# Patient Record
Sex: MALE | Race: WHITE | Employment: OTHER | URBAN - METROPOLITAN AREA
[De-identification: names, ages, dates, MRNs, and addresses within clinical notes are randomized per-mention and may not be internally consistent; named-entity substitution may affect disease eponyms.]

---

## 2017-04-17 DIAGNOSIS — I10 ESSENTIAL HYPERTENSION, BENIGN: ICD-10-CM

## 2017-04-17 DIAGNOSIS — J30.9 ALLERGIC RHINITIS: ICD-10-CM

## 2017-04-17 RX ORDER — HYDROCHLOROTHIAZIDE 25 MG/1
TABLET ORAL
Qty: 90 TABLET | Refills: 0 | Status: SHIPPED | OUTPATIENT
Start: 2017-04-17 | End: 2017-07-21 | Stop reason: SDUPTHER

## 2017-04-17 RX ORDER — LORATADINE 10 MG/1
10 TABLET ORAL DAILY
Qty: 90 TABLET | Refills: 0 | Status: SHIPPED | OUTPATIENT
Start: 2017-04-17 | End: 2017-07-21 | Stop reason: SDUPTHER

## 2017-04-17 RX ORDER — FLUTICASONE PROPIONATE 50 MCG
1 SPRAY, SUSPENSION (ML) NASAL DAILY
Qty: 3 BOTTLE | Refills: 0 | Status: SHIPPED | OUTPATIENT
Start: 2017-04-17 | End: 2017-07-21 | Stop reason: SDUPTHER

## 2017-05-01 ENCOUNTER — OFFICE VISIT (OUTPATIENT)
Dept: FAMILY MEDICINE CLINIC | Age: 57
End: 2017-05-01

## 2017-05-01 VITALS
OXYGEN SATURATION: 98 % | TEMPERATURE: 98.4 F | BODY MASS INDEX: 49.44 KG/M2 | HEART RATE: 86 BPM | RESPIRATION RATE: 16 BRPM | HEIGHT: 67 IN | DIASTOLIC BLOOD PRESSURE: 101 MMHG | SYSTOLIC BLOOD PRESSURE: 147 MMHG | WEIGHT: 315 LBS

## 2017-05-01 DIAGNOSIS — I10 ESSENTIAL HYPERTENSION, BENIGN: Primary | ICD-10-CM

## 2017-05-01 DIAGNOSIS — E78.5 HYPERLIPIDEMIA WITH TARGET LDL LESS THAN 100: ICD-10-CM

## 2017-05-01 DIAGNOSIS — K76.0 FATTY LIVER DISEASE, NONALCOHOLIC: ICD-10-CM

## 2017-05-01 DIAGNOSIS — Z12.11 COLON CANCER SCREENING: ICD-10-CM

## 2017-05-01 DIAGNOSIS — R73.01 IMPAIRED FASTING GLUCOSE: ICD-10-CM

## 2017-05-01 DIAGNOSIS — E66.01 MORBID OBESITY WITH BMI OF 50.0-59.9, ADULT (HCC): ICD-10-CM

## 2017-05-01 DIAGNOSIS — Z12.5 PROSTATE CANCER SCREENING: Primary | ICD-10-CM

## 2017-05-01 DIAGNOSIS — J30.1 SEASONAL ALLERGIC RHINITIS DUE TO POLLEN: ICD-10-CM

## 2017-05-01 DIAGNOSIS — I10 ESSENTIAL HYPERTENSION, BENIGN: ICD-10-CM

## 2017-05-01 LAB
A/G RATIO: 1.8 (ref 1.1–2.2)
ALBUMIN SERPL-MCNC: 4.6 G/DL (ref 3.4–5)
ALP BLD-CCNC: 68 U/L (ref 40–129)
ALT SERPL-CCNC: 37 U/L (ref 10–40)
ANION GAP SERPL CALCULATED.3IONS-SCNC: 15 MMOL/L (ref 3–16)
AST SERPL-CCNC: 21 U/L (ref 15–37)
BILIRUB SERPL-MCNC: 0.3 MG/DL (ref 0–1)
BUN BLDV-MCNC: 16 MG/DL (ref 7–20)
CALCIUM SERPL-MCNC: 9.7 MG/DL (ref 8.3–10.6)
CHLORIDE BLD-SCNC: 101 MMOL/L (ref 99–110)
CHOLESTEROL, TOTAL: 221 MG/DL (ref 0–199)
CO2: 27 MMOL/L (ref 21–32)
CREAT SERPL-MCNC: 0.8 MG/DL (ref 0.9–1.3)
GFR AFRICAN AMERICAN: >60
GFR NON-AFRICAN AMERICAN: >60
GLOBULIN: 2.5 G/DL
GLUCOSE BLD-MCNC: 93 MG/DL (ref 70–99)
HDLC SERPL-MCNC: 49 MG/DL (ref 40–60)
LDL CHOLESTEROL CALCULATED: 116 MG/DL
POTASSIUM SERPL-SCNC: 4.8 MMOL/L (ref 3.5–5.1)
PROSTATE SPECIFIC ANTIGEN: 0.78 NG/ML (ref 0–4)
SODIUM BLD-SCNC: 143 MMOL/L (ref 136–145)
TOTAL PROTEIN: 7.1 G/DL (ref 6.4–8.2)
TRIGL SERPL-MCNC: 278 MG/DL (ref 0–150)
VLDLC SERPL CALC-MCNC: 56 MG/DL

## 2017-05-01 PROCEDURE — 99214 OFFICE O/P EST MOD 30 MIN: CPT | Performed by: FAMILY MEDICINE

## 2017-05-01 RX ORDER — LISINOPRIL 5 MG/1
5 TABLET ORAL DAILY
Qty: 90 TABLET | Refills: 0 | Status: SHIPPED | OUTPATIENT
Start: 2017-05-01 | End: 2017-05-30 | Stop reason: SDUPTHER

## 2017-05-01 ASSESSMENT — ENCOUNTER SYMPTOMS
ABDOMINAL DISTENTION: 0
COUGH: 0
STRIDOR: 0
APNEA: 0
WHEEZING: 0
CHOKING: 0
ABDOMINAL PAIN: 0
CONSTIPATION: 0
RECTAL PAIN: 0
NAUSEA: 0
SHORTNESS OF BREATH: 0
CHEST TIGHTNESS: 0
DIARRHEA: 0
BLOOD IN STOOL: 0
VOMITING: 0
ANAL BLEEDING: 0

## 2017-05-02 LAB
ESTIMATED AVERAGE GLUCOSE: 116.9 MG/DL
HBA1C MFR BLD: 5.7 %

## 2017-05-30 ENCOUNTER — OFFICE VISIT (OUTPATIENT)
Dept: FAMILY MEDICINE CLINIC | Age: 57
End: 2017-05-30

## 2017-05-30 VITALS
DIASTOLIC BLOOD PRESSURE: 95 MMHG | WEIGHT: 315 LBS | RESPIRATION RATE: 16 BRPM | HEART RATE: 84 BPM | BODY MASS INDEX: 49.44 KG/M2 | TEMPERATURE: 97.8 F | SYSTOLIC BLOOD PRESSURE: 144 MMHG | OXYGEN SATURATION: 98 % | HEIGHT: 67 IN

## 2017-05-30 DIAGNOSIS — I10 ESSENTIAL HYPERTENSION: Primary | ICD-10-CM

## 2017-05-30 DIAGNOSIS — E66.01 MORBID OBESITY WITH BMI OF 45.0-49.9, ADULT (HCC): ICD-10-CM

## 2017-05-30 DIAGNOSIS — K76.0 FATTY LIVER DISEASE, NONALCOHOLIC: ICD-10-CM

## 2017-05-30 DIAGNOSIS — J30.1 SEASONAL ALLERGIC RHINITIS DUE TO POLLEN: ICD-10-CM

## 2017-05-30 DIAGNOSIS — E78.2 MIXED HYPERLIPIDEMIA: ICD-10-CM

## 2017-05-30 DIAGNOSIS — R73.01 IMPAIRED FASTING GLUCOSE: ICD-10-CM

## 2017-05-30 PROCEDURE — 99214 OFFICE O/P EST MOD 30 MIN: CPT | Performed by: FAMILY MEDICINE

## 2017-05-30 RX ORDER — LISINOPRIL 10 MG/1
10 TABLET ORAL DAILY
Qty: 90 TABLET | Refills: 0 | Status: SHIPPED | OUTPATIENT
Start: 2017-05-30 | End: 2017-09-09 | Stop reason: SDUPTHER

## 2017-05-30 ASSESSMENT — ENCOUNTER SYMPTOMS
ABDOMINAL DISTENTION: 0
BLOOD IN STOOL: 0
SHORTNESS OF BREATH: 0
DIARRHEA: 0
ANAL BLEEDING: 0
COUGH: 0
NAUSEA: 0
CHOKING: 0
STRIDOR: 0
APNEA: 0
WHEEZING: 0
CHEST TIGHTNESS: 0
ABDOMINAL PAIN: 0
RECTAL PAIN: 0
CONSTIPATION: 0
VOMITING: 0

## 2017-06-16 ENCOUNTER — OFFICE VISIT (OUTPATIENT)
Dept: FAMILY MEDICINE CLINIC | Age: 57
End: 2017-06-16

## 2017-06-16 VITALS
SYSTOLIC BLOOD PRESSURE: 138 MMHG | HEART RATE: 88 BPM | DIASTOLIC BLOOD PRESSURE: 82 MMHG | BODY MASS INDEX: 47.74 KG/M2 | RESPIRATION RATE: 16 BRPM | HEIGHT: 68 IN | WEIGHT: 315 LBS

## 2017-06-16 DIAGNOSIS — E78.5 HYPERLIPIDEMIA WITH TARGET LDL LESS THAN 100: ICD-10-CM

## 2017-06-16 DIAGNOSIS — J30.1 SEASONAL ALLERGIC RHINITIS DUE TO POLLEN: ICD-10-CM

## 2017-06-16 DIAGNOSIS — E66.01 MORBID OBESITY WITH BMI OF 45.0-49.9, ADULT (HCC): ICD-10-CM

## 2017-06-16 DIAGNOSIS — I10 ESSENTIAL HYPERTENSION: Primary | ICD-10-CM

## 2017-06-16 PROCEDURE — 99213 OFFICE O/P EST LOW 20 MIN: CPT | Performed by: FAMILY MEDICINE

## 2017-06-16 ASSESSMENT — ENCOUNTER SYMPTOMS
DIARRHEA: 0
NAUSEA: 0
VOMITING: 0
STRIDOR: 0
ABDOMINAL PAIN: 0
RECTAL PAIN: 0
WHEEZING: 0
CONSTIPATION: 0
APNEA: 0
ABDOMINAL DISTENTION: 0
BLOOD IN STOOL: 0
ANAL BLEEDING: 0
SHORTNESS OF BREATH: 0
CHOKING: 0
CHEST TIGHTNESS: 0
COUGH: 0

## 2017-07-13 DIAGNOSIS — E78.2 MIXED HYPERLIPIDEMIA: ICD-10-CM

## 2017-07-13 DIAGNOSIS — R73.01 IMPAIRED FASTING GLUCOSE: ICD-10-CM

## 2017-07-13 DIAGNOSIS — I10 ESSENTIAL HYPERTENSION: ICD-10-CM

## 2017-07-13 DIAGNOSIS — E87.5 HYPERKALEMIA: Primary | ICD-10-CM

## 2017-07-13 LAB
A/G RATIO: 1.6 (ref 1.1–2.2)
ALBUMIN SERPL-MCNC: 4.5 G/DL (ref 3.4–5)
ALP BLD-CCNC: 63 U/L (ref 40–129)
ALT SERPL-CCNC: 38 U/L (ref 10–40)
ANION GAP SERPL CALCULATED.3IONS-SCNC: 14 MMOL/L (ref 3–16)
AST SERPL-CCNC: 20 U/L (ref 15–37)
BILIRUB SERPL-MCNC: <0.2 MG/DL (ref 0–1)
BUN BLDV-MCNC: 25 MG/DL (ref 7–20)
CALCIUM SERPL-MCNC: 9.5 MG/DL (ref 8.3–10.6)
CHLORIDE BLD-SCNC: 108 MMOL/L (ref 99–110)
CHOLESTEROL, TOTAL: 200 MG/DL (ref 0–199)
CO2: 24 MMOL/L (ref 21–32)
CREAT SERPL-MCNC: 0.8 MG/DL (ref 0.9–1.3)
GFR AFRICAN AMERICAN: >60
GFR NON-AFRICAN AMERICAN: >60
GLOBULIN: 2.8 G/DL
GLUCOSE BLD-MCNC: 98 MG/DL (ref 70–99)
HDLC SERPL-MCNC: 45 MG/DL (ref 40–60)
LDL CHOLESTEROL CALCULATED: 127 MG/DL
POTASSIUM SERPL-SCNC: 5.2 MMOL/L (ref 3.5–5.1)
SODIUM BLD-SCNC: 146 MMOL/L (ref 136–145)
TOTAL PROTEIN: 7.3 G/DL (ref 6.4–8.2)
TRIGL SERPL-MCNC: 142 MG/DL (ref 0–150)
VLDLC SERPL CALC-MCNC: 28 MG/DL

## 2017-07-17 DIAGNOSIS — E87.5 HYPERKALEMIA: ICD-10-CM

## 2017-07-17 LAB — POTASSIUM SERPL-SCNC: 4.5 MMOL/L (ref 3.5–5.1)

## 2017-07-21 DIAGNOSIS — I10 ESSENTIAL HYPERTENSION, BENIGN: ICD-10-CM

## 2017-07-21 DIAGNOSIS — J30.9 ALLERGIC RHINITIS: ICD-10-CM

## 2017-07-21 RX ORDER — LORATADINE 10 MG/1
10 TABLET ORAL DAILY
Qty: 90 TABLET | Refills: 0 | Status: SHIPPED | OUTPATIENT
Start: 2017-07-21 | End: 2017-10-25 | Stop reason: SDUPTHER

## 2017-07-21 RX ORDER — FLUTICASONE PROPIONATE 50 MCG
1 SPRAY, SUSPENSION (ML) NASAL DAILY
Qty: 3 BOTTLE | Refills: 0 | Status: SHIPPED | OUTPATIENT
Start: 2017-07-21 | End: 2018-08-29 | Stop reason: SDUPTHER

## 2017-07-21 RX ORDER — HYDROCHLOROTHIAZIDE 25 MG/1
TABLET ORAL
Qty: 90 TABLET | Refills: 0 | Status: SHIPPED | OUTPATIENT
Start: 2017-07-21 | End: 2017-10-25 | Stop reason: SDUPTHER

## 2017-09-09 DIAGNOSIS — I10 ESSENTIAL HYPERTENSION: ICD-10-CM

## 2017-09-09 RX ORDER — LISINOPRIL 10 MG/1
10 TABLET ORAL DAILY
Qty: 30 TABLET | Refills: 0 | Status: SHIPPED | OUTPATIENT
Start: 2017-09-09 | End: 2017-10-25 | Stop reason: SDUPTHER

## 2017-09-13 ENCOUNTER — PAT TELEPHONE (OUTPATIENT)
Dept: PREADMISSION TESTING | Age: 57
End: 2017-09-13

## 2017-09-13 VITALS — BODY MASS INDEX: 47.74 KG/M2 | HEIGHT: 68 IN | WEIGHT: 315 LBS

## 2017-09-14 ENCOUNTER — HOSPITAL ENCOUNTER (OUTPATIENT)
Dept: ENDOSCOPY | Age: 57
Discharge: HOME | End: 2017-09-14
Attending: INTERNAL MEDICINE | Admitting: INTERNAL MEDICINE

## 2017-09-14 VITALS
TEMPERATURE: 97 F | SYSTOLIC BLOOD PRESSURE: 134 MMHG | HEART RATE: 63 BPM | WEIGHT: 315 LBS | BODY MASS INDEX: 47.74 KG/M2 | DIASTOLIC BLOOD PRESSURE: 68 MMHG | HEIGHT: 68 IN | OXYGEN SATURATION: 96 % | RESPIRATION RATE: 18 BRPM

## 2017-09-14 RX ORDER — SODIUM CHLORIDE 9 MG/ML
INJECTION, SOLUTION INTRAVENOUS CONTINUOUS
Status: DISCONTINUED | OUTPATIENT
Start: 2017-09-14 | End: 2017-09-15 | Stop reason: HOSPADM

## 2017-09-14 RX ORDER — SODIUM CHLORIDE 0.9 % (FLUSH) 0.9 %
10 SYRINGE (ML) INJECTION PRN
Status: DISCONTINUED | OUTPATIENT
Start: 2017-09-14 | End: 2017-09-15 | Stop reason: HOSPADM

## 2017-09-14 RX ORDER — LABETALOL HYDROCHLORIDE 5 MG/ML
5 INJECTION, SOLUTION INTRAVENOUS EVERY 10 MIN PRN
Status: DISCONTINUED | OUTPATIENT
Start: 2017-09-14 | End: 2017-09-15 | Stop reason: HOSPADM

## 2017-09-14 RX ORDER — SODIUM CHLORIDE 0.9 % (FLUSH) 0.9 %
10 SYRINGE (ML) INJECTION EVERY 12 HOURS SCHEDULED
Status: DISCONTINUED | OUTPATIENT
Start: 2017-09-14 | End: 2017-09-15 | Stop reason: HOSPADM

## 2017-09-14 RX ORDER — PROMETHAZINE HYDROCHLORIDE 25 MG/ML
6.25 INJECTION, SOLUTION INTRAMUSCULAR; INTRAVENOUS
Status: ACTIVE | OUTPATIENT
Start: 2017-09-14 | End: 2017-09-14

## 2017-09-14 RX ORDER — ONDANSETRON 2 MG/ML
4 INJECTION INTRAMUSCULAR; INTRAVENOUS
Status: ACTIVE | OUTPATIENT
Start: 2017-09-14 | End: 2017-09-14

## 2017-09-14 RX ADMIN — SODIUM CHLORIDE: 9 INJECTION, SOLUTION INTRAVENOUS at 08:23

## 2017-09-14 ASSESSMENT — PAIN DESCRIPTION - DESCRIPTORS: DESCRIPTORS: DISCOMFORT;HEADACHE

## 2017-09-14 ASSESSMENT — PAIN SCALES - GENERAL
PAINLEVEL_OUTOF10: 0

## 2017-09-14 ASSESSMENT — PAIN - FUNCTIONAL ASSESSMENT: PAIN_FUNCTIONAL_ASSESSMENT: 0-10

## 2017-10-25 DIAGNOSIS — I10 ESSENTIAL HYPERTENSION, BENIGN: ICD-10-CM

## 2017-10-25 DIAGNOSIS — I10 ESSENTIAL HYPERTENSION: ICD-10-CM

## 2017-10-25 RX ORDER — LISINOPRIL 10 MG/1
10 TABLET ORAL DAILY
Qty: 30 TABLET | Refills: 0 | Status: SHIPPED | OUTPATIENT
Start: 2017-10-25 | End: 2017-11-21 | Stop reason: SDUPTHER

## 2017-10-25 RX ORDER — HYDROCHLOROTHIAZIDE 25 MG/1
TABLET ORAL
Qty: 90 TABLET | Refills: 0 | Status: SHIPPED | OUTPATIENT
Start: 2017-10-25 | End: 2018-01-23 | Stop reason: SDUPTHER

## 2017-10-25 RX ORDER — LORATADINE 10 MG/1
10 TABLET ORAL DAILY
Qty: 90 TABLET | Refills: 0 | Status: SHIPPED | OUTPATIENT
Start: 2017-10-25 | End: 2018-01-23 | Stop reason: SDUPTHER

## 2017-10-26 DIAGNOSIS — I10 ESSENTIAL HYPERTENSION, BENIGN: ICD-10-CM

## 2017-10-26 DIAGNOSIS — I10 ESSENTIAL HYPERTENSION: ICD-10-CM

## 2017-10-26 RX ORDER — LISINOPRIL 10 MG/1
10 TABLET ORAL DAILY
Qty: 30 TABLET | Refills: 0 | OUTPATIENT
Start: 2017-10-26

## 2017-10-26 RX ORDER — HYDROCHLOROTHIAZIDE 25 MG/1
TABLET ORAL
Qty: 90 TABLET | Refills: 0 | OUTPATIENT
Start: 2017-10-26

## 2017-10-26 RX ORDER — LORATADINE 10 MG/1
10 TABLET ORAL DAILY
Qty: 90 TABLET | Refills: 0 | OUTPATIENT
Start: 2017-10-26

## 2017-10-26 NOTE — TELEPHONE ENCOUNTER
From: Keanu Chavez  Sent: 10/25/2017 5:27 PM EDT  Subject: Medication Renewal Request    Keanu Chavez would like a refill of the following medications:  loratadine (CLARITIN) 10 MG tablet Tracy Perez MD]  hydrochlorothiazide (HYDRODIURIL) 25 MG tablet Tracy Perez MD]  lisinopril (PRINIVIL;ZESTRIL) 10 MG tablet Tracy Perez MD]    Preferred pharmacy: [address] [place] [address], [address] [place] [address] [phone] - F [phone]    Comment:  Need 90 day supply on all 3.  Sent this request in yesterday but was nit sure it went thru

## 2017-11-21 DIAGNOSIS — I10 ESSENTIAL HYPERTENSION: ICD-10-CM

## 2017-11-21 RX ORDER — LISINOPRIL 10 MG/1
10 TABLET ORAL DAILY
Qty: 30 TABLET | Refills: 0 | Status: SHIPPED | OUTPATIENT
Start: 2017-11-21 | End: 2017-11-21 | Stop reason: SDUPTHER

## 2017-11-21 RX ORDER — LISINOPRIL 10 MG/1
10 TABLET ORAL DAILY
Qty: 90 TABLET | Refills: 0 | Status: SHIPPED | OUTPATIENT
Start: 2017-11-21 | End: 2018-02-20 | Stop reason: SDUPTHER

## 2018-01-23 DIAGNOSIS — I10 ESSENTIAL HYPERTENSION, BENIGN: ICD-10-CM

## 2018-01-23 RX ORDER — HYDROCHLOROTHIAZIDE 25 MG/1
TABLET ORAL
Qty: 90 TABLET | Refills: 0 | Status: SHIPPED | OUTPATIENT
Start: 2018-01-23 | End: 2018-01-23 | Stop reason: SDUPTHER

## 2018-01-23 RX ORDER — LORATADINE 10 MG/1
10 TABLET ORAL DAILY
Qty: 90 TABLET | Refills: 0 | Status: SHIPPED | OUTPATIENT
Start: 2018-01-23 | End: 2018-01-23 | Stop reason: SDUPTHER

## 2018-01-23 RX ORDER — HYDROCHLOROTHIAZIDE 25 MG/1
TABLET ORAL
Qty: 30 TABLET | Refills: 0 | Status: SHIPPED | OUTPATIENT
Start: 2018-01-23 | End: 2018-02-20 | Stop reason: SDUPTHER

## 2018-01-23 RX ORDER — LORATADINE 10 MG/1
10 TABLET ORAL DAILY
Qty: 30 TABLET | Refills: 0 | Status: SHIPPED | OUTPATIENT
Start: 2018-01-23 | End: 2018-02-20 | Stop reason: SDUPTHER

## 2018-02-20 DIAGNOSIS — I10 ESSENTIAL HYPERTENSION: ICD-10-CM

## 2018-02-20 DIAGNOSIS — I10 ESSENTIAL HYPERTENSION, BENIGN: ICD-10-CM

## 2018-02-20 RX ORDER — LORATADINE 10 MG/1
10 TABLET ORAL DAILY
Qty: 90 TABLET | Refills: 0 | Status: SHIPPED | OUTPATIENT
Start: 2018-02-20 | End: 2018-05-23 | Stop reason: SDUPTHER

## 2018-02-20 RX ORDER — HYDROCHLOROTHIAZIDE 25 MG/1
TABLET ORAL
Qty: 90 TABLET | Refills: 0 | Status: SHIPPED | OUTPATIENT
Start: 2018-02-20 | End: 2018-05-23 | Stop reason: SDUPTHER

## 2018-02-20 RX ORDER — LISINOPRIL 10 MG/1
10 TABLET ORAL DAILY
Qty: 90 TABLET | Refills: 0 | Status: SHIPPED | OUTPATIENT
Start: 2018-02-20 | End: 2018-05-23 | Stop reason: SDUPTHER

## 2018-02-21 NOTE — TELEPHONE ENCOUNTER
From: Nicky Cobian  Sent: 2/20/2018 8:02 PM EST  Subject: Medication Renewal Request    Nickysukhdeep Cobian would like a refill of the following medications:  lisinopril (PRINIVIL;ZESTRIL) 10 MG tablet Bony Porras MD]  loratadine (CLARITIN) 10 MG tablet Bony Porras MD]  hydrochlorothiazide (HYDRODIURIL) 25 MG tablet Bony Porras MD]    Preferred pharmacy: [address] [place] [address], [address] [place] [address] [phone] - F [phone]    Comment:  Need a 90 day supply on ALL 3 and they must go thru [facility] not [facility]

## 2018-02-22 DIAGNOSIS — I10 ESSENTIAL HYPERTENSION, BENIGN: ICD-10-CM

## 2018-02-22 RX ORDER — HYDROCHLOROTHIAZIDE 25 MG/1
TABLET ORAL
Qty: 30 TABLET | Refills: 0 | Status: SHIPPED | OUTPATIENT
Start: 2018-02-22 | End: 2018-10-01 | Stop reason: CLARIF

## 2018-02-22 RX ORDER — LORATADINE 10 MG/1
10 TABLET ORAL DAILY
Qty: 30 TABLET | Refills: 0 | Status: SHIPPED | OUTPATIENT
Start: 2018-02-22 | End: 2018-10-01 | Stop reason: CLARIF

## 2018-03-19 DIAGNOSIS — I10 ESSENTIAL HYPERTENSION: ICD-10-CM

## 2018-03-19 RX ORDER — LISINOPRIL 10 MG/1
10 TABLET ORAL DAILY
Qty: 30 TABLET | Refills: 0 | Status: SHIPPED | OUTPATIENT
Start: 2018-03-19 | End: 2018-10-01 | Stop reason: CLARIF

## 2018-05-23 DIAGNOSIS — I10 ESSENTIAL HYPERTENSION, BENIGN: ICD-10-CM

## 2018-05-23 DIAGNOSIS — I10 ESSENTIAL HYPERTENSION: ICD-10-CM

## 2018-05-23 RX ORDER — HYDROCHLOROTHIAZIDE 25 MG/1
TABLET ORAL
Qty: 90 TABLET | Refills: 0 | Status: SHIPPED | OUTPATIENT
Start: 2018-05-23 | End: 2018-08-29 | Stop reason: SDUPTHER

## 2018-05-23 RX ORDER — LISINOPRIL 10 MG/1
10 TABLET ORAL DAILY
Qty: 90 TABLET | Refills: 0 | Status: SHIPPED | OUTPATIENT
Start: 2018-05-23 | End: 2018-08-29 | Stop reason: SDUPTHER

## 2018-05-23 RX ORDER — LORATADINE 10 MG/1
10 TABLET ORAL DAILY
Qty: 90 TABLET | Refills: 0 | Status: SHIPPED | OUTPATIENT
Start: 2018-05-23 | End: 2018-08-29 | Stop reason: SDUPTHER

## 2018-08-29 DIAGNOSIS — I10 ESSENTIAL HYPERTENSION, BENIGN: ICD-10-CM

## 2018-08-29 DIAGNOSIS — I10 ESSENTIAL HYPERTENSION: ICD-10-CM

## 2018-08-29 DIAGNOSIS — J30.9 ALLERGIC RHINITIS: ICD-10-CM

## 2018-08-29 RX ORDER — FLUTICASONE PROPIONATE 50 MCG
1 SPRAY, SUSPENSION (ML) NASAL DAILY
Qty: 3 BOTTLE | Refills: 0 | Status: SHIPPED | OUTPATIENT
Start: 2018-08-29 | End: 2018-08-30 | Stop reason: SDUPTHER

## 2018-08-29 RX ORDER — LORATADINE 10 MG/1
10 TABLET ORAL DAILY
Qty: 90 TABLET | Refills: 0 | Status: SHIPPED | OUTPATIENT
Start: 2018-08-29 | End: 2018-08-30 | Stop reason: SDUPTHER

## 2018-08-29 RX ORDER — HYDROCHLOROTHIAZIDE 25 MG/1
TABLET ORAL
Qty: 30 TABLET | Refills: 0 | Status: SHIPPED | OUTPATIENT
Start: 2018-08-29 | End: 2018-08-30 | Stop reason: SDUPTHER

## 2018-08-29 RX ORDER — LISINOPRIL 10 MG/1
10 TABLET ORAL DAILY
Qty: 30 TABLET | Refills: 0 | Status: SHIPPED | OUTPATIENT
Start: 2018-08-29 | End: 2018-08-30 | Stop reason: SDUPTHER

## 2018-08-30 DIAGNOSIS — I10 ESSENTIAL HYPERTENSION: ICD-10-CM

## 2018-08-30 DIAGNOSIS — J30.9 ALLERGIC RHINITIS, UNSPECIFIED SEASONALITY, UNSPECIFIED TRIGGER: ICD-10-CM

## 2018-08-30 DIAGNOSIS — I10 ESSENTIAL HYPERTENSION, BENIGN: ICD-10-CM

## 2018-08-30 RX ORDER — FLUTICASONE PROPIONATE 50 MCG
1 SPRAY, SUSPENSION (ML) NASAL DAILY
Qty: 3 BOTTLE | Refills: 0 | Status: SHIPPED | OUTPATIENT
Start: 2018-08-30 | End: 2018-12-26 | Stop reason: SDUPTHER

## 2018-08-30 RX ORDER — LISINOPRIL 10 MG/1
10 TABLET ORAL DAILY
Qty: 30 TABLET | Refills: 0 | Status: SHIPPED | OUTPATIENT
Start: 2018-08-30 | End: 2018-10-01 | Stop reason: SDUPTHER

## 2018-08-30 RX ORDER — LORATADINE 10 MG/1
10 TABLET ORAL DAILY
Qty: 90 TABLET | Refills: 0 | Status: SHIPPED | OUTPATIENT
Start: 2018-08-30 | End: 2018-10-01 | Stop reason: SDUPTHER

## 2018-08-30 RX ORDER — HYDROCHLOROTHIAZIDE 25 MG/1
TABLET ORAL
Qty: 30 TABLET | Refills: 0 | Status: SHIPPED | OUTPATIENT
Start: 2018-08-30 | End: 2018-10-01 | Stop reason: SDUPTHER

## 2018-08-30 NOTE — TELEPHONE ENCOUNTER
[address]    There is a problem with his script for     hydrochlorothiazide (HYDRODIURIL) 25 MG tablet  TAKE ONE TABLET BY MOUTH ONCE DAILY, Disp-30 tablet, R-0    lisinopril (PRINIVIL;ZESTRIL) 10 MG tablet  TAKE 1 TABLET BY MOUTH DAILY, Disp-30 tablet, R-0    loratadine (CLARITIN) 10 MG tablet  TAKE 1 TABLET BY MOUTH DAILY, Disp-30 tablet, R-0    fluticasone (FLONASE) 50 MCG/ACT nasal spray  1 spray by Nasal route daily, Disp-3 Bottle, R-0    Says [facility] does not accept our insurance. want us to call it in to [facility] instead?     Last seen  6/16/2017

## 2018-08-30 NOTE — TELEPHONE ENCOUNTER
Medications have been refilled for 30days only. Last office appointment was >1year ago(6/2017). Needs to schedule an office appointment within 4weeks for medication-blood pressure check & further refills.

## 2018-10-01 ENCOUNTER — OFFICE VISIT (OUTPATIENT)
Dept: FAMILY MEDICINE CLINIC | Age: 58
End: 2018-10-01
Payer: COMMERCIAL

## 2018-10-01 VITALS
BODY MASS INDEX: 47.74 KG/M2 | TEMPERATURE: 98.6 F | WEIGHT: 315 LBS | OXYGEN SATURATION: 99 % | DIASTOLIC BLOOD PRESSURE: 82 MMHG | HEART RATE: 81 BPM | SYSTOLIC BLOOD PRESSURE: 122 MMHG | HEIGHT: 68 IN | RESPIRATION RATE: 16 BRPM

## 2018-10-01 DIAGNOSIS — J30.1 SEASONAL ALLERGIC RHINITIS DUE TO POLLEN: ICD-10-CM

## 2018-10-01 DIAGNOSIS — R73.03 PREDIABETES: ICD-10-CM

## 2018-10-01 DIAGNOSIS — Z12.5 SCREENING PSA (PROSTATE SPECIFIC ANTIGEN): ICD-10-CM

## 2018-10-01 DIAGNOSIS — I10 ESSENTIAL HYPERTENSION: ICD-10-CM

## 2018-10-01 DIAGNOSIS — I10 ESSENTIAL HYPERTENSION: Primary | ICD-10-CM

## 2018-10-01 DIAGNOSIS — K76.0 FATTY LIVER: ICD-10-CM

## 2018-10-01 DIAGNOSIS — E78.2 MIXED HYPERLIPIDEMIA: ICD-10-CM

## 2018-10-01 DIAGNOSIS — E66.01 OBESITY, MORBID (HCC): ICD-10-CM

## 2018-10-01 LAB
A/G RATIO: 1.5 (ref 1.1–2.2)
ALBUMIN SERPL-MCNC: 4.7 G/DL (ref 3.4–5)
ALP BLD-CCNC: 85 U/L (ref 40–129)
ALT SERPL-CCNC: 44 U/L (ref 10–40)
ANION GAP SERPL CALCULATED.3IONS-SCNC: 17 MMOL/L (ref 3–16)
AST SERPL-CCNC: 26 U/L (ref 15–37)
BILIRUB SERPL-MCNC: 0.3 MG/DL (ref 0–1)
BUN BLDV-MCNC: 26 MG/DL (ref 7–20)
CALCIUM SERPL-MCNC: 9.6 MG/DL (ref 8.3–10.6)
CHLORIDE BLD-SCNC: 102 MMOL/L (ref 99–110)
CHOLESTEROL, TOTAL: 252 MG/DL (ref 0–199)
CO2: 23 MMOL/L (ref 21–32)
CREAT SERPL-MCNC: 0.9 MG/DL (ref 0.9–1.3)
GFR AFRICAN AMERICAN: >60
GFR NON-AFRICAN AMERICAN: >60
GLOBULIN: 3.1 G/DL
GLUCOSE BLD-MCNC: 117 MG/DL (ref 70–99)
HDLC SERPL-MCNC: 49 MG/DL (ref 40–60)
LDL CHOLESTEROL CALCULATED: 166 MG/DL
POTASSIUM SERPL-SCNC: 5 MMOL/L (ref 3.5–5.1)
PROSTATE SPECIFIC ANTIGEN: 0.86 NG/ML (ref 0–4)
SODIUM BLD-SCNC: 142 MMOL/L (ref 136–145)
TOTAL PROTEIN: 7.8 G/DL (ref 6.4–8.2)
TRIGL SERPL-MCNC: 183 MG/DL (ref 0–150)
VLDLC SERPL CALC-MCNC: 37 MG/DL

## 2018-10-01 PROCEDURE — 99214 OFFICE O/P EST MOD 30 MIN: CPT | Performed by: FAMILY MEDICINE

## 2018-10-01 RX ORDER — LORATADINE 10 MG/1
10 TABLET ORAL DAILY
Qty: 90 TABLET | Refills: 0 | Status: SHIPPED | OUTPATIENT
Start: 2018-10-01 | End: 2018-12-26 | Stop reason: SDUPTHER

## 2018-10-01 RX ORDER — HYDROCHLOROTHIAZIDE 25 MG/1
TABLET ORAL
Qty: 90 TABLET | Refills: 0 | Status: SHIPPED | OUTPATIENT
Start: 2018-10-01 | End: 2018-12-26 | Stop reason: SDUPTHER

## 2018-10-01 RX ORDER — LISINOPRIL 10 MG/1
10 TABLET ORAL DAILY
Qty: 90 TABLET | Refills: 0 | Status: SHIPPED | OUTPATIENT
Start: 2018-10-01 | End: 2018-12-26 | Stop reason: SDUPTHER

## 2018-10-01 ASSESSMENT — ENCOUNTER SYMPTOMS
COLOR CHANGE: 0
VOMITING: 0
BLOOD IN STOOL: 0
CHOKING: 0
CONSTIPATION: 0
ABDOMINAL DISTENTION: 0
CHEST TIGHTNESS: 0
STRIDOR: 0
RECTAL PAIN: 0
SHORTNESS OF BREATH: 0
DIARRHEA: 0
ABDOMINAL PAIN: 0
NAUSEA: 0
WHEEZING: 0
ANAL BLEEDING: 0
COUGH: 0
APNEA: 0

## 2018-10-01 ASSESSMENT — PATIENT HEALTH QUESTIONNAIRE - PHQ9
1. LITTLE INTEREST OR PLEASURE IN DOING THINGS: 0
SUM OF ALL RESPONSES TO PHQ9 QUESTIONS 1 & 2: 0
SUM OF ALL RESPONSES TO PHQ QUESTIONS 1-9: 0
2. FEELING DOWN, DEPRESSED OR HOPELESS: 0
SUM OF ALL RESPONSES TO PHQ QUESTIONS 1-9: 0

## 2018-10-01 NOTE — PROGRESS NOTES
Subjective:      Patient ID: Flor Earl is a 66 y.o. male. Hypertension   Pertinent negatives include no chest pain, palpitations or shortness of breath. HTN:reports doing well. Takes lisinopril 10mg & 25mg HCTZ w/o side effects. Needs refill. Adds salt to food at the table:Never does  No other new associated factors. Improving with current medication. Worsened by nothing else. Denies cp/sob/pnd/ankle edema/dizziness. Hyperlipidemia:doing well. Labs are due. No other associated or worsening factors. Denies adbo pain/myalgias. Allergic rhinitis:reports doing well. No other associated concerns. Denies neck pain-stiffness/photophobia/fever/chills/appetite changes/rash/wheezing/cough/sob/sore throat/epistaxis. IFG:good diet regime. A1c due. No other new associated concerns. Denies polyuria/polyphagia/polydipsia/unexpected weight loss or gain. Fatty liver:labs due. No Known Allergies    Current Outpatient Prescriptions on File Prior to Visit   Medication Sig Dispense Refill    fluticasone (FLONASE) 50 MCG/ACT nasal spray 1 spray by Nasal route daily 3 Bottle 0    lisinopril (PRINIVIL;ZESTRIL) 10 MG tablet Take 1 tablet by mouth daily 30 tablet 0    loratadine (CLARITIN) 10 MG tablet Take 1 tablet by mouth daily 90 tablet 0    hydrochlorothiazide (HYDRODIURIL) 25 MG tablet TAKE ONE TABLET BY MOUTH ONCE DAILY 30 tablet 0    lisinopril (PRINIVIL;ZESTRIL) 10 MG tablet TAKE 1 TABLET BY MOUTH DAILY 30 tablet 0    loratadine (CLARITIN) 10 MG tablet TAKE 1 TABLET BY MOUTH DAILY 30 tablet 0    hydrochlorothiazide (HYDRODIURIL) 25 MG tablet TAKE ONE TABLET BY MOUTH ONCE DAILY 30 tablet 0    meclizine (ANTIVERT) 25 MG tablet Take 1-2 tablets every 6-8 hours as needed for dizziness 30 tablet 0     No current facility-administered medications on file prior to visit.         Past Medical History:   Diagnosis Date    Allergic rhinitis     Essential hypertension, benign     Fatty liver     Impaired fasting glucose 5/2013    Mixed hyperlipidemia      Obesity     Prostate cancer screening     Declined 4/3/13    Screening PSA (prostate specific antigen) 12/30/2015;5/1/17    Nml:0.53(12/30/2015);5/1/17=nml. Social History   Substance Use Topics    Smoking status: Former Smoker     Packs/day: 2.00     Years: 25.00     Types: Cigarettes     Quit date: 5/1/2006    Smokeless tobacco: Never Used    Alcohol use Yes      Comment: ocas     History   Drug Use No                 Review of Systems   Constitutional: Negative for activity change, appetite change, chills, diaphoresis, fatigue, fever and unexpected weight change. Eyes: Negative for visual disturbance. Respiratory: Negative for apnea, cough, choking, chest tightness, shortness of breath, wheezing and stridor. Cardiovascular: Negative for chest pain, palpitations and leg swelling. Gastrointestinal: Negative for abdominal distention, abdominal pain, anal bleeding, blood in stool, constipation, diarrhea, nausea, rectal pain and vomiting. Endocrine: Negative for cold intolerance, heat intolerance, polydipsia, polyphagia and polyuria. Skin: Negative for color change, pallor, rash and wound. Hematological: Negative for adenopathy. Psychiatric/Behavioral: Negative for sleep disturbance. Objective:   Physical Exam   Constitutional: He is oriented to person, place, and time. Vital signs are normal. He appears well-developed and well-nourished. He is cooperative. He does not have a sickly appearance. No distress. HENT:   Nose: Nose normal.   Mouth/Throat: Uvula is midline, oropharynx is clear and moist and mucous membranes are normal.   Hearing intact to nml conversation   Eyes: Pupils are equal, round, and reactive to light. Conjunctivae, EOM and lids are normal.   Neck: Trachea normal and normal range of motion. Neck supple. No JVD present. Carotid bruit is not present.    Cardiovascular: Normal rate, regular

## 2018-10-02 LAB
ESTIMATED AVERAGE GLUCOSE: 111.2 MG/DL
HBA1C MFR BLD: 5.5 %

## 2018-12-26 ENCOUNTER — OFFICE VISIT (OUTPATIENT)
Dept: FAMILY MEDICINE CLINIC | Age: 58
End: 2018-12-26
Payer: COMMERCIAL

## 2018-12-26 VITALS
HEIGHT: 68 IN | HEART RATE: 81 BPM | OXYGEN SATURATION: 98 % | DIASTOLIC BLOOD PRESSURE: 82 MMHG | BODY MASS INDEX: 47.74 KG/M2 | RESPIRATION RATE: 16 BRPM | SYSTOLIC BLOOD PRESSURE: 115 MMHG | WEIGHT: 315 LBS | TEMPERATURE: 98.2 F

## 2018-12-26 DIAGNOSIS — J30.1 SEASONAL ALLERGIC RHINITIS DUE TO POLLEN: ICD-10-CM

## 2018-12-26 DIAGNOSIS — E78.5 HYPERLIPIDEMIA WITH TARGET LDL LESS THAN 100: ICD-10-CM

## 2018-12-26 DIAGNOSIS — K76.0 NONALCOHOLIC FATTY LIVER DISEASE: ICD-10-CM

## 2018-12-26 DIAGNOSIS — I10 ESSENTIAL HYPERTENSION: ICD-10-CM

## 2018-12-26 DIAGNOSIS — E66.01 OBESITY, MORBID (HCC): ICD-10-CM

## 2018-12-26 DIAGNOSIS — R79.9 ELEVATED BUN: ICD-10-CM

## 2018-12-26 DIAGNOSIS — Z12.11 COLON CANCER SCREENING: ICD-10-CM

## 2018-12-26 DIAGNOSIS — Z00.00 ROUTINE GENERAL MEDICAL EXAMINATION AT A HEALTH CARE FACILITY: Primary | ICD-10-CM

## 2018-12-26 LAB
HEMOCCULT STL QL: NEGATIVE
HEMOCCULT STL QL: NORMAL
HEMOCCULT STL QL: NORMAL

## 2018-12-26 PROCEDURE — 82270 OCCULT BLOOD FECES: CPT | Performed by: FAMILY MEDICINE

## 2018-12-26 PROCEDURE — 99396 PREV VISIT EST AGE 40-64: CPT | Performed by: FAMILY MEDICINE

## 2018-12-26 RX ORDER — HYDROCHLOROTHIAZIDE 25 MG/1
TABLET ORAL
Qty: 90 TABLET | Refills: 0 | Status: SHIPPED | OUTPATIENT
Start: 2018-12-26 | End: 2019-03-19 | Stop reason: SDUPTHER

## 2018-12-26 RX ORDER — LORATADINE 10 MG/1
10 TABLET ORAL DAILY
Qty: 90 TABLET | Refills: 0 | Status: SHIPPED | OUTPATIENT
Start: 2018-12-26 | End: 2018-12-26 | Stop reason: SDUPTHER

## 2018-12-26 RX ORDER — FLUTICASONE PROPIONATE 50 MCG
1 SPRAY, SUSPENSION (ML) NASAL DAILY
Qty: 3 BOTTLE | Refills: 0 | Status: SHIPPED | OUTPATIENT
Start: 2018-12-26 | End: 2018-12-26 | Stop reason: SDUPTHER

## 2018-12-26 RX ORDER — LISINOPRIL 10 MG/1
10 TABLET ORAL DAILY
Qty: 90 TABLET | Refills: 0 | Status: SHIPPED | OUTPATIENT
Start: 2018-12-26 | End: 2019-03-19 | Stop reason: SDUPTHER

## 2018-12-26 RX ORDER — FLUTICASONE PROPIONATE 50 MCG
1 SPRAY, SUSPENSION (ML) NASAL DAILY
Qty: 3 BOTTLE | Refills: 0 | Status: SHIPPED | OUTPATIENT
Start: 2018-12-26 | End: 2020-09-08 | Stop reason: SDUPTHER

## 2018-12-26 RX ORDER — HYDROCHLOROTHIAZIDE 25 MG/1
TABLET ORAL
Qty: 90 TABLET | Refills: 0 | Status: SHIPPED | OUTPATIENT
Start: 2018-12-26 | End: 2018-12-26 | Stop reason: SDUPTHER

## 2018-12-26 RX ORDER — LORATADINE 10 MG/1
10 TABLET ORAL DAILY
Qty: 90 TABLET | Refills: 0 | Status: SHIPPED | OUTPATIENT
Start: 2018-12-26 | End: 2019-03-19 | Stop reason: SDUPTHER

## 2018-12-26 RX ORDER — LISINOPRIL 10 MG/1
10 TABLET ORAL DAILY
Qty: 90 TABLET | Refills: 0 | Status: SHIPPED | OUTPATIENT
Start: 2018-12-26 | End: 2018-12-26 | Stop reason: SDUPTHER

## 2018-12-26 ASSESSMENT — ENCOUNTER SYMPTOMS
PHOTOPHOBIA: 0
SORE THROAT: 0
VOICE CHANGE: 0
ABDOMINAL PAIN: 0
EYE ITCHING: 0
NAUSEA: 0
CHOKING: 0
RECTAL PAIN: 0
ANAL BLEEDING: 0
COUGH: 0
EYE PAIN: 0
COLOR CHANGE: 0
CHEST TIGHTNESS: 0
RHINORRHEA: 0
FACIAL SWELLING: 0
EYE DISCHARGE: 0
WHEEZING: 0
CONSTIPATION: 0
BACK PAIN: 0
SINUS PRESSURE: 0
ABDOMINAL DISTENTION: 0
TROUBLE SWALLOWING: 0
BLOOD IN STOOL: 0
SHORTNESS OF BREATH: 0
APNEA: 0
DIARRHEA: 0
VOMITING: 0
EYE REDNESS: 0
STRIDOR: 0

## 2018-12-26 ASSESSMENT — PATIENT HEALTH QUESTIONNAIRE - PHQ9
SUM OF ALL RESPONSES TO PHQ QUESTIONS 1-9: 0
SUM OF ALL RESPONSES TO PHQ QUESTIONS 1-9: 0
2. FEELING DOWN, DEPRESSED OR HOPELESS: 0
1. LITTLE INTEREST OR PLEASURE IN DOING THINGS: 0
SUM OF ALL RESPONSES TO PHQ9 QUESTIONS 1 & 2: 0

## 2018-12-26 NOTE — PATIENT INSTRUCTIONS
Patient Education        Learning About High Cholesterol  What is high cholesterol? Cholesterol is a type of fat in your blood. It is needed for many body functions, such as making new cells. Cholesterol is made by your body. It also comes from food you eat. If you have too much cholesterol, it starts to build up in your arteries. This is called hardening of the arteries, or atherosclerosis. High cholesterol raises your risk of a heart attack and stroke. There are different types of cholesterol. LDL is the \"bad\" cholesterol. High LDL can raise your risk for heart disease, heart attack, and stroke. HDL is the \"good\" cholesterol. High HDL is linked with a lower risk for heart disease, heart attack, and stroke. Your cholesterol levels help your doctor find out your risk for having a heart attack or stroke. How can you prevent high cholesterol? A heart-healthy lifestyle can help you prevent high cholesterol. This lifestyle helps lower your risk for a heart attack and stroke. · Eat heart-healthy foods. ? Eat fruits, vegetables, whole grains (like oatmeal), dried beans and peas, nuts and seeds, soy products (like tofu), and fat-free or low-fat dairy products. ? Replace butter, margarine, and hydrogenated or partially hydrogenated oils with olive and canola oils. (Canola oil margarine without trans fat is fine.)  ? Replace red meat with fish, poultry, and soy protein (like tofu). ? Limit processed and packaged foods like chips, crackers, and cookies. · Be active. Exercise can improve your cholesterol level. Get at least 30 minutes of exercise on most days of the week. Walking is a good choice. You also may want to do other activities, such as running, swimming, cycling, or playing tennis or team sports. · Stay at a healthy weight. Lose weight if you need to. · Don't smoke. If you need help quitting, talk to your doctor about stop-smoking programs and medicines.  These can increase your chances of quitting levels help your doctor find out your risk for having a heart attack or stroke. You and your doctor can talk about whether you need to lower your risk and what treatment is best for you. A heart-healthy lifestyle along with medicines can help lower your cholesterol and your risk. The way you choose to lower your risk will depend on how high your risk is for heart attack and stroke. It will also depend on how you feel about taking medicines. Follow-up care is a key part of your treatment and safety. Be sure to make and go to all appointments, and call your doctor if you are having problems. It's also a good idea to know your test results and keep a list of the medicines you take. How can you care for yourself at home? · Eat a variety of foods every day. Good choices include fruits, vegetables, whole grains (like oatmeal), dried beans and peas, nuts and seeds, soy products (like tofu), and fat-free or low-fat dairy products. · Replace butter, margarine, and hydrogenated or partially hydrogenated oils with olive and canola oils. (Canola oil margarine without trans fat is fine.)  · Replace red meat with fish, poultry, and soy protein (like tofu). · Limit processed and packaged foods like chips, crackers, and cookies. · Bake, broil, or steam foods. Don't pena them. · Be physically active. Get at least 30 minutes of exercise on most days of the week. Walking is a good choice. You also may want to do other activities, such as running, swimming, cycling, or playing tennis or team sports. · Stay at a healthy weight or lose weight by making the changes in eating and physical activity listed above. Losing just a small amount of weight, even 5 to 10 pounds, can reduce your risk for having a heart attack or stroke. · Do not smoke. When should you call for help?   Watch closely for changes in your health, and be sure to contact your doctor if:    · You need help making lifestyle changes.     · You have questions about

## 2018-12-26 NOTE — PROGRESS NOTES
Subjective:      Patient ID: Laura Juarez is a 66 y.o. male. HPI  Results for Brianna Rutherford (MRN [id number]) as of 12/26/2018 09:26   Ref. Range 10/1/2018 10:13   Sodium Latest Ref Range: 136 - 145 mmol/L 142   Potassium Latest Ref Range: 3.5 - 5.1 mmol/L 5.0   Chloride Latest Ref Range: 99 - 110 mmol/L 102   CO2 Latest Ref Range: 21 - 32 mmol/L 23   BUN Latest Ref Range: 7 - 25 mg/dL 26 (H)   Creatinine Latest Ref Range: 0.9 - 1.3 mg/dL 0.9   Anion Gap Latest Ref Range: 3 - 16  17 (H)   GFR Non- Latest Ref Range: >60  >60   GFR  Latest Ref Range: >60  >60   Glucose Latest Ref Range: 70 - 99 mg/dL 117 (H)   Calcium Latest Ref Range: 8.3 - 10.6 mg/dL 9.6   Total Protein Latest Ref Range: 6.4 - 8.2 g/dL 7.8   Cholesterol, Total Latest Ref Range: 0 - 199 mg/dL 252 (H)   HDL Cholesterol Latest Ref Range: 40 - 60 mg/dL 49   LDL Calculated Latest Ref Range: <100 mg/dL 166 (H)   Triglycerides Latest Ref Range: 0 - 150 mg/dL 183 (H)   VLDL Cholesterol Calculated Latest Ref Range: Not Established mg/dL 37   Albumin Latest Ref Range: 3.4 - 5.0 g/dL 4.7   Globulin Latest Units: g/dL 3.1   Albumin/Globulin Ratio Latest Ref Range: 1.1 - 2.2  1.5   Alk Phos Latest Ref Range: 40 - 129 U/L 85   ALT Latest Ref Range: 10 - 45 U/L 44    AST Latest Ref Range: 15 - 37 U/L 26   Bilirubin Latest Ref Range: 0.0 - 1.0 mg/dL 0.3   Hemoglobin A1C Latest Ref Range: See comment % 5.5   eAG (mg/dL) Latest Units: mg/dL 111.2   Prostatic Specific Ag Latest Ref Range: 0.00 - 4.00 ng/mL 0.86         58y. o male presenting for:Routine Physical exam  PSA:0.86 on 10/1/18. Performs self-testicular exams:Yes  Eye check:<12mos  Dental check:<12mos. Lipid check:see above:plans to address with diet changes. Colonoscopy:2017. Immunizations:see below. Exercise:form regime:daily walking. Smoker:no. Denies any other concerns.     Immunization History   Administered Date(s) Administered    Influenza Vaccine, unspecified

## 2018-12-31 ENCOUNTER — OFFICE VISIT (OUTPATIENT)
Dept: ORTHOPEDIC SURGERY | Age: 58
End: 2018-12-31
Payer: COMMERCIAL

## 2018-12-31 VITALS
WEIGHT: 315 LBS | HEART RATE: 85 BPM | DIASTOLIC BLOOD PRESSURE: 79 MMHG | BODY MASS INDEX: 47.74 KG/M2 | SYSTOLIC BLOOD PRESSURE: 139 MMHG | HEIGHT: 68 IN

## 2018-12-31 DIAGNOSIS — M47.26 OTHER SPONDYLOSIS WITH RADICULOPATHY, LUMBAR REGION: ICD-10-CM

## 2018-12-31 DIAGNOSIS — M54.41 ACUTE RIGHT-SIDED LOW BACK PAIN WITH RIGHT-SIDED SCIATICA: Primary | ICD-10-CM

## 2018-12-31 PROCEDURE — 99213 OFFICE O/P EST LOW 20 MIN: CPT | Performed by: PHYSICIAN ASSISTANT

## 2018-12-31 RX ORDER — METHYLPREDNISOLONE 4 MG/1
TABLET ORAL
Qty: 1 KIT | Refills: 0 | Status: SHIPPED | OUTPATIENT
Start: 2018-12-31 | End: 2019-01-06

## 2018-12-31 NOTE — PROGRESS NOTES
normal and he walks heel to toe without limp or instability. Back:  He stands with slight lumbar flexion. His lumbar flexion, extension and lateral bending are moderately reduced with pain. He has mild tenderness over his lumbar spine without obvious muscle spasm. The skin over his lumbar spine is normal without a surgical scar. Lower extremities:  He has 5/5 motor strength of bilateral lower extremities. He has a negative straight leg raise, bilaterally. Deep tendon reflexes at knees and achilles are 2+. Sensation is intact to light touch L3 to S1 bilaterally. He has no clonus. Hip range of motion painless. Imaging:  I reviewed AP and lateral xray images of his lumbar spine from today. They note mild thoracolumbar spondylosis without evidence of acute fracture or dislocation. Grade 1 spondylolisthesis noted L4-5    Assessment:  Lumbar spondylosis with radiculopathy    Plan:  We discussed treatment options including observation, oral steroids, physical therapy, additional imaging and epidural injections. I recommend he try an oral steroid taper. He was advised to stop all NSAIDs while on the medrol dose pack. He will stop the medication immediately if he develops any poor side effects. He was also given information for [place] exercises to try at home. He will call to schedule a lumbar MRI if his symptoms fail to improve or worsen.      Mary Gunter PA-C

## 2019-03-18 DIAGNOSIS — E78.5 HYPERLIPIDEMIA WITH TARGET LDL LESS THAN 100: ICD-10-CM

## 2019-03-18 DIAGNOSIS — I10 ESSENTIAL HYPERTENSION: ICD-10-CM

## 2019-03-18 DIAGNOSIS — R79.9 ELEVATED BUN: ICD-10-CM

## 2019-03-18 LAB
A/G RATIO: 1.8 (ref 1.1–2.2)
ALBUMIN SERPL-MCNC: 4.4 G/DL (ref 3.4–5)
ALP BLD-CCNC: 67 U/L (ref 40–129)
ALT SERPL-CCNC: 35 U/L (ref 10–40)
ANION GAP SERPL CALCULATED.3IONS-SCNC: 15 MMOL/L (ref 3–16)
AST SERPL-CCNC: 19 U/L (ref 15–37)
BILIRUB SERPL-MCNC: <0.2 MG/DL (ref 0–1)
BUN BLDV-MCNC: 15 MG/DL (ref 7–20)
CALCIUM SERPL-MCNC: 9.2 MG/DL (ref 8.3–10.6)
CHLORIDE BLD-SCNC: 105 MMOL/L (ref 99–110)
CHOLESTEROL, TOTAL: 212 MG/DL (ref 0–199)
CO2: 24 MMOL/L (ref 21–32)
CREAT SERPL-MCNC: 0.7 MG/DL (ref 0.9–1.3)
GFR AFRICAN AMERICAN: >60
GFR NON-AFRICAN AMERICAN: >60
GLOBULIN: 2.4 G/DL
GLUCOSE BLD-MCNC: 109 MG/DL (ref 70–99)
HDLC SERPL-MCNC: 52 MG/DL (ref 40–60)
LDL CHOLESTEROL CALCULATED: 120 MG/DL
POTASSIUM SERPL-SCNC: 4.8 MMOL/L (ref 3.5–5.1)
SODIUM BLD-SCNC: 144 MMOL/L (ref 136–145)
TOTAL PROTEIN: 6.8 G/DL (ref 6.4–8.2)
TRIGL SERPL-MCNC: 199 MG/DL (ref 0–150)
VLDLC SERPL CALC-MCNC: 40 MG/DL

## 2019-03-19 DIAGNOSIS — I10 ESSENTIAL HYPERTENSION: ICD-10-CM

## 2019-03-19 DIAGNOSIS — J30.1 SEASONAL ALLERGIC RHINITIS DUE TO POLLEN: ICD-10-CM

## 2019-03-19 RX ORDER — LISINOPRIL 10 MG/1
10 TABLET ORAL DAILY
Qty: 90 TABLET | Refills: 0 | Status: SHIPPED | OUTPATIENT
Start: 2019-03-19 | End: 2019-06-17 | Stop reason: SDUPTHER

## 2019-03-19 RX ORDER — HYDROCHLOROTHIAZIDE 25 MG/1
TABLET ORAL
Qty: 90 TABLET | Refills: 0 | Status: SHIPPED | OUTPATIENT
Start: 2019-03-19 | End: 2019-06-17 | Stop reason: SDUPTHER

## 2019-03-19 RX ORDER — LORATADINE 10 MG/1
10 TABLET ORAL DAILY
Qty: 90 TABLET | Refills: 0 | Status: SHIPPED | OUTPATIENT
Start: 2019-03-19 | End: 2019-06-17 | Stop reason: SDUPTHER

## 2019-04-29 ENCOUNTER — OFFICE VISIT (OUTPATIENT)
Dept: FAMILY MEDICINE CLINIC | Age: 59
End: 2019-04-29
Payer: COMMERCIAL

## 2019-04-29 VITALS
TEMPERATURE: 97.9 F | DIASTOLIC BLOOD PRESSURE: 82 MMHG | HEIGHT: 68 IN | OXYGEN SATURATION: 98 % | SYSTOLIC BLOOD PRESSURE: 128 MMHG | RESPIRATION RATE: 16 BRPM | BODY MASS INDEX: 47.74 KG/M2 | WEIGHT: 315 LBS | HEART RATE: 82 BPM

## 2019-04-29 DIAGNOSIS — R73.03 PREDIABETES: ICD-10-CM

## 2019-04-29 DIAGNOSIS — E66.01 OBESITY, MORBID (HCC): ICD-10-CM

## 2019-04-29 DIAGNOSIS — I10 ESSENTIAL HYPERTENSION: Primary | ICD-10-CM

## 2019-04-29 DIAGNOSIS — K76.0 NONALCOHOLIC FATTY LIVER DISEASE: ICD-10-CM

## 2019-04-29 DIAGNOSIS — E78.5 HYPERLIPIDEMIA WITH TARGET LDL LESS THAN 100: ICD-10-CM

## 2019-04-29 PROCEDURE — 99214 OFFICE O/P EST MOD 30 MIN: CPT | Performed by: FAMILY MEDICINE

## 2019-04-29 ASSESSMENT — ENCOUNTER SYMPTOMS
SHORTNESS OF BREATH: 0
ABDOMINAL DISTENTION: 0
STRIDOR: 0
RECTAL PAIN: 0
VOMITING: 0
ANAL BLEEDING: 0
BLOOD IN STOOL: 0
ABDOMINAL PAIN: 0
CHEST TIGHTNESS: 0
NAUSEA: 0
CHOKING: 0
COUGH: 0
CONSTIPATION: 0
WHEEZING: 0
APNEA: 0
DIARRHEA: 0

## 2019-04-29 ASSESSMENT — PATIENT HEALTH QUESTIONNAIRE - PHQ9
SUM OF ALL RESPONSES TO PHQ QUESTIONS 1-9: 0
SUM OF ALL RESPONSES TO PHQ9 QUESTIONS 1 & 2: 0
1. LITTLE INTEREST OR PLEASURE IN DOING THINGS: 0
2. FEELING DOWN, DEPRESSED OR HOPELESS: 0
SUM OF ALL RESPONSES TO PHQ QUESTIONS 1-9: 0

## 2019-04-29 NOTE — PATIENT INSTRUCTIONS
Patient Education        High Cholesterol: Care Instructions  Your Care Instructions    Cholesterol is a type of fat in your blood. It is needed for many body functions, such as making new cells. Cholesterol is made by your body. It also comes from food you eat. High cholesterol means that you have too much of the fat in your blood. This raises your risk of a heart attack and stroke. LDL and HDL are part of your total cholesterol. LDL is the \"bad\" cholesterol. High LDL can raise your risk for heart disease, heart attack, and stroke. HDL is the \"good\" cholesterol. It helps clear bad cholesterol from the body. High HDL is linked with a lower risk of heart disease, heart attack, and stroke. Your cholesterol levels help your doctor find out your risk for having a heart attack or stroke. You and your doctor can talk about whether you need to lower your risk and what treatment is best for you. A heart-healthy lifestyle along with medicines can help lower your cholesterol and your risk. The way you choose to lower your risk will depend on how high your risk is for heart attack and stroke. It will also depend on how you feel about taking medicines. Follow-up care is a key part of your treatment and safety. Be sure to make and go to all appointments, and call your doctor if you are having problems. It's also a good idea to know your test results and keep a list of the medicines you take. How can you care for yourself at home? · Eat a variety of foods every day. Good choices include fruits, vegetables, whole grains (like oatmeal), dried beans and peas, nuts and seeds, soy products (like tofu), and fat-free or low-fat dairy products. · Replace butter, margarine, and hydrogenated or partially hydrogenated oils with olive and canola oils. (Canola oil margarine without trans fat is fine.)  · Replace red meat with fish, poultry, and soy protein (like tofu).   · Limit processed and packaged foods like chips, crackers, and cookies. · Bake, broil, or steam foods. Don't pena them. · Be physically active. Get at least 30 minutes of exercise on most days of the week. Walking is a good choice. You also may want to do other activities, such as running, swimming, cycling, or playing tennis or team sports. · Stay at a healthy weight or lose weight by making the changes in eating and physical activity listed above. Losing just a small amount of weight, even 5 to 10 pounds, can reduce your risk for having a heart attack or stroke. · Do not smoke. When should you call for help? Watch closely for changes in your health, and be sure to contact your doctor if:    · You need help making lifestyle changes.     · You have questions about your medicine. Where can you learn more? Go to https://Lixto Softwareboneb.Hello! Messenger. org and sign in to your [employer] account. Enter G996 in the [employer] box to learn more about \"High Cholesterol: Care Instructions. \"     If you do not have an account, please click on the \"Sign Up Now\" link. Current as of: July 22, 2018  Content Version: 11.9  © 8328-3805 [employer]. Care instructions adapted under license by [facility] ([facility]). If you have questions about a medical condition or this instruction, always ask your healthcare professional. [address] any warranty or liability for your use of this information. Patient Education        Learning About High Cholesterol  What is high cholesterol? Cholesterol is a type of fat in your blood. It is needed for many body functions, such as making new cells. Cholesterol is made by your body. It also comes from food you eat. If you have too much cholesterol, it starts to build up in your arteries. This is called hardening of the arteries, or atherosclerosis. High cholesterol raises your risk of a heart attack and stroke. There are different types of cholesterol. LDL is the \"bad\" cholesterol.  High LDL can raise your risk for heart disease, heart attack, and stroke. HDL is the \"good\" cholesterol. High HDL is linked with a lower risk for heart disease, heart attack, and stroke. Your cholesterol levels help your doctor find out your risk for having a heart attack or stroke. How can you prevent high cholesterol? A heart-healthy lifestyle can help you prevent high cholesterol. This lifestyle helps lower your risk for a heart attack and stroke. · Eat heart-healthy foods. ? Eat fruits, vegetables, whole grains (like oatmeal), dried beans and peas, nuts and seeds, soy products (like tofu), and fat-free or low-fat dairy products. ? Replace butter, margarine, and hydrogenated or partially hydrogenated oils with olive and canola oils. (Canola oil margarine without trans fat is fine.)  ? Replace red meat with fish, poultry, and soy protein (like tofu). ? Limit processed and packaged foods like chips, crackers, and cookies. · Be active. Exercise can improve your cholesterol level. Get at least 30 minutes of exercise on most days of the week. Walking is a good choice. You also may want to do other activities, such as running, swimming, cycling, or playing tennis or team sports. · Stay at a healthy weight. Lose weight if you need to. · Don't smoke. If you need help quitting, talk to your doctor about stop-smoking programs and medicines. These can increase your chances of quitting for good. How is high cholesterol treated? The goal of treatment is to reduce your chances of having a heart attack or stroke. The goal is not to lower your cholesterol numbers only. · You may make lifestyle changes, such as eating healthy foods, not smoking, losing weight, and being more active. · You may have to take medicine. Follow-up care is a key part of your treatment and safety. Be sure to make and go to all appointments, and call your doctor if you are having problems.  It's also a good idea to know your test results and keep a list of the medicines you take. Where can you learn more? Go to https://chpepiceweb.healthStereomood. org and sign in to your [employer] account. Enter E421 in the [facility] box to learn more about \"Learning About High Cholesterol. \"     If you do not have an account, please click on the \"Sign Up Now\" link. Current as of: July 22, 2018  Content Version: 11.9  © 6522-1448 [employer]. Care instructions adapted under license by [facility] ([facility]). If you have questions about a medical condition or this instruction, always ask your healthcare professional. [address] any warranty or liability for your use of this information.

## 2019-04-29 NOTE — PROGRESS NOTES
Subjective:      Patient ID: Johanna Epstein is a 66 y.o. male. Hypertension   Pertinent negatives include no chest pain, palpitations or shortness of breath. Results for Jana Sheldon (MRN [id number]) as of 4/29/2019 10:32   Ref. Range 3/18/2019 09:55   Sodium Latest Ref Range: 136 - 145 mmol/L 144   Potassium Latest Ref Range: 3.5 - 5.1 mmol/L 4.8   Chloride Latest Ref Range: 99 - 110 mmol/L 105   CO2 Latest Ref Range: 21 - 32 mmol/L 24   BUN Latest Ref Range: 7 - 20 mg/dL 15   Creatinine Latest Ref Range: 0.9 - 1.3 mg/dL 0.7 (L)   Anion Gap Latest Ref Range: 3 - 16  15   GFR Non- Latest Ref Range: >60  >60   GFR  Latest Ref Range: >60  >60   Glucose Latest Ref Range: 70 - 99 mg/dL 109 (H)   Calcium Latest Ref Range: 8.3 - 10.6 mg/dL 9.2   Total Protein Latest Ref Range: 6.4 - 8.2 g/dL 6.8   Cholesterol, Total Latest Ref Range: 0 - 199 mg/dL 212 (H)   HDL Cholesterol Latest Ref Range: 40 - 60 mg/dL 52   LDL Calculated Latest Ref Range: <100 mg/dL 120 (H)   Triglycerides Latest Ref Range: 0 - 150 mg/dL 199 (H)   VLDL Cholesterol Calculated Latest Ref Range: Not Established mg/dL 40   Albumin Latest Ref Range: 3.4 - 5.0 g/dL 4.4   Globulin Latest Units: g/dL 2.4   Albumin/Globulin Ratio Latest Ref Range: 1.1 - 2.2  1.8   Alk Phos Latest Ref Range: 40 - 129 U/L 67   ALT Latest Ref Range: 10 - 40 U/L 35   AST Latest Ref Range: 15 - 37 U/L 19   Bilirubin Latest Ref Range: 0.0 - 1.0 mg/dL <0.2     HTN check:doing well. Takes meds: lisinopril 10mg & 25mg HCTZ w/o any side effects. Adds salt to food at the table:Never does  No other associated factors. Improving with current medication. Worsened by nothing else new. Denies cp/sob/pnd/ankle edema/dizziness. Hyperlipidemia:doing well. Labs see above. No other new associated factors. Worsening factors:burgers/fast food. .   Denies adbo pain/myalgias. Allergic rhinitis:doing well. No other new associated concerns.   Denies neck pain-stiffness/photophobia/fever/chills/appetite changes/rash/wheezing/cough/sob/sore throat/epistaxis. IFG:good diet regime. A1c see above. No other associated concerns. Denies polyuria/polyphagia/polydipsia/unexpected weight loss or gain. Fatty liver:doing well. See recent labs above. No Known Allergies    Current Outpatient Medications on File Prior to Visit   Medication Sig Dispense Refill    lisinopril (PRINIVIL;ZESTRIL) 10 MG tablet Take 1 tablet by mouth daily 90 tablet 0    hydrochlorothiazide (HYDRODIURIL) 25 MG tablet TAKE ONE TABLET BY MOUTH ONCE DAILY 90 tablet 0    loratadine (CLARITIN) 10 MG tablet Take 1 tablet by mouth daily 90 tablet 0    fluticasone (FLONASE) 50 MCG/ACT nasal spray 1 spray by Nasal route daily 3 Bottle 0    meclizine (ANTIVERT) 25 MG tablet Take 1-2 tablets every 6-8 hours as needed for dizziness 30 tablet 0     No current facility-administered medications on file prior to visit. Past Medical History:   Diagnosis Date    Allergic rhinitis     Essential hypertension, benign     Fatty liver     Impaired fasting glucose 2013    Mixed hyperlipidemia      Obesity     Prostate cancer screening     Declined 4/3/13    Screening PSA (prostate specific antigen) 2015;17    Nml:0.53(2015);17=nml. Social History     Tobacco Use    Smoking status: Former Smoker     Packs/day: 2.00     Years: 25.00     Pack years: 50.00     Types: Cigarettes     Last attempt to quit: 2006     Years since quittin.0    Smokeless tobacco: Never Used   Substance Use Topics    Alcohol use: Yes     Comment: ocas    Drug use: No     Social History     Substance and Sexual Activity   Drug Use No                 Review of Systems   Constitutional: Negative for activity change, appetite change, chills, diaphoresis, fatigue, fever and unexpected weight change. Eyes: Negative for visual disturbance.    Respiratory: Negative for apnea, cough, choking, chest tightness, shortness of breath, wheezing and stridor. Cardiovascular: Negative for chest pain, palpitations and leg swelling. Gastrointestinal: Negative for abdominal distention, abdominal pain, anal bleeding, blood in stool, constipation, diarrhea, nausea, rectal pain and vomiting. Endocrine: Negative for cold intolerance, heat intolerance, polydipsia, polyphagia and polyuria. Genitourinary: Negative for difficulty urinating. Skin: Negative for pallor and rash. Hematological: Negative for adenopathy. Psychiatric/Behavioral: Negative for sleep disturbance. Objective:   Physical Exam   Constitutional: He is oriented to person, place, and time. Vital signs are normal. He appears well-developed and well-nourished. He is cooperative. He does not have a sickly appearance. No distress. HENT:   Nose: Nose normal.   Mouth/Throat: Uvula is midline, oropharynx is clear and moist and mucous membranes are normal.   Hearing intact to nml conversation   Eyes: Pupils are equal, round, and reactive to light. Conjunctivae, EOM and lids are normal.   Neck: Trachea normal and normal range of motion. Neck supple. No JVD present. Carotid bruit is not present. Cardiovascular: Normal rate, regular rhythm, normal heart sounds, intact distal pulses and normal pulses. No murmur heard. No bilateral leg-ankle edema noted. Pulmonary/Chest: Effort normal and breath sounds normal.   CTAB,good AE bilaterally   Abdominal: Soft. Normal appearance and bowel sounds are normal. He exhibits no distension and no mass. There is no hepatomegaly. There is no tenderness. Lymphadenopathy:     He has no cervical adenopathy. No supraclavicular LAD. Neurological: He is alert and oriented to person, place, and time. Skin: Skin is warm, dry and intact. No rash noted. He is not diaphoretic. No pallor. Good skin turgor. Capillary refill=2-3 secs. Psychiatric: He has a normal mood and affect.    Good eye contact. Polite. Assessment:      Diagnosis Orders   1. Essential hypertension  VSS/well appearing. Stable. Comprehensive Metabolic Panel   2. Prediabetes  Stable. Hemoglobin A1C    Comprehensive Metabolic Panel   3. Hyperlipidemia with target LDL less than 100  Not at goal.  ,The patient is asked to make an attempt to improve diet and exercise patterns to aid in medical management of this problem. Stop fast food/burgers. Labs in 2-3mos. Comprehensive Metabolic Panel    Lipid Panel   4. Nonalcoholic fatty liver disease  Stable. Comprehensive Metabolic Panel   5. Obesity, morbid ([address]  Diet & exercise regime reviewed. \             Plan:         Pt' left office in good condition. Obtain labs/diagnostic tests as discussed today & call back for results within 2-7days. Advised to go to local ER or call 911 for any worrisome signs/sx.

## 2019-06-17 DIAGNOSIS — J30.1 SEASONAL ALLERGIC RHINITIS DUE TO POLLEN: ICD-10-CM

## 2019-06-17 DIAGNOSIS — I10 ESSENTIAL HYPERTENSION: ICD-10-CM

## 2019-06-17 RX ORDER — LISINOPRIL 10 MG/1
10 TABLET ORAL DAILY
Qty: 90 TABLET | Refills: 0 | Status: SHIPPED | OUTPATIENT
Start: 2019-06-17 | End: 2019-09-18 | Stop reason: SDUPTHER

## 2019-06-17 RX ORDER — LORATADINE 10 MG/1
10 TABLET ORAL DAILY
Qty: 90 TABLET | Refills: 0 | Status: SHIPPED | OUTPATIENT
Start: 2019-06-17 | End: 2019-09-18 | Stop reason: SDUPTHER

## 2019-06-17 RX ORDER — HYDROCHLOROTHIAZIDE 25 MG/1
TABLET ORAL
Qty: 90 TABLET | Refills: 0 | Status: SHIPPED | OUTPATIENT
Start: 2019-06-17 | End: 2019-09-18 | Stop reason: SDUPTHER

## 2019-06-18 DIAGNOSIS — K76.0 NONALCOHOLIC FATTY LIVER DISEASE: ICD-10-CM

## 2019-06-18 DIAGNOSIS — E78.5 HYPERLIPIDEMIA WITH TARGET LDL LESS THAN 100: ICD-10-CM

## 2019-06-18 DIAGNOSIS — R73.03 PREDIABETES: ICD-10-CM

## 2019-06-18 DIAGNOSIS — I10 ESSENTIAL HYPERTENSION: ICD-10-CM

## 2019-06-18 LAB
A/G RATIO: 1.6 (ref 1.1–2.2)
ALBUMIN SERPL-MCNC: 4.4 G/DL (ref 3.4–5)
ALP BLD-CCNC: 74 U/L (ref 40–129)
ALT SERPL-CCNC: 41 U/L (ref 10–40)
ANION GAP SERPL CALCULATED.3IONS-SCNC: 14 MMOL/L (ref 3–16)
AST SERPL-CCNC: 28 U/L (ref 15–37)
BILIRUB SERPL-MCNC: 0.3 MG/DL (ref 0–1)
BUN BLDV-MCNC: 18 MG/DL (ref 7–20)
CALCIUM SERPL-MCNC: 9.5 MG/DL (ref 8.3–10.6)
CHLORIDE BLD-SCNC: 103 MMOL/L (ref 99–110)
CHOLESTEROL, TOTAL: 188 MG/DL (ref 0–199)
CO2: 23 MMOL/L (ref 21–32)
CREAT SERPL-MCNC: 0.9 MG/DL (ref 0.9–1.3)
GFR AFRICAN AMERICAN: >60
GFR NON-AFRICAN AMERICAN: >60
GLOBULIN: 2.7 G/DL
GLUCOSE BLD-MCNC: 110 MG/DL (ref 70–99)
HDLC SERPL-MCNC: 54 MG/DL (ref 40–60)
LDL CHOLESTEROL CALCULATED: 104 MG/DL
POTASSIUM SERPL-SCNC: 4.5 MMOL/L (ref 3.5–5.1)
SODIUM BLD-SCNC: 140 MMOL/L (ref 136–145)
TOTAL PROTEIN: 7.1 G/DL (ref 6.4–8.2)
TRIGL SERPL-MCNC: 152 MG/DL (ref 0–150)
VLDLC SERPL CALC-MCNC: 30 MG/DL

## 2019-06-19 LAB
ESTIMATED AVERAGE GLUCOSE: 134.1 MG/DL
HBA1C MFR BLD: 6.3 %

## 2019-07-31 ENCOUNTER — TELEPHONE (OUTPATIENT)
Dept: FAMILY MEDICINE CLINIC | Age: 59
End: 2019-07-31

## 2019-07-31 NOTE — TELEPHONE ENCOUNTER
That is ok. However, eval today is advised since the area may be an abscess/infection & could worsen prior to Friday. Since no appt available with me today then ok go to [id number] [facility]);local urgent care or ER today. F/u w/me after discharge from that facility.

## 2019-08-02 ENCOUNTER — HOSPITAL ENCOUNTER (OUTPATIENT)
Dept: GENERAL RADIOLOGY | Age: 59
Discharge: HOME | End: 2019-08-02
Payer: COMMERCIAL

## 2019-08-02 ENCOUNTER — OFFICE VISIT (OUTPATIENT)
Dept: INTERNAL MEDICINE CLINIC | Age: 59
End: 2019-08-02
Payer: COMMERCIAL

## 2019-08-02 ENCOUNTER — HOSPITAL ENCOUNTER (OUTPATIENT)
Age: 59
Discharge: HOME | End: 2019-08-02
Payer: COMMERCIAL

## 2019-08-02 VITALS
WEIGHT: 315 LBS | SYSTOLIC BLOOD PRESSURE: 138 MMHG | OXYGEN SATURATION: 97 % | HEART RATE: 89 BPM | DIASTOLIC BLOOD PRESSURE: 88 MMHG | BODY MASS INDEX: 56.71 KG/M2

## 2019-08-02 DIAGNOSIS — R60.9 DEPENDENT EDEMA: ICD-10-CM

## 2019-08-02 DIAGNOSIS — R60.9 DEPENDENT EDEMA: Primary | ICD-10-CM

## 2019-08-02 DIAGNOSIS — L03.319 CELLULITIS AND ABSCESS OF TRUNK: ICD-10-CM

## 2019-08-02 DIAGNOSIS — L02.219 CELLULITIS AND ABSCESS OF TRUNK: ICD-10-CM

## 2019-08-02 LAB
A/G RATIO: 1.6 (ref 1.1–2.2)
ALBUMIN SERPL-MCNC: 4.2 G/DL (ref 3.4–5)
ALP BLD-CCNC: 71 U/L (ref 40–129)
ALT SERPL-CCNC: 41 U/L (ref 10–40)
ANION GAP SERPL CALCULATED.3IONS-SCNC: 12 MMOL/L (ref 3–16)
AST SERPL-CCNC: 25 U/L (ref 15–37)
BASOPHILS ABSOLUTE: 0 K/UL (ref 0–0.2)
BASOPHILS RELATIVE PERCENT: 0.6 %
BILIRUB SERPL-MCNC: <0.2 MG/DL (ref 0–1)
BUN BLDV-MCNC: 19 MG/DL (ref 7–20)
CALCIUM SERPL-MCNC: 9.7 MG/DL (ref 8.3–10.6)
CHLORIDE BLD-SCNC: 105 MMOL/L (ref 99–110)
CO2: 26 MMOL/L (ref 21–32)
CREAT SERPL-MCNC: 0.9 MG/DL (ref 0.9–1.3)
EOSINOPHILS ABSOLUTE: 0.1 K/UL (ref 0–0.6)
EOSINOPHILS RELATIVE PERCENT: 2.1 %
GFR AFRICAN AMERICAN: >60
GFR NON-AFRICAN AMERICAN: >60
GLOBULIN: 2.7 G/DL
GLUCOSE BLD-MCNC: 124 MG/DL (ref 70–99)
HCT VFR BLD CALC: 44.4 % (ref 40.5–52.5)
HEMOGLOBIN: 14.7 G/DL (ref 13.5–17.5)
LYMPHOCYTES ABSOLUTE: 1.3 K/UL (ref 1–5.1)
LYMPHOCYTES RELATIVE PERCENT: 21.9 %
MCH RBC QN AUTO: 32.8 PG (ref 26–34)
MCHC RBC AUTO-ENTMCNC: 33.1 G/DL (ref 31–36)
MCV RBC AUTO: 98.9 FL (ref 80–100)
MONOCYTES ABSOLUTE: 0.5 K/UL (ref 0–1.3)
MONOCYTES RELATIVE PERCENT: 9 %
NEUTROPHILS ABSOLUTE: 4 K/UL (ref 1.7–7.7)
NEUTROPHILS RELATIVE PERCENT: 66.4 %
PDW BLD-RTO: 14.7 % (ref 12.4–15.4)
PLATELET # BLD: 191 K/UL (ref 135–450)
PMV BLD AUTO: 8.2 FL (ref 5–10.5)
POTASSIUM SERPL-SCNC: 5.7 MMOL/L (ref 3.5–5.1)
RBC # BLD: 4.49 M/UL (ref 4.2–5.9)
SODIUM BLD-SCNC: 143 MMOL/L (ref 136–145)
TOTAL PROTEIN: 6.9 G/DL (ref 6.4–8.2)
WBC # BLD: 6 K/UL (ref 4–11)

## 2019-08-02 PROCEDURE — 71046 X-RAY EXAM CHEST 2 VIEWS: CPT

## 2019-08-02 PROCEDURE — 74019 RADEX ABDOMEN 2 VIEWS: CPT

## 2019-08-02 PROCEDURE — 99214 OFFICE O/P EST MOD 30 MIN: CPT | Performed by: NURSE PRACTITIONER

## 2019-08-02 RX ORDER — SULFAMETHOXAZOLE AND TRIMETHOPRIM 800; 160 MG/1; MG/1
1 TABLET ORAL 2 TIMES DAILY
Qty: 14 TABLET | Refills: 0 | Status: SHIPPED | OUTPATIENT
Start: 2019-08-02 | End: 2019-08-09

## 2019-08-02 ASSESSMENT — PATIENT HEALTH QUESTIONNAIRE - PHQ9
SUM OF ALL RESPONSES TO PHQ QUESTIONS 1-9: 0
2. FEELING DOWN, DEPRESSED OR HOPELESS: 0
SUM OF ALL RESPONSES TO PHQ9 QUESTIONS 1 & 2: 0
1. LITTLE INTEREST OR PLEASURE IN DOING THINGS: 0
SUM OF ALL RESPONSES TO PHQ QUESTIONS 1-9: 0

## 2019-08-02 ASSESSMENT — ENCOUNTER SYMPTOMS
WHEEZING: 0
RHINORRHEA: 0
SHORTNESS OF BREATH: 0
CHEST TIGHTNESS: 0
CONSTIPATION: 0
COLOR CHANGE: 0
ABDOMINAL PAIN: 0
BACK PAIN: 0
EYE REDNESS: 0
NAUSEA: 0
VOMITING: 0
EYE ITCHING: 0
COUGH: 0
SINUS PRESSURE: 0
DIARRHEA: 0
SORE THROAT: 0
BLOOD IN STOOL: 0

## 2019-08-05 ENCOUNTER — TELEPHONE (OUTPATIENT)
Dept: INTERNAL MEDICINE CLINIC | Age: 59
End: 2019-08-05

## 2019-08-05 DIAGNOSIS — E78.2 MIXED HYPERLIPIDEMIA: ICD-10-CM

## 2019-08-05 DIAGNOSIS — E78.2 MIXED HYPERLIPIDEMIA: Primary | ICD-10-CM

## 2019-08-05 LAB — PRO-BNP: 156 PG/ML (ref 0–124)

## 2019-08-05 NOTE — TELEPHONE ENCOUNTER
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

## 2019-09-18 DIAGNOSIS — I10 ESSENTIAL HYPERTENSION: ICD-10-CM

## 2019-09-18 DIAGNOSIS — J30.1 SEASONAL ALLERGIC RHINITIS DUE TO POLLEN: ICD-10-CM

## 2019-09-18 RX ORDER — LORATADINE 10 MG/1
10 TABLET ORAL DAILY
Qty: 90 TABLET | Refills: 0 | Status: SHIPPED | OUTPATIENT
Start: 2019-09-18 | End: 2019-12-18 | Stop reason: SDUPTHER

## 2019-09-18 RX ORDER — HYDROCHLOROTHIAZIDE 25 MG/1
TABLET ORAL
Qty: 90 TABLET | Refills: 0 | Status: SHIPPED | OUTPATIENT
Start: 2019-09-18 | End: 2019-12-18 | Stop reason: SDUPTHER

## 2019-09-18 RX ORDER — LISINOPRIL 10 MG/1
10 TABLET ORAL DAILY
Qty: 90 TABLET | Refills: 0 | Status: SHIPPED | OUTPATIENT
Start: 2019-09-18 | End: 2019-12-18 | Stop reason: SDUPTHER

## 2019-09-25 ENCOUNTER — OFFICE VISIT (OUTPATIENT)
Dept: INTERNAL MEDICINE CLINIC | Age: 59
End: 2019-09-25
Payer: COMMERCIAL

## 2019-09-25 ENCOUNTER — TELEPHONE (OUTPATIENT)
Dept: INTERNAL MEDICINE CLINIC | Age: 59
End: 2019-09-25

## 2019-09-25 VITALS
HEART RATE: 90 BPM | SYSTOLIC BLOOD PRESSURE: 132 MMHG | WEIGHT: 315 LBS | OXYGEN SATURATION: 96 % | BODY MASS INDEX: 58.39 KG/M2 | DIASTOLIC BLOOD PRESSURE: 88 MMHG

## 2019-09-25 DIAGNOSIS — I10 ESSENTIAL HYPERTENSION, BENIGN: ICD-10-CM

## 2019-09-25 DIAGNOSIS — E78.2 MIXED HYPERTRIGLYCERIDEMIA: ICD-10-CM

## 2019-09-25 DIAGNOSIS — E66.01 OBESITY, MORBID (HCC): ICD-10-CM

## 2019-09-25 DIAGNOSIS — L03.116 CELLULITIS OF LEFT LOWER EXTREMITY: ICD-10-CM

## 2019-09-25 PROBLEM — L02.219 CELLULITIS AND ABSCESS OF TRUNK: Status: RESOLVED | Noted: 2019-08-02 | Resolved: 2019-09-25

## 2019-09-25 PROBLEM — R60.9 DEPENDENT EDEMA: Status: RESOLVED | Noted: 2019-08-02 | Resolved: 2019-09-25

## 2019-09-25 PROBLEM — L03.319 CELLULITIS AND ABSCESS OF TRUNK: Status: RESOLVED | Noted: 2019-08-02 | Resolved: 2019-09-25

## 2019-09-25 PROCEDURE — 99214 OFFICE O/P EST MOD 30 MIN: CPT | Performed by: NURSE PRACTITIONER

## 2019-09-25 RX ORDER — SULFAMETHOXAZOLE AND TRIMETHOPRIM 800; 160 MG/1; MG/1
1 TABLET ORAL 2 TIMES DAILY
Qty: 14 TABLET | Refills: 0 | Status: SHIPPED | OUTPATIENT
Start: 2019-09-25 | End: 2019-10-02

## 2019-09-25 ASSESSMENT — ENCOUNTER SYMPTOMS
CONSTIPATION: 0
ABDOMINAL PAIN: 0
VOMITING: 0
DIARRHEA: 0
RHINORRHEA: 0
BACK PAIN: 0
CHEST TIGHTNESS: 0
WHEEZING: 0
SHORTNESS OF BREATH: 0
COUGH: 0
COLOR CHANGE: 0
EYE REDNESS: 0
SINUS PRESSURE: 0
BLOOD IN STOOL: 0
EYE ITCHING: 0
SORE THROAT: 0
NAUSEA: 0

## 2019-09-25 ASSESSMENT — PATIENT HEALTH QUESTIONNAIRE - PHQ9
SUM OF ALL RESPONSES TO PHQ QUESTIONS 1-9: 0
SUM OF ALL RESPONSES TO PHQ9 QUESTIONS 1 & 2: 0
2. FEELING DOWN, DEPRESSED OR HOPELESS: 0
1. LITTLE INTEREST OR PLEASURE IN DOING THINGS: 0
SUM OF ALL RESPONSES TO PHQ QUESTIONS 1-9: 0

## 2019-09-25 NOTE — ASSESSMENT & PLAN NOTE
Discussed the critical importance of weight loss today  He has gained 50 lbs since April  He is not exercising or dieting  He has considered surgery, but afraid about time off work  He will check with his insurance and work to see if he is allowed to try saxenda for weight loss.    Appointment 30 minutes >50% of time spent discussing weight loss and exercise

## 2019-09-25 NOTE — PROGRESS NOTES
Subjective:      Patient ID: Isaiah Vega is a 64 y.o. y.o. male. Chief Complaint   Patient presents with    Edema     lower extremities. HPI    Patient is here for a recheck  He continues to have cellulitis and swelling to lower extremities. He knows that his weight is the largest issue  He is a  and this is preventing him from a lot of exercise and eating healthy. He is up 50 lbs since April  He recently had blood work done, which was stable. Is has considered surgery, but is worried about time off work. Vitals:    09/25/19 1108   BP: 132/88   Pulse:    SpO2:        Wt Readings from Last 3 Encounters:   09/25/19 (!) 384 lb (174.2 kg)   08/02/19 (!) 373 lb (169.2 kg)   04/29/19 (!) 350 lb 4.8 oz (158.9 kg)       [unfilled]    Review of Systems   Constitutional: Negative for chills, fatigue and fever. HENT: Negative for congestion, ear pain, postnasal drip, rhinorrhea, sinus pressure, sneezing and sore throat. Eyes: Negative for redness and itching. Respiratory: Negative for cough, chest tightness, shortness of breath and wheezing. Cardiovascular: Positive for leg swelling. Negative for chest pain and palpitations. Gastrointestinal: Negative for abdominal pain, blood in stool, constipation, diarrhea, nausea and vomiting. Endocrine: Negative for cold intolerance and heat intolerance. Genitourinary: Negative for difficulty urinating, dysuria, flank pain, frequency, hematuria and urgency. Musculoskeletal: Negative for arthralgias, back pain, joint swelling and myalgias. Skin: Negative for color change, pallor, rash and wound. Allergic/Immunologic: Negative for environmental allergies and food allergies. Neurological: Negative for dizziness, seizures, syncope, weakness, light-headedness, numbness and headaches. Hematological: Negative for adenopathy. Does not bruise/bleed easily.    Psychiatric/Behavioral: Negative for confusion, sleep disturbance and suicidal

## 2019-09-26 ENCOUNTER — TELEPHONE (OUTPATIENT)
Dept: INTERNAL MEDICINE CLINIC | Age: 59
End: 2019-09-26

## 2019-09-26 RX ORDER — FUROSEMIDE 40 MG/1
40 TABLET ORAL DAILY
Qty: 60 TABLET | Refills: 3 | Status: SHIPPED | OUTPATIENT
Start: 2019-09-26 | End: 2019-12-18 | Stop reason: SDUPTHER

## 2019-09-26 RX ORDER — PEN NEEDLE, DIABETIC 29 G X1/2"
NEEDLE, DISPOSABLE MISCELLANEOUS
Qty: 100 EACH | Refills: 0 | Status: SHIPPED | OUTPATIENT
Start: 2019-09-26 | End: 2020-01-24 | Stop reason: CLARIF

## 2019-09-26 NOTE — TELEPHONE ENCOUNTER
Pt's wife calling to ask if you can please phone in another full supply of the anti-biotic that you prescribed for his cellulitis. .. 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 Also, the \"diet drug\" you spoke with him about requires a Pre-D according to [employer]. Please contact wife, Nehemias Brasher, back at her work number [phone].

## 2019-09-27 ENCOUNTER — TELEPHONE (OUTPATIENT)
Dept: INTERNAL MEDICINE CLINIC | Age: 59
End: 2019-09-27

## 2019-10-18 ENCOUNTER — TELEPHONE (OUTPATIENT)
Dept: INTERNAL MEDICINE CLINIC | Age: 59
End: 2019-10-18

## 2019-10-30 ENCOUNTER — TELEPHONE (OUTPATIENT)
Dept: INTERNAL MEDICINE CLINIC | Age: 59
End: 2019-10-30

## 2019-10-30 ENCOUNTER — OFFICE VISIT (OUTPATIENT)
Dept: INTERNAL MEDICINE CLINIC | Age: 59
End: 2019-10-30
Payer: COMMERCIAL

## 2019-10-30 VITALS
DIASTOLIC BLOOD PRESSURE: 80 MMHG | SYSTOLIC BLOOD PRESSURE: 140 MMHG | OXYGEN SATURATION: 98 % | WEIGHT: 315 LBS | BODY MASS INDEX: 55.95 KG/M2 | HEART RATE: 93 BPM

## 2019-10-30 DIAGNOSIS — I10 ESSENTIAL HYPERTENSION, BENIGN: ICD-10-CM

## 2019-10-30 DIAGNOSIS — E66.01 OBESITY, MORBID (HCC): ICD-10-CM

## 2019-10-30 DIAGNOSIS — L03.116 CELLULITIS OF LEFT LOWER EXTREMITY: ICD-10-CM

## 2019-10-30 PROCEDURE — 99214 OFFICE O/P EST MOD 30 MIN: CPT | Performed by: NURSE PRACTITIONER

## 2019-10-30 ASSESSMENT — ENCOUNTER SYMPTOMS
ABDOMINAL PAIN: 0
BLOOD IN STOOL: 0
EYE ITCHING: 0
SORE THROAT: 0
RHINORRHEA: 0
COUGH: 0
EYE REDNESS: 0
DIARRHEA: 0
CONSTIPATION: 0
NAUSEA: 0
CHEST TIGHTNESS: 0
WHEEZING: 0
BACK PAIN: 0
COLOR CHANGE: 0
SHORTNESS OF BREATH: 0
VOMITING: 0
SINUS PRESSURE: 0

## 2019-11-04 ENCOUNTER — TELEPHONE (OUTPATIENT)
Dept: INTERNAL MEDICINE CLINIC | Age: 59
End: 2019-11-04

## 2019-12-18 ENCOUNTER — TELEPHONE (OUTPATIENT)
Dept: INTERNAL MEDICINE CLINIC | Age: 59
End: 2019-12-18

## 2019-12-18 DIAGNOSIS — J30.1 SEASONAL ALLERGIC RHINITIS DUE TO POLLEN: ICD-10-CM

## 2019-12-18 DIAGNOSIS — I10 ESSENTIAL HYPERTENSION: ICD-10-CM

## 2019-12-18 RX ORDER — LORATADINE 10 MG/1
10 TABLET ORAL DAILY
Qty: 90 TABLET | Refills: 0 | Status: SHIPPED | OUTPATIENT
Start: 2019-12-18 | End: 2019-12-18 | Stop reason: SDUPTHER

## 2019-12-18 RX ORDER — LISINOPRIL 10 MG/1
10 TABLET ORAL DAILY
Qty: 90 TABLET | Refills: 0 | Status: SHIPPED | OUTPATIENT
Start: 2019-12-18 | End: 2019-12-18 | Stop reason: SDUPTHER

## 2019-12-18 RX ORDER — HYDROCHLOROTHIAZIDE 25 MG/1
TABLET ORAL
Qty: 90 TABLET | Refills: 0 | Status: SHIPPED | OUTPATIENT
Start: 2019-12-18 | End: 2019-12-18 | Stop reason: SDUPTHER

## 2019-12-18 RX ORDER — LISINOPRIL 10 MG/1
10 TABLET ORAL DAILY
Qty: 90 TABLET | Refills: 0 | Status: SHIPPED | OUTPATIENT
Start: 2019-12-18 | End: 2020-03-16

## 2019-12-18 RX ORDER — FUROSEMIDE 40 MG/1
40 TABLET ORAL DAILY
Qty: 90 TABLET | Refills: 0 | Status: SHIPPED | OUTPATIENT
Start: 2019-12-18 | End: 2019-12-18 | Stop reason: SDUPTHER

## 2019-12-18 RX ORDER — FUROSEMIDE 40 MG/1
40 TABLET ORAL DAILY
Qty: 90 TABLET | Refills: 0 | Status: SHIPPED | OUTPATIENT
Start: 2019-12-18 | End: 2020-03-16

## 2019-12-18 RX ORDER — LORATADINE 10 MG/1
10 TABLET ORAL DAILY
Qty: 90 TABLET | Refills: 0 | Status: SHIPPED | OUTPATIENT
Start: 2019-12-18 | End: 2020-03-16

## 2019-12-18 RX ORDER — HYDROCHLOROTHIAZIDE 25 MG/1
TABLET ORAL
Qty: 90 TABLET | Refills: 0 | Status: SHIPPED | OUTPATIENT
Start: 2019-12-18 | End: 2020-03-16

## 2019-12-23 ENCOUNTER — OFFICE VISIT (OUTPATIENT)
Dept: INTERNAL MEDICINE CLINIC | Age: 59
End: 2019-12-23
Payer: COMMERCIAL

## 2019-12-23 VITALS
BODY MASS INDEX: 56.78 KG/M2 | WEIGHT: 315 LBS | DIASTOLIC BLOOD PRESSURE: 74 MMHG | SYSTOLIC BLOOD PRESSURE: 128 MMHG | TEMPERATURE: 98.2 F

## 2019-12-23 DIAGNOSIS — R73.01 IMPAIRED FASTING GLUCOSE: ICD-10-CM

## 2019-12-23 DIAGNOSIS — E66.01 OBESITY, MORBID (HCC): ICD-10-CM

## 2019-12-23 DIAGNOSIS — I10 ESSENTIAL HYPERTENSION, BENIGN: ICD-10-CM

## 2019-12-23 PROCEDURE — 99214 OFFICE O/P EST MOD 30 MIN: CPT | Performed by: NURSE PRACTITIONER

## 2019-12-23 ASSESSMENT — ENCOUNTER SYMPTOMS
WHEEZING: 0
DIARRHEA: 0
SHORTNESS OF BREATH: 0
EYE ITCHING: 0
BLOOD IN STOOL: 0
COLOR CHANGE: 0
ABDOMINAL PAIN: 0
CHEST TIGHTNESS: 0
EYE REDNESS: 0
SINUS PRESSURE: 0
RHINORRHEA: 0
CONSTIPATION: 0
VOMITING: 0
SORE THROAT: 0
BACK PAIN: 0
NAUSEA: 0
COUGH: 0

## 2020-01-24 ENCOUNTER — OFFICE VISIT (OUTPATIENT)
Dept: INTERNAL MEDICINE CLINIC | Age: 60
End: 2020-01-24
Payer: COMMERCIAL

## 2020-01-24 VITALS
DIASTOLIC BLOOD PRESSURE: 76 MMHG | BODY MASS INDEX: 55.95 KG/M2 | HEART RATE: 100 BPM | SYSTOLIC BLOOD PRESSURE: 120 MMHG | OXYGEN SATURATION: 94 % | WEIGHT: 315 LBS

## 2020-01-24 PROCEDURE — 99214 OFFICE O/P EST MOD 30 MIN: CPT | Performed by: NURSE PRACTITIONER

## 2020-01-24 RX ORDER — ONDANSETRON 4 MG/1
4 TABLET, FILM COATED ORAL DAILY PRN
Qty: 30 TABLET | Refills: 0 | Status: SHIPPED | OUTPATIENT
Start: 2020-01-24 | End: 2020-08-06

## 2020-01-24 ASSESSMENT — ENCOUNTER SYMPTOMS
EYE REDNESS: 0
SORE THROAT: 0
STRIDOR: 0
COUGH: 0
SINUS PAIN: 0
EYE PAIN: 0
PHOTOPHOBIA: 0
WHEEZING: 0
SHORTNESS OF BREATH: 0
ABDOMINAL PAIN: 0
NAUSEA: 0
VOMITING: 0
DIARRHEA: 0
CONSTIPATION: 0
BACK PAIN: 0
EYE DISCHARGE: 0
BLOOD IN STOOL: 0

## 2020-01-24 NOTE — PROGRESS NOTES
Subjective:      Patient ID: Lee Lockett is a 64 y.o. male. Chief Complaint   Patient presents with    Other     medication f/up       HPI  Soni Olivas is in the office today to follow up on contrave  He is down 5 lbs in one month. He states that he has been doing well overall. He does have some nausea from the medication, but is managing. He states that he is trying to watch his diet. He is not exercising. His bp remains stable. He has no concerns with medications     Review of Systems   Constitutional: Negative for chills, diaphoresis and fever. HENT: Negative for congestion, ear discharge, ear pain, hearing loss, nosebleeds, sinus pain, sore throat and tinnitus. Eyes: Negative for photophobia, pain, discharge and redness. Respiratory: Negative for cough, shortness of breath, wheezing and stridor. Cardiovascular: Negative for chest pain, palpitations and leg swelling. Gastrointestinal: Negative for abdominal pain, blood in stool, constipation, diarrhea, nausea and vomiting. Endocrine: Negative for polydipsia. Genitourinary: Negative for dysuria, flank pain, frequency, hematuria and urgency. Musculoskeletal: Negative for back pain, myalgias and neck pain. Skin: Negative for rash. Allergic/Immunologic: Negative for environmental allergies. Neurological: Negative for dizziness, tremors, seizures, weakness and headaches. Hematological: Does not bruise/bleed easily. Psychiatric/Behavioral: Negative for hallucinations and suicidal ideas. The patient is not nervous/anxious. Objective:   Physical Exam  Constitutional:       Appearance: He is well-developed. HENT:      Head: Normocephalic. Right Ear: External ear normal.      Left Ear: External ear normal.   Eyes:      Conjunctiva/sclera: Conjunctivae normal.      Pupils: Pupils are equal, round, and reactive to light. Neck:      Musculoskeletal: Normal range of motion and neck supple. Vascular: No JVD.    Cardiovascular: Rate and Rhythm: Normal rate and regular rhythm. Heart sounds: No murmur. No friction rub. No gallop. Pulmonary:      Effort: Pulmonary effort is normal. No respiratory distress. Breath sounds: Normal breath sounds. No wheezing. Abdominal:      General: Bowel sounds are normal. There is no distension. Palpations: Abdomen is soft. There is no mass. Tenderness: There is no tenderness. There is no guarding or rebound. Musculoskeletal: Normal range of motion. General: No tenderness. Skin:     General: Skin is warm and dry. Neurological:      Mental Status: He is alert and oriented to person, place, and time. Deep Tendon Reflexes: Reflexes are normal and symmetric. Psychiatric:         Behavior: Behavior normal.         Assessment:      See Problem List assessment and plan       Plan:       Essential hypertension, benign  Stable, controlled  No changes  Continue current treatment plan       Obesity, morbid  Weight continues to trend down  He is doing well on the contrave  Follow up in 1 month       Impaired fasting glucose  Continue to monitor blood sugars   Recheck a1c at next appointment                 Patient engaged in shared decision making. Information given to evaluate options of treatment, understand what is needed and discuss importance of following plan.

## 2020-02-24 ENCOUNTER — OFFICE VISIT (OUTPATIENT)
Dept: INTERNAL MEDICINE CLINIC | Age: 60
End: 2020-02-24
Payer: COMMERCIAL

## 2020-02-24 VITALS
BODY MASS INDEX: 55.95 KG/M2 | OXYGEN SATURATION: 96 % | DIASTOLIC BLOOD PRESSURE: 82 MMHG | SYSTOLIC BLOOD PRESSURE: 134 MMHG | WEIGHT: 315 LBS | HEART RATE: 94 BPM

## 2020-02-24 PROCEDURE — 99214 OFFICE O/P EST MOD 30 MIN: CPT | Performed by: NURSE PRACTITIONER

## 2020-02-24 ASSESSMENT — ENCOUNTER SYMPTOMS
RHINORRHEA: 0
NAUSEA: 0
BLOOD IN STOOL: 0
SINUS PRESSURE: 0
ABDOMINAL PAIN: 0
WHEEZING: 0
EYE ITCHING: 0
SORE THROAT: 0
DIARRHEA: 0
CHEST TIGHTNESS: 0
COUGH: 0
BACK PAIN: 0
COLOR CHANGE: 0
VOMITING: 0
CONSTIPATION: 0
EYE REDNESS: 0
SHORTNESS OF BREATH: 0

## 2020-02-24 ASSESSMENT — PATIENT HEALTH QUESTIONNAIRE - PHQ9
2. FEELING DOWN, DEPRESSED OR HOPELESS: 0
SUM OF ALL RESPONSES TO PHQ QUESTIONS 1-9: 0
SUM OF ALL RESPONSES TO PHQ QUESTIONS 1-9: 0
1. LITTLE INTEREST OR PLEASURE IN DOING THINGS: 0
SUM OF ALL RESPONSES TO PHQ9 QUESTIONS 1 & 2: 0

## 2020-02-24 NOTE — PROGRESS NOTES
Subjective:      Patient ID: Jenny Briseno is a 64 y.o. y.o. male. Chief Complaint   Patient presents with    Follow-up       HPI    Patient is here for a recheck  He states that he has been doing okay   His joints are hurting more lately  He uses and arthritis cream that helps  He knows it is because of his weight  He states that he feels the medication is not working anymore   He has not lost anymore weight, but has not gained either  He continues to take all other medications as well. Vitals:    02/24/20 0835   BP: 134/82   Pulse: 94   SpO2: 96%       Wt Readings from Last 3 Encounters:   02/24/20 (!) 368 lb (166.9 kg)   01/24/20 (!) 368 lb (166.9 kg)   12/23/19 (!) 373 lb 6.4 oz (169.4 kg)         Review of Systems   Constitutional: Negative for chills, fatigue and fever. HENT: Negative for congestion, ear pain, postnasal drip, rhinorrhea, sinus pressure, sneezing and sore throat. Eyes: Negative for redness and itching. Respiratory: Negative for cough, chest tightness, shortness of breath and wheezing. Cardiovascular: Negative for chest pain and palpitations. Gastrointestinal: Negative for abdominal pain, blood in stool, constipation, diarrhea, nausea and vomiting. Endocrine: Negative for cold intolerance and heat intolerance. Genitourinary: Negative for difficulty urinating, dysuria, flank pain, frequency, hematuria and urgency. Musculoskeletal: Negative for arthralgias, back pain, joint swelling and myalgias. Skin: Negative for color change, pallor, rash and wound. Allergic/Immunologic: Negative for environmental allergies and food allergies. Neurological: Negative for dizziness, seizures, syncope, weakness, light-headedness, numbness and headaches. Hematological: Negative for adenopathy. Does not bruise/bleed easily. Psychiatric/Behavioral: Negative for confusion, sleep disturbance and suicidal ideas. The patient is not nervous/anxious and is not hyperactive. needed and discuss importance of following plan.

## 2020-03-16 RX ORDER — LISINOPRIL 10 MG/1
TABLET ORAL
Qty: 90 TABLET | Refills: 0 | Status: SHIPPED | OUTPATIENT
Start: 2020-03-16 | End: 2020-06-17 | Stop reason: SDUPTHER

## 2020-03-16 RX ORDER — NALTREXONE HYDROCHLORIDE AND BUPROPION HYDROCHLORIDE 8; 90 MG/1; MG/1
TABLET, EXTENDED RELEASE ORAL
Qty: 180 TABLET | Refills: 0 | Status: SHIPPED | OUTPATIENT
Start: 2020-03-16 | End: 2020-07-20 | Stop reason: ALTCHOICE

## 2020-03-16 RX ORDER — FUROSEMIDE 40 MG/1
TABLET ORAL
Qty: 90 TABLET | Refills: 0 | Status: SHIPPED | OUTPATIENT
Start: 2020-03-16 | End: 2020-06-17 | Stop reason: SDUPTHER

## 2020-03-16 RX ORDER — LORATADINE 10 MG/1
TABLET ORAL
Qty: 90 TABLET | Refills: 0 | Status: SHIPPED | OUTPATIENT
Start: 2020-03-16 | End: 2020-06-17 | Stop reason: SDUPTHER

## 2020-03-16 RX ORDER — HYDROCHLOROTHIAZIDE 25 MG/1
TABLET ORAL
Qty: 90 TABLET | Refills: 0 | Status: SHIPPED | OUTPATIENT
Start: 2020-03-16 | End: 2020-06-17 | Stop reason: SDUPTHER

## 2020-03-23 ENCOUNTER — OFFICE VISIT (OUTPATIENT)
Dept: ORTHOPEDIC SURGERY | Age: 60
End: 2020-03-23
Payer: COMMERCIAL

## 2020-03-23 VITALS — HEIGHT: 68 IN | WEIGHT: 315 LBS | BODY MASS INDEX: 47.74 KG/M2 | RESPIRATION RATE: 18 BRPM

## 2020-03-23 PROBLEM — M17.12 OSTEOARTHRITIS OF LEFT KNEE: Status: ACTIVE | Noted: 2020-03-23

## 2020-03-23 PROBLEM — M25.562 CHRONIC PAIN OF LEFT KNEE: Status: ACTIVE | Noted: 2020-03-23

## 2020-03-23 PROBLEM — M21.40 PES PLANUS: Status: ACTIVE | Noted: 2020-03-23

## 2020-03-23 PROBLEM — M94.262 CHONDROMALACIA OF LEFT KNEE: Status: ACTIVE | Noted: 2020-03-23

## 2020-03-23 PROBLEM — G89.29 CHRONIC PAIN OF LEFT KNEE: Status: ACTIVE | Noted: 2020-03-23

## 2020-03-23 PROCEDURE — L3040 FT ARCH SUPRT PREMOLD LONGIT: HCPCS | Performed by: FAMILY MEDICINE

## 2020-03-23 PROCEDURE — 20610 DRAIN/INJ JOINT/BURSA W/O US: CPT | Performed by: FAMILY MEDICINE

## 2020-03-23 PROCEDURE — 99203 OFFICE O/P NEW LOW 30 MIN: CPT | Performed by: FAMILY MEDICINE

## 2020-03-23 PROCEDURE — L1812 KO ELASTIC W/JOINTS PRE OTS: HCPCS | Performed by: FAMILY MEDICINE

## 2020-03-23 RX ORDER — DICLOFENAC SODIUM 75 MG/1
75 TABLET, DELAYED RELEASE ORAL 2 TIMES DAILY
Qty: 180 TABLET | Refills: 0 | Status: SHIPPED | OUTPATIENT
Start: 2020-03-23 | End: 2020-06-09 | Stop reason: ALTCHOICE

## 2020-03-23 RX ORDER — BUPIVACAINE HYDROCHLORIDE 2.5 MG/ML
2 INJECTION, SOLUTION INFILTRATION; PERINEURAL ONCE
Status: COMPLETED | OUTPATIENT
Start: 2020-03-23 | End: 2020-03-23

## 2020-03-23 RX ORDER — BETAMETHASONE SODIUM PHOSPHATE AND BETAMETHASONE ACETATE 3; 3 MG/ML; MG/ML
12 INJECTION, SUSPENSION INTRA-ARTICULAR; INTRALESIONAL; INTRAMUSCULAR; SOFT TISSUE ONCE
Status: COMPLETED | OUTPATIENT
Start: 2020-03-23 | End: 2020-03-23

## 2020-03-23 RX ORDER — LIDOCAINE HYDROCHLORIDE 10 MG/ML
1 INJECTION, SOLUTION INFILTRATION; PERINEURAL ONCE
Status: COMPLETED | OUTPATIENT
Start: 2020-03-23 | End: 2020-03-23

## 2020-03-23 RX ADMIN — BUPIVACAINE HYDROCHLORIDE 2 MG: 2.5 INJECTION, SOLUTION INFILTRATION; PERINEURAL at 11:28

## 2020-03-23 RX ADMIN — LIDOCAINE HYDROCHLORIDE 1 ML: 10 INJECTION, SOLUTION INFILTRATION; PERINEURAL at 11:29

## 2020-03-23 RX ADMIN — BETAMETHASONE SODIUM PHOSPHATE AND BETAMETHASONE ACETATE 12 MG: 3; 3 INJECTION, SUSPENSION INTRA-ARTICULAR; INTRALESIONAL; INTRAMUSCULAR; SOFT TISSUE at 11:28

## 2020-03-23 NOTE — PROGRESS NOTES
Chief Complaint  Knee Pain (NP LT KNEE)      Sheri Morales is a 64 y.o. 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 History of Present Illness for New Patient:     Patient is being seen today for acute left knee pain. The patient reports over the last couple of weeks since the first week in March 2020 he began noticed the insidious onset of pain to the anterior medial portion of his left knee. Alexi Ye He describes the symptoms as aching, sharp and stabbing. The pain is located medial, lateral and the patient rates their current pain as a 1-7 out of 10 on the pain scale. This problem has been an issue for 2 weeks. 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 Patient has other associated symptoms: Mild swelling instability. Patient has attempted rest, ice, heat, NSAID-6-12 over-the-counter ibuprofen daily to treat this problem and symptoms are are worsening. Patient does not have a previous history previous knee injury. He does recall an episode a couple months ago when he believes he pulled his right hip adductor which may have led to a gait change causing his left knee to bother him. Only mild night discomfort. Patient is being seen today for orthopedic and sports consultation and review of imaging.      Pain Assessment  Location of Pain: Knee  Location Modifiers: Left  Severity of Pain: 5  Quality of Pain: Aching, Dull, Sharp  Duration of Pain: Persistent  Frequency of Pain: Intermittent  Limiting Behavior: Some  Result of Injury: No  Work-Related Injury: No  Are there other pain locations you wish to document?: No     I have reviewed and attest the documentation healing. The patient was educated and fit by a healthcare professional with expert knowledge and specialization in brace application while under the direct supervision of the treating physician. Verbal and written instructions for the use of and application of this item were provided. They were instructed to contact the office immediately should the brace result in increased pain, decreased sensation, increased swelling or worsening of the condition.  [employer] Hinged Knee WrapAround Brace     Patient was prescribed a [employer] Hinged Wrap Around Knee Brace. The left knee will require stabilization / immobilization from this semi-rigid / rigid orthosis to improve their function. The orthosis will assist in protecting the affected area, provide functional support and facilitate healing. The patient was educated and fit by a healthcare professional with expert knowledge and specialization in brace application while under the direct supervision of the treating physician. Verbal and written instructions for the use of and application of this item were provided. They were instructed to contact the office immediately should the brace result in increased pain, decreased sensation, increased swelling or worsening of the condition. Treatment Plan:  Treatment options were discussed with Layla Ch. We did review his plain films and exam findings. He is only been symptomatic for a couple of weeks with pain in his left knee but is actively limping. He does have medial and anterior compartment arthritic change and potential for a degenerative medial meniscus tear was discussed. He has had little in the way of treatment and would like to hold off on MR imaging given his lack of mechanical symptoms. Alternatively we did opt to inject his left knee today using 2 cc of Celestone, 2 cc of Marcaine, 1 cc of Xylocaine.   We did place him in a wraparound knee brace and gave him off-the-shelf inserts

## 2020-04-13 RX ORDER — DICLOFENAC SODIUM 75 MG/1
75 TABLET, DELAYED RELEASE ORAL 2 TIMES DAILY
Qty: 60 TABLET | Refills: 3 | Status: SHIPPED | OUTPATIENT
Start: 2020-04-13 | End: 2020-05-27 | Stop reason: SDUPTHER

## 2020-05-19 ENCOUNTER — TELEPHONE (OUTPATIENT)
Dept: INTERNAL MEDICINE CLINIC | Age: 60
End: 2020-05-19

## 2020-05-19 ENCOUNTER — TELEMEDICINE (OUTPATIENT)
Dept: FAMILY MEDICINE CLINIC | Age: 60
End: 2020-05-19
Payer: COMMERCIAL

## 2020-05-19 PROBLEM — L03.119 CELLULITIS OF LOWER EXTREMITY: Status: ACTIVE | Noted: 2019-09-25

## 2020-05-19 PROCEDURE — 99213 OFFICE O/P EST LOW 20 MIN: CPT | Performed by: NURSE PRACTITIONER

## 2020-05-19 RX ORDER — SULFAMETHOXAZOLE AND TRIMETHOPRIM 800; 160 MG/1; MG/1
1 TABLET ORAL 2 TIMES DAILY
Qty: 14 TABLET | Refills: 0 | Status: SHIPPED | OUTPATIENT
Start: 2020-05-19 | End: 2020-05-26

## 2020-05-19 ASSESSMENT — ENCOUNTER SYMPTOMS
SHORTNESS OF BREATH: 0
ABDOMINAL PAIN: 0
CONSTIPATION: 0
BACK PAIN: 0
DIARRHEA: 0
SINUS PAIN: 0
SINUS PRESSURE: 0
COLOR CHANGE: 0
COUGH: 0
WHEEZING: 0

## 2020-05-19 NOTE — TELEPHONE ENCOUNTER
Spoke to patients wife  Patient's wife will have patient call our office to schedule a Mychart VV. Patient is on road at this moment until Thursday so patient's wife states he will call to schedule when he is able to pull over for a minute.    Await phone call

## 2020-05-19 NOTE — PROGRESS NOTES
MOUTH ONE TIME DAILY  Wen Mauro MD   loratadine (CLARITIN) 10 MG tablet TAKE ONE TABLET BY MOUTH ONE TIME DAILY  Wen Mauro MD   CONTRAVE 8-90 MG per extended release tablet TAKE 1 TABLET BY MOUTH 2 TIMES A DAY  JACKLYN Eli CNP   ondansetron (ZOFRAN) 4 MG tablet Take 1 tablet by mouth daily as needed for Nausea or Vomiting  JACKLYN Yip CNP   fluticasone (FLONASE) 50 MCG/ACT nasal spray 1 spray by Nasal route daily  Leah Strogn MD   meclizine (ANTIVERT) 25 MG tablet Take 1-2 tablets every 6-8 hours as needed for dizziness  Sheila Dumont MD       Social History     Tobacco Use    Smoking status: Former Smoker     Packs/day: 2.00     Years: 25.00     Pack years: 50.00     Types: Cigarettes     Last attempt to quit: 2006     Years since quittin.0    Smokeless tobacco: Never Used   Substance Use Topics    Alcohol use: Yes     Comment: ocas    Drug use: No        Past Medical History:   Diagnosis Date    Allergic rhinitis     Essential hypertension, benign     Fatty liver     Impaired fasting glucose 2013    Mixed hyperlipidemia      Obesity     Prostate cancer screening     Declined 4/3/13    Screening PSA (prostate specific antigen) 2015;17    Nml:0.53(2015);17=nml.   ,   Past Surgical History:   Procedure Laterality Date    CIRCUMCISION      COLONOSCOPY  2017    Colonoscopy with polypectomy (cold biopsy):Dr. Walker:next in 3yrs=2020    ENDOSCOPY, COLON, DIAGNOSTIC  2017    HERNIA REPAIR  7323    umbilical    MOUTH SURGERY      VASECTOMY      WISDOM TOOTH EXTRACTION         PHYSICAL EXAMINATION:  Physical Exam  Constitutional:       Appearance: Normal appearance. HENT:      Head: Normocephalic and atraumatic. Mouth/Throat:      Mouth: Mucous membranes are moist.   Eyes:      Extraocular Movements: Extraocular movements intact.       Conjunctiva/sclera: Conjunctivae normal.   Neck: Musculoskeletal: Normal range of motion. Pulmonary:      Effort: Pulmonary effort is normal.   Skin:     Coloration: Skin is not jaundiced or pale. Findings: Erythema and lesion (open blisters, draining clear fluid) present. Neurological:      General: No focal deficit present. Mental Status: He is alert and oriented to person, place, and time. Psychiatric:         Mood and Affect: Mood normal.         Behavior: Behavior normal.         Thought Content: Thought content normal.     ASSESSMENT/PLAN:  Cellulitis of lower extremity  Start bactrim x 7 days   Elevate legs as much as possible   Follow up in 1 week       Cammie Millan is a 64 y.o. male being evaluated by a Virtual Visit (video visit) encounter to address concerns as mentioned above. A caregiver was present when appropriate. Due to this being a TeleHealth encounter (During TKSEX-35 public health emergency), evaluation of the following organ systems was limited: Vitals/Constitutional/EENT/Resp/CV/GI//MS/Neuro/Skin/Heme-Lymph-Imm. Pursuant to the emergency declaration under the [address] [place] [address] authority and the [employer] Act, this Virtual Visit was conducted with patient's (and/or legal guardian's) consent, to reduce the patient's risk of exposure to COVID-19 and provide necessary medical care. The patient (and/or legal guardian) has also been advised to contact this office for worsening conditions or problems, and seek emergency medical treatment and/or call 911 if deemed necessary. Patient identification was verified at the start of the visit: Yes    Total time spent on this encounter: 15 minutes    Services were provided through a video synchronous discussion virtually to substitute for in-person clinic visit. Patient and provider were located at their individual homes.     --JACKLYN Corona CNP on 5/19/2020 at 3:38 PM    An

## 2020-05-27 ENCOUNTER — OFFICE VISIT (OUTPATIENT)
Dept: ORTHOPEDIC SURGERY | Age: 60
End: 2020-05-27
Payer: COMMERCIAL

## 2020-05-27 ENCOUNTER — TELEPHONE (OUTPATIENT)
Dept: ORTHOPEDIC SURGERY | Age: 60
End: 2020-05-27

## 2020-05-27 VITALS — BODY MASS INDEX: 47.74 KG/M2 | HEIGHT: 68 IN | WEIGHT: 315 LBS

## 2020-05-27 PROCEDURE — 99214 OFFICE O/P EST MOD 30 MIN: CPT | Performed by: FAMILY MEDICINE

## 2020-05-27 RX ORDER — METHYLPREDNISOLONE 4 MG/1
TABLET ORAL
Qty: 21 KIT | Refills: 0 | Status: SHIPPED | OUTPATIENT
Start: 2020-05-27 | End: 2020-07-20

## 2020-05-27 RX ORDER — DICLOFENAC SODIUM 75 MG/1
75 TABLET, DELAYED RELEASE ORAL 2 TIMES DAILY
Qty: 60 TABLET | Refills: 3 | Status: SHIPPED | OUTPATIENT
Start: 2020-05-27 | End: 2020-06-09 | Stop reason: SDUPTHER

## 2020-05-27 NOTE — PROGRESS NOTES
intact  Mental Status: The patient is oriented to time, place and person. The patient's mood and affect are appropriate. Lymphatic: The lymphatic examination bilaterally reveals all areas to be without enlargement or induration. Vascular: Examination reveals no swelling or calf tenderness. Peripheral pulses are palpable and 2+. Neurological: The patient has good coordination. There is no weakness or sensory deficit. Left knee examination     Inspection: There is no high-grade deformity or soft tissue swelling although he may have a trace knee joint effusion. Only mild patellofemoral crepitation.     Palpation: He does have ongoing tenderness over the lateral greater than medial patellofemoral facet as well as over the anterior and posterior third medial joint line.     Rang of Motion: He does definitely stiff in the terminal 10 degrees of extension with flexion to about 120. Hamstrings and IT bands are tight.     Strength: 4 to 4+ out of 5 with flexion and extension.     Special Tests: He does have some pain reproduced patellar grind testing and with palpation of the primarily the anterior third medial joint line. I cannot really elicit any high-grade meniscal click so this causes some moderate discomfort medially more so than laterally. No obvious instability. Screening left hip testing is benign.     Skin: There are no rashes, ulcerations or lesions. Distal motor sensory and vascular exam is intact.     Gait:  Mild altalgia      Reflex symmetrically preserved       Additional Comments:                 Additional Examinations:     Contralateral Exam: Examination of the right knee reveals intact skin. There is no focal tenderness. The patient demonstrates full painless range of motion with regards to flexion and extension. Strength is 5/5 thorough out all planes.   Ligamentous stability is grossly intact.     Right Lower Extremity: Examination of the right lower extremity does not show any tenderness, deformity or injury. Range of motion is unremarkable. There is no gross instability. There are no rashes, ulcerations or lesions. Strength and tone are normal.  Left Lower Extremity: Examination of the left lower extremity does not show any tenderness, deformity or injury. Range of motion is unremarkable. There is no gross instability. There are no rashes, ulcerations or lesions. Strength and tone are normal.          Diagnostic Test Findings: No results found. Left knee AP and PA weightbearing sunrise and lateral films were once again reviewed from 3/23/2020 which does show moderate medial compartment narrowing with patellofemoral tilt and moderate patellofemoral arthropathy.         Assessment & Plan:    Encounter Diagnoses   Name Primary?  Primary osteoarthritis of left knee Yes    Chondromalacia of left knee     Left knee pain, unspecified chronicity        Orders Placed This Encounter   Procedures    MRI Knee Left WO Contrast     Standing Status:   Future     Standing Expiration Date:   6/27/2020     Scheduling Instructions:      [facility] [address]      [phone]            [facility] #:      TIME AND DATE TBD      PLEASE CALL PATIENT ONCE APPROVED TO SCHEDULE       PUSH TO [employer] SYSTEM            Remember that it may take several business days to pre-cert your MRI through your insurance. Our office will contact you as soon as we have the approval. We will not give any test results over the phone. Please call [phone] once you have your test day and time to schedule a follow up with Dr. King Nazario. Order Specific Question:   Reason for exam:     Answer:   EVAL OA/CMP. R/O MEDIAL MENISCUS TEAR         Treatment Plan:  Treatment options were discussed with Christian Patterson. We did once again review his plain films and exam findings.   He has been having ongoing pain symptoms for almost 3 months now and with initial treatment he did have a very temporary improvement about 80% but this lasted only about 4 weeks and he is now once again having anterior medial pain. He is noticed some region catching and fairly substantial pain with repositioning his knee after it is flexed for a period of time. He does note some popping but has not had juventino locking. Given his plain films, would like for him to have an MRI performed to more thoroughly evaluate degree of degenerative changes and to rule out a medial meniscus tear particular with his new onset catching. I would like for him to start a Medrol Dosepak to see if we can get him calm down and he will then resume his diclofenac 75 mg 1 pill twice daily. He was strongly encouraged to continue with his off-the-shelf inserts his wraparound brace and his patellar protection program.  Icing and activity modification as discussed. He does leave to go out of town over the road with his job next Monday and will be gone for 3 weeks and hopefully we can get his MRI performed before this. Potential for Visco supplementation versus arthroscopic intervention was also discussed. He was also encouraged 10 pound weight loss and a positive impact to put him on his knees. We will see him back post imaging but he will contact us in the interim with questions or concerns.          This dictation was performed with a verbal recognition program (DRAGON) and it was checked for errors. It is possible that there are still dictated errors within this office note. If so, please bring any errors to my attention for an addendum. All efforts were made to ensure that this office note is accurate.

## 2020-05-27 NOTE — TELEPHONE ENCOUNTER
Spoke to patient and informed them that their MRI has been authorized and that they can call and schedule scan at their convenience. Also told them that they can call and schedule a f/u with Dr. Katlyn Fountain once they have MRI scheduled, leaving at least 2-3 days for our office to receive their results.

## 2020-06-02 ENCOUNTER — TELEPHONE (OUTPATIENT)
Dept: ORTHOPEDIC SURGERY | Age: 60
End: 2020-06-02

## 2020-06-02 ENCOUNTER — OFFICE VISIT (OUTPATIENT)
Dept: ORTHOPEDIC SURGERY | Age: 60
End: 2020-06-02
Payer: COMMERCIAL

## 2020-06-02 VITALS — BODY MASS INDEX: 47.74 KG/M2 | HEIGHT: 68 IN | WEIGHT: 315 LBS

## 2020-06-02 PROCEDURE — 99214 OFFICE O/P EST MOD 30 MIN: CPT | Performed by: FAMILY MEDICINE

## 2020-06-02 PROCEDURE — 20610 DRAIN/INJ JOINT/BURSA W/O US: CPT | Performed by: FAMILY MEDICINE

## 2020-06-02 PROCEDURE — E0114 CRUTCH UNDERARM PAIR NO WOOD: HCPCS | Performed by: FAMILY MEDICINE

## 2020-06-02 RX ORDER — LIDOCAINE HYDROCHLORIDE 10 MG/ML
2 INJECTION, SOLUTION INFILTRATION; PERINEURAL ONCE
Status: COMPLETED | OUTPATIENT
Start: 2020-06-02 | End: 2020-06-02

## 2020-06-02 RX ORDER — TRAMADOL HYDROCHLORIDE 50 MG/1
50 TABLET ORAL EVERY 6 HOURS PRN
Qty: 28 TABLET | Refills: 0 | Status: SHIPPED | OUTPATIENT
Start: 2020-06-02 | End: 2020-06-09

## 2020-06-02 RX ORDER — BUPIVACAINE HYDROCHLORIDE 2.5 MG/ML
4 INJECTION, SOLUTION INFILTRATION; PERINEURAL ONCE
Status: COMPLETED | OUTPATIENT
Start: 2020-06-02 | End: 2020-06-02

## 2020-06-02 RX ORDER — BETAMETHASONE SODIUM PHOSPHATE AND BETAMETHASONE ACETATE 3; 3 MG/ML; MG/ML
24 INJECTION, SUSPENSION INTRA-ARTICULAR; INTRALESIONAL; INTRAMUSCULAR; SOFT TISSUE ONCE
Status: COMPLETED | OUTPATIENT
Start: 2020-06-02 | End: 2020-06-02

## 2020-06-02 RX ORDER — IBUPROFEN 800 MG/1
800 TABLET ORAL EVERY 8 HOURS PRN
Qty: 90 TABLET | Refills: 3 | Status: SHIPPED | OUTPATIENT
Start: 2020-06-02 | End: 2020-07-28 | Stop reason: ALTCHOICE

## 2020-06-02 RX ADMIN — LIDOCAINE HYDROCHLORIDE 2 ML: 10 INJECTION, SOLUTION INFILTRATION; PERINEURAL at 09:55

## 2020-06-02 RX ADMIN — BUPIVACAINE HYDROCHLORIDE 10 MG: 2.5 INJECTION, SOLUTION INFILTRATION; PERINEURAL at 09:55

## 2020-06-02 RX ADMIN — BETAMETHASONE SODIUM PHOSPHATE AND BETAMETHASONE ACETATE 24 MG: 3; 3 INJECTION, SUSPENSION INTRA-ARTICULAR; INTRALESIONAL; INTRAMUSCULAR; SOFT TISSUE at 09:54

## 2020-06-02 NOTE — PROGRESS NOTES
Chief Complaint  Knee Pain (TR MRI LEFT KNEE)      Candace Nelson is a 64 y.o. 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 Follow-up left knee MRI with initial evaluation worsening right knee pain    History of Present Illness for Follow Up Patient:      Patient is being seen today for acute left knee pain. The patient reports that since the first week in March 2020 he began noticed the insidious onset of pain to the anterior medial portion of his left knee. Fredrick Badillo He describes the symptoms as aching, sharp and stabbing. The pain is located medial, lateral and the patient rates their current pain as a 1-7 out of 10 on the pain scale. This problem has been an issue for 2 weeks. 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 Patient has other associated symptoms: Mild swelling instability. Patient has attempted rest, ice, heat, NSAID-6-12 over-the-counter ibuprofen daily to treat this problem and symptoms are are worsening. Patient does not have a previous history previous knee injury. He does recall an episode a couple months ago when he believes he pulled his right hip adductor which may have led to a gait change causing his left knee to bother him. Only mild night discomfort. Patient is being seen today for orthopedic and sports consultation and review of imaging. 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   Following a steroid injection he did report at least an 80% improvement of his symptoms but this lasted only about 4 weeks and over the last month he has noticed recurrence of his pain to the point where his pain is about a 5 out of 10 currently. He has noticed some catching and has definite difficulty repositioning his knee if it is in a bent position. He has not had juventino locking and admits he has been a little bit lax in performing his home-based exercises but has continued with his diclofenac twice daily. He has not noticed a great deal of swelling and has not had true instability symptoms and has gotten benefit from use of his brace. His pain is primarily anterior but also medial.  He just returned back to town this past weekend after being on the road for 8 straight weeks. Naldo Johnson was last seen in the office on 5/27/2020 due to worsening pain, and sent for an MRI of his left knee. His MRI does show more prominent degenerative changes with high-grade medial and patellofemoral compartment chondromalacia with degenerative tearing and maceration to the posterior horn and body of the medial meniscus. There was a probable area of synovial thickening versus loose body in the suprapatellar recess medially but he is really not complaining of sensations of loose bodies and is not really had true locking or catching. Over this past weekend his pain symptoms have worsened substantially where he is unable to comfortably walk. He is having 10 out of 10 pain difficulty with motion and flexion. Once again he was supposed to leave [facility] to get back on the road as a  but he is unable to walk and unable to climb into his cab. He is having difficulty sleeping at this point. He has been icing and has been taking his steroid taper which did not provide any type of pain relief now his right knee is bothering him anteriorly and medially. Once again no active locking or catching. Does have short-term disability available to him. Attest: I have reviewed and attest the documentation of the HPI documented by my .  I will make any changes if necessary. Enc Date: 2020  Time: 9:33 AM  Provider: Mary Barrera MD        Social History     Tobacco Use    Smoking status: Former Smoker     Packs/day: 2.00     Years: 25.00     Pack years: 50.00     Types: Cigarettes     Last attempt to quit: 2006     Years since quittin.0    Smokeless tobacco: Never Used   Substance Use Topics    Alcohol use: Yes     Comment: ocas    Drug use: No        Review of Systems  Pertinent items are noted in HPI  Review of systems reviewed from Patient History Form dated on 3/23/2020 and available in the patient's chart under the Media tab. Vital Signs     Ht 5' 8\" (1.727 m)   Wt (!) 367 lb (166.5 kg)   BMI 55.80 kg/m²       General Exam:   Constitutional: Patient is adequately groomed with no evidence of malnutrition  DTRs: Deep tendon reflexes are intact  Mental Status: The patient is oriented to time, place and person. The patient's mood and affect are appropriate. Lymphatic: The lymphatic examination bilaterally reveals all areas to be without enlargement or induration. Vascular: Examination reveals no swelling or calf tenderness. Peripheral pulses are palpable and 2+. Neurological: The patient has good coordination. There is no weakness or sensory deficit. Left knee examination     Inspection: There is no high-grade deformity or soft tissue swelling although he may have a trace knee joint effusion. Only mild patellofemoral crepitation.     Palpation: He does have more prominent tenderness over the lateral greater than medial patellofemoral facet as well as over the anterior and posterior third medial joint line.     Rang of Motion: He does definitely stiff in the terminal 10 degrees of extension with flexion to about 120.   Hamstrings and IT bands are tight.     Strength: 4 to 4+ out of 5 with flexion and extension.     Special Tests: He does have substantial pain reproduced patellar grind testing and with palpation of the primarily the in the interim with questions or concerns. This dictation was performed with a verbal recognition program (DRAGON) and it was checked for errors. It is possible that there are still dictated errors within this office note. If so, please bring any errors to my attention for an addendum. All efforts were made to ensure that this office note is accurate.

## 2020-06-02 NOTE — TELEPHONE ENCOUNTER
6/2/2020. EUFLEXXA () series of 3. NO PA REQUIRED. BILATERAL KNEE.  VALID & BILLABLE ON CLAIM YES. BUY AND BILL. Per PHONE  From [facility] REF # [id number] .

## 2020-06-02 NOTE — TELEPHONE ENCOUNTER
LVM for patient that euflexxa injections have been approved for BILATERAL knees. Told patient they could call central scheduling at [phone] at their convenience to schedule those appointments.  Also told them if they had any problems or questions, they could call me directly at [phone]

## 2020-06-03 ENCOUNTER — TELEPHONE (OUTPATIENT)
Dept: ORTHOPEDIC SURGERY | Age: 60
End: 2020-06-03

## 2020-06-08 ENCOUNTER — TELEPHONE (OUTPATIENT)
Dept: ORTHOPEDIC SURGERY | Age: 60
End: 2020-06-08

## 2020-06-09 ENCOUNTER — OFFICE VISIT (OUTPATIENT)
Dept: ORTHOPEDIC SURGERY | Age: 60
End: 2020-06-09
Payer: COMMERCIAL

## 2020-06-09 VITALS — HEIGHT: 68 IN | WEIGHT: 315 LBS | BODY MASS INDEX: 47.74 KG/M2 | TEMPERATURE: 93.3 F

## 2020-06-09 PROCEDURE — 20610 DRAIN/INJ JOINT/BURSA W/O US: CPT | Performed by: FAMILY MEDICINE

## 2020-06-09 PROCEDURE — 99213 OFFICE O/P EST LOW 20 MIN: CPT | Performed by: FAMILY MEDICINE

## 2020-06-09 RX ORDER — HYALURONATE SODIUM 10 MG/ML
40 SYRINGE (ML) INTRAARTICULAR ONCE
Status: COMPLETED | OUTPATIENT
Start: 2020-06-09 | End: 2020-06-09

## 2020-06-09 RX ADMIN — Medication 40 MG: at 08:29

## 2020-06-09 NOTE — PROGRESS NOTES
did report at least an 80% improvement of his symptoms but this lasted only about 4 weeks and over the last month he has noticed recurrence of his pain to the point where his pain is about a 5 out of 10 currently. He has noticed some catching and has definite difficulty repositioning his knee if it is in a bent position. He has not had juventino locking and admits he has been a little bit lax in performing his home-based exercises but has continued with his diclofenac twice daily. He has not noticed a great deal of swelling and has not had true instability symptoms and has gotten benefit from use of his brace. His pain is primarily anterior but also medial.  He just returned back to town this past weekend after being on the road for 8 straight weeks. Jaden Queen was last seen in the office on 5/27/2020 due to worsening pain, and sent for an MRI of his left knee. His MRI does show more prominent degenerative changes with high-grade medial and patellofemoral compartment chondromalacia with degenerative tearing and maceration to the posterior horn and body of the medial meniscus. There was a probable area of synovial thickening versus loose body in the suprapatellar recess medially but he is really not complaining of sensations of loose bodies and is not really had true locking or catching. Over this past weekend his pain symptoms have worsened substantially where he is unable to comfortably walk. He is having 10 out of 10 pain difficulty with motion and flexion. Once again he was supposed to leave [facility] to get back on the road as a  but he is unable to walk and unable to climb into his cab. He is having difficulty sleeping at this point. He has been icing and has been taking his steroid taper which did not provide any type of pain relief now his right knee is bothering him anteriorly and medially. Once again no active locking or catching. Does have short-term disability available to him.     He was induration. Vascular: Examination reveals no swelling or calf tenderness. Peripheral pulses are palpable and 2+. Neurological: The patient has good coordination. There is no weakness or sensory deficit. Left knee examination     Inspection: There is no high-grade deformity or soft tissue swelling although he may have a trace knee joint effusion. Only mild patellofemoral crepitation.     Palpation: He does have more prominent tenderness over the lateral greater than medial patellofemoral facet as well as over the anterior and posterior third medial joint line.     Rang of Motion: He does definitely stiff in the terminal 10 degrees of extension with flexion to about 120. Hamstrings and IT bands are tight.     Strength: 4 to 4+ out of 5 with flexion and extension.     Special Tests: He does have substantial pain reproduced patellar grind testing and with palpation of the primarily the anterior third medial joint line. I cannot really elicit any high-grade meniscal click so this causes some moderate discomfort medially more so than laterally. No obvious instability. Screening left hip testing is benign.     Skin: There are no rashes, ulcerations or lesions. Distal motor sensory and vascular exam is intact.     Gait:  He is still having difficulty with bearing weight. He is currently using crutches. .     Reflex symmetrically preserved       Additional Comments:     Evaluation of his right knee does reveal trace swelling. He does have tenderness over the medial and lateral patellofemoral facet and most of his pain is reproduced with palpation of the anterior third medial joint line with patellar grind testing. He is stiff in the terminal 10 degrees of extension with flexion on the right to about 120-125.  4-5 strength with flexion extension. Pain with medial Monik's but no high-grade click. Negative lateral Monik's. No obvious instability.   Screening right hip testing appears minimally tilted.  High-grade patellar and trochlear groove    chondromalacia.       ACL, PCL, LCL, MCL, flexor mechanism, and extensor mechanism are intact.       Thickened MCL.  Chronic sprain, scarring, and capsulitis is present.       High-grade chondromalacia involves the weightbearing medial femur and tibia.  Eccentric    spurring is present.       Lateral meniscus is intact.       Medial meniscus free edge tearing involves the body.       Nodular region of synovial thickening or body is noted within the suprapatellar recess    medially.  This measures at least 1.75 x 1 x 2.3 cm.       CONCLUSION:   1. High-grade medial and patellofemoral compartment chondromalacia.  Degenerative tearing    involves the free edge of the posterior horn and body.  Subchondral marrow changes, spurring    and remodeling of the medial and less so patellofemoral joint spaces. 2. Loose osteochondral body suprapatellar recess medially measures 1.75 x 1 x 2.3 cm.   3. Anteromedial and anterolateral subcutis swelling.  Contusion and sprain present. 4. Please see above.       Thank you for the opportunity to provide your interpretation.               Jayy Moctezuma MD       A: See 05/29/2020 7:29 PM   T: AVA 05/29/2020 2:35 PM           Assessment & Plan:    Encounter Diagnoses   Name Primary?  Primary osteoarthritis of left knee Yes    Primary osteoarthritis of right knee     Chondromalacia of left knee     Chondromalacia patellae of right knee     Acute pain of right knee     Left knee pain, unspecified chronicity     Pes planus of both feet        Orders Placed This Encounter   Procedures    [place] ARTHROCENTESIS ASPIR&/INJ MAJOR JT/BURSA W/O US         Treatment Plan:  Treatment options were discussed with Cammie Millan. We did once again review his plain films, his recent left knee MRI and exam findings.   He has been having ongoing pain symptoms for 3+ months now and with initial treatment he did have a very temporary improvement about 80% but this lasted only about 4 weeks and he is now once again having anterior medial pain. His MRI was obtained last week and actually shows more prominent arthritic change with high-grade degenerative changes to the medial and anterior compartment. There was a question of synovial thickening versus mild or loose body but he is not really complaining of true foreign body sensations or locking. Clinic at this point he looks much better than last week and would rate his improvement on the left at about 50% with an 80 to 90% improvement on his right knee. After discussion of pros and cons of Visco supplementation, we did opt to inject his knees bilaterally today with his first injection of Euflexxa. This was performed using a standard prefilled 2 cc syringe. He was measured for a medial compartment  and secondary to not hitting his deductible he needs to discuss this with his wife. He was encouraged to keep his therapy appointment on 6/11/2020. He will continue with his ibuprofen 800 mg 3 times daily and does have tramadol to take for breakthrough pain at night. He may utilize his crutches. He is currently off work on short-term disability to allow for efficient rehab and maximal treatment with regard to his knees. He is aware that he will probably need a knee replacement down the road but certainly needs substantial weight loss as his current BMI is 55. 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 Icing and activity modification importance of performing his home-based exercise program and periodic use of crutches was discussed. He will be seen back each to the next 2 weeks to finish up his Euflexxa series and progress through therapy. Icing and activity modifications was discussed. He will contact us in the interim with questions or concerns.          This dictation was performed with a verbal recognition program (DRAGON) and it was checked for errors. It is possible that there are still dictated errors within this office note. If so, please bring any errors to my attention for an addendum. All efforts were made to ensure that this office note is accurate.

## 2020-06-09 NOTE — PROGRESS NOTES
Chief Complaint  Knee Pain (EUFLEXXA #1 BILATERAL KNEES)      Saeed Easton is a 64 y.o. male ***     History of Present Illness for Follow Up Patient:      Naldo Johnson is being seen in follow up today for {OSE ACUTE CHRONIC:} {body parts:302865} pain. Naldo Johnson is {#:40402} {DAYS:} out from initial injury. The patient rates their current pain as a {NUMBER 1-10:3577678228} out of 10 on the pain scale. Activities aggravating current symptoms include ***. Activities relieving current symptoms include ***. Treatment to date includes: {BACK PAIN PREVIOUS TREATMENT:} to treat this problem and symptoms {Improving/worsening/no change:33004}. Saeed Easton reports a *** % improvement in symptoms. Attest: ***    Social History     Tobacco Use    Smoking status: Former Smoker     Packs/day: 2.00     Years: 25.00     Pack years: 50.00     Types: Cigarettes     Last attempt to quit: 2006     Years since quittin.1    Smokeless tobacco: Never Used   Substance Use Topics    Alcohol use: Yes     Comment: ocas    Drug use: No        Review of Systems  Pertinent items are noted in HPI  Review of systems reviewed from Patient History Form dated on *** and available in the patient's chart under the Media tab. Vital Signs     Ht 5' 8\" (1.727 m)   Wt (!) 367 lb (166.5 kg)   BMI 55.80 kg/m²       General Exam:   ***    ***        Additional Examinations:     {Additional Exams:92396}        Diagnostic Test Findings:   ***    Assessment & Plan:    Encounter Diagnoses   Name Primary?     Primary osteoarthritis of left knee Yes    Primary osteoarthritis of right knee     Chondromalacia of left knee     Chondromalacia patellae of right knee     Acute pain of right knee     Left knee pain, unspecified chronicity     Pes planus of both feet        Orders Placed This Encounter   Procedures    [place] ARTHROCENTESIS ASPIR&/INJ MAJOR JT/BURSA W/O US         Treatment Plan:  Treatment options were discussed

## 2020-06-10 ENCOUNTER — TELEMEDICINE (OUTPATIENT)
Dept: INTERNAL MEDICINE CLINIC | Age: 60
End: 2020-06-10
Payer: COMMERCIAL

## 2020-06-10 PROCEDURE — 99213 OFFICE O/P EST LOW 20 MIN: CPT | Performed by: NURSE PRACTITIONER

## 2020-06-10 RX ORDER — ROPINIROLE 0.25 MG/1
TABLET, FILM COATED ORAL
Qty: 30 TABLET | Refills: 2 | Status: SHIPPED | OUTPATIENT
Start: 2020-06-10 | End: 2020-06-22 | Stop reason: SDUPTHER

## 2020-06-10 ASSESSMENT — ENCOUNTER SYMPTOMS
COLOR CHANGE: 0
WHEEZING: 0
DIARRHEA: 0
SINUS PAIN: 0
COUGH: 0
SINUS PRESSURE: 0
SHORTNESS OF BREATH: 0
CONSTIPATION: 0
BACK PAIN: 0
ABDOMINAL PAIN: 0

## 2020-06-10 NOTE — PROGRESS NOTES
6/10/2020    TELEHEALTH EVALUATION -- Audio/Visual (During [facility]-88 public health emergency)    HPI:    Demetrius Monroe (:  1960) has requested an audio/video evaluation for the following concern(s):    Here for concern of RLS. States he has had symptoms for years but they have been manageable until now. He recently had arthrocentesis of his knee and has been on crutches. He states the sensation in his legs are now driving him nuts. He has to get up frequently through the night and cannot keep his legs still. He is interested in starting medication to help with this. Review of Systems   Constitutional: Negative for chills, fatigue and fever. HENT: Negative for congestion, sinus pressure and sinus pain. Respiratory: Negative for cough, shortness of breath and wheezing. Cardiovascular: Negative for chest pain and palpitations. Gastrointestinal: Negative for abdominal pain, constipation and diarrhea. Musculoskeletal: Positive for arthralgias. Negative for back pain and myalgias. Skin: Negative for color change, pallor and rash. Neurological: Negative for dizziness, syncope, weakness, light-headedness and headaches. Psychiatric/Behavioral: Positive for sleep disturbance. Negative for behavioral problems and confusion. The patient is not nervous/anxious. Prior to Visit Medications    Medication Sig Taking? Authorizing Provider   rOPINIRole (REQUIP) 0.25 MG tablet Take 1 tab for 1 week then increase to 2 tabs before bed Yes Edu Key, APRN - CNP   ibuprofen (ADVIL;MOTRIN) 800 MG tablet Take 1 tablet by mouth every 8 hours as needed for Pain  Duran Foreman MD   methylPREDNISolone (MEDROL DOSEPACK) 4 MG tablet Take by mouth as directed.   Duran Foreman MD   lisinopril (PRINIVIL;ZESTRIL) 10 MG tablet TAKE ONE TABLET BY MOUTH DAILY  Daniel Youngblood MD   furosemide (LASIX) 40 MG tablet TAKE ONE TABLET BY MOUTH ONE TIME DAILY  Daniel Youngblood MD   hydroCHLOROthiazide (HYDRODIURIL) 25 MG tablet TAKE ONE TABLET BY MOUTH ONE TIME DAILY  Karin Rivero MD   loratadine (CLARITIN) 10 MG tablet TAKE ONE TABLET BY MOUTH ONE TIME DAILY  Karin Rivero MD   CONTRAVE 8-90 MG per extended release tablet TAKE 1 TABLET BY MOUTH 2 TIMES A DAY  JACKLYN Eli CNP   ondansetron (ZOFRAN) 4 MG tablet Take 1 tablet by mouth daily as needed for Nausea or Vomiting  JACKLYN Cage - CNP   fluticasone (FLONASE) 50 MCG/ACT nasal spray 1 spray by Nasal route daily  Yelena Aguilera MD   meclizine (ANTIVERT) 25 MG tablet Take 1-2 tablets every 6-8 hours as needed for dizziness  Arvin Garcia MD       Social History     Tobacco Use    Smoking status: Former Smoker     Packs/day: 2.00     Years: 25.00     Pack years: 50.00     Types: Cigarettes     Last attempt to quit: 2006     Years since quittin.1    Smokeless tobacco: Never Used   Substance Use Topics    Alcohol use: Yes     Comment: ocas    Drug use: No        Past Medical History:   Diagnosis Date    Allergic rhinitis     Essential hypertension, benign     Fatty liver     Impaired fasting glucose 2013    Mixed hyperlipidemia      Obesity     Prostate cancer screening     Declined 4/3/13    Screening PSA (prostate specific antigen) 2015;17    Nml:0.53(2015);17=nml.   ,   Past Surgical History:   Procedure Laterality Date    CIRCUMCISION      COLONOSCOPY  2017    Colonoscopy with polypectomy (cold biopsy):Dr. Walker:next in 3yrs=2020    ENDOSCOPY, COLON, DIAGNOSTIC  2017    HERNIA REPAIR  9598    umbilical    MOUTH SURGERY      VASECTOMY      WISDOM TOOTH EXTRACTION         PHYSICAL EXAMINATION:  Physical Exam  Constitutional:       Appearance: Normal appearance. HENT:      Head: Normocephalic and atraumatic. Mouth/Throat:      Mouth: Mucous membranes are moist.   Eyes:      Extraocular Movements: Extraocular movements intact. Conjunctiva/sclera: Conjunctivae normal.   Neck:      Musculoskeletal: Normal range of motion. Pulmonary:      Effort: Pulmonary effort is normal.   Skin:     Coloration: Skin is not jaundiced or pale. Neurological:      General: No focal deficit present. Mental Status: He is alert and oriented to person, place, and time. Psychiatric:         Mood and Affect: Mood normal.         Behavior: Behavior normal.         Thought Content: Thought content normal.         ASSESSMENT/PLAN:  RLS (restless legs syndrome)  Symptoms have been managed conservatively until now   Will start requip 0.25 mg, increase to 0.5 mg after one week   Follow up in one month     No follow-ups on file. Demetrius Monroe is a 64 y.o. male being evaluated by a Virtual Visit (video visit) encounter to address concerns as mentioned above. A caregiver was present when appropriate. Due to this being a TeleHealth encounter (During [facility]-30 public health emergency), evaluation of the following organ systems was limited: Vitals/Constitutional/EENT/Resp/CV/GI//MS/Neuro/Skin/Heme-Lymph-Imm. Pursuant to the emergency declaration under the [address] [place] [address], [address] [place] [address] authority and the [employer] Act, this Virtual Visit was conducted with patient's (and/or legal guardian's) consent, to reduce the patient's risk of exposure to COVID-19 and provide necessary medical care. The patient (and/or legal guardian) has also been advised to contact this office for worsening conditions or problems, and seek emergency medical treatment and/or call 911 if deemed necessary. Patient identification was verified at the start of the visit: Yes    Total time spent on this encounter: not billed on time    Services were provided through a video synchronous discussion virtually to substitute for in-person clinic visit.  Patient and provider were located at their individual homes.    --Vikas [place], APRN - CNP on 6/10/2020 at 4:11 PM    An electronic signature was used to authenticate this note.

## 2020-06-11 ENCOUNTER — HOSPITAL ENCOUNTER (OUTPATIENT)
Dept: PHYSICAL THERAPY | Age: 60
Setting detail: THERAPIES SERIES
Discharge: HOME | End: 2020-06-11
Payer: COMMERCIAL

## 2020-06-11 PROCEDURE — 97110 THERAPEUTIC EXERCISES: CPT

## 2020-06-11 PROCEDURE — 97140 MANUAL THERAPY 1/> REGIONS: CPT

## 2020-06-11 PROCEDURE — 97161 PT EVAL LOW COMPLEX 20 MIN: CPT

## 2020-06-11 NOTE — PLAN OF CARE
joint sprain/strain   []Signs/symptoms consistent with patella-femoral syndrome   [x]Signs/symptoms consistent with knee OA/hip OA   [x]Signs/symptoms consistent with internal derangement of knee/Hip   []Signs/symptoms consistent with functional hip weakness/NMR control      []Signs/symptoms consistent with tendinitis/tendinosis    []signs/symptoms consistent with pathology which may benefit from Dry needling      []other:      Prognosis/Rehab Potential:      []Excellent   []Good    [x]Fair   []Poor    Tolerance of evaluation/treatment:    []Excellent   []Good    [x]Fair   []Poor  Physical Therapy Evaluation Complexity Justification  [x] A history of present problem with:  [] no personal factors and/or comorbidities that impact the plan of care;  [x]1-2 personal factors and/or comorbidities that impact the plan of care  []3 personal factors and/or comorbidities that impact the plan of care  [x] An examination of body systems using standardized tests and measures addressing any of the following: body structures and functions (impairments), activity limitations, and/or participation restrictions;:  [] a total of 1-2 or more elements   [] a total of 3 or more elements   [x] a total of 4 or more elements   [x] A clinical presentation with:  [x] stable and/or uncomplicated characteristics   [] evolving clinical presentation with changing characteristics  [] unstable and unpredictable characteristics;   [x] Clinical decision making of [x] low, [] moderate, [] high complexity using standardized patient assessment instrument and/or measurable assessment of functional outcome.     [x] EVAL (LOW) 26408 (typically 20 minutes face-to-face)  [] EVAL (MOD) 36456 (typically 30 minutes face-to-face)  [] EVAL (HIGH) 43841 (typically 45 minutes face-to-face)  [] RE-EVAL       PLAN:   Frequency/Duration:  2 days per week for 6 Weeks:  Interventions:  [x]  Therapeutic exercise including: strength training, ROM, for Lower extremity and signed by:  Joelene Bence, PT, DPT

## 2020-06-11 NOTE — FLOWSHEET NOTE
assist with LE, proximal hip, and core control in self care, mobility, lifting, ambulation and eccentric single leg control. NMR and Therapeutic Activities:    [] (61341 or 85292) Provided verbal/tactile cueing for activities related to improving balance, coordination, kinesthetic sense, posture, motor skill, proprioception and motor activation to allow for proper function of core, proximal hip and LE with self care and ADLs  [x] (31702) Gait Re-education- Provided training and instruction to the patient for proper LE, core and proximal hip recruitment and positioning and eccentric body weight control with ambulation re-education including up and down stairs     Home Exercise Program:    [x] (55566) Reviewed/Progressed HEP activities related to strengthening, flexibility, endurance, ROM of core, proximal hip and LE for functional self-care, mobility, lifting and ambulation/stair navigation   [] (23285)Reviewed/Progressed HEP activities related to improving balance, coordination, kinesthetic sense, posture, motor skill, proprioception of core, proximal hip and LE for self care, mobility, lifting, and ambulation/stair navigation      Manual Treatments:  PROM / STM / Oscillations-Mobs:  G-I, II, III, IV (PA's, Inf., Post.)  [x] (36603) Provided manual therapy to mobilize LE, proximal hip and/or LS spine soft tissue/joints for the purpose of modulating pain, promoting relaxation,  increasing ROM, reducing/eliminating soft tissue swelling/inflammation/restriction, improving soft tissue extensibility and allowing for proper ROM for normal function with self care, mobility, lifting and ambulation. Modalities:     [] GAME READY (VASO)- for significant edema, swelling, pain control.      Charges:  Timed Code Treatment Minutes: 30   Total Treatment Minutes: 62     [facility] time in/time out:   (and requires time in and out for each CPT code)    [x] EVAL (LOW) 30935 (typically 20 minutes face-to-face)  [] EVAL (MOD) 11245

## 2020-06-15 ENCOUNTER — HOSPITAL ENCOUNTER (OUTPATIENT)
Dept: PHYSICAL THERAPY | Age: 60
Setting detail: THERAPIES SERIES
Discharge: HOME | End: 2020-06-15
Payer: COMMERCIAL

## 2020-06-15 PROCEDURE — 97110 THERAPEUTIC EXERCISES: CPT | Performed by: PHYSICAL THERAPIST

## 2020-06-15 PROCEDURE — 97140 MANUAL THERAPY 1/> REGIONS: CPT | Performed by: PHYSICAL THERAPIST

## 2020-06-15 NOTE — FLOWSHEET NOTE
strengthening, flexibility, endurance, ROM for improvements in LE, proximal hip, and core control with self care, mobility, lifting, ambulation.  [] (69357) Provided verbal/tactile cueing for activities related to improving balance, coordination, kinesthetic sense, posture, motor skill, proprioception  to assist with LE, proximal hip, and core control in self care, mobility, lifting, ambulation and eccentric single leg control. NMR and Therapeutic Activities:    [] (82765 or 08195) Provided verbal/tactile cueing for activities related to improving balance, coordination, kinesthetic sense, posture, motor skill, proprioception and motor activation to allow for proper function of core, proximal hip and LE with self care and ADLs  [x] (17131) Gait Re-education- Provided training and instruction to the patient for proper LE, core and proximal hip recruitment and positioning and eccentric body weight control with ambulation re-education including up and down stairs     Home Exercise Program:    [x] (44843) Reviewed/Progressed HEP activities related to strengthening, flexibility, endurance, ROM of core, proximal hip and LE for functional self-care, mobility, lifting and ambulation/stair navigation   [] (51895)Reviewed/Progressed HEP activities related to improving balance, coordination, kinesthetic sense, posture, motor skill, proprioception of core, proximal hip and LE for self care, mobility, lifting, and ambulation/stair navigation      Manual Treatments:  PROM / STM / Oscillations-Mobs:  G-I, II, III, IV (PA's, Inf., Post.)  [x] (08749) Provided manual therapy to mobilize LE, proximal hip and/or LS spine soft tissue/joints for the purpose of modulating pain, promoting relaxation,  increasing ROM, reducing/eliminating soft tissue swelling/inflammation/restriction, improving soft tissue extensibility and allowing for proper ROM for normal function with self care, mobility, lifting and ambulation.      Modalities: Instructed pt to ice (also compress with ace bandage) at home   [] GAME READY (VASO)- for significant edema, swelling, pain control. Charges:  Timed Code Treatment Minutes: 50   Total Treatment Minutes: 60     [facility] time in/time out:   (and requires time in and out for each CPT code)    [] EVAL (LOW) 26533 (typically 20 minutes face-to-face)  [] EVAL (MOD) 90873 (typically 30 minutes face-to-face)  [] EVAL (HIGH) 69215 (typically 45 minutes face-to-face)  [] RE-EVAL     [x] PZ(75379) x 2     [] IONTO  [] NMR (83198) x     [] VASO  [x] Manual (98230) x  1    [] Other:  [] TA x      [] Mech Traction (97211)  [] ES(attended) (79956)      [] ES (un) (98630):       GOALS:   Patient stated goal: Bending knee without pain  []? Progressing: []? Met: []? Not Met: []? Adjusted     Therapist goals for Patient:   Short Term Goals: To be achieved in: 2 weeks  1. Independent in HEP and progression per patient tolerance, in order to prevent re-injury. []? Progressing: []? Met: []? Not Met: []? Adjusted  2. Patient will have a decrease in pain to facilitate improvement in movement, function, and ADLs as indicated by Functional Deficits. []? Progressing: []? Met: []? Not Met: []? Adjusted     Long Term Goals: To be achieved in: 6 weeks  1. Disability index score of 40% or less for the LEFS to assist with reaching prior level of function. []? Progressing: []? Met: []? Not Met: []? Adjusted  2. Patient will demonstrate increased pain-free AROM to 0-105 deg for R,L knees to allow for proper joint functioning for sit to stand transitions, normal gait patterns, and stair ambulation with a reciprocal pattern. []? Progressing: []? Met: []? Not Met: []? Adjusted  3. Patient will demonstrate an increase in R,L knee, hip strength to at least 4+/5 in order to amb s AD, improve ease of sit to stand transfers and climb stairs in his home. []? Progressing: []? Met: []? Not Met: []? Adjusted  4.  Patient will return to [facility] distances s AD and using knee braces. []? Progressing: []? Met: []? Not Met: []? Adjusted  5. Patient will be able to climb stairs reciprocally using on HR.    []? Progressing: []? Met: []? Not Met: []? Adjusted         Progression Towards Functional goals:  [] Patient is progressing as expected towards functional goals listed. [x] Progression is slowed due to complexities listed. [x] Progression has been slowed due to co-morbidities. [] Plan just implemented, too soon to assess goals progression  [] Other:         Overall Progression Towards Functional goals/ Treatment Progress Update:  [] Patient is progressing as expected towards functional goals listed. [x] Progression is slowed due to complexities/Impairments listed. [x] Progression has been slowed due to co-morbidities. [] Plan just implemented, too soon to assess goals progression <30days   [] Goals require adjustment due to lack of progress  [] Patient is not progressing as expected and requires additional follow up with physician  [] Other    Prognosis for POC: [x] Good [] Fair  [] Poor      Patient requires continued skilled intervention: [x] Yes  [] No    Treatment/Activity Tolerance:  [x] Patient able to complete treatment  [] Patient limited by fatigue  [] Patient limited by pain    [] Patient limited by other medical complications  [] Other:     ASSESSMENT: High pain levels limit patient progress. Pt demonstrates difficulty with ROM, quad activation and proper gait mechanics. PLAN:   [x] Continue per plan of care [] Alter current plan (see comments above)  [] Plan of care initiated [] Hold pending MD visit [] Discharge      Electronically signed by:  Irene Prasad, PT, DPT [id number]    Note: If patient does not return for scheduled/ recommended follow up visits, this note will serve as a discharge from care along with most recent update on progress.

## 2020-06-16 ENCOUNTER — OFFICE VISIT (OUTPATIENT)
Dept: ORTHOPEDIC SURGERY | Age: 60
End: 2020-06-16
Payer: COMMERCIAL

## 2020-06-16 ENCOUNTER — TELEPHONE (OUTPATIENT)
Dept: ORTHOPEDIC SURGERY | Age: 60
End: 2020-06-16

## 2020-06-16 VITALS — WEIGHT: 315 LBS | HEIGHT: 68 IN | BODY MASS INDEX: 47.74 KG/M2

## 2020-06-16 PROCEDURE — 20610 DRAIN/INJ JOINT/BURSA W/O US: CPT | Performed by: FAMILY MEDICINE

## 2020-06-16 RX ORDER — HYALURONATE SODIUM 10 MG/ML
40 SYRINGE (ML) INTRAARTICULAR ONCE
Status: COMPLETED | OUTPATIENT
Start: 2020-06-16 | End: 2020-06-16

## 2020-06-16 RX ADMIN — Medication 40 MG: at 08:19

## 2020-06-16 NOTE — PROGRESS NOTES
CC:  FU Knee Osteoarthritis with Viscosupplementation       HPI: Clara Mccray is a 64 y.o. 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 He is here today to continue with his Euflexxa injections. He is continue with physical therapy and still is using his crutches but may have noticed a slight improvement overall in his knee pain symptoms. Is here today for second Euflexxa injection and has continued with his anti-inflammatory medications and is in the process of getting fitted for an  brace. He is continue with his anti-inflammatory medications and is still not complaining of mechanical locking catching or sensations of loose bodies. PE: no substantial change in exam.    Assessment:  Knee Osteoarthritis with viscosupplementation      PROCEDURE NOTE:    PRE-PROCEDURE DIAGNOSIS: DJD knee    POST-PROCEDURE DIAGNOSIS: DJD knee    Injection #2 of Euflexxa bilaterally. PROCEDURE:  With the patient's permission, his bilaterally knee was prepped in standard sterile fashion with Betadine and Alcohol and the prefilled 2cc injection of Euflexxa was injected intra-articularly into the bilaterally knee via a superolateral approach without difficulty. The patient tolerated this well without difficulty. A band-aid was applied. POST-PROCEDURE INSTRUCTIONS GIVEN TO PATIENT: The patient was advised to ice the knee for 15-20 minutes to relieve any injection site related pain. FOLLOW-UP: as directed. He will continue with his ibuprofen 800 mg 3 times daily as well as a physical therapy.   He is in the process of getting

## 2020-06-17 PROCEDURE — MISC901 COLD PAC STANDARD: Performed by: FAMILY MEDICINE

## 2020-06-17 RX ORDER — LORATADINE 10 MG/1
10 TABLET ORAL DAILY
Qty: 90 TABLET | Refills: 0 | Status: SHIPPED | OUTPATIENT
Start: 2020-06-17 | End: 2020-09-08 | Stop reason: SDUPTHER

## 2020-06-17 RX ORDER — HYDROCHLOROTHIAZIDE 25 MG/1
TABLET ORAL
Qty: 90 TABLET | Refills: 0 | Status: SHIPPED | OUTPATIENT
Start: 2020-06-17 | End: 2020-09-10 | Stop reason: SDUPTHER

## 2020-06-17 RX ORDER — LISINOPRIL 10 MG/1
10 TABLET ORAL DAILY
Qty: 90 TABLET | Refills: 0 | Status: SHIPPED | OUTPATIENT
Start: 2020-06-17 | End: 2020-09-08 | Stop reason: SDUPTHER

## 2020-06-17 RX ORDER — FUROSEMIDE 40 MG/1
40 TABLET ORAL DAILY
Qty: 90 TABLET | Refills: 0 | Status: SHIPPED | OUTPATIENT
Start: 2020-06-17 | End: 2020-09-08 | Stop reason: SDUPTHER

## 2020-06-17 ASSESSMENT — PATIENT HEALTH QUESTIONNAIRE - PHQ9
2. FEELING DOWN, DEPRESSED OR HOPELESS: 0
1. LITTLE INTEREST OR PLEASURE IN DOING THINGS: 0
SUM OF ALL RESPONSES TO PHQ QUESTIONS 1-9: 0
SUM OF ALL RESPONSES TO PHQ QUESTIONS 1-9: 0
SUM OF ALL RESPONSES TO PHQ9 QUESTIONS 1 & 2: 0

## 2020-06-18 ENCOUNTER — HOSPITAL ENCOUNTER (OUTPATIENT)
Dept: PHYSICAL THERAPY | Age: 60
Setting detail: THERAPIES SERIES
Discharge: HOME | End: 2020-06-18
Payer: COMMERCIAL

## 2020-06-18 PROCEDURE — 97112 NEUROMUSCULAR REEDUCATION: CPT | Performed by: PHYSICAL THERAPIST

## 2020-06-18 PROCEDURE — 97530 THERAPEUTIC ACTIVITIES: CPT | Performed by: PHYSICAL THERAPIST

## 2020-06-18 PROCEDURE — 97110 THERAPEUTIC EXERCISES: CPT | Performed by: PHYSICAL THERAPIST

## 2020-06-18 NOTE — FLOWSHEET NOTE
quad activation and lack of Trendelenburg                                       Therapeutic Exercise and NMR EXR  [x] (73405) Provided verbal/tactile cueing for activities related to strengthening, flexibility, endurance, ROM for improvements in LE, proximal hip, and core control with self care, mobility, lifting, ambulation.  [] (14360) Provided verbal/tactile cueing for activities related to improving balance, coordination, kinesthetic sense, posture, motor skill, proprioception  to assist with LE, proximal hip, and core control in self care, mobility, lifting, ambulation and eccentric single leg control.      NMR and Therapeutic Activities:    [] (30440 or 72250) Provided verbal/tactile cueing for activities related to improving balance, coordination, kinesthetic sense, posture, motor skill, proprioception and motor activation to allow for proper function of core, proximal hip and LE with self care and ADLs  [x] (84803) Gait Re-education- Provided training and instruction to the patient for proper LE, core and proximal hip recruitment and positioning and eccentric body weight control with ambulation re-education including up and down stairs     Home Exercise Program:    [x] (01729) Reviewed/Progressed HEP activities related to strengthening, flexibility, endurance, ROM of core, proximal hip and LE for functional self-care, mobility, lifting and ambulation/stair navigation   [] (61339)Reviewed/Progressed HEP activities related to improving balance, coordination, kinesthetic sense, posture, motor skill, proprioception of core, proximal hip and LE for self care, mobility, lifting, and ambulation/stair navigation      Manual Treatments:  PROM / STM / Oscillations-Mobs:  G-I, II, III, IV (PA's, Inf., Post.)  [x] (70290) Provided manual therapy to mobilize LE, proximal hip and/or LS spine soft tissue/joints for the purpose of modulating pain, promoting relaxation,  increasing ROM, reducing/eliminating soft tissue

## 2020-06-22 ENCOUNTER — HOSPITAL ENCOUNTER (OUTPATIENT)
Dept: PHYSICAL THERAPY | Age: 60
Setting detail: THERAPIES SERIES
Discharge: HOME | End: 2020-06-22
Payer: COMMERCIAL

## 2020-06-22 PROCEDURE — 97112 NEUROMUSCULAR REEDUCATION: CPT | Performed by: PHYSICAL THERAPIST

## 2020-06-22 PROCEDURE — 97110 THERAPEUTIC EXERCISES: CPT | Performed by: PHYSICAL THERAPIST

## 2020-06-22 PROCEDURE — 97530 THERAPEUTIC ACTIVITIES: CPT | Performed by: PHYSICAL THERAPIST

## 2020-06-22 RX ORDER — ROPINIROLE 1 MG/1
1 TABLET, FILM COATED ORAL NIGHTLY
Qty: 30 TABLET | Refills: 2 | Status: SHIPPED | OUTPATIENT
Start: 2020-06-22 | End: 2020-06-22

## 2020-06-22 RX ORDER — ROPINIROLE 1 MG/1
TABLET, FILM COATED ORAL
Qty: 90 TABLET | Refills: 0 | Status: SHIPPED | OUTPATIENT
Start: 2020-06-22 | End: 2020-09-08 | Stop reason: SDUPTHER

## 2020-06-22 NOTE — FLOWSHEET NOTE
Language for Healthcare:   [x]English       []other:      Pain level: 5-9/10     SUBJECTIVE: Pt been walking without crutches the majority of the time- still using crutches in community and on steps. Been working on walking pattern. New walking pattern seems to have slightly decreased knee pain. Putting on socks is easier. OBJECTIVE: 104-105 flex with heel slide   Observation:    Test measurements:      RESTRICTIONS/PRECAUTIONS: HTN, obesity   L knee MRI 5/29/20:  CONCLUSION:   1. High-grade medial and patellofemoral compartment chondromalacia.  Degenerative tearing    involves the free edge of the posterior horn and body.  Subchondral marrow changes, spurring    and remodeling of the medial and less so patellofemoral joint spaces. 2. Loose osteochondral body suprapatellar recess medially measures 1.75 x 1 x 2.3 cm.   3. Anteromedial and anterolateral subcutis swelling.  Contusion and sprain present.        Exercises/Interventions:     Therapeutic Ex (98610) Sets/sec Reps Notes/CUES   Pt ed: findings of exam and POC, crutch use vs RW; prognosis for PT-will not improve OA, but can improve strength/flexibility/pain, aquatics therapy, water walking, household ambulation, HEP, pain management, activity initiation  10'     Bike rock 5 mins  Very difficult but able to make several backwards revolutions   Slantboard  30\" x 3     HL bridge 3\" 10 reps       HEP   Seated LAQ w/ ADD  SAQ supine 5\" 20 reps HEP   Seated hip abd iso + quad set 5\" x10  HEP   Seated hip add iso 10\"x10  HEP   SLR- flex 3\" 10 reps    SLR- abd 3\" 20 reps          Heel slide 10\" x 10  +HEP         HR 10 reps B Limited ROM- HEP   Standing HS curl 10 reps  B HEP   Standing hip march 10 reps B HEP         Manual Intervention ([id number])       Pt unable to lie supine c bolster comfortably                                 NMR re-education (97683)   CUES NEEDED                                                     Therapeutic Activity (66296)      80 feet gait X  With VC for knee flexion, glute and quad activation and lack of Trendelenburg   Supine TA march                        Brace fitting with ATC X           Therapeutic Exercise and NMR EXR  [x] (94148) Provided verbal/tactile cueing for activities related to strengthening, flexibility, endurance, ROM for improvements in LE, proximal hip, and core control with self care, mobility, lifting, ambulation.  [] (54381) Provided verbal/tactile cueing for activities related to improving balance, coordination, kinesthetic sense, posture, motor skill, proprioception  to assist with LE, proximal hip, and core control in self care, mobility, lifting, ambulation and eccentric single leg control.      NMR and Therapeutic Activities:    [] (69081 or 67262) Provided verbal/tactile cueing for activities related to improving balance, coordination, kinesthetic sense, posture, motor skill, proprioception and motor activation to allow for proper function of core, proximal hip and LE with self care and ADLs  [x] (40725) Gait Re-education- Provided training and instruction to the patient for proper LE, core and proximal hip recruitment and positioning and eccentric body weight control with ambulation re-education including up and down stairs     Home Exercise Program:    [x] (63315) Reviewed/Progressed HEP activities related to strengthening, flexibility, endurance, ROM of core, proximal hip and LE for functional self-care, mobility, lifting and ambulation/stair navigation   [] (97679)Reviewed/Progressed HEP activities related to improving balance, coordination, kinesthetic sense, posture, motor skill, proprioception of core, proximal hip and LE for self care, mobility, lifting, and ambulation/stair navigation      Manual Treatments:  PROM / STM / Oscillations-Mobs:  G-I, II, III, IV (PA's, Inf., Post.)  [x] (43603) Provided manual therapy to mobilize LE, proximal hip and/or LS spine soft tissue/joints for the purpose of modulating Other    Prognosis for POC: [x] Good [] Fair  [] Poor      Patient requires continued skilled intervention: [x] Yes  [] No    Treatment/Activity Tolerance:  [x] Patient able to complete treatment  [] Patient limited by fatigue  [] Patient limited by pain    [] Patient limited by other medical complications  [] Other:     ASSESSMENT: High pain levels limit patient progress. Pt demonstrates difficulty with ROM, quad activation and proper gait mechanics. Pt also cannot stand for functional lengths of time without UE support. He is going to attempt more household, level ground walking within the home in order to decrease reliance on UE support and normalize walking mechanics. Aquatic therapy was also broached to pt since pain levels and body habitus continue to limit PT progress. PLAN:   [x] Continue per plan of care [] Alter current plan (see comments above)  [] Plan of care initiated [] Hold pending MD visit [] Discharge      Electronically signed by:  Rashel Whiting PT, DPT [id number]    Note: If patient does not return for scheduled/ recommended follow up visits, this note will serve as a discharge from care along with most recent update on progress.

## 2020-06-23 ENCOUNTER — OFFICE VISIT (OUTPATIENT)
Dept: ORTHOPEDIC SURGERY | Age: 60
End: 2020-06-23
Payer: COMMERCIAL

## 2020-06-23 VITALS — HEIGHT: 68 IN | BODY MASS INDEX: 47.74 KG/M2 | WEIGHT: 315 LBS

## 2020-06-23 PROCEDURE — 20610 DRAIN/INJ JOINT/BURSA W/O US: CPT | Performed by: FAMILY MEDICINE

## 2020-06-23 RX ORDER — HYALURONATE SODIUM 10 MG/ML
40 SYRINGE (ML) INTRAARTICULAR ONCE
Status: COMPLETED | OUTPATIENT
Start: 2020-06-23 | End: 2020-06-23

## 2020-06-23 RX ADMIN — Medication 40 MG: at 08:20

## 2020-06-25 ENCOUNTER — OFFICE VISIT (OUTPATIENT)
Dept: ORTHOPEDIC SURGERY | Age: 60
End: 2020-06-25
Payer: COMMERCIAL

## 2020-06-25 ENCOUNTER — TELEMEDICINE (OUTPATIENT)
Dept: INTERNAL MEDICINE CLINIC | Age: 60
End: 2020-06-25
Payer: COMMERCIAL

## 2020-06-25 ENCOUNTER — HOSPITAL ENCOUNTER (OUTPATIENT)
Dept: PHYSICAL THERAPY | Age: 60
Setting detail: THERAPIES SERIES
Discharge: HOME | End: 2020-06-25
Payer: COMMERCIAL

## 2020-06-25 VITALS — HEIGHT: 68 IN | BODY MASS INDEX: 47.74 KG/M2 | WEIGHT: 315 LBS | TEMPERATURE: 97.2 F

## 2020-06-25 PROBLEM — M79.89 LEG SWELLING: Status: ACTIVE | Noted: 2020-06-25

## 2020-06-25 PROCEDURE — 99213 OFFICE O/P EST LOW 20 MIN: CPT | Performed by: ORTHOPAEDIC SURGERY

## 2020-06-25 PROCEDURE — 99213 OFFICE O/P EST LOW 20 MIN: CPT | Performed by: NURSE PRACTITIONER

## 2020-06-25 ASSESSMENT — ENCOUNTER SYMPTOMS
SINUS PRESSURE: 0
CONSTIPATION: 0
SINUS PAIN: 0
COLOR CHANGE: 0
DIARRHEA: 0
SHORTNESS OF BREATH: 0
COUGH: 0
WHEEZING: 0
ABDOMINAL PAIN: 0
BACK PAIN: 0

## 2020-06-25 ASSESSMENT — PATIENT HEALTH QUESTIONNAIRE - PHQ9
SUM OF ALL RESPONSES TO PHQ QUESTIONS 1-9: 0
2. FEELING DOWN, DEPRESSED OR HOPELESS: 0
1. LITTLE INTEREST OR PLEASURE IN DOING THINGS: 0
SUM OF ALL RESPONSES TO PHQ QUESTIONS 1-9: 0
SUM OF ALL RESPONSES TO PHQ9 QUESTIONS 1 & 2: 0

## 2020-06-25 NOTE — PROGRESS NOTES
Mouth/Throat:      Mouth: Mucous membranes are moist.   Eyes:      Extraocular Movements: Extraocular movements intact. Conjunctiva/sclera: Conjunctivae normal.   Neck:      Musculoskeletal: Normal range of motion. Pulmonary:      Effort: Pulmonary effort is normal.   Skin:     Coloration: Skin is not jaundiced or pale. Comments: Healing golfball sized open blister to left calf. No purulent drainage or erythema. Neurological:      General: No focal deficit present. Mental Status: He is alert and oriented to person, place, and time. Psychiatric:         Mood and Affect: Mood normal.         Behavior: Behavior normal.         Thought Content: Thought content normal.         ASSESSMENT/PLAN:  Leg swelling  Bilateral   No sign of cellulitis   Open blister appears healing but slowly   Increase lasix to 40 mg in the morning and 20 mg in the afternoon x 5 days   Encouraged leg elevation and compression  Call if symptoms worsen or fail to improve          No follow-ups on file. Marta Olmos is a 64 y.o. male being evaluated by a Virtual Visit (video visit) encounter to address concerns as mentioned above. A caregiver was present when appropriate. Due to this being a TeleHealth encounter (During FWFOB-00 public health emergency), evaluation of the following organ systems was limited: Vitals/Constitutional/EENT/Resp/CV/GI//MS/Neuro/Skin/Heme-Lymph-Imm. Pursuant to the emergency declaration under the [address] [place] [address], [address] [place] [address] authority and the [employer] and [employer] Act, this Virtual Visit was conducted with patient's (and/or legal guardian's) consent, to reduce the patient's risk of exposure to COVID-19 and provide necessary medical care.   The patient (and/or legal guardian) has also been advised to contact this office for worsening conditions or problems, and seek emergency medical treatment and/or call 911 if deemed necessary. Patient identification was verified at the start of the visit: Yes    Total time spent on this encounter: Not billed by time    Services were provided through a video synchronous discussion virtually to substitute for in-person clinic visit. Patient and provider were located at their individual homes. --JACKLYN Paniagua CNP on 6/25/2020 at 1:17 PM    An electronic signature was used to authenticate this note.

## 2020-06-25 NOTE — ASSESSMENT & PLAN NOTE
Bilateral   No sign of cellulitis   Open blister appears healing but slowly   Increase lasix to 40 mg in the morning and 20 mg in the afternoon x 5 days   Encouraged leg elevation and compression  Call if symptoms worsen or fail to improve

## 2020-06-25 NOTE — FLOWSHEET NOTE
[facility],  [address], [address] [place] [address]        Physical Therapy  Cancellation/No-show Note  Patient Name:  Candace Nelson  :  1960   Date:  2020  Cancelled visits to date: 0  No-shows to date: 0    For today's appointment patient:  ?  Cancelled  ? Rescheduled appointment  ? No-show     Reason given by patient:  ?  Patient ill  ? Conflicting appointment  ? No transportation    ? Conflict with work  ? No reason given  ? Other:  Pt stiff and sore from injection.     Comments:      Electronically signed by:  Jose E Gross, [address] [place] [address]

## 2020-06-28 NOTE — PROGRESS NOTES
Patient is a 61-year-old male who presents today complaining of bilateral knee pain. He has been treated conservatively in the past including anti-inflammatories physical therapy and also intra-articular cortisone injections. Patient on x-ray has bilateral tricompartmental degenerative osteoarthritis. Unfortunately due to this patient's super morbid status with a BMI of 55.80 he is not a surgical candidate for knee arthroplasty. He complains of pain loss of range of motion and difficulty with ambulation. He has no history of injury or surgery to either right or left knee. Patient Active Problem List   Diagnosis    Obesity, morbid ([address]    Mixed hypertriglyceridemia    Nonalcoholic fatty liver disease    RLS (restless legs syndrome)    Fatty liver disease, nonalcoholic    Hyperlipidemia with target LDL less than 100    Essential hypertension, benign    Impaired fasting glucose    Cellulitis of lower extremity    Chronic pain of left knee    Osteoarthritis of left knee    Chondromalacia of left knee    Pes planus    Leg swelling     Prior to Visit Medications    Medication Sig Taking? Authorizing Provider   rOPINIRole (REQUIP) 1 MG tablet TAKE 1 TABLET BY MOUTH EVERY NIGHT Yes JACKLYN Mcconnell CNP   lisinopril (PRINIVIL;ZESTRIL) 10 MG tablet Take 1 tablet by mouth daily Yes JACKLYN Mcconnell CNP   furosemide (LASIX) 40 MG tablet Take 1 tablet by mouth daily Yes JACKLYN Mcconnell CNP   hydroCHLOROthiazide (HYDRODIURIL) 25 MG tablet TAKE ONE TABLET BY MOUTH ONE TIME DAILY Yes JACKLYN Mcconnell CNP   loratadine (CLARITIN) 10 MG tablet Take 1 tablet by mouth daily Yes JACKLYN Mcconnell CNP   ibuprofen (ADVIL;MOTRIN) 800 MG tablet Take 1 tablet by mouth every 8 hours as needed for Pain Yes Gareth Triana MD   methylPREDNISolone (MEDROL DOSEPACK) 4 MG tablet Take by mouth as directed.  Yes Gareth Triana MD   CONTRAVE 8-90 MG per extended release tablet TAKE 1 TABLET BY MOUTH 2

## 2020-06-29 ENCOUNTER — HOSPITAL ENCOUNTER (OUTPATIENT)
Dept: PHYSICAL THERAPY | Age: 60
Setting detail: THERAPIES SERIES
Discharge: HOME | End: 2020-06-29
Payer: COMMERCIAL

## 2020-06-29 PROCEDURE — 97110 THERAPEUTIC EXERCISES: CPT | Performed by: PHYSICAL THERAPIST

## 2020-06-29 PROCEDURE — 97530 THERAPEUTIC ACTIVITIES: CPT | Performed by: PHYSICAL THERAPIST

## 2020-06-29 NOTE — FLOWSHEET NOTE
[facility], [address]  Phone: [phone]  Fax [phone]    Physical Therapy Treatment Note/ Progress Report:           Date:  2020    Patient Name:  Connor Alejandro    :  1960  MRN: [id number]  Restrictions/Precautions:    Medical/Treatment Diagnosis Information:  · Diagnosis: M17.12 (ICD-10-CM) - Primary osteoarthritis of left knee; M94.262 (ICD-10-CM) - Chondromalacia of left knee; M25.562 (ICD-10-CM) - Left knee pain, unspecified chronicity; M25.561 (ICD-10-CM) - Acute pain of right knee; M17.11 (ICD-10-CM) - Primary osteoarthritis of right knee; M22.41 (ICD-10-CM) - Chondromalacia patellae of right knee  · Treatment Diagnosis: Diagnosis: M17.12 (ICD-10-CM) - Primary osteoarthritis of left knee; M94.262 (ICD-10-CM) - Chondromalacia of left knee; M25.562 (ICD-10-CM) - Left knee pain, unspecified chronicity; M25.561 (ICD-10-CM) - Acute pain of right knee; M17.11 (ICD-10-CM) - Primary osteoarthritis of right knee; M22.41 (ICD-10-CM) - Chondromalacia patellae of right knee  Insurance/Certification information:  PT Insurance Information: U[employer]  Physician Information:  Referring Practitioner: Dr. Myrna Carlos  Has the plan of care been signed (Y/N):        []  Yes  [x]  No     Date of Patient follow up with Samir Meyer      Is this a Progress Report:     []  Yes  [x]  No        If Yes:  Date Range for reporting period:  Beginning 20  Ending    Progress report will be due (10 Rx or 30 days whichever is less):      Recertification will be due (POC Duration  / 90 days whichever is less):       Visit # Insurance Allowable Requires auth   4 Check when scanned    []no        []yes:???      Functional Scale: LEFS 84% disability  Date assessed:  20     Therapy Diagnosis/Practice Pattern: B     Number of Comorbidities:  []0     [x]1-2    []3+    Latex Allergy:  [x]NO      []YES  Preferred Language for Healthcare:   [x]English       []other:      Pain level: 5-9/10     SUBJECTIVE: Pt feels he has had a set back since last injection. He has had more pain and less functional movement since then. He has severe pain \"more than a 10\" in the morning and with prolonged immobility. Pt educated about importance of moderated movement. OBJECTIVE: 104-105 flex with heel slide   Observation:    Test measurements:      RESTRICTIONS/PRECAUTIONS: HTN, obesity   L knee MRI 5/29/20:  CONCLUSION:   1. High-grade medial and patellofemoral compartment chondromalacia.  Degenerative tearing    involves the free edge of the posterior horn and body.  Subchondral marrow changes, spurring    and remodeling of the medial and less so patellofemoral joint spaces. 2. Loose osteochondral body suprapatellar recess medially measures 1.75 x 1 x 2.3 cm.   3. Anteromedial and anterolateral subcutis swelling.  Contusion and sprain present. Exercises/Interventions:     Therapeutic Ex (70500) Sets/sec Reps Notes/CUES   Pt ed: findings of exam and POC, crutch use vs RW; prognosis for PT-will not improve OA, but can improve strength/flexibility/pain, aquatics therapy, water walking, household ambulation, HEP, pain management, activity initiation  10'     Bike rock 85 mins     Slantboard  30\" x 3     HL bridge 5\"/ 2 10 reps       HEP   Seated LAQ    2#/ 5\" 15 reps HEP   Seated hip abd iso + quad set 5\" x10  HEP   Seated hip add iso 10\"x10  HEP   SLR- flex 3\"/ 2 10 reps Very difficult in 2nd set   SLR- abd 3\"/ 2 15 reps          Heel slide 10\" x 10  +HEP         HR 2 x 10  B Limited ROM- HEP   Standing HS curl 15 reps  B alt.  HEP   Standing hip march 10 reps B HEP         Manual Intervention ([id number])       Pt unable to lie supine c bolster comfortably                                 NMR re-education (44443)   CUES NEEDED                                                     Therapeutic Activity (68992)      80 feet gait X  With VC Progressing: [x]? Met: []? Not Met: []? Adjusted  3. Patient will demonstrate an increase in R,L knee, hip strength to at least 4+/5 in order to amb s AD, improve ease of sit to stand transfers and climb stairs in his home. [x]? Progressing: []? Met: []? Not Met: []? Adjusted  4. Patient will return to [facility] community distances s AD and using knee braces. [x]? Progressing: []? Met: []? Not Met: []? Adjusted  5. Patient will be able to climb stairs reciprocally using on HR.    []? Progressing: []? Met: [x]? Not Met: []? Adjusted         Progression Towards Functional goals:  [] Patient is progressing as expected towards functional goals listed. [x] Progression is slowed due to complexities listed. [x] Progression has been slowed due to co-morbidities. [] Plan just implemented, too soon to assess goals progression  [] Other:     Access Code: [email or web address]   URL: [employer]/   Date: 06/22/2020   Prepared by: Liset Schumacher     Exercises   Supine Bridge - 10 reps - 3 sets - 3 hold - 1x daily - 7x weekly   Supine Active Straight Leg Raise - 10 reps - 3 sets - 3 hold - 1x daily - 7x weekly   Sidelying Hip Abduction - 10 reps - 3 sets - 3 hold - 1x daily - 7x weekly   Seated Long Arc Quad - 10 reps - 3 sets - 1x daily - 7x weekly   Standing Heel Raise with Support - 10 reps - 3 sets - 1x daily - 7x weekly   Standing March with Counter Support - 10 reps - 3 sets - 1x daily - 7x weekly   Standing Knee Flexion AROM with Chair Support - 10 reps - 3 sets - 1x daily - 7x weekly       Overall Progression Towards Functional goals/ Treatment Progress Update:  [] Patient is progressing as expected towards functional goals listed. [x] Progression is slowed due to complexities/Impairments listed. [x] Progression has been slowed due to co-morbidities.   [] Plan just implemented, too soon to assess goals progression <30days   [] Goals require adjustment due to lack of progress  [] Patient is not progressing as

## 2020-07-02 ENCOUNTER — HOSPITAL ENCOUNTER (OUTPATIENT)
Dept: PHYSICAL THERAPY | Age: 60
Setting detail: THERAPIES SERIES
Discharge: HOME | End: 2020-07-02

## 2020-07-02 NOTE — FLOWSHEET NOTE
[facility],  [address], [address] [place] [address]        Physical Therapy  Cancellation/No-show Note  Patient Name:  Ladora Ganser  :  1960   Date:  2020  Cancelled visits to date: 2  No-shows to date: 0    For today's appointment patient:  ?  Cancelled  ? Rescheduled appointment  ? No-show     Reason given by patient:  ?  Patient ill  ? Conflicting appointment  ? No transportation    ? Conflict with work  ? No reason given  ? Other:  Pt was called yesterday and although he confirmed that he was coming today, despite his appt next week for aquatics, he changed his mind and cancelled.     Comments:      Electronically signed by:  Dirk Wagner, [address] [place] [address]

## 2020-07-06 ENCOUNTER — OFFICE VISIT (OUTPATIENT)
Dept: ORTHOPEDIC SURGERY | Age: 60
End: 2020-07-06
Payer: COMMERCIAL

## 2020-07-06 ENCOUNTER — HOSPITAL ENCOUNTER (OUTPATIENT)
Dept: PHYSICAL THERAPY | Age: 60
Setting detail: THERAPIES SERIES
Discharge: HOME | End: 2020-07-06
Payer: COMMERCIAL

## 2020-07-06 VITALS — WEIGHT: 315 LBS | BODY MASS INDEX: 47.74 KG/M2 | HEIGHT: 68 IN

## 2020-07-06 PROCEDURE — 99214 OFFICE O/P EST MOD 30 MIN: CPT | Performed by: FAMILY MEDICINE

## 2020-07-06 PROCEDURE — 97113 AQUATIC THERAPY/EXERCISES: CPT

## 2020-07-06 RX ORDER — TRAMADOL HYDROCHLORIDE 50 MG/1
50 TABLET ORAL EVERY 6 HOURS PRN
Qty: 28 TABLET | Refills: 0 | Status: SHIPPED | OUTPATIENT
Start: 2020-07-06 | End: 2020-07-13

## 2020-07-06 RX ORDER — ETODOLAC 400 MG/1
400 TABLET, FILM COATED ORAL 2 TIMES DAILY
Qty: 60 TABLET | Refills: 3 | Status: SHIPPED | OUTPATIENT
Start: 2020-07-06 | End: 2020-10-08 | Stop reason: SDUPTHER

## 2020-07-06 RX ORDER — ETODOLAC 400 MG/1
400 TABLET, FILM COATED ORAL 2 TIMES DAILY
Qty: 60 TABLET | Refills: 3 | Status: SHIPPED
Start: 2020-07-06 | End: 2020-07-06 | Stop reason: CLARIF

## 2020-07-06 RX ORDER — DIAZEPAM 10 MG/1
TABLET ORAL
Qty: 1 TABLET | Refills: 0 | Status: SHIPPED | OUTPATIENT
Start: 2020-07-06 | End: 2020-07-20

## 2020-07-06 NOTE — PROGRESS NOTES
Chief Complaint  Knee Pain (CK JOHN KNEES- FINISHED EUFLEXXA 6/23/2020)      Carson Lester is a 64 y.o. 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 History of Present Illness for Follow Up Patient:      Patient is being seen today for acute left knee pain. The patient reports that since the first week in March 2020 he began noticed the insidious onset of pain to the anterior medial portion of his left knee. Kirstinsteven Manuel He describes the symptoms as aching, sharp and stabbing. The pain is located medial, lateral and the patient rates their current pain as a 1-7 out of 10 on the pain scale. This problem has been an issue for 2 weeks. 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 Patient has other associated symptoms: Mild swelling instability. Patient has attempted rest, ice, heat, NSAID-6-12 over-the-counter ibuprofen daily to treat this problem and symptoms are are worsening. Patient does not have a previous history previous knee injury. He does recall an episode a couple months ago when he believes he pulled his right hip adductor which may have led to a gait change causing his left knee to bother him. Only mild night discomfort. Patient is being seen today for orthopedic and sports consultation and review of imaging. 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   Following a steroid injection he did report at least an 80% improvement of his symptoms but this lasted only about 4 weeks and over the last month he has noticed recurrence of his pain to the point where his pain is about a 5 out of 10 currently. He has noticed some catching and has definite difficulty repositioning his knee if it is in a bent position. He has not had juventino locking and admits he has been a little bit lax in performing his home-based exercises but has continued with his diclofenac twice daily. He has not noticed a great deal of swelling and has not had true instability symptoms and has gotten benefit from use of his brace. His pain is primarily anterior but also medial.  He just returned back to town this past weekend after being on the road for 8 straight weeks. Jayce Booker was last seen in the office on 5/27/2020 due to worsening pain, and sent for an MRI of his left knee. His MRI does show more prominent degenerative changes with high-grade medial and patellofemoral compartment chondromalacia with degenerative tearing and maceration to the posterior horn and body of the medial meniscus. There was a probable area of synovial thickening versus loose body in the suprapatellar recess medially but he is really not complaining of sensations of loose bodies and is not really had true locking or catching. Over this past weekend his pain symptoms have worsened substantially where he is unable to comfortably walk. He is having 10 out of 10 pain difficulty with motion and flexion. Once again he was supposed to leave [facility] to get back on the road as a  but he is unable to walk and unable to climb into his cab. He is having difficulty sleeping at this point. He has been icing and has been taking his steroid taper which did not provide any type of pain relief now his right knee is bothering him anteriorly and medially. Once again no active locking or catching.   Does have short-term disability available to him.    He was last seen in the office on 2020 was continued on conservative treatment post imaging. Once again he is quite symptomatic and we did have to take him off work to allow for rehabilitation. He was quite painful and definitely requiring crutches to walk. Once again he does believe he has short-term disability. He continues to deny sensations of loose bodies are active locking or catching. He is continue with his ibuprofen 800 mg 3 times daily we did give him tramadol for breakthrough pain. He has been contemplating Visco supplementation. He is set up to begin supervised physical therapy this week and has been measured for his  brace. There is improvement on the left at about 50% and has at least an 80 to 90% improvement on the right knee. Karel Alexis was last seen in the office on 2021 we completed Visco supplementation to his knees bilaterally. He states his right knee is doing reasonably well and grades improved about 75% however since finishing up his final Euflexxa injection to his left knee, his symptoms have apparently been getting worse to the point where he is still having to use his crutch. He is not accustomed to realizing his  brace but is having very substantial pain both at rest but primarily with weightbearing. They did have a second opinion consultation with Dr. Eliane Alcala on 2020 who did agree that given his weight currently, that he is not an optimal surgical candidate. He has lost 8 pounds initially with diet but I think he would benefit as well from a consultation with [facility] weight loss. Potential for gastric bypass under banding was also discussed. He is still on short-term disability which will initially  next week but he cannot ambulate enough to get into his truck and has continued with physical therapy.   He has been taking his ibuprofen 3 times daily as well as his periodic tramadol but still is having fairly consistent pain up to an with flexion to about 120. Hamstrings and IT bands are tight.     Strength: 4 to 4+ out of 5 with flexion and extension.     Special Tests: He does have substantial pain reproduced patellar grind testing and with palpation of the primarily the anterior third medial joint line. I cannot really elicit any high-grade meniscal click so this causes some moderate discomfort medially more so than laterally. No obvious instability. Screening left hip testing is benign.     Skin: There are no rashes, ulcerations or lesions. Distal motor sensory and vascular exam is intact.     Gait:  He is still having difficulty with bearing weight. He is currently using 1 crutch on the right.     Reflex symmetrically preserved       Additional Comments:     Evaluation of his right knee does reveal trace swelling. He does have tenderness over the medial and lateral patellofemoral facet and most of his pain is reproduced with palpation of the anterior third medial joint line with patellar grind testing. He is stiff in the terminal 10 degrees of extension with flexion on the right to about 120-125.  4-5 strength with flexion extension. Pain with medial Monik's but no high-grade click. Negative lateral Monik's. No obvious instability. Screening right hip testing appears benign.         Additional Examinations:          Right Lower Extremity: Examination of the right lower extremity does not show any tenderness, deformity or injury. Range of motion is unremarkable. There is no gross instability. There are no rashes, ulcerations or lesions. Strength and tone are normal.  Left Lower Extremity: Examination of the left lower extremity does not show any tenderness, deformity or injury. Range of motion is unremarkable. There is no gross instability. There are no rashes, ulcerations or lesions. Strength and tone are normal.          Diagnostic Test Findings: No results found.    Right knee AP and PA weightbearing sunrise and lateral films obtained 2020 does show more mild medial compartment narrowing with patellofemoral tilt and mild apparent patellofemoral arthropathy.     Left knee MRI obtained at [facility] on 2020 as listed above  Narrative   Site: [employer] #: [id number] #: [id number] Procedure: MR Left Knee w/o Contrast ; Reason for Exam: primary osteoarthritis of left knee; chondromalacia of left knee; left knee pain; eval OA/CMP; r/o medial meniscus tear   This document is confidential medical information.  Unauthorized disclosure or use of this information is prohibited by law. If you are not the intended recipient of this document, please advise us by calling immediately [phone].       [employer] [facility]   [address]           Patient Name: Obed Alberto   Case ID: [id number]   Patient : 1960   Referring Physician: Apollo Hensley MD   Exam Date: 2020   Exam Description: MR Left Knee w/o Contrast            HISTORY:  Primary osteoarthritis of left knee.  Chondromalacia of left knee.  Left knee pain.     Evaluate OA/CMP.  Evaluate for medial meniscus tear.       TECHNICAL FACTORS:  Long- and short-axis fat- and water-weighted images were performed.       COMPARISON:  None.       FINDINGS: Kristi Longoriaisker is minimally tilted.  High-grade patellar and trochlear groove    chondromalacia.       ACL, PCL, LCL, MCL, flexor mechanism, and extensor mechanism are intact.       Thickened MCL.  Chronic sprain, scarring, and capsulitis is present.       High-grade chondromalacia involves the weightbearing medial femur and tibia.  Eccentric    spurring is present.       Lateral meniscus is intact.       Medial meniscus free edge tearing involves the body.       Nodular region of synovial thickening or body is noted within the suprapatellar recess    medially.  This measures at least 1.75 x 1 x 2.3 cm.       CONCLUSION:   1.  High-grade medial and patellofemoral compartment chondromalacia.  Degenerative tearing    involves the free edge of the posterior horn and body.  Subchondral marrow changes, spurring    and remodeling of the medial and less so patellofemoral joint spaces. 2. Loose osteochondral body suprapatellar recess medially measures 1.75 x 1 x 2.3 cm.   3. Anteromedial and anterolateral subcutis swelling.  Contusion and sprain present. 4. Please see above.       Thank you for the opportunity to provide your interpretation.               Rommie Cogan, MD       A: See 05/29/2020 7:29 PM   T: AVA 05/29/2020 2:35 PM           Assessment & Plan:    Encounter Diagnoses   Name Primary?  Primary osteoarthritis of left knee Yes    Primary osteoarthritis of right knee     Chondromalacia of left knee     Chondromalacia patellae of right knee     Acute pain of right knee     Left knee pain, unspecified chronicity     Claustrophobia        Orders Placed This Encounter   Procedures    MRI Knee Left WO Contrast     Standing Status:   Future     Standing Expiration Date:   8/6/2020     Scheduling Instructions:      [facility] [address]      [phone]            [place] [id number] #:      TIME AND DATE TBD      PLEASE CALL PATIENT ONCE APPROVED TO SCHEDULE       PUSH [facility]            Remember that it may take several business days to pre-cert your MRI through your insurance. Our office will contact you as soon as we have the approval. We will not give any test results over the phone. Please call [phone] once you have your test day and time to schedule a follow up with Dr. Alma Essex.      Order Specific Question:   Reason for exam:     Answer:   r/o insufficiency fracture    [facility]     Referral Priority:   Routine     Referral Type:   Eval and Treat     Referral Reason:   Specialty Services Required     Requested Specialty:   Bariatric Surgery     Number of Visits Requested:   1 Treatment Plan:  Treatment options were discussed with Montez Lo. We did once again review his plain films, his recent left knee MRI and exam findings. He has been having ongoing pain symptoms for 3.5+ months now and with initial treatment he did have a very temporary improvement about 80% but this lasted only about 4 weeks and he is now once again having anterior medial pain. His MRI was obtained on his more symptomatic left knee on 5/28/2020 and actually shows more prominent arthritic change with high-grade degenerative changes to the medial and anterior compartment. There was a question of synovial thickening versus mild or loose body but he is not really complaining of true foreign body sensations or locking. Clinic at this point he looks much better with regard to his right knee but his left knee is certainly lagging. We discussed an interim steroid injection however I would like for him to have an updated MRI once again to his left knee to ensure that we are not dealing with an insufficiency fracture and her rapid medial compartment failure. We did change him on his anti-inflammatories to Lodine 400 mg 1 pill twice daily was given a refill on his tramadol. He was given Valium 10 mg to take 1 hour prior to his MRI. He just started aquatics exercises today we did have a longstanding discussion regarding weight loss and I did echo Dr. Zoran Edwards opinion that referral to [facility] weight loss is certainly beneficial for him as he is not a candidate for knee arthroplasty which he really will need down the road unless his BMI is close to 40 as possible. Dr. Vik Mack mention that he would consider knee arthroplasty if his BMI was about a 46. We did extend his short-term disability for an additional 6 weeks. We will see him back post imaging and we may consider a steroid injection as long as we are not seeing evidence of an insufficiency injury.   Icing and activity modification and use of his  brace was discussed. We also may have him sit down Dr. Eddie Dahl to consider  Co[place]if treatments as well. They will contact us in the interim with questions or concerns. This dictation was performed with a verbal recognition program (DRAGON) and it was checked for errors. It is possible that there are still dictated errors within this office note. If so, please bring any errors to my attention for an addendum. All efforts were made to ensure that this office note is accurate.

## 2020-07-06 NOTE — LETTER
[facility] [address]  Phone: [phone]  Fax: [phone]     Bhavani Blanc MD           July 6, 2020      Patient: Edita Deal   YOB: 1960   Date of Visit: 7/6/2020         To Whom It May Concern:     It is my medical opinion that Edita Deal should remain out of work for an additional 6 weeks to allow time for physical therapy.     If you have any questions or concerns, please don't hesitate to call.     Sincerely,          Bhavani Blanc MD

## 2020-07-06 NOTE — FLOWSHEET NOTE
[facility]     Physical Therapy Treatment Note/ Progress Report:           Date:  2020    Patient Name:  Lorri Puentes    :  1960  MRN: [id number]  Restrictions/Precautions:    Medical/Treatment Diagnosis Information:  · Diagnosis: M17.12 (ICD-10-CM) - Primary osteoarthritis of left knee; M94.262 (ICD-10-CM) - Chondromalacia of left knee; M25.562 (ICD-10-CM) - Left knee pain, unspecified chronicity; M25.561 (ICD-10-CM) - Acute pain of right knee; M17.11 (ICD-10-CM) - Primary osteoarthritis of right knee; M22.41 (ICD-10-CM) - Chondromalacia patellae of right knee  · Treatment Diagnosis: Diagnosis: M17.12 (ICD-10-CM) - Primary osteoarthritis of left knee; M94.262 (ICD-10-CM) - Chondromalacia of left knee; M25.562 (ICD-10-CM) - Left knee pain, unspecified chronicity; M25.561 (ICD-10-CM) - Acute pain of right knee; M17.11 (ICD-10-CM) - Primary osteoarthritis of right knee; M22.41 (ICD-10-CM) - Chondromalacia patellae of right knee  Insurance/Certification information:  PT Insurance Information: UMR  Physician Information:  Referring Practitioner: Dr. Jody Moran  Has the plan of care been signed (Y/N):        []  Yes  [x]  No     Date of Patient follow up with Mary Will      Is this a Progress Report:     []  Yes  [x]  No        If Yes:  Date Range for reporting period:  Beginning 20  Ending    Progress report will be due (10 Rx or 30 days whichever is less):      Recertification will be due (POC Duration  / 90 days whichever is less):       Visit # Insurance Allowable Requires auth   4 Check when scanned    []no        []yes:???      Functional Scale: LEFS 84% disability  Date assessed:  20     Therapy Diagnosis/Practice Pattern: B     Number of Comorbidities:  []0     [x]1-2    []3+    Latex Allergy:  [x]NO      []YES  Preferred Language for Healthcare:   [x]English       []other:      Pain level: 5-9/10     SUBJECTIVE: Here from [facility] for his [facility] pool appt, said the left knee just seems to be getting worse, and so they recommended aquatic therapy     OBJECTIVE: 104-105 flex with heel slide   Observation:    Test measurements:      RESTRICTIONS/PRECAUTIONS: HTN, obesity   L knee MRI 5/29/20:  CONCLUSION:   1. High-grade medial and patellofemoral compartment chondromalacia.  Degenerative tearing    involves the free edge of the posterior horn and body.  Subchondral marrow changes, spurring    and remodeling of the medial and less so patellofemoral joint spaces. 2. Loose osteochondral body suprapatellar recess medially measures 1.75 x 1 x 2.3 cm.   3. Anteromedial and anterolateral subcutis swelling.  Contusion and sprain present. Exercises/Interventions:     Therapeutic Ex (63766) Sets/sec Reps Notes/CUES   Pt ed: findings of exam and POC, crutch use vs RW; prognosis for PT-will not improve OA, but can improve strength/flexibility/pain, aquatics therapy, water walking, household ambulation, HEP, pain management, activity initiation  10'     Bike rock 85 mins     Slantboard  30\" x 3     HL bridge 5\"/ 2 10 reps       HEP   Seated LAQ    2#/ 5\" 15 reps HEP   Seated hip abd iso + quad set 5\" x10  HEP   Seated hip add iso 10\"x10  HEP   SLR- flex 3\"/ 2 10 reps Very difficult in 2nd set   SLR- abd 3\"/ 2 15 reps          Heel slide 10\" x 10  +HEP         HR 2 x 10  B Limited ROM- HEP   Standing HS curl 15 reps  B alt.  HEP   Standing hip march 10 reps B HEP         Manual Intervention ([id number])       Pt unable to lie supine c bolster comfortably                                 NMR re-education (25437)   CUES NEEDED                                                     Therapeutic Activity (81663)      80 feet gait X  With VC for knee flexion, glute and quad activation and lack of Trendelenburg   Supine TA march            Sit to stand from chair + Airex 3 10 VC to narrow NÉSTOR         Brace fitting with ATC X       AquaticTherapy Dates of Service:  7/6  Aquatic Visits Exercises/Activities:    Transfers:          % Immersion:         RED WITH PT, SBA, 75% WATER IMMERSION    Ambulation:   UE Exercises:       Forwards   X 1 Shoulder Shrugs      Lateral   X  Shoulder Circles      Retro    X 1 Scapular Retraction       Marching   Push Downs       Cariocas   Punching     Jog    Rowing     Multifidi walkouts with paddle   Elbow Flex/Ext       Shldr Flex/Ext       [employer] aBd/aDd    LE Exercises:  Shldr Horiz aBd/aDd    HR/TR X 10 Shldr IR/ER    Marches X 10  Arm Circles    Squats  x 10  PNF Diagonals    Hamstring Curls X 10  Wall Push Ups    Hip Flexion (SLR) X 10  Figure 8's    Hip aBduction (SLR) X 10  Bilateral Pull Downs    Hip Extension (SLR) X 10       Hip aDduction (SLR)      Hip Circles X 10  Functional:    Hip IR/Er X 10  Step up forward X 10    TrA Set   Step up lateral  X 10    Pelvic Tilts   Step down     Fig 8's   Lunges Forward    LE PNF  Lunges Retro      Lunges Lateral     Balance:   Lower ab curl with noodle     SLS        Tandem Stance 20\" x 2      NBOS eyes open 20\" x 2 Seated:     NBOS eyes closed  Ankle pumps     Hand to Opposite Knee X 10 B Ankle Circles     Fwd Step ups to SLS  Knee Flex/Ext    Lateral Step ups to SLS  Hip aBd/aDd    Stop/Go Gait   Bicycle       Ankle DF/PF      Ankle Inv/Ev    Stretching:   LAQ X 10    Gastroc/Soleus 30\" x 2     Hamstring  30\" x 2  Noodle:     Knee Flex Stretch  Leg Press    Piriformis   Noodle Hang at [employer] [place]    Hip Flexor  Noodle Hang Deep Water    SKTC  Noodle Bicycle     DKTC       ITB      Quad  Deep Water:    Mid Back   Jog    UT  Jumping Jacks    Post Shoulder  Heel to Toe    Ladder Pull  Hand to Opposite Knee    Pec Stretch  Rocking Horse      [employer] Skier          Cervical:   Other:     AROM Flexion      AROM Extension      AROM Side Bending      AROM Rotation      Chin Tucks      Chin Nods        Aquatic Abbreviation Key  B= Belt DB= Dumbells T= Theratube   H= Hydrotone N= Noodles W= Weights   P= Paddles S= Speedo equipment K= [employer]       Therapeutic Exercise and NMR EXR  [x] (72984) Provided verbal/tactile cueing for activities related to strengthening, flexibility, endurance, ROM for improvements in LE, proximal hip, and core control with self care, mobility, lifting, ambulation.  [] (53581) Provided verbal/tactile cueing for activities related to improving balance, coordination, kinesthetic sense, posture, motor skill, proprioception  to assist with LE, proximal hip, and core control in self care, mobility, lifting, ambulation and eccentric single leg control.      NMR and Therapeutic Activities:    [] (31310 or 52199) Provided verbal/tactile cueing for activities related to improving balance, coordination, kinesthetic sense, posture, motor skill, proprioception and motor activation to allow for proper function of core, proximal hip and LE with self care and ADLs  [x] (19085) Gait Re-education- Provided training and instruction to the patient for proper LE, core and proximal hip recruitment and positioning and eccentric body weight control with ambulation re-education including up and down stairs     Home Exercise Program:    [x] (65791) Reviewed/Progressed HEP activities related to strengthening, flexibility, endurance, ROM of core, proximal hip and LE for functional self-care, mobility, lifting and ambulation/stair navigation   [] (88057)Reviewed/Progressed HEP activities related to improving balance, coordination, kinesthetic sense, posture, motor skill, proprioception of core, proximal hip and LE for self care, mobility, lifting, and ambulation/stair navigation      Manual Treatments:  PROM / STM / Oscillations-Mobs:  G-I, II, III, IV (PA's, Inf., Post.)  [x] (51162) Provided manual therapy to mobilize LE, proximal hip and/or LS spine soft tissue/joints for the purpose of modulating pain, promoting relaxation,  increasing ROM, reducing/eliminating soft tissue swelling/inflammation/restriction, improving soft tissue extensibility and allowing for proper ROM for normal function with self care, mobility, lifting and ambulation. Modalities:  Instructed pt to ice (also compress with ace bandage) at home   [] GAME READY (VASO)- for significant edema, swelling, pain control. Charges:  Timed Code Treatment Minutes: 55   Total Treatment Minutes: 55     [facility] time in/time out:   (and requires time in and out for each CPT code)    [] EVAL (LOW) 02068 (typically 20 minutes face-to-face)  [] EVAL (MOD) 79633 (typically 30 minutes face-to-face)  [] EVAL (HIGH) 55165 (typically 45 minutes face-to-face)  [] RE-EVAL     [] RJ(48943) x 2     [] IONTO  [] NMR (15950) x     [] VASO  [] Manual (02163) x      [] Other:  [] TA x  1    [] Mech Traction (73535)  [] ES(attended) (37560)      [] ES (un) (69920):    4 - aqua therapy     GOALS:   Patient stated goal: Bending knee without pain  []? Progressing: []? Met: []? Not Met: []? Adjusted     Therapist goals for Patient:   Short Term Goals: To be achieved in: 2 weeks  1. Independent in HEP and progression per patient tolerance, in order to prevent re-injury. [x]? Progressing: []? Met: []? Not Met: []? Adjusted  2. Patient will have a decrease in pain to facilitate improvement in movement, function, and ADLs as indicated by Functional Deficits. [x]? Progressing: []? Met: []? Not Met: []? Adjusted     Long Term Goals: To be achieved in: 6 weeks  1. Disability index score of 40% or less for the LEFS to assist with reaching prior level of function. []? Progressing: []? Met: []? Not Met: []? Adjusted  2. Patient will demonstrate increased pain-free AROM to 0-105 deg for R,L knees to allow for proper joint functioning for sit to stand transitions, normal gait patterns, and stair ambulation with a reciprocal pattern. []? Progressing: [x]? Met: []? Not Met: []? Adjusted  3.  Patient will demonstrate an increase in R,L knee, hip strength to at least 4+/5 in order to amb s AD, improve ease of sit

## 2020-07-07 ENCOUNTER — TELEPHONE (OUTPATIENT)
Dept: ORTHOPEDIC SURGERY | Age: 60
End: 2020-07-07

## 2020-07-10 ENCOUNTER — HOSPITAL ENCOUNTER (OUTPATIENT)
Dept: PHYSICAL THERAPY | Age: 60
Setting detail: THERAPIES SERIES
Discharge: HOME | End: 2020-07-10
Payer: COMMERCIAL

## 2020-07-10 PROCEDURE — 97113 AQUATIC THERAPY/EXERCISES: CPT

## 2020-07-10 NOTE — FLOWSHEET NOTE
reports constant pain in left knee that varies in severity. Pt is getting an MRI on left knee July 11th to see if there is any reason for pain that could have been missed previously. OBJECTIVE: 104-105 flex with heel slide   Observation:    Test measurements:      RESTRICTIONS/PRECAUTIONS: HTN, obesity   L knee MRI 5/29/20:  CONCLUSION:   1. High-grade medial and patellofemoral compartment chondromalacia.  Degenerative tearing    involves the free edge of the posterior horn and body.  Subchondral marrow changes, spurring    and remodeling of the medial and less so patellofemoral joint spaces. 2. Loose osteochondral body suprapatellar recess medially measures 1.75 x 1 x 2.3 cm.   3. Anteromedial and anterolateral subcutis swelling.  Contusion and sprain present. Exercises/Interventions:     Therapeutic Ex (47567) Sets/sec Reps Notes/CUES   Pt ed: findings of exam and POC, crutch use vs RW; prognosis for PT-will not improve OA, but can improve strength/flexibility/pain, aquatics therapy, water walking, household ambulation, HEP, pain management, activity initiation  10'     Bike rock 85 mins     Slantboard  30\" x 3     HL bridge 5\"/ 2 10 reps       HEP   Seated LAQ    2#/ 5\" 15 reps HEP   Seated hip abd iso + quad set 5\" x10  HEP   Seated hip add iso 10\"x10  HEP   SLR- flex 3\"/ 2 10 reps Very difficult in 2nd set   SLR- abd 3\"/ 2 15 reps          Heel slide 10\" x 10  +HEP         HR 2 x 10  B Limited ROM- HEP   Standing HS curl 15 reps  B alt.  HEP   Standing hip march 10 reps B HEP         Manual Intervention ([id number])       Pt unable to lie supine c bolster comfortably                                 NMR re-education (48246)   CUES NEEDED                                                     Therapeutic Activity (04586)      80 feet gait X  With VC for knee flexion, glute and quad activation and lack of Trendelenburg   Supine TA march            Sit to stand from chair + Airex 3 10 VC to narrow NÉSTOR Brace fitting with ATC X       AquaticTherapy Dates of Service:  7/6, 7/10  Aquatic Visits Exercises/Activities: Patient received one on one time with the PT during this session. Transfers:          % Immersion:         RED WITH PT, SBA, 75% WATER IMMERSION    Ambulation:   UE Exercises:       Forwards   X 1 Shoulder Shrugs      Lateral   X 1 Shoulder Circles      Retro    X 1 Scapular Retraction       Marching   Push Downs       Cariocas   Punching     Jog    Rowing     Multifidi walkouts with paddle   Elbow Flex/Ext       Shldr Flex/Ext       [employer] aBd/aDd    LE Exercises:  Shldr Horiz aBd/aDd    HR/TR X 10 Shldr IR/ER    Marches X 10  Arm Circles    Squats  x 10  PNF Diagonals    Hamstring Curls X 10  Wall Push Ups    Hip Flexion (SLR) X 10  Figure 8's    Hip aBduction (SLR) X 10  Bilateral Pull Downs    Hip Extension (SLR) X 10       Hip aDduction (SLR)      Hip Circles X 10  Functional:    Hip IR/Er  Step up forward X 10    TrA Set   Step up lateral  X 10    Pelvic Tilts   Step down     Fig 8's   Lunges Forward    LE PNF  Lunges Retro      Lunges Lateral     Balance:   Lower ab curl with noodle     SLS  30\" x 2 B      Tandem Stance 20\" x 2      NBOS eyes open  Seated:     NBOS eyes closed 30\" x 2 Ankle pumps     Hand to Opposite Knee X 6 B- stopped due to pain Ankle Circles     Fwd Step ups to SLS  Knee Flex/Ext X10 Left   Lateral Step ups to SLS  Hip aBd/aDd    Stop/Go Gait   Bicycle  x20     Ankle DF/PF      Ankle Inv/Ev    Stretching:   SLR x10 Left   Gastroc/Soleus 30\" x 2     Hamstring  30\" x 2  Noodle:     Knee Flex Stretch  Leg Press    Piriformis   Noodle Hang at [employer] [place]    Hip Flexor  Noodle Hang Deep Water    SKTC  Noodle Bicycle     DKTC       ITB      Quad  Deep Water:    Mid Back   Jog    UT  Jumping Jacks    Post Shoulder  Heel to Toe    Ladder Pull  Hand to Opposite Knee    Pec Stretch  Rocking Horse      [employer] Skier          Cervical:   Other:     AROM Flexion      AROM Extension and/or LS spine soft tissue/joints for the purpose of modulating pain, promoting relaxation,  increasing ROM, reducing/eliminating soft tissue swelling/inflammation/restriction, improving soft tissue extensibility and allowing for proper ROM for normal function with self care, mobility, lifting and ambulation. Modalities:  Instructed pt to ice (also compress with ace bandage) at home   [] GAME READY (VASO)- for significant edema, swelling, pain control. Charges:  Timed Code Treatment Minutes: 48   Total Treatment Minutes: 48     [facility] time in/time out:   (and requires time in and out for each CPT code)    [] EVAL (LOW) 80380 (typically 20 minutes face-to-face)  [] EVAL (MOD) 17686 (typically 30 minutes face-to-face)  [] EVAL (HIGH) 59198 (typically 45 minutes face-to-face)  [] RE-EVAL     [] OT(40160) x 2     [] IONTO  [] NMR (19790) x     [] VASO  [] Manual (59219) x      [] Other:  [] TA x  1    [] Mech Traction (20999)  [] ES(attended) (94918)      [] ES (un) (59694):   3 - aqua therapy      GOALS:   Patient stated goal: Bending knee without pain  []? Progressing: []? Met: []? Not Met: []? Adjusted     Therapist goals for Patient:   Short Term Goals: To be achieved in: 2 weeks  1. Independent in HEP and progression per patient tolerance, in order to prevent re-injury. [x]? Progressing: []? Met: []? Not Met: []? Adjusted  2. Patient will have a decrease in pain to facilitate improvement in movement, function, and ADLs as indicated by Functional Deficits. [x]? Progressing: []? Met: []? Not Met: []? Adjusted     Long Term Goals: To be achieved in: 6 weeks  1. Disability index score of 40% or less for the LEFS to assist with reaching prior level of function. []? Progressing: []? Met: []? Not Met: []? Adjusted  2.  Patient will demonstrate increased pain-free AROM to 0-105 deg for R,L knees to allow for proper joint functioning for sit to stand transitions, normal gait patterns, and stair ambulation with a reciprocal pattern. []? Progressing: [x]? Met: []? Not Met: []? Adjusted  3. Patient will demonstrate an increase in R,L knee, hip strength to at least 4+/5 in order to amb s AD, improve ease of sit to stand transfers and climb stairs in his home. [x]? Progressing: []? Met: []? Not Met: []? Adjusted  4. Patient will return to amb community distances s AD and using knee braces. [x]? Progressing: []? Met: []? Not Met: []? Adjusted  5. Patient will be able to climb stairs reciprocally using on HR.    []? Progressing: []? Met: [x]? Not Met: []? Adjusted         Progression Towards Functional goals:  [] Patient is progressing as expected towards functional goals listed. [x] Progression is slowed due to complexities listed. [x] Progression has been slowed due to co-morbidities. [] Plan just implemented, too soon to assess goals progression  [] Other:     Access Code: [email or web address]   URL: [employer]/   Date: 06/22/2020   Prepared by: Monik Robertson     Exercises   Supine Bridge - 10 reps - 3 sets - 3 hold - 1x daily - 7x weekly   Supine Active Straight Leg Raise - 10 reps - 3 sets - 3 hold - 1x daily - 7x weekly   Sidelying Hip Abduction - 10 reps - 3 sets - 3 hold - 1x daily - 7x weekly   Seated Long Arc Quad - 10 reps - 3 sets - 1x daily - 7x weekly   Standing Heel Raise with Support - 10 reps - 3 sets - 1x daily - 7x weekly   Standing March with Counter Support - 10 reps - 3 sets - 1x daily - 7x weekly   Standing Knee Flexion AROM with Chair Support - 10 reps - 3 sets - 1x daily - 7x weekly       Overall Progression Towards Functional goals/ Treatment Progress Update:  [] Patient is progressing as expected towards functional goals listed. [x] Progression is slowed due to complexities/Impairments listed. [x] Progression has been slowed due to co-morbidities.   [] Plan just implemented, too soon to assess goals progression <30days   [] Goals require adjustment due to lack of progress  [] Patient is not progressing as expected and requires additional follow up with physician  [] Other    Prognosis for POC: [x] Good [] Fair  [] Poor      Patient requires continued skilled intervention: [x] Yes  [] No    Treatment/Activity Tolerance:  [x] Patient able to complete treatment  [] Patient limited by fatigue  [] Patient limited by pain    [] Patient limited by other medical complications  [] Other:     ASSESSMENT: Pt tolerated exercises during pool session well, only had to stop hand to opposite knee due to pain in L knee. Continue progress in pool as tolerated. PLAN:   [x] Continue per plan of care [] Alter current plan (see comments above)  [] Plan of care initiated [] Hold pending MD visit [] Discharge       Electronically signed by:  Elisa Gray PT   Mia Egan, [facility]  Therapist was present, directed the patient's care, made skilled judgement, and was responsible for assessment and treatment of the patient. Note: If patient does not return for scheduled/ recommended follow up visits, this note will serve as a discharge from care along with most recent update on progress.

## 2020-07-13 ENCOUNTER — HOSPITAL ENCOUNTER (OUTPATIENT)
Dept: PHYSICAL THERAPY | Age: 60
Setting detail: THERAPIES SERIES
Discharge: HOME | End: 2020-07-13
Payer: COMMERCIAL

## 2020-07-13 ENCOUNTER — TELEPHONE (OUTPATIENT)
Dept: ORTHOPEDIC SURGERY | Age: 60
End: 2020-07-13

## 2020-07-13 PROCEDURE — 97113 AQUATIC THERAPY/EXERCISES: CPT

## 2020-07-13 NOTE — FLOWSHEET NOTE
[facility]     [facility] Treatment Note/ Progress Report:           Date:  2020    Patient Name:  Delores Dawson    :  1960  MRN: [id number]  Restrictions/Precautions:    Medical/Treatment Diagnosis Information:  · Diagnosis: M17.12 (ICD-10-CM) - Primary osteoarthritis of left knee; M94.262 (ICD-10-CM) - Chondromalacia of left knee; M25.562 (ICD-10-CM) - Left knee pain, unspecified chronicity; M25.561 (ICD-10-CM) - Acute pain of right knee; M17.11 (ICD-10-CM) - Primary osteoarthritis of right knee; M22.41 (ICD-10-CM) - Chondromalacia patellae of right knee  · Treatment Diagnosis: Diagnosis: M17.12 (ICD-10-CM) - Primary osteoarthritis of left knee; M94.262 (ICD-10-CM) - Chondromalacia of left knee; M25.562 (ICD-10-CM) - Left knee pain, unspecified chronicity; M25.561 (ICD-10-CM) - Acute pain of right knee; M17.11 (ICD-10-CM) - Primary osteoarthritis of right knee; M22.41 (ICD-10-CM) - Chondromalacia patellae of right knee  Insurance/Certification information:  PT Insurance Information: UMR  Physician Information:  Referring Practitioner: Dr. Oralia Burt  Has the plan of care been signed (Y/N):        []  Yes  [x]  No     Date of Patient follow up with Nela Reynoso      Is this a Progress Report:     []  Yes  [x]  No        If Yes:  Date Range for reporting period:  Beginning 20  Ending    Progress report will be due (10 Rx or 30 days whichever is less):      Recertification will be due (POC Duration  / 90 days whichever is less):       Visit # Insurance Allowable Requires auth   6 Check when scanned    []no        []yes:???      Functional Scale: LEFS 84% disability  Date assessed:  20     Therapy Diagnosis/Practice Pattern: B     Number of Comorbidities:  []0     [x]1-2    []3+    Latex Allergy:  [x]NO      []YES  Preferred Language for Healthcare:   [x]English       []other:      Pain level: 5/10 left knee    SUBJECTIVE:  : pt states yesterday was a really bad pain day with no reason why. States he can't bend it and sometimes it won't move when he tries to move it. States today isn't bad but it usually is worse after therapy            OBJECTIVE: 104-105 flex with heel slide   Observation:    Test measurements:      RESTRICTIONS/PRECAUTIONS: HTN, obesity   L knee MRI 5/29/20:  CONCLUSION:   1. High-grade medial and patellofemoral compartment chondromalacia.  Degenerative tearing    involves the free edge of the posterior horn and body.  Subchondral marrow changes, spurring    and remodeling of the medial and less so patellofemoral joint spaces. 2. Loose osteochondral body suprapatellar recess medially measures 1.75 x 1 x 2.3 cm.   3. Anteromedial and anterolateral subcutis swelling.  Contusion and sprain present. Exercises/Interventions:     Therapeutic Ex (40155) Sets/sec Reps Notes/CUES   Pt ed: findings of exam and POC, crutch use vs RW; prognosis for PT-will not improve OA, but can improve strength/flexibility/pain, aquatics therapy, water walking, household ambulation, HEP, pain management, activity initiation  10'     Bike rock 85 mins     Slantboard  30\" x 3     HL bridge 5\"/ 2 10 reps       HEP   Seated LAQ    2#/ 5\" 15 reps HEP   Seated hip abd iso + quad set 5\" x10  HEP   Seated hip add iso 10\"x10  HEP   SLR- flex 3\"/ 2 10 reps Very difficult in 2nd set   SLR- abd 3\"/ 2 15 reps          Heel slide 10\" x 10  +HEP         HR 2 x 10  B Limited ROM- HEP   Standing HS curl 15 reps  B alt.  HEP   Standing hip march 10 reps B HEP         Manual Intervention ([id number])       Pt unable to lie supine c bolster comfortably                                 NMR re-education (26897)   CUES NEEDED                                                     Therapeutic Activity (26009)      80 feet gait X  With VC for knee flexion, glute and quad activation and lack of Trendelenburg   Supine TA march            Sit to stand from chair + Airex 3 10 VC to narrow NÉSTOR         Brace fitting with ATC X       AquaticTherapy Dates of Service:  7/6, 7/10, 7/13  Aquatic Visits Exercises/Activities: Patient received one on one time with the PT during this session. Transfers:          % Immersion:         RED WITH PT, SBA, 75% WATER IMMERSION    Ambulation:   UE Exercises:       Forwards   X 1 Shoulder Shrugs      Lateral   X 1 Shoulder Circles      Retro    X 1 Scapular Retraction       Marching   Push Downs       Cariocas   Punching     Jog    Rowing     Multifidi walkouts with paddle   Elbow Flex/Ext       Shldr Flex/Ext       [employer] aBd/aDd    LE Exercises:  Shldr Horiz aBd/aDd    HR/TR X 10 Shldr IR/ER    Marches X 10  Arm Circles    Squats  x 10  PNF Diagonals    Hamstring Curls X 10  Wall Push Ups    Hip Flexion (SLR) X 10  Figure 8's    Hip aBduction (SLR) X 10  Bilateral Pull Downs    Hip Extension (SLR) X 10       Hip aDduction (SLR)      Hip Circles X 10  Functional:    Hip IR/Er  Step up forward X 10    TrA Set   Step up lateral  X 10    Pelvic Tilts   Step down     Fig 8's   Lunges Forward    LE PNF  Lunges Retro      Lunges Lateral     Balance:   Lower ab curl with noodle     SLS  30\" x 2 B      Tandem Stance 20\" x 2      NBOS eyes open  Seated:     NBOS eyes closed 30\" x 2 Ankle pumps     Hand to Opposite Knee  Ankle Circles     Fwd Step ups to SLS  Knee Flex/Ext X10 Left   Lateral Step ups to SLS  Hip aBd/aDd    Stop/Go Gait   Bicycle  x20     Ankle DF/PF      Ankle Inv/Ev    Stretching:   SLR x10 Left   Gastroc/Soleus 30\" x 2     Hamstring  30\" x 2  Noodle:     Knee Flex Stretch  Leg Press    Piriformis   Noodle Hang at [place]    Hip Flexor  Noodle Hang Deep Water    SKTC  Noodle Bicycle     DKTC       ITB      Quad  Deep Water:    Mid Back   Jog    UT  Jumping Jacks    Post Shoulder  Heel to Toe    Ladder Pull  Hand to Opposite Knee    Pec Stretch  Rocking Horse      [employer] Skier          Cervical:   Other:     AROM Flexion      AROM Extension      AROM Side Bending      AROM Rotation      Chin Tucks      Chin Nods        Aquatic Abbreviation Key  B= Belt DB= Dumbells T= Theratube   H= Hydrotone N= Noodles W= Weights   P= Paddles S= Speedo equipment K= Kickboard       Therapeutic Exercise and NMR EXR  [x] (22113) Provided verbal/tactile cueing for activities related to strengthening, flexibility, endurance, ROM for improvements in LE, proximal hip, and core control with self care, mobility, lifting, ambulation.  [] (54581) Provided verbal/tactile cueing for activities related to improving balance, coordination, kinesthetic sense, posture, motor skill, proprioception  to assist with LE, proximal hip, and core control in self care, mobility, lifting, ambulation and eccentric single leg control.      NMR and Therapeutic Activities:    [] (93459 or 69030) Provided verbal/tactile cueing for activities related to improving balance, coordination, kinesthetic sense, posture, motor skill, proprioception and motor activation to allow for proper function of core, proximal hip and LE with self care and ADLs  [x] (87828) Gait Re-education- Provided training and instruction to the patient for proper LE, core and proximal hip recruitment and positioning and eccentric body weight control with ambulation re-education including up and down stairs     Home Exercise Program:    [x] (33208) Reviewed/Progressed HEP activities related to strengthening, flexibility, endurance, ROM of core, proximal hip and LE for functional self-care, mobility, lifting and ambulation/stair navigation   [] (59744)Reviewed/Progressed HEP activities related to improving balance, coordination, kinesthetic sense, posture, motor skill, proprioception of core, proximal hip and LE for self care, mobility, lifting, and ambulation/stair navigation      Manual Treatments:  PROM / STM / Oscillations-Mobs:  G-I, II, III, IV (PA's, Inf., Post.)  [x] (07716) Provided manual therapy to mobilize LE, proximal hip and/or LS spine soft []? Progressing: [x]? Met: []? Not Met: []? Adjusted  3. Patient will demonstrate an increase in R,L knee, hip strength to at least 4+/5 in order to amb s AD, improve ease of sit to stand transfers and climb stairs in his home. [x]? Progressing: []? Met: []? Not Met: []? Adjusted  4. Patient will return to amb community distances s AD and using knee braces. [x]? Progressing: []? Met: []? Not Met: []? Adjusted  5. Patient will be able to climb stairs reciprocally using on HR.    []? Progressing: []? Met: [x]? Not Met: []? Adjusted         Progression Towards Functional goals:  [] Patient is progressing as expected towards functional goals listed. [x] Progression is slowed due to complexities listed. [x] Progression has been slowed due to co-morbidities. [] Plan just implemented, too soon to assess goals progression  [] Other:     Access Code: [email or web address]   URL: [employer]/   Date: 06/22/2020   Prepared by: Pablito Bhatia     Exercises   Supine Bridge - 10 reps - 3 sets - 3 hold - 1x daily - 7x weekly   Supine Active Straight Leg Raise - 10 reps - 3 sets - 3 hold - 1x daily - 7x weekly   Sidelying Hip Abduction - 10 reps - 3 sets - 3 hold - 1x daily - 7x weekly   Seated Long Arc Quad - 10 reps - 3 sets - 1x daily - 7x weekly   Standing Heel Raise with Support - 10 reps - 3 sets - 1x daily - 7x weekly   Standing March with Counter Support - 10 reps - 3 sets - 1x daily - 7x weekly   Standing Knee Flexion AROM with Chair Support - 10 reps - 3 sets - 1x daily - 7x weekly       Overall Progression Towards Functional goals/ Treatment Progress Update:  [] Patient is progressing as expected towards functional goals listed. [x] Progression is slowed due to complexities/Impairments listed. [x] Progression has been slowed due to co-morbidities.   [] Plan just implemented, too soon to assess goals progression <30days   [] Goals require adjustment due to lack of progress  [] Patient is not progressing as expected and requires additional follow up with physician  [] Other    Prognosis for POC: [x] Good [] Fair  [] Poor      Patient requires continued skilled intervention: [x] Yes  [] No    Treatment/Activity Tolerance:  [x] Patient able to complete treatment  [] Patient limited by fatigue  [] Patient limited by pain    [] Patient limited by other medical complications  [] Other:     ASSESSMENT: Pt tolerated exercises during pool session well, only had to stop hand to opposite knee due to pain in L knee. Continue progress in pool as tolerated. PLAN:   [x] Continue per plan of care [] Alter current plan (see comments above)  [] Plan of care initiated [] Hold pending MD visit [] Discharge       Electronically signed by:  Sharon Arellano PT     Note: If patient does not return for scheduled/ recommended follow up visits, this note will serve as a discharge from care along with most recent update on progress.

## 2020-07-16 ENCOUNTER — OFFICE VISIT (OUTPATIENT)
Dept: ORTHOPEDIC SURGERY | Age: 60
End: 2020-07-16
Payer: COMMERCIAL

## 2020-07-16 ENCOUNTER — OFFICE VISIT (OUTPATIENT)
Dept: INTERNAL MEDICINE CLINIC | Age: 60
End: 2020-07-16
Payer: COMMERCIAL

## 2020-07-16 VITALS
BODY MASS INDEX: 47.74 KG/M2 | OXYGEN SATURATION: 98 % | HEART RATE: 110 BPM | DIASTOLIC BLOOD PRESSURE: 68 MMHG | TEMPERATURE: 97.8 F | WEIGHT: 315 LBS | SYSTOLIC BLOOD PRESSURE: 126 MMHG | HEIGHT: 68 IN

## 2020-07-16 VITALS — HEIGHT: 68 IN | BODY MASS INDEX: 47.74 KG/M2 | WEIGHT: 315 LBS

## 2020-07-16 DIAGNOSIS — Z01.818 PREOP EXAMINATION: ICD-10-CM

## 2020-07-16 PROBLEM — I83.009 VENOUS ULCER (HCC): Status: ACTIVE | Noted: 2020-07-16

## 2020-07-16 PROBLEM — L97.909 VENOUS ULCER (HCC): Status: ACTIVE | Noted: 2020-07-16

## 2020-07-16 PROCEDURE — 93000 ELECTROCARDIOGRAM COMPLETE: CPT | Performed by: NURSE PRACTITIONER

## 2020-07-16 PROCEDURE — 99214 OFFICE O/P EST MOD 30 MIN: CPT | Performed by: NURSE PRACTITIONER

## 2020-07-16 PROCEDURE — 99213 OFFICE O/P EST LOW 20 MIN: CPT | Performed by: ORTHOPAEDIC SURGERY

## 2020-07-16 RX ORDER — SULFAMETHOXAZOLE AND TRIMETHOPRIM 800; 160 MG/1; MG/1
1 TABLET ORAL 2 TIMES DAILY
Qty: 14 TABLET | Refills: 0 | Status: SHIPPED | OUTPATIENT
Start: 2020-07-16 | End: 2020-07-28 | Stop reason: ALTCHOICE

## 2020-07-16 ASSESSMENT — ENCOUNTER SYMPTOMS
DIARRHEA: 0
SINUS PRESSURE: 0
SHORTNESS OF BREATH: 0
COLOR CHANGE: 0
BACK PAIN: 0
EYE REDNESS: 0
BLOOD IN STOOL: 0
NAUSEA: 0
COUGH: 0
SORE THROAT: 0
CONSTIPATION: 0
VOMITING: 0
ABDOMINAL PAIN: 0
WHEEZING: 0
CHEST TIGHTNESS: 0
RHINORRHEA: 0
EYE ITCHING: 0

## 2020-07-16 ASSESSMENT — PATIENT HEALTH QUESTIONNAIRE - PHQ9
SUM OF ALL RESPONSES TO PHQ QUESTIONS 1-9: 0
2. FEELING DOWN, DEPRESSED OR HOPELESS: 0
DEPRESSION UNABLE TO ASSESS: FUNCTIONAL CAPACITY MOTIVATION LIMITS ACCURACY
SUM OF ALL RESPONSES TO PHQ9 QUESTIONS 1 & 2: 0
SUM OF ALL RESPONSES TO PHQ QUESTIONS 1-9: 0
1. LITTLE INTEREST OR PLEASURE IN DOING THINGS: 0

## 2020-07-16 NOTE — PROGRESS NOTES
Subjective:     Luis E Marie who presentsto the office today for a preoperative consultation at the request of surgeon Dr. Kennedy Valladares who plans on performing left knee arthroscopy with partial medial menisectomy, chondroplasty on 7/27/2020 at [facility]. Thisconsultation is requested for the specific conditions prompting preoperative evaluation(i.e. because of potential affect on operative risk): obesity, htn, hld. Planned anesthesia isGeneral.  The patient has the following known anesthesiaissues: none  Patient has a bleeding risk of : no recent abnormal bleeding, no remote history of abnormal bleeding. He still has chronic venous ulcers that are draining. His wife has been using Hibiclens and neosporin. He states that it was red and swollen last week with pus draining out of the left ulcer. The right ulcer ha been stable. Patient's medications, allergies, past medical, surgical,social and family histories were reviewed and updated as appropriate. Past Medical History:   Diagnosis Date    Allergic rhinitis     Essential hypertension, benign     Fatty liver     Impaired fasting glucose 5/2013    Mixed hyperlipidemia      Obesity     Prostate cancer screening     Declined 4/3/13    Screening PSA (prostate specific antigen) 12/30/2015;5/1/17    Nml:0.53(12/30/2015);5/1/17=nml.      Past Surgical History:   Procedure Laterality Date    CIRCUMCISION      COLONOSCOPY  09/14/2017    Colonoscopy with polypectomy (cold biopsy):Dr. Walker:next in 3yrs=9/14/2020    ENDOSCOPY, COLON, DIAGNOSTIC  09/14/2017    HERNIA REPAIR  4943    umbilical    MOUTH SURGERY      VASECTOMY      WISDOM TOOTH EXTRACTION       Social History     Socioeconomic History    Marital status:      Spouse name: Not on file    Number of children: 2    Years of education: Not on file    Highest education level: Not on file   Occupational History    Occupation: [employer] Financial resource strain: Not on file    Food insecurity     Worry: Not on file     Inability: Not on file    Transportation needs     Medical: Not on file     Non-medical: Not on file   Tobacco Use    Smoking status: Former Smoker     Packs/day: 2.00     Years: 25.00     Pack years: 50.00     Types: Cigarettes     Last attempt to quit: 2006     Years since quittin.2    Smokeless tobacco: Never Used   Substance and Sexual Activity    Alcohol use: Yes     Comment: ocas    Drug use: No    Sexual activity: Yes     Partners: Female   Lifestyle    Physical activity     Days per week: Not on file     Minutes per session: Not on file    Stress: Not on file   Relationships    Social connections     Talks on phone: Not on file     Gets together: Not on file     Attends Yarsani service: Not on file     Active member of club or organization: Not on file     Attends meetings of clubs or organizations: Not on file     Relationship status: Not on file    Intimate partner violence     Fear of current or ex partner: Not on file     Emotionally abused: Not on file     Physically abused: Not on file     Forced sexual activity: Not on file   Other Topics Concern    Not on file   Social History Narrative    Not on file       Review of Systems  Review of Systems   Constitutional: Negative for chills, fatigue and fever. HENT: Negative for congestion, ear pain, postnasal drip, rhinorrhea, sinus pressure, sneezing and sore throat. Eyes: Negative for redness and itching. Respiratory: Negative for cough, chest tightness, shortness of breath and wheezing. Cardiovascular: Negative for chest pain and palpitations. Gastrointestinal: Negative for abdominal pain, blood in stool, constipation, diarrhea, nausea and vomiting. Endocrine: Negative for cold intolerance and heat intolerance. Genitourinary: Negative for difficulty urinating, dysuria, flank pain, frequency, hematuria and urgency.    Musculoskeletal: Positive for arthralgias (right knee). Negative for back pain, joint swelling and myalgias. Skin: Negative for color change, pallor, rash and wound. Allergic/Immunologic: Negative for environmental allergies and food allergies. Neurological: Negative for dizziness, seizures, syncope, weakness, light-headedness, numbness and headaches. Hematological: Negative for adenopathy. Does not bruise/bleed easily. Psychiatric/Behavioral: Negative for confusion, sleep disturbance and suicidal ideas. The patient is not nervous/anxious and is not hyperactive. All other systems reviewed and are negative. Objective:     Vitals:    07/16/20 1317   BP: 126/68   Pulse: 110   Temp: 97.8 °F (36.6 °C)   SpO2: 98%        Physical Exam  Physical Exam  Constitutional:       Appearance: He is well-developed. HENT:      Head: Normocephalic and atraumatic. Right Ear: Hearing, tympanic membrane, ear canal and external ear normal.      Left Ear: Hearing, tympanic membrane, ear canal and external ear normal.      Nose: Nose normal. No mucosal edema or rhinorrhea. Right Sinus: No maxillary sinus tenderness or frontal sinus tenderness. Left Sinus: No maxillary sinus tenderness or frontal sinus tenderness. Mouth/Throat:      Pharynx: No oropharyngeal exudate or posterior oropharyngeal erythema. Tonsils: No tonsillar abscesses. Neck:      Musculoskeletal: Normal range of motion and neck supple. Vascular: No JVD. Cardiovascular:      Rate and Rhythm: Normal rate and regular rhythm. Heart sounds: Normal heart sounds. No murmur. No friction rub. No gallop. Pulmonary:      Effort: Pulmonary effort is normal. No respiratory distress. Breath sounds: Normal breath sounds. No wheezing. Chest:      Chest wall: No tenderness. Abdominal:      General: Bowel sounds are normal. There is no distension. Palpations: Abdomen is soft. There is no mass. Tenderness:  There is no abdominal tenderness. There is no guarding or rebound. Musculoskeletal:         General: No tenderness or deformity. Right knee: He exhibits decreased range of motion and swelling. Left knee: He exhibits decreased range of motion and swelling. Lymphadenopathy:      Head:      Right side of head: No submental, submandibular, tonsillar, preauricular, posterior auricular or occipital adenopathy. Left side of head: No submental, submandibular, tonsillar, preauricular, posterior auricular or occipital adenopathy. Cervical: No cervical adenopathy. Skin:     General: Skin is warm and dry. Findings: No erythema or rash. Neurological:      Mental Status: He is alert and oriented to person, place, and time. Psychiatric:         Judgment: Judgment normal.         Cardiographics  ECG: normal sinus rhythm, no blocks or conduction defects, no ischemic changes    Medication Review  Current Outpatient Medications on File Prior to Visit   Medication Sig Dispense Refill    etodolac (LODINE) 400 MG tablet Take 1 tablet by mouth 2 times daily 60 tablet 3    diazePAM (VALIUM) 10 MG tablet TAKE 1 TABLET BY MOUTH 1 HOUR PRIOR TO PROCEDURE. 1 tablet 0    rOPINIRole (REQUIP) 1 MG tablet TAKE 1 TABLET BY MOUTH EVERY NIGHT 90 tablet 0    lisinopril (PRINIVIL;ZESTRIL) 10 MG tablet Take 1 tablet by mouth daily 90 tablet 0    furosemide (LASIX) 40 MG tablet Take 1 tablet by mouth daily 90 tablet 0    hydroCHLOROthiazide (HYDRODIURIL) 25 MG tablet TAKE ONE TABLET BY MOUTH ONE TIME DAILY 90 tablet 0    loratadine (CLARITIN) 10 MG tablet Take 1 tablet by mouth daily 90 tablet 0    fluticasone (FLONASE) 50 MCG/ACT nasal spray 1 spray by Nasal route daily 3 Bottle 0    ibuprofen (ADVIL;MOTRIN) 800 MG tablet Take 1 tablet by mouth every 8 hours as needed for Pain 90 tablet 3    methylPREDNISolone (MEDROL DOSEPACK) 4 MG tablet Take by mouth as directed.  (Patient not taking: Reported on 7/16/2020) 21 kit 0    CONTRAVE 8-90 MG per extended release tablet TAKE 1 TABLET BY MOUTH 2 TIMES A DAY (Patient not taking: Reported on 7/16/2020) 180 tablet 0    ondansetron (ZOFRAN) 4 MG tablet Take 1 tablet by mouth daily as needed for Nausea or Vomiting (Patient not taking: Reported on 7/16/2020) 30 tablet 0    meclizine (ANTIVERT) 25 MG tablet Take 1-2 tablets every 6-8 hours as needed for dizziness (Patient not taking: Reported on 7/16/2020) 30 tablet 0     No current facility-administered medications on file prior to visit. Assessment:       1. Preop examination    2. Chronic pain of left knee    3. Venous ulcer ([address]           Plan:        Chronic pain of left knee  Surgery as planned       Preop examination  Perioperative risk related to the patient's upcoming surgery is considered low. he is cleared for surgery. Pre-op exam was completed on 7/16/20 1:58 PM.        Venous ulcer ([address]  Chronic venous ulcers on bilateral legs. Left posterior wound is stable today, but recently has been red and draining pus. Will treat given pending surgery.

## 2020-07-16 NOTE — PROGRESS NOTES
Chief Complaint   Patient presents with    New Patient     Left knee: refer Dr. Barrett Morin, completed Bilateral Euflexxa 6/23/2020, OA, chondromalacia, the Right knee improved after euflexxa, but the left knee didnt improve, in PT, has instability first thing in the morning        [address] Simon Ibarra is a 64 y.o. male. Presents for evaluation of his left knee. I am seeing him as a consultation at the request of Dr. Sahil Maldonado for a sports medicine orthopedic specialty consultation. He has a history of osteoarthritis in his knee, but is been having worsening significant pain for the last 8 weeks. There is no one single incident. He is a  and has noticed a lot of difficulty with his normal activities and has had to go on temporary disability because of his difficulty with ambulation. He is now currently on crutches. He has been in physical therapy. He had a corticosteroid injection in his knee in March. This gave him some relief for a while, however the pain returned. He then had Visco supplementation injections finishing his third injection on 6/23/2020. He has not had much improvement at this point continues having significant pain. He is also been using a medial  brace. He has been having some significant catching and popping in his knee at times. A new MRI was performed on 7/13/2020 which did demonstrate a meniscus tear. He is here today to discuss whether or not arthroscopic treatment would be an option given the fact that due to BMI arthroplasty is not recommended.     Activities/occupation:     PAST MEDICAL/SURGICAL HISTORY     Past Medical History:   Diagnosis Date    Allergic rhinitis     Essential hypertension, benign     Fatty liver     Impaired fasting glucose 5/2013    Mixed hyperlipidemia      Obesity     Prostate cancer screening     Declined 4/3/13    Screening PSA (prostate specific antigen) 12/30/2015;5/1/17 Description: MR Left Knee w/o Contrast              HISTORY:  Primary osteoarthritis of left knee, chondromalacia of left knee, left knee pain,    evaluate for insufficiency fracture.         TECHNICAL FACTORS:  Long- and short-axis fat- and water-weighted images were performed.         COMPARISON:  None.         FINDINGS:  ACL, PCL, LCL and MCL are intact.         3-4 cm trizonal pattern of tearing involves the posterior horn and body of the medial meniscus.         Lateral meniscus is intact.         High-grade class 4 medial femoral and tibial chondromalacia.         MCL is thickened.  Sprain, scarring and capsulitis are present.         CONCLUSION:    1. 3-4 cm trizonal pattern of tearing involves the posterior horn body of the medial meniscus. 2. No lateral meniscal or cruciate tear. 3. Chronic MCL and LCL sprain and capsulitis. 4. High-grade medial and patellofemoral compartment chondromalacia. ASSESSMENT  61-year-old male with left knee osteoarthritis with medial meniscus tear, pain and mechanical symptoms    No orders of the defined types were placed in this encounter. PLAN  -Diagnosis and treatment options discussed in detail today  -We discussed his condition at length. We discussed the natural history of osteoarthritis and meniscus tears and treatment options ranging from continued nonsurgical treatment with medications, bracing, physical therapy, ice, Q modification to attempting arthroscopic surgery to help with his mechanical symptoms. We discussed that arthroscopic surgery in the setting of mild to moderate arthritis can help with mechanical symptoms, however pain is unpredictable.   Given the fact that he has such significant pain and difficulty at this time and is not a candidate for arthroplasty and has tried cortisone injections, Visco supplementation injections, and medications that is reasonable to consider arthroscopic surgery given the lack of improvement with other conservative measures  -We discussed that there is a possibility of needing continued treatment afterward and that he may continue having symptoms and that weight loss is a key element in his improvement  -He has already been looking into a weight loss management program and is moving forward with that and is had some weight loss already to this point  -At this point we will move forward with arthroscopic surgery to address his mechanical symptoms and hopefully improve his pain as well    Surgery: Left knee arthroscopy with partial medial meniscectomy, chondroplasty, surgery as indicated    After discussing the pros and cons of treatment options and risks and benefits of surgical intervention, Eladio Sexton  has elected to proceed with surgery at this time. He understands that there are no guarantee of results with surgery and there are always risks of infection, stiff joint, injury to nerve or blood vessel, blood clot, anesthesia complications, etc. The procedure and recovery were discussed and all questions answered. Eladio Sexton understands that this is an elective procedure and should they have any further questions they may give me a call. Surgery will be set up in the near future. Risks and benefits of the procedure were fully explained in detail, including but not limited to infection, neurovascular injury, continued pain, arthritis, stiffness, need for further surgery, re-injury, DVT, PE, general risks of anesthesia and loss of limb or life. The patient understands all the risks and does wish to proceed with written consent. The patient was counseled at length about the risks of jennie Covid-19 during their perioperative period and any recovery window from their procedure. The patient was made aware that jennie Covid-19  may worsen their prognosis for recovering from their procedure  and lend to a higher morbidity and/or mortality risk.   All material risks, benefits, and reasonable alternatives including postponing the procedure were discussed. The patient does wish to proceed with the procedure at this time.      -He will get a preoperative medical evaluation  -We discussed the postoperative expected course as well as necessity for physical therapy and potential complications  -We will see him back for surgery in the near future and if there are issues in interim he will contact the office      Mina Florence MD  [address] partner of [facility])      Voice Recognition Dictation disclaimer: Please note that portions of this chart were generated using Dragon dictation software. Although every effort was made to ensure the accuracy of this automated transcription, some errors in transcription may have occurred.

## 2020-07-16 NOTE — ASSESSMENT & PLAN NOTE
Perioperative risk related to the patient's upcoming surgery is considered low. he is cleared for surgery.   Pre-op exam was completed on 7/16/20 1:58 PM.

## 2020-07-16 NOTE — ASSESSMENT & PLAN NOTE
Chronic venous ulcers on bilateral legs. Left posterior wound is stable today, but recently has been red and draining pus. Will treat given pending surgery.

## 2020-07-17 ENCOUNTER — TELEPHONE (OUTPATIENT)
Dept: ORTHOPEDIC SURGERY | Age: 60
End: 2020-07-17

## 2020-07-17 ENCOUNTER — APPOINTMENT (OUTPATIENT)
Dept: PHYSICAL THERAPY | Age: 60
End: 2020-07-17
Payer: COMMERCIAL

## 2020-07-17 LAB
A/G RATIO: 1.4 (ref 1.1–2.2)
ALBUMIN SERPL-MCNC: 4.1 G/DL (ref 3.4–5)
ALP BLD-CCNC: 80 U/L (ref 40–129)
ALT SERPL-CCNC: 60 U/L (ref 10–40)
ANION GAP SERPL CALCULATED.3IONS-SCNC: 16 MMOL/L (ref 3–16)
AST SERPL-CCNC: 33 U/L (ref 15–37)
BILIRUB SERPL-MCNC: <0.2 MG/DL (ref 0–1)
BUN BLDV-MCNC: 28 MG/DL (ref 7–20)
CALCIUM SERPL-MCNC: 9.5 MG/DL (ref 8.3–10.6)
CHLORIDE BLD-SCNC: 101 MMOL/L (ref 99–110)
CO2: 26 MMOL/L (ref 21–32)
CREAT SERPL-MCNC: 1 MG/DL (ref 0.9–1.3)
GFR AFRICAN AMERICAN: >60
GFR NON-AFRICAN AMERICAN: >60
GLOBULIN: 2.9 G/DL
GLUCOSE BLD-MCNC: 102 MG/DL (ref 70–99)
HCT VFR BLD CALC: 43.2 % (ref 40.5–52.5)
HEMOGLOBIN: 14.2 G/DL (ref 13.5–17.5)
MCH RBC QN AUTO: 32.5 PG (ref 26–34)
MCHC RBC AUTO-ENTMCNC: 32.8 G/DL (ref 31–36)
MCV RBC AUTO: 99 FL (ref 80–100)
PDW BLD-RTO: 14.4 % (ref 12.4–15.4)
PLATELET # BLD: 171 K/UL (ref 135–450)
PMV BLD AUTO: 8.5 FL (ref 5–10.5)
POTASSIUM SERPL-SCNC: 4.9 MMOL/L (ref 3.5–5.1)
RBC # BLD: 4.36 M/UL (ref 4.2–5.9)
SODIUM BLD-SCNC: 143 MMOL/L (ref 136–145)
TOTAL PROTEIN: 7 G/DL (ref 6.4–8.2)
WBC # BLD: 8.8 K/UL (ref 4–11)

## 2020-07-17 NOTE — PROGRESS NOTES
Carson Lester    Age 64 y.o.    male    1960    N [id number]    7/27/2020  Arrival Time_____________  OR Time____________[address]     Procedure(s):  VIDEO ARTHROSCOPY LEFT KNEE, PARTIAL MEDIAL MENISCECTOMY, CHONDROPLASTY                      General    Surgeon(s):  Tomer Zelaya, MD       Phone [phone] ([place])     [address] [place] [address] [facility]  Cell Work  _________________________________________________________________  _________________________________________________________________  _________________________________________________________________  _________________________________________________________________  _________________________________________________________________      PCP _____________________________ Phone_________________       H&P__________________Bringing    Chart            Epic  DOS     Called_______  EKG__________________Bringing    Chart            Epic  DOS     Called_______  LAB__________________ Bringing    Chart            Epic  DOS     Called_______  Cardiac Clearance_______Bringing    Chart            Epic      DOS       Called_______    Cardiologist________________________ Phone___________________________      ? Moravian concerns / Waiver on Chart            PAT Communications_____________  ? Pre-op Instructions Given [place]          ______________________________  ? Directions to Surgery Center                          ______________________________  ? Transportation Home_______________      _______________________________  ?  Crutches/Walker__________________        _______________________________      ________Pre-op Orders   _______Transcribed    _______Wt.  ________Pharmacy          _______SCD  ______VTE     ______Beta Blocker  ________Consent             ________TED Arnold Daja

## 2020-07-17 NOTE — TELEPHONE ENCOUNTER
Auth: # [id number]    Date: 07/27/20  Type of SX:  OUTPATIENT  Location: [facility]  CPT: [id number], [id number]   DX Code: D91.747Y, M17.12  SX area: L KNEE  Insurance: [facility]  SX VALID 07/27/20 TO 8/25/20   BY PRICILLA GUTIERREZ

## 2020-07-20 ENCOUNTER — APPOINTMENT (OUTPATIENT)
Dept: PHYSICAL THERAPY | Age: 60
End: 2020-07-20
Payer: COMMERCIAL

## 2020-07-20 NOTE — PROGRESS NOTES
Obstructive Sleep Apnea (TOM) Screening     Patient:  Gagandeep Oconnor    YOB: 1960      Medical Record #:  [id number]                     Date:  7/20/2020     1. Are you a loud and/or regular snorer? [x]  Yes       [] No    2. Have you been observed to gasp or stop breathing during sleep? []  Yes       [x] No    3. Do you feel tired or groggy upon awakening or do you awaken with a headache? [x]  Yes       [] No    4. Are you often tired or fatigued during the wake time hours? [x]  Yes       [] No    5. Do you fall asleep sitting, reading, watching TV or driving? [x]  Yes       [] No    6. Do you often have problems with memory or concentration? []  Yes       [] No    **If patient's score is ? 3 they are considered high risk for TOM. An Anesthesia provider will evaluate the patient and develop a plan of care the day of surgery. Note:  If the patient's BMI is more than 35 kg m¯² , has neck circumference > 40 cm, and/or high blood pressure the risk is greater (© American Sleep Apnea Association, 2006).

## 2020-07-20 NOTE — PROGRESS NOTES
Patient instructed to:  Bring picture ID, insurance card,proof of address  Dress in comfortable,loose clothing,no jewelry, no make up/or finger polish/nocontact lenses dos if appplicable  ALL Ellamae Sergio on operative limb must be removed prior to DOS -if applicable  Nothing to eat or drink after midnight the night before surgery   If instructed to take medicines day of surgery by PCP, take only with sips of water  If you are taking insulin or diabetic medications check with your PCP to adjust doses according to your NPO (not eating) status  Arrange for transportation to and from surgery  With a caregiver staying with you for 24 hours  --make sure you are bring appropriate clothes to fit over large operative drsg - if applicable  Bring copies of H&P and or ekg dos     If your are taking NSAIDS/ASA products/vitamins regularly confirm this with your surgeon regarding taking them preop 5 days before surgery     Notify your Surgeon if you develop any illness between now and surgery time; cough, cold, fever, sore throat, nausea, vomiting, etc.

## 2020-07-20 NOTE — PROGRESS NOTES
Patient instructed to:  Bring picture ID, insurance card,proof of address  Dress in comfortable,loose clothing,no jewelry, no make up/or finger polish/nocontact lenses dos if appplicable  ALL Ozie Alfonzo on operative limb must be removed prior to DOS -if applicable  Nothing to eat or drink after midnight the night before surgery   If instructed to take medicines day of surgery by PCP, take only with sips of water  If you are taking insulin or diabetic medications check with your PCP to adjust doses according to your NPO (not eating) status  Arrange for transportation to and from surgery  With a caregiver staying with you for 24 hours  --make sure you are bring appropriate clothes to fit over large operative drsg - if applicable  Bring copies of H&P and or ekg dos     If your are taking NSAIDS/ASA products/vitamins regularly confirm this with your surgeon regarding taking them preop 5 days before surgery     Notify your Surgeon if you develop any illness between now and surgery time; cough, cold, fever, sore throat, nausea, vomiting, etc.

## 2020-07-21 ENCOUNTER — OFFICE VISIT (OUTPATIENT)
Dept: PRIMARY CARE CLINIC | Age: 60
End: 2020-07-21
Payer: COMMERCIAL

## 2020-07-21 PROCEDURE — 99211 OFF/OP EST MAY X REQ PHY/QHP: CPT | Performed by: NURSE PRACTITIONER

## 2020-07-23 LAB
REPORT: NORMAL
SARS-COV-2: NOT DETECTED
THIS TEST SENT TO: NORMAL

## 2020-07-24 ENCOUNTER — APPOINTMENT (OUTPATIENT)
Dept: PHYSICAL THERAPY | Age: 60
End: 2020-07-24
Payer: COMMERCIAL

## 2020-07-24 NOTE — PROGRESS NOTES
Creta Lento    Age 64 y.o.    male    1960    N [id number]    8/3/2020  Arrival Time_____________  OR Time____________45 [address]     Procedure(s):  VIDEO ARTHROSCOPY LEFT KNEE, PARTIAL MEDIAL MENISCECTOMY, CHONDROPLASTY -TOM=4-                      General    Surgeon(s):  Rianna Drop, MD       Phone [phone] ([place])     [address] [place] [address]  Cell Work  _________________________________________________________________  _________________________________________________________________  _________________________________________________________________  _________________________________________________________________  _________________________________________________________________      PCP _____________________________ Phone_________________       H&P__________________Bringing    Chart            Epic  DOS     Called_______  EKG__________________Bringing    Chart            Epic  DOS     Called_______  LAB__________________ Bringing    Chart            Epic  DOS     Called_______  Cardiac Clearance_______Bringing    Chart            Epic      DOS       Called_______    Cardiologist________________________ Phone___________________________      ? Restorationism concerns / Waiver on Chart            PAT Communications_____________  ? Pre-op Instructions Given [place]          ______________________________  ? Directions to Surgery Center                          ______________________________  ? Transportation Home_______________      _______________________________  ?  Crutches/Walker__________________        _______________________________      ________Pre-op Orders   _______Transcribed    _______Wt.  ________Pharmacy          _______SCD  ______VTE     ______Beta Blocker  ________Consent             ________TED Hayder Mookie

## 2020-07-27 ENCOUNTER — APPOINTMENT (OUTPATIENT)
Dept: PHYSICAL THERAPY | Age: 60
End: 2020-07-27
Payer: COMMERCIAL

## 2020-07-27 NOTE — PROGRESS NOTES

## 2020-07-28 ENCOUNTER — OFFICE VISIT (OUTPATIENT)
Dept: PRIMARY CARE CLINIC | Age: 60
End: 2020-07-28
Payer: COMMERCIAL

## 2020-07-28 ENCOUNTER — HOSPITAL ENCOUNTER (OUTPATIENT)
Dept: WOUND CARE | Age: 60
Discharge: HOME | End: 2020-07-28
Payer: COMMERCIAL

## 2020-07-28 VITALS
BODY MASS INDEX: 47.74 KG/M2 | HEIGHT: 68 IN | TEMPERATURE: 98.4 F | SYSTOLIC BLOOD PRESSURE: 145 MMHG | DIASTOLIC BLOOD PRESSURE: 83 MMHG | WEIGHT: 315 LBS | RESPIRATION RATE: 24 BRPM | HEART RATE: 100 BPM

## 2020-07-28 PROBLEM — L03.119 CELLULITIS OF LOWER EXTREMITY: Status: RESOLVED | Noted: 2019-09-25 | Resolved: 2020-07-28

## 2020-07-28 PROBLEM — Z01.818 PREOP EXAMINATION: Status: RESOLVED | Noted: 2020-07-16 | Resolved: 2020-07-28

## 2020-07-28 PROBLEM — L97.911 ULCERS OF BOTH LOWER LEGS, LIMITED TO BREAKDOWN OF SKIN (HCC): Status: ACTIVE | Noted: 2020-07-28

## 2020-07-28 PROBLEM — I87.2 PERIPHERAL VENOUS INSUFFICIENCY: Status: ACTIVE | Noted: 2020-07-28

## 2020-07-28 PROBLEM — L97.921 ULCERS OF BOTH LOWER LEGS, LIMITED TO BREAKDOWN OF SKIN (HCC): Status: ACTIVE | Noted: 2020-07-28

## 2020-07-28 PROCEDURE — 29581 APPL MULTLAYER CMPRN SYS LEG: CPT

## 2020-07-28 PROCEDURE — 99203 OFFICE O/P NEW LOW 30 MIN: CPT | Performed by: INTERNAL MEDICINE

## 2020-07-28 PROCEDURE — 29581 APPL MULTLAYER CMPRN SYS LEG: CPT | Performed by: INTERNAL MEDICINE

## 2020-07-28 PROCEDURE — 99213 OFFICE O/P EST LOW 20 MIN: CPT

## 2020-07-28 PROCEDURE — 99211 OFF/OP EST MAY X REQ PHY/QHP: CPT | Performed by: NURSE PRACTITIONER

## 2020-07-28 RX ORDER — LIDOCAINE HYDROCHLORIDE 40 MG/ML
SOLUTION TOPICAL ONCE
Status: DISCONTINUED | OUTPATIENT
Start: 2020-07-28 | End: 2020-07-29 | Stop reason: HOSPADM

## 2020-07-28 ASSESSMENT — PAIN DESCRIPTION - PAIN TYPE: TYPE: CHRONIC PAIN

## 2020-07-28 ASSESSMENT — PAIN SCALES - GENERAL: PAINLEVEL_OUTOF10: 0

## 2020-07-28 NOTE — PLAN OF CARE
Pt to the [address] [place] [address],[address] [facility] for initial appointment for venous leg ulcers. Pt to have knee surgery on Monday. Dr Patrizia Jason discussed with patient about long term compression garments. Pt to have weekly compression wrap applied to bilateral lower legs. Pt to follow up in the [address] [place] [address],[address] [place] [address] in 1 week. Discharge instructions reviewed with patient, all questions answered, copy given to patient. Dressings were applied to all wounds per M.D. Instructions at this visit.

## 2020-07-28 NOTE — CONSULTS
[facility] Consult Progress Note    Nataly Day     : 1960    DATE OF VISIT:  2020    Subjective:     Nataly Day is a 64 y.o. male who has a venous ulcer located on the bilateral lower leg. Dr. Denise Weiss Early requested a wound-care consult for recurrent lower extremity ulcers in the setting of chronic swelling and skin changes, recent cellulitis, and plans for knee surgery in the very near future. Current complaint of pain in this ulcer? yes. Quality of pain: aching and burning  Timing: intermittent and stable  Severity: mild-moderate  Associated Signs/Symptoms: swelling, redness and drainage (light, clear)  Other significant symptoms or pertinent ulcer history: Mr. Denis Brown has a relatively longstanding history of some leg swelling, and has occasionally had areas of skin inflammation, blisters, weeping and open ulcers. He was recently treated for a secondary cellulitis in one of these areas. He's been having more and more pain in his left knee in recent times, which has indirectly impacted his legs even more (the pain has reduced his ROM and mobility, made it harder to elevate the legs, etc). He's scheduled for an arthroscopy of the left knee on Monday, but Dr. Sylvia Drake wanted an opinion on how to move ahead with the skin issues of his lower legs. No recent F/C/D, no pus or malodor. The patient tells me that the leg lesions got worse when he recently transitioned from traditional PT to therapy in a pool, describing the lesions getting purulent (though on reviewing a photo from him, it looks like the bigger issue might have been very fragile skin that became macerated, as opposed to purulent). Drainage has slowed down a bit since then, but areas are still open.  He and his wife tried to work with compression stockings, but have found them very difficult to manage (ones that are probably the ideal size are nearly impossible to don, and ones that they CAN manage to don are likely not giving enough compression). Additional ulcer(s) noted? no.      Mr. Valencia De has a past medical history of Cellulitis of lower extremity, Impaired fasting glucose, Obesity, Screening PSA (prostate specific antigen), Tobacco use, and Tubular adenoma of colon. He has a past surgical history that includes Mouth surgery; [place] tooth extraction; Circumcision; Vasectomy; Colonoscopy (62/55/7609); and Umbilical hernia repair (05/28/2011). His family history includes Diabetes in his brother; Heart Disease in his mother; High Blood Pressure in his mother. Mr. Valencia De reports that he quit smoking about 14 years ago. His smoking use included cigarettes. He has a 50.00 pack-year smoking history. He has never used smokeless tobacco. He reports current alcohol use. He reports that he does not use drugs. His current medication list consists of etodolac, fluticasone, furosemide, hydroCHLOROthiazide, lisinopril, loratadine, meclizine, ondansetron, and rOPINIRole. Allergies: Patient has no known allergies. Pertinent items from the review of systems are discussed in the HPI; the remainder of the ROS was reviewed and is negative. Objective:     Vitals:    07/28/20 1232   BP: (!) 145/83   Pulse: 100   Resp: 24   Temp: 98.4 °F (36.9 °C)   TempSrc: Oral   Weight: (!) 402 lb 8 oz (182.6 kg)   Height: 5' 8\" (1.727 m)     EFRA today -- right 0.94, left 4.34 (arm systolics 858 & 686, right ankle 114 & 126, left ankle 124 & 130).     Constitutional:  well-developed, well-nourished, overweight, in some discomfort from his knee, not acutely ill appearing  Psychiatric:  oriented to person, place and time; mood and affect appropriate for the situation   Eyes:  pupils equal, round and reactive to light; sclerae anicteric, conjunctivae not pale  ENT: no thrush or oral ulcers, mucous membranes moist  Abd: soft, NT, ND, good BS  Cardiovascular:  bilateral pedal pulses palpable, feet warm, good cap refill; moderate available in retail pharmacies. He should call before his next visit if there is any significant pain, significant strike-through of drainage to the surface of the wrap, or any significant sense of the wrap sliding down more than an inch or so, bunching-up and abrading his skin. I reminded the patient of the importance of weight management and smoking cessation, if applicable; also encouraged ambulation as tolerated, additional lower extremity exercises as instructed in our education sheet, leg elevation when at rest, and compliance with any recommended dietary, diuretic and compression therapies. Next week will discuss options for long-term Velcro compression garments. I don't see an active ID or wound-care contraindication to his surgery on Monday. So that he can shower before surgery, I recommended that he get a cast guard to protect the right sided wrap. Left sided wrap can come off before surgery, and then immediately post-op any simple absorptive dressing can be placed on the left calf if it's still weeping, with just a length of Spandagrip for compression for a few days, until he sees us here later next week (we gave him a piece of that today). Would suggest a dose of preoperative Ancef, to keep the post-surgical infection risk as low as possible, but I do think his baseline risk is quite low. D/W Dr. Luz Jeff. Written discharge instructions given to patient. Follow up in 1 week.     Electronically signed by Maria M Albrecht MD on 7/28/2020 at 6:26 PM.

## 2020-07-31 ENCOUNTER — APPOINTMENT (OUTPATIENT)
Dept: PHYSICAL THERAPY | Age: 60
End: 2020-07-31
Payer: COMMERCIAL

## 2020-07-31 ENCOUNTER — TELEPHONE (OUTPATIENT)
Dept: BARIATRICS/WEIGHT MGMT | Age: 60
End: 2020-07-31

## 2020-07-31 NOTE — TELEPHONE ENCOUNTER
Spoke to pt, DOES NOT have bariatric coverage, plan exclusion,  Pt may be switching to wife medical mutual ins. Will call back once he switches.

## 2020-08-03 ENCOUNTER — ANESTHESIA (OUTPATIENT)
Dept: OPERATING ROOM | Age: 60
End: 2020-08-03
Payer: COMMERCIAL

## 2020-08-03 ENCOUNTER — ANESTHESIA EVENT (OUTPATIENT)
Dept: OPERATING ROOM | Age: 60
End: 2020-08-03
Payer: COMMERCIAL

## 2020-08-03 ENCOUNTER — HOSPITAL ENCOUNTER (OUTPATIENT)
Age: 60
Setting detail: OUTPATIENT SURGERY
Discharge: HOME | End: 2020-08-03
Attending: ORTHOPAEDIC SURGERY | Admitting: ORTHOPAEDIC SURGERY
Payer: COMMERCIAL

## 2020-08-03 VITALS
RESPIRATION RATE: 30 BRPM | SYSTOLIC BLOOD PRESSURE: 180 MMHG | TEMPERATURE: 98.6 F | DIASTOLIC BLOOD PRESSURE: 89 MMHG | OXYGEN SATURATION: 92 %

## 2020-08-03 VITALS
TEMPERATURE: 98 F | SYSTOLIC BLOOD PRESSURE: 151 MMHG | BODY MASS INDEX: 47.74 KG/M2 | OXYGEN SATURATION: 93 % | DIASTOLIC BLOOD PRESSURE: 65 MMHG | RESPIRATION RATE: 16 BRPM | WEIGHT: 315 LBS | HEART RATE: 97 BPM | HEIGHT: 68 IN

## 2020-08-03 LAB — SARS-COV-2, NAAT: NOT DETECTED

## 2020-08-03 PROCEDURE — 2500000003 HC RX 250 WO HCPCS: Performed by: NURSE ANESTHETIST, CERTIFIED REGISTERED

## 2020-08-03 PROCEDURE — 3700000000 HC ANESTHESIA ATTENDED CARE: Performed by: ORTHOPAEDIC SURGERY

## 2020-08-03 PROCEDURE — 2709999900 HC NON-CHARGEABLE SUPPLY: Performed by: ORTHOPAEDIC SURGERY

## 2020-08-03 PROCEDURE — 3600000004 HC SURGERY LEVEL 4 BASE: Performed by: ORTHOPAEDIC SURGERY

## 2020-08-03 PROCEDURE — 3700000001 HC ADD 15 MINUTES (ANESTHESIA): Performed by: ORTHOPAEDIC SURGERY

## 2020-08-03 PROCEDURE — 2580000003 HC RX 258: Performed by: ANESTHESIOLOGY

## 2020-08-03 PROCEDURE — 7100000000 HC PACU RECOVERY - FIRST 15 MIN: Performed by: ORTHOPAEDIC SURGERY

## 2020-08-03 PROCEDURE — 6360000002 HC RX W HCPCS: Performed by: NURSE ANESTHETIST, CERTIFIED REGISTERED

## 2020-08-03 PROCEDURE — 7100000010 HC PHASE II RECOVERY - FIRST 15 MIN: Performed by: ORTHOPAEDIC SURGERY

## 2020-08-03 PROCEDURE — 7100000011 HC PHASE II RECOVERY - ADDTL 15 MIN: Performed by: ORTHOPAEDIC SURGERY

## 2020-08-03 PROCEDURE — 2580000003 HC RX 258: Performed by: ORTHOPAEDIC SURGERY

## 2020-08-03 PROCEDURE — 6370000000 HC RX 637 (ALT 250 FOR IP): Performed by: ANESTHESIOLOGY

## 2020-08-03 PROCEDURE — 88304 TISSUE EXAM BY PATHOLOGIST: CPT

## 2020-08-03 PROCEDURE — U0002 COVID-19 LAB TEST NON-CDC: HCPCS

## 2020-08-03 PROCEDURE — 6360000002 HC RX W HCPCS: Performed by: ORTHOPAEDIC SURGERY

## 2020-08-03 PROCEDURE — 2500000003 HC RX 250 WO HCPCS: Performed by: ORTHOPAEDIC SURGERY

## 2020-08-03 PROCEDURE — 7100000001 HC PACU RECOVERY - ADDTL 15 MIN: Performed by: ORTHOPAEDIC SURGERY

## 2020-08-03 PROCEDURE — 6360000002 HC RX W HCPCS: Performed by: ANESTHESIOLOGY

## 2020-08-03 PROCEDURE — 3600000014 HC SURGERY LEVEL 4 ADDTL 15MIN: Performed by: ORTHOPAEDIC SURGERY

## 2020-08-03 RX ORDER — PROPOFOL 10 MG/ML
INJECTION, EMULSION INTRAVENOUS PRN
Status: DISCONTINUED | OUTPATIENT
Start: 2020-08-03 | End: 2020-08-03 | Stop reason: SDUPTHER

## 2020-08-03 RX ORDER — DEXAMETHASONE SODIUM PHOSPHATE 10 MG/ML
INJECTION INTRAMUSCULAR; INTRAVENOUS PRN
Status: DISCONTINUED | OUTPATIENT
Start: 2020-08-03 | End: 2020-08-03 | Stop reason: SDUPTHER

## 2020-08-03 RX ORDER — GLYCOPYRROLATE 1 MG/5 ML
SYRINGE (ML) INTRAVENOUS PRN
Status: DISCONTINUED | OUTPATIENT
Start: 2020-08-03 | End: 2020-08-03 | Stop reason: SDUPTHER

## 2020-08-03 RX ORDER — PROMETHAZINE HYDROCHLORIDE 25 MG/ML
6.25 INJECTION, SOLUTION INTRAMUSCULAR; INTRAVENOUS
Status: DISCONTINUED | OUTPATIENT
Start: 2020-08-03 | End: 2020-08-03 | Stop reason: HOSPADM

## 2020-08-03 RX ORDER — MEPERIDINE HYDROCHLORIDE 50 MG/ML
12.5 INJECTION INTRAMUSCULAR; INTRAVENOUS; SUBCUTANEOUS EVERY 5 MIN PRN
Status: DISCONTINUED | OUTPATIENT
Start: 2020-08-03 | End: 2020-08-03 | Stop reason: HOSPADM

## 2020-08-03 RX ORDER — ONDANSETRON 2 MG/ML
INJECTION INTRAMUSCULAR; INTRAVENOUS PRN
Status: DISCONTINUED | OUTPATIENT
Start: 2020-08-03 | End: 2020-08-03 | Stop reason: SDUPTHER

## 2020-08-03 RX ORDER — SODIUM CHLORIDE 0.9 % (FLUSH) 0.9 %
10 SYRINGE (ML) INJECTION EVERY 12 HOURS SCHEDULED
Status: DISCONTINUED | OUTPATIENT
Start: 2020-08-03 | End: 2020-08-03 | Stop reason: HOSPADM

## 2020-08-03 RX ORDER — LIDOCAINE HYDROCHLORIDE 10 MG/ML
0.3 INJECTION, SOLUTION EPIDURAL; INFILTRATION; INTRACAUDAL; PERINEURAL
Status: DISCONTINUED | OUTPATIENT
Start: 2020-08-03 | End: 2020-08-03 | Stop reason: HOSPADM

## 2020-08-03 RX ORDER — MORPHINE SULFATE 2 MG/ML
1 INJECTION, SOLUTION INTRAMUSCULAR; INTRAVENOUS EVERY 5 MIN PRN
Status: DISCONTINUED | OUTPATIENT
Start: 2020-08-03 | End: 2020-08-03 | Stop reason: HOSPADM

## 2020-08-03 RX ORDER — ROCURONIUM BROMIDE 10 MG/ML
INJECTION, SOLUTION INTRAVENOUS PRN
Status: DISCONTINUED | OUTPATIENT
Start: 2020-08-03 | End: 2020-08-03 | Stop reason: SDUPTHER

## 2020-08-03 RX ORDER — MORPHINE SULFATE 2 MG/ML
2 INJECTION, SOLUTION INTRAMUSCULAR; INTRAVENOUS EVERY 5 MIN PRN
Status: DISCONTINUED | OUTPATIENT
Start: 2020-08-03 | End: 2020-08-03 | Stop reason: HOSPADM

## 2020-08-03 RX ORDER — OXYCODONE HYDROCHLORIDE AND ACETAMINOPHEN 5; 325 MG/1; MG/1
1 TABLET ORAL PRN
Status: COMPLETED | OUTPATIENT
Start: 2020-08-03 | End: 2020-08-03

## 2020-08-03 RX ORDER — ONDANSETRON 2 MG/ML
4 INJECTION INTRAMUSCULAR; INTRAVENOUS PRN
Status: DISCONTINUED | OUTPATIENT
Start: 2020-08-03 | End: 2020-08-03 | Stop reason: HOSPADM

## 2020-08-03 RX ORDER — DOCUSATE SODIUM 100 MG/1
100 CAPSULE, LIQUID FILLED ORAL 2 TIMES DAILY PRN
Qty: 20 CAPSULE | Refills: 0 | Status: SHIPPED | OUTPATIENT
Start: 2020-08-03 | End: 2020-08-06

## 2020-08-03 RX ORDER — SODIUM CHLORIDE, SODIUM LACTATE, POTASSIUM CHLORIDE, AND CALCIUM CHLORIDE .6; .31; .03; .02 G/100ML; G/100ML; G/100ML; G/100ML
IRRIGANT IRRIGATION PRN
Status: DISCONTINUED | OUTPATIENT
Start: 2020-08-03 | End: 2020-08-03 | Stop reason: ALTCHOICE

## 2020-08-03 RX ORDER — LIDOCAINE HYDROCHLORIDE 20 MG/ML
INJECTION, SOLUTION INFILTRATION; PERINEURAL PRN
Status: DISCONTINUED | OUTPATIENT
Start: 2020-08-03 | End: 2020-08-03 | Stop reason: SDUPTHER

## 2020-08-03 RX ORDER — FENTANYL CITRATE 50 UG/ML
INJECTION, SOLUTION INTRAMUSCULAR; INTRAVENOUS PRN
Status: DISCONTINUED | OUTPATIENT
Start: 2020-08-03 | End: 2020-08-03 | Stop reason: SDUPTHER

## 2020-08-03 RX ORDER — LIDOCAINE HYDROCHLORIDE AND EPINEPHRINE 10; 10 MG/ML; UG/ML
INJECTION, SOLUTION INFILTRATION; PERINEURAL PRN
Status: DISCONTINUED | OUTPATIENT
Start: 2020-08-03 | End: 2020-08-03 | Stop reason: ALTCHOICE

## 2020-08-03 RX ORDER — MIDAZOLAM HYDROCHLORIDE 1 MG/ML
INJECTION INTRAMUSCULAR; INTRAVENOUS PRN
Status: DISCONTINUED | OUTPATIENT
Start: 2020-08-03 | End: 2020-08-03 | Stop reason: SDUPTHER

## 2020-08-03 RX ORDER — HYDRALAZINE HYDROCHLORIDE 20 MG/ML
5 INJECTION INTRAMUSCULAR; INTRAVENOUS EVERY 10 MIN PRN
Status: DISCONTINUED | OUTPATIENT
Start: 2020-08-03 | End: 2020-08-03 | Stop reason: HOSPADM

## 2020-08-03 RX ORDER — DIPHENHYDRAMINE HYDROCHLORIDE 50 MG/ML
12.5 INJECTION INTRAMUSCULAR; INTRAVENOUS
Status: DISCONTINUED | OUTPATIENT
Start: 2020-08-03 | End: 2020-08-03 | Stop reason: HOSPADM

## 2020-08-03 RX ORDER — SODIUM CHLORIDE, SODIUM LACTATE, POTASSIUM CHLORIDE, CALCIUM CHLORIDE 600; 310; 30; 20 MG/100ML; MG/100ML; MG/100ML; MG/100ML
INJECTION, SOLUTION INTRAVENOUS CONTINUOUS
Status: DISCONTINUED | OUTPATIENT
Start: 2020-08-03 | End: 2020-08-03 | Stop reason: HOSPADM

## 2020-08-03 RX ORDER — LABETALOL HYDROCHLORIDE 5 MG/ML
5 INJECTION, SOLUTION INTRAVENOUS EVERY 10 MIN PRN
Status: DISCONTINUED | OUTPATIENT
Start: 2020-08-03 | End: 2020-08-03 | Stop reason: HOSPADM

## 2020-08-03 RX ORDER — HYDROCODONE BITARTRATE AND ACETAMINOPHEN 5; 325 MG/1; MG/1
1 TABLET ORAL EVERY 6 HOURS PRN
Qty: 20 TABLET | Refills: 0 | Status: SHIPPED | OUTPATIENT
Start: 2020-08-03 | End: 2020-08-08

## 2020-08-03 RX ORDER — ONDANSETRON 4 MG/1
4 TABLET, FILM COATED ORAL EVERY 4 HOURS PRN
Qty: 20 TABLET | Refills: 0 | Status: SHIPPED | OUTPATIENT
Start: 2020-08-03 | End: 2020-08-13

## 2020-08-03 RX ORDER — OXYCODONE HYDROCHLORIDE AND ACETAMINOPHEN 5; 325 MG/1; MG/1
2 TABLET ORAL PRN
Status: COMPLETED | OUTPATIENT
Start: 2020-08-03 | End: 2020-08-03

## 2020-08-03 RX ORDER — SUCCINYLCHOLINE CHLORIDE 20 MG/ML
INJECTION INTRAMUSCULAR; INTRAVENOUS PRN
Status: DISCONTINUED | OUTPATIENT
Start: 2020-08-03 | End: 2020-08-03 | Stop reason: SDUPTHER

## 2020-08-03 RX ORDER — SODIUM CHLORIDE 0.9 % (FLUSH) 0.9 %
10 SYRINGE (ML) INJECTION PRN
Status: DISCONTINUED | OUTPATIENT
Start: 2020-08-03 | End: 2020-08-03 | Stop reason: HOSPADM

## 2020-08-03 RX ADMIN — Medication 0.2 MG: at 11:53

## 2020-08-03 RX ADMIN — DEXAMETHASONE SODIUM PHOSPHATE 10 MG: 10 INJECTION INTRAMUSCULAR; INTRAVENOUS at 12:07

## 2020-08-03 RX ADMIN — FENTANYL CITRATE 50 MCG: 50 INJECTION, SOLUTION INTRAMUSCULAR; INTRAVENOUS at 11:59

## 2020-08-03 RX ADMIN — ROCURONIUM BROMIDE 40 MG: 10 SOLUTION INTRAVENOUS at 12:07

## 2020-08-03 RX ADMIN — Medication 3 G: at 11:56

## 2020-08-03 RX ADMIN — FENTANYL CITRATE 50 MCG: 50 INJECTION, SOLUTION INTRAMUSCULAR; INTRAVENOUS at 11:53

## 2020-08-03 RX ADMIN — FENTANYL CITRATE 100 MCG: 50 INJECTION, SOLUTION INTRAMUSCULAR; INTRAVENOUS at 11:57

## 2020-08-03 RX ADMIN — MIDAZOLAM HYDROCHLORIDE 2 MG: 2 INJECTION, SOLUTION INTRAMUSCULAR; INTRAVENOUS at 11:53

## 2020-08-03 RX ADMIN — PROPOFOL 300 MG: 10 INJECTION, EMULSION INTRAVENOUS at 11:59

## 2020-08-03 RX ADMIN — HYDROMORPHONE HYDROCHLORIDE 0.5 MG: 1 INJECTION, SOLUTION INTRAMUSCULAR; INTRAVENOUS; SUBCUTANEOUS at 13:18

## 2020-08-03 RX ADMIN — LIDOCAINE HYDROCHLORIDE 3 ML: 20 INJECTION, SOLUTION INFILTRATION; PERINEURAL at 11:59

## 2020-08-03 RX ADMIN — OXYCODONE HYDROCHLORIDE AND ACETAMINOPHEN 1 TABLET: 5; 325 TABLET ORAL at 13:41

## 2020-08-03 RX ADMIN — SODIUM CHLORIDE, POTASSIUM CHLORIDE, SODIUM LACTATE AND CALCIUM CHLORIDE: 600; 310; 30; 20 INJECTION, SOLUTION INTRAVENOUS at 11:14

## 2020-08-03 RX ADMIN — HYDROMORPHONE HYDROCHLORIDE 0.5 MG: 1 INJECTION, SOLUTION INTRAMUSCULAR; INTRAVENOUS; SUBCUTANEOUS at 13:27

## 2020-08-03 RX ADMIN — ONDANSETRON 4 MG: 2 INJECTION INTRAMUSCULAR; INTRAVENOUS at 12:07

## 2020-08-03 RX ADMIN — SUCCINYLCHOLINE CHLORIDE 140 MG: 20 INJECTION, SOLUTION INTRAMUSCULAR; INTRAVENOUS at 11:59

## 2020-08-03 ASSESSMENT — PULMONARY FUNCTION TESTS
PIF_VALUE: 39
PIF_VALUE: 34
PIF_VALUE: 35
PIF_VALUE: 2
PIF_VALUE: 25
PIF_VALUE: 34
PIF_VALUE: 43
PIF_VALUE: 40
PIF_VALUE: 35
PIF_VALUE: 39
PIF_VALUE: 35
PIF_VALUE: 40
PIF_VALUE: 39
PIF_VALUE: 33
PIF_VALUE: 25
PIF_VALUE: 35
PIF_VALUE: 35
PIF_VALUE: 34
PIF_VALUE: 30
PIF_VALUE: 31
PIF_VALUE: 35
PIF_VALUE: 33
PIF_VALUE: 3
PIF_VALUE: 35
PIF_VALUE: 5
PIF_VALUE: 35
PIF_VALUE: 33
PIF_VALUE: 34
PIF_VALUE: 44
PIF_VALUE: 40
PIF_VALUE: 35
PIF_VALUE: 41
PIF_VALUE: 26
PIF_VALUE: 40
PIF_VALUE: 25
PIF_VALUE: 40
PIF_VALUE: 42
PIF_VALUE: 36
PIF_VALUE: 34
PIF_VALUE: 28
PIF_VALUE: 33
PIF_VALUE: 23
PIF_VALUE: 36
PIF_VALUE: 39
PIF_VALUE: 42
PIF_VALUE: 0
PIF_VALUE: 1
PIF_VALUE: 34
PIF_VALUE: 3
PIF_VALUE: 6
PIF_VALUE: 2
PIF_VALUE: 35
PIF_VALUE: 35
PIF_VALUE: 1
PIF_VALUE: 34
PIF_VALUE: 28
PIF_VALUE: 41
PIF_VALUE: 0
PIF_VALUE: 37
PIF_VALUE: 41
PIF_VALUE: 44
PIF_VALUE: 34
PIF_VALUE: 27
PIF_VALUE: 39

## 2020-08-03 ASSESSMENT — PAIN - FUNCTIONAL ASSESSMENT: PAIN_FUNCTIONAL_ASSESSMENT: 0-10

## 2020-08-03 ASSESSMENT — PAIN DESCRIPTION - DESCRIPTORS: DESCRIPTORS: SHARP;STABBING

## 2020-08-03 ASSESSMENT — PAIN SCALES - GENERAL
PAINLEVEL_OUTOF10: 5
PAINLEVEL_OUTOF10: 7
PAINLEVEL_OUTOF10: 7
PAINLEVEL_OUTOF10: 5
PAINLEVEL_OUTOF10: 3
PAINLEVEL_OUTOF10: 5
PAINLEVEL_OUTOF10: 3
PAINLEVEL_OUTOF10: 3

## 2020-08-03 ASSESSMENT — PAIN DESCRIPTION - LOCATION: LOCATION: KNEE

## 2020-08-03 ASSESSMENT — PAIN DESCRIPTION - ORIENTATION: ORIENTATION: LEFT

## 2020-08-03 NOTE — ANESTHESIA PRE PROCEDURE
Department of Anesthesiology  Preprocedure Note       Name:  Edita Deal   Age:  64 y.o.  :  1960                                          MRN:  [id number]         Date:  8/3/2020      Surgeon: Karina Reese):  Jayshree Alarcon MD    Procedure: Procedure(s):  VIDEO ARTHROSCOPY LEFT KNEE, PARTIAL MEDIAL MENISCECTOMY, CHONDROPLASTY -TOM=4-    Medications prior to admission:   Prior to Admission medications    Medication Sig Start Date End Date Taking?  Authorizing Provider   etodolac (LODINE) 400 MG tablet Take 1 tablet by mouth 2 times daily 20  Irma Gong MD   rOPINIRole (REQUIP) 1 MG tablet TAKE 1 TABLET BY MOUTH EVERY NIGHT 20   JACKLYN Yun CNP   lisinopril (PRINIVIL;ZESTRIL) 10 MG tablet Take 1 tablet by mouth daily 20   JACKLYN Yun CNP   furosemide (LASIX) 40 MG tablet Take 1 tablet by mouth daily 20   JACKLYN Yun CNP   hydroCHLOROthiazide (HYDRODIURIL) 25 MG tablet TAKE ONE TABLET BY MOUTH ONE TIME DAILY 20   JACKLYN Yun CNP   loratadine (CLARITIN) 10 MG tablet Take 1 tablet by mouth daily 20   JACKLYN Yun CNP   ondansetron (ZOFRAN) 4 MG tablet Take 1 tablet by mouth daily as needed for Nausea or Vomiting 20   JACKLYN Light CNP   fluticasone (FLONASE) 50 MCG/ACT nasal spray 1 spray by Nasal route daily 18   Tobin Flanagan MD   meclizine (ANTIVERT) 25 MG tablet Take 1-2 tablets every 6-8 hours as needed for dizziness 7/31/15   Violetta Dorsey MD       Current medications:    Current Facility-Administered Medications   Medication Dose Route Frequency Provider Last Rate Last Dose    ceFAZolin (ANCEF) 3 g in dextrose 5 % 100 mL IVPB  3 g Intravenous On Call to OR Jayshree Alarcon MD        lactated ringers infusion   Intravenous Continuous Yuan Alfaro MD        sodium chloride flush 0.9 % injection 10 mL  10 mL Intravenous 2 times per day Yuan Alfaro MD        sodium chloride flush 0.9 % injection 10 mL  10 mL Intravenous PRN Esvin Romano MD        lidocaine PF 1 % injection 0.3 mL  0.3 mL Intradermal Once PRN Esvin Romano MD           Allergies:  No Known Allergies    Problem List:    Patient Active Problem List   Diagnosis Code    Obesity, morbid ([facility] E66.01    Mixed hypertriglyceridemia Y83.5    Nonalcoholic fatty liver disease K76.0    RLS (restless legs syndrome) G25.81    Essential hypertension, benign I10    Impaired fasting glucose R73.01    Chronic pain of left knee M25.562, G89.29    Osteoarthritis of left knee M17.12    Chondromalacia of left knee M94.262    Pes planus M21.40    Leg swelling M79.89    Ulcers of both lower legs, [facility] to breakdown [facility] L97.911, L97.921    Peripheral venous insufficiency I87.2    Allergic rhinitis J30.9       Past Medical History:        Diagnosis Date    Cellulitis of lower extremity 2019    Impaired fasting glucose 2013    Obesity     Screening PSA (prostate specific antigen) 2015;17    Nml:0.53(2015);17=nml.     Tobacco use     Tubular adenoma of colon 2017       Past Surgical History:        Procedure Laterality Date    CIRCUMCISION      COLONOSCOPY  2017    Colonoscopy with polypectomy (cold biopsy):Dr. Walker:next in 3yrs=2020    MOUTH SURGERY      UMBILICAL HERNIA REPAIR  2011    VASECTOMY      WISDOM TOOTH EXTRACTION         Social History:    Social History     Tobacco Use    Smoking status: Former Smoker     Packs/day: 2.00     Years: 25.00     Pack years: 50.00     Types: Cigarettes     Last attempt to quit: 2006     Years since quittin.2    Smokeless tobacco: Never Used   Substance Use Topics    Alcohol use: Yes     Comment: ocas                                Counseling given: Not Answered      Vital Signs (Current):   Vitals:    20 1400   Weight: (!) 388 lb (176 kg)   Height: 5' 8\" (1.727 m) Pulmonary:Negative Pulmonary ROS and normal exam  breath sounds clear to auscultation                             Cardiovascular:  Exercise tolerance: good (>4 METS),   (+) hypertension:,         Rhythm: regular  Rate: normal                    Neuro/Psych:   Negative Neuro/Psych ROS              GI/Hepatic/Renal:   (+) liver disease:, morbid obesity          Endo/Other: Negative Endo/Other ROS                    Abdominal:           Vascular: negative vascular ROS. Pre-Operative Diagnosis: Complex tear of medial meniscus of left knee as current injury, initial encounter [S83.232A]; Osteoarthritis of left knee, unspecified osteoarthritis type [M17.12]    64 y.o.   BMI:  Body mass index is 59 kg/m². Vitals:    20 1400   Weight: (!) 388 lb (176 kg)   Height: 5' 8\" (1.727 m)       No Known Allergies    Social History     Tobacco Use    Smoking status: Former Smoker     Packs/day: 2.00     Years: 25.00     Pack years: 50.00     Types: Cigarettes     Last attempt to quit: 2006     Years since quittin.2    Smokeless tobacco: Never Used   Substance Use Topics    Alcohol use: Yes     Comment: ocas     EKG 2020  NSR    LABS:    CBC  Lab Results   Component Value Date/Time    WBC 8.8 2020 04:19 PM    HGB 14.2 2020 04:19 PM    HCT 43.2 2020 04:19 PM     2020 04:19 PM     RENAL  Lab Results   Component Value Date/Time     2020 04:19 PM    K 4.9 2020 04:19 PM     2020 04:19 PM    CO2 26 2020 04:19 PM    BUN 28 (H) 2020 04:19 PM    CREATININE 1.0 2020 04:19 PM    GLUCOSE 102 (H) 2020 04:19 PM     COAGS  Lab Results   Component Value Date/Time    PROTIME 13.1 2011 08:20 AM    INR 1.0 2011 08:20 AM    APTT 27.1 2011 08:20 AM        Anesthesia Plan      general     ASA 3     (I discussed with the patient the risks and benefits of PIV, anesthesia, IV Narcotics, PACU. All questions were answered the patient agrees with the plan and wishes to proceed)  Induction: intravenous.                           Casey Kulkarni MD   8/3/2020

## 2020-08-03 NOTE — BRIEF OP NOTE
Brief Postoperative Note      Patient: Harjinder Luna  YOB: 1960  MRN: [id number]    Date of Procedure: 8/3/2020    Pre-Op Diagnosis: LEFT KNEE MEDIAL MENISCUS TEAR, OSTEOARTHRITIS    Post-Op Diagnosis: LEFT KNEE MEDIAL MENISCUS TEAR, OSTEOARTHRITIS, SYNOVIAL MASS       Procedure(s):  1.   Left knee arthroscopy with partial medial meniscectomy and chondroplasty medial femoral condyle with synovial biopsy ([id number])    Modifier 22 Justification     This was an extremely difficult case for the following reasons:     - The patient is morbidly obese with a BMI of 60.6  -  Additional personnel were require to move the patient to the OR bed  -  Additional personnel were require to position the patient  - Given the aforementioned factors, the overall increase in surgical time was at least 50% longer than if this was an uncomplicated knee arthroscopy      Surgeon(s):  Dagmar Barrientos MD    Assistant:  Surgical Assistant: Herman Petit    Anesthesia: General    Estimated Blood Loss (mL): Minimal    Complications: None    Specimens:   ID Type Source Tests Collected by Time Destination   A : LOOSE BODY- LEFT KNEE Tissue Tissue SURGICAL PATHOLOGY Dagmar Barrientos MD 8/3/2020 1241        Implants:  * No implants in log *      Drains: * No LDAs found *    Antibiotics: 3 g ancef IV prior to incision     Findings: Complex tear medial meniscus body and posterior horn, grade 3 chondral changes diffusely to the medial aspect of medial femoral condyle, grade 3-4 changes to the medial tibial plateau, grade 2 changes to the patella, grade 1 changes to the lateral femoral condyle  , Approximately 2 cm² mass in the superior medial suprapatellar pouch which was partially synovialized    Electronically signed by Dagmar Barrientos MD on 8/3/2020 at 1:11 PM

## 2020-08-03 NOTE — OP NOTE
treatment including injection treatments with corticosteroid, Visco supplementation injections, medial  bracing, NSAID medications, ice, and activity modification. He was having some issues with venous stasis ulcers and so surgery was put off for a week and he has been in the wound center has had good treatment with no sign of active infectious of his venous stasis ulcers. We discussed the possibility of knee arthroscopy to treat his mechanical symptoms, but discussed that he does have significant degenerative changes and treatment of this with knee arthroscopy is somewhat unpredictable. After a long discussion preoperatively discussing the long-term consequences as well as the prognosis they elected to go forward with surgery. After discussing the pros and cons of treatment options and risks and benefits of surgical intervention, He understands that there are no guarantee of results with surgery and there are always risks of infection, stiff joint, injury to nerve or blood vessel, blood clot, anesthesia complications, etc. The procedure and recovery were discussed and all questions answered. Risks and benefits of the procedure were fully explained in detail, including but not limited to infection, neurovascular injury, continued pain, arthritis, stiffness, need for further surgery, re-injury, DVT, PE, general risks of anesthesia and loss of limb or life. Site Marking and Surgical Prep  The patient was seen in the holding area and the left lower extremity marked with a pen. The patient was taken to the operative suite, identified, placed on the operating room table in the supine position with all bony prominences well padded. A tourniquet was applied but not utilized during the procedure. After induction of general anesthesia, the extremity was elevated, prepped with Betadine and draped in the normal, sterile fashion. A timeout was performed per protocol.   3 g IV Ancef was administered prior

## 2020-08-03 NOTE — H&P
I saw and examined the patient independently and agree with the H&P dated 7/16/2020 located in the chart and there are no changes. Mina Florence MD  [address] [facility] partner of [facility] ([facility])

## 2020-08-04 NOTE — ANESTHESIA POSTPROCEDURE EVALUATION
1.0                 07/16/2020 04:19 PM        GLUCOSE                  102 (H)             07/16/2020 04:19 PM   NAUN  Lab Results       Component                Value               Date/Time                  PROTIME                  13.1                05/28/2011 08:20 AM        INR                      1.0                 05/28/2011 08:20 AM        APTT                     27.1                05/28/2011 08:20 AM     Intake & Output:  @52VXWM@    Nausea & Vomiting:  No    Level of Consciousness:  Awake    Pain Assessment:  Adequate analgesia    Anesthesia Complications:  No apparent anesthetic complications    SUMMARY      Vital signs stable  OK to discharge from Stage I post anesthesia care.   Care transferred from Anesthesiology department on discharge from perioperative area

## 2020-08-05 LAB
REPORT: NORMAL
SARS-COV-2: NOT DETECTED
THIS TEST SENT TO: NORMAL

## 2020-08-06 ENCOUNTER — HOSPITAL ENCOUNTER (OUTPATIENT)
Dept: PHYSICAL THERAPY | Age: 60
Setting detail: THERAPIES SERIES
Discharge: HOME | End: 2020-08-06
Payer: COMMERCIAL

## 2020-08-06 ENCOUNTER — HOSPITAL ENCOUNTER (OUTPATIENT)
Dept: WOUND CARE | Age: 60
Discharge: HOME | End: 2020-08-06
Payer: COMMERCIAL

## 2020-08-06 VITALS
BODY MASS INDEX: 47.74 KG/M2 | DIASTOLIC BLOOD PRESSURE: 83 MMHG | RESPIRATION RATE: 24 BRPM | SYSTOLIC BLOOD PRESSURE: 158 MMHG | HEART RATE: 105 BPM | HEIGHT: 68 IN | TEMPERATURE: 98.4 F | WEIGHT: 315 LBS

## 2020-08-06 PROCEDURE — 97110 THERAPEUTIC EXERCISES: CPT

## 2020-08-06 PROCEDURE — 29581 APPL MULTLAYER CMPRN SYS LEG: CPT | Performed by: INTERNAL MEDICINE

## 2020-08-06 PROCEDURE — 29581 APPL MULTLAYER CMPRN SYS LEG: CPT

## 2020-08-06 PROCEDURE — 97161 PT EVAL LOW COMPLEX 20 MIN: CPT

## 2020-08-06 RX ORDER — LIDOCAINE 40 MG/G
CREAM TOPICAL ONCE
Status: DISCONTINUED | OUTPATIENT
Start: 2020-08-06 | End: 2020-08-07 | Stop reason: HOSPADM

## 2020-08-06 RX ORDER — TRAMADOL HYDROCHLORIDE 50 MG/1
50 TABLET ORAL EVERY 6 HOURS PRN
COMMUNITY
End: 2020-08-10

## 2020-08-06 ASSESSMENT — PAIN SCALES - GENERAL: PAINLEVEL_OUTOF10: 0

## 2020-08-06 NOTE — FLOWSHEET NOTE
[facility],  [address] [place]  Phone: [phone]  Fax [phone]    Physical Therapy Treatment Note/ Progress Report:           Date:  2020    Patient Name:  Stone Veloz    :  1960  MRN: [id number]  Restrictions/Precautions:    Medical/Treatment Diagnosis Information:  · Diagnosis: D23.901C (ICD-10-CM) - Complex tear of medial meniscus of left knee as current injury, initial encounter  · Treatment Diagnosis: M25.562 Left knee pain; M25.662 Left knee stiffness; R26.2 Difficulty in walking  Insurance/Certification information:  PT Insurance Information: UMR  Physician Information:  Referring Practitioner: Dr. Irvin Kim  Has the plan of care been signed (Y/N):        []  Yes  [x]  No     Date of Patient follow up with Physician:       Is this a Progress Report:     []  Yes  [x]  No        If Yes:  Date Range for reporting period:  Beginning 8/3/20  Ending     Progress report will be due (10 Rx or 30 days whichever is less): [birth year]      Recertification will be due (POC Duration  / 90 days whichever is less):        Visit # Insurance Allowable Requires auth   1  used previously    [x]no        []yes:     Functional Scale:LEFS 75% disability   Date assessed:  20      Therapy Diagnosis/Practice Pattern:E    Number of Comorbidities:  []0     []1-2    [x]3+    Latex Allergy:  [x]NO      []YES  Preferred Language for Healthcare:   [x]English       []other:      Pain level:  3-7/10     SUBJECTIVE:  See eval    OBJECTIVE: See eval   Observation:    Test measurements:      RESTRICTIONS/PRECAUTIONS: HTN  Procedure(s) 8/3/2020:  1.  Left knee arthroscopy with partial medial meniscectomy and chondroplasty medial femoral condyle with synovial biopsy ([id number])    Exercises/Interventions:     Therapeutic Ex (67738) Sets/sec Notes/CUES HEP   Pt ed: findings of exam and POC; recovery expectations, use compression stocking, ice, elevate to reduce swelling, SS of infection, use B crutches for safety and to decrease pain 6'     Supine heel slide 5\"x10  X   Quad set 3\"x10  X   SLR- min- mod assist 2x5  X   Long sit gastroc s 30\"x2  X   Seated LAQ 10x  X   Seated HR, TR 20x  X                                 Manual Intervention (69889)      Patellar mobs sup, inf 20x     Re-apply bandaids, one steri-strip lateral portal, ACE wrap 3'                             NMR re-education (20181)   CUES NEEDED                                                         Therapeutic Activity (45739)                                                Therapeutic Exercise and NMR EXR  [x] (29232) Provided verbal/tactile cueing for activities related to strengthening, flexibility, endurance, ROM for improvements in LE, proximal hip, and core control with self care, mobility, lifting, ambulation.  [] (33137) Provided verbal/tactile cueing for activities related to improving balance, coordination, kinesthetic sense, posture, motor skill, proprioception  to assist with LE, proximal hip, and core control in self care, mobility, lifting, ambulation and eccentric single leg control.      NMR and Therapeutic Activities:    [] (32497 or 01832) Provided verbal/tactile cueing for activities related to improving balance, coordination, kinesthetic sense, posture, motor skill, proprioception and motor activation to allow for proper function of core, proximal hip and LE with self care and ADLs  [] (34258) Gait Re-education- Provided training and instruction to the patient for proper LE, core and proximal hip recruitment and positioning and eccentric body weight control with ambulation re-education including up and down stairs     Home Exercise Program:    [x] (36538) Reviewed/Progressed HEP activities related to strengthening, flexibility, endurance, ROM of core, proximal hip and LE for functional self-care, mobility, lifting and ambulation/stair navigation 6 weeks  1. Disability index score of 40% or less for the LEFS to assist with reaching prior level of function. []? Progressing: []? Met: []? Not Met: []? Adjusted  2. Patient will demonstrate increased left knee AROM to 0-110  to allow for proper joint functioning for car/truck mobility, stair amb.   []? Progressing: []? Met: []? Not Met: []? Adjusted  3. Patient will demonstrate an increase in Strength to at least 4+/5 for the left knee and hip stabilizers in order to ambulate s AD for community distances. []? Progressing: []? Met: []? Not Met: []? Adjusted  4. Patient will return to walking community distances s AD with mild bilateral knee pain (<3/10). []? Progressing: []? Met: []? Not Met: []? Adjusted  5. Pt will be able to drive x 2 hours without increased knee pain; car mobility without knee pain. []? Progressing: []? Met: []? Not Met: []? Adjusted         Progression Towards Functional goals:  [] Patient is progressing as expected towards functional goals listed. [] Progression is slowed due to complexities listed. [] Progression has been slowed due to co-morbidities. [x] Plan just implemented, too soon to assess goals progression  [] Other:         Overall Progression Towards Functional goals/ Treatment Progress Update:  [] Patient is progressing as expected towards functional goals listed. [] Progression is slowed due to complexities/Impairments listed. [] Progression has been slowed due to co-morbidities.   [x] Plan just implemented, too soon to assess goals progression <30days   [] Goals require adjustment due to lack of progress  [] Patient is not progressing as expected and requires additional follow up with physician  [] Other    Prognosis for POC: [x] Good [] Fair  [] Poor      Patient requires continued skilled intervention: [x] Yes  [] No    Treatment/Activity Tolerance:  [x] Patient able to complete treatment  [] Patient limited by fatigue  [] Patient limited by pain    [] Patient limited by other medical complications  [] Other:     ASSESSMENT: see eval    Return to Play: (if applicable)   []  Stage 1: Intro to Strength   []  Stage 2: Return to Run and Strength   []  Stage 3: Return to Jump and Strength   []  Stage 4: Dynamic Strength and Agility   []  Stage 5: Sport Specific Training     []  Ready to Return to Play, Meets All Above Stages   []  Not Ready for Return to Sports   Comments:                               PLAN: See eval  [] Continue per plan of care [] Alter current plan (see comments above)  [x] Plan of care initiated [] Hold pending MD visit [] Discharge      Electronically signed by:  Roxane Bustillos, PT, DPT    Note: If patient does not return for scheduled/ recommended follow up visits, this note will serve as a discharge from care along with most recent update on progress.

## 2020-08-06 NOTE — PLAN OF CARE
PT prior to surgery for bilateral knee pain, had injections as well. He ultimately had a second MRI that showed a meniscus tear in the left knee and proceeded with an arthroscopy on 8/3. His left knee is feeling a bit better since surgery- it is a new kind of pain. However, he is taking vicodin every 6 hours. He sees the doctor next Thursday. Was told to put bandaids on his steri-strips and keep the ACE wrap on. He is going to a wound clinic for sores on bilateral legs, is going for a second time today. 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      OBJECTIVE:     ROM LEFT RIGHT   HIP Flex     HIP Abd     HIP Ext     HIP IR     HIP ER     Knee ext 3 5   Knee Flex 100 110   Ankle PF     Ankle DF     Ankle In     Ankle Ev     Strength  LEFT RIGHT   HIP Flexors SLR with min assist 5   HIP Abductors     HIP Ext     Hip ER     Knee EXT (quad) At least 3- able to LAQ    Knee Flex (HS)     Ankle DF     Ankle PF     Ankle Inv     Ankle EV          Circumference  Mid apex  7 cm prox             Reflexes/Sensation: not assessed PO   []Dermatomes/Myotomes intact    []Reflexes equal and normal bilaterally   []Other:    Joint mobility:    []Normal    [x]Hypo min hypo sup and inf patellofemoral mobility   []Hyper    Palpation: none    Functional Mobility/Transfers: mod assist supine to sit, indep with all other transfers    Posture: increased thoracic kyphosis, forward shoulders and head, sacral sits    Bandages/Dressings/Incisions:2 portals covered with steri-strips and bandaids. No ss of infection, but dried blood on steri-strips    Gait: (include devices/WB status) decreased knee flexion during gait c B axillary crutches    Orthopedic Special Tests: - Tank                        [x] Patient history, allergies, meds reviewed. Medical chart reviewed. See intake form. Review Of Systems (ROS):  [x]Performed Review of systems (Integumentary, CardioPulmonary, Neurological) by intake and observation. Intake form has been scanned into medical record. Patient has been instructed to contact their primary care physician regarding ROS issues if not already being addressed at this time.       Co-morbidities/Complexities (which will affect course of rehabilitation):  []None           Arthritic conditions   []Rheumatoid arthritis (M05.9)  [x]Osteoarthritis (M19.91)   Cardiovascular conditions   [x]Hypertension (I10)  []Hyperlipidemia (E78.5)  []Angina pectoris (I20)  []Atherosclerosis (I70)   Musculoskeletal conditions   []Disc pathology   []Congenital spine pathologies   []Prior surgical intervention  []Osteoporosis (M81.8)  []Osteopenia (M85.8)   Endocrine conditions   []Hypothyroid (E03.9)  []Hyperthyroid Gastrointestinal conditions   []Constipation (M74.65)   Metabolic conditions   [x]Morbid obesity (E66.01)  []Diabetes type 1(E10.65) or 2 (E11.65)   []Neuropathy (G60.9)     Pulmonary conditions   []Asthma (J45)  []Coughing   []COPD (J44.9)   Psychological Disorders  []Anxiety (F41.9)  []Depression (F32.9)   []Other:   []Other:          Barriers to/and or personal factors that will affect rehab potential:              []Age  []Sex              []Motivation/Lack of Motivation                        [x]Co-Morbidities              []Cognitive Function, education/learning barriers              []Environmental, home barriers []profession/work barriers  [x]past PT/medical experience  []other:  Justification:     Falls Risk Assessment (30 days):   [] Falls Risk assessed and no intervention required. [] Falls Risk assessed and Patient requires intervention due to being higher risk   TUG score (>12s at risk):     [x] Falls education provided, including use bilateral crutches          ASSESSMENT:   Functional Impairments:     [x]Noted lumbar/proximal hip/LE joint hypomobility   [x]Decreased LE functional ROM   [x]Decreased core/proximal hip strength and neuromuscular control   [x]Decreased LE functional strength   [x]Reduced balance/proprioceptive control   []other:      Functional Activity Limitations (from functional questionnaire and intake)   [x]Reduced ability to tolerate prolonged functional positions   [x]Reduced ability or difficulty with changes of positions or transfers between positions   [x]Reduced ability to maintain good posture and demonstrate good body mechanics with sitting, bending, and lifting   []Reduced ability to sleep   [x] Reduced ability or tolerance with driving and/or computer work   [x]Reduced ability to perform lifting, carrying tasks   [x]Reduced ability to squat   [x]Reduced ability to forward bend   [x]Reduced ability to ambulate prolonged functional periods/distances/surfaces   [x]Reduced ability to ascend/descend stairs   []Reduced ability to run, hop, cut or jump   []other:    Participation Restrictions   [x]Reduced participation in self care activities   [x]Reduced participation in home management activities   [x]Reduced participation in work activities   []Reduced participation in social activities. []Reduced participation in sport/recreation activities. Classification :    [x]Signs/symptoms consistent with post-surgical status including decreased ROM, strength and function.    []Signs/symptoms consistent with joint sprain/strain   []Signs/symptoms consistent with patella-femoral [x]  Manual therapy as indicated for LE, Hip and spine to include: Dry Needling/IASTM, STM, PROM, Gr I-IV mobilizations, manipulation. [x] Modalities as needed that may include: thermal agents, E-stim, Biofeedback, US, iontophoresis as indicated  [x] Patient education on joint protection, postural re-education, activity modification, progression of HEP. 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 Independent in HEP and progression per patient tolerance, in order to prevent re-injury. [] Progressing: [] Met: [] Not Met: [] Adjusted  2. Patient will have a decrease in pain to facilitate improvement in movement, function, and ADLs as indicated by Functional Deficits. [] Progressing: [] Met: [] Not Met: [] Adjusted    Long Term Goals: To be achieved in: 6 weeks  1. Disability index score of 40% or less for the LEFS to assist with reaching prior level of function. [] Progressing: [] Met: [] Not Met: [] Adjusted  2. Patient will demonstrate increased left knee AROM to 0-110  to allow for proper joint functioning for car/truck mobility, stair amb. [] Progressing: [] Met: [] Not Met: [] Adjusted  3. Patient will demonstrate an increase in Strength to at least 4+/5 for the left knee and hip stabilizers in order to ambulate s AD for community distances. [] Progressing: [] Met: [] Not Met: [] Adjusted  4. Patient will return to walking community distances s AD with mild bilateral knee pain (<3/10). [] Progressing: [] Met: [] Not Met: [] Adjusted  5. Pt will be able to drive x 2 hours without increased knee pain; car mobility without knee pain.    [] Progressing: [] Met: [] Not Met: [] Adjusted     Electronically signed by:  Saroj Lockwood, PT , DPT

## 2020-08-10 RX ORDER — TRAMADOL HYDROCHLORIDE 50 MG/1
50 TABLET ORAL EVERY 6 HOURS PRN
Qty: 28 TABLET | Refills: 0 | Status: SHIPPED | OUTPATIENT
Start: 2020-08-10 | End: 2020-08-20

## 2020-08-10 NOTE — DISCHARGE INSTR - COC
Continuity of Care Form    Patient Name: Lucy Baker   :  1960  MRN:  [id number]    Admit date:  (Not on file)  Discharge date:  ***    Code Status Order: No Order   Advance Directives:     Admitting Physician:  No admitting provider for patient encounter. PCP: JACKLYN Verdin - CNP    Discharging Nurse: [facility] Unit/Room#: No information available for this encounter.   Discharging Unit Phone Number: ***    Emergency Contact:   Extended Emergency Contact Information  Primary Emergency Contact: Chanell aDn  Address: [address] Phone: [phone]  Work Phone: [phone]  Relation: Spouse    Past Surgical History:  Past Surgical History:   Procedure Laterality Date    CIRCUMCISION      COLONOSCOPY  2017    Colonoscopy with polypectomy (cold biopsy):Dr. Walker:next in 3yrs=2020    KNEE ARTHROSCOPY Left 8/3/2020    VIDEO ARTHROSCOPY LEFT KNEE, PARTIAL MEDIAL MENISCECTOMY, CHONDROPLASTY, SYNOVIAL BIOPSY -TOM=4- performed by Jannie Weir MD at [address]  2011    VASECTOMY      WISDOM TOOTH EXTRACTION         Immunization History:   Immunization History   Administered Date(s) Administered    Influenza Vaccine, unspecified formulation 2016    Influenza Virus Vaccine 10/20/2014, 10/06/2017    Influenza, Quadv, IM, PF (6 mo and older Fluzone, Flulaval, Fluarix, and 3 yrs and older Afluria) 2018    Tdap (Boostrix, Adacel) 2011    Zoster Recombinant (Shingrix) 2020       Active Problems:  Patient Active Problem List   Diagnosis Code    Obesity, morbid ([address] E66.01    Mixed hypertriglyceridemia B06.2    Nonalcoholic fatty liver disease K76.0    RLS (restless legs syndrome) G25.81    Essential hypertension, benign I10    Impaired fasting glucose R73.01    Chronic pain of left knee M25.562, G89.29    Osteoarthritis of left knee M17.12    Chondromalacia of left knee M94.262    Pes planus M21.40    Leg swelling M79.89    Ulcers of both lower legs, limited to breakdown of skin ([address] L97.911, L97.921    Peripheral venous insufficiency I87.2    Allergic rhinitis J30.9       Isolation/Infection:   Isolation          No Isolation        Patient Infection Status     Infection Onset Added Last Indicated Last Indicated By Review Planned Expiration Resolved Resolved By    None active    Resolved    COVID-19 Rule Out 08/03/20 08/03/20 08/03/20 COVID-19 (Ordered)   08/03/20 Rule-Out Test Resulted          Nurse Assessment:  Last Vital Signs: There were no vitals taken for this visit. Last documented pain score (0-10 scale):    Last Weight:   Wt Readings from Last 1 Encounters:   08/06/20 (!) 405 lb (183.7 kg)     Mental Status:  {IP PT MENTAL STATUS:78043}    IV Access:  { TRACY IV ACCESS:224581911}    Nursing Mobility/ADLs:  Walking   {[facility] DME PWOF:663459975}  Transfer  {[facility] DME UTHO:044923749}  Bathing  {P DME XOIE:693103452}  Dressing  {[facility] DME ABHO:920907773}  Toileting  {P DME AWRA:924552995}  Feeding  {[facility] DME RAIV:012219987}  Med Admin  {[facility] DME CKJO:150892739}  Med Delivery   { TRACY MED Delivery:897117701}    Wound Care Documentation and Therapy:  Wound 07/28/20 #1 right lower pretib cluster, venous, partial thickness, last 2 years (Active)   Wound Image   07/28/20 1232   Wound Venous 08/06/20 1458   Dressing Status Clean;Dry; Intact 08/06/20 1603   Dressing Changed Changed/New 08/06/20 1603   Dressing/Treatment Other (comment) 08/06/20 1603   Wound Cleansed Rinsed/Irrigated with saline; Wound cleanser 08/06/20 1458   Wound Length (cm) 6 cm 08/06/20 1458   Wound Width (cm) 3 cm 08/06/20 1458   Wound Depth (cm) 0.1 cm 08/06/20 1458   Wound Surface Area (cm^2) 18 cm^2 08/06/20 1458   Change in Wound Size % (l*w) 66.42 08/06/20 1458   Wound Volume (cm^3) 1.8 cm^3 08/06/20 1458   Wound Healing % 66 08/06/20 1458   Distance Tunneling (cm) 0 cm 07/28/20 1232   Tunneling Position ___ O'Clock 0 07/28/20 1232   Undermining Starts ___ O'Clock 0 07/28/20 1232   Undermining Ends___ O'Clock 00 07/28/20 1232   Undermining Maxium Distance (cm) 0 07/28/20 1232   Wound Assessment Pink 08/06/20 1458   Drainage Amount Small 08/06/20 1458   Drainage Description Serous 08/06/20 1458   Odor None 08/06/20 1458   Edwina-wound Assessment Dry 08/06/20 1458   Number of days: 13       Wound 07/28/20 #2 left posterior lower leg cluster, venous, partial thickness, last 2 years (Active)   Wound Image   07/28/20 1232   Wound Venous 08/06/20 1458   Dressing Status Dry; Intact; Clean 08/06/20 1603   Dressing Changed Changed/New 08/06/20 1603   Dressing/Treatment Other (comment) 08/06/20 1603   Wound Cleansed Rinsed/Irrigated with saline; Wound cleanser 08/06/20 1458   Wound Length (cm) 6 cm 08/06/20 1458   Wound Width (cm) 5 cm 08/06/20 1458   Wound Depth (cm) 0.1 cm 08/06/20 1458   Wound Surface Area (cm^2) 30 cm^2 08/06/20 1458   Change in Wound Size % (l*w) 25 08/06/20 1458   Wound Volume (cm^3) 3 cm^3 08/06/20 1458   Wound Healing % 25 08/06/20 1458   Distance Tunneling (cm) 0 cm 07/28/20 1232   Tunneling Position ___ O'Clock 0 07/28/20 1232   Undermining Starts ___ O'Clock 0 07/28/20 1232   Undermining Ends___ O'Clock 0 07/28/20 1232   Undermining Maxium Distance (cm) 0 07/28/20 1232   Wound Assessment Black;Dry 08/06/20 1458   Drainage Amount Scant 08/06/20 1458   Drainage Description Serous 08/06/20 1458   Odor None 08/06/20 1458   Edwina-wound Assessment Dry 08/06/20 1458   Number of days: 13        Elimination:  Continence:   · Bowel: {YES / UI:69861}  · Bladder: {YES / OM:39695}  Urinary Catheter: {Urinary Catheter:712233950}   Colostomy/Ileostomy/Ileal Conduit: {YES / UF:27154}       Date of Last BM: ***  No intake or output data in the 24 hours ending 08/10/20 1329  No intake/output data recorded.     Safety Concerns:     [facility] Safety Concerns:449224421}    Impairments/Disabilities:      [address] Impairments/Disabilities:527279270}    Nutrition Therapy:  Current Nutrition Therapy:   [address] TRACY Diet List:452540816}    Routes of Feeding: {CHP DME Other Feedings:592316608}  Liquids: {Slp liquid thickness:46463}  Daily Fluid Restriction: {CHP DME Yes amt example:483285752}  Last Modified Barium Swallow with Video (Video Swallowing Test): {Done Not Done DQNZ:080734782}    Treatments at the Time of Hospital Discharge:   Respiratory Treatments: ***  Oxygen Therapy:  {Therapy; copd oxygen:38879}  Ventilator:    { CC Vent GZV}    Rehab Therapies: {THERAPEUTIC INTERVENTION:5275564068}  Weight Bearing Status/Restrictions: [address]  Weight Bearin}  Other Medical Equipment (for information only, NOT a DME order):  {EQUIPMENT:091527164}  Other Treatments: ***    Patient's personal belongings (please select all that are sent with patient):  {P DME Belongings:577544025}    RN SIGNATURE:  {Esignature:188686554}    CASE MANAGEMENT/SOCIAL WORK SECTION    Inpatient Status Date: ***    Readmission Risk Assessment Score:  Readmission Risk              Risk of Unplanned Readmission:        0           Discharging to Facility/ Agency   · Name:   · Address:  · Phone:  · Fax:    Dialysis Facility (if applicable)   · Name:  · Address:  · Dialysis Schedule:  · Phone:  · Fax:    / signature: {Esignature:124700480}    PHYSICIAN SECTION    Prognosis: {Prognosis:6549121221}    Condition at Discharge: [address] Patient Condition:648791286}    Rehab Potential (if transferring to Rehab): {Prognosis:4749576118}    Recommended Labs or Other Treatments After Discharge: ***    Physician Certification: I certify the above information and transfer of Akiko Lopez  is necessary for the continuing treatment of the diagnosis listed and that he requires {Admit to Appropriate Level of Care:67177} for {GREATER/LESS:054110744} 30 days.      Update Admission H&P: {CHP DME Changes in [facility]:780738624}    PHYSICIAN SIGNATURE:  {Esignature:036995835}

## 2020-08-10 NOTE — PROGRESS NOTES
[facility] Progress Note    Edgar Gusman     : 1960    DATE OF VISIT:  2020    Subjective:     Edgar Gusman is a 64 y.o. male who has a venous ulcer located on the bilateral lower leg. Significant symptoms or pertinent wound history since last visit: feeling ok overall. Had his knee surgery earlier this week, started some gentle PT. Left wrap was removed in the OR, with simpler and gentler compression materials applied afterwards. Right leg compression wrap stayed in place for 9 days, started to abrade the skin at the proximal calf a bit, and caused some pruritus. Swelling is down somewhat. No F/C/D. Little drainage. Additional ulcer(s) noted? no. One right pretib, one left calf. His current medication list consists of etodolac, fluticasone, furosemide, hydroCHLOROthiazide, lisinopril, loratadine, meclizine, ondansetron, rOPINIRole, and traMADol. Allergies: Patient has no known allergies. Objective:     Vitals:    20 1451   BP: (!) 158/83   Pulse: 105   Resp: 24   Temp: 98.4 °F (36.9 °C)   TempSrc: Oral   Weight: (!) 405 lb (183.7 kg)   Height: 5' 8\" (1.727 m)     AAOx3, overweight, NAD  Intact distal pulses, feet warm, good cap refill  Mild-mod BL LE edema  No cellulitis, angitis, fluctuance  A bit of allodynia around the ulcers, R>L  Edwina-ulcer skin: indurated, pink, santa, warm and dry. Ulcer(s): right shin more obviously open now, partial thickness, pink-red, pretty clean, a bit of fibrin; left calf still mostly crusted exudate and old blood, a bit of epidermal necrosis. Photos also saved in electronic chart.     Today's Wound Measurements, per RN documentation:  Wound 20 #1 right lower pretib cluster, venous, partial thickness, last 2 years-Wound Length (cm): 6 cm  Wound 20 #2 left posterior lower leg cluster, venous, partial thickness, last 2 years-Wound Length (cm): 6 cm(MD to measure - dry scabbed area )    Wound 20 #1 right

## 2020-08-11 ENCOUNTER — HOSPITAL ENCOUNTER (OUTPATIENT)
Dept: PHYSICAL THERAPY | Age: 60
Setting detail: THERAPIES SERIES
Discharge: HOME | End: 2020-08-11
Payer: COMMERCIAL

## 2020-08-11 PROCEDURE — 97140 MANUAL THERAPY 1/> REGIONS: CPT

## 2020-08-11 PROCEDURE — 97110 THERAPEUTIC EXERCISES: CPT

## 2020-08-11 NOTE — FLOWSHEET NOTE
[facility],  [address]  Phone: [phone]  Fax [phone]    Physical Therapy Treatment Note/ Progress Report:           Date:  2020    Patient Name:  Montez Lo    :  1960  MRN: [id number]  Restrictions/Precautions:    Medical/Treatment Diagnosis Information:  · Diagnosis: Y01.535N (ICD-10-CM) - Complex tear of medial meniscus of left knee as current injury, initial encounter  · Treatment Diagnosis: M25.562 Left knee pain; M25.662 Left knee stiffness; R26.2 Difficulty in walking  Insurance/Certification information:  PT Insurance Information: R  Physician Information:  Referring Practitioner: Dr. Enrike Velasquez  Has the plan of care been signed (Y/N):        []  Yes  [x]  No     Date of Patient follow up with Physician:       Is this a Progress Report:     []  Yes  [x]  No        If Yes:  Date Range for reporting period:  Beginning 8/3/20  Ending     Progress report will be due (10 Rx or 30 days whichever is less): 88      Recertification will be due (POC Duration  / 90 days whichever is less):        Visit # Insurance Allowable Requires auth   2 /  used previously    [x]no        []yes:     Functional Scale:LEFS 75% disability   Date assessed:  20      Therapy Diagnosis/Practice Pattern:E    Number of Comorbidities:  []0     []1-2    [x]3+    Latex Allergy:  [x]NO      []YES  Preferred Language for Healthcare:   [x]English       []other:      Pain level: 1-4/10     SUBJECTIVE:  Pt states that he is doing better overall since surgery, but that he still has a lot of ache in his knee when he sits in the car. Gets sharp pains in his knee sometimes when he is doing the long arc quad.      OBJECTIVE:    Observation:    Test measurements:  Knee flexion 104    RESTRICTIONS/PRECAUTIONS: HTN  Procedure(s) 8/3/2020:  1.  Left knee arthroscopy with partial medial meniscectomy and chondroplasty proper LE, core and proximal hip recruitment and positioning and eccentric body weight control with ambulation re-education including up and down stairs     Home Exercise Program:    [x] (56948) Reviewed/Progressed HEP activities related to strengthening, flexibility, endurance, ROM of core, proximal hip and LE for functional self-care, mobility, lifting and ambulation/stair navigation   [] (91675)Reviewed/Progressed HEP activities related to improving balance, coordination, kinesthetic sense, posture, motor skill, proprioception of core, proximal hip and LE for self care, mobility, lifting, and ambulation/stair navigation      Manual Treatments:  PROM / STM / Oscillations-Mobs:  G-I, II, III, IV (PA's, Inf., Post.)  [x] (89269) Provided manual therapy to mobilize LE, proximal hip and/or LS spine soft tissue/joints for the purpose of modulating pain, promoting relaxation,  increasing ROM, reducing/eliminating soft tissue swelling/inflammation/restriction, improving soft tissue extensibility and allowing for proper ROM for normal function with self care, mobility, lifting and ambulation. Modalities:     [] GAME READY (VASO)- for significant edema, swelling, pain control. CP x10' L knee  Charges:  Timed Code Treatment Minutes: 40   Total Treatment Minutes: 55 (rest breaks, CP)     [facility] time in/time out:   (and requires time in and out for each CPT code)    [] EVAL (LOW) 05141 (typically 20 minutes face-to-face)  [] EVAL (MOD) 96324 (typically 30 minutes face-to-face)  [] EVAL (HIGH) 16223 (typically 45 minutes face-to-face)  [] RE-EVAL     [x] SP(70277) x 2   [] IONTO  [] NMR (55003) x     [] VASO  [x] Manual (29727) x 1     [] Other:  [] TA x      [] Mech Traction (29845)  [] ES(attended) (99912)      [] ES (un) (35078):       GOALS:   Patient stated goal: 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 Progressing: []? Met: []? Not Met: []?  Adjusted     Therapist goals for Patient: Short Term Goals: To be achieved in: 2 weeks  1. Independent in HEP and progression per patient tolerance, in order to prevent re-injury. []? Progressing: []? Met: []? Not Met: []? Adjusted  2. Patient will have a decrease in pain to facilitate improvement in movement, function, and ADLs as indicated by Functional Deficits. []? Progressing: []? Met: []? Not Met: []? Adjusted     Long Term Goals: To be achieved in: 6 weeks  1. Disability index score of 40% or less for the LEFS to assist with reaching prior level of function. []? Progressing: []? Met: []? Not Met: []? Adjusted  2. Patient will demonstrate increased left knee AROM to 0-110  to allow for proper joint functioning for car/truck mobility, stair amb.   []? Progressing: []? Met: []? Not Met: []? Adjusted  3. Patient will demonstrate an increase in Strength to at least 4+/5 for the left knee and hip stabilizers in order to ambulate s AD for community distances. []? Progressing: []? Met: []? Not Met: []? Adjusted  4. Patient will return to walking community distances s AD with mild bilateral knee pain (<3/10). []? Progressing: []? Met: []? Not Met: []? Adjusted  5. Pt will be able to drive x 2 hours without increased knee pain; car mobility without knee pain. []? Progressing: []? Met: []? Not Met: []? Adjusted         Progression Towards Functional goals:  [] Patient is progressing as expected towards functional goals listed. [] Progression is slowed due to complexities listed. [] Progression has been slowed due to co-morbidities. [x] Plan just implemented, too soon to assess goals progression  [] Other:         Overall Progression Towards Functional goals/ Treatment Progress Update:  [] Patient is progressing as expected towards functional goals listed. [] Progression is slowed due to complexities/Impairments listed. [] Progression has been slowed due to co-morbidities.   [x] Plan just implemented, too soon to assess goals progression <30days   [] Goals require adjustment due to lack of progress  [] Patient is not progressing as expected and requires additional follow up with physician  [] Other    Prognosis for POC: [x] Good [] Fair  [] Poor      Patient requires continued skilled intervention: [x] Yes  [] No    Treatment/Activity Tolerance:  [x] Patient able to complete treatment  [] Patient limited by fatigue  [] Patient limited by pain    [] Patient limited by other medical complications  [] Other:     ASSESSMENT:Pt still has difficulty with quad control, may benefit from NMES. Mod assist needed for SLR d/t quad lag and possibly dt body habitus. Left knee sore after using the bike, so iced afterward. He should continue to use bilateral crutches for ambulation. Return to Play: (if applicable)   []  Stage 1: Intro to Strength   []  Stage 2: Return to Run and Strength   []  Stage 3: Return to Jump and Strength   []  Stage 4: Dynamic Strength and Agility   []  Stage 5: Sport Specific Training     []  Ready to Return to Play, Meets All Above Stages   []  Not Ready for Return to Sports   Comments:                               PLAN: NMES NV? [x] Continue per plan of care [] Alter current plan (see comments above)  [] Plan of care initiated [] Hold pending MD visit [] Discharge      Electronically signed by:  Geraldine Russell PT, DPT    Note: If patient does not return for scheduled/ recommended follow up visits, this note will serve as a discharge from care along with most recent update on progress.

## 2020-08-13 ENCOUNTER — HOSPITAL ENCOUNTER (OUTPATIENT)
Dept: WOUND CARE | Age: 60
Discharge: HOME | End: 2020-08-13
Payer: COMMERCIAL

## 2020-08-13 ENCOUNTER — OFFICE VISIT (OUTPATIENT)
Dept: ORTHOPEDIC SURGERY | Age: 60
End: 2020-08-13

## 2020-08-13 ENCOUNTER — APPOINTMENT (OUTPATIENT)
Dept: PHYSICAL THERAPY | Age: 60
End: 2020-08-13
Payer: COMMERCIAL

## 2020-08-13 VITALS
HEIGHT: 68 IN | WEIGHT: 315 LBS | TEMPERATURE: 98.8 F | SYSTOLIC BLOOD PRESSURE: 140 MMHG | DIASTOLIC BLOOD PRESSURE: 68 MMHG | BODY MASS INDEX: 47.74 KG/M2 | RESPIRATION RATE: 22 BRPM | HEART RATE: 99 BPM

## 2020-08-13 VITALS — BODY MASS INDEX: 47.74 KG/M2 | WEIGHT: 315 LBS | HEIGHT: 68 IN

## 2020-08-13 PROCEDURE — 29581 APPL MULTLAYER CMPRN SYS LEG: CPT | Performed by: INTERNAL MEDICINE

## 2020-08-13 PROCEDURE — 29581 APPL MULTLAYER CMPRN SYS LEG: CPT

## 2020-08-13 PROCEDURE — 99024 POSTOP FOLLOW-UP VISIT: CPT | Performed by: ORTHOPAEDIC SURGERY

## 2020-08-13 RX ORDER — LIDOCAINE 40 MG/G
CREAM TOPICAL ONCE
Status: DISCONTINUED | OUTPATIENT
Start: 2020-08-13 | End: 2020-08-14 | Stop reason: HOSPADM

## 2020-08-13 ASSESSMENT — PAIN SCALES - GENERAL: PAINLEVEL_OUTOF10: 0

## 2020-08-13 NOTE — PROGRESS NOTES
Chief Complaint  Post-Op Check (LEFT KNEE   SX 08/03/2020)      History of Present Illness:  Sharon Vargas is a 64 y.o. y/o male who presents post op 10 days status post left knee arthroscopy with partial medial meniscectomy, chondroplasty, removal loose body/synovial biopsy. Overall he is doing okay. He is using crutches. Has some swelling and soreness. He is taking tramadol for pain. He is doing physical therapy. He denies any fevers, chills, night sweats, nausea, vomiting. He denies excessive numbness, tingling, calf pain, chest pain, shortness of breath. He denies erythema, warmth, excessive drainage from the surgical site. Vital Signs  There were no vitals filed for this visit. General Exam:   Constitutional: Patient is adequately groomed with no evidence of malnutrition  Mental Status: The patient is oriented to time, place and person. The patient's mood and affect are appropriate. Lymphatic: The lymphatic examination bilaterally reveals all areas to be without enlargement or induration. Neurological: The patient has good coordination. There is no weakness or sensory deficit. Gait: Crutches    Left knee examination  Inspection:  Incisions clean, dry, and in tact without erythema, warmth, purulent drainage, or other signs of infection. 1+ effusion    Palpation:  Mild tenderness near the operative site    Range of Motion: 0-100    Sensation: In tact to light touch all nerve distributions     Strength:  Normal motor exam limited secondary to pain    Special Tests:  None performed    Skin: There are no additional worrisome rashes, ulcerations or lesions. Circulation normal    Additional Examinations:  Right Lower Extremity: Examination of the right lower extremity does not show any tenderness, deformity or injury. Range of motion is unremarkable. There is no gross instability. There are no rashes, ulcerations or lesions.   Strength and tone are normal.      Radiology:     X-rays obtained and reviewed in office:  No new imaging      Assessment:  82-year-old male 10 days status post left knee arthroscopy with partial medial meniscectomy and chondroplasty and removal loose body/synovial biopsy    Office Procedures:  No orders of the defined types were placed in this encounter. Plan:   -We discussed that his biopsy did demonstrate a presumed loose body which was synovialized  -Continue with ice, daily modification, over-the-counter medications, physical therapy per protocol, crutches as needed until walking without a significant limp  -We will see him back in 4 weeks for repeat evaluation and if there are issues in interim he will contact the office    Mina Florence MD  [id number] partner of [facility])        Voice Recognition Dictation disclaimer: Please note that portions of this chart were generated using Dragon dictation software. Although every effort was made to ensure the accuracy of this automated transcription, some errors in transcription may have occurred.

## 2020-08-13 NOTE — PLAN OF CARE
Patient here for weekly f/u in wound care center. No debridement today, continue treatment as prescribed last week. Dr. Cristy Hassan discusses various options for Long Term Compression with patient, Instructed him to continue with frequent elevation of BLE t/o the day.   F/u x 1 week, MD orders/D/C instructions reviewed with patient, all questions answered; copy of instructions given to patient

## 2020-08-14 ENCOUNTER — HOSPITAL ENCOUNTER (OUTPATIENT)
Dept: PHYSICAL THERAPY | Age: 60
Setting detail: THERAPIES SERIES
Discharge: HOME | End: 2020-08-14
Payer: COMMERCIAL

## 2020-08-14 PROCEDURE — 97140 MANUAL THERAPY 1/> REGIONS: CPT

## 2020-08-14 PROCEDURE — 97112 NEUROMUSCULAR REEDUCATION: CPT

## 2020-08-14 PROCEDURE — 97110 THERAPEUTIC EXERCISES: CPT

## 2020-08-14 PROCEDURE — 97016 VASOPNEUMATIC DEVICE THERAPY: CPT

## 2020-08-14 NOTE — FLOWSHEET NOTE
108    RESTRICTIONS/PRECAUTIONS: HTN  Procedure(s) 8/3/2020:  1.  Left knee arthroscopy with partial medial meniscectomy and chondroplasty medial femoral condyle with synovial biopsy (97278-23)    Exercises/Interventions:     Therapeutic Ex (21808) Sets/sec Notes/CUES HEP   Pt ed: findings of exam and POC; recovery expectations, use compression stocking, ice, elevate to reduce swelling, SS of infection, contin to use B crutches for safety and to decrease pain 6'     Supine heel slide c strap on SB 5\"x5 PT assist X   Quad set  2nd set with NMES 10\"x10  \" \" Towel behind knee X   SLR-mod assist 2x5 Pt is co-jennie HS, making it difficult to lift (can do with NMES NV) X   SAQ 3\"x10 I today    Long sit gastroc s  Standing calf stretch wedge 30\"x2  X   Seated LAQ with NMES 5\"/5\" 3 min total  X   Seated HR, TR   X   Standing HR 2x10 At [facility]    Mini squat  At [facility] Too sore today               Bike (low)  Seat 17 Pt too sore today   Manual Intervention (18149) 10' total     Patellar mobs sup, inf 20x     Re-apply bandaids, 1'     Edema massage, STM to quad, HS 6'                       NMR re-education (57371)   CUES NEEDED   Gait training B crutches  Verbal cueing/review of placement Stand up straight, placing crutches out too far         NMES L quad x10' total (with quad iso, LAQ) See above  [place] 5\"/5\" x10' total                                       Therapeutic Activity (08881)                                                Therapeutic Exercise and NMR EXR  [x] (61667) Provided verbal/tactile cueing for activities related to strengthening, flexibility, endurance, ROM for improvements in LE, proximal hip, and core control with self care, mobility, lifting, ambulation.  [] (28343) Provided verbal/tactile cueing for activities related to improving balance, coordination, kinesthetic sense, posture, motor skill, proprioception  to assist with LE, proximal hip, and core control in self care, mobility, lifting, ambulation and eccentric single leg control. NMR and Therapeutic Activities:    [x] (44121 or 20104) Provided verbal/tactile cueing for activities related to improving balance, coordination, kinesthetic sense, posture, motor skill, proprioception and motor activation to allow for proper function of core, proximal hip and LE with self care and ADLs  [x] (76442) Gait Re-education- Provided training and instruction to the patient for proper LE, core and proximal hip recruitment and positioning and eccentric body weight control with ambulation re-education including up and down stairs     Home Exercise Program:    [x] (07160) Reviewed/Progressed HEP activities related to strengthening, flexibility, endurance, ROM of core, proximal hip and LE for functional self-care, mobility, lifting and ambulation/stair navigation   [] (73747)Reviewed/Progressed HEP activities related to improving balance, coordination, kinesthetic sense, posture, motor skill, proprioception of core, proximal hip and LE for self care, mobility, lifting, and ambulation/stair navigation      Manual Treatments:  PROM / STM / Oscillations-Mobs:  G-I, II, III, IV (PA's, Inf., Post.)  [x] (76099) Provided manual therapy to mobilize LE, proximal hip and/or LS spine soft tissue/joints for the purpose of modulating pain, promoting relaxation,  increasing ROM, reducing/eliminating soft tissue swelling/inflammation/restriction, improving soft tissue extensibility and allowing for proper ROM for normal function with self care, mobility, lifting and ambulation.      Modalities:     [x] GAME READY (VASO)- for significant edema, swelling, pain control. (low) in long-sitting    Charges:  Timed Code Treatment Minutes: 45   Total Treatment Minutes: 65 (rest breaks, vaso)     [facility] time in/time out:   (and requires time in and out for each CPT code)    [] EVAL (LOW) 24994 (typically 20 minutes face-to-face)  [] EVAL (MOD) 98863 (typically 30 minutes face-to-face)  [] EVAL (HIGH) 25742 (typically 45 minutes face-to-face)  [] RE-EVAL     [x] AE(75208) x 1   [] IONTO  [x] NMR (59475) x  1   [x] VASO  [x] Manual (96347) x 1     [] Other:  [] TA x      [] Mech Traction (38050)  [] ES(attended) (74570)      [] ES (un) (18260):       GOALS:   Patient stated goal: 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 Progressing: []? Met: []? Not Met: []? Adjusted     Therapist goals for Patient:   Short Term Goals: To be achieved in: 2 weeks  1. Independent in HEP and progression per patient tolerance, in order to prevent re-injury. []? Progressing: []? Met: []? Not Met: []? Adjusted  2. Patient will have a decrease in pain to facilitate improvement in movement, function, and ADLs as indicated by Functional Deficits. []? Progressing: []? Met: []? Not Met: []? Adjusted     Long Term Goals: To be achieved in: 6 weeks  1. Disability index score of 40% or less for the LEFS to assist with reaching prior level of function. []? Progressing: []? Met: []? Not Met: []? Adjusted  2. Patient will demonstrate increased left knee AROM to 0-110  to allow for proper joint functioning for car/truck mobility, stair amb.   []? Progressing: []? Met: []? Not Met: []? Adjusted  3. Patient will demonstrate an increase in Strength to at least 4+/5 for the left knee and hip stabilizers in order to ambulate s AD for community distances. []? Progressing: []? Met: []? Not Met: []? Adjusted  4. Patient will return to walking community distances s AD with mild bilateral knee pain (<3/10). []? Progressing: []? Met: []? Not Met: []? Adjusted  5. Pt will be able to drive x 2 hours without increased knee pain; car mobility without knee pain. []? Progressing: []? Met: []? Not Met: []? Adjusted         Progression Towards Functional goals:  [] Patient is progressing as expected towards functional goals listed. [] Progression is slowed due to complexities listed.   [] Progression has been slowed due to co-morbidities. [x] Plan just implemented, too soon to assess goals progression  [] Other:         Overall Progression Towards Functional goals/ Treatment Progress Update:  [] Patient is progressing as expected towards functional goals listed. [] Progression is slowed due to complexities/Impairments listed. [] Progression has been slowed due to co-morbidities. [x] Plan just implemented, too soon to assess goals progression <30days   [] Goals require adjustment due to lack of progress  [] Patient is not progressing as expected and requires additional follow up with physician  [] Other    Prognosis for POC: [x] Good [] Fair  [] Poor      Patient requires continued skilled intervention: [x] Yes  [] No    Treatment/Activity Tolerance:  [x] Patient able to complete treatment  [] Patient limited by fatigue  [] Patient limited by pain    [] Patient limited by other medical complications  [] Other:     ASSESSMENT:Pt had inc'd pain and discomfort with [address] this visit, therefore focused on quad activ with use of NMES mainly. He struggles with quad iso, SLR, SAQ d/t HS co-contraction, but this improved with NMES. Still recommend pt use B crutches for amb as knee is buckling in stance. Return to Play: (if applicable)   []  Stage 1: Intro to Strength   []  Stage 2: Return to Run and Strength   []  Stage 3: Return to Jump and Strength   []  Stage 4: Dynamic Strength and Agility   []  Stage 5: Sport Specific Training     []  Ready to Return to Play, Meets All Above Stages   []  Not Ready for Return to Sports   Comments:                               PLAN: Contin NMES, work on progressive [address] as dayanna.   [x] Continue per plan of care [] Alter current plan (see comments above)  [] Plan of care initiated [] Hold pending MD visit [] Discharge      Electronically signed by:  Juan Pablo Conklin, PT, DPT    Note: If patient does not return for scheduled/ recommended follow up visits, this note will serve as a discharge from care along with most recent update on progress.

## 2020-08-17 NOTE — PROGRESS NOTES
[facility] Progress Note    Martha Lomeli     : 1960    DATE OF VISIT:  2020    Subjective:     Martha Lomeli is a 64 y.o. 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 Significant symptoms or pertinent wound history since last visit: feeling ok overall, mild pruritus, less swelling, no drainage from left side this week, knee doing ok, no fever. Additional ulcer(s) noted? no.      His current medication list consists of etodolac, fluticasone, furosemide, hydroCHLOROthiazide, lisinopril, loratadine, meclizine, rOPINIRole, and traMADol. Allergies: Patient has no known allergies. Objective:     Vitals:    20 1540   BP: (!) 140/68   Pulse: 99   Resp: 22   Temp: 98.8 °F (37.1 °C)   TempSrc: Oral   Weight: (!) 397 lb 12.8 oz (180.4 kg)   Height: 5' 8\" (1.727 m)     AAOx3, overweight, NAD  Intact distal pulses, feet warm, good cap refill  Mild-mod BL LE edema, no cellulitis, angitis, fluctuance  Some allodynia persists at the right shin  No acute stasis or contact dermatitis  Edwina-ulcer skin: indurated, pink, warm, dry and hemosiderin changes. Ulcer(s): left calf delicately healed with some crusted debris and hyperkeratosis lysed away; right shin still open, red, granular, clean, no signs of infection. Photos also saved in electronic chart.     Today's Wound Measurements, per RN documentation:  Wound 20 #1 right lower pretib cluster, venous, partial thickness, last 2 years-Wound Length (cm): 5 cm  Wound 20 #2 left posterior lower leg cluster, venous, partial thickness, last 2 years-Wound Length (cm): 0 cm    Wound 20 #1 right lower pretib cluster, venous, partial thickness, last 2 years-Wound Width (cm): 3 cm  Wound 20 #2 left posterior lower leg cluster, venous, partial thickness, last 2 years-Wound Width (cm): 0 cm    Wound 20 #1 right lower pretib cluster, venous, partial thickness, last 2 years-Wound 07/28/20 #1 right lower pretib cluster, venous, partial thickness, last 2 years-Dressing/Treatment: Other (comment)(vashe,procellera,4x4,ABD,Coflex calamine TLC)  Wound 07/28/20 #2 left posterior lower leg cluster, venous, partial thickness, last 2 years-Dressing/Treatment: Other (comment)(ABD, co flex calamine). Leave primary dressing and multi-layer wrap(s) in place until the next appointment. He should call before his next visit if there is any significant pain, significant strike-through of drainage to the surface of the wrap, or any significant sense of the wrap sliding down more than an inch or so, bunching-up and abrading his skin. I reminded the patient of the importance of weight management and smoking cessation, if applicable; also encouraged ambulation as tolerated, additional lower extremity exercises as instructed in our education sheet, leg elevation when at rest, and compliance with any recommended dietary, diuretic and compression therapies. Discussed various options for ongoing daily compression therapy today, and he thinks the CompreFlex garments might be best for him (not too cumbersome, and with the long transition liner, as opposed to a small compression anklet); will order a pair from a local DME company, and check on insurance coverage. Hope to have him into a daily garment at least on the left side by next week, perhaps on the right side in 2 weeks. Written discharge instructions given to patient. Follow up in 1 week.     Electronically signed by Josue More MD on 8/17/2020 at 6:09 PM.

## 2020-08-18 ENCOUNTER — HOSPITAL ENCOUNTER (OUTPATIENT)
Dept: PHYSICAL THERAPY | Age: 60
Setting detail: THERAPIES SERIES
Discharge: HOME | End: 2020-08-18
Payer: COMMERCIAL

## 2020-08-18 PROCEDURE — 97110 THERAPEUTIC EXERCISES: CPT

## 2020-08-18 PROCEDURE — 97112 NEUROMUSCULAR REEDUCATION: CPT

## 2020-08-18 PROCEDURE — 97016 VASOPNEUMATIC DEVICE THERAPY: CPT

## 2020-08-18 PROCEDURE — 97140 MANUAL THERAPY 1/> REGIONS: CPT

## 2020-08-18 NOTE — FLOWSHEET NOTE
[facility], [address]  Phone: [phone]  Fax [phone]    Physical Therapy Treatment Note/ Progress Report:           Date:  2020    Patient Name:  Joaquin Vera    :  1960  MRN: [id number]  Restrictions/Precautions:    Medical/Treatment Diagnosis Information:  · Diagnosis: G58.998O (ICD-10-CM) - Complex tear of medial meniscus of left knee as current injury, initial encounter  · Treatment Diagnosis: M25.562 Left knee pain; M25.662 Left knee stiffness; R26.2 Difficulty in walking  Insurance/Certification information:  PT Insurance Information: R  Physician Information:  Referring Practitioner: Dr. Ash Yu  Has the plan of care been signed (Y/N):        []  Yes  [x]  No     Date of Patient follow up with Physician:       Is this a Progress Report:     []  Yes  [x]  No        If Yes:  Date Range for reporting period:  Beginning 8/3/20  Ending     Progress report will be due (10 Rx or 30 days whichever is less): 51      Recertification will be due (POC Duration  / 90 days whichever is less):        Visit # Insurance Allowable Requires auth   4 60/ 5 used previously    [x]no        []yes:     Functional Scale:LEFS 75% disability   Date assessed:  20      Therapy Diagnosis/Practice Pattern:E    Number of Comorbidities:  []0     []1-2    [x]3+    Latex Allergy:  [x]NO      []YES  Preferred Language for Healthcare:   [x]English       []other:      Pain level: 4/10     SUBJECTIVE:  Pt has had inc'd pain since coming to therapy last Tuesday. Then he had to walk on Thursday a lot and that really did him in.      OBJECTIVE:    Observation: B LEs wrapped for vascular wounds/drainage + light compression hose B   Test measurements:     RESTRICTIONS/PRECAUTIONS: HTN  Procedure(s) 8/3/2020:  1.  Left knee arthroscopy with partial medial meniscectomy and chondroplasty medial femoral condyle with synovial biopsy ([id number])    Exercises/Interventions:     Therapeutic Ex (68018) Sets/sec Notes/CUES HEP   Pt ed: findings of exam and POC; recovery expectations, use compression stocking, ice, elevate to reduce swelling, SS of infection, contin to use B crutches for safety and to decrease pain 6'     Supine heel slide c strap on SB  Seated heel slide with glider 5\"x6  PT assist X   Quad set with NMES 10\"x10 x 3'   Towel behind knee X   SLR-mod assist 2' NMES 10\"/10\" X   SAQ  I today    Long sit gastroc s  Standing calf stretch wedge 30\"x2  30\"x2  X   Seated LAQ with NMES 10\"/10\" 3 min total PT medial patellar glide X   Seated HR, TR   X   Standing HR, TR 2x10 At [facility]    Mini squat  At [facility] Too sore today               Bike (low)  Seat 17 Pt too sore today   Manual Intervention (75480) 10' total     Patellar mobs sup, inf 20x     Re-apply bandaids,      Edema massage, STM to quad, HS 6'                       NMR re-education (52541)   CUES NEEDED   Gait training B crutches  Verbal cueing/review of placement Stand up straight, placing crutches out too far         NMES L quad x8' total (with quad iso, LAQ, SLR)   [place] 10\"/10\" x8' total   Weight shifting A/P with one crutch 20x R,L  Intermittent CL UE support when L foot back- not ready to wean to one crutch                                 Therapeutic Activity (39479)                                                Therapeutic Exercise and NMR EXR  [x] (72363) Provided verbal/tactile cueing for activities related to strengthening, flexibility, endurance, ROM for improvements in LE, proximal hip, and core control with self care, mobility, lifting, ambulation.  [] (56742) Provided verbal/tactile cueing for activities related to improving balance, coordination, kinesthetic sense, posture, motor skill, proprioception  to assist with LE, proximal hip, and core control in self care, mobility, lifting, ambulation and eccentric single leg control.      NMR and Therapeutic Activities:    [x] (05948 or 85673) Provided verbal/tactile cueing for activities related to improving balance, coordination, kinesthetic sense, posture, motor skill, proprioception and motor activation to allow for proper function of core, proximal hip and LE with self care and ADLs  [x] (62037) Gait Re-education- Provided training and instruction to the patient for proper LE, core and proximal hip recruitment and positioning and eccentric body weight control with ambulation re-education including up and down stairs     Home Exercise Program:    [x] (81672) Reviewed/Progressed HEP activities related to strengthening, flexibility, endurance, ROM of core, proximal hip and LE for functional self-care, mobility, lifting and ambulation/stair navigation   [] (26995)Reviewed/Progressed HEP activities related to improving balance, coordination, kinesthetic sense, posture, motor skill, proprioception of core, proximal hip and LE for self care, mobility, lifting, and ambulation/stair navigation      Manual Treatments:  PROM / STM / Oscillations-Mobs:  G-I, II, III, IV (PA's, Inf., Post.)  [x] (58078) Provided manual therapy to mobilize LE, proximal hip and/or LS spine soft tissue/joints for the purpose of modulating pain, promoting relaxation,  increasing ROM, reducing/eliminating soft tissue swelling/inflammation/restriction, improving soft tissue extensibility and allowing for proper ROM for normal function with self care, mobility, lifting and ambulation.      Modalities:     [x] GAME READY (VASO)- for significant edema, swelling, pain control. (low) in long-sitting    Charges:  Timed Code Treatment Minutes: 45   Total Treatment Minutes: 65 (rest breaks, vaso)     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 Progressing: []? Met: []? Not Met: []? Adjusted     Therapist goals for Patient:   Short Term Goals: To be achieved in: 2 weeks  1. Independent in HEP and progression per patient tolerance, in order to prevent re-injury. []? Progressing: []? Met: []? Not Met: []? Adjusted  2. Patient will have a decrease in pain to facilitate improvement in movement, function, and ADLs as indicated by Functional Deficits. []? Progressing: []? Met: []? Not Met: []? Adjusted     Long Term Goals: To be achieved in: 6 weeks  1. Disability index score of 40% or less for the LEFS to assist with reaching prior level of function. []? Progressing: []? Met: []? Not Met: []? Adjusted  2. Patient will demonstrate increased left knee AROM to 0-110  to allow for proper joint functioning for car/truck mobility, stair amb.   []? Progressing: []? Met: []? Not Met: []? Adjusted  3. Patient will demonstrate an increase in Strength to at least 4+/5 for the left knee and hip stabilizers in order to ambulate s AD for community distances. []? Progressing: []? Met: []? Not Met: []? Adjusted  4. Patient will return to walking community distances s AD with mild bilateral knee pain (<3/10). []? Progressing: []? Met: []? Not Met: []? Adjusted  5. Pt will be able to drive x 2 hours without increased knee pain; car mobility without knee pain. []? Progressing: []? Met: []? Not Met: []? Adjusted         Progression Towards Functional goals:  [] Patient is progressing as expected towards functional goals listed. [] Progression is slowed due to complexities listed. [] Progression has been slowed due to co-morbidities.   [x] Plan just implemented, too soon to

## 2020-08-20 ENCOUNTER — HOSPITAL ENCOUNTER (OUTPATIENT)
Dept: WOUND CARE | Age: 60
Discharge: HOME | End: 2020-08-20
Payer: COMMERCIAL

## 2020-08-20 ENCOUNTER — HOSPITAL ENCOUNTER (OUTPATIENT)
Dept: PHYSICAL THERAPY | Age: 60
Setting detail: THERAPIES SERIES
Discharge: HOME | End: 2020-08-20
Payer: COMMERCIAL

## 2020-08-20 VITALS
WEIGHT: 315 LBS | SYSTOLIC BLOOD PRESSURE: 155 MMHG | HEART RATE: 96 BPM | RESPIRATION RATE: 22 BRPM | HEIGHT: 68 IN | BODY MASS INDEX: 47.74 KG/M2 | DIASTOLIC BLOOD PRESSURE: 84 MMHG | TEMPERATURE: 98.4 F

## 2020-08-20 PROCEDURE — 97112 NEUROMUSCULAR REEDUCATION: CPT

## 2020-08-20 PROCEDURE — 29581 APPL MULTLAYER CMPRN SYS LEG: CPT | Performed by: INTERNAL MEDICINE

## 2020-08-20 PROCEDURE — 29581 APPL MULTLAYER CMPRN SYS LEG: CPT

## 2020-08-20 PROCEDURE — 97110 THERAPEUTIC EXERCISES: CPT

## 2020-08-20 PROCEDURE — 97140 MANUAL THERAPY 1/> REGIONS: CPT

## 2020-08-20 PROCEDURE — 97016 VASOPNEUMATIC DEVICE THERAPY: CPT

## 2020-08-20 RX ORDER — LIDOCAINE 40 MG/G
CREAM TOPICAL PRN
Status: DISCONTINUED | OUTPATIENT
Start: 2020-08-20 | End: 2020-08-21 | Stop reason: HOSPADM

## 2020-08-20 ASSESSMENT — PAIN SCALES - GENERAL: PAINLEVEL_OUTOF10: 0

## 2020-08-20 NOTE — FLOWSHEET NOTE
[facility], [address] [place]  Phone: [phone]  Fax [phone]    Physical Therapy Treatment Note/ Progress Report:           Date:  2020    Patient Name:  Cynthia Landa    :  1960  MRN: [id number]  Restrictions/Precautions:    Medical/Treatment Diagnosis Information:  · Diagnosis: R42.578P (ICD-10-CM) - Complex tear of medial meniscus of left knee as current injury, initial encounter  · Treatment Diagnosis: M25.562 Left knee pain; M25.662 Left knee stiffness; R26.2 Difficulty in walking  Insurance/Certification information:  PT Insurance Information: R  Physician Information:  Referring Practitioner: Dr. Benitez Allen  Has the plan of care been signed (Y/N):        []  Yes  [x]  No     Date of Patient follow up with Physician:       Is this a Progress Report:     []  Yes  [x]  No        If Yes:  Date Range for reporting period:  Beginning 8/3/20  Ending     Progress report will be due (10 Rx or 30 days whichever is less):       Recertification will be due (POC Duration  / 90 days whichever is less):        Visit # Insurance Allowable Requires auth   5 60/ 5 used previously    [x]no        []yes:     Functional Scale:LEFS 75% disability   Date assessed:  20      Therapy Diagnosis/Practice Pattern:E    Number of Comorbidities:  []0     []1-2    [x]3+    Latex Allergy:  [x]NO      []YES  Preferred Language for Healthcare:   [x]English       []other:      Pain level: 1-3/10     SUBJECTIVE:  Pt reports knee cap pain walking out of therapy LV. Tylenol has been helpful to keep pain levels lower. Not using Tramadol. Notices he can get in and out of bed easier.      OBJECTIVE:    Observation: B LEs wrapped for vascular wounds/drainage + light compression hose B   Test measurements:     RESTRICTIONS/PRECAUTIONS: HTN  Procedure(s) 8/3/2020:  1.  Left knee arthroscopy with partial medial meniscectomy and chondroplasty medial femoral condyle with synovial biopsy ([id number])    Exercises/Interventions:     Therapeutic Ex (14296) Sets/sec Notes/CUES HEP   Pt ed: findings of exam and POC; recovery expectations, use compression stocking, ice, elevate to reduce swelling, SS of infection, contin to use B crutches for safety and to decrease pain 6'     Supine heel slide c strap on SB  Seated heel slide with glider Resume NV  PT assist X   Quad set with NMES 10\"x10 x 3'   Towel behind knee X   SLR-mod assist 4' NMES 10\"/10\" X   SAQ 5'     Long sit gastroc s  Standing calf stretch wedge   X   Seated LAQ  3\"x10  X   Seated HR, TR   X   Standing HR, TR  At [facility]    Mini squat 10x At [facility] Too sore today   Side step 1/2 wall (short) 2 laps UE support          Bike (low)  Seat 17 Pt too sore today   Manual Intervention (97723) 10' total     Patellar mobs sup, inf 20x     Re-apply bandaids,      Edema massage, STM to quad, HS 6'                       NMR re-education (45055)   CUES NEEDED   Gait training B crutches  Verbal cueing/review of placement Stand up straight, placing crutches out too far         NMES L quad x12' total (with quad iso, SAQ, SLR)   [place] 10\"/10\" x12' total   Weight shifting A/P with one crutch   Intermittent CL UE support when L foot back- not ready to wean to one crutch   Split stance balance at 1/2 wall 2x30\" R,L  Intermittent UE support                           Therapeutic Activity (42801)                                                Therapeutic Exercise and NMR EXR  [x] (85297) Provided verbal/tactile cueing for activities related to strengthening, flexibility, endurance, ROM for improvements in LE, proximal hip, and core control with self care, mobility, lifting, ambulation.  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 Progressing: []? Met: []? Not Met: []? Adjusted     Therapist goals for Patient:   Short Term Goals: To be achieved in: 2 weeks  1. Independent in HEP and progression per patient tolerance, in order to prevent re-injury. []? Progressing: []? Met: []? Not Met: []? Adjusted  2. Patient will have a decrease in pain to facilitate improvement in movement, function, and ADLs as indicated by Functional Deficits. []? Progressing: []? Met: []? Not Met: []? Adjusted     Long Term Goals: To be achieved in: 6 weeks  1. Disability index score of 40% or less for the LEFS to assist with reaching prior level of function. []? Progressing: []? Met: []? Not Met: []? Adjusted  2. Patient will demonstrate increased left knee AROM to 0-110  to allow for proper joint functioning for car/truck mobility, stair amb.   []? Progressing: []? Met: []? Not Met: []? Adjusted  3. Patient will demonstrate an increase in Strength to at least 4+/5 for the left knee and hip stabilizers in order to ambulate s AD for community distances. []? Progressing: []? Met: []? Not Met: []? Adjusted  4. Patient will return to walking community distances s AD with mild bilateral knee pain (<3/10). []? Progressing: []? Met: []? Not Met: []? Adjusted  5. Pt will be able to drive x 2 hours without increased knee pain; car mobility without knee pain. []? Progressing: []? Met: []? Not Met: []? Adjusted         Progression Towards Functional goals:  [] Patient is progressing as expected towards functional goals listed. [] Progression is slowed due to complexities listed.   []

## 2020-08-20 NOTE — PLAN OF CARE
No wound debridement today of RLE venous wound, LLE venous wound is healed, continue compression wraps Coflex Calamine TLC to BLE, Procellera to wound of RLE wound. His insurance is changing, if no insurance next week, he will remove compression dressings and place his velcro compression garment. F/u pending.   MD orders/D/C instructions reviewed with patient, all questions answered; copy of instructions given to patient

## 2020-08-24 ENCOUNTER — HOSPITAL ENCOUNTER (OUTPATIENT)
Dept: PHYSICAL THERAPY | Age: 60
Setting detail: THERAPIES SERIES
Discharge: HOME | End: 2020-08-24
Payer: COMMERCIAL

## 2020-08-24 PROCEDURE — 97110 THERAPEUTIC EXERCISES: CPT

## 2020-08-24 PROCEDURE — 97140 MANUAL THERAPY 1/> REGIONS: CPT

## 2020-08-24 PROCEDURE — 97016 VASOPNEUMATIC DEVICE THERAPY: CPT

## 2020-08-24 NOTE — FLOWSHEET NOTE
[facility], [address]  Phone: [phone]  Fax [phone]    Physical Therapy Treatment Note/ Progress Report:           Date:  2020    Patient Name:  Anai Chang    :  1960  MRN: [id number]  Restrictions/Precautions:    Medical/Treatment Diagnosis Information:  · Diagnosis: E21.122C (ICD-10-CM) - Complex tear of medial meniscus of left knee as current injury, initial encounter  · Treatment Diagnosis: M25.562 Left knee pain; M25.662 Left knee stiffness; R26.2 Difficulty in walking  Insurance/Certification information:  PT Insurance Information: U[employer]  Physician Information:  Referring Practitioner: Dr. Fátima Simon  Has the plan of care been signed (Y/N):        []  Yes  [x]  No     Date of Patient follow up with Physician:       Is this a Progress Report:     []  Yes  [x]  No        If Yes:  Date Range for reporting period:  Beginning 8/3/20  Ending     Progress report will be due (10 Rx or 30 days whichever is less): 58      Recertification will be due (POC Duration  / 90 days whichever is less):        Visit # Insurance Allowable Requires auth   6 60/ 5 used previously    [x]no        []yes:     Functional Scale:LEFS 75% disability   Date assessed:  20      Therapy Diagnosis/Practice Pattern:E    Number of Comorbidities:  []0     []1-2    [x]3+    Latex Allergy:  [x]NO      []YES  Preferred Language for Healthcare:   [x]English       []other:      Pain level: 1-3/10     SUBJECTIVE:  Pt reports about the same function/pain as LV, but able to lift his leg up easier.      OBJECTIVE:    Observation: B LEs wrapped for vascular wounds/drainage + light compression hose B   Test measurements:  Knee flexion AROM 105, increased to 110 with bridge    RESTRICTIONS/PRECAUTIONS: HTN  Procedure(s) 8/3/2020:  1.  Left knee arthroscopy with partial medial meniscectomy and chondroplasty medial femoral condyle with synovial biopsy (21189-98)    Exercises/Interventions:     Therapeutic Ex (86687) Sets/sec Notes/CUES HEP   Pt ed: findings of exam and POC; recovery expectations, use compression stocking, ice, elevate to reduce swelling, SS of infection, contin to use B crutches for safety and to decrease pain 6'     Supine heel slide c strap on SB  Seated heel slide with glider 5\" x5, 5\"x5 with PT OP  PT assist X   Quad set with NMES    Towel behind knee X   Bridge with increasing knee flex 9x     SLR-min assist when elevated 3x5  X   SAQ 3\"x10     SL hip abd  clamshell x15  2x10     Long sit gastroc s  Standing calf stretch wedge   X   Seated LAQ  3\"x10  X   Seated HR, TR   X   Standing HR, TR  At [facility] [place]    Mini squat 3x5 At 1/2 wall Too sore today   Side step 1/2 wall (short) 3 laps UE support    Step up 4\"  5x Heavy UE support, min assist last two          Bike (low)  Seat 17 Pt too sore today   Manual Intervention (40197) 10' total     Patellar mobs sup, inf 20x     Re-apply bandaids,      Edema massage, STM to quad, HS, medial knee 6'                       NMR re-education (22226)   CUES NEEDED   Gait training B crutches  Verbal cueing/review of placement Stand up straight, placing crutches out too far         NMES L quad x12' total (with quad iso, SAQ, SLR)   [place] 10\"/10\"    Weight shifting A/P with one crutch   Intermittent CL UE support when L foot back- not ready to wean to one crutch   Split stance balance at 1/2 wall L  Intermittent UE support                           Therapeutic Activity (30349)                                                Therapeutic Exercise and NMR EXR  [x] (20528) Provided verbal/tactile cueing for activities related to strengthening, flexibility, endurance, ROM for improvements in LE, proximal hip, and core control with self care, mobility, lifting, ambulation.  [] (26347) Provided verbal/tactile cueing for activities related to improving balance, coordination, kinesthetic sense, posture, motor skill, proprioception  to assist with LE, proximal hip, and core control in self care, mobility, lifting, ambulation and eccentric single leg control. NMR and Therapeutic Activities:    [x] (90077 or 09406) Provided verbal/tactile cueing for activities related to improving balance, coordination, kinesthetic sense, posture, motor skill, proprioception and motor activation to allow for proper function of core, proximal hip and LE with self care and ADLs  [x] (33034) Gait Re-education- Provided training and instruction to the patient for proper LE, core and proximal hip recruitment and positioning and eccentric body weight control with ambulation re-education including up and down stairs     Home Exercise Program:    [x] (93109) Reviewed/Progressed HEP activities related to strengthening, flexibility, endurance, ROM of core, proximal hip and LE for functional self-care, mobility, lifting and ambulation/stair navigation   [] (17001)Reviewed/Progressed HEP activities related to improving balance, coordination, kinesthetic sense, posture, motor skill, proprioception of core, proximal hip and LE for self care, mobility, lifting, and ambulation/stair navigation      Manual Treatments:  PROM / STM / Oscillations-Mobs:  G-I, II, III, IV (PA's, Inf., Post.)  [x] (95716) Provided manual therapy to mobilize LE, proximal hip and/or LS spine soft tissue/joints for the purpose of modulating pain, promoting relaxation,  increasing ROM, reducing/eliminating soft tissue swelling/inflammation/restriction, improving soft tissue extensibility and allowing for proper ROM for normal function with self care, mobility, lifting and ambulation.      Modalities:     [x] GAME READY (VASO)- for significant edema, swelling, pain control. (low) in long-sitting x15'    Charges:  Timed Code Treatment Minutes: 35   Total Treatment Minutes: 50 (rest breaks, vaso)     BWC time in/time out:   (and requires time in and out for each CPT code)    [] EVAL (LOW) 34009 (typically 20 minutes face-to-face)  [] EVAL (MOD) 21569 (typically 30 minutes face-to-face)  [] EVAL (HIGH) 28451 (typically 45 minutes face-to-face)  [] RE-EVAL     [x] OJ(07815) x 1   [] IONTO  [x] NMR (27913) x     [x] VASO  [x] Manual (87810) x 1     [] Other:  [] TA x      [] Mech Traction (06418)  [] ES(attended) (57775)      [] ES (un) (79221):       GOALS:   Patient stated goal: 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 Progressing: []? Met: []? Not Met: []? Adjusted     Therapist goals for Patient:   Short Term Goals: To be achieved in: 2 weeks  1. Independent in HEP and progression per patient tolerance, in order to prevent re-injury. []? Progressing: []? Met: []? Not Met: []? Adjusted  2. Patient will have a decrease in pain to facilitate improvement in movement, function, and ADLs as indicated by Functional Deficits. []? Progressing: []? Met: []? Not Met: []? Adjusted     Long Term Goals: To be achieved in: 6 weeks  1. Disability index score of 40% or less for the LEFS to assist with reaching prior level of function. []? Progressing: []? Met: []? Not Met: []? Adjusted  2. Patient will demonstrate increased left knee AROM to 0-110  to allow for proper joint functioning for car/truck mobility, stair amb.   []? Progressing: []? Met: []? Not Met: []? Adjusted  3. Patient will demonstrate an increase in Strength to at least 4+/5 for the left knee and hip stabilizers in order to ambulate s AD for community distances. []? Progressing: []? Met: []? Not Met: []? Adjusted  4. Patient will return to walking community distances s AD with mild bilateral knee pain (<3/10). []? Progressing: []? Met: []? Not Met: []? Adjusted  5. Pt will be able to drive x 2 hours without increased knee pain; car mobility without knee pain. []? Progressing: []? Met: []? Not Met: []?  Adjusted         Progression Towards Functional goals:  [] Patient is progressing as expected towards functional goals listed. [] Progression is slowed due to complexities listed. [] Progression has been slowed due to co-morbidities. [x] Plan just implemented, too soon to assess goals progression  [] Other:         Overall Progression Towards Functional goals/ Treatment Progress Update:  [] Patient is progressing as expected towards functional goals listed. [] Progression is slowed due to complexities/Impairments listed. [] Progression has been slowed due to co-morbidities. [x] Plan just implemented, too soon to assess goals progression <30days   [] Goals require adjustment due to lack of progress  [] Patient is not progressing as expected and requires additional follow up with physician  [] Other    Prognosis for POC: [x] Good [] Fair  [] Poor      Patient requires continued skilled intervention: [x] Yes  [] No    Treatment/Activity Tolerance:  [x] Patient able to complete treatment  [] Patient limited by fatigue  [] Patient limited by pain    [] Patient limited by other medical complications  [] Other:     ASSESSMENT: pt has improved sit to stand ability, improved bed mobility (able to get LLE up on mat without assistance) d/t improving quad function. He still has quite reduced stamina with gait and should continue to use B crutches. Return to Play: (if applicable)   []  Stage 1: Intro to Strength   []  Stage 2: Return to Run and Strength   []  Stage 3: Return to Jump and Strength   []  Stage 4: Dynamic Strength and Agility   []  Stage 5: Sport Specific Training     []  Ready to Return to Play, Meets All Above Stages   []  Not Ready for Return to Sports   Comments:                               PLAN: Continue to improve quad control, work on progressive [address] as dayanna.   [x] Continue per plan of care [] Alter current plan (see comments above)  [] Plan of care initiated [] Hold pending MD visit [] Discharge      Electronically signed by:  Dieudonne Veloz, PT, DPT    Note: If patient does not return for scheduled/ recommended follow up visits, this note will serve as a discharge from care along with most recent update on progress.

## 2020-08-24 NOTE — PROGRESS NOTES
[facility] Progress Note    Edita Deal     : 1960    DATE OF VISIT:  2020    Subjective:     Edita Deal is a 64 y.o. male who has a venous ulcer located on the bilateral lower leg. Significant symptoms or pertinent wound history since last visit: feeling ok overall, some pain in the left knee, some BL lower leg pruritus, and still some increased sensitivity to pain at the right shin ulcer, only after dressing removal today. No F/C/D. He did not receive his Velcro compression garments last week because his deductible had not yet been met, but it was a bit of a funny coincidence with timing, and his deductible HAS been met as of this weekend, so [facility] will re-process his order and send the garments to him. Wraps slid down a bit more this week, leading to some proximal calf discomfort, and accentuated edema just below the knees. Additional ulcer(s) noted? no.      His current medication list consists of etodolac, fluticasone, furosemide, hydroCHLOROthiazide, lisinopril, loratadine, and rOPINIRole. Allergies: Patient has no known allergies. Objective:     Vitals:    20 1405   BP: (!) 155/84   Pulse: 96   Resp: 22   Temp: 98.4 °F (36.9 °C)   TempSrc: Oral   Weight: (!) 404 lb 12.8 oz (183.6 kg)   Height: 5' 8\" (1.727 m)     AAOx3, overweight, NAD  Intact distal pulses, feet warm, good cap refill  Mild-mod BL LE edema, more moderate just below the knees  No cellulitis, angitis, fluctuance, but a folliculitis rash on the right leg  Still some allodynia at right shin  BL areas of indurated stasis dermatitis  Edwina-ulcer skin: indurated, pink, warm and stasis dermatitis. Ulcer(s): smaller, superficial, clean, red, serous exudate, just a bit of fibrin. Photos also saved in electronic chart.     Today's Wound Measurements, per RN documentation:  Wound 20 #1 right lower pretib cluster, venous, partial thickness, last 2 years-Wound Length (cm): 3 cm  Wound 07/28/20 #2 left posterior lower leg cluster, venous, partial thickness, last 2 years-Wound Length (cm): 0 cm    Wound 07/28/20 #1 right lower pretib cluster, venous, partial thickness, last 2 years-Wound Width (cm): 2.5 cm  Wound 07/28/20 #2 left posterior lower leg cluster, venous, partial thickness, last 2 years-Wound Width (cm): 0 cm    Wound 07/28/20 #1 right lower pretib cluster, venous, partial thickness, last 2 years-Wound Depth (cm): 0.1 cm  Wound 07/28/20 #2 left posterior lower leg cluster, venous, partial thickness, last 2 years-Wound Depth (cm): 0 cm    Assessment:     Patient Active Problem List   Diagnosis Code    Obesity, morbid (HCC) E66.01    Mixed hypertriglyceridemia S97.7    Nonalcoholic fatty liver disease K76.0    RLS (restless legs syndrome) G25.81    Essential hypertension, benign I10    Impaired fasting glucose R73.01    Chronic pain of left knee M25.562, G89.29    Osteoarthritis of left knee M17.12    Chondromalacia of left knee M94.262    Pes planus M21.40    Leg swelling M79.89    Ulcers of both lower legs, limited to breakdown of skin ([facility] [address] L97.911, L97.921    Peripheral venous insufficiency I87.2    Allergic rhinitis J30.9       Assessment of today's active condition(s): venous stasis, chronic skin changes, LE edema, healed left and nearly healed right lower leg ulcer, no signs of infection or ischemia; he will be at risk for repeat ulcers, but hopefully can do well with Velcro garments. Factors contributing to occurrence and/or persistence of the chronic ulcer include edema, venous stasis, decreased mobility and obesity. Medical necessity of today's visit is shown by the above documentation. Sharp debridement is not indicated today, based upon the exam findings in the wound(s) above. He does have an indication for a weekly compression wrap. Procedure note:     Consent obtained. Time out performed per [facility] policy.     Anesthetic  Anesthetic: 4% Lidocaine Cream After cleansing of the wounds with saline and applying skin-care and/or dressing materials as listed below, Bilateral application of a multi-layer compression wrap was performed per physician order, to help manage edema, stasis dermatitis, and/or venous ulcers. The patient's level of pain during and after the procedure was monitored, and is noted in the wound documentation flowsheet. Discharge plan:     Treatment in the wound care center today, per RN documentation: Wound 07/28/20 #1 right lower pretib cluster, venous, partial thickness, last 2 years-Dressing/Treatment: Other (comment)(Triam./PSO to folliculitis, Vashe, JumpStart, 4x4, ABD to pad below knee, CoFlex Calamine, Tensoplast to hold wrap in place)  Wound 07/28/20 #2 left posterior lower leg cluster, venous, partial thickness, last 2 years-Dressing/Treatment: (ABD to pad below knee, CoFlex Calamine, Tensoplast). Leave primary dressing and multi-layer wrap(s) in place until the next appointment. He should call before his next visit if there is any significant pain, significant strike-through of drainage to the surface of the wrap, or any significant sense of the wrap sliding down more than an inch or so, bunching-up and abrading his skin. I reminded the patient of the importance of weight management and smoking cessation, if applicable; also encouraged ambulation as tolerated, additional lower extremity exercises as instructed in our education sheet, leg elevation when at rest, and compliance with any recommended dietary, diuretic and compression therapies. I'll give him some paperwork to assist him in ordering extra transition liners and replacement Velcro garments, when needed. If a couple of wrap straps are just a bit too short, I have a couple of Extender Straps that I can give him. Written discharge instructions given to patient. Follow up in 1 week.  He is concerned that he might be in-between health insurance companies next week, and if that IS the case, and he does not want to come in for a formal FU visit next week, his wife may unwrap his LLE wrap and start using a CompreFlex garment daily on that side, and try to leave the right multilayer wrap in place for 2 weeks.      Electronically signed by Juliana Huerta MD on 8/24/2020 at 10:08 AM.

## 2020-08-27 ENCOUNTER — HOSPITAL ENCOUNTER (OUTPATIENT)
Dept: WOUND CARE | Age: 60
Discharge: HOME | End: 2020-08-27
Payer: COMMERCIAL

## 2020-08-27 ENCOUNTER — HOSPITAL ENCOUNTER (OUTPATIENT)
Dept: PHYSICAL THERAPY | Age: 60
Setting detail: THERAPIES SERIES
Discharge: HOME | End: 2020-08-27
Payer: COMMERCIAL

## 2020-08-27 VITALS
BODY MASS INDEX: 47.74 KG/M2 | TEMPERATURE: 98.5 F | RESPIRATION RATE: 24 BRPM | SYSTOLIC BLOOD PRESSURE: 174 MMHG | WEIGHT: 315 LBS | DIASTOLIC BLOOD PRESSURE: 90 MMHG | HEART RATE: 108 BPM | HEIGHT: 68 IN

## 2020-08-27 PROCEDURE — 99212 OFFICE O/P EST SF 10 MIN: CPT | Performed by: INTERNAL MEDICINE

## 2020-08-27 PROCEDURE — 29581 APPL MULTLAYER CMPRN SYS LEG: CPT

## 2020-08-27 PROCEDURE — 29581 APPL MULTLAYER CMPRN SYS LEG: CPT | Performed by: INTERNAL MEDICINE

## 2020-08-27 PROCEDURE — 97016 VASOPNEUMATIC DEVICE THERAPY: CPT

## 2020-08-27 PROCEDURE — 97140 MANUAL THERAPY 1/> REGIONS: CPT

## 2020-08-27 PROCEDURE — 97110 THERAPEUTIC EXERCISES: CPT

## 2020-08-27 RX ORDER — LIDOCAINE 40 MG/G
CREAM TOPICAL ONCE
Status: DISCONTINUED | OUTPATIENT
Start: 2020-08-27 | End: 2020-08-28 | Stop reason: HOSPADM

## 2020-08-27 RX ORDER — SULFAMETHOXAZOLE AND TRIMETHOPRIM 800; 160 MG/1; MG/1
1 TABLET ORAL 2 TIMES DAILY
Qty: 14 TABLET | Refills: 0 | Status: SHIPPED | OUTPATIENT
Start: 2020-08-27 | End: 2020-09-03 | Stop reason: ALTCHOICE

## 2020-08-27 ASSESSMENT — PAIN SCALES - GENERAL
PAINLEVEL_OUTOF10: 0
PAINLEVEL_OUTOF10: 0

## 2020-08-27 NOTE — FLOWSHEET NOTE
[facility],  [address]  Phone: [phone]  Fax [phone]    Physical Therapy Treatment Note/ Progress Report:           Date:  2020    Patient Name:  Andres He    :  1960  MRN: [id number]  Restrictions/Precautions:    Medical/Treatment Diagnosis Information:  · Diagnosis: G67.496A (ICD-10-CM) - Complex tear of medial meniscus of left knee as current injury, initial encounter  · Treatment Diagnosis: M25.562 Left knee pain; M25.662 Left knee stiffness; R26.2 Difficulty in walking  Insurance/Certification information:  PT Insurance Information: [employer]  Physician Information:  Referring Practitioner: Dr. Princess Brewster  Has the plan of care been signed (Y/N):        []  Yes  [x]  No     Date of Patient follow up with Physician:       Is this a Progress Report:     []  Yes  [x]  No        If Yes:  Date Range for reporting period:  Beginning 8/3/20  Ending     Progress report will be due (10 Rx or 30 days whichever is less): 3/9/29      Recertification will be due (POC Duration  / 90 days whichever is less):        Visit # Insurance Allowable Requires auth    (new insu )    [x]no        []yes:     Functional Scale:LEFS 75% disability   Date assessed:  20      Therapy Diagnosis/Practice Pattern:E    Number of Comorbidities:  []0     []1-2    [x]3+    Latex Allergy:  [x]NO      []YES  Preferred Language for Healthcare:   [x]English       []other:      Pain level: 1-3/10     SUBJECTIVE:  Pt reports increased pain in the last two days. He is unsure why. Has been walking short distances in the house s AD intermittently.      OBJECTIVE:    Observation: B LEs wrapped for vascular wounds/drainage + light compression hose B   Test measurements:  Knee flexion AROM 105, increased to 110 with bridge    RESTRICTIONS/PRECAUTIONS: HTN  Procedure(s) 8/3/2020:  1.  Left knee arthroscopy with partial medial meniscectomy and chondroplasty medial femoral condyle with synovial biopsy (61433-94)    Exercises/Interventions:     Therapeutic Ex (27102) Sets/sec Notes/CUES HEP   Pt ed: findings of exam and POC; recovery expectations, use compression stocking, ice, elevate to reduce swelling, SS of infection, contin to use B crutches for safety and to decrease pain 6'     Supine heel slide c strap on SB  Seated heel slide with glider   PT assist X   Quad set with NMES    Towel behind knee X   Bridge with increasing knee flex 9x 110 deg    SLR-min assist when elevated   X   SAQ 3\"x10 1#    SL hip abd  clamshell      Long sit gastroc s  Standing calf stretch wedge   X   Seated LAQ  3\" 2x10  X   Seated HR, TR   X   Standing HR, TR  At [facility]    Mini squat  At 1/2 wall Too sore today   Side step 1/2 wall (short)  UE support    Step up 4\"   Heavy UE support, min assist last two          Bike (low)  Seat 17 Pt too sore today   Manual Intervention (43194) 15' total     Patellar mobs sup, inf 20x     Re-apply bandaids,      Edema massage, STM to quad, HS, medial knee 8'     HS s 20\"x3 Knee slightly bent                NMR re-education (59111)   CUES NEEDED   Gait training one crutch 4x10' Verbal cueing/review of placement One instance of LOB, PT min assist to recover.           NMES L quad x12' total (with quad iso, SAQ, SLR)   [place] 10\"/10\"    Weight shifting A/P with one crutch   Intermittent CL UE support when L foot back- not ready to wean to one crutch   Split stance balance (floor) at 1/2 wall  \" (airex)  x30\" R,L  \"  Intermittent UE support                           Therapeutic Activity (35444)                                                Therapeutic Exercise and NMR EXR  [x] (17753) Provided verbal/tactile cueing for activities related to strengthening, flexibility, endurance, ROM for improvements in LE, proximal hip, and core control with self care, mobility, lifting, ambulation.  [] (11483) Provided verbal/tactile cueing for activities related to improving balance, coordination, kinesthetic sense, posture, motor skill, proprioception  to assist with LE, proximal hip, and core control in self care, mobility, lifting, ambulation and eccentric single leg control. NMR and Therapeutic Activities:    [x] (48304 or 79562) Provided verbal/tactile cueing for activities related to improving balance, coordination, kinesthetic sense, posture, motor skill, proprioception and motor activation to allow for proper function of core, proximal hip and LE with self care and ADLs  [x] (78683) Gait Re-education- Provided training and instruction to the patient for proper LE, core and proximal hip recruitment and positioning and eccentric body weight control with ambulation re-education including up and down stairs     Home Exercise Program:    [x] (28574) Reviewed/Progressed HEP activities related to strengthening, flexibility, endurance, ROM of core, proximal hip and LE for functional self-care, mobility, lifting and ambulation/stair navigation   [] (62564)Reviewed/Progressed HEP activities related to improving balance, coordination, kinesthetic sense, posture, motor skill, proprioception of core, proximal hip and LE for self care, mobility, lifting, and ambulation/stair navigation      Manual Treatments:  PROM / STM / Oscillations-Mobs:  G-I, II, III, IV (PA's, Inf., Post.)  [x] (60489) Provided manual therapy to mobilize LE, proximal hip and/or LS spine soft tissue/joints for the purpose of modulating pain, promoting relaxation,  increasing ROM, reducing/eliminating soft tissue swelling/inflammation/restriction, improving soft tissue extensibility and allowing for proper ROM for normal function with self care, mobility, lifting and ambulation.      Modalities:     [x] GAME READY (VASO)- for significant edema, swelling, pain control. (low) in long-sitting x10'    Charges:  Timed Code Treatment Minutes: 35   Total Treatment Minutes: 50 (rest breaks, vaso)     [facility] time in/time out:   (and requires time in and out for each CPT code)    [] EVAL (LOW) 45236 (typically 20 minutes face-to-face)  [] EVAL (MOD) 79483 (typically 30 minutes face-to-face)  [] EVAL (HIGH) 51912 (typically 45 minutes face-to-face)  [] RE-EVAL     [x] KT(43312) x 1   [] IONTO  [x] NMR (80354) x     [x] VASO  [x] Manual (48166) x 1     [] Other:  [] TA x      [] Mech Traction (24488)  [] ES(attended) (78849)      [] ES (un) (98253):       GOALS:   Patient stated goal: 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 Progressing: []? Met: []? Not Met: []? Adjusted     Therapist goals for Patient:   Short Term Goals: To be achieved in: 2 weeks  1. Independent in HEP and progression per patient tolerance, in order to prevent re-injury. []? Progressing: []? Met: []? Not Met: []? Adjusted  2. Patient will have a decrease in pain to facilitate improvement in movement, function, and ADLs as indicated by Functional Deficits. []? Progressing: []? Met: []? Not Met: []? Adjusted     Long Term Goals: To be achieved in: 6 weeks  1. Disability index score of 40% or less for the LEFS to assist with reaching prior level of function. []? Progressing: []? Met: []? Not Met: []? Adjusted  2. Patient will demonstrate increased left knee AROM to 0-110  to allow for proper joint functioning for car/truck mobility, stair amb.   []? Progressing: []? Met: []? Not Met: []? Adjusted  3. Patient will demonstrate an increase in Strength to at least 4+/5 for the left knee and hip stabilizers in order to ambulate s AD for community distances. []? Progressing: []? Met: []? Not Met: []? Adjusted  4. Patient will return to walking community distances s AD with mild bilateral knee pain (<3/10). []? Progressing: []? Met: []? Not Met: []? Adjusted  5. Pt will be able to drive x 2 hours without increased knee pain; car mobility without knee pain. []? Progressing: []? Met: []? Not Met: []?  Adjusted Progression Towards Functional goals:  [] Patient is progressing as expected towards functional goals listed. [] Progression is slowed due to complexities listed. [] Progression has been slowed due to co-morbidities. [x] Plan just implemented, too soon to assess goals progression  [] Other:         Overall Progression Towards Functional goals/ Treatment Progress Update:  [] Patient is progressing as expected towards functional goals listed. [] Progression is slowed due to complexities/Impairments listed. [] Progression has been slowed due to co-morbidities. [x] Plan just implemented, too soon to assess goals progression <30days   [] Goals require adjustment due to lack of progress  [] Patient is not progressing as expected and requires additional follow up with physician  [] Other    Prognosis for POC: [x] Good [] Fair  [] Poor      Patient requires continued skilled intervention: [x] Yes  [] No    Treatment/Activity Tolerance:  [x] Patient able to complete treatment  [] Patient limited by fatigue  [] Patient limited by pain    [] Patient limited by other medical complications  [] Other:     ASSESSMENT: Pt has tenderness to palpation and tension in his distal adductors, semimembranosus and sartorius muscles. Soreness at medial knee slightly improved with STM. He fatigues very quickly and will require a significant strengthening to resume ambulation s AD safely. Do not recommend that he try walking in his house s AD's. Return to Play: (if applicable)   []  Stage 1: Intro to Strength   []  Stage 2: Return to Run and Strength   []  Stage 3: Return to Jump and Strength   []  Stage 4: Dynamic Strength and Agility   []  Stage 5: Sport Specific Training     []  Ready to Return to Play, Meets All Above Stages   []  Not Ready for Return to Sports   Comments:                               PLAN: Continue to improve quad control, work on progressive [address] as dayanna.   [x] Continue per plan of care [] Ryanne Abdul current plan (see comments above)  [] Plan of care initiated [] Hold pending MD visit [] Discharge      Electronically signed by:  Dalia Rodrigez PT, DPT    Note: If patient does not return for scheduled/ recommended follow up visits, this note will serve as a discharge from care along with most recent update on progress.

## 2020-08-28 PROBLEM — I87.2 STASIS DERMATITIS OF BOTH LEGS: Status: ACTIVE | Noted: 2020-08-28

## 2020-08-28 PROBLEM — L73.9 FOLLICULITIS: Status: ACTIVE | Noted: 2020-08-28

## 2020-08-28 NOTE — PROGRESS NOTES
[facility] Progress Note    Marizol Fernandes     : 1960    DATE OF VISIT:  2020    Subjective:     Marizol Fernandes is a 64 y.o. male who has a venous ulcer located on the right, anterior lower leg. Significant symptoms or pertinent wound history since last visit: having a bit of pruritus and discomfort on the right leg. Left leg just itches a little bit, and he's had a bit of intermittent weeping of fluid from the distal calf. Received his CompreFlex wraps in the mail this week, started wearing one on the left leg, but didn't notice the Accutabs in the package, so has just been snugging it up by feel. No F/C/D, no SOB, only minor drainage from that right leg, no purulence. Additional ulcer(s) noted? no. Left side looks healed. His current medication list consists of etodolac, fluticasone, furosemide, hydroCHLOROthiazide, lisinopril, loratadine, rOPINIRole, and sulfamethoxazole-trimethoprim. Allergies: Patient has no known allergies. Objective:     Vitals:    20 1352 20 1353   BP:  (!) 174/90   Pulse:  108   Resp: 24    Temp: 98.5 °F (36.9 °C)    TempSrc: Oral    Weight: (!) 404 lb (183.3 kg)    Height: 5' 8\" (1.727 m)      AAOx3, overweight, NAD, in some discomfort from his knee  Intact distal pulses, feet warm, good cap refill  Moderate left and mild-moderate right lower leg edema  Left pretib with a bit of mild stasis dermatitis and papillomatous changes of chronic inflammation and lymphedema   Right pretib and some of medial calf with persistent folliculitis and I think a bit of patchy cellulitis this week, mostly proximal, quite tender  Edwina-ulcer skin: indurated, pink, erythematous  and warm. Ulcer(s): smaller, red-pink, granular, very superficial, not inflamed, I think less tender, minor degree of serous exudate. Photos also saved in electronic chart.     Today's Wound Measurements, per RN documentation:  Wound 20 #1 right lower pretib cluster, venous, partial thickness, last 2 years-Wound Length (cm): 1 cm  [REMOVED] Wound 07/28/20 #2 left posterior lower leg cluster, venous, partial thickness, last 2 years-Wound Length (cm): 0 cm    Wound 07/28/20 #1 right lower pretib cluster, venous, partial thickness, last 2 years-Wound Width (cm): 1.6 cm  [REMOVED] Wound 07/28/20 #2 left posterior lower leg cluster, venous, partial thickness, last 2 years-Wound Width (cm): 0 cm    Wound 07/28/20 #1 right lower pretib cluster, venous, partial thickness, last 2 years-Wound Depth (cm): 0.1 cm  [REMOVED] Wound 07/28/20 #2 left posterior lower leg cluster, venous, partial thickness, last 2 years-Wound Depth (cm): 0 cm    Assessment:     Patient Active Problem List   Diagnosis Code    Obesity, morbid (HCC) E66.01    Mixed hypertriglyceridemia H17.4    Nonalcoholic fatty liver disease K76.0    RLS (restless legs syndrome) G25.81    Essential hypertension, benign I10    Impaired fasting glucose R73.01    Chronic pain of left knee M25.562, G89.29    Osteoarthritis of left knee M17.12    Chondromalacia of left knee M94.262    Pes planus M21.40    Leg swelling M79.89    Ulcer of right leg, limited to breakdown of skin ([address] L97.911    Peripheral venous insufficiency I87.2    Allergic rhinitis J30.9    Stasis dermatitis of both legs G27.5    Folliculitis of right leg L73.9       Assessment of today's active condition(s): venous stasis, acute-on-chronic skin changes, lower extremity edema, delicately healed left leg ulcer, not quite healed on the right. Some increase in LLE swelling this week, not unexpected as he transitioned from weekly to daily compression. Right side has more significant findings of superficial skin infection this week. Factors contributing to occurrence and/or persistence of the chronic ulcer include edema, venous stasis, decreased mobility and obesity. Medical necessity of today's visit is shown by the above documentation.  Veto Young debridement is not indicated today, based upon the exam findings in the wound(s) above. He does have an indication for a weekly compression wrap. Procedure note:     Consent obtained. Time out performed per [facility]. Anesthetic  Anesthetic: 4% Lidocaine Cream     After cleansing of the wounds with saline and applying skin-care and/or dressing materials as listed below, Right unilateral application of a multi-layer compression wrap was performed per physician order, to help manage edema, stasis dermatitis, and/or venous ulcers. The patient's level of pain during and after the procedure was monitored, and is noted in the wound documentation flowsheet. Discharge plan:     Treatment in the wound care center today, per RN documentation: Wound 07/28/20 #1 right lower pretib cluster, venous, partial thickness, last 2 years-Dressing/Treatment: Other (comment)(PSO/Triamcinolone to folliculitis, Vashe, Procellera, gauze). I called in a week of empiric Bactrim for the right leg. Leave primary dressing and multi-layer wrap(s) in place until the next appointment. He should call before his next visit if there is any significant pain, significant strike-through of drainage to the surface of the wrap, or any significant sense of the wrap sliding down more than an inch or so, bunching-up and abrading his skin. I reminded the patient of the importance of weight management and smoking cessation, if applicable; also encouraged ambulation as tolerated, additional lower extremity exercises as instructed in our education sheet, leg elevation when at rest, and compliance with any recommended dietary, diuretic and compression therapies. Continue daily CompreFlex garment on the left side -- described how to use the Accu-Tabs to make sure he's getting appropriate compression. If the left pretibial rash gets more symptomatic, he can apply some OTC hydrocortisone cream PRN for up to a week or two at a time.     Written discharge instructions given to patient. Follow up in 1 week.     Electronically signed by Adiel Garcia MD on 8/28/2020 at 12:51 PM.

## 2020-08-31 ENCOUNTER — HOSPITAL ENCOUNTER (OUTPATIENT)
Dept: PHYSICAL THERAPY | Age: 60
Setting detail: THERAPIES SERIES
Discharge: HOME | End: 2020-08-31
Payer: COMMERCIAL

## 2020-08-31 PROCEDURE — 97110 THERAPEUTIC EXERCISES: CPT

## 2020-08-31 PROCEDURE — 97140 MANUAL THERAPY 1/> REGIONS: CPT

## 2020-08-31 PROCEDURE — 97016 VASOPNEUMATIC DEVICE THERAPY: CPT

## 2020-08-31 PROCEDURE — 97112 NEUROMUSCULAR REEDUCATION: CPT

## 2020-08-31 NOTE — FLOWSHEET NOTE
[facility], [address] [place]  Phone: [phone]  Fax [phone]    Physical Therapy Treatment Note/ Progress Report:           Date:  2020    Patient Name:  Delores Dawson    :  1960  MRN: [id number]  Restrictions/Precautions:    Medical/Treatment Diagnosis Information:  · Diagnosis: O80.857C (ICD-10-CM) - Complex tear of medial meniscus of left knee as current injury, initial encounter  · Treatment Diagnosis: M25.562 Left knee pain; M25.662 Left knee stiffness; R26.2 Difficulty in walking  Insurance/Certification information:  PT Insurance Information: [employer]  Physician Information:  Referring Practitioner: Dr. Shemar Morrison  Has the plan of care been signed (Y/N):        []  Yes  [x]  No     Date of Patient follow up with Physician:       Is this a Progress Report:     []  Yes  [x]  No        If Yes:  Date Range for reporting period:  Beginning 8/3/20  Ending     Progress report will be due (10 Rx or 30 days whichever is less): 3/4/72      Recertification will be due (POC Duration  / 90 days whichever is less):        Visit # Insurance Allowable Requires auth    (new insu )    [x]no        []yes:     Functional Scale:LEFS 75% disability   Date assessed:  20      Therapy Diagnosis/Practice Pattern:E    Number of Comorbidities:  []0     []1-2    [x]3+    Latex Allergy:  [x]NO      []YES  Preferred Language for Healthcare:   [x]English       []other:      Pain level: 1-3/10     SUBJECTIVE:  Pt reports that he was sore on Thursday, but was better over the weekend.      OBJECTIVE:    Observation: B LEs wrapped for vascular wounds/drainage + light compression hose B   Test measurements:  Knee flexion AROM 105, increased to 110 with bridge    RESTRICTIONS/PRECAUTIONS: HTN  Procedure(s) 8/3/2020:  1.  Left knee arthroscopy with partial medial meniscectomy and chondroplasty medial femoral balance, coordination, kinesthetic sense, posture, motor skill, proprioception  to assist with LE, proximal hip, and core control in self care, mobility, lifting, ambulation and eccentric single leg control. NMR and Therapeutic Activities:    [x] (07367 or 71063) Provided verbal/tactile cueing for activities related to improving balance, coordination, kinesthetic sense, posture, motor skill, proprioception and motor activation to allow for proper function of core, proximal hip and LE with self care and ADLs  [x] (81456) Gait Re-education- Provided training and instruction to the patient for proper LE, core and proximal hip recruitment and positioning and eccentric body weight control with ambulation re-education including up and down stairs     Home Exercise Program:    [x] (83713) Reviewed/Progressed HEP activities related to strengthening, flexibility, endurance, ROM of core, proximal hip and LE for functional self-care, mobility, lifting and ambulation/stair navigation   [] (92694)Reviewed/Progressed HEP activities related to improving balance, coordination, kinesthetic sense, posture, motor skill, proprioception of core, proximal hip and LE for self care, mobility, lifting, and ambulation/stair navigation      Manual Treatments:  PROM / STM / Oscillations-Mobs:  G-I, II, III, IV (PA's, Inf., Post.)  [x] (94458) Provided manual therapy to mobilize LE, proximal hip and/or LS spine soft tissue/joints for the purpose of modulating pain, promoting relaxation,  increasing ROM, reducing/eliminating soft tissue swelling/inflammation/restriction, improving soft tissue extensibility and allowing for proper ROM for normal function with self care, mobility, lifting and ambulation.      Modalities:     [x] GAME READY (VASO)- for significant edema, swelling, pain control. (low) in long-sitting x15'    Charges:  Timed Code Treatment Minutes: 38   Total Treatment Minutes: 53 ( vaso)     [facility] time in/time out:   (and requires time in and out for each CPT code)    [] EVAL (LOW) 76756 (typically 20 minutes face-to-face)  [] EVAL (MOD) 71513 (typically 30 minutes face-to-face)  [] EVAL (HIGH) 31892 (typically 45 minutes face-to-face)  [] RE-EVAL     [x] TK(65284) x 1   [] IONTO  [x] NMR (27884) x1     [x] VASO  [x] Manual (55482) x 1     [] Other:  [] TA x      [] Mech Traction (60274)  [] ES(attended) (67105)      [] ES (un) (13431):       GOALS:   Patient stated goal: 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 Progressing: []? Met: []? Not Met: []? Adjusted     Therapist goals for Patient:   Short Term Goals: To be achieved in: 2 weeks  1. Independent in HEP and progression per patient tolerance, in order to prevent re-injury. []? Progressing: []? Met: []? Not Met: []? Adjusted  2. Patient will have a decrease in pain to facilitate improvement in movement, function, and ADLs as indicated by Functional Deficits. []? Progressing: []? Met: []? Not Met: []? Adjusted     Long Term Goals: To be achieved in: 6 weeks  1. Disability index score of 40% or less for the LEFS to assist with reaching prior level of function. []? Progressing: []? Met: []? Not Met: []? Adjusted  2. Patient will demonstrate increased left knee AROM to 0-110  to allow for proper joint functioning for car/truck mobility, stair amb.   []? Progressing: []? Met: []? Not Met: []? Adjusted  3. Patient will demonstrate an increase in Strength to at least 4+/5 for the left knee and hip stabilizers in order to ambulate s AD for community distances. []? Progressing: []? Met: []? Not Met: []? Adjusted  4. Patient will return to walking community distances s AD with mild bilateral knee pain (<3/10). []? Progressing: []? Met: []? Not Met: []? Adjusted  5. Pt will be able to drive x 2 hours without increased knee pain; car mobility without knee pain. []? Progressing: []? Met: []? Not Met: []?  Adjusted         Progression Towards Functional goals:  [] Patient is progressing as expected towards functional goals listed. [] Progression is slowed due to complexities listed. [] Progression has been slowed due to co-morbidities. [x] Plan just implemented, too soon to assess goals progression  [] Other:         Overall Progression Towards Functional goals/ Treatment Progress Update:  [] Patient is progressing as expected towards functional goals listed. [] Progression is slowed due to complexities/Impairments listed. [] Progression has been slowed due to co-morbidities. [x] Plan just implemented, too soon to assess goals progression <30days   [] Goals require adjustment due to lack of progress  [] Patient is not progressing as expected and requires additional follow up with physician  [] Other    Prognosis for POC: [x] Good [] Fair  [] Poor      Patient requires continued skilled intervention: [x] Yes  [] No    Treatment/Activity Tolerance:  [x] Patient able to complete treatment  [] Patient limited by fatigue  [] Patient limited by pain    [] Patient limited by other medical complications  [] Other:     ASSESSMENT: Tenderness a medial knee improved since last visit. Also able to complete a SLR without assistance for the first time today. He is able to ambulate short distances with one crutch safely, and should practice this at home to build stamina. Return to Play: (if applicable)   []  Stage 1: Intro to Strength   []  Stage 2: Return to Run and Strength   []  Stage 3: Return to Jump and Strength   []  Stage 4: Dynamic Strength and Agility   []  Stage 5: Sport Specific Training     []  Ready to Return to Play, Meets All Above Stages   []  Not Ready for Return to Sports   Comments:                               PLAN: Continue to improve quad control, work on progressive [address] as dayanna.   [x] Continue per plan of care [] Alter current plan (see comments above)  [] Plan of care initiated [] Hold pending MD visit [] Discharge      Electronically

## 2020-09-03 ENCOUNTER — HOSPITAL ENCOUNTER (OUTPATIENT)
Dept: PHYSICAL THERAPY | Age: 60
Setting detail: THERAPIES SERIES
Discharge: HOME | End: 2020-09-03
Payer: COMMERCIAL

## 2020-09-03 ENCOUNTER — HOSPITAL ENCOUNTER (OUTPATIENT)
Dept: WOUND CARE | Age: 60
Discharge: HOME | End: 2020-09-03
Payer: COMMERCIAL

## 2020-09-03 VITALS
DIASTOLIC BLOOD PRESSURE: 79 MMHG | HEIGHT: 68 IN | RESPIRATION RATE: 24 BRPM | BODY MASS INDEX: 47.74 KG/M2 | WEIGHT: 315 LBS | SYSTOLIC BLOOD PRESSURE: 150 MMHG | HEART RATE: 105 BPM | TEMPERATURE: 98.2 F

## 2020-09-03 PROCEDURE — 29581 APPL MULTLAYER CMPRN SYS LEG: CPT | Performed by: INTERNAL MEDICINE

## 2020-09-03 PROCEDURE — 97116 GAIT TRAINING THERAPY: CPT

## 2020-09-03 PROCEDURE — 97140 MANUAL THERAPY 1/> REGIONS: CPT

## 2020-09-03 PROCEDURE — 97110 THERAPEUTIC EXERCISES: CPT

## 2020-09-03 PROCEDURE — 29581 APPL MULTLAYER CMPRN SYS LEG: CPT

## 2020-09-03 RX ORDER — LIDOCAINE HYDROCHLORIDE 40 MG/ML
SOLUTION TOPICAL ONCE
Status: DISCONTINUED | OUTPATIENT
Start: 2020-09-03 | End: 2020-09-04 | Stop reason: HOSPADM

## 2020-09-03 ASSESSMENT — PAIN SCALES - GENERAL: PAINLEVEL_OUTOF10: 0

## 2020-09-03 NOTE — DISCHARGE INSTR - COC
Continuity of Care Form    Patient Name: Lorri Puentes   :  1960  MRN:  [id number]    Admit date:  9/3/2020  Discharge date:  ***    Code Status Order: No Order   Advance Directives:   [facility] Documentation     Date/Time Healthcare Directive Type of Healthcare Directive Copy in [address] [facility] [address] Agent's Name Healthcare Agent's Phone Number    20 1400  No, patient does not have an advance directive for healthcare treatment -- -- -- -- --          Admitting Physician:  No admitting provider for patient encounter. PCP: JACKLYN Clay CNP    Discharging Nurse: [facility] Unit/Room#: No information available for this encounter.   Discharging Unit Phone Number: ***    Emergency Contact:   Extended Emergency Contact Information  Primary Emergency Contact: Chanell Dan  Address: . [address], [address] Phone: [phone]  Work Phone: [phone]  Relation: Spouse    Past Surgical History:  Past Surgical History:   Procedure Laterality Date    CIRCUMCISION      COLONOSCOPY  2017    Colonoscopy with polypectomy (cold biopsy):Dr. Walker:next in 3yrs=2020    KNEE ARTHROSCOPY Left 8/3/2020    VIDEO ARTHROSCOPY LEFT KNEE, PARTIAL MEDIAL MENISCECTOMY, CHONDROPLASTY, SYNOVIAL BIOPSY -TOM=4- performed by Rianna Fisher MD at [address] [facility]  2011    VASECTOMY      WISDOM TOOTH EXTRACTION         Immunization History:   Immunization History   Administered Date(s) Administered    Influenza Vaccine, unspecified formulation 2016    Influenza Virus Vaccine 10/20/2014, 10/06/2017    Influenza, Quadv, IM, PF (6 mo and older Fluzone, Flulaval, Fluarix, and 3 yrs and older Afluria) 2018    Tdap (Boostrix, Adacel) 2011    Zoster Recombinant (Shingrix) 2020       Active Problems:  Patient Active Problem List Diagnosis Code    Obesity, morbid (HCC) E66.01    Mixed hypertriglyceridemia P55.7    Nonalcoholic fatty liver disease K76.0    RLS (restless legs syndrome) G25.81    Essential hypertension, benign I10    Impaired fasting glucose R73.01    Chronic pain of left knee M25.562, G89.29    Osteoarthritis of left knee M17.12    Chondromalacia of left knee M94.262    Pes planus M21.40    Leg swelling M79.89    Ulcer of right leg, limited to breakdown of skin ([address] L97.911    Peripheral venous insufficiency I87.2    Allergic rhinitis J30.9    Stasis dermatitis of both legs Y65.7    Folliculitis of right leg L73.9       Isolation/Infection:   Isolation          No Isolation        Patient Infection Status     Infection Onset Added Last Indicated Last Indicated By Review Planned Expiration Resolved Resolved By    None active    Resolved    COVID-19 Rule Out 20 COVID-19 (Ordered)   20 Rule-Out Test Resulted          Nurse Assessment:  Last Vital Signs: BP (!) 150/79   Pulse 105   Temp 98.2 °F (36.8 °C) (Oral)   Resp 24   Ht 5' 8\" (1.727 m)   Wt (!) 404 lb (183.3 kg)   BMI 61.43 kg/m²     Last documented pain score (0-10 scale): Pain Level: 0  Last Weight:   Wt Readings from Last 1 Encounters:   20 (!) 404 lb (183.3 kg)     Mental Status:  {IP PT MENTAL STATUS:41253}    IV Access:  { TRACY IV ACCESS:626192230}    Nursing Mobility/ADLs:  Walking   {[facility] DME PQPI:592550706}  Transfer  {[facility] DME OIYU:698913812}  Bathing  {[facility] DME RDI}  Dressing  {[facility] DME GUCV:706251702}  Toileting  {[facility] DME PRMY:621942302}  Feeding  {[facility] DME RWZY:023688761}  Med Admin  {[facility] DME CBGV:282195633}  Med Delivery   { TRACY MED Delivery:349503507}    Wound Care Documentation and Therapy:  Wound 20 #1 right lower pretib cluster, venous, partial thickness, last 2 years (Active)   Wound Image   20 1354   Wound Venous 20 1354   Dressing Status Clean;Dry; Intact 20 4555 Dressing Changed Changed/New 08/27/20 1445   Dressing/Treatment Other (comment) 08/27/20 1445   Wound Cleansed Wound cleanser 09/03/20 1354   Wound Length (cm) 0 cm 09/03/20 1354   Wound Width (cm) 0 cm 09/03/20 1354   Wound Depth (cm) 0 cm 09/03/20 1354   Wound Surface Area (cm^2) 0 cm^2 09/03/20 1354   Change in Wound Size % (l*w) 100 09/03/20 1354   Wound Volume (cm^3) 0 cm^3 09/03/20 1354   Wound Healing % 100 09/03/20 1354   Distance Tunneling (cm) 0 cm 08/13/20 1541   Tunneling Position ___ O'Clock 0 08/13/20 1541   Undermining Starts ___ O'Clock 0 08/13/20 1541   Undermining Ends___ O'Clock 0 08/13/20 1541   Undermining Maxium Distance (cm) 0 08/13/20 1541   Wound Assessment [place] 09/03/20 1354   Drainage Amount Scant 09/03/20 1354   Drainage Description Serous 09/03/20 1354   Odor None 09/03/20 1354   Edwina-wound Assessment Intact 09/03/20 1354   Number of days: 37        Elimination:  Continence:   · Bowel: {YES / YJ:99130}  · Bladder: {YES / EK:08429}  Urinary Catheter: {Urinary Catheter:951054505}   Colostomy/Ileostomy/Ileal Conduit: {YES / DI:63913}       Date of Last BM: ***  No intake or output data in the 24 hours ending 09/03/20 1439  No intake/output data recorded.     Safety Concerns:     [address] [employer] Safety Concerns:695348820}    Impairments/Disabilities:      [address] [employer] Impairments/Disabilities:187599435}    Nutrition Therapy:  Current Nutrition Therapy:   [address] [employer] Diet List:125796911}    Routes of Feeding: {CHP DME Other Feedings:343248138}  Liquids: {Slp liquid thickness:39471}  Daily Fluid Restriction: {CHP DME Yes amt example:169299094}  Last Modified Barium Swallow with Video (Video Swallowing Test): {Done Not Done TOUC:959684690}    Treatments at the Time of Hospital Discharge:   Respiratory Treatments: ***  Oxygen Therapy:  {Therapy; copd oxygen:86631}  Ventilator:    {SARI BARR Vent RUF:259296305}    Rehab Therapies: {THERAPEUTIC INTERVENTION:4150184254}  Weight Bearing Status/Restrictions: {SARI BARR Weight Bearin}  Other Medical Equipment (for information only, NOT a DME order):  {EQUIPMENT:742210818}  Other Treatments: ***    Patient's personal belongings (please select all that are sent with patient):  {CHP DME Belongings:146576159}    RN SIGNATURE:  {Esignature:447355992}    CASE MANAGEMENT/SOCIAL WORK SECTION    Inpatient Status Date: ***    Readmission Risk Assessment Score:  Readmission Risk              Risk of Unplanned Readmission:        0           Discharging to Facility/ Agency   · Name:   · Address:  · Phone:  · Fax:    Dialysis Facility (if applicable)   · Name:  · Address:  · Dialysis Schedule:  · Phone:  · Fax:    / signature: {Esignature:832932361}    PHYSICIAN SECTION    Prognosis: {Prognosis:1353328093}    Condition at Discharge: [address] [place] [address] Patient Condition:540553045}    Rehab Potential (if transferring to Rehab): {Prognosis:9581672033}    Recommended Labs or Other Treatments After Discharge: ***    Physician Certification: I certify the above information and transfer of Nataly Day  is necessary for the continuing treatment of the diagnosis listed and that he requires {Admit to Appropriate Level of Care:82037} for {GREATER/LESS:472646047} 30 days.      Update Admission H&P: {CHP DME Changes in DOSN}    PHYSICIAN SIGNATURE:  {Esignature:809580957}

## 2020-09-03 NOTE — PROGRESS NOTES
[facility],  [address]  Phone: [phone]  Fax [phone]    Physical Therapy Treatment Note/ Progress Report:           Date:  9/3/2020    Patient Name:  Martha Lomeli    :  1960  MRN: [id number]  Restrictions/Precautions:    Medical/Treatment Diagnosis Information:  · Diagnosis: H51.603R (ICD-10-CM) - Complex tear of medial meniscus of left knee as current injury, initial encounter  · Treatment Diagnosis: M25.562 Left knee pain; M25.662 Left knee stiffness; R26.2 Difficulty in walking  Insurance/Certification information:  PT Insurance Information: [employer] [place]  Physician Information:  Referring Practitioner: Dr. Josefina Etienne  Has the plan of care been signed (Y/N):        []  Yes  [x]  No     Date of Patient follow up with Physician:       Is this a Progress Report:     [x]  Yes  [x]  No        If Yes:  Date Range for reporting period:  Beginning 8/3/20  Ending 9/3/20    Progress report will be due (10 Rx or 30 days whichever is less): 8/3/48      Recertification will be due (POC Duration  / 90 days whichever is less):        Visit # Insurance Allowable Requires auth    (new insu )    [x]no        []yes:     Functional Scale:LEFS 75% disability   Date assessed:  20      Therapy Diagnosis/Practice Pattern:E    Number of Comorbidities:  []0     []1-2    [x]3+    Latex Allergy:  [x]NO      []YES  Preferred Language for Healthcare:   [x]English       []other:      Pain level: 1/10 current, but 1-3/10 yesterday     SUBJECTIVE:  Pt reports that he has been walking short distances in the house with one crutch. Also trying to walk with both crutches out in front of him. He reports a large improvement in function since before surgery, but both of his knees still get very sore when he stands for any length of time.      OBJECTIVE:    Observation: B LEs wrapped for vascular wounds/drainage + light compression hose B   Test measurements:  Knee flexion AROM 105, increased to 115 with bridge   MMT hip ABD 3-/5, knee extension 4-, knee flexion 4    RESTRICTIONS/PRECAUTIONS: HTN  Procedure(s) 8/3/2020:  1.  Left knee arthroscopy with partial medial meniscectomy and chondroplasty medial femoral condyle with synovial biopsy (51985-49)    Exercises/Interventions:     Therapeutic Ex (66302) Sets/sec Notes/CUES HEP   Pt ed: findings of exam and POC; recovery expectations, use compression stocking, ice, elevate to reduce swelling, SS of infection, contin to use B crutches for safety and to decrease pain 6'     Supine heel slide c strap on SB  Seated heel slide with glider   PT assist X   HL adductor stretch 3x30\"     Quad set with NMES    Towel behind knee X   Bridge with increasing knee flex 10x 115 deg    SLR-  Indep today x15  X   SAQ 5\" 2x10 1.5#    SL hip abd  clamshell 2x103\" 3x10     Long sit gastroc s  Standing calf stretch wedge   X   Seated LAQ  5\" 2x10 1.5#  X   Seated HR, TR   X   Standing HR, TR  At [facility]    Mini squat  At 1/2 wall Too sore today   Side step 1/2 wall (long) 1 lap UE support    Step up 4\"   Heavy UE support, min assist last two          Bike (low)  Seat 17 Pt too sore today   Manual Intervention (60024) 11' total     Patellar mobs sup, inf 20x     Re-apply bandaids,      Edema massage, STM to quad, HS, medial knee 8'     HS s  Knee slightly bent                NMR re-education (78529)   CUES NEEDED   Gait training one crutch 60' x 2 standing Breaks needed    Weight shifting A/P  10x R,L  fingertip support    NMES L quad x12' total (with quad iso, SAQ, SLR)   [place] 10\"/10\"    Weight shifting A/P with one crutch   Intermittent CL UE support when L foot back- not ready to wean to one crutch   Split stance balance + airex at 1/2 wall     Intermittent UE support                           Therapeutic Activity (70323)                                                Therapeutic Exercise and NMR EXR  [x] (62697) Provided verbal/tactile cueing for activities related to strengthening, flexibility, endurance, ROM for improvements in LE, proximal hip, and core control with self care, mobility, lifting, ambulation.  [] (56143) Provided verbal/tactile cueing for activities related to improving balance, coordination, kinesthetic sense, posture, motor skill, proprioception  to assist with LE, proximal hip, and core control in self care, mobility, lifting, ambulation and eccentric single leg control.      NMR and Therapeutic Activities:    [x] (71655 or 71635) Provided verbal/tactile cueing for activities related to improving balance, coordination, kinesthetic sense, posture, motor skill, proprioception and motor activation to allow for proper function of core, proximal hip and LE with self care and ADLs  [x] (40707) Gait Re-education- Provided training and instruction to the patient for proper LE, core and proximal hip recruitment and positioning and eccentric body weight control with ambulation re-education including up and down stairs     Home Exercise Program:    [x] (18286) Reviewed/Progressed HEP activities related to strengthening, flexibility, endurance, ROM of core, proximal hip and LE for functional self-care, mobility, lifting and ambulation/stair navigation   [] (84023)Reviewed/Progressed HEP activities related to improving balance, coordination, kinesthetic sense, posture, motor skill, proprioception of core, proximal hip and LE for self care, mobility, lifting, and ambulation/stair navigation      Manual Treatments:  PROM / STM / Oscillations-Mobs:  G-I, II, III, IV (PA's, Inf., Post.)  [x] (93940) Provided manual therapy to mobilize LE, proximal hip and/or LS spine soft tissue/joints for the purpose of modulating pain, promoting relaxation,  increasing ROM, reducing/eliminating soft tissue swelling/inflammation/restriction, improving soft tissue extensibility and allowing for proper ROM for normal function with self care, mobility, lifting and ambulation. Modalities:     [] GAME READY (VASO)- for significant edema, swelling, pain control. (low) in long-sitting x15'  CP x10' L knee  Charges:  Timed Code Treatment Minutes: 40   Total Treatment Minutes: 50 ( vaso)     BWC time in/time out:   (and requires time in and out for each CPT code)    [] EVAL (LOW) 30110 (typically 20 minutes face-to-face)  [] EVAL (MOD) 36203 (typically 30 minutes face-to-face)  [] EVAL (HIGH) 94105 (typically 45 minutes face-to-face)  [] RE-EVAL     [x] MU(69160) x 1   [] IONTO  [] NMR (91799) x1     [] VASO  [x] Manual (09794) x 1     [x] Other: gait x1  [] TA x      [] Mech Traction (15620)  [] ES(attended) (83162)      [] ES (un) (26855):       GOALS:   Patient stated goal: 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 Progressing: []? Met: []? Not Met: []? Adjusted     Therapist goals for Patient:   Short Term Goals: To be achieved in: 2 weeks  1. Independent in HEP and progression per patient tolerance, in order to prevent re-injury. [x]? Progressing: []? Met: []? Not Met: []? Adjusted  2. Patient will have a decrease in pain to facilitate improvement in movement, function, and ADLs as indicated by Functional Deficits. [x]? Progressing: []? Met: []? Not Met: []? Adjusted     Long Term Goals: To be achieved in: 6 weeks  1. Disability index score of 40% or less for the LEFS to assist with reaching prior level of function. []? Progressing: []? Met: []? Not Met: []? Adjusted  2. Patient will demonstrate increased left knee AROM to 0-110  to allow for proper joint functioning for car/truck mobility, stair amb.   []? Progressing: [x]? Met: []? Not Met: []? Adjusted  3. Patient will demonstrate an increase in Strength to at least 4+/5 for the left knee and hip stabilizers in order to ambulate s AD for community distances. [x]? Progressing: []? Met: []? Not Met: []? Adjusted  4.  Patient will return to walking community distances s AD with mild bilateral knee pain (<3/10). [x]? Progressing: []? Met: []? Not Met: []? Adjusted  5. Pt will be able to drive x 2 hours without increased knee pain; car mobility without knee pain. []? Progressing: []? Met: []? Not Met: []? Adjusted         Progression Towards Functional goals:  [] Patient is progressing as expected towards functional goals listed. [x] Progression is slowed due to complexities listed. [] Progression has been slowed due to co-morbidities. [] Plan just implemented, too soon to assess goals progression  [] Other:         Overall Progression Towards Functional goals/ Treatment Progress Update:  [] Patient is progressing as expected towards functional goals listed. [x] Progression is slowed due to complexities/Impairments listed. [] Progression has been slowed due to co-morbidities. [] Plan just implemented, too soon to assess goals progression <30days   [] Goals require adjustment due to lack of progress  [] Patient is not progressing as expected and requires additional follow up with physician  [] Other    Prognosis for POC: [x] Good [] Fair  [] Poor      Patient requires continued skilled intervention: [x] Yes  [] No    Treatment/Activity Tolerance:  [x] Patient able to complete treatment  [] Patient limited by fatigue  [] Patient limited by pain    [] Patient limited by other medical complications  [] Other:     ASSESSMENT: Dutch Handy has made good improvements in the past week with quad function and tolerance to walking with one crutch. He does fatigue very quickly with ambulation and also needs to take standing rest breaks d/t bilateral knee pain. He will need to continue with progressive re-strengthening and work to build stamina in order to walk without crutches for community ambulation (pt's goal).      Return to Play: (if applicable)   []  Stage 1: Intro to Strength   []  Stage 2: Return to Run and Strength   []  Stage 3: Return to Jump and Strength   []  Stage 4: Dynamic Strength and Agility   []  Stage 5: Sport Specific Training     []  Ready to Return to Play, Meets All Above Stages   []  Not Ready for Return to Sports   Comments:                               PLAN: Continue to improve quad control, work on progressive [address] as dayanna. [x] Continue per plan of care [] Alter current plan (see comments above)  [] Plan of care initiated [] Hold pending MD visit [] Discharge      Electronically signed by:  Kadi Rao, PT, DPT    Note: If patient does not return for scheduled/ recommended follow up visits, this note will serve as a discharge from care along with most recent update on progress.

## 2020-09-07 PROBLEM — L73.9 FOLLICULITIS: Status: RESOLVED | Noted: 2020-08-28 | Resolved: 2020-09-07

## 2020-09-07 PROBLEM — I87.2 STASIS DERMATITIS OF BOTH LEGS: Status: RESOLVED | Noted: 2020-08-28 | Resolved: 2020-09-07

## 2020-09-07 NOTE — PROGRESS NOTES
[facility] Progress Note    Ladora Ganser     : 1960    DATE OF VISIT:  9/3/2020    Subjective:     Ladora Ganser is a 64 y.o. male who has a venous ulcer located on the right lower leg. Significant symptoms or pertinent wound history since last visit: feeling ok overall, right leg less tender after the ABx this week. Still some intermittent weeping from the left calf, with some crusted exudate and hyperkeratosis I think perpetuating a cycle there. No F/C/D, no sore throat or mouth, no drug rash, no N/V/D. Additional ulcer(s) noted? no.      His current medication list consists of etodolac, fluticasone, furosemide, hydroCHLOROthiazide, lisinopril, loratadine, and rOPINIRole. Just completed a week of Bactrim. Allergies: Patient has no known allergies. Objective:     Vitals:    20 1354   BP: (!) 150/79   Pulse: 105   Resp: 24   Temp: 98.2 °F (36.8 °C)   TempSrc: Oral   Weight: (!) 404 lb (183.3 kg)   Height: 5' 8\" (1.727 m)     AAOx3, overweight, NAD  Intact distal pulses, feet warm, good cap refill  Mild-moderate BL LE edema  Basically resolved RLE cellulitis, much improved folliculitis  No icterus, thrush, drug rash, abd tenderness  He also pointed out a rash on his cheeks - small (1-2 cm) oval areas of very faint erythema, well demarcated, minimally raised, and he only believes they appeared in the last day or so  Edwina-ulcer skin: indurated, pink, santa, warm and hyperkeratotic. Ulcer(s): no distinct venous ulcer remaining, but a few areas of denudation persist on the right shin, I think from the edges of last week's dressing; left posterior calf with a bit of crusted exudate and hyperkeratosis, making skin less supple there, leading to recurrent minor cracks and then more weeping. Photos also saved in electronic chart.     Today's Wound Measurements, per RN documentation:  [REMOVED] Wound 20 #1 right lower pretib cluster, venous, partial thickness, last 2 years-Wound Length (cm): 0 cm    [REMOVED] Wound 07/28/20 #1 right lower pretib cluster, venous, partial thickness, last 2 years-Wound Width (cm): 0 cm    [REMOVED] Wound 07/28/20 #1 right lower pretib cluster, venous, partial thickness, last 2 years-Wound Depth (cm): 0 cm    Assessment:     Patient Active Problem List   Diagnosis Code    Obesity, morbid ([facility] [address] E66.01    Mixed hypertriglyceridemia T54.5    Nonalcoholic fatty liver disease K76.0    RLS (restless legs syndrome) G25.81    Essential hypertension, benign I10    Impaired fasting glucose R73.01    Chronic pain of left knee M25.562, G89.29    Osteoarthritis of left knee M17.12    Chondromalacia of left knee M94.262    Pes planus M21.40    Leg swelling M79.89    Ulcer of right leg, limited to breakdown of skin ([facility] [address] L97.911    Peripheral venous insufficiency I87.2    Allergic rhinitis J30.9       Assessment of today's active condition(s): venous stasis, chronic skin changes, LE edema, borderline healed venous leg ulcers; right leg folliculitis and cellulitis likely Staph aureus, basically resolved with a week of Bactrim. Not sure what to make of the facial rash -- almost looks like tinea corporis, but rather symmetric left to right, and sounds too rapid in onset; not really classic for seborrheic dermatitis; perhaps a fixed drug eruption from the Bactrim? I'm afraid that the intermittent weeping, crusting and hyperkeratosis will likely continue for some time - there's only so much that daily compression garments and elevation can do; hopefully as his knee improves and he gets more active, increased leg exercises and weight loss will help his venous HTN and edema, and that problem will slowly improve. Factors contributing to occurrence and/or persistence of the chronic ulcer include edema, venous stasis, decreased mobility and obesity. Medical necessity of today's visit is shown by the above documentation.  Sharp debridement is not indicated today, based upon the exam findings in the wound(s) above. He does have an indication for a weekly compression wrap. Procedure note:     Consent obtained. Time out performed per [facility]. Anesthetic  Anesthetic: 4% Lidocaine Cream     After cleansing of the wounds with saline and applying skin-care and/or dressing materials as listed below, Right unilateral application of a multi-layer compression wrap was performed per physician order, to help manage edema, stasis dermatitis, and/or venous ulcers. Just need to wrap that right leg for (hopefully) one more week, to get some of that delicate and denuded skin to heal more completely. The patient's level of pain during and after the procedure was monitored, and is noted in the wound documentation flowsheet. Discharge plan:     Treatment in the wound care center today, per RN documentation: [REMOVED] Wound 07/28/20 #1 right lower pretib cluster, venous, partial thickness, last 2 years-Dressing/Treatment: (procellera hydrogel benzoin mepilex border CoFlex TLC calamine). Leave primary dressing and multi-layer wrap(s) in place until the next appointment. He should call before his next visit if there is any significant pain, significant strike-through of drainage to the surface of the wrap, or any significant sense of the wrap sliding down more than an inch or so, bunching-up and abrading his skin. I reminded the patient of the importance of weight management and smoking cessation, if applicable; also encouraged ambulation as tolerated, additional lower extremity exercises as instructed in our education sheet, leg elevation when at rest, and compliance with any recommended dietary, diuretic and compression therapies. Continue Velcro garment to left leg daily for now, hopefully to add the right leg wrap next week.     He should remove the right leg wrap and dressing next Wednesday and give us a call; if it is more clearly healed, he doesn't need to FU right away; if it's still clearly open, I'm happy to see him next Thursday. Also at that time we can discuss his facial rash, if still present. For the areas of crusted exudate and hyperkeratosis, daily Aquaphor (or similar) at night, after removing compression garments. Written discharge instructions given to patient. Follow up in 1 week if he believes that his legs are still open.     Electronically signed by Smita Farias MD on 9/7/2020 at 7:49 PM.

## 2020-09-08 ENCOUNTER — TELEPHONE (OUTPATIENT)
Dept: WOUND CARE | Age: 60
End: 2020-09-08

## 2020-09-08 ENCOUNTER — TELEPHONE (OUTPATIENT)
Dept: INTERNAL MEDICINE CLINIC | Age: 60
End: 2020-09-08

## 2020-09-08 ENCOUNTER — HOSPITAL ENCOUNTER (OUTPATIENT)
Dept: PHYSICAL THERAPY | Age: 60
Setting detail: THERAPIES SERIES
Discharge: HOME | End: 2020-09-08
Payer: COMMERCIAL

## 2020-09-08 PROCEDURE — 97110 THERAPEUTIC EXERCISES: CPT

## 2020-09-08 PROCEDURE — 97016 VASOPNEUMATIC DEVICE THERAPY: CPT

## 2020-09-08 PROCEDURE — 97140 MANUAL THERAPY 1/> REGIONS: CPT

## 2020-09-08 RX ORDER — FUROSEMIDE 40 MG/1
40 TABLET ORAL DAILY
Qty: 90 TABLET | Refills: 0 | Status: SHIPPED
Start: 2020-09-08 | End: 2020-09-25 | Stop reason: DRUGHIGH

## 2020-09-08 RX ORDER — LORATADINE 10 MG/1
10 TABLET ORAL DAILY
Qty: 90 TABLET | Refills: 0 | Status: SHIPPED | OUTPATIENT
Start: 2020-09-08 | End: 2020-12-16 | Stop reason: SDUPTHER

## 2020-09-08 RX ORDER — ROPINIROLE 1 MG/1
1 TABLET, FILM COATED ORAL NIGHTLY
Qty: 90 TABLET | Refills: 0 | Status: SHIPPED | OUTPATIENT
Start: 2020-09-08 | End: 2020-09-21 | Stop reason: SDUPTHER

## 2020-09-08 RX ORDER — LISINOPRIL 10 MG/1
10 TABLET ORAL DAILY
Qty: 90 TABLET | Refills: 0 | Status: SHIPPED | OUTPATIENT
Start: 2020-09-08 | End: 2020-12-16

## 2020-09-08 RX ORDER — FLUTICASONE PROPIONATE 50 MCG
1 SPRAY, SUSPENSION (ML) NASAL DAILY
Qty: 3 BOTTLE | Refills: 0 | Status: SHIPPED | OUTPATIENT
Start: 2020-09-08 | End: 2021-10-20

## 2020-09-08 NOTE — FLOWSHEET NOTE
[facility],  [address] [place]  Phone: [phone]  Fax [phone]    Physical Therapy Treatment Note/ Progress Report:           Date:  2020    Patient Name:  Martha Lomeli    :  1960  MRN: [id number]  Restrictions/Precautions:    Medical/Treatment Diagnosis Information:  · Diagnosis: N48.805U (ICD-10-CM) - Complex tear of medial meniscus of left knee as current injury, initial encounter  · Treatment Diagnosis: M25.562 Left knee pain; M25.662 Left knee stiffness; R26.2 Difficulty in walking  Insurance/Certification information:  PT Insurance Information: [employer] [place]  Physician Information:  Referring Practitioner: Dr. Josefina Etienne  Has the plan of care been signed (Y/N):        []  Yes  [x]  No     Date of Patient follow up with Physician:       Is this a Progress Report:     []  Yes  [x]  No        If Yes:  Date Range for reporting period:  Beginning 8/3/20  Ending 9/3/20    Progress report will be due (10 Rx or 30 days whichever is less):       Recertification will be due (POC Duration  / 90 days whichever is less):        Visit # Insurance Allowable Requires auth   9 (prog note written visit 8) 30 (new insu )    [x]no        []yes:     Functional Scale:LEFS 75% disability   Date assessed:  20      Therapy Diagnosis/Practice Pattern:E    Number of Comorbidities:  []0     []1-2    [x]3+    Latex Allergy:  [x]NO      []YES  Preferred Language for Healthcare:   [x]English       []other:       Pain level: 1/10 current, but 1-3/10 yesterday     SUBJECTIVE:  Pt is very frustrated that he's been in more pain since LV (when he had to go to wound care clinic and walk a lot immediately following PT.) He had more swelling, dec'd tolerance for walking, and less knee ROM since LV. He has had to use B crutches all weekend and he doesn't feel he can do the bike today.  He did get home stationary pedals to continue to work on ROM. OBJECTIVE:    Observation: B LEs wrapped for vascular wounds/drainage + light compression hose B   Test measurements:  Knee flexion AROM 107, increased to 110 with bridge       RESTRICTIONS/PRECAUTIONS: HTN  Procedure(s) 8/3/2020:  1.  Left knee arthroscopy with partial medial meniscectomy and chondroplasty medial femoral condyle with synovial biopsy (30597-08)    Exercises/Interventions:     Therapeutic Ex (64418) Sets/sec Notes/CUES HEP   Pt ed: findings of exam and POC; recovery expectations, use compression stocking, ice, elevate to reduce swelling, SS of infection, contin to use B crutches for safety and to decrease pain;   On days of inc'd soreness/swelling, still do exc to keep knee from getting too stiff 6'     Supine heel slide c strap on SB  Seated heel slide with glider , 5\"x15  I  X   HL adductor stretch 3x30\"     Quad set with NMES    Towel behind knee X   Bridge with increasing knee flex 10x 110 deg    SLR-  Indep today x12  X   SAQ 5\" 2x10 2#     SL hip abd  clamshell 2x103\" 2x10   2# on knee    Long sit gastroc s  Standing calf stretch wedge   X   Seated LAQ  5\" 2x10 held wt today d/t soreness X   Seated HR, TR   X   Standing HR, TR  At [address]    Mini squat 2x5 At bar    Side step 1/2 wall (long)  UE support Pt felt too sore today   Step up 4\"  x3 Heavy UE support, min assist last two Attempted on [address] platform (4\" too hard), but too sore after 3 reps so stopped   Standing glider: abd, ext diag x10 R, L ea UE supp on bar Cue for posture   Bike (low)  Seat 17 Pt too sore today   Manual Intervention (26078) 15' total     Patellar mobs sup, inf 20x     Re-apply bandaids,      Edema massage, STM to quad, HS, medial knee  Stick to quad, med/lat thigh 12'  Showed pt how to use stick from home   HS s  Knee slightly bent                NMR re-education (46948)   CUES NEEDED   Gait training one crutch  standing Breaks needed    Weight shifting A/P  10x R,L No  fingertip support today    NMES L quad x12' total (with quad iso, SAQ, SLR)   [place] 10\"/10\"    Weight shifting A/P with one crutch   Intermittent CL UE support when L foot back- not ready to wean to one crutch   Split stance balance + airex at 1/2 wall     Intermittent UE support                           Therapeutic Activity (90501)                                                Therapeutic Exercise and NMR EXR  [x] (62341) Provided verbal/tactile cueing for activities related to strengthening, flexibility, endurance, ROM for improvements in LE, proximal hip, and core control with self care, mobility, lifting, ambulation.  [] (55018) Provided verbal/tactile cueing for activities related to improving balance, coordination, kinesthetic sense, posture, motor skill, proprioception  to assist with LE, proximal hip, and core control in self care, mobility, lifting, ambulation and eccentric single leg control.      NMR and Therapeutic Activities:    [x] (95750 or 88519) Provided verbal/tactile cueing for activities related to improving balance, coordination, kinesthetic sense, posture, motor skill, proprioception and motor activation to allow for proper function of core, proximal hip and LE with self care and ADLs  [x] (12369) Gait Re-education- Provided training and instruction to the patient for proper LE, core and proximal hip recruitment and positioning and eccentric body weight control with ambulation re-education including up and down stairs     Home Exercise Program:    [x] (33593) Reviewed/Progressed HEP activities related to strengthening, flexibility, endurance, ROM of core, proximal hip and LE for functional self-care, mobility, lifting and ambulation/stair navigation   [] (91936)Reviewed/Progressed HEP activities related to improving balance, coordination, kinesthetic sense, posture, motor skill, proprioception of core, proximal hip and LE for self care, mobility, lifting, and ambulation/stair navigation      Manual Treatments:  PROM / STM / Oscillations-Mobs:  G-I, II, III, IV (PA's, Inf., Post.)  [x] (40159) Provided manual therapy to mobilize LE, proximal hip and/or LS spine soft tissue/joints for the purpose of modulating pain, promoting relaxation,  increasing ROM, reducing/eliminating soft tissue swelling/inflammation/restriction, improving soft tissue extensibility and allowing for proper ROM for normal function with self care, mobility, lifting and ambulation. Modalities:     [x] GAME READY (VASO)- for significant edema, swelling, pain control. (low) in long-sitting x15'    Charges:  Timed Code Treatment Minutes: 45   Total Treatment Minutes: 60 ( vaso)     [employer] time in/time out:   (and requires time in and out for each CPT code)    [] EVAL (LOW) 42594 (typically 20 minutes face-to-face)  [] EVAL (MOD) 97936 (typically 30 minutes face-to-face)  [] EVAL (HIGH) 10652 (typically 45 minutes face-to-face)  [] RE-EVAL     [x] OM(50745) x 2   [] IONTO  [] NMR (25830) x     [x] VASO  [x] Manual (42597) x 1     [] Other: gait x  [] TA x      [] Mech Traction (54392)  [] ES(attended) (40602)      [] ES (un) (13536):       GOALS:   Patient stated goal: 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 Progressing: []? Met: []? Not Met: []? Adjusted     Therapist goals for Patient:   Short Term Goals: To be achieved in: 2 weeks  1. Independent in HEP and progression per patient tolerance, in order to prevent re-injury. [x]? Progressing: []? Met: []? Not Met: []? Adjusted  2. Patient will have a decrease in pain to facilitate improvement in movement, function, and ADLs as indicated by Functional Deficits. [x]? Progressing: []? Met: []? Not Met: []? Adjusted     Long Term Goals: To be achieved in: 6 weeks  1. Disability index score of 40% or less for the LEFS to assist with reaching prior level of function. []? Progressing: []? Met: []? Not Met: []? Adjusted  2.  Patient will demonstrate increased left knee AROM to 0-110  to allow for proper joint functioning for car/truck mobility, stair amb.   []? Progressing: [x]? Met: []? Not Met: []? Adjusted  3. Patient will demonstrate an increase in Strength to at least 4+/5 for the left knee and hip stabilizers in order to ambulate s AD for community distances. [x]? Progressing: []? Met: []? Not Met: []? Adjusted  4. Patient will return to walking community distances s AD with mild bilateral knee pain (<3/10). [x]? Progressing: []? Met: []? Not Met: []? Adjusted  5. Pt will be able to drive x 2 hours without increased knee pain; car mobility without knee pain. []? Progressing: []? Met: []? Not Met: []? Adjusted         Progression Towards Functional goals:  [] Patient is progressing as expected towards functional goals listed. [x] Progression is slowed due to complexities listed. [] Progression has been slowed due to co-morbidities. [] Plan just implemented, too soon to assess goals progression  [] Other:         Overall Progression Towards Functional goals/ Treatment Progress Update:  [] Patient is progressing as expected towards functional goals listed. [x] Progression is slowed due to complexities/Impairments listed. [] Progression has been slowed due to co-morbidities.   [] Plan just implemented, too soon to assess goals progression <30days   [] Goals require adjustment due to lack of progress  [] Patient is not progressing as expected and requires additional follow up with physician  [] Other    Prognosis for POC: [x] Good [] Fair  [] Poor      Patient requires continued skilled intervention: [x] Yes  [] No    Treatment/Activity Tolerance:  [x] Patient able to complete treatment  [] Patient limited by fatigue  [] Patient limited by pain    [] Patient limited by other medical complications  [] Other:     ASSESSMENT: Pt entered PT this session with inc'd pain, swelling, and therefore dec'd dayanna for [address], therefore modified some standing there-ex again this visit. He will benefit from continued PT to maximize strength, gait, endurance. D/t pt's body habitus, unsure if he would be candidate for Alter G, but discussed aquatic exc and walking to help improve stamina while reducing load on joints, pt is agreeable to this plan. Return to Play: (if applicable)   []  Stage 1: Intro to Strength   []  Stage 2: Return to Run and Strength   []  Stage 3: Return to Jump and Strength   []  Stage 4: Dynamic Strength and Agility   []  Stage 5: Sport Specific Training     []  Ready to Return to Play, Meets All Above Stages   []  Not Ready for Return to Sports   Comments:                                PLAN: Continue to improve quad control, work on progressive [address] as dayanna. Consider starting aquatic exc if ok per wound care MD  (pt states skin still not fully closed)  [x] Continue per plan of care [] Alter current plan (see comments above)  [] Plan of care initiated [] Hold pending MD visit [] Discharge      Electronically signed by:  Catalina Eagle, PT, DPT    Note: If patient does not return for scheduled/ recommended follow up visits, this note will serve as a discharge from care along with most recent update on progress.

## 2020-09-10 ENCOUNTER — HOSPITAL ENCOUNTER (OUTPATIENT)
Dept: WOUND CARE | Age: 60
Discharge: HOME | End: 2020-09-10
Payer: COMMERCIAL

## 2020-09-10 ENCOUNTER — OFFICE VISIT (OUTPATIENT)
Dept: ORTHOPEDIC SURGERY | Age: 60
End: 2020-09-10

## 2020-09-10 VITALS
WEIGHT: 315 LBS | SYSTOLIC BLOOD PRESSURE: 140 MMHG | TEMPERATURE: 99 F | DIASTOLIC BLOOD PRESSURE: 78 MMHG | BODY MASS INDEX: 47.74 KG/M2 | HEIGHT: 68 IN | HEART RATE: 112 BPM | RESPIRATION RATE: 26 BRPM | OXYGEN SATURATION: 95 %

## 2020-09-10 VITALS — HEIGHT: 68 IN | WEIGHT: 315 LBS | BODY MASS INDEX: 47.74 KG/M2

## 2020-09-10 LAB
ANION GAP SERPL CALCULATED.3IONS-SCNC: 8 MMOL/L (ref 3–16)
BUN BLDV-MCNC: 22 MG/DL (ref 7–20)
CALCIUM SERPL-MCNC: 9 MG/DL (ref 8.3–10.6)
CHLORIDE BLD-SCNC: 103 MMOL/L (ref 99–110)
CO2: 25 MMOL/L (ref 21–32)
CREAT SERPL-MCNC: 1 MG/DL (ref 0.9–1.3)
GFR AFRICAN AMERICAN: >60
GFR NON-AFRICAN AMERICAN: >60
GLUCOSE BLD-MCNC: 202 MG/DL (ref 70–99)
POTASSIUM REFLEX MAGNESIUM: 4.3 MMOL/L (ref 3.5–5.1)
SODIUM BLD-SCNC: 136 MMOL/L (ref 136–145)

## 2020-09-10 PROCEDURE — 29581 APPL MULTLAYER CMPRN SYS LEG: CPT

## 2020-09-10 PROCEDURE — 80048 BASIC METABOLIC PNL TOTAL CA: CPT

## 2020-09-10 PROCEDURE — 99024 POSTOP FOLLOW-UP VISIT: CPT | Performed by: ORTHOPAEDIC SURGERY

## 2020-09-10 PROCEDURE — 29581 APPL MULTLAYER CMPRN SYS LEG: CPT | Performed by: INTERNAL MEDICINE

## 2020-09-10 PROCEDURE — 36415 COLL VENOUS BLD VENIPUNCTURE: CPT

## 2020-09-10 RX ORDER — LIDOCAINE 40 MG/G
CREAM TOPICAL ONCE
Status: DISCONTINUED | OUTPATIENT
Start: 2020-09-10 | End: 2020-09-11 | Stop reason: HOSPADM

## 2020-09-10 RX ORDER — HYDROCHLOROTHIAZIDE 25 MG/1
TABLET ORAL
Qty: 90 TABLET | Refills: 0 | Status: SHIPPED | OUTPATIENT
Start: 2020-09-10 | End: 2020-12-16

## 2020-09-10 ASSESSMENT — PAIN SCALES - GENERAL: PAINLEVEL_OUTOF10: 1

## 2020-09-10 ASSESSMENT — PAIN DESCRIPTION - DESCRIPTORS: DESCRIPTORS: BURNING

## 2020-09-10 ASSESSMENT — PAIN DESCRIPTION - LOCATION: LOCATION: LEG

## 2020-09-10 ASSESSMENT — PAIN DESCRIPTION - ORIENTATION: ORIENTATION: RIGHT;LOWER

## 2020-09-10 ASSESSMENT — PAIN DESCRIPTION - FREQUENCY: FREQUENCY: INTERMITTENT

## 2020-09-10 ASSESSMENT — PAIN DESCRIPTION - ONSET: ONSET: SUDDEN

## 2020-09-10 ASSESSMENT — PAIN DESCRIPTION - PAIN TYPE: TYPE: ACUTE PAIN

## 2020-09-10 ASSESSMENT — PAIN DESCRIPTION - PROGRESSION: CLINICAL_PROGRESSION: GRADUALLY WORSENING

## 2020-09-10 ASSESSMENT — PAIN - FUNCTIONAL ASSESSMENT: PAIN_FUNCTIONAL_ASSESSMENT: ACTIVITIES ARE NOT PREVENTED

## 2020-09-10 NOTE — PROGRESS NOTES
Chief Complaint  Post-Op Check (LEFT KNEE  SX  08/03/2020, increased pain after therapy and activity)      History of Present Illness:  Papa Granados is a 64 y.o. y/o male who presents today for follow up of his left knee now 6 weeks status post left knee arthroscopy with partial medial meniscectomy and chondroplasty medial femoral condyle with synovial biopsy. Overall he is doing okay, but recently had some worsening pain after some physical therapy. He has been taking etodolac as well as acetaminophen. He has been doing physical therapy. He still using crutches. He is still an treatment at the wound center for venous stasis ulcers. No redness or swelling or issues with his knee. Medical History  Past Medical History:   Diagnosis Date    Cellulitis of lower extremity 9/25/2019    Hypertension     Impaired fasting glucose 5/2013    Obesity     Screening PSA (prostate specific antigen) 12/30/2015;5/1/17    Nml:0.53(12/30/2015);5/1/17=nml.     Stasis dermatitis of both legs 8/28/2020    Tobacco use     Tubular adenoma of colon 09/14/2017       Past Surgical History:   Procedure Laterality Date    CIRCUMCISION      COLONOSCOPY  09/14/2017    Colonoscopy with polypectomy (cold biopsy):Dr. Walker:next in 3yrs=9/14/2020    KNEE ARTHROSCOPY Left 8/3/2020    VIDEO ARTHROSCOPY LEFT KNEE, PARTIAL MEDIAL MENISCECTOMY, CHONDROPLASTY, SYNOVIAL BIOPSY -TOM=4- performed by Susu Stubbs MD at [address] [facility]  05/28/2011    VASECTOMY      WISDOM TOOTH EXTRACTION         Social History     Socioeconomic History    Marital status:      Spouse name: Not on file    Number of children: 2    Years of education: Not on file    Highest education level: Not on file   Occupational History    Occupation: [employer]   Social Needs    Financial resource strain: Not on file    Food insecurity     Worry: Not on file     Inability: Not on file   [employer]t [employer]al Transportation needs     Medical: Not on file     Non-medical: Not on file   Tobacco Use    Smoking status: Former Smoker     Packs/day: 2.00     Years: 25.00     Pack years: 50.00     Types: Cigarettes     Last attempt to quit: 2006     Years since quittin.3    Smokeless tobacco: Never Used   Substance and Sexual Activity    Alcohol use: Yes     Comment: ocas    Drug use: No    Sexual activity: Yes     Partners: Female   Lifestyle    Physical activity     Days per week: Not on file     Minutes per session: Not on file    Stress: Not on file   Relationships    Social connections     Talks on phone: Not on file     Gets together: Not on file     Attends Religion service: Not on file     Active member of club or organization: Not on file     Attends meetings of clubs or organizations: Not on file     Relationship status: Not on file    Intimate partner violence     Fear of current or ex partner: Not on file     Emotionally abused: Not on file     Physically abused: Not on file     Forced sexual activity: Not on file   Other Topics Concern    Not on file   Social History Narrative    Not on file       Family History   Problem Relation Age of Onset    High Blood Pressure Mother     Heart Disease Mother         MI    AT 66    Diabetes Brother         Review of Systems  Pertinent items are noted in HPI       Vital Signs  There were no vitals filed for this visit. General Exam:   Constitutional: Patient is adequately groomed with no evidence of malnutrition  Mental Status: The patient is oriented to time, place and person. The patient's mood and affect are appropriate. Lymphatic: The lymphatic examination bilaterally reveals all areas to be without enlargement or induration. Neurological: The patient has good coordination. There is no weakness or sensory deficit.      Gait: Antalgic with crutches    Left knee examination  Inspection: No effusion, no erythema, incisions well-healed    Palpation: Moderate tenderness to the medial aspect of the knee    Range of Motion: 0-110    Sensation: In tact to light touch all nerve distributions     Strength: Knee flexion extension are intact good quad tone    Special Tests: None    Skin: There are no additional worrisome rashes, ulcerations or lesions. Circulation normal    Additional Examinations:  Right Lower Extremity: Examination of the right lower extremity does not show any tenderness, deformity or injury. Range of motion is unremarkable. There is no gross instability. There are no rashes, ulcerations or lesions. Strength and tone are normal.      Radiology:     X-rays obtained and reviewed in office:  No new imaging      Assessment:  53-year-old male 6 weeks status post left knee arthroscopy with partial medial meniscectomy and chondroplasty and removal loose body/synovial biopsy    Office Procedures:  No orders of the defined types were placed in this encounter. Plan:   -At this time we discussed I would recommend continue with physical therapy, however he does have a lot of pain after his last therapy session he may want to give a session allow his knee to calm down  -Ice, activity modification, medications. We also discussed supplements as well as topical agents  -He is moving forward with weight loss clinic as well  -He is also scheduled to follow-up with Dr. Kunal Justin to discuss further treatment and possible injections and if there are issues in interim he will contact the office    Mina Florence MD  [address] [employer] of [employer]    Voice Recognition Dictation disclaimer: Please note that portions of this chart were generated using Dragon dictation software. Although every effort was made to ensure the accuracy of this automated transcription, some errors in transcription may have occurred.

## 2020-09-10 NOTE — PLAN OF CARE
Wounds of RLE present (venous). No debridement of these wounds today, 24# weight gain in past week. Patient relates it to inactivity. Labs ordered to check kidney function. Pt denies SOB.   Fourflex order as compression for RLE, he will wear his velcro compression garment to RLE, encouraged more elevation of BLE, f/u x 1 week, MD orders/D/C instructions reviewed with patient, all questions answered; copy of instructions given to patient

## 2020-09-11 ENCOUNTER — APPOINTMENT (OUTPATIENT)
Dept: PHYSICAL THERAPY | Age: 60
End: 2020-09-11
Payer: COMMERCIAL

## 2020-09-14 ENCOUNTER — OFFICE VISIT (OUTPATIENT)
Dept: ORTHOPEDIC SURGERY | Age: 60
End: 2020-09-14

## 2020-09-14 ENCOUNTER — HOSPITAL ENCOUNTER (OUTPATIENT)
Dept: PHYSICAL THERAPY | Age: 60
Setting detail: THERAPIES SERIES
Discharge: HOME | End: 2020-09-14
Payer: COMMERCIAL

## 2020-09-14 VITALS — HEIGHT: 68 IN | WEIGHT: 315 LBS | BODY MASS INDEX: 47.74 KG/M2

## 2020-09-14 PROCEDURE — 97016 VASOPNEUMATIC DEVICE THERAPY: CPT

## 2020-09-14 PROCEDURE — 99024 POSTOP FOLLOW-UP VISIT: CPT | Performed by: FAMILY MEDICINE

## 2020-09-14 PROCEDURE — 97110 THERAPEUTIC EXERCISES: CPT

## 2020-09-14 PROCEDURE — 97140 MANUAL THERAPY 1/> REGIONS: CPT

## 2020-09-14 NOTE — PROGRESS NOTES
[address] [facility] Progress Note    Harjinder Luna     : 1960    DATE OF VISIT:  9/10/2020    Subjective:     Harjinder Luna is a 64 y.o. male who has a venous and  lymphedema ulcer located on the right lower leg. Significant symptoms or pertinent wound history since last visit: he removed his right leg wrap and dressing a couple of days ago to see if he might be healed, but noted some new patches of skin inflammation, drainage, irritation, so kept a dressing in place to handle drainage, but hasn't really had any significant compression on his RLE for a couple of days. Left leg CompreFlex is still in place, and he has a small amount of intermittent weeping from the calf still. Legs seem significantly more swollen this week, and his weight is up sharply (though I'm not sure I believe every one of his documented weights is accurate). Denies increase in exertional SOB, no CP. No F/C/D. Compliant with his medications. Still has a bit of a rash on his cheeks, but it's largely faded away this week without any specific therapy - starting to wonder if it could be a fixed drug eruption from his Bactrim, given the timing and appearance, but can't say for sure because it's only happened once. Additional ulcer(s) noted? no.      His current medication list consists of etodolac, fluticasone, furosemide, hydroCHLOROthiazide, lisinopril, loratadine, and rOPINIRole. Allergies: Bactrim [sulfamethoxazole-trimethoprim] -- possible fixed drug eruption here recently.      Objective:     Vitals:    09/10/20 1355 09/10/20 1447   BP: (!) 140/78    Pulse: 112    Resp: 26    Temp: 99 °F (37.2 °C)    TempSrc: Oral    SpO2:  95%   Weight: (!) 429 lb 12.8 oz (195 kg)    Height: 5' 8\" (1.727 m)      AAOx3, overweight, fatigued, NAD  Intact distal pulses, feet warm, good cap refill  Mild-mod left and more mod-severe right lower extremity stage 2 lymphedema  Increased venous stasis erythema / dermatitis on the right side, but no recurrent folliculitis or cellulitis, no angitis or fluctuance  Still some allodynia around the right shin / calf ulcer  Edwina-ulcer skin: indurated, pink, santa, warm and hyperkeratotic. Ulcer(s): partial thickness, red, finely granular, a bit of fibrin, moderate serous exudate. Photos also saved in electronic chart. Today's Wound Measurements, per RN documentation:  Wound 07/28/20 #1 right lower pretib cluster, venous, partial thickness, last 2 years-Wound Length (cm): 14 cm    Wound 07/28/20 #1 right lower pretib cluster, venous, partial thickness, last 2 years-Wound Width (cm): 10 cm    Wound 07/28/20 #1 right lower pretib cluster, venous, partial thickness, last 2 years-Wound Depth (cm): 0.1 cm    Assessment:     Patient Active Problem List   Diagnosis Code    Obesity, morbid ([facility] [address] E66.01    Mixed hypertriglyceridemia Z39.1    Nonalcoholic fatty liver disease K76.0    RLS (restless legs syndrome) G25.81    Essential hypertension, benign I10    Impaired fasting glucose R73.01    Chronic pain of left knee M25.562, G89.29    Osteoarthritis of left knee M17.12    Chondromalacia of left knee M94.262    Pes planus M21.40    Leg swelling M79.89    Ulcer of right leg, limited to breakdown of skin ([facility] [address] L97.911    Peripheral venous insufficiency I87.2    Allergic rhinitis J30.9       Assessment of today's active condition(s): venous stasis, chronic skin changes, LE edema, delicately healed LLE ulcer, flaring right leg ulcer because of increased edema this week. Recent superficial skin infection resolved; no signs of infection. Would like to increase his diuresis for a bit, but should make sure that he hasn't had an episode of AFIA for some reason first. Factors contributing to occurrence and/or persistence of the chronic ulcer include venous stasis, lymphedema, decreased mobility and obesity. Medical necessity of today's visit is shown by the above documentation.  Sharp debridement is not indicated today, based upon the exam findings in the wound(s) above. He does have an indication for a weekly compression wrap. Procedure note:     Consent obtained. Time out performed per [facility]. Anesthetic  Anesthetic: 4% Lidocaine Cream     After cleansing of the wounds with saline and applying skin-care and/or dressing materials as listed below, Right unilateral application of a multi-layer compression wrap was performed per physician order, to help manage edema, stasis dermatitis, and/or venous ulcers. The patient's level of pain during and after the procedure was monitored, and is noted in the wound documentation flowsheet. Discharge plan:     Treatment in the wound care center today, per RN documentation: Wound 07/28/20 #1 right lower pretib cluster, venous, partial thickness, last 2 years-Dressing/Treatment: (triamcinolone calmoseptine opticel ag fourflex spandagrip). Increasing compression to a 4-layer wrap for this week. Will get a BMP today. If BUN/creat basically stable, will ask him to double his Lasix for a week. Will update his PCP. Just keep an eye on that fading cheek rash for now; would consider him to have a possible allergic reaction to Bactrim, but if he ever has an infection for which Bactrim really would be the best choice of treatments, could challenge him again and watch closely for recurrence of the rash. Leave primary dressing and multi-layer wrap(s) in place until the next appointment. He should call before his next visit if there is any significant pain, significant strike-through of drainage to the surface of the wrap, or any significant sense of the wrap sliding down more than an inch or so, bunching-up and abrading his skin.      I reminded the patient of the importance of weight management and smoking cessation, if applicable; also encouraged ambulation as tolerated, additional lower extremity exercises as instructed in our education sheet, leg elevation when at rest, and compliance with any recommended dietary, diuretic and compression therapies. Keep using the CompreFlex on the left leg each day. Written discharge instructions given to patient. Follow up in 1 week.     Electronically signed by Rodrigo Walter MD on 9/14/2020 at 2:43 PM.

## 2020-09-14 NOTE — FLOWSHEET NOTE
[facility],  [address] [place]  Phone: [phone]  Fax [phone]    Physical Therapy Treatment Note/ Progress Report:           Date:  2020    Patient Name:  Andres He    :  1960  MRN: [id number]  Restrictions/Precautions:    Medical/Treatment Diagnosis Information:  · Diagnosis: R46.556M (ICD-10-CM) - Complex tear of medial meniscus of left knee as current injury, initial encounter  · Treatment Diagnosis: M25.562 Left knee pain; M25.662 Left knee stiffness; R26.2 Difficulty in walking  Insurance/Certification information:  PT Insurance Information: [employer] [place]  Physician Information:  Referring Practitioner: Dr. Princess Brewster  Has the plan of care been signed (Y/N):        []  Yes  [x]  No     Date of Patient follow up with Physician:       Is this a Progress Report:     []  Yes  [x]  No        If Yes:  Date Range for reporting period:  Beginning 8/3/20  Ending 9/3/20    Progress report will be due (10 Rx or 30 days whichever is less):       Recertification will be due (POC Duration  / 90 days whichever is less):        Visit # Insurance Allowable Requires auth   10  30 (new insu )    [x]no        []yes:     Functional Scale:LEFS 75% disability   Date assessed:  20      Therapy Diagnosis/Practice Pattern:E    Number of Comorbidities:  []0     []1-2    [x]3+    Latex Allergy:  [x]NO      []YES  Preferred Language for Healthcare:   [x]English       []other:       Pain level: 1/10 current, but 1-3/10 yesterday     SUBJECTIVE:  Pt states that he was very sore after walking at his visit on 9/3 and then to his wound care appointment. He is frustrated that he has a constant ache in his left knee (rates it 1/10). It is better this morning than it has been the past few days. He will see doctor Valerie Suárez this morning because his right knee is hurting again.      OBJECTIVE:    Observation: B LEs wrapped for vascular wounds/drainage + light compression hose B   Test measurements:  Knee flexion AROM 107, increased to 115 with bridge       RESTRICTIONS/PRECAUTIONS: HTN  Procedure(s) 8/3/2020:  1.  Left knee arthroscopy with partial medial meniscectomy and chondroplasty medial femoral condyle with synovial biopsy ([id number])    Exercises/Interventions:     Therapeutic Ex (30681) Sets/sec Notes/CUES HEP   Pt ed: findings of exam and POC; recovery expectations, use compression stocking, ice, elevate to reduce swelling, SS of infection, contin to use B crutches for safety and to decrease pain;   On days of inc'd soreness/swelling, still do exc to keep knee from getting too stiff 6'     Supine heel slide c strap on SB  Seated heel slide with glider , 5\"x15  I  X   HL adductor stretch 3x30\"     Rectus stretch EOB 15\"x4     Quad set with NMES    Towel behind knee X   Bridge with increasing knee flex 3x 115 deg- HS cramp R    SLR-  Indep today   \" ER 2x5 ea   Min assist X   SAQ 5\" 2x10 1.5#  c ball squeeze    SL hip abd  clamshell 2x103\" 2x10   1.5# on knee    Long sit gastroc s  Standing calf stretch wedge   X   Seated LAQ  5\" 2x10 held wt today d/t soreness X   Seated HR, TR   X   Standing HR, TR  At [facility]    Mini squat  At bar    Side step 1/2 wall (long)  UE support Pt felt too sore today   Step up 4\"   Heavy UE support, min assist last two Attempted on [facility] platform (4\" too hard), but too sore after 3 reps so stopped   Standing glider: abd, ext diag x10 R, L ea UE supp on bar Cue for posture   HSC  2x10  30#    Bike (low)  Seat 17 Pt too sore today   Manual Intervention (56126) 15' total     Patellar mobs sup, inf 20x     Re-apply bandaids,      Edema massage, STM to quad, HS, medial knee  Stick to quad, med/lat thigh 12'  Showed pt how to use stick from home   HS s  Knee slightly bent                NMR re-education (27606)   CUES NEEDED   Gait training one crutch  standing Breaks needed    Weight shifting A/P  10x Patient will demonstrate increased left knee AROM to 0-110  to allow for proper joint functioning for car/truck mobility, stair amb.   []? Progressing: [x]? Met: []? Not Met: []? Adjusted  3. Patient will demonstrate an increase in Strength to at least 4+/5 for the left knee and hip stabilizers in order to ambulate s AD for community distances. [x]? Progressing: []? Met: []? Not Met: []? Adjusted  4. Patient will return to walking community distances s AD with mild bilateral knee pain (<3/10). [x]? Progressing: []? Met: []? Not Met: []? Adjusted  5. Pt will be able to drive x 2 hours without increased knee pain; car mobility without knee pain. []? Progressing: []? Met: []? Not Met: []? Adjusted         Progression Towards Functional goals:  [] Patient is progressing as expected towards functional goals listed. [x] Progression is slowed due to complexities listed. [] Progression has been slowed due to co-morbidities. [] Plan just implemented, too soon to assess goals progression  [] Other:         Overall Progression Towards Functional goals/ Treatment Progress Update:  [] Patient is progressing as expected towards functional goals listed. [x] Progression is slowed due to complexities/Impairments listed. [] Progression has been slowed due to co-morbidities. [] Plan just implemented, too soon to assess goals progression <30days   [] Goals require adjustment due to lack of progress  [] Patient is not progressing as expected and requires additional follow up with physician  [] Other    Prognosis for POC: [x] Good [] Fair  [] Poor      Patient requires continued skilled intervention: [x] Yes  [] No    Treatment/Activity Tolerance:  [x] Patient able to complete treatment  [] Patient limited by fatigue  [] Patient limited by pain    [] Patient limited by other medical complications  [] Other:     ASSESSMENT: Decreased WB activity today d/t poor tolerance after last visit on 9/3.  Despite not being able to progress with walking this visit, he continues to progress with quadricep function as he is able to complete SLR's with a normal ROM and full knee extension. He will benefit from continued PT to maximize strength, gait, endurance. D/t pt's body habitus, unsure if he would be candidate for Alter G, but discussed aquatic exc and walking to help improve stamina while reducing load on joints, pt is agreeable to this plan. Return to Play: (if applicable)   []  Stage 1: Intro to Strength   []  Stage 2: Return to Run and Strength   []  Stage 3: Return to Jump and Strength   []  Stage 4: Dynamic Strength and Agility   []  Stage 5: Sport Specific Training     []  Ready to Return to Play, Meets All Above Stages   []  Not Ready for Return to Sports   Comments:                                PLAN: Continue to improve quad control, work on progressive [address] as dayanna. Consider starting aquatic exc if ok per wound care MD  (pt states skin still not fully closed)  [x] Continue per plan of care [] Alter current plan (see comments above)  [] Plan of care initiated [] Hold pending MD visit [] Discharge      Electronically signed by:  Sae Plunkett, PT, DPT    Note: If patient does not return for scheduled/ recommended follow up visits, this note will serve as a discharge from care along with most recent update on progress.

## 2020-09-14 NOTE — PROGRESS NOTES
Chief Complaint  Knee Pain (CK LEFT KNEE)      Alcira Key is a 64 y.o. 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 History of Present Illness for Follow Up Patient:      Patient is being seen today for acute left knee pain. The patient reports that since the first week in March 2020 he began noticed the insidious onset of pain to the anterior medial portion of his left knee. Shanta Dickens He describes the symptoms as aching, sharp and stabbing. The pain is located medial, lateral and the patient rates their current pain as a 1-7 out of 10 on the pain scale. This problem has been an issue for 2 weeks. 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 Patient has other associated symptoms: Mild swelling instability. Patient has attempted rest, ice, heat, NSAID-6-12 over-the-counter ibuprofen daily to treat this problem and symptoms are are worsening. Patient does not have a previous history previous knee injury. He does recall an episode a couple months ago when he believes he pulled his right hip adductor which may have led to a gait change causing his left knee to bother him. Only mild night discomfort. Patient is being seen today for orthopedic and sports consultation and review of imaging. 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   Following a steroid injection he did report at least an 80% improvement of his symptoms but this lasted only about 4 weeks and over the last month he has noticed recurrence of his pain to the point where his pain is about a 5 out of 10 currently. He has noticed some catching and has definite difficulty repositioning his knee if it is in a bent position. He has not had juventino locking and admits he has been a little bit lax in performing his home-based exercises but has continued with his diclofenac twice daily. He has not noticed a great deal of swelling and has not had true instability symptoms and has gotten benefit from use of his brace. His pain is primarily anterior but also medial.  He just returned back to town this past weekend after being on the road for 8 straight weeks. Mati Schneider was last seen in the office on 5/27/2020 due to worsening pain, and sent for an MRI of his left knee. His MRI does show more prominent degenerative changes with high-grade medial and patellofemoral compartment chondromalacia with degenerative tearing and maceration to the posterior horn and body of the medial meniscus. There was a probable area of synovial thickening versus loose body in the suprapatellar recess medially but he is really not complaining of sensations of loose bodies and is not really had true locking or catching. Over this past weekend his pain symptoms have worsened substantially where he is unable to comfortably walk. He is having 10 out of 10 pain difficulty with motion and flexion. Once again he was supposed to leave [facility] to get back on the road as a  but he is unable to walk and unable to climb into his cab. He is having difficulty sleeping at this point. He has been icing and has been taking his steroid taper which did not provide any type of pain relief now his right knee is bothering him anteriorly and medially. Once again no active locking or catching. Does have short-term disability available to him.     He was last seen in the office on 2020 was continued on conservative treatment post imaging. Once again he is quite symptomatic and we did have to take him off work to allow for rehabilitation. He was quite painful and definitely requiring crutches to walk. Once again he does believe he has short-term disability. He continues to deny sensations of loose bodies are active locking or catching. He is continue with his ibuprofen 800 mg 3 times daily we did give him tramadol for breakthrough pain. He has been contemplating Visco supplementation. He is set up to begin supervised physical therapy this week and has been measured for his  brace. There is improvement on the left at about 50% and has at least an 80 to 90% improvement on the right knee. Evi Moran was last seen in the office on 2020 we completed Visco supplementation to his knees bilaterally. He states his right knee is doing reasonably well and grades improved about 75% however since finishing up his final Euflexxa injection to his left knee, his symptoms have apparently been getting worse to the point where he is still having to use his crutch. He is not accustomed to realizing his  brace but is having very substantial pain both at rest but primarily with weightbearing. They did have a second opinion consultation with Dr. Anna Sharp on 2020 who did agree that given his weight currently, that he is not an optimal surgical candidate. He has lost 8 pounds initially with diet but I think he would benefit as well from a consultation with [facility] weight loss. Potential for gastric bypass under banding was also discussed. He is still on short-term disability which will initially  next week but he cannot ambulate enough to get into his truck and has continued with physical therapy. He has been taking his ibuprofen 3 times daily as well as his periodic tramadol but still is having fairly consistent pain up to an 8 out of 10.     We last saw Evi Moran in the office on 2020 and ultimately underwent arthroscopic meniscectomy and chondroplasty to his left knee with some improvement of his symptoms. For the most part it is only a 2 to 3-10 but he has not been overly active and still requiring crutches for ambulation. He did have some venous stasis ulcerations and was seeing in wound clinic and these do seem to be improving. He also had no recent increase in his weight which was likely fluid accumulation as he was developing some pitting edema. He is trying to perform his exercise program as best he can. We did complete Visco supplementation in his knees on 2020 but this was fairly ineffective and he is going to be seeing weight loss management. Progressive standing and walking is still problematic for him and he is trying to lose weight. He unfortunately did not temporarily lose his job because of his worsening knee pain which she has been undergoing treatment for for the past 6 months. Most of his pain is anterior medial in nature. He has been continuing with his diclofenac and acetaminophen. Attest: I have reviewed and attest the documentation of the HPI documented by my . I will make any changes if necessary. Enc Date: 2020  Time: 4:00 PM  Provider: Yasmani Mack MD        Social History     Tobacco Use    Smoking status: Former Smoker     Packs/day: 2.00     Years: 25.00     Pack years: 50.00     Types: Cigarettes     Last attempt to quit: 2006     Years since quittin.3    Smokeless tobacco: Never Used   Substance Use Topics    Alcohol use: Yes     Comment: ocas    Drug use: No        Review of Systems  Pertinent items are noted in HPI  Review of systems reviewed from Patient History Form dated on 3/23/2020 and available in the patient's chart under the Media tab.        Vital Signs     Ht 5' 8\" (1.727 m)   Wt (!) 429 lb 12.8 oz (195 kg)   BMI 65.35 kg/m²       General Exam:   Constitutional: have tenderness over the medial and lateral patellofemoral facet and most of his pain is reproduced with palpation of the anterior third medial joint line with patellar grind testing. He is stiff in the terminal 10 degrees of extension with flexion on the right to about 120-125.  4-5 strength with flexion extension. Pain with medial Monik's but no high-grade click. Negative lateral Monik's. No obvious instability. Screening right hip testing appears benign.         Additional Examinations:          Right Lower Extremity: Examination of the right lower extremity does not show any tenderness, deformity or injury. Range of motion is unremarkable. There is no gross instability. There are no rashes, ulcerations or lesions. Strength and tone are normal.  Left Lower Extremity: Examination of the left lower extremity does not show any tenderness, deformity or injury. Range of motion is unremarkable. There is no gross instability. There are no rashes, ulcerations or lesions. Strength and tone are normal.          Diagnostic Test Findings: No results found. Right knee AP and PA weightbearing sunrise and lateral films obtained 6/2/2020 does show more mild medial compartment narrowing with patellofemoral tilt and mild apparent patellofemoral arthropathy.     Left knee MRI obtained at [address] [place] [address] on 5/29/2020 as listed above  Narrative   Site: [facility] #: [id number] #: [id number] Procedure: MR Left Knee w/o Contrast ; Reason for Exam: primary osteoarthritis of left knee; chondromalacia of left knee; left knee pain; eval OA/CMP; r/o medial meniscus tear   This document is confidential medical information.  Unauthorized disclosure or use of this information is prohibited by law. If you are not the intended recipient of this document, please advise us by calling immediately [phone].       [facility] [employer]   RONALDO Mejia           Patient Name: Lexi Alberto ID: [id number]   Patient : 1960   Referring Physician: Sam Bill MD   Exam Date: 2020   Exam Description: MR Left Knee w/o Contrast            HISTORY:  Primary osteoarthritis of left knee.  Chondromalacia of left knee.  Left knee pain.     Evaluate OA/CMP.  Evaluate for medial meniscus tear.       TECHNICAL FACTORS:  Long- and short-axis fat- and water-weighted images were performed.       COMPARISON:  None.       FINDINGS: Noy Hickey is minimally tilted.  High-grade patellar and trochlear groove    chondromalacia.       ACL, PCL, LCL, MCL, flexor mechanism, and extensor mechanism are intact.       Thickened MCL.  Chronic sprain, scarring, and capsulitis is present.       High-grade chondromalacia involves the weightbearing medial femur and tibia.  Eccentric    spurring is present.       Lateral meniscus is intact.       Medial meniscus free edge tearing involves the body.       Nodular region of synovial thickening or body is noted within the suprapatellar recess    medially.  This measures at least 1.75 x 1 x 2.3 cm.       CONCLUSION:   1. High-grade medial and patellofemoral compartment chondromalacia.  Degenerative tearing    involves the free edge of the posterior horn and body.  Subchondral marrow changes, spurring    and remodeling of the medial and less so patellofemoral joint spaces. 2. Loose osteochondral body suprapatellar recess medially measures 1.75 x 1 x 2.3 cm.   3. Anteromedial and anterolateral subcutis swelling.  Contusion and sprain present.    4. Please see above.       Thank you for the opportunity to provide your interpretation.               Rommie Cogan, MD       A: See 2020 7:29 PM   T: AVA 2020 2:35 PM         Follow-up left knee MRI obtained at [facility] on 2020 as listed above  Narrative    Site: [facility] #: [id number] #: [id number] Procedure: MR Left Knee w/o Contrast ; Reason for Exam: primary osteoarthritis of left knee; chondromalacia of left knee; left knee pain; r/o insufficiency fracture    This document is confidential medical information.  Unauthorized disclosure or use of this information is prohibited by law. If you are not the intended recipient of this document, please advise us by calling immediately [phone].         [facility] RON[facility] [address]              Patient Name: Tanja Hughes    Case ID: [id number]    Patient : 1960    Referring Physician: Cami Salter MD    Exam Date: 2020    Exam Description: MR Left Knee w/o Contrast              HISTORY:  Primary osteoarthritis of left knee, chondromalacia of left knee, left knee pain,    evaluate for insufficiency fracture.         TECHNICAL FACTORS:  Long- and short-axis fat- and water-weighted images were performed.         COMPARISON:  None.         FINDINGS:  ACL, PCL, LCL and MCL are intact.         3-4 cm trizonal pattern of tearing involves the posterior horn and body of the medial meniscus.         Lateral meniscus is intact.         High-grade class 4 medial femoral and tibial chondromalacia.         MCL is thickened.  Sprain, scarring and capsulitis are present.         CONCLUSION:    1. 3-4 cm trizonal pattern of tearing involves the posterior horn body of the medial meniscus. 2. No lateral meniscal or cruciate tear. 3. Chronic MCL and LCL sprain and capsulitis. 4. High-grade medial and patellofemoral compartment chondromalacia.         Thank you for the opportunity to provide your interpretation.                   Leeanna Alba MD         A: YOANNA/jules 2020 2:27 PM    T: JUELS 2020 12:41 PM             Assessment & Plan:    Encounter Diagnoses   Name Primary?  Primary osteoarthritis of left knee Yes    Chondromalacia of left knee     Pes planus of both feet     Chronic pain of left knee        No orders of the defined types were placed in this encounter.         Treatment Plan: Treatment options were discussed with Martha Lomeli. We did once again review his plain films, his recent left knee MRI and exam findings. He has been having ongoing pain symptoms for about 6 months now and with initial treatment plan he did undergo left knee chondroplasty meniscectomy on 8/3/2020 with some improvement of his overall knee pain symptoms. He continues to have pain in his knee requiring use of crutches. We did hold off on aquatics therapy as he developed some venous stasis ulcerations and they are trying to reestablish his water balance. He has been seen at wound clinic. He also does have an upcoming sleep study later this week and will ask his wound doctor about getting back into aquatics exercises. He will continue with his Lodine 400 mg 1 pill twice daily. We discussed alternatives in treating his knee arthritic symptoms and would like for him to have an evaluation with Dr. Nestor Pacheco regarding a possible [place] procedure which may buy him some time with regard to his substantial left knee osteoarthritis and allow him to rehabilitate. He will continue with his compression stockings and treatments at wound care. Icing and activity modification the importance of continue with his exercise program was discussed. He would also be eligible for repeat Visco supplementation after 12/23/2020. They will contact us in the interim with questions or concerns. This dictation was performed with a verbal recognition program (DRAGON) and it was checked for errors. It is possible that there are still dictated errors within this office note. If so, please bring any errors to my attention for an addendum. All efforts were made to ensure that this office note is accurate.

## 2020-09-16 ENCOUNTER — VIRTUAL VISIT (OUTPATIENT)
Dept: PULMONOLOGY | Age: 60
End: 2020-09-16
Payer: COMMERCIAL

## 2020-09-16 PROCEDURE — 99244 OFF/OP CNSLTJ NEW/EST MOD 40: CPT | Performed by: INTERNAL MEDICINE

## 2020-09-16 ASSESSMENT — SLEEP AND FATIGUE QUESTIONNAIRES
HOW LIKELY ARE YOU TO NOD OFF OR FALL ASLEEP WHILE SITTING INACTIVE IN A PUBLIC PLACE: 3
HOW LIKELY ARE YOU TO NOD OFF OR FALL ASLEEP WHEN YOU ARE A PASSENGER IN A CAR FOR AN HOUR WITHOUT A BREAK: 1
HOW LIKELY ARE YOU TO NOD OFF OR FALL ASLEEP WHILE SITTING AND TALKING TO SOMEONE: 0
HOW LIKELY ARE YOU TO NOD OFF OR FALL ASLEEP WHILE SITTING QUIETLY AFTER LUNCH WITHOUT ALCOHOL: 3
HOW LIKELY ARE YOU TO NOD OFF OR FALL ASLEEP IN A CAR, WHILE STOPPED FOR A FEW MINUTES IN TRAFFIC: 2
ESS TOTAL SCORE: 18
HOW LIKELY ARE YOU TO NOD OFF OR FALL ASLEEP WHILE SITTING AND READING: 3
HOW LIKELY ARE YOU TO NOD OFF OR FALL ASLEEP WHILE LYING DOWN TO REST IN THE AFTERNOON WHEN CIRCUMSTANCES PERMIT: 3
HOW LIKELY ARE YOU TO NOD OFF OR FALL ASLEEP WHILE WATCHING TV: 3

## 2020-09-16 NOTE — LETTER
[facility]  [place] [address] [place] [address]  Phone: [phone]  Fax: [phone]      September 16, 2020       Patient: Cynthia Landa   MR Number: [id number]   YOB: 1960   Date of Visit: 9/16/2020     Thank you for allowing me to participate in the care of Cynthia Landa. Here is my assessment and plan. Also attached is a copy of his consult note:    ASSESSMENT:  Visit Diagnoses and Associated Orders     Hypersomnia   (New Problem)  -  Primary    needs work-up    POLYSOMNOGRAPHY (PSG) - Diagnostic Testing [07289 Custom]   - Future Order         Snoring   (New Problem)      needs work-up    POLYSOMNOGRAPHY (PSG) - Diagnostic Testing [06400 Custom]   - Future Order         Essential hypertension, benign   (Stable)           Allergic rhinitis, unspecified seasonality, unspecified trigger   (Stable)           Class 3 severe obesity due to excess calories with serious comorbidity and body mass index (BMI) of 60.0 to 69.9 in adult ([facility] [address]   (Stable)                 Plan:  Differential diagnosis includes but not limited to: TOM, PLMD's, narcolepsy, parasomnias Reviewed with patient TOM (highest likelihood Dx): pathophysiology, diagnosis, complications and treatment. Instructed not to drive when drowsy. Continue medications per his PCP and other physicians. Discussed CDL/DOT rules and regulations. He needs to be off driving till work up is done and, if positive for TOM, has responded to therapy. If positive for TOM will need to be compliant (at least 4 hrs per night of use and using at least 80% of the days in a 30 day period) on his prescribed treatment, needs repeat test(s) to show treatment is effective, and then may need a negative MWT prior to returning to driving. He also will need 6 month compliance checks if on pressure therapy. This is per federal DOT rules and regulations.   Will order PSG to rule out TOM and other sleep disorders and see him in follow up 1 week after his test to review the results. The chronic medical conditions listed are directly related to the primary diagnosis listed above. The management of the primary diagnosis affects the secondary diagnosis and vice versa. Continue meds for: HTN and AR. Pt would medically benefit from wt loss for TOM (diet, exercise, surgical). Orders Placed This Encounter   Procedures    POLYSOMNOGRAPHY (PSG) - Diagnostic Testing         If you have questions or concerns, please do not hesitate to call me. I look forward to following Eladio Sexton along with you.     Sincerely,      Cholo Martínez MD    CC providers:  JACKLYN Gonzalez - CNP  [address] [facility] [address]

## 2020-09-16 NOTE — PROGRESS NOTES
Vamshi Jones MD, [facility], [facility]  [facility]  [address]  3rd Floor,  [address], [address] [phone]   [facility] [address]. [address] [phone]     Video Visit- Consult    Pursuant to the emergency declaration under the [facility], [facility] waiver authority and the [employer] Act, this Virtual  Visit was conducted, with patient's consent, to reduce the patient's risk of exposure to COVID-19. Services were provided through a video synchronous discussion virtually to substitute for in-person clinic visit. Patient was located in their home. Assessment:      Visit Diagnoses and Associated Orders     Hypersomnia   (New Problem)  -  Primary    needs work-up    POLYSOMNOGRAPHY (PSG) - Diagnostic Testing [65630 Custom]   - Future Order         Snoring   (New Problem)      needs work-up    POLYSOMNOGRAPHY (PSG) - Diagnostic Testing [71210 Custom]   - Future Order         Essential hypertension, benign   (Stable)           Allergic rhinitis, unspecified seasonality, unspecified trigger   (Stable)           Class 3 severe obesity due to excess calories with serious comorbidity and body mass index (BMI) of 60.0 to 69.9 in adult (HCC)   (Stable)                  Plan:      Differential diagnosis includes but not limited to: TOM, PLMD's, narcolepsy, parasomnias Reviewed with patient TOM (highest likelihood Dx): pathophysiology, diagnosis, complications and treatment. Instructed not to drive when drowsy. Continue medications per his PCP and other physicians. Discussed CDL/DOT rules and regulations. He needs to be off driving till work up is done and, if positive for TOM, has responded to therapy.  If positive for TOM will need to be compliant (at least 4 hrs per night of use and using at least 80% of the days in a 30 day period) on his prescribed treatment, needs repeat test(s) to show treatment is effective, and then may need a negative MWT prior to returning to driving. He also will need 6 month compliance checks if on pressure therapy. This is per federal DOT rules and regulations. Will order PSG to rule out TOM and other sleep disorders and see him in follow up 1 week after his test to review the results. The chronic medical conditions listed are directly related to the primary diagnosis listed above. The management of the primary diagnosis affects the secondary diagnosis and vice versa. Continue meds for: HTN and AR. Pt would medically benefit from wt loss for TOM (diet, exercise, surgical). Orders Placed This Encounter   Procedures    POLYSOMNOGRAPHY (PSG) - Diagnostic Testing          Subjective:     Patient ID: Jeannette Dawson is a 64 y.o. male. Chief Complaint   Patient presents with    Snoring    Daytime Sleepiness       HPI:      Jeannette Dawson is a 64 y.o. male referred by [place] John,* for a sleep evaluation. He complains of: snoring, excessive daytime sleepiness , non-restorative sleep, nocturia, snorting awake, napping and tossing and turning at night. He denies: cataplexy and hypnagogic hallucinations. Now sleeping in a chair. Hypertension, allergic rhinitis, and obesity: stable on meds and followed by pt's PCP and other physicians. Previous evaluation and treatment has included- none    DOT/CDL - Yes: CDL  FAA/'s license -No    Previous Report(s) Reviewed: historical medical records, office notes, andreferral letter(s). Pertinent data has been documented. [place] - Total score: 18    Caffeine Intake - None.     Social History     Socioeconomic History    Marital status:      Spouse name: Not on file    Number of children: 2    Years of education: Not on file    Highest education level: Not on file   Occupational History    Occupation: [employer]   Social Needs    Financial resource strain: Not on file    Food insecurity     Worry: Not on file     Inability: Not on file    Transportation needs     Medical: Not on file     Non-medical: Not on file   Tobacco Use    Smoking status: Former Smoker     Packs/day: 2.00     Years: 25.00     Pack years: 50.00     Types: Cigarettes     Last attempt to quit: 2006     Years since quittin.3    Smokeless tobacco: Never Used   Substance and Sexual Activity    Alcohol use: Yes     Comment: ocas    Drug use: No    Sexual activity: Yes     Partners: Female   Lifestyle    Physical activity     Days per week: Not on file     Minutes per session: Not on file    Stress: Not on file   Relationships    Social connections     Talks on phone: Not on file     Gets together: Not on file     Attends Hinduism service: Not on file     Active member of club or organization: Not on file     Attends meetings of clubs or organizations: Not on file     Relationship status: Not on file    Intimate partner violence     Fear of current or ex partner: Not on file     Emotionally abused: Not on file     Physically abused: Not on file     Forced sexual activity: Not on file   Other Topics Concern    Not on file   Social History Narrative    Not on file        Current Outpatient Medications   Medication Sig Dispense Refill    hydroCHLOROthiazide (HYDRODIURIL) 25 MG tablet TAKE ONE TABLET BY MOUTH ONE TIME DAILY 90 tablet 0    fluticasone (FLONASE) 50 MCG/ACT nasal spray 1 spray by Nasal route daily 3 Bottle 0    lisinopril (PRINIVIL;ZESTRIL) 10 MG tablet Take 1 tablet by mouth daily 90 tablet 0    furosemide (LASIX) 40 MG tablet Take 1 tablet by mouth daily 90 tablet 0    loratadine (CLARITIN) 10 MG tablet Take 1 tablet by mouth daily 90 tablet 0    rOPINIRole (REQUIP) 1 MG tablet Take 1 tablet by mouth nightly 90 tablet 0    etodolac (LODINE) 400 MG tablet Take 1 tablet by mouth 2 times daily 60 tablet 3     No current facility-administered medications for this visit. Allergies as of 2020 - Review Complete 2020   Allergen Reaction Noted    Bactrim [sulfamethoxazole-trimethoprim] Rash 2020       Patient Active Problem List   Diagnosis    Class 3 severe obesity due to excess calories with serious comorbidity and body mass index (BMI) of 60.0 to 69.9 in adult ([facility]    Mixed hypertriglyceridemia    Nonalcoholic fatty liver disease    RLS (restless legs syndrome)    Essential hypertension, benign    Impaired fasting glucose    Chronic pain of left knee    Osteoarthritis of left knee    Chondromalacia of left knee    Pes planus    Leg swelling    Ulcer of right leg, limited to breakdown of skin ([facility]    Peripheral venous insufficiency    Allergic rhinitis       Past Medical History:   Diagnosis Date    Cellulitis of lower extremity 2019    Hypertension     Impaired fasting glucose 2013    Obesity     Screening PSA (prostate specific antigen) 2015;17    Nml:0.53(2015);17=nml.     Stasis dermatitis of both legs 2020    Tobacco use     Tubular adenoma of colon 2017       Past Surgical History:   Procedure Laterality Date    CIRCUMCISION      COLONOSCOPY  2017    Colonoscopy with polypectomy (cold biopsy):Dr. Walker:next in 3yrs=2020    KNEE ARTHROSCOPY Left 8/3/2020    VIDEO ARTHROSCOPY LEFT KNEE, PARTIAL MEDIAL MENISCECTOMY, CHONDROPLASTY, SYNOVIAL BIOPSY -TOM=4- performed by Edouard Fenton MD at [address] [facility]  2011    VASECTOMY      WISDOM TOOTH EXTRACTION         Family History   Problem Relation Age of Onset    High Blood Pressure Mother     Heart Disease Mother         MI    AT 66    Diabetes Brother        Objective:     Vitals:  Patient reported Height and Weight Calculated BMI   Patient-Reported Vitals 2020   Patient-Reported Weight 420lb   Patient-Reported Height 5 8       63.9     Due to COVID-19 this was a virtual visit and physical exam was deferred.     Electronically signed by Johnnie Giron MD on9/16/2020 at 10:13 AM

## 2020-09-17 ENCOUNTER — HOSPITAL ENCOUNTER (OUTPATIENT)
Dept: WOUND CARE | Age: 60
Discharge: HOME | End: 2020-09-17
Payer: COMMERCIAL

## 2020-09-17 ENCOUNTER — HOSPITAL ENCOUNTER (OUTPATIENT)
Dept: PHYSICAL THERAPY | Age: 60
Setting detail: THERAPIES SERIES
Discharge: HOME | End: 2020-09-17
Payer: COMMERCIAL

## 2020-09-17 VITALS
DIASTOLIC BLOOD PRESSURE: 77 MMHG | HEART RATE: 86 BPM | HEIGHT: 68 IN | WEIGHT: 315 LBS | TEMPERATURE: 98 F | RESPIRATION RATE: 24 BRPM | SYSTOLIC BLOOD PRESSURE: 145 MMHG | BODY MASS INDEX: 47.74 KG/M2

## 2020-09-17 PROCEDURE — 29581 APPL MULTLAYER CMPRN SYS LEG: CPT | Performed by: INTERNAL MEDICINE

## 2020-09-17 PROCEDURE — 97140 MANUAL THERAPY 1/> REGIONS: CPT

## 2020-09-17 PROCEDURE — 97110 THERAPEUTIC EXERCISES: CPT

## 2020-09-17 PROCEDURE — 29581 APPL MULTLAYER CMPRN SYS LEG: CPT

## 2020-09-17 RX ORDER — LIDOCAINE 40 MG/G
CREAM TOPICAL ONCE
Status: DISCONTINUED | OUTPATIENT
Start: 2020-09-17 | End: 2020-09-18 | Stop reason: HOSPADM

## 2020-09-17 ASSESSMENT — PAIN - FUNCTIONAL ASSESSMENT: PAIN_FUNCTIONAL_ASSESSMENT: ACTIVITIES ARE NOT PREVENTED

## 2020-09-17 ASSESSMENT — PAIN DESCRIPTION - DESCRIPTORS: DESCRIPTORS: BURNING

## 2020-09-17 ASSESSMENT — PAIN DESCRIPTION - FREQUENCY: FREQUENCY: INTERMITTENT

## 2020-09-17 ASSESSMENT — PAIN SCALES - GENERAL: PAINLEVEL_OUTOF10: 1

## 2020-09-17 ASSESSMENT — PAIN DESCRIPTION - LOCATION: LOCATION: LEG

## 2020-09-17 ASSESSMENT — PAIN DESCRIPTION - PAIN TYPE: TYPE: ACUTE PAIN

## 2020-09-17 ASSESSMENT — PAIN DESCRIPTION - ORIENTATION: ORIENTATION: RIGHT

## 2020-09-17 ASSESSMENT — PAIN DESCRIPTION - PROGRESSION: CLINICAL_PROGRESSION: GRADUALLY IMPROVING

## 2020-09-17 ASSESSMENT — PAIN DESCRIPTION - ONSET: ONSET: ON-GOING

## 2020-09-17 NOTE — PLAN OF CARE
No wound debridement today of BLE wounds. He will begin having Fourflex applied to both legs today, he has lost 10 pounds with increasing Lasix from 40mg to 80 mg daily last week. He will continue 80mg daily for the next week with planned labs next week.   Continue elevation of BLE frequently t/o the day, increase activity, f/u x 1 week, MD orders/D/C instructions reviewed with patient, all questions answered; copy of instructions given to patient

## 2020-09-17 NOTE — PLAN OF CARE
[facility],  [address] [place]  Phone: [phone]  Fax [phone]    Physical Therapy Treatment Note/ Progress Report:           Date:  2020    Patient Name:  Cynthia Landa    :  1960  MRN: [id number]  Restrictions/Precautions:    Medical/Treatment Diagnosis Information:  · Diagnosis: Z23.673S (ICD-10-CM) - Complex tear of medial meniscus of left knee as current injury, initial encounter  · Treatment Diagnosis: M25.562 Left knee pain; M25.662 Left knee stiffness; R26.2 Difficulty in walking  Insurance/Certification information:  PT Insurance Information: [employer] [place]  Physician Information:  Referring Practitioner: Dr. Benitez Allen  Has the plan of care been signed (Y/N):        []  Yes  [x]  No     Date of Patient follow up with Physician:       Is this a Progress Report:     []  Yes  [x]  No        If Yes:  Date Range for reporting period:  Beginning 8/3/20  Ending     Progress report will be due (10 Rx or 30 days whichever is less):     Recertification will be due (POC Duration  / 90 days whichever is less):10/29    Visit # Insurance Allowable Requires auth    (new insu )    [x]no        []yes:     Functional Scale:LEFS 75% disability   Date assessed:  20  LEFS 78% disability      20      Therapy Diagnosis/Practice Pattern:E    Number of Comorbidities:  []0     []1-2    [x]3+    Latex Allergy:  [x]NO      []YES  Preferred Language for Healthcare:   [x]English       []other:       Pain level: /10 current, but 1-3/10 yesterday     SUBJECTIVE:  Pt states that he did okay after last visit. He thought his hamstrings were going to be really sore, but they were not. Has an appointment with the wound care clinic after this. He has been using both crutches to walk still. Although, he would really like to get off of the crutches.  He has less pain compared to pre-surgery, but has the most pain when standing and walking (actually greater on the right side). He is able to move in and out of the car and bed with more ease now. OBJECTIVE:    Observation: B LEs wrapped for vascular wounds/drainage + light compression hose B   Test measurements:  Knee flexion AROM 110, increased to 115 with bridge   MMT hip ABD 3-/5, knee extension 4-, knee flexion 4    RESTRICTIONS/PRECAUTIONS: HTN  Procedure(s) 8/3/2020:  1.  Left knee arthroscopy with partial medial meniscectomy and chondroplasty medial femoral condyle with synovial biopsy (94371-07)    Exercises/Interventions:     Therapeutic Ex (34046) Sets/sec Notes/CUES HEP   Pt ed: findings of exam and POC; recovery expectations, use compression stocking, ice, elevate to reduce swelling, SS of infection, contin to use B crutches for safety and to decrease pain;   On days of inc'd soreness/swelling, still do exc to keep knee from getting too stiff 6'     HL adductor stretch 3x30\"     Rectus stretch EOB  Resume NV    Bridge with increasing knee flex 10x 115 deg    SLR-  Indep today   \" ER 2x10 ea   Min assist X   SAQ 5\" 2x10 1.5#  c ball squeeze    SL hip abd R,L  Clamshell R,L 2x103\" 2x10   1.5# on knee    Seated LAQ  5\" 2x10 held wt today d/t soreness X   Seated HR, TR   X   Standing HR, TR  At [facility]    Mini squat  At bar    Side step 1/2 wall (long)  UE support Pt felt too sore today   Step up 4\"   Heavy UE support, min assist last two Attempted on [facility] platform (4\" too hard), but too sore after 3 reps so stopped   Standing glider: abd, ext diag  UE supp on bar Cue for posture   HSC  2x10  30#    LATOYA TKE  LATOYA hip ABD 5\"x15  10x R,L 45#  15#    Bike (low)  Seat 17 Pt too sore today   Manual Intervention (51001) 15' total     Patellar mobs sup, inf 20x     Re-apply bandaids,      Edema massage, STM to quad, HS, medial knee  Stick to quad, med/lat thigh 12'  Showed pt how to use stick from home   HS s  Knee slightly bent                NMR re-education (41066) CUES NEEDED   Gait training one crutch  standing Breaks needed    Weight shifting A/P   intermittent fingertip support today    NMES L quad x12' total (with quad iso, SAQ, SLR)   [place] 10\"/10\"    Weight shifting A/P with one crutch   Intermittent CL UE support when L foot back- not ready to wean to one crutch   Split stance balance + airex at 1/2 wall     Intermittent UE support                           Therapeutic Activity (47478)                                                Therapeutic Exercise and NMR EXR  [x] (49987) Provided verbal/tactile cueing for activities related to strengthening, flexibility, endurance, ROM for improvements in LE, proximal hip, and core control with self care, mobility, lifting, ambulation.  [] (00565) Provided verbal/tactile cueing for activities related to improving balance, coordination, kinesthetic sense, posture, motor skill, proprioception  to assist with LE, proximal hip, and core control in self care, mobility, lifting, ambulation and eccentric single leg control.      NMR and Therapeutic Activities:    [x] (98199 or 54554) Provided verbal/tactile cueing for activities related to improving balance, coordination, kinesthetic sense, posture, motor skill, proprioception and motor activation to allow for proper function of core, proximal hip and LE with self care and ADLs  [x] (60242) Gait Re-education- Provided training and instruction to the patient for proper LE, core and proximal hip recruitment and positioning and eccentric body weight control with ambulation re-education including up and down stairs     Home Exercise Program:    [x] (27750) Reviewed/Progressed HEP activities related to strengthening, flexibility, endurance, ROM of core, proximal hip and LE for functional self-care, mobility, lifting and ambulation/stair navigation   [] (51789)Reviewed/Progressed HEP activities related to improving balance, coordination, kinesthetic sense, posture, motor skill, reaching prior level of function. []? Progressing: []? Met: [x]? Not Met: []? Adjusted  2. Patient will demonstrate increased left knee AROM to 0-110  to allow for proper joint functioning for car/truck mobility, stair amb.   []? Progressing: [x]? Met: []? Not Met: []? Adjusted  3. Patient will demonstrate an increase in Strength to at least 4+/5 for the left knee and hip stabilizers in order to ambulate s AD for community distances. [x]? Progressing: []? Met: []? Not Met: []? Adjusted  4. Patient will return to walking community distances s AD with mild bilateral knee pain (<3/10). [x]? Progressing: []? Met: []? Not Met: []? Adjusted  5. Pt will be able to drive x 2 hours without increased knee pain; car mobility without knee pain. []? Progressing: []? Met: [x]? Not Met: []? Adjusted         Progression Towards Functional goals:  [] Patient is progressing as expected towards functional goals listed. [x] Progression is slowed due to complexities listed. [] Progression has been slowed due to co-morbidities. [] Plan just implemented, too soon to assess goals progression  [] Other:         Overall Progression Towards Functional goals/ Treatment Progress Update:  [] Patient is progressing as expected towards functional goals listed. [x] Progression is slowed due to complexities/Impairments listed. [] Progression has been slowed due to co-morbidities.   [] Plan just implemented, too soon to assess goals progression <30days   [] Goals require adjustment due to lack of progress  [] Patient is not progressing as expected and requires additional follow up with physician  [] Other    Prognosis for POC: [x] Good [] Fair  [] Poor      Patient requires continued skilled intervention: [x] Yes  [] No    Treatment/Activity Tolerance:  [x] Patient able to complete treatment  [] Patient limited by fatigue  [x] Patient limited by pain    [] Patient limited by other medical complications  [] Other:     ASSESSMENT: Have

## 2020-09-21 ENCOUNTER — HOSPITAL ENCOUNTER (OUTPATIENT)
Dept: PHYSICAL THERAPY | Age: 60
Setting detail: THERAPIES SERIES
Discharge: HOME | End: 2020-09-21
Payer: COMMERCIAL

## 2020-09-21 PROCEDURE — 97110 THERAPEUTIC EXERCISES: CPT

## 2020-09-21 PROCEDURE — 97140 MANUAL THERAPY 1/> REGIONS: CPT

## 2020-09-21 RX ORDER — ROPINIROLE 2 MG/1
2 TABLET, FILM COATED ORAL NIGHTLY
Qty: 90 TABLET | Refills: 0 | Status: SHIPPED | OUTPATIENT
Start: 2020-09-21 | End: 2021-01-19 | Stop reason: SDUPTHER

## 2020-09-21 NOTE — PLAN OF CARE
[facility],  [address] [place]  Phone: [phone]  Fax [phone]    Physical Therapy Treatment Note/ Progress Report:           Date:  2020    Patient Name:  Bita Peralta    :  1960  MRN: [id number]  Restrictions/Precautions:    Medical/Treatment Diagnosis Information:  · Diagnosis: G61.841X (ICD-10-CM) - Complex tear of medial meniscus of left knee as current injury, initial encounter  · Treatment Diagnosis: M25.562 Left knee pain; M25.662 Left knee stiffness; R26.2 Difficulty in walking  Insurance/Certification information:  PT Insurance Information: [employer] [place]  Physician Information:  Referring Practitioner: Dr. Baron Montalvo  Has the plan of care been signed (Y/N):        []  Yes  [x]  No     Date of Patient follow up with Physician:       Is this a Progress Report:     []  Yes  [x]  No        If Yes:  Date Range for reporting period:  Beginning 8/3/20  Ending     Progress report will be due (10 Rx or 30 days whichever is less):     Recertification will be due (POC Duration  / 90 days whichever is less):10/29    Visit # Insurance Allowable Requires auth    (new insu )    [x]no        []yes:     Functional Scale:LEFS 75% disability   Date assessed:  20  LEFS 78% disability      20      Therapy Diagnosis/Practice Pattern:E    Number of Comorbidities:  []0     []1-2    [x]3+    Latex Allergy:  [x]NO      []YES  Preferred Language for Healthcare:   [x]English       []other:       Pain level: 1-2/10 current, but 3-4 last Thursday. SUBJECTIVE:  Pt states that he was sore for two days after LV, doing better now. Still has increased pain when riding in the car 1 hour to his wound care appts.      OBJECTIVE:    Observation: B LEs wrapped for vascular wounds/drainage + light compression hose B   Test measurements:  Knee flexion AROM 110, increased to 115 with bridge   MMT hip ABD 3-/5, knee extension 4-, knee flexion 4    RESTRICTIONS/PRECAUTIONS: HTN  Procedure(s) 8/3/2020:  1.  Left knee arthroscopy with partial medial meniscectomy and chondroplasty medial femoral condyle with synovial biopsy (62675-90)    Exercises/Interventions:     Therapeutic Ex (46945) Sets/sec Notes/CUES HEP   Pt ed: continue to elevate as per wound care clinic instructions 6'     HL adductor stretch 3x30\"     Rectus stretch EOB 30\"x3     Bridge with increasing knee flex 10x 115 deg    SLR-  Indep today   \" ER- indep today x10 ea    X   SAQ 5\" 2x10 2#  c ball squeeze    SL hip abd R,L  Clamshell R,L 2x103\" 2x10   1.5# on knee    Seated LAQ   held wt today d/t soreness X   Seated HR, TR   X   Standing HR, TR  At [facility]    Mini squat  At bar    Side step 1/2 wall (long)  UE support Pt felt too sore today   Step up 4\"  x3 Heavy UE support, min assist last two Attempted on [facility] platform (4\" too hard), but too sore after 3 reps so stopped   Standing glider: abd, ext diag  UE supp on bar Cue for posture   Sit to stand - highest box 2x5 No UE    HSC    30#    LATOYA TKE  LATOYA hip ABD 5\"x15   45#  15#    Bike (low)  Seat 17 Pt too sore today   Manual Intervention (07753) 15' total     Patellar mobs sup, inf 20x     Re-apply bandaids,      Edema massage, STM to quad, HS, medial knee, IT band  Stick to quad, med/lat thigh 12'  Showed pt how to use stick from home   HS s  Knee slightly bent                NMR re-education (48748)   CUES NEEDED   Gait training one crutch  standing Breaks needed    Weight shifting A/P   intermittent fingertip support today    Weight shifting A/P with one crutch   Intermittent CL UE support when L foot back- not ready to wean to one crutch   Split stance balance + airex at 1/2 wall     Intermittent UE support                           Therapeutic Activity (93080)                                                Therapeutic Exercise and NMR EXR  [x] (31815) Provided verbal/tactile cueing for activities related to strengthening, flexibility, endurance, ROM for improvements in LE, proximal hip, and core control with self care, mobility, lifting, ambulation.  [] (56858) Provided verbal/tactile cueing for activities related to improving balance, coordination, kinesthetic sense, posture, motor skill, proprioception  to assist with LE, proximal hip, and core control in self care, mobility, lifting, ambulation and eccentric single leg control. NMR and Therapeutic Activities:    [x] (05500 or 83027) Provided verbal/tactile cueing for activities related to improving balance, coordination, kinesthetic sense, posture, motor skill, proprioception and motor activation to allow for proper function of core, proximal hip and LE with self care and ADLs  [x] (42207) Gait Re-education- Provided training and instruction to the patient for proper LE, core and proximal hip recruitment and positioning and eccentric body weight control with ambulation re-education including up and down stairs     Home Exercise Program:    [x] (95670) Reviewed/Progressed HEP activities related to strengthening, flexibility, endurance, ROM of core, proximal hip and LE for functional self-care, mobility, lifting and ambulation/stair navigation   [] (75363)Reviewed/Progressed HEP activities related to improving balance, coordination, kinesthetic sense, posture, motor skill, proprioception of core, proximal hip and LE for self care, mobility, lifting, and ambulation/stair navigation      Manual Treatments:  PROM / STM / Oscillations-Mobs:  G-I, II, III, IV (PA's, Inf., Post.)  [x] (05415) Provided manual therapy to mobilize LE, proximal hip and/or LS spine soft tissue/joints for the purpose of modulating pain, promoting relaxation,  increasing ROM, reducing/eliminating soft tissue swelling/inflammation/restriction, improving soft tissue extensibility and allowing for proper ROM for normal function with self care, mobility, lifting and ambulation. Modalities:     [] GAME READY (VASO)- for significant edema, swelling, pain control. (low) in long-sitting x12'  CP x10' L knee  Charges:  Timed Code Treatment Minutes: 41   Total Treatment Minutes:  54 CP, rest breaks     BWC time in/time out:   (and requires time in and out for each CPT code)    [] EVAL (LOW) 08860 (typically 20 minutes face-to-face)  [] EVAL (MOD) 58468 (typically 30 minutes face-to-face)  [] EVAL (HIGH) 12428 (typically 45 minutes face-to-face)  [] RE-EVAL     [x] IW(61159) x 2   [] IONTO  [] NMR (42358) x     [] VASO  [x] Manual (99902) x 1     [] Other: gait x  [] TA x      [] Mech Traction (30615)  [] ES(attended) (76993)      [] ES (un) (35981):       GOALS:   Patient stated goal: 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 Progressing: []? Met: []? Not Met: []? Adjusted     Therapist goals for Patient:   Short Term Goals: To be achieved in: 2 weeks  1. Independent in HEP and progression per patient tolerance, in order to prevent re-injury. [x]? Progressing: []? Met: []? Not Met: []? Adjusted  2. Patient will have a decrease in pain to facilitate improvement in movement, function, and ADLs as indicated by Functional Deficits. [x]? Progressing: []? Met: []? Not Met: []? Adjusted     Long Term Goals: To be achieved in: 6 weeks  1. Disability index score of 40% or less for the LEFS to assist with reaching prior level of function. []? Progressing: []? Met: [x]? Not Met: []? Adjusted  2. Patient will demonstrate increased left knee AROM to 0-110  to allow for proper joint functioning for car/truck mobility, stair amb.   []? Progressing: [x]? Met: []? Not Met: []? Adjusted  3. Patient will demonstrate an increase in Strength to at least 4+/5 for the left knee and hip stabilizers in order to ambulate s AD for community distances. [x]? Progressing: []? Met: []? Not Met: []? Adjusted  4.  Patient will return to walking community distances s AD with mild bilateral knee pain (<3/10). [x]? Progressing: []? Met: []? Not Met: []? Adjusted  5. Pt will be able to drive x 2 hours without increased knee pain; car mobility without knee pain. []? Progressing: []? Met: [x]? Not Met: []? Adjusted         Progression Towards Functional goals:  [] Patient is progressing as expected towards functional goals listed. [x] Progression is slowed due to complexities listed. [] Progression has been slowed due to co-morbidities. [] Plan just implemented, too soon to assess goals progression  [] Other:         Overall Progression Towards Functional goals/ Treatment Progress Update:  [] Patient is progressing as expected towards functional goals listed. [x] Progression is slowed due to complexities/Impairments listed. [] Progression has been slowed due to co-morbidities. [] Plan just implemented, too soon to assess goals progression <30days   [] Goals require adjustment due to lack of progress  [] Patient is not progressing as expected and requires additional follow up with physician  [] Other    Prognosis for POC: [x] Good [] Fair  [] Poor      Patient requires continued skilled intervention: [x] Yes  [] No    Treatment/Activity Tolerance:  [x] Patient able to complete treatment  [] Patient limited by fatigue  [x] Patient limited by pain    [] Patient limited by other medical complications  [] Other:     ASSESSMENT: Pt still irritable after increasing exercise mildly LV, but pain did not last as long as it has in the past. Will continue to progress NWB exercise more vs standing exercise, but did work with a few CKC exercises today and pt did not report pain.       Return to Play: (if applicable)   []  Stage 1: Intro to Strength   []  Stage 2: Return to Run and Strength   []  Stage 3: Return to Jump and Strength   []  Stage 4: Dynamic Strength and Agility   []  Stage 5: Sport Specific Training     []  Ready to Return to Play, Meets All Above Stages   []  Not Ready for Return to Sports   Comments:                                PLAN: Continue to improve quad control, work on progressive [address] as dayanna. Consider starting aquatic exc if ok per wound care MD  (pt states skin still not fully closed)  [x] Continue per plan of care [] Alter current plan (see comments above)  [] Plan of care initiated [] Hold pending MD visit [] Discharge      Electronically signed by:  Phil Angelo, PT, DPT    Note: If patient does not return for scheduled/ recommended follow up visits, this note will serve as a discharge from care along with most recent update on progress.

## 2020-09-21 NOTE — PROGRESS NOTES
[facility] Progress Note    Cynthia Landa     : 1960    DATE OF VISIT:  2020    Subjective:     Cytnhia Landa is a 64 y.o. 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 Significant symptoms or pertinent wound history since last visit: did ok this week; wraps stayed in place fairly well, drainage was less. Had some discomfort in the legs at times, but more the knees. Lost some fluid weight with an increase in daily Lasix dose; maybe had a bit of cramping one night, but that was all; no dizziness, palpitations, etc.     Additional ulcer(s) noted? no. Right calf focus healed, left calf reopened, right pretib open but looking better. His current medication list consists of etodolac, fluticasone, furosemide, hydroCHLOROthiazide, lisinopril, loratadine, and rOPINIRole. Lasix usually 40 mg daily, 80 mg daily for the last week. Allergies: Bactrim [sulfamethoxazole-trimethoprim]    Objective:     Vitals:    20 1458 20 1508   BP:  (!) 145/77   Pulse:  86   Resp: 24    Temp: 98 °F (36.7 °C)    TempSrc: Oral    Weight: (!) 419 lb 3.2 oz (190.1 kg)    Height: 5' 8\" (1.727 m)      AAOx3, overweight, in some pain from his left knee, NAD otherwise  Intact distal pulses, feet warm, good cap refill  Moderate BL LE edema  Usual venous erythema, some mild hyperkeratosis  No cellulitis, angitis, fluctuance, still some allodynia at his open ulcers (usual for him)  Edwina-ulcer skin: indurated, pink, santa, warm, hyperkeratotic and stasis dermatitis. Ulcer(s): clean, partial thickness, red, serous exudate, minimal fibrin, no signs of infection. Photos also saved in electronic chart.     Today's Wound Measurements, per RN documentation:  [REMOVED] Wound 20 #2 left posterior lower leg cluster, venous, partial thickness, last 2 years-Wound Length (cm): 4 cm  Wound 20 #1 right lower pretib cluster, venous, partial thickness, last 2 years-Wound Length (cm): 1 cm    [REMOVED] Wound 07/28/20 #2 left posterior lower leg cluster, venous, partial thickness, last 2 years-Wound Width (cm): 3 cm  Wound 07/28/20 #1 right lower pretib cluster, venous, partial thickness, last 2 years-Wound Width (cm): 2 cm    [REMOVED] Wound 07/28/20 #2 left posterior lower leg cluster, venous, partial thickness, last 2 years-Wound Depth (cm): 0.1 cm  Wound 07/28/20 #1 right lower pretib cluster, venous, partial thickness, last 2 years-Wound Depth (cm): 0.1 cm   _________________    BMP from last week -- Na 136, K 4.3, BUN 22, creat 1.0. Assessment:     Patient Active Problem List   Diagnosis Code    Class 3 severe obesity due to excess calories with serious comorbidity and body mass index (BMI) of 60.0 to 69.9 in adult ([facility] [address] E66.01, Z68.44    Mixed hypertriglyceridemia M58.1    Nonalcoholic fatty liver disease K76.0    RLS (restless legs syndrome) G25.81    Essential hypertension, benign I10    Impaired fasting glucose R73.01    Chronic pain of left knee M25.562, G89.29    Osteoarthritis of left knee M17.12    Chondromalacia of left knee M94.262    Pes planus M21.40    Leg swelling M79.89    Ulcer of right leg, limited to breakdown of skin ([facility] L97.911    Peripheral venous insufficiency I87.2    Allergic rhinitis J30.9       Assessment of today's active condition(s): venous stasis, chronic skin changes, lymphedema with recent worsening in fluid overload (and perhaps worsening lymphedema as a result of the knee surgery); BL lower leg ulcers, no cellulitis currently, no ischemia. Factors contributing to occurrence and/or persistence of the chronic ulcer include venous stasis, lymphedema, decreased mobility and obesity. Medical necessity of today's visit is shown by the above documentation. Sharp debridement is not indicated today, based upon the exam findings in the wound(s) above. He does have an indication for a weekly compression wrap.      Procedure note:     Consent obtained. Time out performed per [facility]. Anesthetic  Anesthetic: 4% Lidocaine Cream     After cleansing of the wounds with saline and applying skin-care and/or dressing materials as listed below, Bilateral application of a multi-layer compression wrap was performed per physician order, to help manage edema, stasis dermatitis, and/or venous ulcers. The patient's level of pain during and after the procedure was monitored, and is noted in the wound documentation flowsheet. Discharge plan:     Treatment in the wound care center today, per RN documentation: [REMOVED] Wound 07/28/20 #2 left posterior lower leg cluster, venous, partial thickness, last 2 years-Dressing/Treatment: (triamcinolone calmoseptine opticel ag 4x4's fourflex spand)  Wound 07/28/20 #1 right lower pretib cluster, venous, partial thickness, last 2 years-Dressing/Treatment: (triamcinolone calmoseptine opticel ag 4x4's fourflex spand). Increasing compression to both legs this week, given the good response to the FourFlex on the right leg last week. Continue higher dose of Lasix for another week at least, and I'll repeat a BMP next week. Leave primary dressing and multi-layer wrap(s) in place until the next appointment. He should call before his next visit if there is any significant pain, significant strike-through of drainage to the surface of the wrap, or any significant sense of the wrap sliding down more than an inch or so, bunching-up and abrading his skin. I reminded the patient of the importance of weight management and smoking cessation, if applicable; also encouraged ambulation as tolerated, additional lower extremity exercises as instructed in our education sheet, leg elevation when at rest, and compliance with any recommended dietary, diuretic and compression therapies. Written discharge instructions given to patient. Follow up in 1 week.     Electronically signed by Duane Sine, MD on 9/21/2020 at 1:34 PM.

## 2020-09-23 ENCOUNTER — TELEPHONE (OUTPATIENT)
Dept: ORTHOPEDIC SURGERY | Age: 60
End: 2020-09-23

## 2020-09-23 NOTE — TELEPHONE ENCOUNTER
RECEIVED AN APS FROM [employer] FROM DR. ESPOSITO WAITING TO HEAR BACK FROM [facility] REGARDING RTW DATE    FAXED COMPLETED APS TO [address] @ [phone]

## 2020-09-24 ENCOUNTER — HOSPITAL ENCOUNTER (OUTPATIENT)
Dept: WOUND CARE | Age: 60
Discharge: HOME | End: 2020-09-24
Payer: COMMERCIAL

## 2020-09-24 ENCOUNTER — TELEPHONE (OUTPATIENT)
Dept: ORTHOPEDIC SURGERY | Age: 60
End: 2020-09-24

## 2020-09-24 ENCOUNTER — HOSPITAL ENCOUNTER (OUTPATIENT)
Dept: PHYSICAL THERAPY | Age: 60
Setting detail: THERAPIES SERIES
Discharge: HOME | End: 2020-09-24
Payer: COMMERCIAL

## 2020-09-24 VITALS
RESPIRATION RATE: 20 BRPM | DIASTOLIC BLOOD PRESSURE: 83 MMHG | BODY MASS INDEX: 47.74 KG/M2 | TEMPERATURE: 98.2 F | HEART RATE: 85 BPM | WEIGHT: 315 LBS | HEIGHT: 68 IN | SYSTOLIC BLOOD PRESSURE: 174 MMHG

## 2020-09-24 LAB
ANION GAP SERPL CALCULATED.3IONS-SCNC: 13 MMOL/L (ref 3–16)
BUN BLDV-MCNC: 23 MG/DL (ref 7–20)
CALCIUM SERPL-MCNC: 9.5 MG/DL (ref 8.3–10.6)
CHLORIDE BLD-SCNC: 103 MMOL/L (ref 99–110)
CO2: 24 MMOL/L (ref 21–32)
CREAT SERPL-MCNC: 0.8 MG/DL (ref 0.9–1.3)
GFR AFRICAN AMERICAN: >60
GFR NON-AFRICAN AMERICAN: >60
GLUCOSE BLD-MCNC: 120 MG/DL (ref 70–99)
POTASSIUM REFLEX MAGNESIUM: 4.1 MMOL/L (ref 3.5–5.1)
SODIUM BLD-SCNC: 140 MMOL/L (ref 136–145)

## 2020-09-24 PROCEDURE — 97140 MANUAL THERAPY 1/> REGIONS: CPT

## 2020-09-24 PROCEDURE — 80048 BASIC METABOLIC PNL TOTAL CA: CPT

## 2020-09-24 PROCEDURE — 97110 THERAPEUTIC EXERCISES: CPT

## 2020-09-24 PROCEDURE — 29581 APPL MULTLAYER CMPRN SYS LEG: CPT | Performed by: INTERNAL MEDICINE

## 2020-09-24 PROCEDURE — 36415 COLL VENOUS BLD VENIPUNCTURE: CPT

## 2020-09-24 PROCEDURE — 29581 APPL MULTLAYER CMPRN SYS LEG: CPT

## 2020-09-24 ASSESSMENT — PAIN SCALES - GENERAL: PAINLEVEL_OUTOF10: 0

## 2020-09-24 NOTE — FLOWSHEET NOTE
bridge   MMT hip ABD 3-/5, knee extension 4-, knee flexion 4    RESTRICTIONS/PRECAUTIONS: HTN  Procedure(s) 8/3/2020:  1.  Left knee arthroscopy with partial medial meniscectomy and chondroplasty medial femoral condyle with synovial biopsy ([id number])    Exercises/Interventions:     Therapeutic Ex (07974) Sets/sec Notes/CUES HEP   Pt ed: continue to elevate as per wound care clinic instructions     HL adductor stretch 3x30\"     Rectus stretch EOB 30\"x3     Bridge with increasing knee flex 10x 115 deg    SLR-  Indep today   \" ER- indep today 2x5   - 1#   X   SAQ 5\" 2x10 2#  c ball squeeze    SL hip abd R, L  Clamshell R,L  Reverse clamshell R,L 2x103\" 2x10   1.5# on knee  1# ankle    Seated LAQ   held wt today d/t soreness X   Seated HR, TR   X   Standing HR, TR  At [facility]    Mini squat  At bar    Side step 1/2 wall (long)  UE support Pt felt too sore today   Step up 3\"  x5 Heavy UE support, min assist last two Attempted on [facility] platform (4\" too hard)   Standing glider: abd, ext diag  UE supp on bar Cue for posture   Sit to stand - highest box 2x5 No UE    HSC    30#    LATOYA TKE  LATOYA hip ABD 5\"x15   60#  15#    Bike (low)  Seat 17 Pt too sore today   Manual Intervention (87830) 15' total     Patellar mobs sup, inf 20x     Re-apply bandaids,      Edema massage, STM to quad, HS, medial knee, IT band  Stick to quad, med/lat thigh 12'  Showed pt how to use stick from home   HS s  Knee slightly bent                NMR re-education (89849)   CUES NEEDED   Gait training one crutch  standing Breaks needed    Weight shifting A/P   intermittent fingertip support today    Weight shifting A/P with one crutch   Intermittent CL UE support when L foot back- not ready to wean to one crutch   Split stance balance + airex at 1/2 wall     Intermittent UE support                           Therapeutic Activity (50212)                                                Therapeutic Exercise and NMR EXR  [x] (00040) Provided verbal/tactile cueing for activities related to strengthening, flexibility, endurance, ROM for improvements in LE, proximal hip, and core control with self care, mobility, lifting, ambulation.  [] (49625) Provided verbal/tactile cueing for activities related to improving balance, coordination, kinesthetic sense, posture, motor skill, proprioception  to assist with LE, proximal hip, and core control in self care, mobility, lifting, ambulation and eccentric single leg control.      NMR and Therapeutic Activities:    [x] (26523 or 66127) Provided verbal/tactile cueing for activities related to improving balance, coordination, kinesthetic sense, posture, motor skill, proprioception and motor activation to allow for proper function of core, proximal hip and LE with self care and ADLs  [x] (59770) Gait Re-education- Provided training and instruction to the patient for proper LE, core and proximal hip recruitment and positioning and eccentric body weight control with ambulation re-education including up and down stairs     Home Exercise Program:    [x] (84693) Reviewed/Progressed HEP activities related to strengthening, flexibility, endurance, ROM of core, proximal hip and LE for functional self-care, mobility, lifting and ambulation/stair navigation   [] (91818)Reviewed/Progressed HEP activities related to improving balance, coordination, kinesthetic sense, posture, motor skill, proprioception of core, proximal hip and LE for self care, mobility, lifting, and ambulation/stair navigation      Manual Treatments:  PROM / STM / Oscillations-Mobs:  G-I, II, III, IV (PA's, Inf., Post.)  [x] (04151) Provided manual therapy to mobilize LE, proximal hip and/or LS spine soft tissue/joints for the purpose of modulating pain, promoting relaxation,  increasing ROM, reducing/eliminating soft tissue swelling/inflammation/restriction, improving soft tissue extensibility and allowing for proper ROM for normal function with self care, mobility, lifting and ambulation. Modalities:     [] GAME READY (VASO)- for significant edema, swelling, pain control. (low) in long-sitting x12'  CP x10' L knee  Charges:  Timed Code Treatment Minutes: 41   Total Treatment Minutes:  53 CP, rest breaks     BWC time in/time out:   (and requires time in and out for each CPT code)    [] EVAL (LOW) 11604 (typically 20 minutes face-to-face)  [] EVAL (MOD) 56107 (typically 30 minutes face-to-face)  [] EVAL (HIGH) 51282 (typically 45 minutes face-to-face)  [] RE-EVAL     [x] VZ(64823) x 2   [] IONTO  [] NMR (01289) x     [] VASO  [x] Manual (10692) x 1     [] Other: gait x  [] TA x      [] Mech Traction (32695)  [] ES(attended) (68977)      [] ES (un) (25408):       GOALS:   Patient stated goal: 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 Progressing: []? Met: []? Not Met: []? Adjusted     Therapist goals for Patient:   Short Term Goals: To be achieved in: 2 weeks  1. Independent in HEP and progression per patient tolerance, in order to prevent re-injury. [x]? Progressing: []? Met: []? Not Met: []? Adjusted  2. Patient will have a decrease in pain to facilitate improvement in movement, function, and ADLs as indicated by Functional Deficits. [x]? Progressing: []? Met: []? Not Met: []? Adjusted     Long Term Goals: To be achieved in: 6 weeks  1. Disability index score of 40% or less for the LEFS to assist with reaching prior level of function. []? Progressing: []? Met: [x]? Not Met: []? Adjusted  2. Patient will demonstrate increased left knee AROM to 0-110  to allow for proper joint functioning for car/truck mobility, stair amb.   []? Progressing: [x]? Met: []? Not Met: []? Adjusted  3. Patient will demonstrate an increase in Strength to at least 4+/5 for the left knee and hip stabilizers in order to ambulate s AD for community distances. [x]? Progressing: []? Met: []? Not Met: []? Adjusted  4.  Patient will return to walking community distances s AD with Sports   Comments:                                PLAN: Continue to improve quad control, work on progressive [address] as dayanna. Consider starting aquatic exc if ok per wound care MD  (pt states skin still not fully closed)  [x] Continue per plan of care [] Alter current plan (see comments above)  [] Plan of care initiated [] Hold pending MD visit [] Discharge      Electronically signed by:  Toan Plummer, PT, DPT    Note: If patient does not return for scheduled/ recommended follow up visits, this note will serve as a discharge from care along with most recent update on progress.

## 2020-09-25 ENCOUNTER — OFFICE VISIT (OUTPATIENT)
Dept: PRIMARY CARE CLINIC | Age: 60
End: 2020-09-25
Payer: COMMERCIAL

## 2020-09-25 PROCEDURE — 99211 OFF/OP EST MAY X REQ PHY/QHP: CPT | Performed by: NURSE PRACTITIONER

## 2020-09-25 RX ORDER — FUROSEMIDE 80 MG
80 TABLET ORAL 2 TIMES DAILY
Qty: 60 TABLET | Refills: 0 | Status: SHIPPED | OUTPATIENT
Start: 2020-09-25 | End: 2020-10-22

## 2020-09-26 LAB — SARS-COV-2, NAA: NOT DETECTED

## 2020-09-28 ENCOUNTER — APPOINTMENT (OUTPATIENT)
Dept: PHYSICAL THERAPY | Age: 60
End: 2020-09-28
Payer: COMMERCIAL

## 2020-09-28 NOTE — PROGRESS NOTES
[facility] Progress Note    Tanja Hughes     : 1960    DATE OF VISIT:  2020    Subjective:     Tanja Hughes is a 64 y.o. male who has a venous ulcer located on the left  lower leg. Significant symptoms or pertinent wound history since last visit: did not lose any additional weight this week with the BID Lasix, which was a bit disappointing to him. The more robust compression wraps definitely helped his lower leg swelling and skin condition, however, although the medial portions of the proximal edges of the wraps felt like they were rolling down and really pinching in to his medial calves -- he made a couple of relief cuts to ease that pain, which helped, though he does have a couple of abrasions at the left shin. No F/C/D. No SOB. PT is going ok, though slowly, still with a good bit of left knee pain at times. Additional ulcer(s) noted? no. Right leg does appear as durably healed as it ever has. His current medication list consists of etodolac, fluticasone, furosemide, hydroCHLOROthiazide, lisinopril, loratadine, and rOPINIRole. Lasix at 40 mg BID for 2 weeks. Allergies: Bactrim [sulfamethoxazole-trimethoprim]    Objective:     Vitals:    20 1415   BP: (!) 174/83   Pulse: 85   Resp: 20   Temp: 98.2 °F (36.8 °C)   TempSrc: Oral   Weight: (!) 420 lb 6.4 oz (190.7 kg)   Height: 5' 8\" (1.727 m)     AAOx3, overweight, NAD  Intact distal pulses, feet warm, good cap refill  Moderate BL LE stage 2 lymphedema, lower legs definitely improved from last week, thighs still quite swollen  No cellulitis, angitis, fluctuance  Some allodynia at the left pretib abrasions, not at the other (recently open) sites of his lower legs though  Edwina-ulcer skin: indurated, pink and warm.   Ulcer(s): right pretib and left calf areas of venous ulceration more durably healed this week, but a small cluster of left pretib abrasions / ulcers from the top of this past week's wrap (red, clean, inflamed, tender, serous exudate). Photos also saved in electronic chart. Today's Wound Measurements, per RN documentation:  Wound 07/28/20 #1 right lower pretib cluster, venous, partial thickness, last 2 years-Wound Length (cm): 0 cm  [REMOVED] Wound 07/28/20 #2 left posterior lower leg cluster, venous, partial thickness, last 2 years-Wound Length (cm): 0 cm    Wound 07/28/20 #1 right lower pretib cluster, venous, partial thickness, last 2 years-Wound Width (cm): 0 cm  [REMOVED] Wound 07/28/20 #2 left posterior lower leg cluster, venous, partial thickness, last 2 years-Wound Width (cm): 0 cm    Wound 07/28/20 #1 right lower pretib cluster, venous, partial thickness, last 2 years-Wound Depth (cm): 0 cm  [REMOVED] Wound 07/28/20 #2 left posterior lower leg cluster, venous, partial thickness, last 2 years-Wound Depth (cm): 0 cm    Assessment:     Patient Active Problem List   Diagnosis Code    Class 3 severe obesity due to excess calories with serious comorbidity and body mass index (BMI) of 60.0 to 69.9 in adult ([facility] E66.01, Z68.44    Mixed hypertriglyceridemia M92.8    Nonalcoholic fatty liver disease K76.0    RLS (restless legs syndrome) G25.81    Essential hypertension, benign I10    Impaired fasting glucose R73.01    Chronic pain of left knee M25.562, G89.29    Osteoarthritis of left knee M17.12    Chondromalacia of left knee M94.262    Pes planus M21.40    Leg swelling M79.89    Chronic ulcer of left leg, limited to breakdown of skin ([facility] L97.921    Peripheral venous insufficiency I87.2    Allergic rhinitis J30.9       Assessment of today's active condition(s): venous stasis, chronic skin changes, multifactorial lower extremity edema. Right leg currently healed, primary left leg ulcer healed, though a couple of new iatrogenic abrasions at the left shin from last week's wrap.  I have some confidence that we can get him healed in the next 1-2 weeks, but keeping him healed is going to be a and smoking cessation, if applicable; also encouraged ambulation as tolerated, additional lower extremity exercises as instructed in our education sheet, leg elevation when at rest, and compliance with any recommended dietary, diuretic and compression therapies. Start back with daily CompreFlex Transition wrap on the RLE this week (ideally don each AM, doff QHS, but he can keep it in place overnight here and there, if that's easier). In terms of diuretics, I'll check another BMP today, and if things look stable, will consider either increasing Lasix further, or adding some metolazone in the short-term. Will call him tomorrow with results and plans. Written discharge instructions given to patient. Follow up in 1 week.     Electronically signed by Adi Hernández MD on 9/28/2020 at 11:23 AM.

## 2020-09-29 ENCOUNTER — TELEPHONE (OUTPATIENT)
Dept: BARIATRICS/WEIGHT MGMT | Age: 60
End: 2020-09-29

## 2020-09-30 ENCOUNTER — APPOINTMENT (OUTPATIENT)
Dept: PHYSICAL THERAPY | Age: 60
End: 2020-09-30
Payer: COMMERCIAL

## 2020-10-01 ENCOUNTER — HOSPITAL ENCOUNTER (OUTPATIENT)
Dept: SLEEP CENTER | Age: 60
Discharge: HOME | End: 2020-10-01
Payer: COMMERCIAL

## 2020-10-01 ENCOUNTER — APPOINTMENT (OUTPATIENT)
Dept: PHYSICAL THERAPY | Age: 60
End: 2020-10-01
Payer: COMMERCIAL

## 2020-10-01 PROCEDURE — 95811 POLYSOM 6/>YRS CPAP 4/> PARM: CPT | Performed by: INTERNAL MEDICINE

## 2020-10-01 PROCEDURE — 95811 POLYSOM 6/>YRS CPAP 4/> PARM: CPT

## 2020-10-02 ENCOUNTER — HOSPITAL ENCOUNTER (OUTPATIENT)
Dept: WOUND CARE | Age: 60
Discharge: HOME | End: 2020-10-02
Payer: COMMERCIAL

## 2020-10-02 VITALS
BODY MASS INDEX: 47.74 KG/M2 | TEMPERATURE: 98.6 F | HEART RATE: 96 BPM | WEIGHT: 315 LBS | RESPIRATION RATE: 22 BRPM | SYSTOLIC BLOOD PRESSURE: 164 MMHG | DIASTOLIC BLOOD PRESSURE: 90 MMHG | HEIGHT: 68 IN

## 2020-10-02 PROCEDURE — 29581 APPL MULTLAYER CMPRN SYS LEG: CPT | Performed by: INTERNAL MEDICINE

## 2020-10-02 PROCEDURE — 29581 APPL MULTLAYER CMPRN SYS LEG: CPT

## 2020-10-02 RX ORDER — METOLAZONE 2.5 MG/1
2.5 TABLET ORAL
Qty: 30 TABLET | Refills: 1 | Status: SHIPPED | OUTPATIENT
Start: 2020-10-02 | End: 2020-10-22

## 2020-10-02 RX ORDER — POTASSIUM CHLORIDE 20 MEQ/1
20 TABLET, EXTENDED RELEASE ORAL DAILY
Qty: 60 TABLET | Refills: 1 | Status: SHIPPED | OUTPATIENT
Start: 2020-10-02 | End: 2020-10-22

## 2020-10-02 ASSESSMENT — PAIN DESCRIPTION - LOCATION: LOCATION: LEG

## 2020-10-02 ASSESSMENT — PAIN DESCRIPTION - ORIENTATION: ORIENTATION: RIGHT

## 2020-10-02 ASSESSMENT — PAIN DESCRIPTION - ONSET: ONSET: ON-GOING

## 2020-10-02 ASSESSMENT — PAIN DESCRIPTION - DESCRIPTORS: DESCRIPTORS: BURNING

## 2020-10-02 ASSESSMENT — PAIN DESCRIPTION - FREQUENCY: FREQUENCY: INTERMITTENT

## 2020-10-02 ASSESSMENT — PAIN - FUNCTIONAL ASSESSMENT: PAIN_FUNCTIONAL_ASSESSMENT: PREVENTS OR INTERFERES SOME ACTIVE ACTIVITIES AND ADLS

## 2020-10-02 ASSESSMENT — PAIN SCALES - GENERAL: PAINLEVEL_OUTOF10: 1

## 2020-10-02 ASSESSMENT — PAIN DESCRIPTION - PAIN TYPE: TYPE: ACUTE PAIN

## 2020-10-02 ASSESSMENT — PAIN DESCRIPTION - PROGRESSION: CLINICAL_PROGRESSION: NOT CHANGED

## 2020-10-05 ENCOUNTER — HOSPITAL ENCOUNTER (OUTPATIENT)
Dept: PHYSICAL THERAPY | Age: 60
Setting detail: THERAPIES SERIES
Discharge: HOME | End: 2020-10-05
Payer: COMMERCIAL

## 2020-10-05 ENCOUNTER — OFFICE VISIT (OUTPATIENT)
Dept: ORTHOPEDIC SURGERY | Age: 60
End: 2020-10-05
Payer: COMMERCIAL

## 2020-10-05 VITALS — BODY MASS INDEX: 47.74 KG/M2 | WEIGHT: 315 LBS | HEIGHT: 68 IN

## 2020-10-05 PROCEDURE — 97140 MANUAL THERAPY 1/> REGIONS: CPT

## 2020-10-05 PROCEDURE — 20610 DRAIN/INJ JOINT/BURSA W/O US: CPT | Performed by: FAMILY MEDICINE

## 2020-10-05 PROCEDURE — 99214 OFFICE O/P EST MOD 30 MIN: CPT | Performed by: FAMILY MEDICINE

## 2020-10-05 PROCEDURE — 97110 THERAPEUTIC EXERCISES: CPT

## 2020-10-05 RX ORDER — BETAMETHASONE SODIUM PHOSPHATE AND BETAMETHASONE ACETATE 3; 3 MG/ML; MG/ML
12 INJECTION, SUSPENSION INTRA-ARTICULAR; INTRALESIONAL; INTRAMUSCULAR; SOFT TISSUE ONCE
Status: COMPLETED | OUTPATIENT
Start: 2020-10-05 | End: 2020-10-05

## 2020-10-05 RX ORDER — LIDOCAINE HYDROCHLORIDE 10 MG/ML
1 INJECTION, SOLUTION INFILTRATION; PERINEURAL ONCE
Status: COMPLETED | OUTPATIENT
Start: 2020-10-05 | End: 2020-10-05

## 2020-10-05 RX ORDER — TRAMADOL HYDROCHLORIDE 50 MG/1
50 TABLET ORAL EVERY 6 HOURS PRN
Qty: 28 TABLET | Refills: 0 | Status: SHIPPED | OUTPATIENT
Start: 2020-10-05 | End: 2020-10-15 | Stop reason: SDUPTHER

## 2020-10-05 RX ORDER — ETODOLAC 400 MG/1
400 TABLET, FILM COATED ORAL 2 TIMES DAILY
Qty: 60 TABLET | Refills: 3 | Status: SHIPPED | OUTPATIENT
Start: 2020-10-05 | End: 2021-01-04 | Stop reason: SDUPTHER

## 2020-10-05 RX ORDER — BUPIVACAINE HYDROCHLORIDE 2.5 MG/ML
2 INJECTION, SOLUTION INFILTRATION; PERINEURAL ONCE
Status: COMPLETED | OUTPATIENT
Start: 2020-10-05 | End: 2020-10-05

## 2020-10-05 RX ADMIN — LIDOCAINE HYDROCHLORIDE 1 ML: 10 INJECTION, SOLUTION INFILTRATION; PERINEURAL at 10:51

## 2020-10-05 RX ADMIN — BUPIVACAINE HYDROCHLORIDE 5 MG: 2.5 INJECTION, SOLUTION INFILTRATION; PERINEURAL at 10:51

## 2020-10-05 RX ADMIN — BETAMETHASONE SODIUM PHOSPHATE AND BETAMETHASONE ACETATE 12 MG: 3; 3 INJECTION, SUSPENSION INTRA-ARTICULAR; INTRALESIONAL; INTRAMUSCULAR; SOFT TISSUE at 10:50

## 2020-10-05 NOTE — PLAN OF CARE
Will begin taking Zaroxylone M-W-F will add Potassium supplement as well. Plan to draw labs at next [facility] ER appt. Goal to get edema and weight down. Discharge instructions reviewed with patient, all questions answered, copy given to patient. Dressings were applied to all wounds per M.D. Instructions at this visit.

## 2020-10-05 NOTE — PROGRESS NOTES
Chief Complaint  Knee Pain (fu lt knee, having alot of pain)      Elvin Miles is a 64 y.o. 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 History of Present Illness for Follow Up Patient:      Patient is being seen today for acute left knee pain. The patient reports that since the first week in March 2020 he began noticed the insidious onset of pain to the anterior medial portion of his left knee. Ashwini Yoder He describes the symptoms as aching, sharp and stabbing. The pain is located medial, lateral and the patient rates their current pain as a 1-7 out of 10 on the pain scale. This problem has been an issue for 2 weeks. 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 Patient has other associated symptoms: Mild swelling instability. Patient has attempted rest, ice, heat, NSAID-6-12 over-the-counter ibuprofen daily to treat this problem and symptoms are are worsening. Patient does not have a previous history previous knee injury. He does recall an episode a couple months ago when he believes he pulled his right hip adductor which may have led to a gait change causing his left knee to bother him. Only mild night discomfort. Patient is being seen today for orthopedic and sports consultation and review of imaging. 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   Following a steroid injection he did report at least an 80% improvement of his symptoms but this lasted only about 4 weeks and over the last month he has noticed recurrence of his pain to the point where his pain is about a 5 out of 10 currently. He has noticed some catching and has definite difficulty repositioning his knee if it is in a bent position. He has not had juventino locking and admits he has been a little bit lax in performing his home-based exercises but has continued with his diclofenac twice daily. He has not noticed a great deal of swelling and has not had true instability symptoms and has gotten benefit from use of his brace. His pain is primarily anterior but also medial.  He just returned back to town this past weekend after being on the road for 8 straight weeks. Radha Zaragoza was last seen in the office on 5/27/2020 due to worsening pain, and sent for an MRI of his left knee. His MRI does show more prominent degenerative changes with high-grade medial and patellofemoral compartment chondromalacia with degenerative tearing and maceration to the posterior horn and body of the medial meniscus. There was a probable area of synovial thickening versus loose body in the suprapatellar recess medially but he is really not complaining of sensations of loose bodies and is not really had true locking or catching. Over this past weekend his pain symptoms have worsened substantially where he is unable to comfortably walk. He is having 10 out of 10 pain difficulty with motion and flexion. Once again he was supposed to leave town to get back on the road as a  but he is unable to walk and unable to climb into his cab. He is having difficulty sleeping at this point. He has been icing and has been taking his steroid taper which did not provide any type of pain relief now his right knee is bothering him anteriorly and medially. Once again no active locking or catching. Does have short-term disability available to him.     He was last seen in the office on 2020 was continued on conservative treatment post imaging. Once again he is quite symptomatic and we did have to take him off work to allow for rehabilitation. He was quite painful and definitely requiring crutches to walk. Once again he does believe he has short-term disability. He continues to deny sensations of loose bodies are active locking or catching. He is continue with his ibuprofen 800 mg 3 times daily we did give him tramadol for breakthrough pain. He has been contemplating Visco supplementation. He is set up to begin supervised physical therapy this week and has been measured for his  brace. There is improvement on the left at about 50% and has at least an 80 to 90% improvement on the right knee. Elda Carlton was last seen in the office on 2020 we completed Visco supplementation to his knees bilaterally. He states his right knee is doing reasonably well and grades improved about 75% however since finishing up his final Euflexxa injection to his left knee, his symptoms have apparently been getting worse to the point where he is still having to use his crutch. He is not accustomed to realizing his  brace but is having very substantial pain both at rest but primarily with weightbearing. They did have a second opinion consultation with Dr. Meg Bruno on 2020 who did agree that given his weight currently, that he is not an optimal surgical candidate. He has lost 8 pounds initially with diet but I think he would benefit as well from a consultation with [facility] weight loss. Potential for gastric bypass under banding was also discussed. He is still on short-term disability which will initially  next week but he cannot ambulate enough to get into his truck and has continued with physical therapy.   He has been taking his ibuprofen 3 times daily as well as his periodic tramadol but still is having fairly consistent pain up to an 8 out of 10.    We last saw [place] in the office on 7/6/2020 and ultimately underwent arthroscopic meniscectomy and chondroplasty to his left knee with some improvement of his symptoms. For the most part it is only a 2 to 3-10 but he has not been overly active and still requiring crutches for ambulation. He did have some venous stasis ulcerations and was seeing in wound clinic and these do seem to be improving. He also had no recent increase in his weight which was likely fluid accumulation as he was developing some pitting edema. He is trying to perform his exercise program as best he can. We did complete Visco supplementation in his knees on 6/23/2020 but this was fairly ineffective and he is going to be seeing weight loss management. Progressive standing and walking is still problematic for him and he is trying to lose weight. He unfortunately did not temporarily lose his job because of his worsening knee pain which she has been undergoing treatment for for the past 6 months. Most of his pain is anterior medial in nature. He has been continuing with his diclofenac and acetaminophen. [place] was last seen in the office on 9/14/2020 for his underlying significant left knee osteoarthritis. He is now 2 months out from his left knee arthroscopy and was seemingly making progress up until a few days ago when he began his worsening pain once again with regard to his knee anteriorly and medially. There is no history of injury or fall. He has continued trying to lose weight and does have an upcoming appointment with his bariatric surgeon on 10/28/2020. We have been trying to get approval for the geniculate block in preparation for possible coolief treatment with Dr. Sera Guzman. We are awaiting for medical mutual for authorization as we need to get the proper codes for approval of this.   We once again did complete Euflexxa on him on 6/23/2020 but that was before his surgery on 8/4/2020 his last steroid injection was weakness or sensory deficit. Left knee examination     Inspection: There is no high-grade deformity or soft tissue swelling although he may have a trace knee joint effusion. Only mild patellofemoral crepitation.     Palpation: He does have more prominent tenderness over the lateral greater than medial patellofemoral facet as well as over the anterior and posterior third medial joint line. Increasing tenderness over the medial tibial plateau and femoral condyle without high-grade effusion.     Rang of Motion: He does definitely stiff in the terminal 10 degrees of extension with flexion to about 120. Hamstrings and IT bands are tight.     Strength: 4 to 4+ out of 5 with flexion and extension.     Special Tests: He does have substantial pain reproduced patellar grind testing and with palpation of the primarily the anterior third medial joint line. I cannot really elicit any high-grade meniscal click so this causes some moderate discomfort medially more so than laterally. No obvious instability. Screening left hip testing is benign.     Skin: There are no rashes, ulcerations or lesions. Distal motor sensory and vascular exam is intact.     Gait:  He is still having difficulty with bearing weight. He is currently using 1 crutch on the right although he will occasionally use 2 crutches as well. .     Reflex symmetrically preserved       Additional Comments:     Evaluation of his right knee does reveal trace swelling. He does have tenderness over the medial and lateral patellofemoral facet and most of his pain is reproduced with palpation of the anterior third medial joint line with patellar grind testing. He is stiff in the terminal 10 degrees of extension with flexion on the right to about 120-125.  4-5 strength with flexion extension. Pain with medial Monik's but no high-grade click. Negative lateral Monik's. No obvious instability.   Screening right hip testing appears benign.         Additional patellar and trochlear groove    chondromalacia.       ACL, PCL, LCL, MCL, flexor mechanism, and extensor mechanism are intact.       Thickened MCL.  Chronic sprain, scarring, and capsulitis is present.       High-grade chondromalacia involves the weightbearing medial femur and tibia.  Eccentric    spurring is present.       Lateral meniscus is intact.       Medial meniscus free edge tearing involves the body.       Nodular region of synovial thickening or body is noted within the suprapatellar recess    medially.  This measures at least 1.75 x 1 x 2.3 cm.       CONCLUSION:   1. High-grade medial and patellofemoral compartment chondromalacia.  Degenerative tearing    involves the free edge of the posterior horn and body.  Subchondral marrow changes, spurring    and remodeling of the medial and less so patellofemoral joint spaces. 2. Loose osteochondral body suprapatellar recess medially measures 1.75 x 1 x 2.3 cm.   3. Anteromedial and anterolateral subcutis swelling.  Contusion and sprain present. 4. Please see above.       Thank you for the opportunity to provide your interpretation.               Gerhard Riggs MD       A: See 2020 7:29 PM   T: AVA 2020 2:35 PM         Follow-up left knee MRI obtained at [address] [place] [address] on 2020 as listed above  Narrative    Site: [email or web address] #: [id number] #: [id number] Procedure: MR Left Knee w/o Contrast ; Reason for Exam: primary osteoarthritis of left knee; chondromalacia of left knee; left knee pain; r/o insufficiency fracture    This document is confidential medical information.  Unauthorized disclosure or use of this information is prohibited by law. If you are not the intended recipient of this document, please advise us by calling immediately [phone].         [employer] [facility]    RONALDO Mejia              Patient Name: Emma Holland    Case ID: [id number]    Patient : 1960    Referring Physician: Brunilda Joseph crutches. After discussing options, we did perform an intra-articular steroid injection to his left knee using 2 cc of Celestone, 2 cc of Marcaine, 1 cc Xylocaine. I would like for him to meet with just for additional fitting or modification of his medial compartment  brace icing and continuation of his crutches and physical therapy was recommended. He will continue with his Lodine 400 mg 1 pill twice daily was given a refill on his tramadol. He does have a bariatric surgery appointment on 10/28/2020. Once again I do think ultimately that Alfred Bob will need total knee arthroplasty however with his BMI of 63.71 currently, he is not a surgical candidate. I do believe sincerely that bariatric surgery is of medical necessity for him as he is not a safe surgical candidate until we get his BMI closer to 40. He has been diagnosed with sleep apnea and has completed treatment of his wound clinic. He will continue use of the stockings as well as his Lodine 400 mg 1 pill twice daily. We will try to get the appropriate diagnoses allowed for him so he can got undergo  Coolief treatment with Dr. Sera Guzman. We will see him back in a few weeks for follow-up. Icing and activity modification was discussed. He will contact us in the interim with questions or concerns. This dictation was performed with a verbal recognition program (DRAGON) and it was checked for errors. It is possible that there are still dictated errors within this office note. If so, please bring any errors to my attention for an addendum. All efforts were made to ensure that this office note is accurate.

## 2020-10-05 NOTE — FLOWSHEET NOTE
[facility],  [address], [address] [place] [address]  Phone: [phone]  Fax [phone]    Physical Therapy Treatment Note/ Progress Report:           Date:  10/5/2020    Patient Name:  Dayana Franks    :  1960  MRN: [id number]  Restrictions/Precautions:    Medical/Treatment Diagnosis Information:  · Diagnosis: T14.549E (ICD-10-CM) - Complex tear of medial meniscus of left knee as current injury, initial encounter  · Treatment Diagnosis: M25.562 Left knee pain; M25.662 Left knee stiffness; R26.2 Difficulty in walking  Insurance/Certification information:  PT Insurance Information: [employer] [place]  Physician Information:  Referring Practitioner: Dr. Chucho Wang  Has the plan of care been signed (Y/N):        []  Yes  [x]  No     Date of Patient follow up with Physician:       Is this a Progress Report:     []  Yes  [x]  No        If Yes:  Date Range for reporting period:  Beginning 8/3/20  Ending     Progress report will be due (10 Rx or 30 days whichever is less):     Recertification will be due (POC Duration  / 90 days whichever is less):10/29    Visit # Insurance Allowable Requires auth   15 30 (new insu )    [x]no        []yes:     Functional Scale:LEFS 75% disability   Date assessed:  20  LEFS 78% disability      20      Therapy Diagnosis/Practice Pattern:E    Number of Comorbidities:  []0     []1-2    [x]3+    Latex Allergy:  [x]NO      []YES  Preferred Language for Healthcare:   [x]English       []other:       Pain level: 4-810 current     SUBJECTIVE:  Pt states that he started to have increased pain last Thursday morning when he tried to get out of bed. He does not recall any twisting episode or mechanism to cause increased pain. It has been more difficult to walk and states that it feels like before he had surgery.      OBJECTIVE:    Observation: B LEs wrapped for vascular wounds/drainage + light compression hose B  Test measurements:   - Takn test 10/5/20, but increased swelling 5 cm below inferior pole of patella: L 57 cm, R 55 cm  RESTRICTIONS/PRECAUTIONS: HTN  Procedure(s) 8/3/2020:  1.  Left knee arthroscopy with partial medial meniscectomy and chondroplasty medial femoral condyle with synovial biopsy (27014-35)    Exercises/Interventions:     Therapeutic Ex (40699) Sets/sec Notes/CUES HEP   Pt ed: stretches at home that were performed today, e-stim 2'    Seated calf stretch c strap 3x30\"     Seated HS stretch 3x30\"     HL adductor stretch 3x30\"     Rectus stretch EOB  Could not tolerate 10/5    Bridge with increasing knee flex 115 deg    SLR-  Indep today   \" ER- indep today 1#   X   SAQ 2#  c ball squeeze    SL hip abd R, L  Clamshell R,L  Reverse clamshell R,L   1.5# on knee  1# ankle    Seated LAQ   held wt today d/t soreness X   Seated HR, TR   X   Standing HR, TR  At [facility]    Mini squat  At bar    Side step 1/2 wall (long)  UE support Pt felt too sore today   Step up 3\"   Heavy UE support, min assist last two Attempted on [facility] platform (4\" too hard)   Standing glider: abd, ext diag  UE supp on bar Cue for posture   Sit to stand - highest box No UE    HSC  30#    LATOYA TKE  LATOYA hip ABD 60#  15#    Bike (low)  Seat 17 Pt too sore today   Manual Intervention (44667) 15' total     Patellar mobs sup, inf 20x     Re-apply bandaids,      Edema massage, STM to pes anserine, distal IT band, adductor muscle belly 15'  Showed pt how to use stick from home   HS s  Knee slightly bent                NMR re-education (22276)   CUES NEEDED   Gait training one crutch  standing Breaks needed    Weight shifting A/P   intermittent fingertip support today    Weight shifting A/P with one crutch   Intermittent CL UE support when L foot back- not ready to wean to one crutch   Split stance balance + airex at 1/2 wall     Intermittent UE support                           Therapeutic Activity (25390) Therapeutic Exercise and NMR EXR  [x] (92285) Provided verbal/tactile cueing for activities related to strengthening, flexibility, endurance, ROM for improvements in LE, proximal hip, and core control with self care, mobility, lifting, ambulation.  [] (49086) Provided verbal/tactile cueing for activities related to improving balance, coordination, kinesthetic sense, posture, motor skill, proprioception  to assist with LE, proximal hip, and core control in self care, mobility, lifting, ambulation and eccentric single leg control.      NMR and Therapeutic Activities:    [x] (88378 or 85204) Provided verbal/tactile cueing for activities related to improving balance, coordination, kinesthetic sense, posture, motor skill, proprioception and motor activation to allow for proper function of core, proximal hip and LE with self care and ADLs  [x] (09037) Gait Re-education- Provided training and instruction to the patient for proper LE, core and proximal hip recruitment and positioning and eccentric body weight control with ambulation re-education including up and down stairs     Home Exercise Program:    [x] (02685) Reviewed/Progressed HEP activities related to strengthening, flexibility, endurance, ROM of core, proximal hip and LE for functional self-care, mobility, lifting and ambulation/stair navigation   [] (86929)Reviewed/Progressed HEP activities related to improving balance, coordination, kinesthetic sense, posture, motor skill, proprioception of core, proximal hip and LE for self care, mobility, lifting, and ambulation/stair navigation      Manual Treatments:  PROM / STM / Oscillations-Mobs:  G-I, II, III, IV (PA's, Inf., Post.)  [x] (41246) Provided manual therapy to mobilize LE, proximal hip and/or LS spine soft tissue/joints for the purpose of modulating pain, promoting relaxation,  increasing ROM, reducing/eliminating soft tissue swelling/inflammation/restriction, improving soft tissue left knee and hip stabilizers in order to ambulate s AD for community distances. [x]? Progressing: []? Met: []? Not Met: []? Adjusted  4. Patient will return to walking community distances s AD with mild bilateral knee pain (<3/10). [x]? Progressing: []? Met: []? Not Met: []? Adjusted  5. Pt will be able to drive x 2 hours without increased knee pain; car mobility without knee pain. []? Progressing: []? Met: [x]? Not Met: []? Adjusted         Progression Towards Functional goals:  [] Patient is progressing as expected towards functional goals listed. [x] Progression is slowed due to complexities listed. [] Progression has been slowed due to co-morbidities. [] Plan just implemented, too soon to assess goals progression  [] Other:         Overall Progression Towards Functional goals/ Treatment Progress Update:  [] Patient is progressing as expected towards functional goals listed. [x] Progression is slowed due to complexities/Impairments listed. [] Progression has been slowed due to co-morbidities. [] Plan just implemented, too soon to assess goals progression <30days   [] Goals require adjustment due to lack of progress  [] Patient is not progressing as expected and requires additional follow up with physician  [] Other    Prognosis for POC: [x] Good [] Fair  [] Poor      Patient requires continued skilled intervention: [x] Yes  [] No    Treatment/Activity Tolerance:  [x] Patient able to complete treatment  [] Patient limited by fatigue  [x] Patient limited by pain    [] Patient limited by other medical complications  [] Other:     ASSESSMENT: Pt had increased pain and swelling at medial L shin today, possibly d/t pes anserine. Tissue mobility improved with STM. No strengthening this visit- focused on pain modulation with light flexibility, MT. He did not feel that ES was helpful with pain, so stopped after a trial of 3' and just used ice. Pt will see Dr. Peter Dale after his appt today d/t increase in pain. Return to Play: (if applicable)   []  Stage 1: Intro to Strength   []  Stage 2: Return to Run and Strength   []  Stage 3: Return to Jump and Strength   []  Stage 4: Dynamic Strength and Agility   []  Stage 5: Sport Specific Training     []  Ready to Return to Play, Meets All Above Stages   []  Not Ready for Return to Sports   Comments:                                PLAN: Pt to see Dr. Don Temple after appt today  [x] Continue per plan of care [] Alter current plan (see comments above)  [] Plan of care initiated [] Hold pending MD visit [] Discharge      Electronically signed by:  Itz Honeycutt PT, DPT    Note: If patient does not return for scheduled/ recommended follow up visits, this note will serve as a discharge from care along with most recent update on progress.

## 2020-10-06 ENCOUNTER — TELEPHONE (OUTPATIENT)
Dept: PULMONOLOGY | Age: 60
End: 2020-10-06

## 2020-10-06 ENCOUNTER — TELEPHONE (OUTPATIENT)
Dept: ORTHOPEDIC SURGERY | Age: 60
End: 2020-10-06

## 2020-10-06 PROBLEM — I89.0 LYMPHEDEMA: Status: ACTIVE | Noted: 2020-06-25

## 2020-10-06 NOTE — PROGRESS NOTES
Peggy Mirian         : 1960 MSC    Diagnosis: [x] TOM (G47.33) [] CSA (G47.31) [] Apnea (G47.30)   Length of Need: [x] 12 Months [] 99 Months [] Other:    Machine (MICHELLE!): [x] Respironics Dream Station      Auto [] ResMed AirSense     Auto [] Other:     []  CPAP () [x] Bilevel ()   Mode: [] Auto [] Spontaneous    Mode: [x] Auto [] Spontaneous                IPAP max 25 cmH2O  EPAP min 14 cmH2O  PS min 3 cmH2O  PS max 8 cmH2O             Comfort Settings:   - Ramp Pressure: 7 cmH2O                                        - Ramp time: 15 min                                     -  Flex/EPR - 3 full time                                    - For ResMed Bilevel (TiMax-4 sec   TiMin- 0.2 sec)     Humidifier: [x] Heated ()        [x] Water chamber replacement ()/ 1 per 6 months        Mask:   [] Nasal () /1 per 3 months [x] Full Face () /1 per 3 months   [] Patient choice -Size and fit mask [x] Patient Choice - Size and fit mask   [] Dispense:  [] Dispense:    [] Headgear () / 1 per 3 months [x] Headgear () / 1 per 3 months   [] Replacement Nasal Cushion ()/2 per month [x] Interface Replacement ()/1 per month   [] Replacement Nasal Pillows ()/2 per month         Tubing: [x] Heated ()/1 per 3 months    [] Standard ()/1 per 3 months [] Other:           Filters: [x] Non-disposable ()/1 per 6 months     [x] Ultra-Fine, Disposable ()/2 per month        Miscellaneous: [] Chin Strap ()/ 1 per 6 months [] O2 bleed-in:       LPM   [] Oximetry on CPAP/Bilevel []  Other:    [x] Modem: ()         Start Order Date: 10/06/20    MEDICAL JUSTIFICATION:  I, the undersigned, certify that the above prescribed supplies are medically necessary for this patients wellbeing. In my opinion, the supplies are both reasonable and necessary in reference to accepted standards of medicalpractice in treatment of this patients condition.     Tania Tanner MD NPI: [id number]       Order Signed Date: 10/06/20    Electronically signed by Robert Mejia MD on 10/6/2020 at 10:27 AM

## 2020-10-06 NOTE — PROGRESS NOTES
[facility] Progress Note    Lev Leggett     : 1960    DATE OF VISIT:  10/2/2020    Subjective:     Lev Leggett is a 64 y.o. male who has a venous and  lymphedema ulcer located on the right lower leg. Significant symptoms or pertinent wound history since last visit: unfortunately not making a lot of progress. Despite Lasix increasing from 40 QD to 40 BID to 80 BID, he's only lost a pound of fluid weight this week. The left leg that we had wrapped up for the week has nice improvement in swelling, with healed ulcers, but the right leg has opened up again in a couple of places. No increased SOB, no F/C/D. In discussing his diuretics, he did notice some hand cramps this week when using his hands. Also had a sudden increase in left knee pain when he got out of bed once this week; has ortho F/U on Monday. Additional ulcer(s) noted? no.      His current medication list consists of etodolac, fluticasone, furosemide, hydroCHLOROthiazide, lisinopril, loratadine, rOPINIRole, and traMADol. Allergies: Bactrim [sulfamethoxazole-trimethoprim]    Objective:     Vitals:    10/02/20 1543 10/02/20 1546   BP:  (!) 164/90   Pulse:  96   Resp: 22    Temp: 98.6 °F (37 °C)    TempSrc: Oral    Weight: (!) 419 lb (190.1 kg)    Height: 5' 8\" (1.727 m)      AAOx3, overweight, in some pain from his knee, NAD otherwise  Intact distal pulses, feet warm, good cap refill  Mild-mod left and at least moderate right lower extremity edema  No cellulitis, angitis, fluctuance  Edwina-ulcer skin: indurated, pink, warm, dry and stasis dermatitis. Ulcer(s): cluster of small, partial thickness venous-type ulcers, no real necrosis, no signs of infection, just serous exudate. Photos also saved in electronic chart.     Today's Wound Measurements, per RN documentation:  Wound 20 #1 right lower pretib cluster, venous, partial thickness, last 2 years-Wound Length (cm): 2 cm  [REMOVED] Wound 20 #2 left posterior lower leg cluster, venous, partial thickness, last 2 years-Wound Length (cm): 0 cm    Wound 07/28/20 #1 right lower pretib cluster, venous, partial thickness, last 2 years-Wound Width (cm): 1 cm  [REMOVED] Wound 07/28/20 #2 left posterior lower leg cluster, venous, partial thickness, last 2 years-Wound Width (cm): 0 cm    Wound 07/28/20 #1 right lower pretib cluster, venous, partial thickness, last 2 years-Wound Depth (cm): 0.1 cm  [REMOVED] Wound 07/28/20 #2 left posterior lower leg cluster, venous, partial thickness, last 2 years-Wound Depth (cm): 0 cm    Assessment:     Patient Active Problem List   Diagnosis Code    Class 3 severe obesity due to excess calories with serious comorbidity and body mass index (BMI) of 60.0 to 69.9 in adult ([facility] E66.01, Z68.44    Mixed hypertriglyceridemia I46.4    Nonalcoholic fatty liver disease K76.0    RLS (restless legs syndrome) G25.81    Essential hypertension, benign I10    Impaired fasting glucose R73.01    Chronic pain of left knee M25.562, G89.29    Osteoarthritis of left knee M17.12    Chondromalacia of left knee M94.262    Pes planus M21.40    Leg swelling M79.89    Chronic ulcer of right leg, limited to breakdown of skin ([facility] L97.911    Peripheral venous insufficiency I87.2    Allergic rhinitis J30.9       Assessment of today's active condition(s): venous stasis, skin changes, LE edema, recurrent superficial venous leg ulcers -- does well when compressed for the week, but not when he has to manage daily compression garments at home. Also hasn't really diuresed much at all despite increased Lasix doses. Might consider a lymphedema compression pump if we can't make more progress, but I want to be sure that his left knee is ok first. Factors contributing to occurrence and/or persistence of the chronic ulcer include venous stasis, lymphedema, decreased mobility and obesity. Medical necessity of today's visit is shown by the above documentation. Sharp debridement is not indicated today, based upon the exam findings in the wound(s) above. He does have an indication for a weekly compression wrap. Procedure note:     Consent obtained. Time out performed per [facility]. Anesthetic  Anesthetic: 4% Lidocaine Cream     After cleansing of the wounds with saline and applying skin-care and/or dressing materials as listed below, Right unilateral application of a multi-layer compression wrap was performed per physician order, to help manage edema, stasis dermatitis, and/or venous ulcers. The patient's level of pain during and after the procedure was monitored, and is noted in the wound documentation flowsheet. Discharge plan:     Treatment in the wound care center today, per RN documentation: Wound 07/28/20 #1 right lower pretib cluster, venous, partial thickness, last 2 years-Dressing/Treatment: Other (comment)(triam, collagen, 4x4, fourflex, G spanda). Leave primary dressing and multi-layer wrap(s) in place until the next appointment. He should call before his next visit if there is any significant pain, significant strike-through of drainage to the surface of the wrap, or any significant sense of the wrap sliding down more than an inch or so, bunching-up and abrading his skin. I reminded the patient of the importance of weight management and smoking cessation, if applicable; also encouraged ambulation as tolerated, additional lower extremity exercises as instructed in our education sheet, leg elevation when at rest, and compliance with any recommended dietary, diuretic and compression therapies. With the left side being healed, I'll have him go back to daily compression garment on that side again this week. Await ortho FU re: his knee pain. Will keep Lasix at 80 BID, add Zaroxolyn 2.5 mg TIW, also add a small daily dose of KCl. If he gets increased cramps or any palpitations or symptoms of hypovolemia, stop the Zaroxolyn and call me.  In the long-term, with significant weight loss, he ought to be able to do better with daily self-directed compression, but that's obviously going to take some time. If he doesn't have any other serious knee issues in need of immediate OR attention, might discuss a lymphedema compression pump in the next couple of weeks, if his lymphedema continues to be uncontrolled with elevation, PT, diuretics and his Velcro compression garments. Written discharge instructions given to patient. Follow up in 1 week.     Electronically signed by Josette Herbert MD on 10/6/2020 at 7:45 PM.

## 2020-10-06 NOTE — TELEPHONE ENCOUNTER
Spoke with pt to review study results, Order to be sent to [facility].  F/U scheduled

## 2020-10-08 ENCOUNTER — HOSPITAL ENCOUNTER (OUTPATIENT)
Dept: PHYSICAL THERAPY | Age: 60
Setting detail: THERAPIES SERIES
Discharge: HOME | End: 2020-10-08
Payer: COMMERCIAL

## 2020-10-08 ENCOUNTER — HOSPITAL ENCOUNTER (OUTPATIENT)
Dept: WOUND CARE | Age: 60
Discharge: HOME | End: 2020-10-08
Payer: COMMERCIAL

## 2020-10-08 VITALS
BODY MASS INDEX: 47.74 KG/M2 | WEIGHT: 315 LBS | DIASTOLIC BLOOD PRESSURE: 85 MMHG | TEMPERATURE: 97.7 F | HEART RATE: 102 BPM | SYSTOLIC BLOOD PRESSURE: 186 MMHG | RESPIRATION RATE: 22 BRPM | HEIGHT: 68 IN

## 2020-10-08 LAB
ANION GAP SERPL CALCULATED.3IONS-SCNC: 15 MMOL/L (ref 3–16)
BUN BLDV-MCNC: 25 MG/DL (ref 7–20)
CALCIUM SERPL-MCNC: 9.5 MG/DL (ref 8.3–10.6)
CHLORIDE BLD-SCNC: 94 MMOL/L (ref 99–110)
CO2: 25 MMOL/L (ref 21–32)
CREAT SERPL-MCNC: 0.9 MG/DL (ref 0.9–1.3)
GFR AFRICAN AMERICAN: >60
GFR NON-AFRICAN AMERICAN: >60
GLUCOSE BLD-MCNC: 118 MG/DL (ref 70–99)
POTASSIUM REFLEX MAGNESIUM: 4 MMOL/L (ref 3.5–5.1)
SODIUM BLD-SCNC: 134 MMOL/L (ref 136–145)

## 2020-10-08 PROCEDURE — 29581 APPL MULTLAYER CMPRN SYS LEG: CPT

## 2020-10-08 PROCEDURE — 29581 APPL MULTLAYER CMPRN SYS LEG: CPT | Performed by: INTERNAL MEDICINE

## 2020-10-08 PROCEDURE — 97110 THERAPEUTIC EXERCISES: CPT

## 2020-10-08 PROCEDURE — 97140 MANUAL THERAPY 1/> REGIONS: CPT

## 2020-10-08 PROCEDURE — 80048 BASIC METABOLIC PNL TOTAL CA: CPT

## 2020-10-08 PROCEDURE — 36415 COLL VENOUS BLD VENIPUNCTURE: CPT

## 2020-10-08 RX ORDER — LIDOCAINE HYDROCHLORIDE 40 MG/ML
SOLUTION TOPICAL ONCE
Status: DISCONTINUED | OUTPATIENT
Start: 2020-10-08 | End: 2020-10-09 | Stop reason: HOSPADM

## 2020-10-08 ASSESSMENT — PAIN SCALES - GENERAL: PAINLEVEL_OUTOF10: 0

## 2020-10-08 NOTE — FLOWSHEET NOTE
[facility],  [address] [place]  Phone: [phone]  Fax [phone]    Physical Therapy Treatment Note/ Progress Report:           Date:  10/8/2020    Patient Name:  Anoop Rogers    :  1960  MRN: [id number]  Restrictions/Precautions:    Medical/Treatment Diagnosis Information:  · Diagnosis: B38.284O (ICD-10-CM) - Complex tear of medial meniscus of left knee as current injury, initial encounter  · Treatment Diagnosis: M25.562 Left knee pain; M25.662 Left knee stiffness; R26.2 Difficulty in walking  Insurance/Certification information:  PT Insurance Information: [employer] [place]  Physician Information:  Referring Practitioner: Dr. Rabia Craft  Has the plan of care been signed (Y/N):        []  Yes  [x]  No     Date of Patient follow up with Physician:       Is this a Progress Report:     []  Yes  [x]  No        If Yes:  Date Range for reporting period:  Beginning 8/3/20  Ending     Progress report will be due (10 Rx or 30 days whichever is less):     Recertification will be due (POC Duration  / 90 days whichever is less):10/29    Visit # Insurance Allowable Requires auth    30 (new insu )    [x]no        []yes:     Functional Scale:LEFS 75% disability   Date assessed:  20  LEFS 78% disability      20      Therapy Diagnosis/Practice Pattern:E    Number of Comorbidities:  []0     []1-2    [x]3+    Latex Allergy:  [x]NO      []YES  Preferred Language for Healthcare:   [x]English       []other:       Pain level: 5/10 current     SUBJECTIVE:  Pt states that he is definitely better than Monday, but still painful in the left knee. Was uncomfortable all day yesterday.      OBJECTIVE:    Observation: B LEs wrapped for vascular wounds/drainage + light compression hose B  Test measurements:   - Tank test 10/5/20, but increased swelling 5 cm below inferior pole of patella: L 57 cm, R 55 cm  RESTRICTIONS/PRECAUTIONS: HTN  Procedure(s) 8/3/2020:  1.  Left knee arthroscopy with partial medial meniscectomy and chondroplasty medial femoral condyle with synovial biopsy (36581-34)    Exercises/Interventions:     Therapeutic Ex (13206) Sets/sec Notes/CUES HEP   Pt ed: HEP 2'    Seated calf stretch c strap  Incline board calf stretch lvl 2- toes on board   3x30\"  X   Seated HS stretch 3x30\"  X   HL adductor stretch   X   Rectus stretch EOB 20\"x3 Could not tolerate 10/5    Bridge with increasing knee flex 115 deg    SLR-  Indep today   \" ER- indep today 2x5    X   SAQ 2#  c ball squeeze    SL hip abd R, L  Clamshell R,L  Reverse clamshell R,L 2x103\" 2x10  1# on knee  1# ankle    Seated LAQ 90-40 with ball sq 5\" 2x10  X   Seated heel slide with glider 5\"x10     Seated HR, TR   X   Standing HR, TR  At [facility]    Mini squat  At bar    Side step 1/2 wall (long)  UE support Pt felt too sore today   Step up 3\"   Heavy UE support, min assist last two Attempted on [facility] platform (4\" too hard)   Standing glider: abd, ext diag  UE supp on bar Cue for posture   Sit to stand - highest box No UE    HSC  2x1030#    Soleus press 2x1035#    LATOYA TKE  LATOYA hip ABD 60#  15#    Bike (low)  Seat 17 Pt too sore today   Manual Intervention (73170) 10' total     Patellar mobs sup, inf      Re-apply bandaids,      Edema massage, STM to pes anserine, distal IT band, adductor muscle belly 10'  Showed pt how to use stick from home   HS s  Knee slightly bent                NMR re-education (99469)   CUES NEEDED   Gait training one crutch  standing Breaks needed    Weight shifting A/P   intermittent fingertip support today    Weight shifting A/P with one crutch   Intermittent CL UE support when L foot back- not ready to wean to one crutch   Split stance balance + airex at 1/2 wall     Intermittent UE support                           Therapeutic Activity (86802)                                                Therapeutic Exercise and NMR function with self care, mobility, lifting and ambulation. Modalities:     [] GAME READY (VASO)- for significant edema, swelling, pain control. (low) in long-sitting x12'  CP x 15' R,L knee long sit with pillow under knees  Charges:  Timed Code Treatment Minutes: 38   Total Treatment Minutes: 58 CP,rest breaks     BWC time in/time out:   (and requires time in and out for each CPT code)    [] EVAL (LOW) 24248 (typically 20 minutes face-to-face)  [] EVAL (MOD) 61004 (typically 30 minutes face-to-face)  [] EVAL (HIGH) 37224 (typically 45 minutes face-to-face)  [] RE-EVAL     [x] IF(72047) x 2   [] IONTO  [] NMR (32321) x     [] VASO  [x] Manual (29209) x 1     [] Other: gait x  [] TA x      [] Mech Traction (45131)  [] ES(attended) (22225)      [] ES (un) (00027): x3', but stopped d/t pt not feeling this was helpful      GOALS:   Patient stated goal: 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 Progressing: []? Met: []? Not Met: []? Adjusted     Therapist goals for Patient:   Short Term Goals: To be achieved in: 2 weeks  1. Independent in HEP and progression per patient tolerance, in order to prevent re-injury. [x]? Progressing: []? Met: []? Not Met: []? Adjusted  2. Patient will have a decrease in pain to facilitate improvement in movement, function, and ADLs as indicated by Functional Deficits. [x]? Progressing: []? Met: []? Not Met: []? Adjusted     Long Term Goals: To be achieved in: 6 weeks  1. Disability index score of 40% or less for the LEFS to assist with reaching prior level of function. []? Progressing: []? Met: [x]? Not Met: []? Adjusted  2. Patient will demonstrate increased left knee AROM to 0-110  to allow for proper joint functioning for car/truck mobility, stair amb.   []? Progressing: [x]? Met: []? Not Met: []? Adjusted  3.  Patient will demonstrate an increase in Strength to at least 4+/5 for the left knee and hip stabilizers in order to ambulate s AD for community distances. [x]? Progressing: []? Met: []? Not Met: []? Adjusted  4. Patient will return to walking community distances s AD with mild bilateral knee pain (<3/10). [x]? Progressing: []? Met: []? Not Met: []? Adjusted  5. Pt will be able to drive x 2 hours without increased knee pain; car mobility without knee pain. []? Progressing: []? Met: [x]? Not Met: []? Adjusted         Progression Towards Functional goals:  [] Patient is progressing as expected towards functional goals listed. [x] Progression is slowed due to complexities listed. [] Progression has been slowed due to co-morbidities. [] Plan just implemented, too soon to assess goals progression  [] Other:         Overall Progression Towards Functional goals/ Treatment Progress Update:  [] Patient is progressing as expected towards functional goals listed. [x] Progression is slowed due to complexities/Impairments listed. [] Progression has been slowed due to co-morbidities. [] Plan just implemented, too soon to assess goals progression <30days   [] Goals require adjustment due to lack of progress  [] Patient is not progressing as expected and requires additional follow up with physician  [] Other    Prognosis for POC: [x] Good [] Fair  [] Poor      Patient requires continued skilled intervention: [x] Yes  [] No    Treatment/Activity Tolerance:  [x] Patient able to complete treatment  [] Patient limited by fatigue  [x] Patient limited by pain    [] Patient limited by other medical complications  [] Other:     ASSESSMENT: Pt was able to resume about 50% of mat exercises today with only one instance of reported pain with sidelying abduction. Challenged with soleus and HS strengthening. Reported pain with walking d/t R knee, not left. Pt still very limited with exercise tolerance and d/t later appt with wound care, did not perform CKC exercise as pt will have to walk more later today.      Return to Play: (if applicable)   []  Stage 1: Intro to Strength   []  Stage 2: Return to Run and Strength   []  Stage 3: Return to Jump and Strength   []  Stage 4: Dynamic Strength and Agility   []  Stage 5: Sport Specific Training     []  Ready to Return to Play, Meets All Above Stages   []  Not Ready for Return to Sports   Comments:                                PLAN: Updated HEP  [x] Continue per plan of care [] Alter current plan (see comments above)  [] Plan of care initiated [] Hold pending MD visit [] Discharge      Electronically signed by:  Elisabeth Stuart, PT, DPT    Note: If patient does not return for scheduled/ recommended follow up visits, this note will serve as a discharge from care along with most recent update on progress. Access Code: [email or web address]   URL: [email or web address]   Date: 10/08/2020   Prepared by: Elisabeth Stuart     Exercises   Supine Hip Adductor Stretch - 3 sets - 15s hold - 1x daily - 7x weekly   Bridge - 10 reps - 1x daily - 7x weekly   Small Range Straight Leg Raise - 5 reps - 3 sets - 1x daily - 7x weekly   Sidelying Hip Abduction - 10 reps - 2 sets - 1x daily - 4x weekly   Clamshell - 10 reps - 2 sets - 1x daily - 4x weekly   Seated Gastroc Stretch with Strap - 3 sets - 30s hold - 1x daily - 7x weekly   Seated Hamstring Stretch - 3 sets - 20s hold - 1x daily - 7x weekly   Heel rises with counter support - 10 reps - 3 sets - 1x daily - 7x weekly   Patient Education   Pain Management

## 2020-10-08 NOTE — PLAN OF CARE
Patient reports wrap slipped this week on right lower extremity. Patient left posterior leg referenced today, small dark area that threatens to open. Patient placed in bilatera;l wraps with tensoplast to be applied to top of wrap to hold wrap in place. Patient informed to call if wraps slid down or cause discomfort this week. Patient educated importance of proper diet and weight management. Follow up scheduled and discharge instructions reviewed.

## 2020-10-11 PROBLEM — L97.921 CHRONIC ULCER OF LEFT LEG, LIMITED TO BREAKDOWN OF SKIN (HCC): Status: ACTIVE | Noted: 2020-10-11

## 2020-10-12 ENCOUNTER — HOSPITAL ENCOUNTER (OUTPATIENT)
Dept: PHYSICAL THERAPY | Age: 60
Setting detail: THERAPIES SERIES
Discharge: HOME | End: 2020-10-12
Payer: COMMERCIAL

## 2020-10-12 PROCEDURE — 97110 THERAPEUTIC EXERCISES: CPT

## 2020-10-12 PROCEDURE — 97140 MANUAL THERAPY 1/> REGIONS: CPT

## 2020-10-12 NOTE — FLOWSHEET NOTE
[facility],  [address]  Phone: [phone]  Fax [phone]    Physical Therapy Treatment Note/ Progress Report:           Date:  10/12/2020    Patient Name:  Maci Fearvickey    :  1960  MRN: [id number]  Restrictions/Precautions:    Medical/Treatment Diagnosis Information:  · Diagnosis: D40.504L (ICD-10-CM) - Complex tear of medial meniscus of left knee as current injury, initial encounter  · Treatment Diagnosis: M25.562 Left knee pain; M25.662 Left knee stiffness; R26.2 Difficulty in walking  Insurance/Certification information:  PT Insurance Information: Medical [place]  Physician Information:  Referring Practitioner: Dr. Gal Hernandez  Has the plan of care been signed (Y/N):        []  Yes  [x]  No     Date of Patient follow up with Physician:       Is this a Progress Report:     []  Yes  [x]  No        If Yes:  Date Range for reporting period:  Beginning 8/3/20  Ending     Progress report will be due (10 Rx or 30 days whichever is less):     Recertification will be due (POC Duration  / 90 days whichever is less):10/29    Visit # Insurance Allowable Requires auth   17- PN [place] 30 (new insu , 6 visits on previous insurance)    [x]no        []yes:     Functional Scale:LEFS 75% disability   Date assessed:  20  LEFS 78% disability      20      Therapy Diagnosis/Practice Pattern:E    Number of Comorbidities:  []0     []1-2    [x]3+    Latex Allergy:  [x]NO      []YES  Preferred Language for Healthcare:   [x]English       []other:       Pain level: 1-3/10 current     SUBJECTIVE:  Pt states that he is doing better today than last visit.  Has some discomfort     OBJECTIVE:    Observation: B LEs wrapped for vascular wounds/drainage + light compression hose B    - Tank test 10/5/20, but increased swelling 5 cm below inferior pole of patella: L 57 cm, R 55 cm  RESTRICTIONS/PRECAUTIONS: HTN  Procedure(s) 8/3/2020:  1.  Left knee arthroscopy with partial medial meniscectomy and chondroplasty medial femoral condyle with synovial biopsy (68163-86)    Exercises/Interventions:     Therapeutic Ex (32441) Sets/sec Notes/CUES HEP   Pt ed: HEP 2'    Seated calf stretch c strap  Incline board calf stretch lvl 2- toes on board   3x30\"  X   Seated HS stretch 3x30\"  X   HL adductor stretch   X   Rectus stretch EOB 20\"x3 Could not tolerate 10/5    Bridge with increasing knee flex  Bridge on SWB 2x5115 deg    SLR-  Indep today   \" ER- indep today 10x R,L    X   SAQ 2#  c ball squeeze    SL hip abd R, L  Clamshell R,L  Reverse clamshell R,L   1# on knee  1# ankle    Seated LAQ 90-40 with ball sq 5\" x6, x10  X   Seated heel slide with glider 5\"x10     Seated hip IR iso 5\"x10 OVl at ankles    Standing HR, TR  At [facility]    Mini squat  At bar    Side step 1/2 wall (long)  UE support Pt felt too sore today   Step up 3\"  x10 Heavy UE support, min assist last two Attempted on [facility] platform (4\" too hard)   Standing glider: abd, ext diag  UE supp on bar Cue for posture   Sit to stand - highest box 2x5No UE    HSC  2x1035#    Soleus press 2x1035#    LATOYA TKE  LATOYA hip ABD 60#  15#    Bike (low)  Seat 17 Pt too sore today   Manual Intervention (31140) 10' total     Patellar mobs sup, inf      Re-apply bandaids,      Edema massage, STM to pes anserine, distal IT band, adductor muscle belly 10'  Showed pt how to use stick from home   HS s  Knee slightly bent                NMR re-education (55846)   CUES NEEDED   Gait training one crutch  standing Breaks needed    Weight shifting A/P   intermittent fingertip support today    Weight shifting A/P with one crutch   Intermittent CL UE support when L foot back- not ready to wean to one crutch   Split stance balance + airex at 1/2 wall     Intermittent UE support                           Therapeutic Activity (37661)                                                Therapeutic Exercise and NMR EXR  [x] (59226) Provided verbal/tactile cueing for activities related to strengthening, flexibility, endurance, ROM for improvements in LE, proximal hip, and core control with self care, mobility, lifting, ambulation.  [] (38988) Provided verbal/tactile cueing for activities related to improving balance, coordination, kinesthetic sense, posture, motor skill, proprioception  to assist with LE, proximal hip, and core control in self care, mobility, lifting, ambulation and eccentric single leg control.      NMR and Therapeutic Activities:    [x] (40587 or 61082) Provided verbal/tactile cueing for activities related to improving balance, coordination, kinesthetic sense, posture, motor skill, proprioception and motor activation to allow for proper function of core, proximal hip and LE with self care and ADLs  [x] (74640) Gait Re-education- Provided training and instruction to the patient for proper LE, core and proximal hip recruitment and positioning and eccentric body weight control with ambulation re-education including up and down stairs     Home Exercise Program:    [x] (67670) Reviewed/Progressed HEP activities related to strengthening, flexibility, endurance, ROM of core, proximal hip and LE for functional self-care, mobility, lifting and ambulation/stair navigation   [] (20761)Reviewed/Progressed HEP activities related to improving balance, coordination, kinesthetic sense, posture, motor skill, proprioception of core, proximal hip and LE for self care, mobility, lifting, and ambulation/stair navigation      Manual Treatments:  PROM / STM / Oscillations-Mobs:  G-I, II, III, IV (PA's, Inf., Post.)  [x] (57743) Provided manual therapy to mobilize LE, proximal hip and/or LS spine soft tissue/joints for the purpose of modulating pain, promoting relaxation,  increasing ROM, reducing/eliminating soft tissue swelling/inflammation/restriction, improving soft tissue extensibility and allowing for proper ROM for normal function with self care, mobility, lifting and ambulation. Modalities:     [] GAME READY (VASO)- for significant edema, swelling, pain control. (low) in long-sitting x12'  CP x 15' R,L knee long sit with pillow under knees  Charges:  Timed Code Treatment Minutes: 39   Total Treatment Minutes: 54 CP     BWC time in/time out:   (and requires time in and out for each CPT code)    [] EVAL (LOW) 26861 (typically 20 minutes face-to-face)  [] EVAL (MOD) 20991 (typically 30 minutes face-to-face)  [] EVAL (HIGH) 82669 (typically 45 minutes face-to-face)  [] RE-EVAL     [x] NJ(37126) x 2   [] IONTO  [] NMR (36261) x     [] VASO  [x] Manual (03931) x 1     [] Other: gait x  [] TA x      [] Mech Traction (36026)  [] ES(attended) (36199)      [] ES (un) (68442): x3', but stopped d/t pt not feeling this was helpful      GOALS:   Patient stated goal: 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 Progressing: []? Met: []? Not Met: []? Adjusted     Therapist goals for Patient:   Short Term Goals: To be achieved in: 2 weeks  1. Independent in HEP and progression per patient tolerance, in order to prevent re-injury. [x]? Progressing: []? Met: []? Not Met: []? Adjusted  2. Patient will have a decrease in pain to facilitate improvement in movement, function, and ADLs as indicated by Functional Deficits. [x]? Progressing: []? Met: []? Not Met: []? Adjusted     Long Term Goals: To be achieved in: 6 weeks  1. Disability index score of 40% or less for the LEFS to assist with reaching prior level of function. []? Progressing: []? Met: [x]? Not Met: []? Adjusted  2. Patient will demonstrate increased left knee AROM to 0-110  to allow for proper joint functioning for car/truck mobility, stair amb.   []? Progressing: [x]? Met: []? Not Met: []? Adjusted  3. Patient will demonstrate an increase in Strength to at least 4+/5 for the left knee and hip stabilizers in order to ambulate s AD for community distances. [x]? Progressing: []? Met: []? Not Met: []? Adjusted  4. Patient will return to walking community distances s AD with mild bilateral knee pain (<3/10). [x]? Progressing: []? Met: []? Not Met: []? Adjusted  5. Pt will be able to drive x 2 hours without increased knee pain; car mobility without knee pain. []? Progressing: []? Met: [x]? Not Met: []? Adjusted         Progression Towards Functional goals:  [] Patient is progressing as expected towards functional goals listed. [x] Progression is slowed due to complexities listed. [] Progression has been slowed due to co-morbidities. [] Plan just implemented, too soon to assess goals progression  [] Other:         Overall Progression Towards Functional goals/ Treatment Progress Update:  [] Patient is progressing as expected towards functional goals listed. [x] Progression is slowed due to complexities/Impairments listed. [] Progression has been slowed due to co-morbidities. [] Plan just implemented, too soon to assess goals progression <30days   [] Goals require adjustment due to lack of progress  [] Patient is not progressing as expected and requires additional follow up with physician  [] Other    Prognosis for POC: [x] Good [] Fair  [] Poor      Patient requires continued skilled intervention: [x] Yes  [] No    Treatment/Activity Tolerance:  [x] Patient able to complete treatment  [] Patient limited by fatigue  [x] Patient limited by pain    [] Patient limited by other medical complications  [] Other:     ASSESSMENT: Pt able to resume standing exercises without reports of increased pain in the left knee, just reported muscle burn. Able to peform step-ups and sit to stand with the most ease since he's started PT. He will benefit from continued PT to address endurance, strength and eventually wean from B crutches to a cane/ no AD if possible.      Return to Play: (if applicable)   []  Stage 1: Intro to Strength   []  Stage 2: Return to Run and Strength   [] Stage 3: Return to Jump and Strength   []  Stage 4: Dynamic Strength and Agility   []  Stage 5: Sport Specific Training     []  Ready to Return to Play, Meets All Above Stages   []  Not Ready for Return to Sports   Comments:                                PLAN:   [x] Continue per plan of care [] Alter current plan (see comments above)  [] Plan of care initiated [] Hold pending MD visit [] Discharge      Electronically signed by:  Judi Song, PT, DPT    Note: If patient does not return for scheduled/ recommended follow up visits, this note will serve as a discharge from care along with most recent update on progress. Access Code: [email or web address]   URL: [email or web address]   Date: 10/08/2020   Prepared by: Judi Song     Exercises   Supine Hip Adductor Stretch - 3 sets - 15s hold - 1x daily - 7x weekly   Bridge - 10 reps - 1x daily - 7x weekly   Small Range Straight Leg Raise - 5 reps - 3 sets - 1x daily - 7x weekly   Sidelying Hip Abduction - 10 reps - 2 sets - 1x daily - 4x weekly   Clamshell - 10 reps - 2 sets - 1x daily - 4x weekly   Seated Gastroc Stretch with Strap - 3 sets - 30s hold - 1x daily - 7x weekly   Seated Hamstring Stretch - 3 sets - 20s hold - 1x daily - 7x weekly   Heel rises with counter support - 10 reps - 3 sets - 1x daily - 7x weekly   Patient Education   Pain Management

## 2020-10-12 NOTE — PROGRESS NOTES
pretib, venous, partial thickness, 10/2020-Wound Length (cm): 0.7 cm    Wound 07/28/20 #1 right lower pretib cluster, venous, partial thickness, last 2 years-Wound Width (cm): 0.9 cm  Wound 10/08/20 #3 left pretib, venous, partial thickness, 10/2020-Wound Width (cm): 0.2 cm    Wound 07/28/20 #1 right lower pretib cluster, venous, partial thickness, last 2 years-Wound Depth (cm): 0.1 cm  Wound 10/08/20 #3 left pretib, venous, partial thickness, 10/2020-Wound Depth (cm): 0.1 cm    Assessment:     Patient Active Problem List   Diagnosis Code    Class 3 severe obesity due to excess calories with serious comorbidity and body mass index (BMI) of 60.0 to 69.9 in adult ([facility] [address] E66.01, Z68.44    Mixed hypertriglyceridemia S99.2    Nonalcoholic fatty liver disease K76.0    RLS (restless legs syndrome) G25.81    Essential hypertension, benign I10    Impaired fasting glucose R73.01    Chronic pain of left knee M25.562, G89.29    Osteoarthritis of left knee M17.12    Chondromalacia of left knee M94.262    Pes planus M21.40    Lymphedema I89.0    Chronic ulcer of right leg, limited to breakdown of skin ([facility] [address] L97.911    Peripheral venous insufficiency I87.2    Allergic rhinitis J30.9    Chronic ulcer of left leg, limited to breakdown of skin ([facility] [address] L97.921       Assessment of today's active condition(s): venous stasis, lymphedema, concomitant fluid overload, recurrent BL lower leg ulcers; though the one wrap slid down and the other side has a couple of small areas that aren't healed, I'm encouraged that he's diuresed some this week, and that the left leg didn't spontaneously blister and weep as both legs have recently, when he's tried to manage daily Velcro compression; nevertheless, I think weekly wraps BL might be best right now, to get these last few areas more durably healed as he hopefully diureses some more.  Factors contributing to occurrence and/or persistence of the chronic ulcer include venous stasis, lymphedema, decreased mobility, shear force and obesity. Medical necessity of today's visit is shown by the above documentation. Sharp debridement is not indicated today, based upon the exam findings in the wound(s) above. He does have an indication for a weekly compression wrap. Procedure note:     Consent obtained. Time out performed per [facility]. Anesthetic  Anesthetic: 4% Lidocaine Cream     After cleansing of the wounds with saline and applying skin-care and/or dressing materials as listed below, Bilateral application of a multi-layer compression wrap was performed per physician order, to help manage edema, stasis dermatitis, and/or venous ulcers. The patient's level of pain during and after the procedure was monitored, and is noted in the wound documentation flowsheet. Discharge plan:     Treatment in the wound care center today, per RN documentation: Wound 07/28/20 #1 right lower pretib cluster, venous, partial thickness, last 2 years-Dressing/Treatment: Other (comment)(Triam.,MepitelAg,4x4s,Fourflex,singleGspanda,tensoplast)  Wound 10/08/20 #3 left pretib, venous, partial thickness, 10/2020-Dressing/Treatment: Other (comment)(Triamcinolone,Triad,4x4s,Fourflex,single G spanda,tensoplast). Leave primary dressing and multi-layer wrap(s) in place until the next appointment. He should call before his next visit if there is any significant pain, significant strike-through of drainage to the surface of the wrap, or any significant sense of the wrap sliding down more than an inch or so, bunching-up and abrading his skin. Will use some Tensoplast to try to hold wraps in place better.      I reminded the patient of the importance of weight management and smoking cessation, if applicable; also encouraged ambulation as tolerated, additional lower extremity exercises as instructed in our education sheet, leg elevation when at rest, and compliance with any recommended dietary, diuretic and compression therapies. No change in Lasix, Zaroxolyn and KCl this week. Will check a BMP today. Written discharge instructions given to patient. Follow up in 1 week.     Electronically signed by Sofia Rhodes MD on 10/11/2020 at 10:07 PM.

## 2020-10-15 ENCOUNTER — TELEPHONE (OUTPATIENT)
Dept: ORTHOPEDIC SURGERY | Age: 60
End: 2020-10-15

## 2020-10-15 ENCOUNTER — HOSPITAL ENCOUNTER (OUTPATIENT)
Dept: PHYSICAL THERAPY | Age: 60
Setting detail: THERAPIES SERIES
Discharge: HOME | End: 2020-10-15
Payer: COMMERCIAL

## 2020-10-15 ENCOUNTER — HOSPITAL ENCOUNTER (OUTPATIENT)
Dept: WOUND CARE | Age: 60
Discharge: HOME | End: 2020-10-15
Payer: COMMERCIAL

## 2020-10-15 VITALS
HEIGHT: 68 IN | RESPIRATION RATE: 20 BRPM | TEMPERATURE: 99 F | SYSTOLIC BLOOD PRESSURE: 144 MMHG | BODY MASS INDEX: 47.74 KG/M2 | DIASTOLIC BLOOD PRESSURE: 88 MMHG | WEIGHT: 315 LBS | HEART RATE: 93 BPM

## 2020-10-15 PROCEDURE — 29581 APPL MULTLAYER CMPRN SYS LEG: CPT

## 2020-10-15 PROCEDURE — 97110 THERAPEUTIC EXERCISES: CPT

## 2020-10-15 PROCEDURE — 97140 MANUAL THERAPY 1/> REGIONS: CPT

## 2020-10-15 PROCEDURE — 29581 APPL MULTLAYER CMPRN SYS LEG: CPT | Performed by: INTERNAL MEDICINE

## 2020-10-15 RX ORDER — TRAMADOL HYDROCHLORIDE 50 MG/1
50 TABLET ORAL EVERY 6 HOURS PRN
Qty: 28 TABLET | Refills: 0 | Status: SHIPPED | OUTPATIENT
Start: 2020-10-15 | End: 2020-10-22

## 2020-10-15 ASSESSMENT — PAIN SCALES - GENERAL: PAINLEVEL_OUTOF10: 0

## 2020-10-15 NOTE — PLAN OF CARE
Patient bilateral legs appear healed. Patient discussed compression wraps with patient today. Patient informed to bring both wraps to follow up nurse assessment. Patient rewrapped this week to protect newly healed skin. Discharge instructions reviewed and follow up provided.

## 2020-10-15 NOTE — TELEPHONE ENCOUNTER
10/15/20   FELISHA  - APPROVED  AUTH# [id number]  10/5/20 - 1/5/21  PER MELLY BAEZA    TIER 1  DEDUCTIBLE: $1000 IND /MET                    CO INSURANCE: 90% AD  OOP: $1000 IND /$95.97 MET               PER MARINO @ [facility]  REF # [id number]  ZHEN

## 2020-10-15 NOTE — FLOWSHEET NOTE
[place] [facility] [facility],  [address]  Phone: [phone]  Fax [phone]    Physical Therapy Treatment Note/ Progress Report:           Date:  10/15/2020    Patient Name:  Keisha Chowdhury    :  1960  MRN: [id number]  Restrictions/Precautions:    Medical/Treatment Diagnosis Information:  · Diagnosis: W13.095G (ICD-10-CM) - Complex tear of medial meniscus of left knee as current injury, initial encounter  · Treatment Diagnosis: M25.562 Left knee pain; M25.662 Left knee stiffness; R26.2 Difficulty in walking  Insurance/Certification information:  PT Insurance Information: [employer] [place]  Physician Information:  Referring Practitioner: Dr. Prudy Opitz  Has the plan of care been signed (Y/N):        []  Yes  [x]  No     Date of Patient follow up with Physician:       Is this a Progress Report:     []  Yes  [x]  No        If Yes:  Date Range for reporting period:  Beginning   Ending 10/15    Progress report will be due (10 Rx or 30 days whichever is less): 64/62- POC 1st    Recertification will be due (POC Duration  / 90 days whichever is less):10/29    Visit # Insurance Allowable Requires auth   18- PN  30 (new insu , 6 visits on previous insurance)    [x]no        []yes:     Functional Scale:LEFS 75% disability   Date assessed:  20  LEFS 78% disability      20  LEFS 81% disability      10/15/20      Therapy Diagnosis/Practice Pattern:E    Number of Comorbidities:  []0     []1-2    [x]3+    Latex Allergy:  [x]NO      []YES  Preferred Language for Healthcare:   [x]English       []other:       Pain level: 1/10 current     SUBJECTIVE:  Pt states that he is doing better today than last visit. Had some discomfort for about 1 day after LV, but nothing that he couldn't handle. Still using bilateral crutches for ambulation. He will have the nerve block done on his left knee on Tuesday next week.      OBJECTIVE:  Observation: B LEs wrapped for vascular wounds/drainage + light compression hose B   Test measurements:  Knee flexion AROM 119   MMT hip ABD 3-/5, hip flexion 4, knee extension 4, knee flexion 4+  - Tank test 10/5/20, but increased swelling 5 cm below inferior pole of patella: L 57 cm, R 55 cm  RESTRICTIONS/PRECAUTIONS: HTN  Procedure(s) 8/3/2020:  1.  Left knee arthroscopy with partial medial meniscectomy and chondroplasty medial femoral condyle with synovial biopsy (87684-02)    Exercises/Interventions:     Therapeutic Ex (37317) Sets/sec Notes/CUES HEP   Pt ed: HEP 2'    Seated calf stretch c strap  Incline board calf stretch lvl 2- toes on board   3x30\"  X   Seated HS stretch   X   HL adductor stretch   X   Rectus stretch EOB 20\"x3 Could not tolerate 10/5    Bridge with increasing knee flex  Bridge on  deg    SLR-  Indep today   \" ER- indep today 10x R,L    X   SAQ 2#  c ball squeeze    SL hip abd R, L  Clamshell R,L  Reverse clamshell R,L 2x101#  1# on knee  1# ankle    Seated LAQ 90-40 with ball sq 2x10  X   Seated heel slide with glider 5\"x10     Seated hip IR iso 5\"x10 OVl at ankles    Standing HR, TR  At [facility]    Mini squat  At bar    Side step 1/2 wall (long)  UE support Pt felt too sore today   Step up 3\"  x10 Heavy UE support,   [facility] platform    Standing glider: abd, ext diag  UE supp on bar Cue for posture   Sit to stand - highest box 3x5No UE    HSC  2x1530#    LAQ 90-40 10x10#    Soleus press 2x1530#    LATOYA TKE  LATOYA hip ABD 60#  15#    Bike (low)  Seat 17 Pt too sore today   Manual Intervention (59689) 10' total     Patellar mobs sup, inf      Re-apply bandaids,      Edema massage, STM to pes anserine, distal IT band, adductor muscle belly 10'  Showed pt how to use stick from home   HS s  Knee slightly bent                NMR re-education (28101)   CUES NEEDED   Gait training one crutch  standing Breaks needed    Weight shifting A/P   intermittent fingertip support today    Weight shifting A/P with one crutch   Intermittent CL UE support when L foot back- not ready to wean to one crutch   Split stance balance + airex at 1/2 wall     Intermittent UE support                           Therapeutic Activity (95020)                                                Therapeutic Exercise and NMR EXR  [x] (34058) Provided verbal/tactile cueing for activities related to strengthening, flexibility, endurance, ROM for improvements in LE, proximal hip, and core control with self care, mobility, lifting, ambulation.  [] (03555) Provided verbal/tactile cueing for activities related to improving balance, coordination, kinesthetic sense, posture, motor skill, proprioception  to assist with LE, proximal hip, and core control in self care, mobility, lifting, ambulation and eccentric single leg control.      NMR and Therapeutic Activities:    [x] (78674 or 44790) Provided verbal/tactile cueing for activities related to improving balance, coordination, kinesthetic sense, posture, motor skill, proprioception and motor activation to allow for proper function of core, proximal hip and LE with self care and ADLs  [x] (63743) Gait Re-education- Provided training and instruction to the patient for proper LE, core and proximal hip recruitment and positioning and eccentric body weight control with ambulation re-education including up and down stairs     Home Exercise Program:    [x] (07845) Reviewed/Progressed HEP activities related to strengthening, flexibility, endurance, ROM of core, proximal hip and LE for functional self-care, mobility, lifting and ambulation/stair navigation   [] (53441)Reviewed/Progressed HEP activities related to improving balance, coordination, kinesthetic sense, posture, motor skill, proprioception of core, proximal hip and LE for self care, mobility, lifting, and ambulation/stair navigation      Manual Treatments:  PROM / STM / Oscillations-Mobs:  G-I, II, III, IV (PA's, Inf., Post.)  [x] (26259) Provided manual therapy to mobilize LE, proximal hip and/or LS spine soft tissue/joints for the purpose of modulating pain, promoting relaxation,  increasing ROM, reducing/eliminating soft tissue swelling/inflammation/restriction, improving soft tissue extensibility and allowing for proper ROM for normal function with self care, mobility, lifting and ambulation. Modalities:     [] GAME READY (VASO)- for significant edema, swelling, pain control. (low) in long-sitting x12'  CP x 15' R,L knee long sit with pillow under knees  Charges:  Timed Code Treatment Minutes: 39   Total Treatment Minutes: 54 CP     BWC time in/time out:   (and requires time in and out for each CPT code)    [] EVAL (LOW) 44337 (typically 20 minutes face-to-face)  [] EVAL (MOD) 79047 (typically 30 minutes face-to-face)  [] EVAL (HIGH) 73128 (typically 45 minutes face-to-face)  [] RE-EVAL     [x] XC(61721) x 2   [] IONTO  [] NMR (69261) x     [] VASO  [x] Manual (22288) x 1     [] Other: gait x  [] TA x      [] Mech Traction (33619)  [] ES(attended) (23874)      [] ES (un) (26707): x3', but stopped d/t pt not feeling this was helpful      GOALS:   Patient stated goal: 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 Progressing: []? Met: []? Not Met: []? Adjusted     Therapist goals for Patient:   Short Term Goals: To be achieved in: 2 weeks  1. Independent in HEP and progression per patient tolerance, in order to prevent re-injury. [x]? Progressing: []? Met: []? Not Met: []? Adjusted  2. Patient will have a decrease in pain to facilitate improvement in movement, function, and ADLs as indicated by Functional Deficits. [x]? Progressing: []? Met: []? Not Met: []? Adjusted     Long Term Goals: To be achieved in: 6 weeks  1. Disability index score of 40% or less for the LEFS to assist with reaching prior level of function. []? Progressing: []? Met: [x]? Not Met: []?  Adjusted Lower score on LEFS despite pt reporting improvements. 2. Patient will demonstrate increased left knee AROM to 0-110  to allow for proper joint functioning for car/truck mobility, stair amb.   []? Progressing: [x]? Met: []? Not Met: []? Adjusted  3. Patient will demonstrate an increase in Strength to at least 4+/5 for the left knee and hip stabilizers in order to ambulate s AD for community distances. [x]? Progressing: []? Met: []? Not Met: []? Adjusted  4. Patient will return to walking community distances s AD with mild bilateral knee pain (<3/10). [x]? Progressing: []? Met: []? Not Met: []? Adjusted  5. Pt will be able to drive x 2 hours without increased knee pain; car mobility without knee pain. []? Progressing: []? Met: [x]? Not Met: []? Adjusted         Progression Towards Functional goals:  [] Patient is progressing as expected towards functional goals listed. [x] Progression is slowed due to complexities listed. [] Progression has been slowed due to co-morbidities. [] Plan just implemented, too soon to assess goals progression  [] Other:         Overall Progression Towards Functional goals/ Treatment Progress Update:  [] Patient is progressing as expected towards functional goals listed. [x] Progression is slowed due to complexities/Impairments listed. [] Progression has been slowed due to co-morbidities.   [] Plan just implemented, too soon to assess goals progression <30days   [] Goals require adjustment due to lack of progress  [] Patient is not progressing as expected and requires additional follow up with physician  [] Other    Prognosis for POC: [x] Good [] Fair  [] Poor      Patient requires continued skilled intervention: [x] Yes  [] No    Treatment/Activity Tolerance:  [x] Patient able to complete treatment  [] Patient limited by fatigue  [x] Patient limited by pain    [] Patient limited by other medical complications  [] Other:     ASSESSMENT: Although Marko Rea is quite limited with ambulation d/t bilateral knee pain and still uses bilateral crutches for ambulation, he continues to make progress in therapy. He is able to transition from a higher chair without UE support now and and minimal pain. He can walk further without needing rest breaks and is able to step up to onto a small height step with bilateral handrail support now. Although these are all improvements since he has had his knee surgery, he will benefit from continued strengthening in PT to eventually wean off of his crutches and become more independent with ADL's. Return to Play: (if applicable)   []  Stage 1: Intro to Strength   []  Stage 2: Return to Run and Strength   []  Stage 3: Return to Jump and Strength   []  Stage 4: Dynamic Strength and Agility   []  Stage 5: Sport Specific Training     []  Ready to Return to Play, Meets All Above Stages   []  Not Ready for Return to Sports   Comments:                                PLAN:   [x] Continue per plan of care [] Alter current plan (see comments above)  [] Plan of care initiated [] Hold pending MD visit [] Discharge      Electronically signed by:  Itz Honeycutt, PT, DPT    Note: If patient does not return for scheduled/ recommended follow up visits, this note will serve as a discharge from care along with most recent update on progress. Access Code: [email or web address]   URL: [email or web address]   Date: 10/08/2020   Prepared by: Itz Honeycutt     Exercises   Supine Hip Adductor Stretch - 3 sets - 15s hold - 1x daily - 7x weekly   Bridge - 10 reps - 1x daily - 7x weekly   Small Range Straight Leg Raise - 5 reps - 3 sets - 1x daily - 7x weekly   Sidelying Hip Abduction - 10 reps - 2 sets - 1x daily - 4x weekly   Clamshell - 10 reps - 2 sets - 1x daily - 4x weekly   Seated Gastroc Stretch with Strap - 3 sets - 30s hold - 1x daily - 7x weekly   Seated Hamstring Stretch - 3 sets - 20s hold - 1x daily - 7x weekly   Heel rises with counter support - 10 reps - 3 sets - 1x daily - 7x weekly   Patient Education   Pain Management

## 2020-10-18 NOTE — PROGRESS NOTES
[facility] Progress Note    Cori Bender     : 1960    DATE OF VISIT:  10/15/2020    Subjective:     Cori Bender is a 64 y.o. 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 Significant symptoms or pertinent wound history since last visit: had a pretty good week overall; had some cramping in his abdominal wall and back one day (but he thinks he forgot his KCl once or twice). Still getting some hand cramps every day or two, not too severe. No dizziness or palpitations. Leg swelling down, wraps stayed in place as well as they ever have; weight a bit lower again also. No F/C/D. Additional ulcer(s) noted? no.      His current medication list consists of etodolac, fluticasone, furosemide, hydroCHLOROthiazide, lisinopril, loratadine, metOLazone, potassium chloride, rOPINIRole, and traMADol. Allergies: Bactrim [sulfamethoxazole-trimethoprim]    Objective:     Vitals:    10/15/20 1409 10/15/20 1500   BP: (!) 160/81 (!) 144/88   Pulse: 107 93   Resp: 20    Temp: 99 °F (37.2 °C)    TempSrc: Oral    Weight: (!) 402 lb 3.2 oz (182.4 kg)    Height: 5' 8\" (1.727 m)      AAOx3, overweight, NAD  Intact distal pulses, feet warm, good cap refill  Mild-mod BL LE stage 2 lymphedema  No cellulitis, angitis, fluctuance, no allodynia  Mild stasis dermatitis and hemosiderin, no contact dermatitis or new wrap abrasions this week  Edwina-ulcer skin: indurated, pink, warm and hyperkeratotic. Ulcer(s): very delicately healed, very fragile. Photos also saved in electronic chart.     Today's Wound Measurements, per RN documentation:  Wound 10/08/20 #3 left pretib, venous, partial thickness, 10/2020-Wound Length (cm): 0 cm  Wound 20 #1 right lower pretib cluster, venous, partial thickness, last 2 years-Wound Length (cm): 0 cm    Wound 10/08/20 #3 left pretib, venous, partial thickness, 10/2020-Wound Width (cm): 0 cm  Wound 20 #1 right lower pretib cluster, performed per [facility] policy. Anesthetic  Anesthetic: None     After cleansing of the wounds with saline and applying skin-care and/or dressing materials as listed below, Bilateral application of a multi-layer compression wrap was performed per physician order, to help manage edema, stasis dermatitis, and/or venous ulcers. The patient's level of pain during and after the procedure was monitored, and is noted in the wound documentation flowsheet. Discharge plan:     Treatment in the wound care center today, per RN documentation: Wound 10/08/20 #3 left pretib, venous, partial thickness, 10/2020-Dressing/Treatment: Other (comment)(Triamcinolone,adaptic,Fourflex,single G spanda,tensoplast)  Wound 07/28/20 #1 right lower pretib cluster, venous, partial thickness, last 2 years-Dressing/Treatment: Other (comment)(Triamcinolone,adaptic,Fourflex,single G spanda,tensoplast). Leave primary dressing and multi-layer wrap(s) in place until the next appointment. He should call before his next visit if there is any significant pain, significant strike-through of drainage to the surface of the wrap, or any significant sense of the wrap sliding down more than an inch or so, bunching-up and abrading his skin. I reminded the patient of the importance of weight management and smoking cessation, if applicable; also encouraged ambulation as tolerated, additional lower extremity exercises as instructed in our education sheet, leg elevation when at rest, and compliance with any recommended dietary, diuretic and compression therapies. Next week, anticipate him getting back into his Velcro wraps, and at that time, I'll update his PCP on the diuretic changes I've made. Told him that he could increase his KCl to 40 mEq per day if he would like -- if he notices a decrease in hand cramping with that, keep the KCl dose there; if no change, go back to 20. Written discharge instructions given to patient.  Follow up in 1 week.    Electronically signed by Estefani Yap MD on 10/18/2020 at 7:42 PM.

## 2020-10-19 ENCOUNTER — APPOINTMENT (OUTPATIENT)
Dept: PHYSICAL THERAPY | Age: 60
End: 2020-10-19
Payer: COMMERCIAL

## 2020-10-20 ENCOUNTER — OFFICE VISIT (OUTPATIENT)
Dept: ORTHOPEDIC SURGERY | Age: 60
End: 2020-10-20
Payer: COMMERCIAL

## 2020-10-20 VITALS — TEMPERATURE: 97.3 F | WEIGHT: 315 LBS | HEIGHT: 68 IN | BODY MASS INDEX: 47.74 KG/M2

## 2020-10-20 PROCEDURE — 64454 NJX AA&/STRD GNCLR NRV BRNCH: CPT | Performed by: ORTHOPAEDIC SURGERY

## 2020-10-20 RX ORDER — LIDOCAINE HYDROCHLORIDE 20 MG/ML
8 INJECTION, SOLUTION INFILTRATION; PERINEURAL ONCE
Status: COMPLETED | OUTPATIENT
Start: 2020-10-20 | End: 2020-10-20

## 2020-10-20 RX ADMIN — LIDOCAINE HYDROCHLORIDE 8 ML: 20 INJECTION, SOLUTION INFILTRATION; PERINEURAL at 08:50

## 2020-10-20 NOTE — PATIENT INSTRUCTIONS
Impression:  left knee osteoarthritis. Plan:  Geniculate nerve block was recommended to the patient who understands that this is a trial to determine the appropriateness for Cooled Radiofrequency ablation of the knee. Procedures:  I. Inferomedial geniculate nerve block. 2. Superomedial geniculate nerve block. 3. Superolateral geniculate nerve block. 4. Suprapateller geniculate nerve block from the Vastus Intermedius branch of the Femoral nerve . Anesthesia: local    Procedure narrartive:  1. The geniculate nerve block procedure for the  left knee using ultrasound visualization was explained to the patient  . He understands the risks and benefits of the injection procedure,  and describes no potential allergies. Time out was performed to verify correct person, correct procedure, and correct site. 2. A bolster was placed under the  left knee and the knee was draped and cleansed with ChloroPrep. 3. Ultrasound visualization was first performed utilizing the [employer] Ultrasound unit using  18/4 MHz Linear Probe with sterile drape in long axes to the tibia, finding the neurovascular bundle of the inferomedial geniculate nerve at the junction of the diaphysis and metaphysis below the medial tibial plateau. The ultrasound doppler function was utilized to aid in location of the bundle. The location of the needle insertion was determined anterior to the geniculate nerve and the skin overlying the  site was anesthetized with 1 mL of 2% Lidocaine using a 25 gauge needle. After sufficient time was allowed for anesthesia, a second 25-gauge  needle was then introduced under ultrasound control to Inferomedial geniculate neurovascular bundle using out of plane technique. Once the needle was in position, the nerve was injected with 1 mL  of 2% Lidocaine. 4. Attention was turned to the medial aspect of the distal femur.   The transducer was utilized in long axis to identify the superomedial geniculate neurovascular bundle just proximal to  the adductor ophelia insertion into the medial femoral epicondyle at the distal femoral metaphyseal diaphyseal junction. Once the injection site was identified, and the skin overlying the site was anesthetized with 1ml of 2% Lidocaine. After allowing sufficient time for analgesia,the superomedial geniculate nerve was injected with 1 ml of 2% Lidocaine using out of plane technique using a 25 gauge needle. 5. Next the attention was turned to the lateral aspect of the distal femur where the superolateral geniculate neurovascular bundle was identified using ultrasound at the distal femoral metaphyseal diaphyseal junction. The overlying skin was anesthetized using a 25 gauge needle with 1ml of 2% Lidocaine, and after allowing sufficient time for analgesia,  the nerve was injected with 1 ml of 2%  Lidocaine using a 25 gauge needle in identical manner. 6. Attention was turned to the Suprapatellar branch of the femoral nerve which was located with ultrasound  just distal to the vastus intermedius muscle and proximal to the supra patellar pouch. The skin over the injection site was first anesthetized with 1ml of 2% Lidocaine using a 25 gauge needle, and after allowing sufficient time for analgesia,  the suprapatellar geniculate nerve anesthetized with 1 ml of 2% lidocaine and 25 gauge needle using in plane technique. 7. Bandaids were applied to the injection sites, and the patient was assisted in transfer to a chair. 8.  Dannie tolerated the procedure well and  did report significant relief of  the left knee pain within 5 minutes of the injection. 9.  Based on the injection results, Ishmael Major is  a candidate for the Cooled Radio Frequency Geniculate Nerve Ablation Procedure. 10 .  Risk and benefits of the procedure were discussed with the patient and he is signed his consent and will be scheduled for this procedure.     Khushbu Solano MD  10/20/2020

## 2020-10-20 NOTE — PROGRESS NOTES
to the patient  . He understands the risks and benefits of the injection procedure,  and describes no potential allergies. Time out was performed to verify correct person, correct procedure, and correct site. 2. A bolster was placed under the  left knee and the knee was draped and cleansed with ChloroPrep. 3. Ultrasound visualization was first performed utilizing the [employer] Ultrasound unit using  18/4 MHz Linear Probe with sterile drape in long axes to the tibia, finding the neurovascular bundle of the inferomedial geniculate nerve at the junction of the diaphysis and metaphysis below the medial tibial plateau. The ultrasound doppler function was utilized to aid in location of the bundle. The location of the needle insertion was determined anterior to the geniculate nerve and the skin overlying the  site was anesthetized with 1 mL of 2% Lidocaine using a 25 gauge needle. After sufficient time was allowed for anesthesia, a second 25-gauge  needle was then introduced under ultrasound control to Inferomedial geniculate neurovascular bundle using out of plane technique. Once the needle was in position, the nerve was injected with 1 mL  of 2% Lidocaine. 4. Attention was turned to the medial aspect of the distal femur. The transducer was utilized in long axis to identify the superomedial geniculate neurovascular bundle just proximal to  the adductor ophelia insertion into the medial femoral epicondyle at the distal femoral metaphyseal diaphyseal junction. Once the injection site was identified, and the skin overlying the site was anesthetized with 1ml of 2% Lidocaine. After allowing sufficient time for analgesia,the superomedial geniculate nerve was injected with 1 ml of 2% Lidocaine using out of plane technique using a 25 gauge needle.    5. Next the attention was turned to the lateral aspect of the distal femur where the superolateral geniculate neurovascular bundle was identified using ultrasound at the

## 2020-10-22 ENCOUNTER — HOSPITAL ENCOUNTER (OUTPATIENT)
Dept: PHYSICAL THERAPY | Age: 60
Setting detail: THERAPIES SERIES
Discharge: HOME | End: 2020-10-22
Payer: COMMERCIAL

## 2020-10-22 ENCOUNTER — HOSPITAL ENCOUNTER (OUTPATIENT)
Dept: WOUND CARE | Age: 60
Discharge: HOME | End: 2020-10-22
Payer: COMMERCIAL

## 2020-10-22 VITALS
WEIGHT: 315 LBS | TEMPERATURE: 98.3 F | DIASTOLIC BLOOD PRESSURE: 75 MMHG | BODY MASS INDEX: 47.74 KG/M2 | RESPIRATION RATE: 24 BRPM | HEART RATE: 112 BPM | HEIGHT: 68 IN | SYSTOLIC BLOOD PRESSURE: 150 MMHG

## 2020-10-22 PROCEDURE — 99213 OFFICE O/P EST LOW 20 MIN: CPT

## 2020-10-22 PROCEDURE — 99213 OFFICE O/P EST LOW 20 MIN: CPT | Performed by: INTERNAL MEDICINE

## 2020-10-22 PROCEDURE — 97597 DBRDMT OPN WND 1ST 20 CM/<: CPT

## 2020-10-22 RX ORDER — METOLAZONE 2.5 MG/1
2.5 TABLET ORAL
Qty: 25 TABLET | Refills: 2 | Status: SHIPPED | OUTPATIENT
Start: 2020-10-23 | End: 2021-01-25 | Stop reason: SDUPTHER

## 2020-10-22 RX ORDER — FUROSEMIDE 80 MG
80 TABLET ORAL 2 TIMES DAILY
Qty: 120 TABLET | Refills: 2 | Status: ON HOLD
Start: 2020-10-22 | End: 2021-03-03 | Stop reason: HOSPADM

## 2020-10-22 RX ORDER — LIDOCAINE 40 MG/G
CREAM TOPICAL PRN
Status: DISCONTINUED | OUTPATIENT
Start: 2020-10-22 | End: 2020-10-22

## 2020-10-22 RX ORDER — POTASSIUM CHLORIDE 1500 MG/1
20 TABLET, FILM COATED, EXTENDED RELEASE ORAL 2 TIMES DAILY
Qty: 120 TABLET | Refills: 2 | Status: SHIPPED | OUTPATIENT
Start: 2020-10-22 | End: 2021-01-14 | Stop reason: SDUPTHER

## 2020-10-22 RX ORDER — LIDOCAINE 40 MG/G
CREAM TOPICAL ONCE
Status: DISCONTINUED | OUTPATIENT
Start: 2020-10-22 | End: 2020-10-23 | Stop reason: HOSPADM

## 2020-10-22 ASSESSMENT — PAIN SCALES - GENERAL: PAINLEVEL_OUTOF10: 0

## 2020-10-22 NOTE — PLAN OF CARE
Patient has small area on LUE. Patient leg trimmed around wound  and polysporin, skin prep, benzoin, mepilex and Velcro wrap applied to Left leg. Velcro wrap applied to RLE. Patient requesting refills on Lasix, potassium, and metolazone, prescriptions called [facility]. Patient to continue increased dose lasix as ordered. Follow up in one week. Discharge instructions reviewed.

## 2020-10-26 ENCOUNTER — HOSPITAL ENCOUNTER (OUTPATIENT)
Dept: PHYSICAL THERAPY | Age: 60
Setting detail: THERAPIES SERIES
Discharge: HOME | End: 2020-10-26
Payer: COMMERCIAL

## 2020-10-26 PROBLEM — Z01.818 PREOPERATIVE CLEARANCE: Status: ACTIVE | Noted: 2020-10-26

## 2020-10-26 PROCEDURE — L1846 KO W ADJ FLEX/EXT ROTAT MOLD: HCPCS | Performed by: FAMILY MEDICINE

## 2020-10-26 PROCEDURE — 97110 THERAPEUTIC EXERCISES: CPT

## 2020-10-26 PROCEDURE — 97140 MANUAL THERAPY 1/> REGIONS: CPT

## 2020-10-26 NOTE — FLOWSHEET NOTE
[facility], [address] [place]  Phone: [phone]  Fax [phone]    Physical Therapy Treatment Note/ Progress Report:           Date:  10/26/2020    Patient Name:  Nicole Esquivel    :  1960  MRN: [id number]  Restrictions/Precautions:    Medical/Treatment Diagnosis Information:  · Diagnosis: D83.806P (ICD-10-CM) - Complex tear of medial meniscus of left knee as current injury, initial encounter  · Treatment Diagnosis: M25.562 Left knee pain; M25.662 Left knee stiffness; R26.2 Difficulty in walking  Insurance/Certification information:  PT Insurance Information: [employer] [place]  Physician Information:  Referring Practitioner: Dr. Roland Kaur  Has the plan of care been signed (Y/N):        []  Yes  [x]  No     Date of Patient follow up with Physician:       Is this a Progress Report:     []  Yes  [x]  No        If Yes:  Date Range for reporting period:  Beginning   Ending 10/15    Progress report will be due (10 Rx or 30 days whichever is less): 54/96- POC 1st    Recertification will be due (POC Duration  / 90 days whichever is less):10/29    Visit # Insurance Allowable Requires auth   19- POC NV  30 (new insu , 6 visits on previous insurance)    [x]no        []yes:     Functional Scale:LEFS 75% disability   Date assessed:  20  LEFS 78% disability      20  LEFS 81% disability      10/15/20      Therapy Diagnosis/Practice Pattern:E    Number of Comorbidities:  []0     []1-2    [x]3+    Latex Allergy:  [x]NO      []YES  Preferred Language for Healthcare:   [x]English       []other:       Pain level: 1/10 current     SUBJECTIVE:  Pt states that he has 1/10 ache when just sitting in his left knee. Pain is 2/10 with walking. 4-5/10 when lying on his side in bed. He had the nerve block last week and it did seem to improve his pain, so he is waiting now to have the nerve ablation procedure scheduled.  He is still walking with both crutches, with the exception of walking around the bed and getting to the bathroom at home.      OBJECTIVE:    Observation: B LEs wrapped for vascular wounds/drainage + light compression hose B   Test measurements:  Knee flexion AROM 119   MMT hip ABD 3-/5, hip flexion 4, knee extension 4, knee flexion 4+  - Tank test 10/5/20, but increased swelling 5 cm below inferior pole of patella: L 57 cm, R 55 cm  RESTRICTIONS/PRECAUTIONS: HTN  Procedure(s) 8/3/2020:  1.  Left knee arthroscopy with partial medial meniscectomy and chondroplasty medial femoral condyle with synovial biopsy ([id number])    Exercises/Interventions:     Therapeutic Ex (11157) Sets/sec Notes/CUES HEP   Pt ed: HEP 2'    Seated calf stretch c strap  Incline board calf stretch lvl 2- toes on board   3x30\"  X   Seated HS stretch   X   HL adductor stretch   X   Rectus stretch EOB 30\"x3 Could not tolerate 10/5    Bridge with increasing knee flex  Bridge on  deg    SLR-  Indep today   \" ER- indep today 10x R,L 1.5#   X   SAQ 2#  c ball squeeze    SL hip abd R, L  Clamshell R,L  Reverse clamshell R,L 2x101.5#  1# on knee  1# ankle    Seated LAQ 90-40 with ball sq 2x10  X   Seated heel slide with glider 5\"x10     Seated hip IR iso 5\"x10 OVl at ankles    Standing HR, TR  At [facility]    Mini squat  At bar    Side step 1/2 wall (long)  UE support Pt felt too sore today   Step up 3\"  x10 Heavy UE support,   [facility] platform    Standing glider: abd, ext diag  UE supp on bar Cue for posture   Sit to stand - hi-low table 3x5No UE    HSC  2x1035#    LAQ 90-40 2x1010#    Soleus press 30#    LATOYA TKE  LATOYA hip ABD 10x R,L60#  15#    Bike (low)  Seat 17 Pt too sore today   Manual Intervention (49052) 10' total     Patellar mobs sup, inf      Re-apply bandaids,      Edema massage, STM to pes anserine, distal IT band, adductor muscle belly 10'  Showed pt how to use stick from home   HS s  Knee slightly bent                NMR re-education (72485) CUES NEEDED   Gait training one crutch  standing Breaks needed    Weight shifting A/P   intermittent fingertip support today    Weight shifting A/P with one crutch   Intermittent CL UE support when L foot back- not ready to wean to one crutch   Split stance balance + airex at 1/2 wall     Intermittent UE support                           Therapeutic Activity (16338)                                                Therapeutic Exercise and NMR EXR  [x] (86400) Provided verbal/tactile cueing for activities related to strengthening, flexibility, endurance, ROM for improvements in LE, proximal hip, and core control with self care, mobility, lifting, ambulation.  [] (38996) Provided verbal/tactile cueing for activities related to improving balance, coordination, kinesthetic sense, posture, motor skill, proprioception  to assist with LE, proximal hip, and core control in self care, mobility, lifting, ambulation and eccentric single leg control.      NMR and Therapeutic Activities:    [x] (00403 or 07843) Provided verbal/tactile cueing for activities related to improving balance, coordination, kinesthetic sense, posture, motor skill, proprioception and motor activation to allow for proper function of core, proximal hip and LE with self care and ADLs  [x] (52309) Gait Re-education- Provided training and instruction to the patient for proper LE, core and proximal hip recruitment and positioning and eccentric body weight control with ambulation re-education including up and down stairs     Home Exercise Program:    [x] (68727) Reviewed/Progressed HEP activities related to strengthening, flexibility, endurance, ROM of core, proximal hip and LE for functional self-care, mobility, lifting and ambulation/stair navigation   [] (16231)Reviewed/Progressed HEP activities related to improving balance, coordination, kinesthetic sense, posture, motor skill, proprioception of core, proximal hip and LE for self care, mobility, lifting, and ambulation/stair navigation      Manual Treatments:  PROM / STM / Oscillations-Mobs:  G-I, II, III, IV (PA's, Inf., Post.)  [x] (49171) Provided manual therapy to mobilize LE, proximal hip and/or LS spine soft tissue/joints for the purpose of modulating pain, promoting relaxation,  increasing ROM, reducing/eliminating soft tissue swelling/inflammation/restriction, improving soft tissue extensibility and allowing for proper ROM for normal function with self care, mobility, lifting and ambulation. Modalities:     [] GAME READY (VASO)- for significant edema, swelling, pain control. (low) in long-sitting   CP x 5' R,L knee long sit with pillow under knees  Charges:  Timed Code Treatment Minutes: 38   Total Treatment Minutes: 60  (CP, rest breaks, fit for brace)     [facility] time in/time out:   (and requires time in and out for each CPT code)    [] EVAL (LOW) 23093 (typically 20 minutes face-to-face)  [] EVAL (MOD) 59008 (typically 30 minutes face-to-face)  [] EVAL (HIGH) 31110 (typically 45 minutes face-to-face)  [] RE-EVAL     [x] FG(49239) x 2   [] IONTO  [] NMR (25763) x     [] VASO  [x] Manual (41021) x 1     [] Other: gait x  [] TA x      [] Mech Traction (37795)  [] ES(attended) (37910)      [] ES (un) (18944):       GOALS:   Patient stated goal: 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 Progressing: []? Met: []? Not Met: []? Adjusted     Therapist goals for Patient:   Short Term Goals: To be achieved in: 2 weeks  1. Independent in HEP and progression per patient tolerance, in order to prevent re-injury. [x]? Progressing: []? Met: []? Not Met: []? Adjusted  2. Patient will have a decrease in pain to facilitate improvement in movement, function, and ADLs as indicated by Functional Deficits. [x]? Progressing: []? Met: []? Not Met: []? Adjusted     Long Term Goals: To be achieved in: 6 weeks  1.  Disability index score of 40% or less for the LEFS to assist with reaching prior level of function. []? Progressing: []? Met: [x]? Not Met: []? Adjusted Lower score on LEFS despite pt reporting improvements. 2. Patient will demonstrate increased left knee AROM to 0-110  to allow for proper joint functioning for car/truck mobility, stair amb.   []? Progressing: [x]? Met: []? Not Met: []? Adjusted  3. Patient will demonstrate an increase in Strength to at least 4+/5 for the left knee and hip stabilizers in order to ambulate s AD for community distances. [x]? Progressing: []? Met: []? Not Met: []? Adjusted  4. Patient will return to walking community distances s AD with mild bilateral knee pain (<3/10). [x]? Progressing: []? Met: []? Not Met: []? Adjusted  5. Pt will be able to drive x 2 hours without increased knee pain; car mobility without knee pain. []? Progressing: []? Met: [x]? Not Met: []? Adjusted         Progression Towards Functional goals:  [] Patient is progressing as expected towards functional goals listed. [x] Progression is slowed due to complexities listed. [] Progression has been slowed due to co-morbidities. [] Plan just implemented, too soon to assess goals progression  [] Other:         Overall Progression Towards Functional goals/ Treatment Progress Update:  [] Patient is progressing as expected towards functional goals listed. [x] Progression is slowed due to complexities/Impairments listed. [] Progression has been slowed due to co-morbidities.   [] Plan just implemented, too soon to assess goals progression <30days   [] Goals require adjustment due to lack of progress  [] Patient is not progressing as expected and requires additional follow up with physician  [] Other    Prognosis for POC: [x] Good [] Fair  [] Poor      Patient requires continued skilled intervention: [x] Yes  [] No    Treatment/Activity Tolerance:  [x] Patient able to complete treatment  [] Patient limited by fatigue  [x] Patient limited by pain    [] Patient limited by other medical complications  [] Other: ASSESSMENT: Aracelis Hannon continues to progress with tolerance to there-ex and is able to complete more standing TE. He does fatigue easily and needs frequent rest breaks. Although he tolerates more WB exercise, he still becomes sore in both knees with any amount of WB exercise. Ice used for pain modulation. Pt was fitted for his custom brace at today's visit and instructed by DME on donning/doffing the brace as well. Pt's wife also present at this time as well. Return to Play: (if applicable)   []  Stage 1: Intro to Strength   []  Stage 2: Return to Run and Strength   []  Stage 3: Return to Jump and Strength   []  Stage 4: Dynamic Strength and Agility   []  Stage 5: Sport Specific Training     []  Ready to Return to Play, Meets All Above Stages   []  Not Ready for Return to Sports   Comments:                                PLAN:   [x] Continue per plan of care [] Alter current plan (see comments above)  [] Plan of care initiated [] Hold pending MD visit [] Discharge      Electronically signed by:  Judi Song, PT, DPT    Note: If patient does not return for scheduled/ recommended follow up visits, this note will serve as a discharge from care along with most recent update on progress. Access Code: [email or web address]   URL: [email or web address]   Date: 10/08/2020   Prepared by: Judi Song     Exercises   Supine Hip Adductor Stretch - 3 sets - 15s hold - 1x daily - 7x weekly   Bridge - 10 reps - 1x daily - 7x weekly   Small Range Straight Leg Raise - 5 reps - 3 sets - 1x daily - 7x weekly   Sidelying Hip Abduction - 10 reps - 2 sets - 1x daily - 4x weekly   Clamshell - 10 reps - 2 sets - 1x daily - 4x weekly   Seated Gastroc Stretch with Strap - 3 sets - 30s hold - 1x daily - 7x weekly   Seated Hamstring Stretch - 3 sets - 20s hold - 1x daily - 7x weekly   Heel rises with counter support - 10 reps - 3 sets - 1x daily - 7x weekly   Patient Education   Pain Management

## 2020-10-27 ENCOUNTER — TELEPHONE (OUTPATIENT)
Dept: BARIATRICS/WEIGHT MGMT | Age: 60
End: 2020-10-27

## 2020-10-27 PROBLEM — L97.911 CHRONIC ULCER OF RIGHT LEG, LIMITED TO BREAKDOWN OF SKIN (HCC): Status: RESOLVED | Noted: 2020-07-28 | Resolved: 2020-10-27

## 2020-10-27 RX ORDER — TRAMADOL HYDROCHLORIDE 50 MG/1
50 TABLET ORAL EVERY 6 HOURS PRN
Qty: 28 TABLET | Refills: 0 | Status: SHIPPED | OUTPATIENT
Start: 2020-10-27 | End: 2020-11-03

## 2020-10-27 NOTE — TELEPHONE ENCOUNTER
Called as a new pt courtesy call - spoke w patient. Did receive paperwork - told patient to have new pt paperwork completely filled out, insurance card, and id and to arrive at appt time. If they didn't have the paperwork filled out and arrive on time may be rescheduled.  Told to arrive half hour before

## 2020-10-27 NOTE — PROGRESS NOTES
[facility] Progress Note    Moises Mckeon     : 1960    DATE OF VISIT:  10/22/2020    Subjective:     Moises Mckeon is a 64 y.o. male who has a venous and  lymphedema ulcer located on the left  lower leg. Current complaint of pain in this ulcer? yes. Quality of pain: aching  Timing: intermittent  Severity: mild  Associated Signs/Symptoms: swelling  Other significant symptoms or pertinent ulcer history: still having some significant knee pain, but the legs seem to be doing ok; wraps slid down a bit, with one small abrasion / venous ulcer on the left side only. No F/C/D. Doing ok with current diuretic regimen, still some hand cramps at times, no dizziness. Some constipation at times, more noticeable this past week. Additional ulcer(s) noted? no.      Mr. Carolina Hatch has a past medical history of Cellulitis of lower extremity, Hypertension, Impaired fasting glucose, Obesity, Preoperative clearance, Screening PSA (prostate specific antigen), Stasis dermatitis of both legs, Tobacco use, and Tubular adenoma of colon. He has a past surgical history that includes Mouth surgery; Kipling tooth extraction; Circumcision; Vasectomy; Colonoscopy (2017); Umbilical hernia repair (2011); and Knee arthroscopy (Left, 8/3/2020). His family history includes Diabetes in his brother; Heart Disease in his mother; High Blood Pressure in his mother. Mr. Carolina Hatch reports that he quit smoking about 14 years ago. His smoking use included cigarettes. He has a 50.00 pack-year smoking history. He has never used smokeless tobacco. He reports current alcohol use. He reports that he does not use drugs. His current medication list consists of etodolac, fluticasone, furosemide, hydroCHLOROthiazide, lisinopril, loratadine, metOLazone, potassium chloride, rOPINIRole, and traMADol.     Allergies: Bactrim [sulfamethoxazole-trimethoprim]    Pertinent items from the review of systems are discussed in the HPI; the remainder of the ROS was reviewed and is negative. Objective:     Vitals:    10/22/20 1357   BP: (!) 150/75   Pulse: 112   Resp: 24   Temp: 98.3 °F (36.8 °C)   TempSrc: Oral   Weight: (!) 404 lb 9.6 oz (183.5 kg)   Height: 5' 8\" (1.727 m)       Constitutional:  well-developed, well-nourished, overweight, NAD  Psychiatric:  oriented to person, place and time; mood and affect appropriate for the situation   Eyes:  pupils equal, round and reactive to light; sclerae anicteric, conjunctivae not pale  Cardiovascular:  bilateral pedal pulses palpable, feet warm, good cap refill; mild-mod BL lower extremity edema  Lymphatic:  no inguinal or popliteal adenopathy, no angitis or cellulitis  Musculoskeletal:  no clubbing, cyanosis or petechiae; RLE and LLE with no gross effusions, joint misalignment or acute arthritis  Edwina-ulcer skin: indurated, pink, warm and hyperkeratotic. Ulcer(s): right healed; left partial thickness, pink-red, a bit of fibrin. Photos also saved in electronic chart.     Today's Wound Measurements, per RN documentation:  Wound 10/08/20 #3 left pretib, venous, partial thickness, 10/2020-Wound Length (cm): 0.5 cm  Wound 07/28/20 #1 right lower pretib cluster, venous, partial thickness, last 2 years-Wound Length (cm): 0 cm    Wound 10/08/20 #3 left pretib, venous, partial thickness, 10/2020-Wound Width (cm): 0.4 cm  Wound 07/28/20 #1 right lower pretib cluster, venous, partial thickness, last 2 years-Wound Width (cm): 0 cm    Wound 10/08/20 #3 left pretib, venous, partial thickness, 10/2020-Wound Depth (cm): 0.1 cm  Wound 07/28/20 #1 right lower pretib cluster, venous, partial thickness, last 2 years-Wound Depth (cm): 0 cm  ______________________________    Lab Results   Component Value Date    LABALBU 4.1 07/16/2020     Lab Results   Component Value Date    CREATININE 0.9 10/08/2020     Lab Results   Component Value Date    HGB 14.2 07/16/2020     Lab Results   Component Value Date LABA1C 6.3 06/18/2019     Assessment:     Patient Active Problem List   Diagnosis Code    Class 3 severe obesity due to excess calories with serious comorbidity and body mass index (BMI) of 60.0 to 69.9 in adult ([facility] E66.01, Z68.44    Mixed hypertriglyceridemia N60.9    Nonalcoholic fatty liver disease K76.0    RLS (restless legs syndrome) G25.81    Essential hypertension, benign I10    Impaired fasting glucose R73.01    Chronic pain of left knee M25.562, G89.29    Osteoarthritis of left knee M17.12    Chondromalacia of left knee M94.262    Pes planus M21.40    Lymphedema I89.0    Peripheral venous insufficiency I87.2    Allergic rhinitis J30.9    Chronic ulcer of left leg, limited to breakdown of skin ([facility] L97.921    Preoperative clearance Z01.818       Assessment of today's active condition(s): venous stasis, chronic and intermittent superimposed acute stasis skin changes, multifactorial lower extremity edema, nearly healed venous leg ulcers; if we can get a dressing to stay in place on the left leg, I think we might have reached the point where the weekly compression wraps have helped his edema as much as they can, without risking further skin breakdown if they should slide and abrade again. The muscle cramps are likely from the diuretics, but not severe, and the constipation could be, if he's becoming a bit volume depleted. Factors contributing to occurrence and/or persistence of the chronic ulcer include venous stasis, lymphedema, decreased mobility, shear force and obesity. Medical necessity of today's visit is shown by the above documentation. Sharp debridement is not indicated today, based upon the exam findings in the wound(s) above.      Discharge plan:     Treatment in the wound care center today, per RN documentation: Wound 10/08/20 #3 left pretib, venous, partial thickness, 10/2020-Dressing/Treatment: Other (comment)(benzoin carmelita,polysporin,mepilex,velcro compression stockings)  Wound 07/28/20 #1 right lower pretib cluster, venous, partial thickness, last 2 years-Dressing/Treatment: Other (comment)(velcro compression garment). Try to leave left leg dressing in place for the week. I reminded the patient of the importance of weight management and smoking cessation, if applicable; also encouraged ambulation as tolerated, additional lower extremity exercises as instructed in our education sheet, leg elevation when at rest, and compliance with any recommended dietary, diuretic and compression therapies. Go back to BL Velcro compression garments at this time. No change in Lasix (80 BID), Zaroxolyn (2.5 TIW) and KCl (20 BID) for now -- new scripts called in to his preferred pharmacy at [facility], [place]. Suggested Colace and Miralax for his constipation. If more durably healed in the next week or two, will leave longer term diuretic decisions to his PCP. Written discharge instructions given to patient. Follow up in 1 week.     Electronically signed by Cash Bo MD on 10/27/2020 at 11:02 AM.

## 2020-10-28 ENCOUNTER — OFFICE VISIT (OUTPATIENT)
Dept: BARIATRICS/WEIGHT MGMT | Age: 60
End: 2020-10-28
Payer: COMMERCIAL

## 2020-10-28 VITALS
WEIGHT: 315 LBS | BODY MASS INDEX: 47.74 KG/M2 | HEART RATE: 103 BPM | TEMPERATURE: 97 F | DIASTOLIC BLOOD PRESSURE: 80 MMHG | HEIGHT: 68 IN | SYSTOLIC BLOOD PRESSURE: 135 MMHG

## 2020-10-28 PROCEDURE — 99204 OFFICE O/P NEW MOD 45 MIN: CPT | Performed by: SURGERY

## 2020-10-28 NOTE — PROGRESS NOTES
Declan Arnold is a 64 y.o. male with a date of birth of 1960. Vitals:    10/28/20 0823   BP: 135/80   Pulse: 103   Temp: 97 °F (36.1 °C)    BMI: Body mass index is 61.61 kg/m². Obesity Classification: Class III    Weight History: Wt Readings from Last 3 Encounters:   10/28/20 (!) 405 lb 3.2 oz (183.8 kg)   10/22/20 (!) 404 lb 9.6 oz (183.5 kg)   10/20/20 (!) 410 lb (186 kg)       Patient's lowest adult weight was 140 lbs at age 27. Patient's highest adult weight was 430 lbs at age 64. Patient has participated in the following weight loss programs: Atkins Diet and low carb. Patient has not participated in meal replacement/liquid diets. Patient has participated in weight loss medications - Contrave. Patient is not lactose intolerant. Patient does not have Anabaptist/cultural food concerns. Patient does not have food allergies. Patient does tolerate artificial sweeteners. Patient tries to have regular sleep/wake schedule - started cpap last week  24 hour recall/food frequency chart:  Breakfast: yes. 2 pickles OR bologna or salami sandwich OR leftovers  Snack: no.   Lunch: yes. Leftover chili  Spaghetti OR Chinese leftovers  Snack: yes. popsicle OR cheese and nachos OR pickles  Dinner: yes. BLT hoagie, chips, salad  Snack: yes. popsicle  Drinks throughout the day: water, seltzer water, Coke occasionally, uns iced tea  Do you drink alcohol? No.    Patient does not meet the criteria for binge eating disorder. Patient does have grazing. Patient does not have night eating - not since getting cpap. Patient does have a history of emotional eating or eating out of boredom. Surgery  Patient does feel confident in his ability to make these changes. The patient's expectations of post-surgical eating habits are realistic. Patient states he does understand the consequences of not complying with post-op food guidelines.   Patient states he does understands the long term changes in food intake that

## 2020-10-28 NOTE — PROGRESS NOTES
[address]  Physicians   General & Laparoscopic Surgery  [facility]      HPI:    Sugar Wesley is a very pleasant 64 y.o. obese male ,   Body mass index is 61.61 kg/m². And multiple medical problems who is presenting for weight loss surgery evaluation and consultation by Dr. April Whitfield. Patient has been struggling for several years now with obesity. Patient feels the weight is an obstacle to achieve and perform things in daily living as well risk on health. Tries to diet, and exercise but can't keep the weight off. Patient tried Atkins and other regimens, but with no sustainable weight loss. Patient  is very determined to lose weight and be healthy, and is interested in surgical weight loss to achieve this goal.    Otherwise patient denies any nausea, vomiting, fevers, chills, shortness of breath, chest pain, constipation or urinary symptoms. Pain Assessment   Denies any abdominal pain     Past Medical History:   Diagnosis Date    Cellulitis of lower extremity 9/25/2019    Hypertension     Impaired fasting glucose 5/2013    Obesity     Preoperative clearance 10/26/2020    Screening PSA (prostate specific antigen) 12/30/2015;5/1/17    Nml:0.53(12/30/2015);5/1/17=nml.     Sleep apnea     Stasis dermatitis of both legs 8/28/2020    Tobacco use     Tubular adenoma of colon 09/14/2017     Past Surgical History:   Procedure Laterality Date    CIRCUMCISION      COLONOSCOPY  09/14/2017    Colonoscopy with polypectomy (cold biopsy):Dr. Walker:next in 3yrs=9/14/2020    KNEE ARTHROSCOPY Left 8/3/2020    VIDEO ARTHROSCOPY LEFT KNEE, PARTIAL MEDIAL MENISCECTOMY, CHONDROPLASTY, SYNOVIAL BIOPSY -TOM=4- performed by No Collins MD at [address] [facility]  05/28/2011    VASECTOMY      WISDOM TOOTH EXTRACTION       Family History   Problem Relation Age of Onset    High Blood Pressure Mother     Heart Disease Mother         MI  AT 66    Diabetes Brother      Social History     Tobacco Use    Smoking status: Former Smoker     Packs/day: 2.00     Years: 25.00     Pack years: 50.00     Types: Cigarettes     Last attempt to quit: 2006     Years since quittin.5    Smokeless tobacco: Never Used   Substance Use Topics    Alcohol use: Yes     Comment: ocas     I counseled the patient on the importance of not smoking and risks of ETOH. Allergies   Allergen Reactions    Bactrim [Sulfamethoxazole-Trimethoprim] Rash     POSSIBLE fixed drug eruption on his cheeks, but diagnosis is not certain (only happened once). Vitals:    10/28/20 0823   BP: 135/80   Pulse: 103   Temp: 97 °F (36.1 °C)   Weight: (!) 405 lb 3.2 oz (183.8 kg)   Height: 5' 8\" (1.727 m)       Body mass index is 61.61 kg/m². Current Outpatient Medications:     traMADol (ULTRAM) 50 MG tablet, Take 1 tablet by mouth every 6 hours as needed for Pain for up to 7 days. , Disp: 28 tablet, Rfl: 0    furosemide (LASIX) 80 MG tablet, Take 1 tablet by mouth 2 times daily, Disp: 120 tablet, Rfl: 2    metOLazone (ZAROXOLYN) 2.5 MG tablet, Take 1 tablet by mouth three times a week, Disp: 25 tablet, Rfl: 2    potassium chloride (KLOR-CON M) 20 MEQ TBCR extended release tablet, Take 1 tablet by mouth 2 times daily, Disp: 120 tablet, Rfl: 2    etodolac (LODINE) 400 MG tablet, Take 1 tablet by mouth 2 times daily, Disp: 60 tablet, Rfl: 3    rOPINIRole (REQUIP) 2 MG tablet, Take 1 tablet by mouth nightly, Disp: 90 tablet, Rfl: 0    hydroCHLOROthiazide (HYDRODIURIL) 25 MG tablet, TAKE ONE TABLET BY MOUTH ONE TIME DAILY, Disp: 90 tablet, Rfl: 0    fluticasone (FLONASE) 50 MCG/ACT nasal spray, 1 spray by Nasal route daily, Disp: 3 Bottle, Rfl: 0    lisinopril (PRINIVIL;ZESTRIL) 10 MG tablet, Take 1 tablet by mouth daily, Disp: 90 tablet, Rfl: 0    loratadine (CLARITIN) 10 MG tablet, Take 1 tablet by mouth daily, Disp: 90 tablet, Rfl: 0      Review of Systems - History obtained from the patient  General ROS: negative  Psychological ROS: negative  Ophthalmic ROS: negative  Neurological ROS: negative  ENT ROS: negative  Allergy and Immunology ROS: negative  Hematological and Lymphatic ROS: negative  Endocrine ROS: negative  Breast ROS: negative  Respiratory ROS: negative  Cardiovascular ROS: negative  Gastrointestinal ROS:negative  Genito-Urinary ROS: negative  Musculoskeletal ROS: negative   Skin ROS: negative    Physical Exam   Constitutional: Patient is oriented to person, place, and time. Vital signs are normal. Patient  appears well-developed and well-nourished. Patient  is active and cooperative. Non-toxic appearance. No distress. HENT:   Head: Normocephalic and atraumatic. Head is without laceration. Right Ear: External ear normal. No lacerations. No drainage, swelling or tenderness. Left Ear: External ear normal. No lacerations. No drainage, swelling or tenderness. Nose: Nose normal. No nose lacerations or nasal deformity. Mouth/Throat: Uvula is midline, oropharynx is clear and moist and mucous membranes are normal. No oropharyngeal exudate. Eyes: Conjunctivae, EOM and lids are normal. Pupils are equal, round, and reactive to light. Right eye exhibits no discharge. No foreign body present in the right eye. Left eye exhibits no discharge. No foreign body present in the left eye. No scleral icterus. Neck: Trachea normal and normal range of motion. Neck supple. No JVD present. No tracheal tenderness present. Carotid bruit is not present. No rigidity. No tracheal deviation and no edema present. No thyromegaly present. Cardiovascular: Normal rate, regular rhythm, normal heart sounds, intact distal pulses and normal pulses. Pulmonary/Chest: Effort normal and breath sounds normal. No stridor. No respiratory distress. Patient  has no wheezes. Patient has no rales. Patient exhibits no tenderness and no crepitus. Abdominal: Soft.  Normal appearance and bowel sounds are normal. Patient exhibits no distension, no abdominal bruit, no ascites and no mass. There is no hepatosplenomegaly. There is no tenderness. There is no rigidity, no rebound, no guarding and no CVA tenderness. No hernia. Hernia confirmed negative in the ventral area. Musculoskeletal: Normal range of motion. Patient exhibits no edema or tenderness. Lymphadenopathy:        Head (right side): No submental, no submandibular, no preauricular, no posterior auricular and no occipital adenopathy present. Head (left side): No submental, no submandibular, no preauricular, no posterior auricular and no occipital adenopathy present. Patient  has no cervical adenopathy. Right: No supraclavicular adenopathy present. Left: No supraclavicular adenopathy present. Neurological: Patient is alert and oriented to person, place, and time. Patient has normal strength. Coordination and gait normal. GCS eye subscore is 4. GCS verbal subscore is 5. GCS motor subscore is 6. Skin: Skin is warm and dry. No abrasion and no rash noted. Patient  is not diaphoretic. No cyanosis or erythema. Psychiatric: Patient has a normal mood and affect. speech is normal and behavior is normal. Cognition and memory are normal.             A/P  Peggy Vela is a very pleasant 64 y.o. male with Obesity,  Body mass index is 61.61 kg/m². and multiple obesity related co-morbidities. Peggy Vela is very motivated to lose weight and being more healthy. We discussed how his weight affects his overall health including:  Pj Lewis was seen today for bariatric, initial visit. Diagnoses and all orders for this visit:    TOM treated with BiPAP  -     CBC Auto Differential; Future  -     Comprehensive Metabolic Panel; Future  -     Hemoglobin A1C; Future  -     Iron and TIBC; Future  -     Lipid Panel; Future  -     TSH with Reflex; Future  -     Vitamin B12 & Folate; Future  -     Vitamin D 25 Hydroxy;  Future  -     [facility] Diggs MD, Pulmonary, Suad Morales MD, Cardiology, [facility]    Morbid obesity with BMI of 60.0-69.9, adult ([address]  -     CBC Auto Differential; Future  -     Comprehensive Metabolic Panel; Future  -     Hemoglobin A1C; Future  -     Iron and TIBC; Future  -     Lipid Panel; Future  -     TSH with Reflex; Future  -     Vitamin B12 & Folate; Future  -     Vitamin D 25 Hydroxy; Future  -     Mindy Clark MD, Manuel Hameed MD, Cardiology, [facility]    Essential hypertension, benign  -     CBC Auto Differential; Future  -     Comprehensive Metabolic Panel; Future  -     Hemoglobin A1C; Future  -     Iron and TIBC; Future  -     Lipid Panel; Future  -     TSH with Reflex; Future  -     Vitamin B12 & Folate; Future  -     Vitamin D 25 Hydroxy; Future  -     Mindy Clark MD, Manuel Hameed MD, Cardiology, [facility]    Mixed hypertriglyceridemia  -     CBC Auto Differential; Future  -     Comprehensive Metabolic Panel; Future  -     Hemoglobin A1C; Future  -     Iron and TIBC; Future  -     Lipid Panel; Future  -     TSH with Reflex; Future  -     Vitamin B12 & Folate; Future  -     Vitamin D 25 Hydroxy; Future  -     Mindy Clark MD, Pulmonary, Suad Morales MD, Cardiology, [facility]      The patient underwent 30 minutes of dietary counseling. I have reviewed, discussed and agree with the dietary plan. Medical weight loss and different surgical options were discussed in details with patient. Peggy Mirian is interested in surgical weight loss. Patient is interested in Laparoscopic Sleeve Gastrectomy, which I believe is an excellent option for the patient. We will proceed with pre-operative work up labs and studies. Will also petition patient's  insurance for approval for this procedure.    Patient received dietary handouts and education. Patient advised that its their responsibility to follow up for studies and/or labs ordered today. Also discussed in details the importance of follow up, as well following the recommendations and completing the whole program to improve outcomes when it comes to healthier lifestyle as well weight loss. Patient also advised about risks and benefits being on a strict dietary regimen as well using supplements. Patient agrees and wants to proceed with weight loss planning     Labs ordered. Cardiac Clearance. Pulmonary Clearance. Psych Evaluation. Sleep Study. H-pylori   EGD  Support Group. PCP Letter. Weight History 2 yrs. F/U in 4 weeks. Patient advised that its their responsibility to follow up for studies and/or labs ordered today.

## 2020-10-29 ENCOUNTER — HOSPITAL ENCOUNTER (OUTPATIENT)
Dept: WOUND CARE | Age: 60
Discharge: HOME | End: 2020-10-29
Payer: COMMERCIAL

## 2020-10-29 ENCOUNTER — HOSPITAL ENCOUNTER (OUTPATIENT)
Dept: PHYSICAL THERAPY | Age: 60
Setting detail: THERAPIES SERIES
Discharge: HOME | End: 2020-10-29
Payer: COMMERCIAL

## 2020-10-29 NOTE — FLOWSHEET NOTE
[facility],  [address]        Physical Therapy  Cancellation/No-show Note  Patient Name:  Anthony Villareal  :  1960   Date:  10/29/2020  Cancelled visits to date: 2  No-shows to date: 0    For today's appointment patient:  X? Cancelled  ? Rescheduled appointment  ? No-show     Reason given by patient:  ?  Patient ill  ? Conflicting appointment  ? No transportation    ? Conflict with work  ? No reason given  X?   Other:     Comments:  Has to take care of the grandkids    Electronically signed by:  Bunny Blanc, PT, DPT

## 2020-10-30 ENCOUNTER — NURSE ONLY (OUTPATIENT)
Dept: INTERNAL MEDICINE CLINIC | Age: 60
End: 2020-10-30

## 2020-10-30 DIAGNOSIS — E66.01 MORBID OBESITY WITH BMI OF 60.0-69.9, ADULT (HCC): ICD-10-CM

## 2020-10-30 DIAGNOSIS — E78.2 MIXED HYPERTRIGLYCERIDEMIA: ICD-10-CM

## 2020-10-30 DIAGNOSIS — G47.33 OSA TREATED WITH BIPAP: ICD-10-CM

## 2020-10-30 DIAGNOSIS — I10 ESSENTIAL HYPERTENSION, BENIGN: ICD-10-CM

## 2020-10-30 LAB
A/G RATIO: 1.3 (ref 1.1–2.2)
ALBUMIN SERPL-MCNC: 3.6 G/DL (ref 3.4–5)
ALP BLD-CCNC: 86 U/L (ref 40–129)
ALT SERPL-CCNC: 67 U/L (ref 10–40)
ANION GAP SERPL CALCULATED.3IONS-SCNC: 12 MMOL/L (ref 3–16)
AST SERPL-CCNC: 56 U/L (ref 15–37)
BANDED NEUTROPHILS RELATIVE PERCENT: 2 % (ref 0–7)
BASOPHILS ABSOLUTE: 0 K/UL (ref 0–0.2)
BASOPHILS RELATIVE PERCENT: 0 %
BILIRUB SERPL-MCNC: 0.4 MG/DL (ref 0–1)
BUN BLDV-MCNC: 18 MG/DL (ref 7–20)
CALCIUM SERPL-MCNC: 9.7 MG/DL (ref 8.3–10.6)
CHLORIDE BLD-SCNC: 101 MMOL/L (ref 99–110)
CHOLESTEROL, TOTAL: 173 MG/DL (ref 0–199)
CO2: 25 MMOL/L (ref 21–32)
CREAT SERPL-MCNC: 0.8 MG/DL (ref 0.9–1.3)
EOSINOPHILS ABSOLUTE: 0.1 K/UL (ref 0–0.6)
EOSINOPHILS RELATIVE PERCENT: 2 %
ESTIMATED AVERAGE GLUCOSE: 185.8 MG/DL
FOLATE: >20 NG/ML (ref 4.78–24.2)
GFR AFRICAN AMERICAN: >60
GFR NON-AFRICAN AMERICAN: >60
GLOBULIN: 2.8 G/DL
GLUCOSE BLD-MCNC: 150 MG/DL (ref 70–99)
HBA1C MFR BLD: 8.1 %
HCT VFR BLD CALC: 41.9 % (ref 40.5–52.5)
HDLC SERPL-MCNC: 56 MG/DL (ref 40–60)
HEMOGLOBIN: 13.9 G/DL (ref 13.5–17.5)
IRON SATURATION: 34 % (ref 20–50)
IRON: 91 UG/DL (ref 59–158)
LDL CHOLESTEROL CALCULATED: 83 MG/DL
LYMPHOCYTES ABSOLUTE: 0.8 K/UL (ref 1–5.1)
LYMPHOCYTES RELATIVE PERCENT: 14 %
MCH RBC QN AUTO: 31.3 PG (ref 26–34)
MCHC RBC AUTO-ENTMCNC: 33 G/DL (ref 31–36)
MCV RBC AUTO: 94.7 FL (ref 80–100)
MONOCYTES ABSOLUTE: 0.5 K/UL (ref 0–1.3)
MONOCYTES RELATIVE PERCENT: 8 %
NEUTROPHILS ABSOLUTE: 4.4 K/UL (ref 1.7–7.7)
NEUTROPHILS RELATIVE PERCENT: 74 %
PDW BLD-RTO: 14.8 % (ref 12.4–15.4)
PLATELET # BLD: 189 K/UL (ref 135–450)
PLATELET SLIDE REVIEW: ADEQUATE
PMV BLD AUTO: 8.4 FL (ref 5–10.5)
POTASSIUM SERPL-SCNC: 4.6 MMOL/L (ref 3.5–5.1)
RBC # BLD: 4.43 M/UL (ref 4.2–5.9)
RBC # BLD: NORMAL 10*6/UL
SLIDE REVIEW: ABNORMAL
SODIUM BLD-SCNC: 138 MMOL/L (ref 136–145)
TOTAL IRON BINDING CAPACITY: 270 UG/DL (ref 260–445)
TOTAL PROTEIN: 6.4 G/DL (ref 6.4–8.2)
TRIGL SERPL-MCNC: 170 MG/DL (ref 0–150)
TSH REFLEX: 3.73 UIU/ML (ref 0.27–4.2)
VITAMIN B-12: 796 PG/ML (ref 211–911)
VITAMIN D 25-HYDROXY: 24.7 NG/ML
VLDLC SERPL CALC-MCNC: 34 MG/DL
WBC # BLD: 5.8 K/UL (ref 4–11)

## 2020-11-02 ENCOUNTER — HOSPITAL ENCOUNTER (OUTPATIENT)
Dept: PHYSICAL THERAPY | Age: 60
Setting detail: THERAPIES SERIES
Discharge: HOME | End: 2020-11-02
Payer: COMMERCIAL

## 2020-11-02 PROCEDURE — 97140 MANUAL THERAPY 1/> REGIONS: CPT

## 2020-11-02 PROCEDURE — 97110 THERAPEUTIC EXERCISES: CPT

## 2020-11-02 NOTE — PLAN OF CARE
[facility],  [address]  Phone: [phone]  Fax [phone]    Physical Therapy Treatment Note/ Progress Report:           Date:  2020    Patient Name:  Yolette Rubio    :  1960  MRN: [id number]  Restrictions/Precautions:    Medical/Treatment Diagnosis Information:  · Diagnosis: W71.974V (ICD-10-CM) - Complex tear of medial meniscus of left knee as current injury, initial encounter  · Treatment Diagnosis: M25.562 Left knee pain; M25.662 Left knee stiffness; R26.2 Difficulty in walking  Insurance/Certification information:  PT Insurance Information: [employer] [place]  Physician Information:  Referring Practitioner: Dr. Maribel Peters  Has the plan of care been signed (Y/N):        []  Yes  [x]  No     Date of Patient follow up with Physician:       Is this a Progress Report:     []  Yes  [x]  No        If Yes:  Date Range for reporting period:  Beginning   Ending 20    Progress report will be due (10 Rx or 30 days whichever is less): 64/8  Recertification will be due (POC Duration  / 90 days whichever is less):    Visit # Insurance Allowable Requires auth   20-POC 30 (new insu , 6 visits on previous insurance)    [x]no        []yes:     Functional Scale:LEFS 75% disability   Date assessed:  20  LEFS 78% disability      20  LEFS 81% disability      10/15/20  LEFS 76% disability      20  Therapy Diagnosis/Practice Pattern:E    Number of Comorbidities:  []0     []1-2    [x]3+    Latex Allergy:  [x]NO      []YES  Preferred Language for Healthcare:   [x]English       []other:       Pain level: 2/10 current     SUBJECTIVE:  Pt states that he always has pain in both legs when he sleeps at night and then when he wakes up because it hurts to lie on either side. He has his left knee nerve ablation the first week of December. He has only worn his knee brace once since he got it.  Despite still having difficulty walking and standing, over the pat month, he has noticed improvement in being able to transition out of the car, off of the bed and out of chairs. If he is not hurting too much, he can walk a little bit more in the bedroom.      OBJECTIVE:    Observation: B LEs wrapped for vascular wounds/drainage + light compression hose B   Test measurements:  Knee flexion AROM 119   MMT hip ABD 3-/5, hip flexion L 5-, R 4+, knee extension R 22.4# 4+/5, R 15.1# 4/5, knee flexion L 5-, R 4+    RESTRICTIONS/PRECAUTIONS: HTN   Procedure(s) 8/3/2020:  1.  Left knee arthroscopy with partial medial meniscectomy and chondroplasty medial femoral condyle with synovial biopsy (58633-89)    Exercises/Interventions:     Therapeutic Ex (06587) Sets/sec Notes/CUES HEP   Pt ed: aquatic therapy vs in this clinic to improve walking distance 4'    Seated calf stretch c strap  Incline board calf stretch lvl 2- toes on board     X   Seated HS stretch   X   HL adductor stretch   X   Rectus stretch EOB 30\"x3 Could not tolerate 10/5    Bridge with increasing knee flex  Bridge on  deg    SLR-  Indep today   \" ER- indep today 10x R,L 1.5#   X   SAQ 2#  c ball squeeze    SL hip abd R, L  Clamshell R,L  Reverse clamshell R,L 1.5#  1# on knee  1# ankle    Seated LAQ 90-40 with ball sq   X   Seated heel slide with glider 5\"x10     Seated hip IR iso  OVl at ankles    Standing HR, TR  At [facility]    Mini squat  At bar    Side step 1/2 wall (long) 4 laps UE support Pt felt too sore today   Step up 3\"  x10 Heavy UE support,   [facility] platform    Standing glider: abd, ext diag  UE supp on bar Cue for posture   Sit to stand - hi-low table 3x5No UE    HSC  2x1235#    LAQ 90-40 2x1015#    Soleus press 2x1535#    LATOYA TKE  LATOYA hip ABD 10x R,L60#  15#    Bike (low)  Seat 17 Pt too sore today   Manual Intervention (49618) 10' total     Patellar mobs sup, inf      Re-apply bandaids,      Edema massage, STM to pes anserine, distal IT band, adductor muscle belly 10'  Showed pt how to use stick from home   HS s  Knee slightly bent                NMR re-education (74025)   CUES NEEDED   Gait training one crutch 40' x 1 standing Breaks needed    Weight shifting A/P   intermittent fingertip support today    Weight shifting A/P with one crutch   Intermittent CL UE support when L foot back- not ready to wean to one crutch   Split stance balance + airex at 1/2 wall     Intermittent UE support                           Therapeutic Activity (15404)                                                Therapeutic Exercise and NMR EXR  [x] (67906) Provided verbal/tactile cueing for activities related to strengthening, flexibility, endurance, ROM for improvements in LE, proximal hip, and core control with self care, mobility, lifting, ambulation.  [] (03723) Provided verbal/tactile cueing for activities related to improving balance, coordination, kinesthetic sense, posture, motor skill, proprioception  to assist with LE, proximal hip, and core control in self care, mobility, lifting, ambulation and eccentric single leg control.      NMR and Therapeutic Activities:    [x] (03175 or 44806) Provided verbal/tactile cueing for activities related to improving balance, coordination, kinesthetic sense, posture, motor skill, proprioception and motor activation to allow for proper function of core, proximal hip and LE with self care and ADLs  [x] (20077) Gait Re-education- Provided training and instruction to the patient for proper LE, core and proximal hip recruitment and positioning and eccentric body weight control with ambulation re-education including up and down stairs     Home Exercise Program:    [x] (27612) Reviewed/Progressed HEP activities related to strengthening, flexibility, endurance, ROM of core, proximal hip and LE for functional self-care, mobility, lifting and ambulation/stair navigation   [] (79577)Reviewed/Progressed HEP activities related to improving balance, coordination, kinesthetic sense, posture, motor skill, proprioception of core, proximal hip and LE for self care, mobility, lifting, and ambulation/stair navigation      Manual Treatments:  PROM / STM / Oscillations-Mobs:  G-I, II, III, IV (PA's, Inf., Post.)  [x] (18041) Provided manual therapy to mobilize LE, proximal hip and/or LS spine soft tissue/joints for the purpose of modulating pain, promoting relaxation,  increasing ROM, reducing/eliminating soft tissue swelling/inflammation/restriction, improving soft tissue extensibility and allowing for proper ROM for normal function with self care, mobility, lifting and ambulation. Modalities:     [] GAME READY (VASO)- for significant edema, swelling, pain control. (low) in long-sitting   CP x 5' R,L knee long sit with pillow under knees  Charges:  Timed Code Treatment Minutes: 38   Total Treatment Minutes: 60  (CP, filling out LEFS)     [facility] time in/time out:   (and requires time in and out for each CPT code)    [] EVAL (LOW) 02480 (typically 20 minutes face-to-face)  [] EVAL (MOD) 92066 (typically 30 minutes face-to-face)  [] EVAL (HIGH) 47134 (typically 45 minutes face-to-face)  [] RE-EVAL     [x] LJ(34304) x 2   [] IONTO  [] NMR (86286) x     [] VASO  [x] Manual (88136) x 1     [] Other: gait x  [] TA x      [] Mech Traction (40681)  [] ES(attended) (86242)      [] ES (un) (03168):       GOALS:   Patient stated goal: 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 Progressing: []? Met: []? Not Met: []? Adjusted     Therapist goals for Patient:   Short Term Goals: To be achieved in: 2 weeks  1. Independent in HEP and progression per patient tolerance, in order to prevent re-injury. [x]? Progressing: []? Met: []? Not Met: []? Adjusted  2. Patient will have a decrease in pain to facilitate improvement in movement, function, and ADLs as indicated by Functional Deficits. [x]? Progressing: []? Met: []? Not Met: []?  Adjusted     Long Term Goals: To be achieved in: 6 weeks  1. Disability index score of 40% or less for the LEFS to assist with reaching prior level of function. []? Progressing: []? Met: [x]? Not Met: []? Adjusted Lower score on LEFS despite pt reporting improvements. 2. Patient will demonstrate increased left knee AROM to 0-110  to allow for proper joint functioning for car/truck mobility, stair amb.   []? Progressing: [x]? Met: []? Not Met: []? Adjusted  3. Patient will demonstrate an increase in Strength to at least 4+/5 for the left knee and hip stabilizers in order to ambulate s AD for community distances. [x]? Progressing: []? Met: []? Not Met: []? Adjusted  4. Patient will return to walking community distances s AD with mild bilateral knee pain (<3/10). [x]? Progressing: []? Met: []? Not Met: []? Adjusted  5. Pt will be able to drive x 2 hours without increased knee pain; car mobility without knee pain. pt able to drive 1.5 hours, but more uncomfortable as a passenger  [x]? Progressing: []? Met: []? Not Met: []? Adjusted          Progression Towards Functional goals:  [] Patient is progressing as expected towards functional goals listed. [x] Progression is slowed due to complexities listed. [] Progression has been slowed due to co-morbidities. [] Plan just implemented, too soon to assess goals progression  [] Other:         Overall Progression Towards Functional goals/ Treatment Progress Update:  [] Patient is progressing as expected towards functional goals listed. [x] Progression is slowed due to complexities/Impairments listed. [] Progression has been slowed due to co-morbidities.   [] Plan just implemented, too soon to assess goals progression <30days   [] Goals require adjustment due to lack of progress  [] Patient is not progressing as expected and requires additional follow up with physician  [] Other    Prognosis for POC: [x] Good [] Fair  [] Poor      Patient requires continued skilled intervention: [x] Yes  [] No    Treatment/Activity Tolerance:  [x] Patient able to complete treatment  [] Patient limited by fatigue  [x] Patient limited by pain    [] Patient limited by other medical complications  [] Other:     ASSESSMENT: John Goins continues to progress with tolerance to there-ex and is able to complete more standing TE. He is able to complete bed mobility and transition sit to stand with UE use with more ease. He reports improved car mobility and no issue with driving once he is in the car (patient specific goal). Pt is now able to walk with one crutch safely, but for short distances only. He does still fatigue easily and needs frequent rest breaks. Pt states today that he would prefer not to use the aquatic environment as a means to improve walking and standing tolerance d/t the possibility that his wounds would worsen. He reports that this happened when he tried aquatic therapy earlier this year. Although his progress is slow, he is making strength gains and modest improvements in mobility with PT and should continue with PT on a 2x/week basis. Return to Play: (if applicable)   []  Stage 1: Intro to Strength   []  Stage 2: Return to Run and Strength   []  Stage 3: Return to Jump and Strength   []  Stage 4: Dynamic Strength and Agility   []  Stage 5: Sport Specific Training     []  Ready to Return to Play, Meets All Above Stages   []  Not Ready for Return to Sports   Comments:                                PLAN: Continue PT 2x/week, extend POC another 6 weeks  [x] Continue per plan of care [] Alter current plan (see comments above)  [] Plan of care initiated [] Hold pending MD visit [] Discharge      Electronically signed by:  Vance Flores, PT, DPT    Note: If patient does not return for scheduled/ recommended follow up visits, this note will serve as a discharge from care along with most recent update on progress. Access Code: [email or web address]   URL: [email or web address]   Date: 10/08/2020   Prepared by: Lydia Peralta     Exercises   Supine Hip Adductor Stretch - 3 sets - 15s hold - 1x daily - 7x weekly   Bridge - 10 reps - 1x daily - 7x weekly   Small Range Straight Leg Raise - 5 reps - 3 sets - 1x daily - 7x weekly   Sidelying Hip Abduction - 10 reps - 2 sets - 1x daily - 4x weekly   Clamshell - 10 reps - 2 sets - 1x daily - 4x weekly   Seated Gastroc Stretch with Strap - 3 sets - 30s hold - 1x daily - 7x weekly   Seated Hamstring Stretch - 3 sets - 20s hold - 1x daily - 7x weekly   Heel rises with counter support - 10 reps - 3 sets - 1x daily - 7x weekly   Patient Education   Pain Management

## 2020-11-03 ENCOUNTER — TELEPHONE (OUTPATIENT)
Dept: BARIATRICS/WEIGHT MGMT | Age: 60
End: 2020-11-03

## 2020-11-03 ENCOUNTER — OFFICE VISIT (OUTPATIENT)
Dept: ORTHOPEDIC SURGERY | Age: 60
End: 2020-11-03
Payer: COMMERCIAL

## 2020-11-03 VITALS — WEIGHT: 315 LBS | BODY MASS INDEX: 47.74 KG/M2 | HEIGHT: 68 IN

## 2020-11-03 PROCEDURE — 99213 OFFICE O/P EST LOW 20 MIN: CPT | Performed by: FAMILY MEDICINE

## 2020-11-03 PROCEDURE — 20610 DRAIN/INJ JOINT/BURSA W/O US: CPT | Performed by: FAMILY MEDICINE

## 2020-11-03 RX ORDER — LIDOCAINE HYDROCHLORIDE 10 MG/ML
1 INJECTION, SOLUTION INFILTRATION; PERINEURAL ONCE
Status: COMPLETED | OUTPATIENT
Start: 2020-11-03 | End: 2020-11-03

## 2020-11-03 RX ORDER — BUPIVACAINE HYDROCHLORIDE 2.5 MG/ML
2 INJECTION, SOLUTION INFILTRATION; PERINEURAL ONCE
Status: COMPLETED | OUTPATIENT
Start: 2020-11-03 | End: 2020-11-03

## 2020-11-03 RX ORDER — TRAMADOL HYDROCHLORIDE 50 MG/1
50 TABLET ORAL EVERY 6 HOURS PRN
Qty: 28 TABLET | Refills: 0 | Status: SHIPPED | OUTPATIENT
Start: 2020-11-03 | End: 2020-11-10

## 2020-11-03 RX ORDER — BETAMETHASONE SODIUM PHOSPHATE AND BETAMETHASONE ACETATE 3; 3 MG/ML; MG/ML
12 INJECTION, SUSPENSION INTRA-ARTICULAR; INTRALESIONAL; INTRAMUSCULAR; SOFT TISSUE ONCE
Status: COMPLETED | OUTPATIENT
Start: 2020-11-03 | End: 2020-11-03

## 2020-11-03 RX ADMIN — BUPIVACAINE HYDROCHLORIDE 5 MG: 2.5 INJECTION, SOLUTION INFILTRATION; PERINEURAL at 15:01

## 2020-11-03 RX ADMIN — BETAMETHASONE SODIUM PHOSPHATE AND BETAMETHASONE ACETATE 12 MG: 3; 3 INJECTION, SUSPENSION INTRA-ARTICULAR; INTRALESIONAL; INTRAMUSCULAR; SOFT TISSUE at 15:00

## 2020-11-03 RX ADMIN — LIDOCAINE HYDROCHLORIDE 1 ML: 10 INJECTION, SOLUTION INFILTRATION; PERINEURAL at 15:00

## 2020-11-03 NOTE — TELEPHONE ENCOUNTER
Pt returning RD call. ..RD not available at the time the pt called back.  Pt phone number is [phone]

## 2020-11-03 NOTE — PROGRESS NOTES
Chief Complaint  Knee Pain (CK RIGHT Kamryn CADENA)      Elmer Hartman is a 64 y.o. 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 Follow-up right knee compartment osteoarthritis with patellofemoral arthropathy and recurrent right knee pain    History of Present Illness for Follow Up Patient:      Patient is being seen today for acute left knee pain. The patient reports that since the first week in March 2020 he began noticed the insidious onset of pain to the anterior medial portion of his left knee. Lety Ross He describes the symptoms as aching, sharp and stabbing. The pain is located medial, lateral and the patient rates their current pain as a 1-7 out of 10 on the pain scale. This problem has been an issue for 2 weeks. 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 Patient has other associated symptoms: Mild swelling instability. Patient has attempted rest, ice, heat, NSAID-6-12 over-the-counter ibuprofen daily to treat this problem and symptoms are are worsening. Patient does not have a previous history previous knee injury. He does recall an episode a couple months ago when he believes he pulled his right hip adductor which may have led to a gait change causing his left knee to bother him. Only mild night discomfort. Patient is being seen today for orthopedic and sports consultation and review of imaging.     He was also seen in the office on 3/23/2020 and given his job as an over 3 times daily as well as his periodic tramadol but still is having fairly consistent pain up to an 8 out of 10. We last saw Deangelo Cadet in the office on 7/6/2020 and ultimately underwent arthroscopic meniscectomy and chondroplasty to his left knee with some improvement of his symptoms. For the most part it is only a 2 to 3-10 but he has not been overly active and still requiring crutches for ambulation. He did have some venous stasis ulcerations and was seeing in wound clinic and these do seem to be improving. He also had no recent increase in his weight which was likely fluid accumulation as he was developing some pitting edema. He is trying to perform his exercise program as best he can. We did complete Visco supplementation in his knees on 6/23/2020 but this was fairly ineffective and he is going to be seeing weight loss management. Progressive standing and walking is still problematic for him and he is trying to lose weight. He unfortunately did not temporarily lose his job because of his worsening knee pain which she has been undergoing treatment for for the past 6 months. Most of his pain is anterior medial in nature. He has been continuing with his diclofenac and acetaminophen. Deangelo Cadet was last seen in the office on 9/14/2020 for his underlying significant left knee osteoarthritis. He is now 2 months out from his left knee arthroscopy and was seemingly making progress up until a few days ago when he began his worsening pain once again with regard to his knee anteriorly and medially. There is no history of injury or fall. He has continued trying to lose weight and does have an upcoming appointment with his bariatric surgeon on 10/28/2020. We have been trying to get approval for the geniculate block in preparation for possible coolief treatment with Dr. Han Ventura. We are awaiting for medical [place] for authorization as we need to get the proper codes for approval of this.   We once again did complete hip testing is benign.     Skin: There are no rashes, ulcerations or lesions. Distal motor sensory and vascular exam is intact.     Gait:  He is still having difficulty with bearing weight. He is currently using 1 crutch on the right although he will occasionally use 2 crutches as well. .     Reflex symmetrically preserved       Additional Comments:     Evaluation of his right knee does reveal trace swelling. He does have recurrent persistent tenderness over the medial and lateral patellofemoral facet and most of his pain is reproduced with palpation of the anterior third medial joint line with patellar grind testing. He is stiff in the terminal 10 degrees of extension with flexion on the right to about 120-125.  4-5 strength with flexion extension. Pain with medial Monik's but no high-grade click. Negative lateral Monik's. No obvious instability. Screening right hip testing appears benign.         Additional Examinations:          Right Lower Extremity: Examination of the right lower extremity does not show any tenderness, deformity or injury. Range of motion is unremarkable. There is no gross instability. There are no rashes, ulcerations or lesions. Strength and tone are normal.  Left Lower Extremity: Examination of the left lower extremity does not show any tenderness, deformity or injury. Range of motion is unremarkable. There is no gross instability. There are no rashes, ulcerations or lesions. Strength and tone are normal.          Diagnostic Test Findings: No results found.    Right knee AP and PA weightbearing sunrise and lateral films obtained 6/2/2020 does show more mild medial compartment narrowing with patellofemoral tilt and mild apparent patellofemoral arthropathy.     Left knee MRI obtained at [address] [place] [address] on 5/29/2020 as listed above  Narrative   Site: [facility] #: [id number] #: [id number] Procedure: MR Left Knee w/o Contrast ; Reason for Exam: primary osteoarthritis of left knee; chondromalacia of left knee; left knee pain; eval OA/CMP; r/o medial meniscus tear   This document is confidential medical information.  Unauthorized disclosure or use of this information is prohibited by law. If you are not the intended recipient of this document, please advise us by calling immediately [phone].       [facility] [address]           Patient Name: Harvey Grady   Case ID: [id number]   Patient : 1960   Referring Physician: Willian Goldstein MD   Exam Date: 2020   Exam Description: MR Left Knee w/o Contrast            HISTORY:  Primary osteoarthritis of left knee.  Chondromalacia of left knee.  Left knee pain.     Evaluate OA/CMP.  Evaluate for medial meniscus tear.       TECHNICAL FACTORS:  Long- and short-axis fat- and water-weighted images were performed.       COMPARISON:  None.       FINDINGS: [place] is minimally tilted.  High-grade patellar and trochlear groove    chondromalacia.       ACL, PCL, LCL, MCL, flexor mechanism, and extensor mechanism are intact.       Thickened MCL.  Chronic sprain, scarring, and capsulitis is present.       High-grade chondromalacia involves the weightbearing medial femur and tibia.  Eccentric    spurring is present.       Lateral meniscus is intact.       Medial meniscus free edge tearing involves the body.       Nodular region of synovial thickening or body is noted within the suprapatellar recess    medially.  This measures at least 1.75 x 1 x 2.3 cm.       CONCLUSION:   1. High-grade medial and patellofemoral compartment chondromalacia.  Degenerative tearing    involves the free edge of the posterior horn and body.  Subchondral marrow changes, spurring    and remodeling of the medial and less so patellofemoral joint spaces.    2. Loose osteochondral body suprapatellar recess medially measures 1.75 x 1 x 2.3 cm.   3. Anteromedial and anterolateral subcutis swelling.  Contusion and sprain present. 4. Please see above.       Thank you for the opportunity to provide your interpretation.               Rachel Atwood MD       A: See 2020 7:29 PM   T: AVA 2020 2:35 PM         Follow-up left knee MRI obtained at [address] [place] [address] on 2020 as listed above  Narrative    Site: [facility] #: [id number] #: [id number] Procedure: MR Left Knee w/o Contrast ; Reason for Exam: primary osteoarthritis of left knee; chondromalacia of left knee; left knee pain; r/o insufficiency fracture    This document is confidential medical information.  Unauthorized disclosure or use of this information is prohibited by law. If you are not the intended recipient of this document, please advise us by calling immediately [phone].         [facility]    [address]              Patient Name: Justice Garcia    Case ID: [id number]    Patient : 1960    Referring Physician: Skip Sandoval MD    Exam Date: 2020    Exam Description: MR Left Knee w/o Contrast              HISTORY:  Primary osteoarthritis of left knee, chondromalacia of left knee, left knee pain,    evaluate for insufficiency fracture.         TECHNICAL FACTORS:  Long- and short-axis fat- and water-weighted images were performed.         COMPARISON:  None.         FINDINGS:  ACL, PCL, LCL and MCL are intact.         3-4 cm trizonal pattern of tearing involves the posterior horn and body of the medial meniscus.         Lateral meniscus is intact.         High-grade class 4 medial femoral and tibial chondromalacia.         MCL is thickened.  Sprain, scarring and capsulitis are present.         CONCLUSION:    1. 3-4 cm trizonal pattern of tearing involves the posterior horn body of the medial meniscus. 2. No lateral meniscal or cruciate tear. 3. Chronic MCL and LCL sprain and capsulitis.     4. High-grade medial and patellofemoral compartment chondromalacia.         Thank you for the opportunity to provide your interpretation.                   Kumar Arteaga MD         A: YOANNA/jules 07/13/2020 2:27 PM    T: JULES 07/13/2020 12:41 PM             Assessment & Plan:    Encounter Diagnoses   Name Primary?  Primary osteoarthritis of right knee Yes    Chondromalacia patellae of right knee     Acute pain of right knee     Primary osteoarthritis of left knee     Chondromalacia of left knee     Chronic pain of left knee     Pes planus of both feet        Orders Placed This Encounter   Procedures    KS ARTHROCENTESIS ASPIR&/INJ MAJOR JT/BURSA W/O US         Treatment Plan:  Treatment options were discussed with Anoop Rogers. We did once again review his plain films, his recent left knee MRI and exam findings. He has been having ongoing pain symptoms for about 8 months now and with initial treatment plan he did undergo left knee chondroplasty meniscectomy on 8/3/2020 with some improvement of his overall knee pain symptoms. More recently he has noticed increase pain in his still left greater than right knee requiring use of crutches. After discussing options, we did perform an intra-articular steroid injection to his right knee using 2 cc of Celestone, 2 cc of Marcaine, 1 cc Xylocaine. He did previously have his left knee injected on 10/5/2020 and we did finish up viscosupplementation to both of his knees on 6/23/2020. Has been fitted with his medial compartment  and will continue use of crutches in his physical therapy. He will continue with his Lodine 400 mg 1 pill twice daily we will continue with his tramadol. He has had his bariatric surgery consultation and has had his psychological evaluation with regard to his bariatric surgery just yesterday on 11/2/2020. Once again I do think ultimately that Theodore Blankenship will need total knee arthroplasty however with his BMI of 63.71 currently, he is not a surgical candidate.   I do believe sincerely that bariatric surgery is of medical necessity for him as he is not a safe surgical candidate until we get his BMI closer to 40. He has been diagnosed with sleep apnea and has completed treatment of his wound clinic. He will continue use of the stockings as well as his Lodine 400 mg 1 pill twice daily. He did have a successful initial geniculate block on his left knee from Dr. Mayito Sanches and is apparently scheduled to undergo cool leaf treatment with Dr. Mayito Sanches in December 2020. We will see him back at the end of December 2020 to consider repeat viscosupplementation. Icing and activity modification was discussed. He will continue his medial compartment  on the left. He will contact us in the interim with questions or concerns. This dictation was performed with a verbal recognition program (DRAGON) and it was checked for errors. It is possible that there are still dictated errors within this office note. If so, please bring any errors to my attention for an addendum. All efforts were made to ensure that this office note is accurate.

## 2020-11-03 NOTE — TELEPHONE ENCOUNTER
RD returned pt's call. Pt asking about what he should do regarding diabetic medication (did not say which medication) and food choices. Encouraged pt to call prescribing PCP/Endocrinologist regarding medication questions. Wanting clarification on some foods that effect blood sugars. Pt also reports he plans to track his intakes. Discussed using MFP/[employer]it[employer] natty's. Pt wanting more specifics on numbers to follow throughout the day to track. Reviewed parameters of 1800 kcal LC meal plan - 104 g/CHO, 116 g/pro, and 102 g/fat. Discussed may need additional snack based on calorie requirements, will monitor need to adjust. Pt verbalized understanding.

## 2020-11-05 ENCOUNTER — HOSPITAL ENCOUNTER (OUTPATIENT)
Dept: WOUND CARE | Age: 60
Discharge: HOME | End: 2020-11-05
Payer: COMMERCIAL

## 2020-11-05 ENCOUNTER — OFFICE VISIT (OUTPATIENT)
Dept: INTERNAL MEDICINE CLINIC | Age: 60
End: 2020-11-05
Payer: COMMERCIAL

## 2020-11-05 ENCOUNTER — HOSPITAL ENCOUNTER (OUTPATIENT)
Dept: PHYSICAL THERAPY | Age: 60
Setting detail: THERAPIES SERIES
Discharge: HOME | End: 2020-11-05
Payer: COMMERCIAL

## 2020-11-05 VITALS
RESPIRATION RATE: 24 BRPM | WEIGHT: 315 LBS | TEMPERATURE: 98.1 F | DIASTOLIC BLOOD PRESSURE: 62 MMHG | BODY MASS INDEX: 47.74 KG/M2 | HEIGHT: 68 IN | HEART RATE: 104 BPM | SYSTOLIC BLOOD PRESSURE: 111 MMHG

## 2020-11-05 VITALS
DIASTOLIC BLOOD PRESSURE: 74 MMHG | WEIGHT: 315 LBS | OXYGEN SATURATION: 96 % | HEIGHT: 68 IN | BODY MASS INDEX: 47.74 KG/M2 | SYSTOLIC BLOOD PRESSURE: 130 MMHG | TEMPERATURE: 98.6 F | HEART RATE: 100 BPM

## 2020-11-05 DIAGNOSIS — E11.9 TYPE 2 DIABETES MELLITUS WITHOUT COMPLICATION, WITHOUT LONG-TERM CURRENT USE OF INSULIN (HCC): ICD-10-CM

## 2020-11-05 PROBLEM — Z01.818 PREOPERATIVE CLEARANCE: Status: RESOLVED | Noted: 2020-10-26 | Resolved: 2020-11-05

## 2020-11-05 LAB
CREATININE URINE: 95.4 MG/DL (ref 39–259)
HEPATITIS C ANTIBODY INTERPRETATION: NORMAL
MICROALBUMIN UR-MCNC: <1.2 MG/DL
MICROALBUMIN/CREAT UR-RTO: NORMAL MG/G (ref 0–30)

## 2020-11-05 PROCEDURE — 99213 OFFICE O/P EST LOW 20 MIN: CPT

## 2020-11-05 PROCEDURE — 97140 MANUAL THERAPY 1/> REGIONS: CPT

## 2020-11-05 PROCEDURE — 99214 OFFICE O/P EST MOD 30 MIN: CPT | Performed by: NURSE PRACTITIONER

## 2020-11-05 PROCEDURE — 3052F HG A1C>EQUAL 8.0%<EQUAL 9.0%: CPT | Performed by: NURSE PRACTITIONER

## 2020-11-05 PROCEDURE — 99212 OFFICE O/P EST SF 10 MIN: CPT | Performed by: INTERNAL MEDICINE

## 2020-11-05 PROCEDURE — 97110 THERAPEUTIC EXERCISES: CPT

## 2020-11-05 ASSESSMENT — ENCOUNTER SYMPTOMS
NAUSEA: 0
BACK PAIN: 0
ABDOMINAL PAIN: 0
EYE REDNESS: 0
BLOOD IN STOOL: 0
VOMITING: 0
SHORTNESS OF BREATH: 0
SORE THROAT: 0
COUGH: 0
RHINORRHEA: 0
EYE ITCHING: 0
DIARRHEA: 0
COLOR CHANGE: 0
WHEEZING: 0
SINUS PRESSURE: 0
CHEST TIGHTNESS: 0
CONSTIPATION: 0

## 2020-11-05 ASSESSMENT — PATIENT HEALTH QUESTIONNAIRE - PHQ9
SUM OF ALL RESPONSES TO PHQ9 QUESTIONS 1 & 2: 0
SUM OF ALL RESPONSES TO PHQ QUESTIONS 1-9: 0
2. FEELING DOWN, DEPRESSED OR HOPELESS: 0
SUM OF ALL RESPONSES TO PHQ QUESTIONS 1-9: 0
DEPRESSION UNABLE TO ASSESS: FUNCTIONAL CAPACITY MOTIVATION LIMITS ACCURACY
SUM OF ALL RESPONSES TO PHQ QUESTIONS 1-9: 0
1. LITTLE INTEREST OR PLEASURE IN DOING THINGS: 0

## 2020-11-05 ASSESSMENT — PAIN SCALES - GENERAL: PAINLEVEL_OUTOF10: 0

## 2020-11-05 NOTE — ASSESSMENT & PLAN NOTE
Start metformin 500 mg bid  Follow up in 1 month  If not improved, introduce farxiga  Educated on dm diagnosis complications and treatment options  Provided with order for glucometer

## 2020-11-05 NOTE — FLOWSHEET NOTE
[facility],  [address] [place]  Phone: [phone]  Fax [phone]    Physical Therapy Treatment Note/ Progress Report:           Date:  2020    Patient Name:  Declan Arnold    :  1960  MRN: [id number]  Restrictions/Precautions:    Medical/Treatment Diagnosis Information:  · Diagnosis: M65.732B (ICD-10-CM) - Complex tear of medial meniscus of left knee as current injury, initial encounter  · Treatment Diagnosis: M25.562 Left knee pain; M25.662 Left knee stiffness; R26.2 Difficulty in walking  Insurance/Certification information:  PT Insurance Information: [employer] [place]  Physician Information:  Referring Practitioner: Dr. Kimberlee Pepe  Has the plan of care been signed (Y/N):        [x]  Yes  []  No     Date of Patient follow up with Physician:       Is this a Progress Report:     []  Yes  [x]  No        If Yes:  Date Range for reporting period:  Beginning   Ending 20    Progress report will be due (10 Rx or 30 days whichever is less): /  Recertification will be due (POC Duration  / 90 days whichever is less):    Visit # Insurance Allowable Requires auth    (new insu , 6 visits on previous insurance)    [x]no        []yes:     Functional Scale:LEFS 75% disability   Date assessed:  20  LEFS 78% disability      20  LEFS 81% disability      10/15/20  LEFS 76% disability      20  Therapy Diagnosis/Practice Pattern:E    Number of Comorbidities:  []0     []1-2    [x]3+    Latex Allergy:  [x]NO      []YES  Preferred Language for Healthcare:   [x]English       []other:       Pain level: 2/10 current     SUBJECTIVE:  Pt states that he always has pain in both legs when he sleeps at night and then when he wakes up because it hurts to lie on either side. He has his left knee nerve ablation the first week of December. He has only worn his knee brace once since he got it.  Despite still pt how to use stick from home   HS s  Knee slightly bent                NMR re-education (71822)   CUES NEEDED   Gait training one crutch 50' x 3     Weight shifting A/P   intermittent fingertip support today    Weight shifting A/P with one crutch   Intermittent CL UE support when L foot back- not ready to wean to one crutch   Split stance balance + airex at 1/2 wall     Intermittent UE support                           Therapeutic Activity (59865)                                                Therapeutic Exercise and NMR EXR  [x] (95934) Provided verbal/tactile cueing for activities related to strengthening, flexibility, endurance, ROM for improvements in LE, proximal hip, and core control with self care, mobility, lifting, ambulation.  [] (98675) Provided verbal/tactile cueing for activities related to improving balance, coordination, kinesthetic sense, posture, motor skill, proprioception  to assist with LE, proximal hip, and core control in self care, mobility, lifting, ambulation and eccentric single leg control.      NMR and Therapeutic Activities:    [x] (16450 or 46900) Provided verbal/tactile cueing for activities related to improving balance, coordination, kinesthetic sense, posture, motor skill, proprioception and motor activation to allow for proper function of core, proximal hip and LE with self care and ADLs  [x] (13791) Gait Re-education- Provided training and instruction to the patient for proper LE, core and proximal hip recruitment and positioning and eccentric body weight control with ambulation re-education including up and down stairs     Home Exercise Program:    [x] (47972) Reviewed/Progressed HEP activities related to strengthening, flexibility, endurance, ROM of core, proximal hip and LE for functional self-care, mobility, lifting and ambulation/stair navigation   [] (22248)Reviewed/Progressed HEP activities related to improving balance, coordination, kinesthetic sense, posture, motor skill, proprioception of core, proximal hip and LE for self care, mobility, lifting, and ambulation/stair navigation      Manual Treatments:  PROM / STM / Oscillations-Mobs:  G-I, II, III, IV (PA's, Inf., Post.)  [x] (88956) Provided manual therapy to mobilize LE, proximal hip and/or LS spine soft tissue/joints for the purpose of modulating pain, promoting relaxation,  increasing ROM, reducing/eliminating soft tissue swelling/inflammation/restriction, improving soft tissue extensibility and allowing for proper ROM for normal function with self care, mobility, lifting and ambulation. Modalities:     [] GAME READY (VASO)- for significant edema, swelling, pain control. (low) in long-sitting   CP x 10' R,L knee long sit with pillow under knees  Charges:  Timed Code Treatment Minutes: 44   Total Treatment Minutes: 54  (CP, )     BWC time in/time out:   (and requires time in and out for each CPT code)    [] EVAL (LOW) 03381 (typically 20 minutes face-to-face)  [] EVAL (MOD) 33368 (typically 30 minutes face-to-face)  [] EVAL (HIGH) 13737 (typically 45 minutes face-to-face)  [] RE-EVAL     [x] ZH(11449) x 2   [] IONTO  [] NMR (83968) x     [] VASO  [x] Manual (21542) x 1     [] Other: gait x  [] TA x      [] Mech Traction (94155)  [] ES(attended) (48951)      [] ES (un) (53935):       GOALS:   Patient stated goal: 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 Progressing: []? Met: []? Not Met: []? Adjusted     Therapist goals for Patient:   Short Term Goals: To be achieved in: 2 weeks  1. Independent in HEP and progression per patient tolerance, in order to prevent re-injury. [x]? Progressing: []? Met: []? Not Met: []? Adjusted  2. Patient will have a decrease in pain to facilitate improvement in movement, function, and ADLs as indicated by Functional Deficits. [x]? Progressing: []? Met: []? Not Met: []? Adjusted     Long Term Goals: To be achieved in: 6 weeks  1.  Disability index score of 40% or less for the LEFS to assist with reaching prior level of function. []? Progressing: []? Met: [x]? Not Met: []? Adjusted Lower score on LEFS despite pt reporting improvements. 2. Patient will demonstrate increased left knee AROM to 0-110  to allow for proper joint functioning for car/truck mobility, stair amb.   []? Progressing: [x]? Met: []? Not Met: []? Adjusted  3. Patient will demonstrate an increase in Strength to at least 4+/5 for the left knee and hip stabilizers in order to ambulate s AD for community distances. [x]? Progressing: []? Met: []? Not Met: []? Adjusted  4. Patient will return to walking community distances s AD with mild bilateral knee pain (<3/10). [x]? Progressing: []? Met: []? Not Met: []? Adjusted  5. Pt will be able to drive x 2 hours without increased knee pain; car mobility without knee pain. pt able to drive 1.5 hours, but more uncomfortable as a passenger  [x]? Progressing: []? Met: []? Not Met: []? Adjusted          Progression Towards Functional goals:  [] Patient is progressing as expected towards functional goals listed. [x] Progression is slowed due to complexities listed. [] Progression has been slowed due to co-morbidities. [] Plan just implemented, too soon to assess goals progression  [] Other:         Overall Progression Towards Functional goals/ Treatment Progress Update:  [] Patient is progressing as expected towards functional goals listed. [x] Progression is slowed due to complexities/Impairments listed. [] Progression has been slowed due to co-morbidities.   [] Plan just implemented, too soon to assess goals progression <30days   [] Goals require adjustment due to lack of progress  [] Patient is not progressing as expected and requires additional follow up with physician  [] Other    Prognosis for POC: [x] Good [] Fair  [] Poor      Patient requires continued skilled intervention: [x] Yes  [] No    Treatment/Activity Tolerance:  [x] Patient able to complete treatment  [] Patient limited by fatigue  [x] Patient limited by pain    [] Patient limited by other medical complications  [] Other:     ASSESSMENT: Roxanne Llanos was able to walk  more with one crutch and his brace today. He has improving walking speed as well as balance while using one crutch. He still has pain with transitions and with all standing exercise, but he is motivated to get stronger and get off of his crutches. Return to Play: (if applicable)   []  Stage 1: Intro to Strength   []  Stage 2: Return to Run and Strength   []  Stage 3: Return to Jump and Strength   []  Stage 4: Dynamic Strength and Agility   []  Stage 5: Sport Specific Training     []  Ready to Return to Play, Meets All Above Stages   []  Not Ready for Return to Sports   Comments:                                PLAN: Continue PT 2x/week, extend POC another 6 weeks  [x] Continue per plan of care [] Alter current plan (see comments above)  [] Plan of care initiated [] Hold pending MD visit [] Discharge      Electronically signed by:  Nury Rodriguez, PT, DPT    Note: If patient does not return for scheduled/ recommended follow up visits, this note will serve as a discharge from care along with most recent update on progress. Access Code: [email or web address]   URL: [email or web address]   Date: 10/08/2020   Prepared by: Nury Rodriguez     Exercises   Supine Hip Adductor Stretch - 3 sets - 15s hold - 1x daily - 7x weekly   Bridge - 10 reps - 1x daily - 7x weekly   Small Range Straight Leg Raise - 5 reps - 3 sets - 1x daily - 7x weekly   Sidelying Hip Abduction - 10 reps - 2 sets - 1x daily - 4x weekly   Clamshell - 10 reps - 2 sets - 1x daily - 4x weekly   Seated Gastroc Stretch with Strap - 3 sets - 30s hold - 1x daily - 7x weekly   Seated Hamstring Stretch - 3 sets - 20s hold - 1x daily - 7x weekly   Heel rises with counter support - 10 reps - 3 sets - 1x daily - 7x weekly   Patient Education   Pain Management

## 2020-11-05 NOTE — PROGRESS NOTES
Subjective:      Patient ID: Elvin Miles is a 64 y.o. male. Chief Complaint   Patient presents with    Hyperlipidemia    Hypertension    Diabetes        HPI  Matthew Dash is int he office to follow up on chronic medical conditions and start treatment for DM  His a1c is 8.1   He is not on medications at this time  He is ready to start treatment  He has been taking all other medications  He is following with ortho from his knee surgery  His recovery has been difficult. Past Medical History:   Diagnosis Date    Cellulitis of lower extremity 2019    Hypertension     Impaired fasting glucose 2013    Obesity     Preoperative clearance 10/26/2020    Screening PSA (prostate specific antigen) 2015;17    Nml:0.53(2015);17=nml.     Sleep apnea     Stasis dermatitis of both legs 2020    Tobacco use     Tubular adenoma of colon 2017     Past Surgical History:   Procedure Laterality Date    CIRCUMCISION      COLONOSCOPY  2017    Colonoscopy with polypectomy (cold biopsy):Dr. Walker:next in 3yrs=2020    KNEE ARTHROSCOPY Left 8/3/2020    VIDEO ARTHROSCOPY LEFT KNEE, PARTIAL MEDIAL MENISCECTOMY, CHONDROPLASTY, SYNOVIAL BIOPSY -TOM=4- performed by Mook Rodriguez MD at [address] [facility]  2011    VASECTOMY      WISDOM TOOTH EXTRACTION       Family History   Problem Relation Age of Onset    High Blood Pressure Mother     Heart Disease Mother         MI    AT 66    Diabetes Brother      Social History     Socioeconomic History    Marital status:      Spouse name: Not on file    Number of children: 2    Years of education: Not on file    Highest education level: Not on file   Occupational History    Occupation: [employer]   Social Needs    Financial resource strain: Not on file    Food insecurity     Worry: Not on file     Inability: Not on file    Transportation needs     Medical: Not on file     Non-medical: Not on file   Tobacco Use    Smoking status: Former Smoker     Packs/day: 2.00     Years: 25.00     Pack years: 50.00     Types: Cigarettes     Last attempt to quit: 2006     Years since quittin.5    Smokeless tobacco: Never Used   Substance and Sexual Activity    Alcohol use: Yes     Comment: ocas    Drug use: No    Sexual activity: Yes     Partners: Female   Lifestyle    Physical activity     Days per week: Not on file     Minutes per session: Not on file    Stress: Not on file   Relationships    Social connections     Talks on phone: Not on file     Gets together: Not on file     Attends Restorationist service: Not on file     Active member of club or organization: Not on file     Attends meetings of clubs or organizations: Not on file     Relationship status: Not on file    Intimate partner violence     Fear of current or ex partner: Not on file     Emotionally abused: Not on file     Physically abused: Not on file     Forced sexual activity: Not on file   Other Topics Concern    Not on file   Social History Narrative    Not on file       Review of Systems   Constitutional: Negative for chills, fatigue and fever. HENT: Negative for congestion, ear pain, postnasal drip, rhinorrhea, sinus pressure, sneezing and sore throat. Eyes: Negative for redness and itching. Respiratory: Negative for cough, chest tightness, shortness of breath and wheezing. Cardiovascular: Negative for chest pain and palpitations. Gastrointestinal: Negative for abdominal pain, blood in stool, constipation, diarrhea, nausea and vomiting. Endocrine: Negative for cold intolerance and heat intolerance. Genitourinary: Negative for difficulty urinating, dysuria, flank pain, frequency, hematuria and urgency. Musculoskeletal: Negative for arthralgias, back pain, joint swelling and myalgias. Skin: Negative for color change, pallor, rash and wound.    Allergic/Immunologic: Negative for environmental allergies and food allergies. Neurological: Negative for dizziness, seizures, syncope, weakness, light-headedness, numbness and headaches. Hematological: Negative for adenopathy. Does not bruise/bleed easily. Psychiatric/Behavioral: Negative for confusion, sleep disturbance and suicidal ideas. The patient is not nervous/anxious and is not hyperactive. Objective:     Vitals:    11/05/20 0938   BP: 130/74   Site: Left Upper Arm   Position: Sitting   Cuff Size: Large Adult   Pulse: 100   Temp: 98.6 °F (37 °C)   TempSrc: Temporal   SpO2: 96%   Weight: (!) 393 lb 3.2 oz (178.4 kg)   Height: 5' 8\" (1.727 m)     Physical Exam  Constitutional:       Appearance: He is well-developed. HENT:      Right Ear: Hearing, tympanic membrane, ear canal and external ear normal.      Left Ear: Hearing, tympanic membrane, ear canal and external ear normal.      Nose: No mucosal edema or rhinorrhea. Right Sinus: No maxillary sinus tenderness or frontal sinus tenderness. Left Sinus: No maxillary sinus tenderness or frontal sinus tenderness. Mouth/Throat:      Pharynx: No oropharyngeal exudate or posterior oropharyngeal erythema. Tonsils: No tonsillar abscesses. Cardiovascular:      Rate and Rhythm: Normal rate and regular rhythm. Heart sounds: Normal heart sounds. Pulmonary:      Effort: Pulmonary effort is normal.      Breath sounds: Normal breath sounds. Musculoskeletal:        Legs:    Lymphadenopathy:      Head:      Right side of head: No submental, submandibular, tonsillar, preauricular, posterior auricular or occipital adenopathy. Left side of head: No submental, submandibular, tonsillar, preauricular, posterior auricular or occipital adenopathy. Cervical: No cervical adenopathy. Skin:     General: Skin is warm and dry.        Current Outpatient Medications   Medication Sig Dispense Refill    metFORMIN (GLUCOPHAGE) 500 MG tablet Take 1 tablet by mouth 2 times daily (with meals) 180 tablet 1    blood glucose monitor kit and supplies Check BG twice daily 1 kit 0    traMADol (ULTRAM) 50 MG tablet Take 1 tablet by mouth every 6 hours as needed for Pain for up to 7 days. 28 tablet 0    furosemide (LASIX) 80 MG tablet Take 1 tablet by mouth 2 times daily 120 tablet 2    metOLazone (ZAROXOLYN) 2.5 MG tablet Take 1 tablet by mouth three times a week 25 tablet 2    potassium chloride (KLOR-CON M) 20 MEQ TBCR extended release tablet Take 1 tablet by mouth 2 times daily 120 tablet 2    etodolac (LODINE) 400 MG tablet Take 1 tablet by mouth 2 times daily 60 tablet 3    rOPINIRole (REQUIP) 2 MG tablet Take 1 tablet by mouth nightly 90 tablet 0    hydroCHLOROthiazide (HYDRODIURIL) 25 MG tablet TAKE ONE TABLET BY MOUTH ONE TIME DAILY 90 tablet 0    fluticasone (FLONASE) 50 MCG/ACT nasal spray 1 spray by Nasal route daily 3 Bottle 0    lisinopril (PRINIVIL;ZESTRIL) 10 MG tablet Take 1 tablet by mouth daily 90 tablet 0    loratadine (CLARITIN) 10 MG tablet Take 1 tablet by mouth daily 90 tablet 0     No current facility-administered medications for this visit. PHQ Scores 11/5/2020 7/16/2020 7/6/2020 6/25/2020 6/17/2020 2/24/2020 1/24/2020   PHQ2 Score 0 0 0 0 0 0 0   PHQ9 Score 0 0 0 0 0 0 0           :     1. Type 2 diabetes mellitus without complication, without long-term current use of insulin ([facility] [address]    2. Essential hypertension, benign    3.  Mixed hypertriglyceridemia      Plan:     Problem List     Mixed hypertriglyceridemia (Chronic)     Stable, controlled  No changes  Continue current treatment plan            Relevant Medications    lisinopril (PRINIVIL;ZESTRIL) 10 MG tablet    hydroCHLOROthiazide (HYDRODIURIL) 25 MG tablet    furosemide (LASIX) 80 MG tablet    metOLazone (ZAROXOLYN) 2.5 MG tablet    Essential hypertension, benign (Chronic)     Stable, controlled  No changes  Continue current treatment plan            Type 2 diabetes mellitus without complication, without long-term current use of insulin ([facility] [address] - Primary     Start metformin 500 mg bid  Follow up in 1 month  If not improved, introduce farxiga  Educated on dm diagnosis complications and treatment options  Provided with order for glucometer          Relevant Medications    metFORMIN (GLUCOPHAGE) 500 MG tablet    Other Relevant Orders    MICROALBUMIN / CREATININE URINE RATIO    Hepatitis C Antibody    HIV Screen             Discussed use, benefit, and side effects of all prescribed medications. Barriers to medication compliance addressed. All patient questions answered. Pt voiced understanding. All patientquestions answered. Pt voiced understanding.

## 2020-11-05 NOTE — PATIENT INSTRUCTIONS
Will start metformin 500 mg twice a day with meals  Follow up in 1 month  If blood sugars are not improved, will start the medication Farxiga    Check blood sugars every morning before meals and then again throughout the day once more. Keep log of this and bring to follow up appointment. Patient Education        Learning About Metformin for Type 2 Diabetes  Introduction     Metformin (such as Glucophage) is a medicine used to treat type 2 diabetes. It helps keep blood sugar levels on target. You may have tried to eat a healthy diet, lose weight, and get more exercise to keep your blood sugar in your target range. If those things do not help, you may take a medicine called metformin. It helps your body use insulin. This can help you control your blood sugar. You might take it on its own or with other medicines. When taken on its own, metformin should not cause low blood sugar or weight gain. Example  · Metformin (Glucophage)  Possible side effects  Common side effects include:  · Short-term nausea. · Not feeling hungry. · Diarrhea. · Increased gas in your belly. · A metallic taste. You may have side effects or reactions not listed here. Check the information that comes with your medicine. What to know about taking this medicine  · Metformin does not usually cause low blood sugar. But you may get a low blood sugar when you take metformin and you exercise hard, drink alcohol, or you do not eat enough food. · Sometimes metformin is combined with other diabetes medicine. Some of these can cause low blood sugar. · If you need a test that uses a dye or you need to have surgery, be sure to tell all of your doctors that you take metformin. You may have to stop taking it before and after the test or surgery. · Over time, blood levels of vitamin B12 can decrease in some people who take metformin. Your body needs this B vitamin to make blood cells. It also keeps your nervous system healthy.  If you have been taking also lowers the amount of stored sugar that your liver releases when you are not eating. · Sulfonylureas. These help your body release more insulin. Some work for many hours. They can cause low blood sugar if you don't eat as you planned. An example is glipizide. · Thiazolidinediones. These reduce the amount of blood glucose. They also help you respond better to insulin. An example is pioglitazone. · SGLT2 inhibitors. These help to remove extra glucose through your urine. They may also help some people lose weight. An example is ertugliflozin. · DPP-4 inhibitors. These help your body raise the level of insulin after you eat. They also help your body make less of a hormone that raises blood sugar. An example is alogliptin. · Incretin hormones (GLP-1 receptor agonists). These help your body make a protein that can raise your insulin level and make you less hungry. They're given as shots or pills. An example is semaglutide. · Meglitinides. These help your body release insulin. They also help slow how your body digests sugars. So they can keep your blood sugar from rising too fast after you eat. · Alpha-glucosidase inhibitors. These keep starches from breaking down. This means that they lower the amount of glucose absorbed when you eat. They don't help your body make more insulin. So they will not cause low blood sugar unless you use them with other medicines for diabetes. Follow-up care is a key part of your treatment and safety. Be sure to make and go to all appointments, and call your doctor if you are having problems. It's also a good idea to know your test results and keep a list of the medicines you take. How can you care for yourself at home? · Eat a healthy diet. Get some exercise each day. This may help you to reduce how much medicine you need.   · Do not take other prescription or over-the-counter medicines, vitamins, herbal products, or supplements without talking to your doctor first. Some medicines for type 2 diabetes can cause problems with other medicines or supplements. · Tell your doctor if you plan to get pregnant. Some of these drugs are not safe for pregnant women. · Be safe with medicines. Take your medicines exactly as prescribed. Meglitinides and sulfonylureas can cause your blood sugar to drop very low. Call your doctor if you think you are having a problem with your medicine. · Check your blood sugar often. You can use a glucose monitor. Keeping track can help you know how certain foods, activities, and medicines affect your blood sugar. And it can help you keep your blood sugar from getting so low that it's not safe. When should you call for help? Call 911 anytime you think you may need emergency care. For example, call if:    · You passed out (lost consciousness).     · You are confused or cannot think clearly.     · Your blood sugar is very high or very low. Watch closely for changes in your health, and be sure to contact your doctor if:    · Your blood sugar stays outside the level your doctor set for you.     · You have any problems. Where can you learn more? Go to https://[employer].C[employer] Technologies. org and sign in to your ECOtality account. Enter H153 in the K2 Learning box to learn more about \"Noninsulin Medicines for Type 2 Diabetes: Care Instructions. \"     If you do not have an account, please click on the \"Sign Up Now\" link. Current as of: December 20, 2019               Content Version: 12.6  © 0389-8559 [employer]. Care instructions adapted under license by [facility] ([facility]). If you have questions about a medical condition or this instruction, always ask your healthcare professional. [place] [address] any warranty or liability for your use of this information. Patient Education        Learning About Diabetes Food Guidelines  Your Care Instructions     Meal planning is important to manage diabetes.  It helps keep your blood sugar at a target level (which you set with your doctor). You don't have to eat special foods. You can eat what your family eats, including sweets once in a while. But you do have to pay attention to how often you eat and how much you eat of certain foods. You may want to work with a dietitian or a certified diabetes educator (CDE) to help you plan meals and snacks. A dietitian or CDE can also help you lose weight if that is one of your goals. What should you know about eating carbs? Managing the amount of carbohydrate (carbs) you eat is an important part of healthy meals when you have diabetes. Carbohydrate is found in many foods. · Learn which foods have carbs. And learn the amounts of carbs in different foods. ? Bread, cereal, pasta, and rice have about 15 grams of carbs in a serving. A serving is 1 slice of bread (1 ounce), ½ cup of cooked cereal, or 1/3 cup of cooked pasta or rice. ? Fruits have 15 grams of carbs in a serving. A serving is 1 small fresh fruit, such as an apple or orange; ½ of a banana; ½ cup of cooked or canned fruit; ½ cup of fruit juice; 1 cup of melon or raspberries; or 2 tablespoons of dried fruit. ? Milk and no-sugar-added yogurt have 15 grams of carbs in a serving. A serving is 1 cup of milk or 2/3 cup of no-sugar-added yogurt. ? Starchy vegetables have 15 grams of carbs in a serving. A serving is ½ cup of mashed potatoes or sweet potato; 1 cup winter squash; ½ of a small baked potato; ½ cup of cooked beans; or ½ cup cooked corn or green peas. · Learn how much carbs to eat each day and at each meal. A dietitian or CDE can teach you how to keep track of the amount of carbs you eat. This is called carbohydrate counting. · If you are not sure how to count carbohydrate grams, use the Plate Method to plan meals. It is a good, quick way to make sure that you have a balanced meal. It also helps you spread carbs throughout the day. ? Divide your plate by types of foods.  Put non-starchy vegetables on half the plate, meat or other protein food on one-quarter of the plate, and a grain or starchy vegetable in the final quarter of the plate. To this you can add a small piece of fruit and 1 cup of milk or yogurt, depending on how many carbs you are supposed to eat at a meal.  · Try to eat about the same amount of carbs at each meal. Do not \"save up\" your daily allowance of carbs to eat at one meal.  · Proteins have very little or no carbs per serving. Examples of proteins are beef, chicken, turkey, fish, eggs, tofu, cheese, cottage cheese, and peanut butter. A serving size of meat is 3 ounces, which is about the size of a deck of cards. Examples of meat substitute serving sizes (equal to 1 ounce of meat) are 1/4 cup of cottage cheese, 1 egg, 1 tablespoon of peanut butter, and ½ cup of tofu. How can you eat out and still eat healthy? · Learn to estimate the serving sizes of foods that have carbohydrate. If you measure food at home, it will be easier to estimate the amount in a serving of restaurant food. · If the meal you order has too much carbohydrate (such as potatoes, corn, or baked beans), ask to have a low-carbohydrate food instead. Ask for a salad or green vegetables. · If you use insulin, check your blood sugar before and after eating out to help you plan how much to eat in the future. · If you eat more carbohydrate at a meal than you had planned, take a walk or do other exercise. This will help lower your blood sugar. What else should you know? · Limit saturated fat, such as the fat from meat and dairy products. This is a healthy choice because people who have diabetes are at higher risk of heart disease. So choose lean cuts of meat and nonfat or low-fat dairy products. Use olive or canola oil instead of butter or shortening when cooking. · Don't skip meals. Your blood sugar may drop too low if you skip meals and take insulin or certain medicines for diabetes.   · Check with your doctor before you drink alcohol. Alcohol can cause your blood sugar to drop too low. Alcohol can also cause a bad reaction if you take certain diabetes medicines. Follow-up care is a key part of your treatment and safety. Be sure to make and go to all appointments, and call your doctor if you are having problems. It's also a good idea to know your test results and keep a list of the medicines you take. Where can you learn more? Go to https://chpepiceweb.Overinteractive [email or web address] org and sign in to your Antenna account. Enter M937 in the HSTYLE box to learn more about \"Learning About Diabetes Food Guidelines. \"     If you do not have an account, please click on the \"Sign Up Now\" link. Current as of: December 20, 2019               Content Version: 12.6  © 1331-4123 [employer]. Care instructions adapted under license by [facility] ([facility]). If you have questions about a medical condition or this instruction, always ask your healthcare professional. [address] any warranty or liability for your use of this information. Patient Education        Learning About Meal Planning for Diabetes  Why plan your meals? Meal planning can be a key part of managing diabetes. Planning meals and snacks with the right balance of carbohydrate, protein, and fat can help you keep your blood sugar at the target level you set with your doctor. You don't have to eat special foods. You can eat what your family eats, including sweets once in a while. But you do have to pay attention to how often you eat and how much you eat of certain foods. You may want to work with a dietitian or a certified diabetes educator. He or she can give you tips and meal ideas and can answer your questions about meal planning. This health professional can also help you reach a healthy weight if that is one of your goals. What plan is right for you?   Your dietitian or diabetes educator may suggest that you start with the plate format or carbohydrate counting. The plate format  The plate format is a simple way to help you manage how you eat. You plan meals by learning how much space each food should take on a plate. Using the plate format helps you spread carbohydrate throughout the day. It can make it easier to keep your blood sugar level within your target range. It also helps you see if you're eating healthy portion sizes. To use the plate format, you put non-starchy vegetables on half your plate. Add meat or meat substitutes on one-quarter of the plate. Put a grain or starchy vegetable (such as brown rice or a potato) on the final quarter of the plate. You can add a small piece of fruit and some low-fat or fat-free milk or yogurt, depending on your carbohydrate goal for each meal.  Here are some tips for using the plate format:  · Make sure that you are not using an oversized plate. A 9-inch plate is best. Many restaurants use larger plates. · Get used to using the plate format at home. Then you can use it when you eat out. · Write down your questions about using the plate format. Talk to your doctor, a dietitian, or a diabetes educator about your concerns. Carbohydrate counting  With carbohydrate counting, you plan meals based on the amount of carbohydrate in each food. Carbohydrate raises blood sugar higher and more quickly than any other nutrient. It is found in desserts, breads and cereals, and fruit. It's also found in starchy vegetables such as potatoes and corn, grains such as rice and pasta, and milk and yogurt. Spreading carbohydrate throughout the day helps keep your blood sugar levels within your target range. Your daily amount depends on several things, including your weight, how active you are, which diabetes medicines you take, and what your goals are for your blood sugar levels. A registered dietitian or diabetes educator can help you plan how much carbohydrate to include in each meal and snack.   A guideline for your daily amount of carbohydrate is:  · 45 to 60 grams at each meal. That's about the same as 3 to 4 carbohydrate servings. · 15 to 20 grams at each snack. That's about the same as 1 carbohydrate serving. The Nutrition Facts label on packaged foods tells you how much carbohydrate is in a serving of the food. First, look at the serving size on the food label. Is that the amount you eat in a serving? All of the nutrition information on a food label is based on that serving size. So if you eat more or less than that, you'll need to adjust the other numbers. Total carbohydrate is the next thing you need to look for on the label. If you count carbohydrate servings, one serving of carbohydrate is 15 grams. For foods that don't come with labels, such as fresh fruits and vegetables, you'll need a guide that lists carbohydrate in these foods. Ask your doctor, dietitian, or diabetes educator about books or other nutrition guides you can use. If you take insulin, you need to know how many grams of carbohydrate are in a meal. This lets you know how much rapid-acting insulin to take before you eat. If you use an insulin pump, you get a constant rate of insulin during the day. So the pump must be programmed at meals to give you extra insulin to cover the rise in blood sugar after meals. When you know how much carbohydrate you will eat, you can take the right amount of insulin. Or, if you always use the same amount of insulin, you need to make sure that you eat the same amount of carbohydrate at meals. If you need more help to understand carbohydrate counting and food labels, ask your doctor, dietitian, or diabetes educator. How do you get started with meal planning? Here are some tips to get started:  · Plan your meals a week at a time. Don't forget to include snacks too. · Use cookbooks or online recipes to plan several main meals. Plan some quick meals for busy nights.  You also can double some recipes that freeze well. Then you can save half for other busy nights when you don't have time to cook. · Make sure you have the ingredients you need for your recipes. If you're running low on basic items, put these items on your shopping list too. · List foods that you use to make breakfasts, lunches, and snacks. List plenty of fruits and vegetables. · Post this list on the refrigerator. Add to it as you think of more things you need. · Take the list to the store to do your weekly shopping. Follow-up care is a key part of your treatment and safety. Be sure to make and go to all appointments, and call your doctor if you are having problems. It's also a good idea to know your test results and keep a list of the medicines you take. Where can you learn more? Go to https://Fanboutspepiceweb.Octopusapp. org and sign in to your MySalescamp account. Enter X872 in the Exogenesis box to learn more about \"Learning About Meal Planning for Diabetes. \"     If you do not have an account, please click on the \"Sign Up Now\" link. Current as of: December 20, 2019               Content Version: 12.6  © 6819-7123 [employer]. Care instructions adapted under license by [facility] ([facility]). If you have questions about a medical condition or this instruction, always ask your healthcare professional. [place] [address] any warranty or liability for your use of this information.

## 2020-11-06 LAB
HIV AG/AB: NORMAL
HIV ANTIGEN: NORMAL
HIV-1 ANTIBODY: NORMAL
HIV-2 AB: NORMAL

## 2020-11-09 ENCOUNTER — HOSPITAL ENCOUNTER (OUTPATIENT)
Dept: PHYSICAL THERAPY | Age: 60
Setting detail: THERAPIES SERIES
Discharge: HOME | End: 2020-11-09
Payer: COMMERCIAL

## 2020-11-09 PROCEDURE — 97110 THERAPEUTIC EXERCISES: CPT

## 2020-11-09 PROCEDURE — 97140 MANUAL THERAPY 1/> REGIONS: CPT

## 2020-11-09 NOTE — FLOWSHEET NOTE
[facility],  [address]  Phone: [phone]  Fax [phone]    Physical Therapy Treatment Note/ Progress Report:           Date:  2020    Patient Name:  Mario Briseno    :  1960  MRN: [id number]  Restrictions/Precautions:    Medical/Treatment Diagnosis Information:  · Diagnosis: G28.556F (ICD-10-CM) - Complex tear of medial meniscus of left knee as current injury, initial encounter  · Treatment Diagnosis: M25.562 Left knee pain; M25.662 Left knee stiffness; R26.2 Difficulty in walking  Insurance/Certification information:  PT Insurance Information: [employer] [place]  Physician Information:  Referring Practitioner: Dr. Mily Qureshi  Has the plan of care been signed (Y/N):        [x]  Yes  []  No     Date of Patient follow up with Physician:       Is this a Progress Report:     []  Yes  [x]  No        If Yes:  Date Range for reporting period:  Beginning   Ending 20    Progress report will be due (10 Rx or 30 days whichever is less):   Recertification will be due (POC Duration  / 90 days whichever is less):    Visit # Insurance Allowable Requires auth    (new insu , 6 visits on previous insurance)    [x]no        []yes:     Functional Scale:LEFS 75% disability   Date assessed:  20  LEFS 78% disability      20  LEFS 81% disability      10/15/20  LEFS 76% disability      20  Therapy Diagnosis/Practice Pattern:E    Number of Comorbidities:  []0     []1-2    [x]3+    Latex Allergy:  [x]NO      []YES  Preferred Language for Healthcare:   [x]English       []other:       Pain level: 1/10 current     SUBJECTIVE:  Pt states that he had some increased pain after LV, but that the pain subsided fairly quickly. Pain is 9/10 in the morning, then dissipates over an hour to 1/10.      OBJECTIVE:    Observation: B LEs wrapped for vascular wounds/drainage + light compression hose B   Test measurements:  Knee flexion AROM 119   MMT hip ABD 3-/5, hip flexion L 5-, R 4+, knee extension R 22.4# 4+/5, R 15.1# 4/5, knee flexion L 5-, R 4+    RESTRICTIONS/PRECAUTIONS: HTN   Procedure(s) 8/3/2020:  1.  Left knee arthroscopy with partial medial meniscectomy and chondroplasty medial femoral condyle with synovial biopsy (64705-07)    Exercises/Interventions:     Therapeutic Ex (91133) Sets/sec Notes/CUES HEP   Pt ed: aquatic therapy vs in this clinic to improve walking distance     Seated calf stretch c strap  Incline board calf stretch lvl 2- toes on board     X   Seated HS stretch   X   HL adductor stretch   X   Rectus stretch EOB 30\"x3 Could not tolerate 10/5    Bridge with increasing knee flex  Bridge on  deg    SLR-   \" ER- indep today x15 R,L 1.5#   X   SAQ 2#  c ball squeeze    SL hip abd R, L  Clamshell R,L  Reverse clamshell R,L 2x101.5#  1# on knee  1# ankle    Seated LAQ 90-40 with ball sq   X   Seated heel slide with glider 5\"x10     Seated hip IR iso 5\"x10 OVl at ankles    Standing HR, TR  At [facility]    Mini squat  At bar    Side step 1/2 wall (long) 3 laps UE support, OVL at ankles    Step up 3\"  x10 Heavy UE support,   [facility] platform    Standing glider: abd, ext diag  UE supp on bar Cue for posture   Sit to stand - 2nd highest plyobox 2x10No UE    HSC  2x1535#    LAQ 90-40 2x1515#    Soleus press 2x1540#    LATOYA TKE  LATOYA hip ABD 2x10 R,L60#  15#    Bike (low)  Seat 17 Pt too sore today   Manual Intervention (29378) 15' total     Patellar mobs sup, inf      Re-apply bandaids,      Edema massage, STM to pes anserine, distal IT band, adductor muscle belly 10'  Showed pt how to use stick from home   HS s  Knee slightly bent                NMR re-education (20128)   CUES NEEDED   Gait training one crutch 50' x 3     Weight shifting A/P   intermittent fingertip support today    Weight shifting A/P with one crutch   Intermittent CL UE support when L foot back- not ready to wean to one crutch modulating pain, promoting relaxation,  increasing ROM, reducing/eliminating soft tissue swelling/inflammation/restriction, improving soft tissue extensibility and allowing for proper ROM for normal function with self care, mobility, lifting and ambulation. Modalities:     [] GAME READY (VASO)- for significant edema, swelling, pain control. (low) in long-sitting   CP x 10' R,L knee long sit with pillow under knees  Charges:  Timed Code Treatment Minutes: 41   Total Treatment Minutes: 51  (CP)     BWC time in/time out:   (and requires time in and out for each CPT code)    [] EVAL (LOW) 94368 (typically 20 minutes face-to-face)  [] EVAL (MOD) 97101 (typically 30 minutes face-to-face)  [] EVAL (HIGH) 09108 (typically 45 minutes face-to-face)  [] RE-EVAL     [x] YP(14609) x 2   [] IONTO  [] NMR (22857) x     [] VASO  [x] Manual (08881) x 1     [] Other: gait x  [] TA x      [] Mech Traction (74207)  [] ES(attended) (75033)      [] ES (un) (98565):       GOALS:   Patient stated goal: 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 Progressing: []? Met: []? Not Met: []? Adjusted     Therapist goals for Patient:   Short Term Goals: To be achieved in: 2 weeks  1. Independent in HEP and progression per patient tolerance, in order to prevent re-injury. [x]? Progressing: []? Met: []? Not Met: []? Adjusted  2. Patient will have a decrease in pain to facilitate improvement in movement, function, and ADLs as indicated by Functional Deficits. [x]? Progressing: []? Met: []? Not Met: []? Adjusted     Long Term Goals: To be achieved in: 6 weeks  1. Disability index score of 40% or less for the LEFS to assist with reaching prior level of function. []? Progressing: []? Met: [x]? Not Met: []? Adjusted Lower score on LEFS despite pt reporting improvements.   2. Patient will demonstrate increased left knee AROM to 0-110  to allow for proper joint functioning for car/truck mobility, stair amb.   []? Progressing: [x]? Met: []? Not Met: []? Adjusted  3. Patient will demonstrate an increase in Strength to at least 4+/5 for the left knee and hip stabilizers in order to ambulate s AD for community distances. [x]? Progressing: []? Met: []? Not Met: []? Adjusted  4. Patient will return to walking community distances s AD with mild bilateral knee pain (<3/10). [x]? Progressing: []? Met: []? Not Met: []? Adjusted  5. Pt will be able to drive x 2 hours without increased knee pain; car mobility without knee pain. pt able to drive 1.5 hours, but more uncomfortable as a passenger  [x]? Progressing: []? Met: []? Not Met: []? Adjusted          Progression Towards Functional goals:  [] Patient is progressing as expected towards functional goals listed. [x] Progression is slowed due to complexities listed. [] Progression has been slowed due to co-morbidities. [] Plan just implemented, too soon to assess goals progression  [] Other:         Overall Progression Towards Functional goals/ Treatment Progress Update:  [] Patient is progressing as expected towards functional goals listed. [x] Progression is slowed due to complexities/Impairments listed. [] Progression has been slowed due to co-morbidities. [] Plan just implemented, too soon to assess goals progression <30days   [] Goals require adjustment due to lack of progress  [] Patient is not progressing as expected and requires additional follow up with physician  [] Other    Prognosis for POC: [x] Good [] Fair  [] Poor      Patient requires continued skilled intervention: [x] Yes  [] No    Treatment/Activity Tolerance:  [x] Patient able to complete treatment  [] Patient limited by fatigue  [x] Patient limited by pain    [] Patient limited by other medical complications  [] Other:     ASSESSMENT: Bo  Continues to progress with stability and stamina using one crutch. Able to increase repetitions with weights this visit.      Return to Play: (if applicable)   []  Stage 1: Intro to Strength   []  Stage 2: Return to Run and Strength   []  Stage 3: Return to Jump and Strength   []  Stage 4: Dynamic Strength and Agility   []  Stage 5: Sport Specific Training     []  Ready to Return to Play, Meets All Above Stages   []  Not Ready for Return to Sports   Comments:                                PLAN: Continue PT 2x/week, extend POC another 6 weeks  [x] Continue per plan of care [] Alter current plan (see comments above)  [] Plan of care initiated [] Hold pending MD visit [] Discharge      Electronically signed by:  Agapito Condon, PT, DPT    Note: If patient does not return for scheduled/ recommended follow up visits, this note will serve as a discharge from care along with most recent update on progress. Access Code: [email or web address]   URL: [email or web address]   Date: 10/08/2020   Prepared by: Agapito Condon     Exercises   Supine Hip Adductor Stretch - 3 sets - 15s hold - 1x daily - 7x weekly   Bridge - 10 reps - 1x daily - 7x weekly   Small Range Straight Leg Raise - 5 reps - 3 sets - 1x daily - 7x weekly   Sidelying Hip Abduction - 10 reps - 2 sets - 1x daily - 4x weekly   Clamshell - 10 reps - 2 sets - 1x daily - 4x weekly   Seated Gastroc Stretch with Strap - 3 sets - 30s hold - 1x daily - 7x weekly   Seated Hamstring Stretch - 3 sets - 20s hold - 1x daily - 7x weekly   Heel rises with counter support - 10 reps - 3 sets - 1x daily - 7x weekly   Patient Education   Pain Management

## 2020-11-10 PROBLEM — L97.921 CHRONIC ULCER OF LEFT LEG, LIMITED TO BREAKDOWN OF SKIN (HCC): Status: RESOLVED | Noted: 2020-10-11 | Resolved: 2020-11-10

## 2020-11-10 NOTE — PROGRESS NOTES
[facility] Progress Note    Robert Huntley     : 1960    DATE OF VISIT:  2020    Subjective:     Robert Huntley is a 64 y.o. male who has a healed venous and edema-related ulcer located on the bilateral lower leg. Current complaint of pain in this ulcer? yes. Quality of pain: aching  Timing: intermittent and stable  Severity: mild  Associated Signs/Symptoms: swelling  Other significant symptoms or pertinent ulcer history: he wasn't able to be here last week because of a family commitment, but did very well overall. Small left sided ulcer healed, no new areas opened up, swelling seems quite stable. He's lost a bit more weight. Some mild pruritus at times. Does have a bit of trouble with the Velcro wraps sliding down over the transition liners, and he needs to readjust a couple of times per day. Taking diuretics as prescribed, still has some very intermittent cramps, mostly his hands. Saw his PCP very recently, was formally diagnosed with DM2, has started on drug therapy for that. Additional ulcer(s) noted? no.      Mr. Michelle Rodriguez has a past medical history of Bilateral venous leg ulcers, Cellulitis of lower extremity, Hypertension, Impaired fasting glucose, Obesity, Preoperative clearance, Screening PSA (prostate specific antigen), Sleep apnea, Stasis dermatitis of both legs, Tobacco use, and Tubular adenoma of colon. He has a past surgical history that includes Mouth surgery; [place] tooth extraction; Circumcision; Vasectomy; Colonoscopy (2017); Umbilical hernia repair (2011); and Knee arthroscopy (Left, 8/3/2020). His family history includes Diabetes in his brother; Heart Disease in his mother; High Blood Pressure in his mother. Mr. Michelle Rodriguez reports that he quit smoking about 14 years ago. His smoking use included cigarettes. He has a 50.00 pack-year smoking history. He has never used smokeless tobacco. He reports current alcohol use.  He reports that he does not use drugs. His current medication list consists of blood glucose monitor kit and supplies, etodolac, fluticasone, furosemide, hydroCHLOROthiazide, lisinopril, loratadine, metFORMIN, metOLazone, potassium chloride, rOPINIRole, and traMADol. Allergies: Bactrim [sulfamethoxazole-trimethoprim]    Pertinent items from the review of systems are discussed in the HPI; the remainder of the ROS was reviewed and is negative. Objective:     Vitals:    11/05/20 1410   BP: 111/62   Pulse: 104   Resp: 24   Temp: 98.1 °F (36.7 °C)   TempSrc: Oral   Weight: (!) 392 lb 3.2 oz (177.9 kg)   Height: 5' 8\" (1.727 m)       Constitutional:  well-developed, well-nourished, overweight, NAD  Psychiatric:  oriented to person, place and time; mood and affect appropriate for the situation   Eyes:  pupils equal, round and reactive to light; sclerae anicteric, conjunctivae not pale  Cardiovascular:  bilateral pedal pulses palpable, feet warm, good cap refill; mild-mod BL lower extremity edema; some mild changes of venous erythema, hemosiderin, some flaky hyperkeratosis and xerosis  Lymphatic:  no inguinal or popliteal adenopathy, no angitis or cellulitis  Musculoskeletal:  no clubbing, cyanosis or petechiae; RLE and LLE with no gross effusions, joint misalignment or acute arthritis  Edwina-ulcer skin: indurated, pink, warm and hyperkeratotic. Ulcer(s): healed and dry. Photos also saved in electronic chart.     Today's Wound Measurements, per RN documentation:  Wound 10/08/20 #3 left pretib, venous, partial thickness, 10/2020-Wound Length (cm): 0 cm  Wound 07/28/20 #1 right lower pretib cluster, venous, partial thickness, last 2 years-Wound Length (cm): 0 cm    Wound 10/08/20 #3 left pretib, venous, partial thickness, 10/2020-Wound Width (cm): 0 cm  Wound 07/28/20 #1 right lower pretib cluster, venous, partial thickness, last 2 years-Wound Width (cm): 0 cm    Wound 10/08/20 #3 left pretib, venous, partial thickness, 10/2020-Wound Depth (cm): 0 cm  Wound 07/28/20 #1 right lower pretib cluster, venous, partial thickness, last 2 years-Wound Depth (cm): 0 cm  ______________________________    Lab Results   Component Value Date    LABALBU 3.6 10/30/2020     Lab Results   Component Value Date    CREATININE 0.8 (L) 10/30/2020     Lab Results   Component Value Date    HGB 13.9 10/30/2020     Lab Results   Component Value Date    LABA1C 8.1 10/30/2020     Assessment:     Patient Active Problem List   Diagnosis Code    Class 3 severe obesity due to excess calories with serious comorbidity and body mass index (BMI) of 60.0 to 69.9 in adult ([facility] [address] E66.01, Z68.44    Mixed hypertriglyceridemia C51.2    Nonalcoholic fatty liver disease K76.0    RLS (restless legs syndrome) G25.81    Essential hypertension, benign I10    Impaired fasting glucose R73.01    Chronic pain of left knee M25.562, G89.29    Osteoarthritis of left knee M17.12    Chondromalacia of left knee M94.262    Pes planus M21.40    Lymphedema I89.0    Peripheral venous insufficiency I87.2    Allergic rhinitis J30.9    Type 2 diabetes mellitus without complication, without long-term current use of insulin (HCC) E11.9       Assessment of today's active condition(s): venous stasis, chronic skin changes, LE lymphedema, healed venous leg ulcers, at fairly high risk for recurrence, especially in the next couple of months. Factors contributing to occurrence and/or persistence of the chronic ulcer include venous stasis, lymphedema, decreased mobility and obesity. Medical necessity of today's visit is shown by the above documentation. Sharp debridement is not indicated today, based upon the exam findings in the wound(s) above.      Discharge plan:     Treatment in the wound care center today, per RN documentation: Wound 10/08/20 #3 left pretib, venous, partial thickness, 10/2020-Dressing/Treatment: Other (comment)(compression stockings)  Wound 07/28/20 #1 right lower pretib cluster, venous, partial thickness, last 2 years-Dressing/Treatment: Other (comment)(compression stockings). Would lightly moisturize any significantly dry skin on the legs at night (after removing compression). I reminded the patient of the importance of weight management and smoking cessation, if applicable; also encouraged ambulation as tolerated, additional lower extremity exercises as instructed in our education sheet, leg elevation when at rest, and compliance with any recommended dietary, diuretic and compression therapies. Apply compression garments early each AM, remove QHS (or can occasionally wear overnight if he would prefer). In a couple of months, assuming that his legs have remained stable, and hoping that he's made a bit more progress with weight loss and becoming more active, he can cautiously start to skip an afternoon of compression therapy here and there, and just see how things go. It may be that he never needs sustained compression any longer after weight loss, or it may be that he needs it indefinitely -- time will tell. Continue same diuretic and KCl plan for now, but will transition longer-term therapy back to his PCP. Recent BMP reassuringly very stable. Written discharge instructions given to patient. Follow up here PRN.     Electronically signed by Hitesh Eagle MD on 11/10/2020 at 8:59 AM.

## 2020-11-12 ENCOUNTER — HOSPITAL ENCOUNTER (OUTPATIENT)
Dept: PHYSICAL THERAPY | Age: 60
Setting detail: THERAPIES SERIES
Discharge: HOME | End: 2020-11-12
Payer: COMMERCIAL

## 2020-11-12 PROCEDURE — 97110 THERAPEUTIC EXERCISES: CPT

## 2020-11-12 PROCEDURE — 97140 MANUAL THERAPY 1/> REGIONS: CPT

## 2020-11-12 NOTE — FLOWSHEET NOTE
[facility], [address] [place]  Phone: [phone]  Fax [phone]    Physical Therapy Treatment Note/ Progress Report:           Date:  2020    Patient Name:  Lev Leggett    :  1960  MRN: [id number]  Restrictions/Precautions:    Medical/Treatment Diagnosis Information:  · Diagnosis: G52.410X (ICD-10-CM) - Complex tear of medial meniscus of left knee as current injury, initial encounter  · Treatment Diagnosis: M25.562 Left knee pain; M25.662 Left knee stiffness; R26.2 Difficulty in walking  Insurance/Certification information:  PT Insurance Information: Medical [address]  Physician Information:  Referring Practitioner: Dr. Dena Ragsdale  Has the plan of care been signed (Y/N):        [x]  Yes  []  No     Date of Patient follow up with Physician:       Is this a Progress Report:     []  Yes  [x]  No        If Yes:  Date Range for reporting period:  Beginning   Ending 20    Progress report will be due (10 Rx or 30 days whichever is less):   Recertification will be due (POC Duration  / 90 days whichever is less):    Visit # Insurance Allowable Requires auth    30 (new insu , 6 visits on previous insurance)    [x]no        []yes:     Functional Scale:LEFS 75% disability   Date assessed:  20  LEFS 78% disability      20  LEFS 81% disability      10/15/20  LEFS 76% disability      20  Therapy Diagnosis/Practice Pattern:E    Number of Comorbidities:  []0     []1-2    [x]3+    Latex Allergy:  [x]NO      []YES  Preferred Language for Healthcare:   [x]English       []other:       Pain level: 1/10 current     SUBJECTIVE:  Pt states that he is more sore today in both knees.      OBJECTIVE:    Observation: B LEs wrapped for vascular wounds/drainage + light compression hose B   Test measurements:  Knee flexion AROM 119   MMT hip ABD 3-/5, hip flexion L 5-, R 4+, knee extension R 22.4# 4+/5, R 15.1# 4/5, knee flexion L 5-, R 4+    RESTRICTIONS/PRECAUTIONS: HTN   Procedure(s) 8/3/2020:  1.  Left knee arthroscopy with partial medial meniscectomy and chondroplasty medial femoral condyle with synovial biopsy (19733-04)    Exercises/Interventions:     Therapeutic Ex (71803) Sets/sec Notes/CUES HEP   Pt ed: aquatic therapy vs in this clinic to improve walking distance     Seated calf stretch c strap  Incline board calf stretch lvl 2- toes on board     X   Seated HS stretch   X   HL adductor stretch   X   Rectus stretch EOB 30\"x3 Could not tolerate 10/5    Bridge with increasing knee flex  Bridge on  deg    SLR-   \" ER- indep today x10 R,L 1.5#   X   SAQ 2#  c ball squeeze    SL hip abd R, L  Clamshell R,L  Reverse clamshell R,L 2x101.5#  1# on knee  1# ankle    Seated LAQ 90-40 with ball sq   X   Seated heel slide with glider      Seated hip IR iso 5\"x10 OVl at ankles    Standing HR, TR  At [facility]    Mini squat  At bar    Side step 1/2 wall (short) 3 laps UE support, OVL at ankles    Step up 3\"  x10 Heavy UE support,   [facility] platform    Standing glider: abd, ext diag  UE supp on bar Cue for posture   Sit to stand - standard chair [id number] on thighs    HSC  2x1535#    LAQ 90-40 2x1515#    Soleus press 2x1540#    LATOYA TKE  LATOYA hip ABD 2x10 R,L60#  15#    Bike (low)  Seat 17 Pt too sore today   Manual Intervention (64978) 10' total     Patellar mobs sup, inf      Re-apply bandaids,      Edema massage, STM to pes anserine, distal IT band, adductor muscle belly 10'  Showed pt how to use stick from home   HS s  Knee slightly bent                NMR re-education (39940)   CUES NEEDED   Gait training one crutch      Weight shifting A/P   intermittent fingertip support today    Weight shifting A/P with one crutch   Intermittent CL UE support when L foot back- not ready to wean to one crutch   Split stance balance + airex at 1/2 wall     Intermittent UE support                           Therapeutic Activity swelling/inflammation/restriction, improving soft tissue extensibility and allowing for proper ROM for normal function with self care, mobility, lifting and ambulation. Modalities:     [] GAME READY (VASO)- for significant edema, swelling, pain control. (low) in long-sitting   CP x 10' R,L knee long sit with pillow under knees  Charges:  Timed Code Treatment Minutes: 39   Total Treatment Minutes: 49 (CP)     [facility] time in/time out:   (and requires time in and out for each CPT code)    [] EVAL (LOW) 64803 (typically 20 minutes face-to-face)  [] EVAL (MOD) 64437 (typically 30 minutes face-to-face)  [] EVAL (HIGH) 95202 (typically 45 minutes face-to-face)  [] RE-EVAL     [x] KI(34896) x 2   [] IONTO  [] NMR (33716) x     [] VASO  [x] Manual (68109) x 1     [] Other: gait x  [] TA x      [] Mech Traction (78849)  [] ES(attended) (91388)      [] ES (un) (36614):       GOALS:   Patient stated goal: 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 Progressing: []? Met: []? Not Met: []? Adjusted     Therapist goals for Patient:   Short Term Goals: To be achieved in: 2 weeks  1. Independent in HEP and progression per patient tolerance, in order to prevent re-injury. [x]? Progressing: []? Met: []? Not Met: []? Adjusted  2. Patient will have a decrease in pain to facilitate improvement in movement, function, and ADLs as indicated by Functional Deficits. [x]? Progressing: []? Met: []? Not Met: []? Adjusted     Long Term Goals: To be achieved in: 6 weeks  1. Disability index score of 40% or less for the LEFS to assist with reaching prior level of function. []? Progressing: []? Met: [x]? Not Met: []? Adjusted Lower score on LEFS despite pt reporting improvements. 2. Patient will demonstrate increased left knee AROM to 0-110  to allow for proper joint functioning for car/truck mobility, stair amb.   []? Progressing: [x]? Met: []? Not Met: []? Adjusted  3.  Patient will demonstrate an increase in Strength to at least 4+/5 for the left knee and hip stabilizers in order to ambulate s AD for community distances. [x]? Progressing: []? Met: []? Not Met: []? Adjusted  4. Patient will return to walking community distances s AD with mild bilateral knee pain (<3/10). [x]? Progressing: []? Met: []? Not Met: []? Adjusted  5. Pt will be able to drive x 2 hours without increased knee pain; car mobility without knee pain. pt able to drive 1.5 hours, but more uncomfortable as a passenger  [x]? Progressing: []? Met: []? Not Met: []? Adjusted          Progression Towards Functional goals:  [] Patient is progressing as expected towards functional goals listed. [x] Progression is slowed due to complexities listed. [] Progression has been slowed due to co-morbidities. [] Plan just implemented, too soon to assess goals progression  [] Other:         Overall Progression Towards Functional goals/ Treatment Progress Update:  [] Patient is progressing as expected towards functional goals listed. [x] Progression is slowed due to complexities/Impairments listed. [] Progression has been slowed due to co-morbidities. [] Plan just implemented, too soon to assess goals progression <30days   [] Goals require adjustment due to lack of progress  [] Patient is not progressing as expected and requires additional follow up with physician  [] Other    Prognosis for POC: [x] Good [] Fair  [] Poor      Patient requires continued skilled intervention: [x] Yes  [] No    Treatment/Activity Tolerance:  [x] Patient able to complete treatment  [] Patient limited by fatigue  [x] Patient limited by pain    [] Patient limited by other medical complications  [] Other:     ASSESSMENT: Despite increased pain entering session, pt continues to progress with tolerance to walking with one crutch and standing exercise. CP for pain control at end of session.      Return to Play: (if applicable)   []  Stage 1: Intro to Strength   []  Stage 2: Return to Run

## 2020-11-16 ENCOUNTER — HOSPITAL ENCOUNTER (OUTPATIENT)
Dept: PHYSICAL THERAPY | Age: 60
Setting detail: THERAPIES SERIES
Discharge: HOME | End: 2020-11-16
Payer: COMMERCIAL

## 2020-11-16 PROCEDURE — 97140 MANUAL THERAPY 1/> REGIONS: CPT

## 2020-11-16 PROCEDURE — 97110 THERAPEUTIC EXERCISES: CPT

## 2020-11-16 NOTE — FLOWSHEET NOTE
[facility],  [address]  Phone: [phone]  Fax [phone]    Physical Therapy Treatment Note/ Progress Report:           Date:  2020    Patient Name:  Tyra Moran    :  1960  MRN: [id number]  Restrictions/Precautions:    Medical/Treatment Diagnosis Information:  · Diagnosis: N95.524V (ICD-10-CM) - Complex tear of medial meniscus of left knee as current injury, initial encounter  · Treatment Diagnosis: M25.562 Left knee pain; M25.662 Left knee stiffness; R26.2 Difficulty in walking  Insurance/Certification information:  PT Insurance Information: [employer] [place]  Physician Information:  Referring Practitioner: Dr. Pollo Arreaga  Has the plan of care been signed (Y/N):        [x]  Yes  []  No     Date of Patient follow up with Physician:       Is this a Progress Report:     []  Yes  [x]  No        If Yes:  Date Range for reporting period:  Beginning   Ending 20    Progress report will be due (10 Rx or 30 days whichever is less):   Recertification will be due (POC Duration  / 90 days whichever is less):    Visit # Insurance Allowable Requires auth    (new insu , 6 visits on previous insurance)    [x]no        []yes:     Functional Scale:LEFS 75% disability   Date assessed:  20  LEFS 78% disability      20  LEFS 81% disability      10/15/20  LEFS 76% disability      20  Therapy Diagnosis/Practice Pattern:E    Number of Comorbidities:  []0     []1-2    [x]3+    Latex Allergy:  [x]NO      []YES  Preferred Language for Healthcare:   [x]English       []other:       Pain level: 1/10 current     SUBJECTIVE:  Pt states that he does not feel as sore as last visit. Had some residual increased pain after strengthening last visit, but did not last longer than 1 day.       OBJECTIVE:    Observation: B LEs wrapped for vascular wounds/drainage + light compression hose B   Test measurements:  Knee flexion AROM 119   MMT hip ABD 3-/5, hip flexion L 5-, R 4+, knee extension R 22.4# 4+/5, R 15.1# 4/5, knee flexion L 5-, R 4+    RESTRICTIONS/PRECAUTIONS: HTN   Procedure(s) 8/3/2020:  1.  Left knee arthroscopy with partial medial meniscectomy and chondroplasty medial femoral condyle with synovial biopsy (89660-24)    Exercises/Interventions:     Therapeutic Ex (36263) Sets/sec Notes/CUES HEP   Pt ed: aquatic therapy vs in this clinic to improve walking distance     Seated calf stretch c strap  Incline board calf stretch lvl 2- toes on board     X   Seated HS stretch   X   HL adductor stretch   X   Rectus stretch EOB 30\"x3 Could not tolerate 10/5    Bridge with increasing knee flex  Bridge on  deg    SLR-   \" ER- indep today x12 R,L 1.5#   X   SAQ 2#  c ball squeeze    SL hip abd R, L  Clamshell R,L  Reverse clamshell R,L 2x101.5#  1# on knee  1# ankle    Seated LAQ 90-40 with ball sq   X   Seated heel slide with glider      Seated hip IR iso  OVl at ankles    Standing HR, TR  At [facility]    Mini squat  At bar    Side step 1/2 wall (short) 3 laps UE support, OVL at ankles    Step up 3\"  x10 Heavy UE support,   [facility] platform    Standing glider: abd, ext diag  UE supp on bar Cue for posture   Sit to stand - standard chair [id number] on thighs intermittently    LP DL  SL 2x10  10x R,L60#  40#    HSC  2x1535#    LAQ 90-40 2x1515#    Soleus press 40#    LATOYA TKE  LATOYA hip ABD 2x10 R,L60#  15#    Bike (low)  Seat 17 Pt too sore today   Manual Intervention (14096) 10' total     Patellar mobs sup, inf      Re-apply bandaids,      Edema massage, STM to pes anserine, distal IT band, adductor muscle belly 10'  Showed pt how to use stick from home   HS s  Knee slightly bent                NMR re-education (42942)   CUES NEEDED   Gait training one crutch      Weight shifting A/P   intermittent fingertip support today    Weight shifting A/P with one crutch   Intermittent CL UE support when L foot back- not ready to wean to one crutch   Split stance balance + airex at 1/2 wall     Intermittent UE support                           Therapeutic Activity (32218)                                                Therapeutic Exercise and NMR EXR  [x] (42486) Provided verbal/tactile cueing for activities related to strengthening, flexibility, endurance, ROM for improvements in LE, proximal hip, and core control with self care, mobility, lifting, ambulation.  [] (76674) Provided verbal/tactile cueing for activities related to improving balance, coordination, kinesthetic sense, posture, motor skill, proprioception  to assist with LE, proximal hip, and core control in self care, mobility, lifting, ambulation and eccentric single leg control.      NMR and Therapeutic Activities:    [x] (63077 or 30773) Provided verbal/tactile cueing for activities related to improving balance, coordination, kinesthetic sense, posture, motor skill, proprioception and motor activation to allow for proper function of core, proximal hip and LE with self care and ADLs  [x] (33466) Gait Re-education- Provided training and instruction to the patient for proper LE, core and proximal hip recruitment and positioning and eccentric body weight control with ambulation re-education including up and down stairs     Home Exercise Program:    [x] (61843) Reviewed/Progressed HEP activities related to strengthening, flexibility, endurance, ROM of core, proximal hip and LE for functional self-care, mobility, lifting and ambulation/stair navigation   [] (16993)Reviewed/Progressed HEP activities related to improving balance, coordination, kinesthetic sense, posture, motor skill, proprioception of core, proximal hip and LE for self care, mobility, lifting, and ambulation/stair navigation      Manual Treatments:  PROM / STM / Oscillations-Mobs:  G-I, II, III, IV (PA's, Inf., Post.)  [x] (60069) Provided manual therapy to mobilize LE, proximal hip and/or LS spine soft tissue/joints for the purpose of modulating pain, promoting relaxation,  increasing ROM, reducing/eliminating soft tissue swelling/inflammation/restriction, improving soft tissue extensibility and allowing for proper ROM for normal function with self care, mobility, lifting and ambulation. Modalities:     [] GAME READY (VASO)- for significant edema, swelling, pain control. (low) in long-sitting   CP x 10' R,L knee long sit with pillow under knees  Charges:  Timed Code Treatment Minutes: 40   Total Treatment Minutes: 60 (CP, rest breaks)     [facility] time in/time out:   (and requires time in and out for each CPT code)    [] EVAL (LOW) 74325 (typically 20 minutes face-to-face)  [] EVAL (MOD) 25428 (typically 30 minutes face-to-face)  [] EVAL (HIGH) 17900 (typically 45 minutes face-to-face)  [] RE-EVAL     [x] SG(81896) x 2   [] IONTO  [] NMR (06809) x     [] VASO  [x] Manual (82111) x 1     [] Other: gait x  [] TA x      [] Mech Traction (24140)  [] ES(attended) (43526)      [] ES (un) (68569):       GOALS:   Patient stated goal: 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 Progressing: []? Met: []? Not Met: []? Adjusted     Therapist goals for Patient:   Short Term Goals: To be achieved in: 2 weeks  1. Independent in HEP and progression per patient tolerance, in order to prevent re-injury. [x]? Progressing: []? Met: []? Not Met: []? Adjusted  2. Patient will have a decrease in pain to facilitate improvement in movement, function, and ADLs as indicated by Functional Deficits. [x]? Progressing: []? Met: []? Not Met: []? Adjusted     Long Term Goals: To be achieved in: 6 weeks  1. Disability index score of 40% or less for the LEFS to assist with reaching prior level of function. []? Progressing: []? Met: [x]? Not Met: []? Adjusted Lower score on LEFS despite pt reporting improvements.   2. Patient will demonstrate increased left knee AROM to 0-110  to allow for proper joint functioning walking with one crutch. Continued CP for pain control. Return to Play: (if applicable)   []  Stage 1: Intro to Strength   []  Stage 2: Return to Run and Strength   []  Stage 3: Return to Jump and Strength   []  Stage 4: Dynamic Strength and Agility   []  Stage 5: Sport Specific Training     []  Ready to Return to Play, Meets All Above Stages   []  Not Ready for Return to Sports   Comments:                                PLAN: Continue PT 2x/week, extend POC another 6 weeks  [x] Continue per plan of care [] Alter current plan (see comments above)  [] Plan of care initiated [] Hold pending MD visit [] Discharge      Electronically signed by:  Itz Honyecutt PT, DPT    Note: If patient does not return for scheduled/ recommended follow up visits, this note will serve as a discharge from care along with most recent update on progress. Access Code: [email or web address]   URL: [email or web address]   Date: 10/08/2020   Prepared by: Itz Honeycutt     Exercises   Supine Hip Adductor Stretch - 3 sets - 15s hold - 1x daily - 7x weekly   Bridge - 10 reps - 1x daily - 7x weekly   Small Range Straight Leg Raise - 5 reps - 3 sets - 1x daily - 7x weekly   Sidelying Hip Abduction - 10 reps - 2 sets - 1x daily - 4x weekly   Clamshell - 10 reps - 2 sets - 1x daily - 4x weekly   Seated Gastroc Stretch with Strap - 3 sets - 30s hold - 1x daily - 7x weekly   Seated Hamstring Stretch - 3 sets - 20s hold - 1x daily - 7x weekly   Heel rises with counter support - 10 reps - 3 sets - 1x daily - 7x weekly   Patient Education   Pain Management

## 2020-11-17 RX ORDER — BLOOD SUGAR DIAGNOSTIC
STRIP MISCELLANEOUS
Qty: 100 EACH | Refills: 0 | Status: SHIPPED | OUTPATIENT
Start: 2020-11-17 | End: 2020-12-04 | Stop reason: SDUPTHER

## 2020-11-17 RX ORDER — LANCETS 28 GAUGE
EACH MISCELLANEOUS
Qty: 100 EACH | Refills: 0 | Status: SHIPPED | OUTPATIENT
Start: 2020-11-17 | End: 2020-12-04 | Stop reason: SDUPTHER

## 2020-11-19 ENCOUNTER — TELEPHONE (OUTPATIENT)
Dept: ORTHOPEDIC SURGERY | Age: 60
End: 2020-11-19

## 2020-11-19 ENCOUNTER — HOSPITAL ENCOUNTER (OUTPATIENT)
Dept: PHYSICAL THERAPY | Age: 60
Setting detail: THERAPIES SERIES
Discharge: HOME | End: 2020-11-19
Payer: COMMERCIAL

## 2020-11-19 PROCEDURE — 97140 MANUAL THERAPY 1/> REGIONS: CPT

## 2020-11-19 PROCEDURE — 97110 THERAPEUTIC EXERCISES: CPT

## 2020-11-19 NOTE — TELEPHONE ENCOUNTER
CPT: 82327  BODY PART: left knee  AUTHORIZATION: pending    Submitted on [facility] 11/19/20    Per Jose Miguel Handler, case is still pending 11/25/20    Per Yanira, procedure is experimental.  Will forward to Hanna Kasper for appeal    Opened case with [employer] for appeal 12/3/20    Per patient, procedure was paid

## 2020-11-19 NOTE — FLOWSHEET NOTE
[facility],  [address] [place]  Phone: [phone]  Fax [phone]    Physical Therapy Treatment Note/ Progress Report:           Date:  2020    Patient Name:  Elmer Hartman    :  1960  MRN: [id number]  Restrictions/Precautions:    Medical/Treatment Diagnosis Information:  · Diagnosis: K11.660V (ICD-10-CM) - Complex tear of medial meniscus of left knee as current injury, initial encounter  · Treatment Diagnosis: M25.562 Left knee pain; M25.662 Left knee stiffness; R26.2 Difficulty in walking  Insurance/Certification information:  PT Insurance Information: [employer] [place]  Physician Information:  Referring Practitioner: Dr. Shireen Duverney  Has the plan of care been signed (Y/N):        [x]  Yes  []  No     Date of Patient follow up with Physician:       Is this a Progress Report:     []  Yes  [x]  No        If Yes:  Date Range for reporting period:  Beginning   Ending 20    Progress report will be due (10 Rx or 30 days whichever is less): 87/3  Recertification will be due (POC Duration  / 90 days whichever is less):    Visit # Insurance Allowable Requires auth    (new insu , 6 visits on previous insurance)    [x]no        []yes:     Functional Scale:LEFS 75% disability   Date assessed:  20  LEFS 78% disability      20  LEFS 81% disability      10/15/20  LEFS 76% disability      20  Therapy Diagnosis/Practice Pattern:E    Number of Comorbidities:  []0     []1-2    [x]3+    Latex Allergy:  [x]NO      []YES  Preferred Language for Healthcare:   [x]English       []other:       Pain level: 1/10 current     SUBJECTIVE:  Pt states that he did have some soreness that lasted for a few hours after LV, but not too bad. He still is using 2 crutches when he goes in and out of buildings, but is starting to use one at home sometimes.  He thought about bringing 1 into his session today, but sometimes feels sore after his session so he thought that he would need two on the way out.       OBJECTIVE:    Observation: B LEs wrapped for vascular wounds/drainage + light compression hose B   Test measurements:  Knee flexion AROM 119   MMT hip ABD 3-/5, hip flexion L 5-, R 4+, knee extension R 22.4# 4+/5, R 15.1# 4/5, knee flexion L 5-, R 4+    RESTRICTIONS/PRECAUTIONS: HTN   Procedure(s) 8/3/2020:  1.  Left knee arthroscopy with partial medial meniscectomy and chondroplasty medial femoral condyle with synovial biopsy (37790-42)    Exercises/Interventions:     Therapeutic Ex (63477) Sets/sec Notes/CUES HEP   Pt ed: aquatic therapy vs in this clinic to improve walking distance     Seated calf stretch c strap  Incline board calf stretch lvl 2- toes on board     X   Seated HS stretch   X   HL adductor stretch   X   Rectus stretch EOB 30\"x3     Bridge with increasing knee flex  Bridge on  deg    SLR-   \" ER- indep today x15 R,L 1.5#   X   SAQ 2#  c ball squeeze    SL hip abd R, L  Clamshell R,L  Reverse clamshell R,L 2x101.5#  1# on knee  1# ankle    Seated LAQ 90-40 with ball sq   X   Seated heel slide with glider      Seated hip IR iso  OVl at ankles    Standing HR, TR  At [facility]    Mini squat  At bar    Side step 1/2 wall (short) 3 laps UE support, GVL at ankles    Step up 3\"  x10 Heavy UE support,   [facility] platform    Standing glider: abd, ext diag  UE supp on bar Cue for posture   Sit to stand - standard chair Hands on thighs intermittently    LP DL  SL 2x10  2x10 R,L60#  40#   Sore L by second set   [address]  3x1035#    LAQ 90-40 3x1015#    Soleus press 3x1045#    LATOYA TKE  LATOYA hip ABD 2x10 R,L60#  15#    Bike (low)  Seat 17 Pt too sore today   Manual Intervention (13601) 10' total     Patellar mobs sup, inf      Re-apply bandaids,      Edema massage, STM to pes anserine, distal IT band, adductor muscle belly 8'  Showed pt how to use stick from home   HS s  Knee slightly bent                NMR re-education (28137)   CUES NEEDED   Gait training one crutch      Weight shifting A/P   intermittent fingertip support today    Weight shifting A/P with one crutch   Intermittent CL UE support when L foot back- not ready to wean to one crutch   Split stance balance + airex at 1/2 wall     Intermittent UE support                           Therapeutic Activity (58395)                                                Therapeutic Exercise and NMR EXR  [x] (30605) Provided verbal/tactile cueing for activities related to strengthening, flexibility, endurance, ROM for improvements in LE, proximal hip, and core control with self care, mobility, lifting, ambulation.  [] (30271) Provided verbal/tactile cueing for activities related to improving balance, coordination, kinesthetic sense, posture, motor skill, proprioception  to assist with LE, proximal hip, and core control in self care, mobility, lifting, ambulation and eccentric single leg control.      NMR and Therapeutic Activities:    [x] (23111 or 21083) Provided verbal/tactile cueing for activities related to improving balance, coordination, kinesthetic sense, posture, motor skill, proprioception and motor activation to allow for proper function of core, proximal hip and LE with self care and ADLs  [x] (18198) Gait Re-education- Provided training and instruction to the patient for proper LE, core and proximal hip recruitment and positioning and eccentric body weight control with ambulation re-education including up and down stairs     Home Exercise Program:    [x] (92389) Reviewed/Progressed HEP activities related to strengthening, flexibility, endurance, ROM of core, proximal hip and LE for functional self-care, mobility, lifting and ambulation/stair navigation   [] (43751)Reviewed/Progressed HEP activities related to improving balance, coordination, kinesthetic sense, posture, motor skill, proprioception of core, proximal hip and LE for self care, mobility, lifting, and ambulation/stair navigation      Manual Treatments:  PROM / STM / Oscillations-Mobs:  G-I, II, III, IV (PA's, Inf., Post.)  [x] (55196) Provided manual therapy to mobilize LE, proximal hip and/or LS spine soft tissue/joints for the purpose of modulating pain, promoting relaxation,  increasing ROM, reducing/eliminating soft tissue swelling/inflammation/restriction, improving soft tissue extensibility and allowing for proper ROM for normal function with self care, mobility, lifting and ambulation. Modalities:     [] GAME READY (VASO)- for significant edema, swelling, pain control. (low) in long-sitting   CP x 10' R,L knee long sit with pillow under knees  Charges:  Timed Code Treatment Minutes: 40   Total Treatment Minutes: 45 (rest breaks)     [facility] time in/time out:   (and requires time in and out for each CPT code)    [] EVAL (LOW) 21268 (typically 20 minutes face-to-face)  [] EVAL (MOD) 61907 (typically 30 minutes face-to-face)  [] EVAL (HIGH) 03452 (typically 45 minutes face-to-face)  [] RE-EVAL     [x] BU(25412) x 2   [] IONTO  [] NMR (76621) x     [] VASO  [x] Manual (73151) x 1     [] Other: gait x  [] TA x      [] Mech Traction (55062)  [] ES(attended) (78371)      [] ES (un) (49194):       GOALS:   Patient stated goal: 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 Progressing: []? Met: []? Not Met: []? Adjusted     Therapist goals for Patient:   Short Term Goals: To be achieved in: 2 weeks  1. Independent in HEP and progression per patient tolerance, in order to prevent re-injury. [x]? Progressing: []? Met: []? Not Met: []? Adjusted  2. Patient will have a decrease in pain to facilitate improvement in movement, function, and ADLs as indicated by Functional Deficits. [x]? Progressing: []? Met: []? Not Met: []? Adjusted     Long Term Goals: To be achieved in: 6 weeks  1. Disability index score of 40% or less for the LEFS to assist with reaching prior level of function.    []? Progressing: []? Met: [x]? Not Met: []? Adjusted Lower score on LEFS despite pt reporting improvements. 2. Patient will demonstrate increased left knee AROM to 0-110  to allow for proper joint functioning for car/truck mobility, stair amb.   []? Progressing: [x]? Met: []? Not Met: []? Adjusted  3. Patient will demonstrate an increase in Strength to at least 4+/5 for the left knee and hip stabilizers in order to ambulate s AD for community distances. [x]? Progressing: []? Met: []? Not Met: []? Adjusted  4. Patient will return to walking community distances s AD with mild bilateral knee pain (<3/10). [x]? Progressing: []? Met: []? Not Met: []? Adjusted  5. Pt will be able to drive x 2 hours without increased knee pain; car mobility without knee pain. pt able to drive 1.5 hours, but more uncomfortable as a passenger  [x]? Progressing: []? Met: []? Not Met: []? Adjusted          Progression Towards Functional goals:  [] Patient is progressing as expected towards functional goals listed. [x] Progression is slowed due to complexities listed. [] Progression has been slowed due to co-morbidities. [] Plan just implemented, too soon to assess goals progression  [] Other:         Overall Progression Towards Functional goals/ Treatment Progress Update:  [] Patient is progressing as expected towards functional goals listed. [x] Progression is slowed due to complexities/Impairments listed. [] Progression has been slowed due to co-morbidities.   [] Plan just implemented, too soon to assess goals progression <30days   [] Goals require adjustment due to lack of progress  [] Patient is not progressing as expected and requires additional follow up with physician  [] Other    Prognosis for POC: [x] Good [] Fair  [] Poor      Patient requires continued skilled intervention: [x] Yes  [] No    Treatment/Activity Tolerance:  [x] Patient able to complete treatment  [] Patient limited by fatigue  [x] Patient limited by pain    []

## 2020-11-23 ENCOUNTER — VIRTUAL VISIT (OUTPATIENT)
Dept: INTERNAL MEDICINE CLINIC | Age: 60
End: 2020-11-23
Payer: COMMERCIAL

## 2020-11-23 ENCOUNTER — HOSPITAL ENCOUNTER (OUTPATIENT)
Dept: PHYSICAL THERAPY | Age: 60
Setting detail: THERAPIES SERIES
Discharge: HOME | End: 2020-11-23
Payer: COMMERCIAL

## 2020-11-23 PROBLEM — L60.0 INGROWN RIGHT BIG TOENAIL: Status: ACTIVE | Noted: 2020-11-23

## 2020-11-23 PROCEDURE — 99213 OFFICE O/P EST LOW 20 MIN: CPT | Performed by: NURSE PRACTITIONER

## 2020-11-23 RX ORDER — CEPHALEXIN 500 MG/1
500 CAPSULE ORAL 2 TIMES DAILY
Qty: 14 CAPSULE | Refills: 0 | Status: SHIPPED | OUTPATIENT
Start: 2020-11-23 | End: 2020-11-30

## 2020-11-23 ASSESSMENT — PATIENT HEALTH QUESTIONNAIRE - PHQ9
2. FEELING DOWN, DEPRESSED OR HOPELESS: 0
SUM OF ALL RESPONSES TO PHQ QUESTIONS 1-9: 0
SUM OF ALL RESPONSES TO PHQ QUESTIONS 1-9: 0
SUM OF ALL RESPONSES TO PHQ9 QUESTIONS 1 & 2: 0
1. LITTLE INTEREST OR PLEASURE IN DOING THINGS: 0
SUM OF ALL RESPONSES TO PHQ QUESTIONS 1-9: 0

## 2020-11-23 ASSESSMENT — ENCOUNTER SYMPTOMS
DIARRHEA: 0
BLOOD IN STOOL: 0
COLOR CHANGE: 0
CHEST TIGHTNESS: 0
SHORTNESS OF BREATH: 0
NAUSEA: 0
VOMITING: 0
WHEEZING: 0
EYE ITCHING: 0
CONSTIPATION: 0
EYE REDNESS: 0
ABDOMINAL PAIN: 0
BACK PAIN: 0
RHINORRHEA: 0
SORE THROAT: 0
SINUS PRESSURE: 0
COUGH: 0

## 2020-11-23 NOTE — PROGRESS NOTES
2020    TELEHEALTH EVALUATION -- Audio/Visual (During IMQVT-19 public health emergency)    HPI:    Anoop Rogers (:  1960) has requested an audio/video evaluation for the following concern(s):    Chief Complaint   Patient presents with    Nail Problem   states that 3 weeks ago was dealing with ingrown toenail. He states that there is green drainage coming from under the toe nail. He cut it back 3 weeks ago. He states that it is no longer throbbing, but when he touches it there is some pain. Review of Systems   Constitutional: Negative for chills, fatigue and fever. HENT: Negative for congestion, ear pain, postnasal drip, rhinorrhea, sinus pressure, sneezing and sore throat. Eyes: Negative for redness and itching. Respiratory: Negative for cough, chest tightness, shortness of breath and wheezing. Cardiovascular: Negative for chest pain and palpitations. Gastrointestinal: Negative for abdominal pain, blood in stool, constipation, diarrhea, nausea and vomiting. Endocrine: Negative for cold intolerance and heat intolerance. Genitourinary: Negative for difficulty urinating, dysuria, flank pain, frequency, hematuria and urgency. Musculoskeletal: Negative for arthralgias, back pain, joint swelling and myalgias. Skin: Positive for wound (right great toe). Negative for color change, pallor and rash. Allergic/Immunologic: Negative for environmental allergies and food allergies. Neurological: Negative for dizziness, seizures, syncope, weakness, light-headedness, numbness and headaches. Hematological: Negative for adenopathy. Does not bruise/bleed easily. Psychiatric/Behavioral: Negative for confusion, sleep disturbance and suicidal ideas. The patient is not nervous/anxious and is not hyperactive. Prior to Visit Medications    Medication Sig Taking?  Authorizing Provider   cephALEXin (KEFLEX) 500 MG capsule Take 1 capsule by mouth 2 times daily for 7 days Yes Nacho Ojeda Gettelfinger, APRN - CNP   Prodigy Twist Top Lancets 28G MISC TEST TWO TIMES A DAY  JACKLYN Eli CNP NO CODING BLOOD GLUC strip TEST TWO TIMES A DAY  JACKLYN Eli CNP   metFORMIN (GLUCOPHAGE) 500 MG tablet Take 1 tablet by mouth 2 times daily (with meals)  JACKLYN Flores CNP   blood glucose monitor kit and supplies Check BG twice daily  JACKLYN Parra CNP   furosemide (LASIX) 80 MG tablet Take 1 tablet by mouth 2 times daily  Juanpablo Figueroa MD   metOLazone (ZAROXOLYN) 2.5 MG tablet Take 1 tablet by mouth three times a week  Juanpablo Figueroa MD   potassium chloride (KLOR-CON M) 20 MEQ TBCR extended release tablet Take 1 tablet by mouth 2 times daily  Juanpablo Figueroa MD   etodolac (LODINE) 400 MG tablet Take 1 tablet by mouth 2 times daily  Camryn Sims MD   rOPINIRole (REQUIP) 2 MG tablet Take 1 tablet by mouth nightly  JAKCLYN Parra CNP   hydroCHLOROthiazide (HYDRODIURIL) 25 MG tablet TAKE ONE TABLET BY MOUTH ONE TIME DAILY  JACKLYN Eli CNP   fluticasone (FLONASE) 50 MCG/ACT nasal spray 1 spray by Nasal route daily  JACKLYN Parra CNP   lisinopril (PRINIVIL;ZESTRIL) 10 MG tablet Take 1 tablet by mouth daily  JACKLYN Parra CNP   loratadine (CLARITIN) 10 MG tablet Take 1 tablet by mouth daily  JACKLYN Flores CNP       Past Medical History:   Diagnosis Date    Bilateral venous leg ulcers 09/2020    Cellulitis of lower extremity 9/25/2019    Hypertension     Impaired fasting glucose 5/2013    Obesity     Preoperative clearance 10/26/2020    Screening PSA (prostate specific antigen) 12/30/2015;5/1/17    Nml:0.53(12/30/2015);5/1/17=nml.     Sleep apnea     Stasis dermatitis of both legs 8/28/2020    Tobacco use     Tubular adenoma of colon 09/14/2017      Past Surgical History:   Procedure Laterality Date    CIRCUMCISION      COLONOSCOPY  09/14/2017 Colonoscopy with polypectomy (cold biopsy):Dr. Walker:next in 3yrs=2020    KNEE ARTHROSCOPY Left 8/3/2020    VIDEO ARTHROSCOPY LEFT KNEE, PARTIAL MEDIAL MENISCECTOMY, CHONDROPLASTY, SYNOVIAL BIOPSY -TOM=4- performed by Sami Madison MD at [address] [facility]  2011    VASECTOMY      WISDOM TOOTH EXTRACTION        Relationships   Social connections    Talks on phone: Not on file    Gets together: Not on file    Attends Hinduism service: Not on file    Active member of club or organization: Not on file    Attends meetings of clubs or organizations: Not on file    Relationship status: Not on file     Family History   Problem Relation Age of Onset    High Blood Pressure Mother     Heart Disease Mother         MI    AT 66    Diabetes Brother          Patient-Reported Vitals 2020   Patient-Reported Weight 420lb   Patient-Reported Height 5 8        PHYSICAL EXAMINATION:  Physical Exam  Constitutional:       Appearance: Normal appearance. HENT:      Head: Normocephalic and atraumatic. Nose: Nose normal.   Eyes:      Extraocular Movements: Extraocular movements intact. Conjunctiva/sclera: Conjunctivae normal.      Pupils: Pupils are equal, round, and reactive to light. Neck:      Musculoskeletal: Normal range of motion. Pulmonary:      Effort: Pulmonary effort is normal.   Musculoskeletal: Normal range of motion. Skin:     Coloration: Skin is not jaundiced or pale. Findings: No bruising, erythema, lesion or rash. Comments: Right great toenail with infection from ingrown nail    Neurological:      Mental Status: He is alert and oriented to person, place, and time. Mental status is at baseline. Psychiatric:         Mood and Affect: Mood normal.         Behavior: Behavior normal.         Thought Content:  Thought content normal.         Judgment: Judgment normal.         ASSESSMENT/PLAN:  Problem List     Ingrown right big

## 2020-11-23 NOTE — ASSESSMENT & PLAN NOTE
Education to not trim nails as he is diabetic  Further management to podiatry   Keflex for infected nail  Follow up as previously scheduled

## 2020-11-25 ENCOUNTER — OFFICE VISIT (OUTPATIENT)
Dept: BARIATRICS/WEIGHT MGMT | Age: 60
End: 2020-11-25
Payer: COMMERCIAL

## 2020-11-25 VITALS
SYSTOLIC BLOOD PRESSURE: 101 MMHG | DIASTOLIC BLOOD PRESSURE: 68 MMHG | WEIGHT: 315 LBS | HEART RATE: 94 BPM | BODY MASS INDEX: 47.74 KG/M2 | HEIGHT: 68 IN

## 2020-11-25 PROCEDURE — 99213 OFFICE O/P EST LOW 20 MIN: CPT | Performed by: SURGERY

## 2020-11-25 NOTE — PROGRESS NOTES
[facility]) Physicians   General & Laparoscopic Surgery  [employer]       HPI:     Grupo Dooley is a very pleasant 64 y.o. male with Body mass index is 59.85 kg/m². , Pre-Surgery. Pre-operative clearance and work up pending. Working hard to keep good dietary habits as well level of activity. Patient denies any nausea, vomiting, fevers, chills, shortness of breath, chest pain, cough, constipation or difficulty urinating. Past Medical History:   Diagnosis Date    Bilateral venous leg ulcers 2020    Cellulitis of lower extremity 2019    Hypertension     Impaired fasting glucose 2013    Obesity     Preoperative clearance 10/26/2020    Screening PSA (prostate specific antigen) 2015;17    Nml:0.53(2015);17=nml.  Sleep apnea     Stasis dermatitis of both legs 2020    Tobacco use     Tubular adenoma of colon 2017     Past Surgical History:   Procedure Laterality Date    CIRCUMCISION      COLONOSCOPY  2017    Colonoscopy with polypectomy (cold biopsy):Dr. Walker:next in 3yrs=2020    KNEE ARTHROSCOPY Left 8/3/2020    VIDEO ARTHROSCOPY LEFT KNEE, PARTIAL MEDIAL MENISCECTOMY, CHONDROPLASTY, SYNOVIAL BIOPSY -TOM=4- performed by Marla Gudino MD at [address] [facility]  2011    VASECTOMY      WISDOM TOOTH EXTRACTION       Family History   Problem Relation Age of Onset    High Blood Pressure Mother     Heart Disease Mother         MI    AT 66    Diabetes Brother      Social History     Tobacco Use    Smoking status: Former Smoker     Packs/day: 2.00     Years: 25.00     Pack years: 50.00     Types: Cigarettes     Last attempt to quit: 2006     Years since quittin.5    Smokeless tobacco: Never Used   Substance Use Topics    Alcohol use: Yes     Comment: ocas     I counseled the patient on the importance of not smoking and risks of ETOH.    Allergies   Allergen Reactions negative  Cardiovascular ROS: negative  Gastrointestinal ROS:negative  Genito-Urinary ROS: negative  Musculoskeletal ROS: negative   Skin ROS: negative    Physical Exam   Vitals Reviewed   Constitutional: Patient is oriented to person, place, and time. Patient appears well-developed and well-nourished. Patient is active and cooperative. Non-toxic appearance. No distress. Neck: Trachea normal and normal range of motion. No JVD present. Pulmonary/Chest: Effort normal. No accessory muscle usage or stridor. No apnea. No respiratory distress. Cardiovascular: Normal rate and no JVD. Abdominal: Normal appearance. Patient exhibits no distension. Abdomen is soft, obese, non tender. Musculoskeletal: Normal range of motion. Patient exhibits no edema. Neurological: Patient is alert and oriented to person, place, and time. Patient has normal strength. GCS eye subscore is 4. GCS verbal subscore is 5. GCS motor subscore is 6. Skin: Skin is warm and dry. No abrasion and no rash noted. Patient is not diaphoretic. No cyanosis or erythema. Psychiatric: Patient has a normal mood and affect. Speech is normal and behavior is normal. Cognition and memory are normal.       A/P    Elmer Hartman is 64 y.o. male, Body mass index is 59.85 kg/m². pre surgery, has lost 12# since last visit. The patient underwent dietary counseling with registered dietician. I have reviewed, discussed and agree with the dietary plan. Patient is trying hard to keep good dietary and behavior modifications. Patient is monitoring portion sizes, food choices and liquid calories. Patient is trying to exercise regularly as much as possible. We discussed how his weight affects his overall health including:  Zunilda Morelos was seen today for weight management.     Diagnoses and all orders for this visit:    Morbid obesity with BMI of 50.0-59.9, adult ([facility] [address]    TOM treated with BiPAP    Essential hypertension, benign       and importance of weight loss to alleviate those co morbid conditions. I encouraged the patient to continue exercise and keeping healthy eating habits. Discussed pre-op labs and work up till now. Also counseled the patient extensively on Surgery. I spent 15 minutes face to face with patient with more than 50% of the time counseling and/or coordinating care for weight loss surgery. RTC in 4 weeks  Obtain rest of pre-op work up / clearances  Diet and Exercise      Patient advised that its their responsibility to follow up for studies and/or labs ordered today.      Agata Weaver

## 2020-11-25 NOTE — PROGRESS NOTES
Peggy Vela lost 11.6 lbs over the past month. Pt is pleased with weight loss; he was recently diagnosed with diabetes  Breakfast: tuna or chix salad with crax, pickles OR leftover pork chop    Snack: none    Lunch: homemade chix/veg soup    Snack: sf popsicle    Dinner: wheat spaghetti with sauce    Snack: none    Is pt consuming smaller portions? yes he is measuring portions    Is pt consuming at least 64 oz of fluids per day? yes water only    Is pt consuming carbonated, caffeinated, or sugary beverages? no; eliminate carbonated water    Has pt sampled Unjury and/or Nectar protein?  Yes; tried and tolerated    Exercise: none because of knee issues and nonhealing wound    Plan/Recommendations: add protein based snacks in am and afternoon    Handouts: none    [place] Stage

## 2020-11-30 ENCOUNTER — HOSPITAL ENCOUNTER (OUTPATIENT)
Dept: PHYSICAL THERAPY | Age: 60
Setting detail: THERAPIES SERIES
Discharge: HOME | End: 2020-11-30
Payer: COMMERCIAL

## 2020-11-30 ENCOUNTER — OFFICE VISIT (OUTPATIENT)
Dept: PULMONOLOGY | Age: 60
End: 2020-11-30
Payer: COMMERCIAL

## 2020-11-30 VITALS
RESPIRATION RATE: 18 BRPM | HEART RATE: 102 BPM | BODY MASS INDEX: 47.74 KG/M2 | WEIGHT: 315 LBS | HEIGHT: 68 IN | TEMPERATURE: 97 F | OXYGEN SATURATION: 95 %

## 2020-11-30 PROBLEM — G47.33 OSA TREATED WITH BIPAP: Status: ACTIVE | Noted: 2020-11-30

## 2020-11-30 PROBLEM — Z87.891 HISTORY OF TOBACCO USE DISORDER: Status: ACTIVE | Noted: 2020-11-30

## 2020-11-30 PROCEDURE — 99202 OFFICE O/P NEW SF 15 MIN: CPT | Performed by: INTERNAL MEDICINE

## 2020-11-30 PROCEDURE — 97140 MANUAL THERAPY 1/> REGIONS: CPT

## 2020-11-30 PROCEDURE — 97110 THERAPEUTIC EXERCISES: CPT

## 2020-11-30 NOTE — PROGRESS NOTES
[facility], [address] [place]  Phone: [phone]  Fax [phone]    Physical Therapy Treatment Note/ Progress Report:           Date:  2020    Patient Name:  Vanessa Gaines    :  1960  MRN: [id number]  Restrictions/Precautions:    Medical/Treatment Diagnosis Information:  · Diagnosis: F16.271R (ICD-10-CM) - Complex tear of medial meniscus of left knee as current injury, initial encounter  · Treatment Diagnosis: M25.562 Left knee pain; M25.662 Left knee stiffness; R26.2 Difficulty in walking  Insurance/Certification information:  PT Insurance Information: [employer]  Physician Information:  Referring Practitioner: Dr. Lauren Rutherford  Has the plan of care been signed (Y/N):        [x]  Yes  []  No     Date of Patient follow up with Physician:       Is this a Progress Report:     []  Yes  [x]  No        If Yes:  Date Range for reporting period:  Beginning   Ending 20    Progress report will be due (10 Rx or 30 days whichever is less):   Recertification will be due (POC Duration  / 90 days whichever is less):    Visit # Insurance Allowable Requires auth    30 (new insu , 6 visits on previous insurance)    [x]no        []yes:     Functional Scale:LEFS 75% disability   Date assessed:  20  LEFS 78% disability      20  LEFS 81% disability      10/15/20  LEFS 76% disability      20  Therapy Diagnosis/Practice Pattern:E    Number of Comorbidities:  []0     []1-2    [x]3+    Latex Allergy:  [x]NO      []YES  Preferred Language for Healthcare:   [x]English       []other:       Pain level: 1/10 current     SUBJECTIVE:  Pt states that he was pretty sore after last visit and it lasted longer than it usually does after his PT sessions. He still has the most pain in the morning, but it improves as he starts to walk. He has been using one crutch around the house 50% of the time.  He uses both crutches to get down the stairs.      OBJECTIVE:    Observation: B LEs wrapped for vascular wounds/drainage + light compression hose B   Test measurements:  Knee flexion AROM 120   MMT hip ABD L 3+/5  26.8#, R 4-/5 33.1#, hip flexion L 5-/43.1#, R 5/5/45.2#, knee extension R 20.3# 4/5 (knee more painful today), R 35.5# 4+/5, knee flexion L 5-, R 5    RESTRICTIONS/PRECAUTIONS: HTN   Procedure(s) 8/3/2020:  1.  Left knee arthroscopy with partial medial meniscectomy and chondroplasty medial femoral condyle with synovial biopsy (92316-92)    Exercises/Interventions:     Therapeutic Ex (60516) Sets/sec Notes/CUES HEP   Pt ed: aquatic therapy vs in this clinic to improve walking distance     Seated calf stretch c strap  Incline board calf stretch lvl 2- toes on board     X   Seated HS stretch   X   HL adductor stretch   X   Rectus stretch EOB 30\"x3     Bridge with increasing knee flex  Bridge on  deg    SLR-   \" ER- indep today x15 R,L 1#   X   SAQ 2#  c ball squeeze    SL hip abd R, L  Clamshell R,L  Reverse clamshell R,L 2x101#  1# on knee  1# ankle    Seated LAQ 90-40 with ball sq   X   Seated heel slide with glider      Seated hip IR iso 5\"x10 GVl at ankles    Standing HR, TR  At [facility]    Mini squat  At bar    Side step 1/2 wall (short) 3 laps UE support, GVL at ankles    Step up 3\"  x10 Heavy UE support,   [facility] platform    Standing glider: abd, ext diag  UE supp on bar Cue for posture   Sit to stand - Second to highest box [id number] on thighs intermittently    LP DL  SL 60#  40#   Sore L by second set   [address]  3x1035#    LAQ 90-40 2x1010#    Soleus press 45#    LATOYA TKE  LATOYA hip ABD 2x10 R,L60#  15#    Bike (low)  Seat 17 Pt too sore today   Manual Intervention (84452) 8' total     Patellar mobs sup, inf 20x     Re-apply bandaids,      Edema massage, STM to pes anserine, distal IT band, adductor muscle belly 7'  Showed pt how to use stick from home   HS s  Knee slightly bent                NMR re-education (84089)   CUES NEEDED   Gait training one crutch      Weight shifting A/P   intermittent fingertip support today    Weight shifting A/P with one crutch   Intermittent CL UE support when L foot back- not ready to wean to one crutch   Split stance balance + airex at 1/2 wall     Intermittent UE support                           Therapeutic Activity (94614)                                                Therapeutic Exercise and NMR EXR  [x] (86292) Provided verbal/tactile cueing for activities related to strengthening, flexibility, endurance, ROM for improvements in LE, proximal hip, and core control with self care, mobility, lifting, ambulation.  [] (01666) Provided verbal/tactile cueing for activities related to improving balance, coordination, kinesthetic sense, posture, motor skill, proprioception  to assist with LE, proximal hip, and core control in self care, mobility, lifting, ambulation and eccentric single leg control.      NMR and Therapeutic Activities:    [x] (16677 or 44638) Provided verbal/tactile cueing for activities related to improving balance, coordination, kinesthetic sense, posture, motor skill, proprioception and motor activation to allow for proper function of core, proximal hip and LE with self care and ADLs  [x] (56211) Gait Re-education- Provided training and instruction to the patient for proper LE, core and proximal hip recruitment and positioning and eccentric body weight control with ambulation re-education including up and down stairs     Home Exercise Program:    [x] (43827) Reviewed/Progressed HEP activities related to strengthening, flexibility, endurance, ROM of core, proximal hip and LE for functional self-care, mobility, lifting and ambulation/stair navigation   [] (36577)Reviewed/Progressed HEP activities related to improving balance, coordination, kinesthetic sense, posture, motor skill, proprioception of core, proximal hip and LE for self care, mobility, lifting, and ambulation/stair navigation      Manual Treatments:  PROM / STM / Oscillations-Mobs:  G-I, II, III, IV (PA's, Inf., Post.)  [x] (95411) Provided manual therapy to mobilize LE, proximal hip and/or LS spine soft tissue/joints for the purpose of modulating pain, promoting relaxation,  increasing ROM, reducing/eliminating soft tissue swelling/inflammation/restriction, improving soft tissue extensibility and allowing for proper ROM for normal function with self care, mobility, lifting and ambulation. Modalities:     [] GAME READY (VASO)- for significant edema, swelling, pain control. (low) in long-sitting   CP x 10' R,L knee long sit with pillow under knees  Charges:  Timed Code Treatment Minutes: 40   Total Treatment Minutes: 55 (rest breaks, CP)     BW time in/time out:   (and requires time in and out for each CPT code)    [] EVAL (LOW) 56592 (typically 20 minutes face-to-face)  [] EVAL (MOD) 93582 (typically 30 minutes face-to-face)  [] EVAL (HIGH) 96061 (typically 45 minutes face-to-face)  [] RE-EVAL     [x] DB(95027) x 2   [] IONTO  [] NMR (98771) x     [] VASO  [x] Manual (60755) x 1     [] Other: gait x  [] TA x      [] Mech Traction (87398)  [] ES(attended) (01197)      [] ES (un) (52407):       GOALS:   Patient stated goal: 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 Progressing: []? Met: []? Not Met: []? Adjusted     Therapist goals for Patient:   Short Term Goals: To be achieved in: 2 weeks  1. Independent in HEP and progression per patient tolerance, in order to prevent re-injury. []? Progressing: [x]? Met: []? Not Met: []? Adjusted  2. Patient will have a decrease in pain to facilitate improvement in movement, function, and ADLs as indicated by Functional Deficits. [x]? Progressing: []? Met: []? Not Met: []? Adjusted     Long Term Goals: To be achieved in: 6 weeks  1. Disability index score of 40% or less for the LEFS to assist with reaching prior level of function. []? Progressing: []? Met: [x]? Not Met: []? Adjusted Lower score on LEFS despite pt reporting improvements. 2. Patient will demonstrate increased left knee AROM to 0-110  to allow for proper joint functioning for car/truck mobility, stair amb.   []? Progressing: [x]? Met: []? Not Met: []? Adjusted  3. Patient will demonstrate an increase in Strength to at least 4+/5 for the left knee and hip stabilizers in order to ambulate s AD for community distances. [x]? Progressing: []? Met: []? Not Met: []? Adjusted  4. Patient will return to walking community distances s AD with mild bilateral knee pain (<3/10). [x]? Progressing: []? Met: []? Not Met: []? Adjusted  5. Pt will be able to drive x 2 hours without increased knee pain; car mobility without knee pain. pt able to drive 1.5 hours, but more uncomfortable as a passenger  [x]? Progressing: []? Met: []? Not Met: []? Adjusted          Progression Towards Functional goals:  [] Patient is progressing as expected towards functional goals listed. [x] Progression is slowed due to complexities listed. [] Progression has been slowed due to co-morbidities. [] Plan just implemented, too soon to assess goals progression  [] Other:         Overall Progression Towards Functional goals/ Treatment Progress Update:  [] Patient is progressing as expected towards functional goals listed. [x] Progression is slowed due to complexities/Impairments listed. [] Progression has been slowed due to co-morbidities.   [] Plan just implemented, too soon to assess goals progression <30days   [] Goals require adjustment due to lack of progress  [] Patient is not progressing as expected and requires additional follow up with physician  [] Other    Prognosis for POC: [x] Good [] Fair  [] Poor      Patient requires continued skilled intervention: [x] Yes  [] No    Treatment/Activity Tolerance:  [x] Patient able to complete treatment  [] Patient limited by fatigue  [x] Patient limited by pain [] Patient limited by other medical complications  [] Other:     ASSESSMENT: Pt still fatigues easily, but is no longer unsteady when walking with one crutch. He is motivated to continue strengthening so that he can walk without his crutches. Pt is building strength in bilateral LE's as evidenced by improvements in MMT documented above. He should continue with strengthening to improve gait patterns further, improve tolerance to stairs, and transitions. Return to Play: (if applicable)   []  Stage 1: Intro to Strength   []  Stage 2: Return to Run and Strength   []  Stage 3: Return to Jump and Strength   []  Stage 4: Dynamic Strength and Agility   []  Stage 5: Sport Specific Training     []  Ready to Return to Play, Meets All Above Stages   []  Not Ready for Return to Sports   Comments:                                PLAN: Continue PT 2x/week, extend POC another 6 weeks  [x] Continue per plan of care [] Alter current plan (see comments above)  [] Plan of care initiated [] Hold pending MD visit [] Discharge      Electronically signed by:  Angel Palencia, PT, DPT    Note: If patient does not return for scheduled/ recommended follow up visits, this note will serve as a discharge from care along with most recent update on progress. Access Code: [email or web address]   URL: [employer]/   Date: 10/08/2020   Prepared by: Angel Palencia     Exercises   Supine Hip Adductor Stretch - 3 sets - 15s hold - 1x daily - 7x weekly   Bridge - 10 reps - 1x daily - 7x weekly   Small Range Straight Leg Raise - 5 reps - 3 sets - 1x daily - 7x weekly   Sidelying Hip Abduction - 10 reps - 2 sets - 1x daily - 4x weekly   Clamshell - 10 reps - 2 sets - 1x daily - 4x weekly   Seated Gastroc Stretch with Strap - 3 sets - 30s hold - 1x daily - 7x weekly   Seated Hamstring Stretch - 3 sets - 20s hold - 1x daily - 7x weekly   Heel rises with counter support - 10 reps - 3 sets - 1x daily - 7x weekly   Patient Education   Pain Management

## 2020-11-30 NOTE — PROGRESS NOTES
[facility]    Outpatient Initial Note    Subjective:   Referring Physician: Fanny Voss / HPI:     The patient is 64 y.o. male who presents today for a new patient visit for pulmonary clearance for bariatric surgery. 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 Patient is a former smoker who is getting around on crutches. He was recently diagnosed with severe TOM and is on an auto-titrating bipap at home. He smoked somewhere between 1.5 and 2 ppd for some of the 20 years he smoked, but quit about 15 years ago. He's not limited by his breathing, but it's difficult to assess because he's so limited by joint pain. He reports that he may be a bit winded after going up a flight of stairs, but he's about 400lbs and he has to use his crutches to get up the stairs because of his knee pain. Past Medical History:    Past Medical History:   Diagnosis Date    Bilateral venous leg ulcers 2020    Cellulitis of lower extremity 2019    Hypertension     Impaired fasting glucose 2013    Obesity     Preoperative clearance 10/26/2020    Screening PSA (prostate specific antigen) 2015;17    Nml:0.53(2015);17=nml.     Sleep apnea     Stasis dermatitis of both legs 2020    Tobacco use     Tubular adenoma of colon 2017       Social History:    Social History     Tobacco Use   Smoking Status Former Smoker    Packs/day: 2.00    Years: 25.00    Pack years: 50.00    Types: Cigarettes    Last attempt to quit: 2006    Years since quittin.5   Smokeless Tobacco Never Used       Family History:  Family History   Problem Relation Age of Onset    High Blood Pressure Mother     Heart Disease Mother         MI    AT 66    Diabetes Brother      Current Medications:  Current Outpatient Medications on File Prior to Visit   Medication Sig Dispense Refill    Prodigy Twist Top Lancets 28G MISC TEST TWO TIMES A  each 0    PRODIGY NO CODING BLOOD GLUC strip TEST TWO TIMES A  each 0    metFORMIN (GLUCOPHAGE) 500 MG tablet Take 1 tablet by mouth 2 times daily (with meals) 180 tablet 1    blood glucose monitor kit and supplies Check BG twice daily 1 kit 0    furosemide (LASIX) 80 MG tablet Take 1 tablet by mouth 2 times daily 120 tablet 2    metOLazone (ZAROXOLYN) 2.5 MG tablet Take 1 tablet by mouth three times a week 25 tablet 2    potassium chloride (KLOR-CON M) 20 MEQ TBCR extended release tablet Take 1 tablet by mouth 2 times daily 120 tablet 2    etodolac (LODINE) 400 MG tablet Take 1 tablet by mouth 2 times daily 60 tablet 3    rOPINIRole (REQUIP) 2 MG tablet Take 1 tablet by mouth nightly 90 tablet 0    hydroCHLOROthiazide (HYDRODIURIL) 25 MG tablet TAKE ONE TABLET BY MOUTH ONE TIME DAILY 90 tablet 0    fluticasone (FLONASE) 50 MCG/ACT nasal spray 1 spray by Nasal route daily 3 Bottle 0    lisinopril (PRINIVIL;ZESTRIL) 10 MG tablet Take 1 tablet by mouth daily 90 tablet 0    loratadine (CLARITIN) 10 MG tablet Take 1 tablet by mouth daily 90 tablet 0    cephALEXin (KEFLEX) 500 MG capsule Take 1 capsule by mouth 2 times daily for 7 days (Patient not taking: Reported on 11/30/2020) 14 capsule 0     No current facility-administered medications on file prior to visit. Allergies: Allergies   Allergen Reactions    Bactrim [Sulfamethoxazole-Trimethoprim] Rash     POSSIBLE fixed drug eruption on his cheeks, but diagnosis is not certain (only happened once).         REVIEW OF SYSTEMS:    CONSTITUTIONAL: Negative for fevers and chills  HEENT: Negative for oropharyngeal exudate, post nasal drip, sinus pain / pressure, nasal congestion, ear pain  RESPIRATORY:  See HPI  CARDIOVASCULAR: Negative for chest pain, palpitations, edema  GASTROINTESTINAL: Negative for nausea, vomiting, diarrhea, constipation and abdominal pain  HEMATOLOGICAL: Negative for adenopathy  SKIN: Negative for clubbing, cyanosis, skin lesions  EXTREMITIES: Negative for weakness, decreased ROM  NEUROLOGICAL: Negative for unilateral weakness, speech or gait abnormalities  PSYCH: Negative for anxiety, depression    Objective:   PHYSICAL EXAM:        VITALS:  Pulse 102   Temp 97 °F (36.1 °C) (Infrared)   Resp 18   Ht 5' 8\" (1.727 m)   Wt (!) 393 lb (178.3 kg)   SpO2 95%   BMI 59.76 kg/m²     CONSTITUTIONAL:  Awake, alert, cooperative, no apparent distress, and appears stated age  HEENT: No oropharyngeal exudate, PERRL, no cervical adenopathy, no tracheal deviation, thyroid size normal  LUNGS:  No increased work of breathing and clear to auscultation, no crackles or wheezing   CARDIOVASCULAR:  normal S1 and S2 and no JVD  ABDOMEN:  Normal bowel sounds, non-distended and non-tender to palpation  EXT: No edema, no calf tenderness. Pulses are present bilaterally. NEUROLOGIC:  Mental Status Exam:  Level of Alertness:   awake  Orientation:   person, place, time. SKIN:  normal skin color, texture, turgor, no redness, warmth, or swelling     DATA:      Last PFTs: none on file    Immunizations:   Immunization History   Administered Date(s) Administered    Influenza Vaccine, unspecified formulation 10/01/2015, 08/30/2016    Influenza Virus Vaccine 10/20/2014, 10/06/2017    Influenza Whole 10/20/2014    Influenza, Quadv, IM, PF (6 mo and older Fluzone, Flulaval, Fluarix, and 3 yrs and older Afluria) 10/06/2017, 09/05/2018, 09/29/2019, 10/08/2020    Tdap (Boostrix, Adacel) 09/07/2011    Zoster Recombinant (Shingrix) 02/24/2020, 10/30/2020     Sleep study:      Assessment: This is a 64 y.o. male with morbid obesity, severe TOM and history of tobacco abuse    Plan:   -No clear evidence of any pulmonary disease despite his smoking history. Quit smoking too long ago to qualify for a screening CT.     -The biggest modifiable risk factor for him going into surgery would be his

## 2020-12-03 ENCOUNTER — HOSPITAL ENCOUNTER (OUTPATIENT)
Dept: PHYSICAL THERAPY | Age: 60
Setting detail: THERAPIES SERIES
Discharge: HOME | End: 2020-12-03
Payer: COMMERCIAL

## 2020-12-03 PROCEDURE — 97110 THERAPEUTIC EXERCISES: CPT

## 2020-12-03 PROCEDURE — 97140 MANUAL THERAPY 1/> REGIONS: CPT

## 2020-12-03 NOTE — PROGRESS NOTES
[facility],  [address] [place]  Phone: [phone]  Fax [phone]    Physical Therapy Treatment Note/ Progress Report:           Date:  12/3/2020    Patient Name:  Alisa Potter    :  1960  MRN: [id number]  Restrictions/Precautions:    Medical/Treatment Diagnosis Information:  · Diagnosis: D03.249M (ICD-10-CM) - Complex tear of medial meniscus of left knee as current injury, initial encounter  · Treatment Diagnosis: M25.562 Left knee pain; M25.662 Left knee stiffness; R26.2 Difficulty in walking  Insurance/Certification information:  PT Insurance Information: [employer] [place]  Physician Information:  Referring Practitioner: Dr. Eusebio Rodrigues  Has the plan of care been signed (Y/N):        [x]  Yes  []  No     Date of Patient follow up with Physician:       Is this a Progress Report:     []  Yes  [x]  No        If Yes:  Date Range for reporting period:  Beginning   Ending 20    Progress report will be due (10 Rx or 30 days whichever is less): 53/7  Recertification will be due (POC Duration  / 90 days whichever is less):    Visit # Insurance Allowable Requires auth    30 (new insu , 6 visits on previous insurance)    [x]no        []yes:     Functional Scale:LEFS 75% disability   Date assessed:  20  LEFS 78% disability      20  LEFS 81% disability      10/15/20  LEFS 76% disability      20  Therapy Diagnosis/Practice Pattern:E    Number of Comorbidities:  []0     []1-2    [x]3+    Latex Allergy:  [x]NO      []YES  Preferred Language for Healthcare:   [x]English       []other:       Pain level: 10 current     SUBJECTIVE:  Pt states that his nerve ablation has been denied from his insurance company and is waiting to hear back from the rep who is working on getting it approved, but he has it scheduled for next week.      OBJECTIVE:    Observation: B LEs wrapped for vascular wounds/drainage + light compression hose B   Test measurements:  Knee flexion AROM 120       RESTRICTIONS/PRECAUTIONS: HTN   Procedure(s) 8/3/2020:  1.  Left knee arthroscopy with partial medial meniscectomy and chondroplasty medial femoral condyle with synovial biopsy (39168-16)    Exercises/Interventions:     Therapeutic Ex (84585) Sets/sec Notes/CUES HEP   Pt ed: aquatic therapy vs in this clinic to improve walking distance     Seated calf stretch c strap  Incline board calf stretch lvl 2- toes on board     X   Seated HS stretch   X   HL adductor stretch   X   Rectus stretch EOB 30\"x3     Bridge with increasing knee flex  Bridge on  deg    SLR-   \" ER- indep today x15 R,L 1.5#   X   SAQ 2#  c ball squeeze    SL hip abd R, L  Clamshell R,L  Reverse clamshell R,L 2x101.5#  1# on knee  1# ankle    Seated LAQ 90-40 with ball sq   X   Seated heel slide with glider      Seated hip IR iso 5\"x15 GVl at ankles    Standing HR 2x10 At [facility]    Mini squat  At bar    Side step 1/2 wall (short) 3 laps UE support, GVL at ankles    Step up 3\"   4\" x15  unable Heavy UE support,   [facility] platform    Standing glider: abd, ext diag  UE supp on bar Cue for posture   Sit to stand - Second to highest box   [id number] on thighs intermittently    LP DL  SL 60#  40#   Sore L by second set   [address]   SL 3x1035#    LAQ 90-40 2x1010#    Soleus press 3x1040#    LATOYA TKE  LATOYA hip ABD 2x10 R,L60#  15#    Bike (low)  Seat 17 Pt too sore today   Manual Intervention (55720) 8' total     Patellar mobs sup, inf 20x     Re-apply bandaids,      Edema massage, STM to pes anserine, distal IT band, adductor muscle belly 7'  Showed pt how to use stick from home   HS s  Knee slightly bent                NMR re-education (29271)   CUES NEEDED   Gait training one crutch      Weight shifting A/P   intermittent fingertip support today    Weight shifting A/P with one crutch   Intermittent CL UE support when L foot back- not ready to wean to one crutch   Split stance balance + airex at 1/2 wall     Intermittent UE support                           Therapeutic Activity (55540)                                                Therapeutic Exercise and NMR EXR  [x] (27558) Provided verbal/tactile cueing for activities related to strengthening, flexibility, endurance, ROM for improvements in LE, proximal hip, and core control with self care, mobility, lifting, ambulation.  [] (39578) Provided verbal/tactile cueing for activities related to improving balance, coordination, kinesthetic sense, posture, motor skill, proprioception  to assist with LE, proximal hip, and core control in self care, mobility, lifting, ambulation and eccentric single leg control.      NMR and Therapeutic Activities:    [x] (44741 or 52747) Provided verbal/tactile cueing for activities related to improving balance, coordination, kinesthetic sense, posture, motor skill, proprioception and motor activation to allow for proper function of core, proximal hip and LE with self care and ADLs  [x] (59367) Gait Re-education- Provided training and instruction to the patient for proper LE, core and proximal hip recruitment and positioning and eccentric body weight control with ambulation re-education including up and down stairs     Home Exercise Program:    [x] (36722) Reviewed/Progressed HEP activities related to strengthening, flexibility, endurance, ROM of core, proximal hip and LE for functional self-care, mobility, lifting and ambulation/stair navigation   [] (17761)Reviewed/Progressed HEP activities related to improving balance, coordination, kinesthetic sense, posture, motor skill, proprioception of core, proximal hip and LE for self care, mobility, lifting, and ambulation/stair navigation      Manual Treatments:  PROM / STM / Oscillations-Mobs:  G-I, II, III, IV (PA's, Inf., Post.)  [x] (96243) Provided manual therapy to mobilize LE, proximal hip and/or LS spine soft tissue/joints for the purpose of []? Progressing: [x]? Met: []? Not Met: []? Adjusted  3. Patient will demonstrate an increase in Strength to at least 4+/5 for the left knee and hip stabilizers in order to ambulate s AD for community distances. [x]? Progressing: []? Met: []? Not Met: []? Adjusted  4. Patient will return to walking community distances s AD with mild bilateral knee pain (<3/10). [x]? Progressing: []? Met: []? Not Met: []? Adjusted  5. Pt will be able to drive x 2 hours without increased knee pain; car mobility without knee pain. pt able to drive 1.5 hours, but more uncomfortable as a passenger  [x]? Progressing: []? Met: []? Not Met: []? Adjusted          Progression Towards Functional goals:  [] Patient is progressing as expected towards functional goals listed. [x] Progression is slowed due to complexities listed. [] Progression has been slowed due to co-morbidities. [] Plan just implemented, too soon to assess goals progression  [] Other:         Overall Progression Towards Functional goals/ Treatment Progress Update:  [] Patient is progressing as expected towards functional goals listed. [x] Progression is slowed due to complexities/Impairments listed. [] Progression has been slowed due to co-morbidities. [] Plan just implemented, too soon to assess goals progression <30days   [] Goals require adjustment due to lack of progress  [] Patient is not progressing as expected and requires additional follow up with physician  [] Other    Prognosis for POC: [x] Good [] Fair  [] Poor      Patient requires continued skilled intervention: [x] Yes  [] No    Treatment/Activity Tolerance:  [x] Patient able to complete treatment  [] Patient limited by fatigue  [x] Patient limited by pain    [] Patient limited by other medical complications  [] Other:     ASSESSMENT: Pt  Attempted 4\" step up instead of 3\" today. He was unable to step up with the LLE, but could do so with the RLE. He was limited by pain and strength.  He should continue with strengthening to improve gait patterns further, improve tolerance to stairs, and transitions. Return to Play: (if applicable)   []  Stage 1: Intro to Strength   []  Stage 2: Return to Run and Strength   []  Stage 3: Return to Jump and Strength   []  Stage 4: Dynamic Strength and Agility   []  Stage 5: Sport Specific Training     []  Ready to Return to Play, Meets All Above Stages   []  Not Ready for Return to Sports   Comments:                                PLAN: Continue PT 2x/week, extend POC another 6 weeks  [x] Continue per plan of care [] Alter current plan (see comments above)  [] Plan of care initiated [] Hold pending MD visit [] Discharge      Electronically signed by:  Pastor Sexton, PT, DPT    Note: If patient does not return for scheduled/ recommended follow up visits, this note will serve as a discharge from care along with most recent update on progress. Access Code: [email or web address]   URL: [email or web address]   Date: 10/08/2020   Prepared by: Pastor Sexton     Exercises   Supine Hip Adductor Stretch - 3 sets - 15s hold - 1x daily - 7x weekly   Bridge - 10 reps - 1x daily - 7x weekly   Small Range Straight Leg Raise - 5 reps - 3 sets - 1x daily - 7x weekly   Sidelying Hip Abduction - 10 reps - 2 sets - 1x daily - 4x weekly   Clamshell - 10 reps - 2 sets - 1x daily - 4x weekly   Seated Gastroc Stretch with Strap - 3 sets - 30s hold - 1x daily - 7x weekly   Seated Hamstring Stretch - 3 sets - 20s hold - 1x daily - 7x weekly   Heel rises with counter support - 10 reps - 3 sets - 1x daily - 7x weekly   Patient Education   Pain Management

## 2020-12-04 ENCOUNTER — OFFICE VISIT (OUTPATIENT)
Dept: CARDIOLOGY CLINIC | Age: 60
End: 2020-12-04
Payer: COMMERCIAL

## 2020-12-04 ENCOUNTER — OFFICE VISIT (OUTPATIENT)
Dept: INTERNAL MEDICINE CLINIC | Age: 60
End: 2020-12-04
Payer: COMMERCIAL

## 2020-12-04 VITALS
BODY MASS INDEX: 59.76 KG/M2 | TEMPERATURE: 94.2 F | WEIGHT: 315 LBS | DIASTOLIC BLOOD PRESSURE: 76 MMHG | SYSTOLIC BLOOD PRESSURE: 126 MMHG | OXYGEN SATURATION: 98 %

## 2020-12-04 VITALS
BODY MASS INDEX: 47.74 KG/M2 | HEIGHT: 68 IN | WEIGHT: 315 LBS | TEMPERATURE: 96.4 F | SYSTOLIC BLOOD PRESSURE: 132 MMHG | HEART RATE: 87 BPM | DIASTOLIC BLOOD PRESSURE: 76 MMHG

## 2020-12-04 LAB — HBA1C MFR BLD: 7.6 %

## 2020-12-04 PROCEDURE — 99213 OFFICE O/P EST LOW 20 MIN: CPT | Performed by: NURSE PRACTITIONER

## 2020-12-04 PROCEDURE — 3051F HG A1C>EQUAL 7.0%<8.0%: CPT | Performed by: INTERNAL MEDICINE

## 2020-12-04 PROCEDURE — 93000 ELECTROCARDIOGRAM COMPLETE: CPT | Performed by: INTERNAL MEDICINE

## 2020-12-04 PROCEDURE — 99204 OFFICE O/P NEW MOD 45 MIN: CPT | Performed by: INTERNAL MEDICINE

## 2020-12-04 PROCEDURE — 83036 HEMOGLOBIN GLYCOSYLATED A1C: CPT | Performed by: NURSE PRACTITIONER

## 2020-12-04 PROCEDURE — 3051F HG A1C>EQUAL 7.0%<8.0%: CPT | Performed by: NURSE PRACTITIONER

## 2020-12-04 RX ORDER — LANCETS 28 GAUGE
EACH MISCELLANEOUS
Qty: 100 EACH | Refills: 0 | Status: SHIPPED | OUTPATIENT
Start: 2020-12-04 | End: 2021-01-19 | Stop reason: SDUPTHER

## 2020-12-04 RX ORDER — BLOOD SUGAR DIAGNOSTIC
STRIP MISCELLANEOUS
Qty: 100 EACH | Refills: 0 | Status: SHIPPED | OUTPATIENT
Start: 2020-12-04 | End: 2021-01-19 | Stop reason: SDUPTHER

## 2020-12-04 RX ORDER — ATORVASTATIN CALCIUM 40 MG/1
40 TABLET, FILM COATED ORAL DAILY
Qty: 90 TABLET | Refills: 3 | Status: SHIPPED | OUTPATIENT
Start: 2020-12-04 | End: 2021-02-22 | Stop reason: SDUPTHER

## 2020-12-04 SDOH — HEALTH STABILITY: MENTAL HEALTH: HOW OFTEN DO YOU HAVE A DRINK CONTAINING ALCOHOL?: NEVER

## 2020-12-04 ASSESSMENT — ENCOUNTER SYMPTOMS
RHINORRHEA: 0
NAUSEA: 0
SINUS PRESSURE: 0
BLOOD IN STOOL: 0
EYE ITCHING: 0
BACK PAIN: 0
CHEST TIGHTNESS: 0
DIARRHEA: 0
COLOR CHANGE: 0
SHORTNESS OF BREATH: 0
SORE THROAT: 0
ABDOMINAL PAIN: 0
CONSTIPATION: 0
VOMITING: 0
EYE REDNESS: 0
WHEEZING: 0
COUGH: 0

## 2020-12-04 ASSESSMENT — PATIENT HEALTH QUESTIONNAIRE - PHQ9
SUM OF ALL RESPONSES TO PHQ9 QUESTIONS 1 & 2: 0
SUM OF ALL RESPONSES TO PHQ QUESTIONS 1-9: 0
SUM OF ALL RESPONSES TO PHQ QUESTIONS 1-9: 0
1. LITTLE INTEREST OR PLEASURE IN DOING THINGS: 0
SUM OF ALL RESPONSES TO PHQ QUESTIONS 1-9: 0
2. FEELING DOWN, DEPRESSED OR HOPELESS: 0
DEPRESSION UNABLE TO ASSESS: FUNCTIONAL CAPACITY MOTIVATION LIMITS ACCURACY

## 2020-12-04 NOTE — PROGRESS NOTES
64 y.o. for preop sleeve gastrec. Has knee problems, uses crutch. Can walk steps but very difficult to get up steps d/t knee problems. Gets sob walking steps. No angina. No n/v/syncope. Has LE edema. No orthopnea or PND. Past Medical History:   Diagnosis Date    Bilateral venous leg ulcers 2020    Cellulitis of lower extremity 2019    Hypertension     Impaired fasting glucose 2013    Obesity     Preoperative clearance 10/26/2020    Screening PSA (prostate specific antigen) 2015;17    Nml:0.53(2015);17=nml.  Sleep apnea     Stasis dermatitis of both legs 2020    Tobacco use     Tubular adenoma of colon 2017     Past Surgical History:   Procedure Laterality Date    CIRCUMCISION      COLONOSCOPY  2017    Colonoscopy with polypectomy (cold biopsy):Dr. Walker:next in 3yrs=2020    KNEE ARTHROSCOPY Left 8/3/2020    VIDEO ARTHROSCOPY LEFT KNEE, PARTIAL MEDIAL MENISCECTOMY, CHONDROPLASTY, SYNOVIAL BIOPSY -TOM=4- performed by Frances Gilliland MD at [address] [facility]  2011    VASECTOMY      WISDOM TOOTH EXTRACTION       Social History     Tobacco Use    Smoking status: Former Smoker     Packs/day: 2.00     Years: 25.00     Pack years: 50.00     Types: Cigarettes     Last attempt to quit: 2006     Years since quittin.6    Smokeless tobacco: Never Used   Substance Use Topics    Alcohol use: Yes     Comment: ocas    Drug use: No     Allergies   Allergen Reactions    Bactrim [Sulfamethoxazole-Trimethoprim] Rash     POSSIBLE fixed drug eruption on his cheeks, but diagnosis is not certain (only happened once). Family History   Problem Relation Age of Onset    High Blood Pressure Mother     Heart Disease Mother         MI    AT 66    Diabetes Brother      Review of Systems   General: No fevers, chills, fatigue, or night sweats. No abnormal changes in weight.    HEENT: No blurry or decreased vision. No changes in hearing, nasal discharge or sore throat. Cardiovascular: See HPI. No cramping in legs or buttocks when walking. Respiratory: + MIN. Gastrointestinal: No abdominal pain, hematochezia, melana, or history of GI ulcers. Genito-Urinary: No dysuria or hematuria. No urgency or polyuria. Musculoskeletal: No complaints of joint pain, joint swelling or muscular weakness/soreness. Neurological: No dizziness or headaches. No numbness/tingling, speech problems or weakness. No history of a stroke or TIA. Psychological: No anxiety or depression  Hematological and Lymphatic: No abnormal bleeding or bruising, blood clots, jaundice. Endocrine: No malaise/lethargy, palpitations, polydipsia/polyuria, temperature intolerance or unexpected weight changes. Skin: No rashes or non-healing ulcers. PE:  Blood pressure 132/76, pulse 87, temperature 96.4 °F (35.8 °C), height 5' 8\" (1.727 m), weight (!) 393 lb (178.3 kg). General (appearance): Well devel. No distress  Eyes: anicteric. eomi  Neck: supple. No jvd  Ears/Nose/Mouth/Thorat: No cyanosis  CV: RRR   Respiratory:  Clear, normal respiratory effort  GI: abd s/nt/nd. Obese  Skin: Warm, dry. No rashes  Neuro/Psych: Alert and oriented x 3.  Appropriate behavior  Ext:  No c/c. + edema. + venous stasis dermatitis/changes  Pulses:  2+ radial.    Lab Results   Component Value Date    WBC 5.8 10/30/2020    HGB 13.9 10/30/2020    HCT 41.9 10/30/2020    MCV 94.7 10/30/2020     10/30/2020     Lab Results   Component Value Date     10/30/2020    K 4.6 10/30/2020     10/30/2020    CO2 25 10/30/2020    BUN 18 10/30/2020    CREATININE 0.8 (L) 10/30/2020    GLUCOSE 150 (H) 10/30/2020    CALCIUM 9.7 10/30/2020    PROT 6.4 10/30/2020    LABALBU 3.6 10/30/2020    BILITOT 0.4 10/30/2020    ALKPHOS 86 10/30/2020    AST 56 (H) 10/30/2020    ALT 67 (H) 10/30/2020    LABGLOM >60 10/30/2020    GFRAA >60 10/30/2020    AGRATIO 1.3 10/30/2020 GLOB 2.8 10/30/2020     Lab Results   Component Value Date    INR 1.0 05/28/2011    PROTIME 13.1 05/28/2011     Lab Results   Component Value Date    CHOL 173 10/30/2020    CHOL 188 06/18/2019    CHOL 212 (H) 03/18/2019     Lab Results   Component Value Date    TRIG 170 (H) 10/30/2020    TRIG 152 (H) 06/18/2019    TRIG 199 (H) 03/18/2019     Lab Results   Component Value Date    HDL 56 10/30/2020    HDL 54 06/18/2019    HDL 52 03/18/2019     Lab Results   Component Value Date    LDLCALC 83 10/30/2020    LDLCALC 104 (H) 06/18/2019    LDLCALC 120 (H) 03/18/2019     Lab Results   Component Value Date    LABVLDL 34 10/30/2020    LABVLDL 30 06/18/2019    LABVLDL 40 03/18/2019     No results found for: CHOLHDLRATIO    7/2020 ECG: NSR    The 10-year ASCVD risk score (Sunny Hitchcock et al., 2013) is: 13.3%    Values used to calculate the score:      Age: 64 years      Sex: Male      Is Non- : No      Diabetic: Yes      Tobacco smoker: No      Systolic Blood Pressure: 902 mmHg      Is BP treated: Yes      HDL Cholesterol: 56 mg/dL      Total Cholesterol: 173 mg/dL    Current Outpatient Medications   Medication Instructions    blood glucose monitor kit and supplies Check BG twice daily    blood glucose test strips (PRODIGY NO CODING BLOOD GLUC) strip TEST TWO TIMES A DAY    etodolac (LODINE) 400 mg, Oral, 2 TIMES DAILY    fluticasone (FLONASE) 50 MCG/ACT nasal spray 1 spray, Nasal, DAILY    furosemide (LASIX) 80 mg, Oral, 2 TIMES DAILY    hydroCHLOROthiazide (HYDRODIURIL) 25 MG tablet TAKE ONE TABLET BY MOUTH ONE TIME DAILY    lisinopril (PRINIVIL;ZESTRIL) 10 mg, Oral, DAILY    loratadine (CLARITIN) 10 mg, Oral, DAILY    metFORMIN (GLUCOPHAGE) 500 mg, Oral, 2 TIMES DAILY WITH MEALS    metOLazone (ZAROXOLYN) 2.5 mg, Oral, THREE TIMES WEEKLY    potassium chloride (KLOR-CON M) 20 MEQ TBCR extended release tablet 20 mEq, Oral, 2 TIMES DAILY    Prodigy Twist Top Lancets 28G MISC TEST TWO TIMES A DAY    rOPINIRole (REQUIP) 2 mg, Oral, NIGHTLY     64 y.o. here for pre-op sleeve gastrectomy. Issues:   Diagnosis Orders   1. Preop cardiovascular exam     2. Mixed hypertriglyceridemia     3. Type 2 diabetes mellitus without complication, without long-term current use of insulin ([address]     4. Benign essential HTN       Recs:  -Given obesity, HTN, HL, DM, and smoking history, I recommend a stress test before intermediate risk surgery.  -Cont lisinopril, hctz  -Lasix  -Start atorvastatin 40 mg daily  -Lipids and LFTs in 1 mo  -See me pending results of stress test; if all normal, see me as needed. Emmanuel Mcgrath MD, [facility]

## 2020-12-04 NOTE — FLOWSHEET NOTE
[address] time______12/9/2020 0900______        Surgery time________1000____    Take the following medications with a sip of water:    Do not eat or drink anything after 12:00 midnight prior to your surgery. This includes water chewing gum, mints and ice chips. You may brush your teeth and gargle the morning of your surgery, but do not swallow the water     Please see your family doctor/pediatrician for a history and physical and/or concerning medications. Bring any test results/reports from your physicians office. If you are under the care of a heart doctor or specialist doctor, please be aware that you may be asked to them for clearance    You may be asked to stop blood thinners such as Coumadin, Plavix, Fragmin, Lovenox, etc., or any anti-inflammatories such as:  Aspirin, Ibuprofen, Advil, Naproxen prior to your surgery. We also ask that you stop any OTC medications such as fish oil, vitamin E, glucosamine, garlic, Multivitamins, COQ 10, etc.    We ask that you do not smoke 24 hours prior to surgery  We ask that you do not  drink any alcoholic beverages 24 hours prior to surgery     You must make arrangements for a responsible adult to take you home after your surgery. For your safety you will not be allowed to leave alone or drive yourself home. Your surgery will be cancelled if you do not have a ride home. Also for your safety, it is strongly suggested that someone stay with you the first 24 hours after your surgery. A parent or legal guardian must accompany a child scheduled for surgery and plan to stay at the hospital until the child is discharged. Please do not bring other children with you. For your comfort, please wear simple loose fitting clothing to the hospital.  Please do not bring valuables.     Do not wear any make-up or nail polish on your fingers or toes      For your safety, please do not wear any jewelry or body piercing's on the day of surgery. All jewelry must be removed. If you have dentures, they will be removed before going to operating room. For your convenience, we will provide you with a container. If you wear contact lenses or glasses, they will be removed, please bring a case for them. If you have a living will and a durable power of  for healthcare, please bring in a copy. As part of our patient safety program to minimize surgical site infections, we ask you to do the following:    · Please notify your surgeon if you develop any illness between         now and the  day of your surgery. · This includes a cough, cold, fever, sore throat, nausea,         or vomiting, and diarrhea, etc.  ·  Please notify your surgeon if you experience dizziness, shortness         of breath or blurred vision between now and the time of your surgery. Do not shave your operative site 96 hours prior to surgery. For face and neck surgery, men may use an electric razor 48 hours   prior to surgery. You may shower the night before surgery or the morning of   your surgery with an antibacterial soap. You will need to bring a photo ID and insurance card    [facility] has an onsite pharmacy, would you like to utilize our pharmacy     If you will be staying overnight and use a C-pap machine, please bring   your C-pap to hospital     Our goal is to provide you with excellent care, therefore, visitors will be limited to two(2) in the room at a time so that we may focus on providing this care for you. Please contact pre-admission testing if you have any further questions. [facility] phone number:  [phone]    Please note these are generalized instructions for all surgical cases, you may be provided with more specific instructions according to your surgery.

## 2020-12-04 NOTE — PROGRESS NOTES
Subjective:      Patient ID: Yolette Rubio is a 64 y.o. male. Chief Complaint   Patient presents with    Diabetes        HPI   Patient returns for follow up for diabetes. He has been taking medications as prescribed. He has no side effects related to their medications. The last HbA1c was   Lab Results   Component Value Date    LABA1C 7.6 2020     Lab Results   Component Value Date    .8 10/30/2020   . He has been checking their BG levels. He has some ingrown toenails on bilateral great toes. He tried to handle on the right on his own, it got infected. Treated with antibiotics and now doing better. He needs podiatry. Past Medical History:   Diagnosis Date    Bilateral venous leg ulcers 2020    Cellulitis of lower extremity 2019    Hypertension     Impaired fasting glucose 2013    Obesity     Preoperative clearance 10/26/2020    Screening PSA (prostate specific antigen) 2015;17    Nml:0.53(2015);17=nml.     Sleep apnea     Stasis dermatitis of both legs 2020    Tobacco use     Tubular adenoma of colon 2017     Past Surgical History:   Procedure Laterality Date    CIRCUMCISION      COLONOSCOPY  2017    Colonoscopy with polypectomy (cold biopsy):Dr. Walker:next in 3yrs=2020    KNEE ARTHROSCOPY Left 8/3/2020    VIDEO ARTHROSCOPY LEFT KNEE, PARTIAL MEDIAL MENISCECTOMY, CHONDROPLASTY, SYNOVIAL BIOPSY -TOM=4- performed by Cisco Lou MD at [address]  2011    VASECTOMY      WISDOM TOOTH EXTRACTION       Family History   Problem Relation Age of Onset    High Blood Pressure Mother     Heart Disease Mother         MI    AT 66    Diabetes Brother      Social History     Socioeconomic History    Marital status:      Spouse name: Not on file    Number of children: 2    Years of education: Not on file    Highest education level: Not on file   Occupational History frequency, hematuria and urgency. Musculoskeletal: Negative for arthralgias, back pain, joint swelling and myalgias. Skin: Negative for color change, pallor, rash and wound. Allergic/Immunologic: Negative for environmental allergies and food allergies. Neurological: Negative for dizziness, seizures, syncope, weakness, light-headedness, numbness and headaches. Hematological: Negative for adenopathy. Does not bruise/bleed easily. Psychiatric/Behavioral: Negative for confusion, sleep disturbance and suicidal ideas. The patient is not nervous/anxious and is not hyperactive. Objective:     Vitals:    12/04/20 1143   BP: 126/76   Site: Left Upper Arm   Position: Sitting   Cuff Size: Large Adult   Temp: 94.2 °F (34.6 °C)   TempSrc: Temporal   SpO2: 98%   Weight: (!) 393 lb (178.3 kg)     Physical Exam  HENT:      Head: Normocephalic and atraumatic. Right Ear: External ear normal.      Left Ear: External ear normal.      Nose: Nose normal.   Neck:      Musculoskeletal: Normal range of motion and neck supple. Vascular: No JVD. Cardiovascular:      Rate and Rhythm: Normal rate and regular rhythm. Heart sounds: Normal heart sounds. No murmur. No friction rub. No gallop. Pulmonary:      Effort: Pulmonary effort is normal. No respiratory distress. Breath sounds: Normal breath sounds. No wheezing. Chest:      Chest wall: No tenderness. Abdominal:      General: Bowel sounds are normal. There is no distension. Palpations: Abdomen is soft. There is no mass. Tenderness: There is no abdominal tenderness. There is no guarding or rebound. Musculoskeletal: Normal range of motion. General: No tenderness or deformity. Skin:     General: Skin is warm and dry. Findings: No erythema or rash. Neurological:      Mental Status: He is alert and oriented to person, place, and time.    Psychiatric:         Judgment: Judgment normal.       Current Outpatient Medications Medication Sig Dispense Refill    Prodigy Twist Top Lancets 28G MISC TEST TWO TIMES A  each 0    PRODIGY NO CODING BLOOD GLUC strip TEST TWO TIMES A  each 0    metFORMIN (GLUCOPHAGE) 500 MG tablet Take 1 tablet by mouth 2 times daily (with meals) 180 tablet 1    blood glucose monitor kit and supplies Check BG twice daily 1 kit 0    furosemide (LASIX) 80 MG tablet Take 1 tablet by mouth 2 times daily 120 tablet 2    metOLazone (ZAROXOLYN) 2.5 MG tablet Take 1 tablet by mouth three times a week 25 tablet 2    potassium chloride (KLOR-CON M) 20 MEQ TBCR extended release tablet Take 1 tablet by mouth 2 times daily 120 tablet 2    etodolac (LODINE) 400 MG tablet Take 1 tablet by mouth 2 times daily 60 tablet 3    rOPINIRole (REQUIP) 2 MG tablet Take 1 tablet by mouth nightly 90 tablet 0    hydroCHLOROthiazide (HYDRODIURIL) 25 MG tablet TAKE ONE TABLET BY MOUTH ONE TIME DAILY 90 tablet 0    fluticasone (FLONASE) 50 MCG/ACT nasal spray 1 spray by Nasal route daily 3 Bottle 0    lisinopril (PRINIVIL;ZESTRIL) 10 MG tablet Take 1 tablet by mouth daily 90 tablet 0    loratadine (CLARITIN) 10 MG tablet Take 1 tablet by mouth daily 90 tablet 0     No current facility-administered medications for this visit. PHQ Scores 12/4/2020 11/23/2020 11/5/2020 7/16/2020 7/6/2020 6/25/2020 6/17/2020   PHQ2 Score 0 0 0 0 0 0 0   PHQ9 Score 0 0 0 0 0 0 0           :     1. Type 2 diabetes mellitus without complication, without long-term current use of insulin ([facility] [address]    2.  Ingrown right big toenail      Plan:     Problem List     Type 2 diabetes mellitus without complication, without long-term current use of insulin ([facility]) - Primary     Stable, controlled  No changes  Continue current treatment plan            Relevant Medications    metFORMIN (GLUCOPHAGE) 500 MG tablet    Other Relevant Orders    POCT glycosylated hemoglobin (Hb A1C) (Completed)    Amb External Referral To Podiatry    Ingrown right big toenail Ongoing pain  Will refer to dr. Kenan Mejia                   Discussed use, benefit, and side effects of all prescribed medications. Barriers to medication compliance addressed. All patient questions answered. Pt voiced understanding. All patientquestions answered. Pt voiced understanding.

## 2020-12-07 ENCOUNTER — HOSPITAL ENCOUNTER (OUTPATIENT)
Dept: NON INVASIVE DIAGNOSTICS | Age: 60
Discharge: HOME | End: 2020-12-07
Payer: COMMERCIAL

## 2020-12-07 ENCOUNTER — TELEPHONE (OUTPATIENT)
Dept: CARDIOLOGY CLINIC | Age: 60
End: 2020-12-07

## 2020-12-07 ENCOUNTER — HOSPITAL ENCOUNTER (OUTPATIENT)
Dept: PHYSICAL THERAPY | Age: 60
Setting detail: THERAPIES SERIES
Discharge: HOME | End: 2020-12-07
Payer: COMMERCIAL

## 2020-12-07 PROCEDURE — A9502 TC99M TETROFOSMIN: HCPCS | Performed by: INTERNAL MEDICINE

## 2020-12-07 PROCEDURE — 97110 THERAPEUTIC EXERCISES: CPT

## 2020-12-07 PROCEDURE — 97140 MANUAL THERAPY 1/> REGIONS: CPT

## 2020-12-07 PROCEDURE — 3430000000 HC RX DIAGNOSTIC RADIOPHARMACEUTICAL: Performed by: INTERNAL MEDICINE

## 2020-12-07 PROCEDURE — 6360000002 HC RX W HCPCS: Performed by: INTERNAL MEDICINE

## 2020-12-07 RX ORDER — 0.9 % SODIUM CHLORIDE 0.9 %
10 VIAL (ML) INJECTION
Status: COMPLETED | OUTPATIENT
Start: 2020-12-07 | End: 2020-12-07

## 2020-12-07 RX ADMIN — TETROFOSMIN 35.3 MILLICURIE: 1.38 INJECTION, POWDER, LYOPHILIZED, FOR SOLUTION INTRAVENOUS at 11:20

## 2020-12-07 RX ADMIN — Medication 10 ML: at 11:30

## 2020-12-07 RX ADMIN — REGADENOSON 0.4 MG: 0.08 INJECTION, SOLUTION INTRAVENOUS at 11:20

## 2020-12-07 NOTE — TELEPHONE ENCOUNTER
Pt called in stated she was scheduled for a stress test this morning 12-07-20. Pt stated due to his weight there was problems with him completing the test. Pt would like to speak to Dr. Denys Kelley to address if pt would need to take the test.pt can be reached at [phone].  Thanks

## 2020-12-07 NOTE — PROGRESS NOTES
Procedure scheduled 12/14. Covid test 12/8 or 12/9 at [facility] or [facility].  Please quarantine after testing

## 2020-12-07 NOTE — PROGRESS NOTES
[facility],  [address] [place]  Phone: [phone]  Fax [phone]    Physical Therapy Treatment Note/ Progress Report:           Date:  2020    Patient Name:  Declan Arnold    :  1960  MRN: [id number]  Restrictions/Precautions:    Medical/Treatment Diagnosis Information:  · Diagnosis: B76.133H (ICD-10-CM) - Complex tear of medial meniscus of left knee as current injury, initial encounter  · Treatment Diagnosis: M25.562 Left knee pain; M25.662 Left knee stiffness; R26.2 Difficulty in walking  Insurance/Certification information:  PT Insurance Information: [employer] [place]  Physician Information:  Referring Practitioner: Dr. Kimberlee Pepe  Has the plan of care been signed (Y/N):        [x]  Yes  []  No     Date of Patient follow up with Physician:       Is this a Progress Report:     []  Yes  [x]  No        If Yes:  Date Range for reporting period:  Beginning   Ending 20    Progress report will be due (10 Rx or 30 days whichever is less):   Recertification will be due (POC Duration  / 90 days whichever is less):    Visit # Insurance Allowable Requires auth    (new insu , 6 visits on previous insurance)    [x]no        []yes:     Functional Scale:LEFS 75% disability   Date assessed:  20  LEFS 78% disability      20  LEFS 81% disability      10/15/20  LEFS 76% disability      20  Therapy Diagnosis/Practice Pattern:E    Number of Comorbidities:  []0     []1-2    [x]3+    Latex Allergy:  [x]NO      []YES  Preferred Language for Healthcare:   [x]English       []other:       Pain level: 1/10 current, up to 8/10 in the morning. SUBJECTIVE:  Pt states that his ablation procedure has been denied. Still having a lot of pain at night, but low levels when resting. Uses two crutches to walk when he has been lying down for a period.  He has two ingrown toenails that are really mobility, stair amb.   []? Progressing: [x]? Met: []? Not Met: []? Adjusted  3. Patient will demonstrate an increase in Strength to at least 4+/5 for the left knee and hip stabilizers in order to ambulate s AD for community distances. [x]? Progressing: []? Met: []? Not Met: []? Adjusted  4. Patient will return to walking community distances s AD with mild bilateral knee pain (<3/10). [x]? Progressing: []? Met: []? Not Met: []? Adjusted  5. Pt will be able to drive x 2 hours without increased knee pain; car mobility without knee pain. pt able to drive 1.5 hours, but more uncomfortable as a passenger  [x]? Progressing: []? Met: []? Not Met: []? Adjusted          Progression Towards Functional goals:  [] Patient is progressing as expected towards functional goals listed. [x] Progression is slowed due to complexities listed. [] Progression has been slowed due to co-morbidities. [] Plan just implemented, too soon to assess goals progression  [] Other:         Overall Progression Towards Functional goals/ Treatment Progress Update:  [] Patient is progressing as expected towards functional goals listed. [x] Progression is slowed due to complexities/Impairments listed. [] Progression has been slowed due to co-morbidities. [] Plan just implemented, too soon to assess goals progression <30days   [] Goals require adjustment due to lack of progress  [] Patient is not progressing as expected and requires additional follow up with physician  [] Other    Prognosis for POC: [x] Good [] Fair  [] Poor      Patient requires continued skilled intervention: [x] Yes  [] No    Treatment/Activity Tolerance:  [x] Patient able to complete treatment  [] Patient limited by fatigue  [x] Patient limited by pain    [] Patient limited by other medical complications  [] Other:     ASSESSMENT: Pt able to ambulate without one crutch safely for short distance, but is very antalgic.  He continues to improve with tolerance to transitions and with strength despite knee pain from OA. He should continue with strengthening to improve gait patterns further, improve tolerance to stairs, and transitions. Return to Play: (if applicable)   []  Stage 1: Intro to Strength   []  Stage 2: Return to Run and Strength   []  Stage 3: Return to Jump and Strength   []  Stage 4: Dynamic Strength and Agility   []  Stage 5: Sport Specific Training     []  Ready to Return to Play, Meets All Above Stages   []  Not Ready for Return to Sports   Comments:                                PLAN: Continue PT 2x/week, extend POC another 6 weeks  [x] Continue per plan of care [] Alter current plan (see comments above)  [] Plan of care initiated [] Hold pending MD visit [] Discharge      Electronically signed by:  Glenn Seth, PT, DPT    Note: If patient does not return for scheduled/ recommended follow up visits, this note will serve as a discharge from care along with most recent update on progress. Access Code: [email or web address]   URL: [email or web address]   Date: 10/08/2020   Prepared by: Glenn Seth     Exercises   Supine Hip Adductor Stretch - 3 sets - 15s hold - 1x daily - 7x weekly   Bridge - 10 reps - 1x daily - 7x weekly   Small Range Straight Leg Raise - 5 reps - 3 sets - 1x daily - 7x weekly   Sidelying Hip Abduction - 10 reps - 2 sets - 1x daily - 4x weekly   Clamshell - 10 reps - 2 sets - 1x daily - 4x weekly   Seated Gastroc Stretch with Strap - 3 sets - 30s hold - 1x daily - 7x weekly   Seated Hamstring Stretch - 3 sets - 20s hold - 1x daily - 7x weekly   Heel rises with counter support - 10 reps - 3 sets - 1x daily - 7x weekly   Patient Education   Pain Management

## 2020-12-08 ENCOUNTER — OFFICE VISIT (OUTPATIENT)
Dept: PRIMARY CARE CLINIC | Age: 60
End: 2020-12-08
Payer: COMMERCIAL

## 2020-12-08 ENCOUNTER — HOSPITAL ENCOUNTER (OUTPATIENT)
Dept: NON INVASIVE DIAGNOSTICS | Age: 60
Discharge: HOME | End: 2020-12-08
Payer: COMMERCIAL

## 2020-12-08 ENCOUNTER — PATIENT MESSAGE (OUTPATIENT)
Dept: ORTHOPEDIC SURGERY | Age: 60
End: 2020-12-08

## 2020-12-08 LAB
LV EF: 75 %
LVEF MODALITY: NORMAL

## 2020-12-08 PROCEDURE — 3430000000 HC RX DIAGNOSTIC RADIOPHARMACEUTICAL: Performed by: NURSE PRACTITIONER

## 2020-12-08 PROCEDURE — 6360000002 HC RX W HCPCS: Performed by: INTERNAL MEDICINE

## 2020-12-08 PROCEDURE — A9502 TC99M TETROFOSMIN: HCPCS | Performed by: NURSE PRACTITIONER

## 2020-12-08 PROCEDURE — 6360000002 HC RX W HCPCS: Performed by: NURSE PRACTITIONER

## 2020-12-08 PROCEDURE — 99211 OFF/OP EST MAY X REQ PHY/QHP: CPT | Performed by: NURSE PRACTITIONER

## 2020-12-08 PROCEDURE — 93017 CV STRESS TEST TRACING ONLY: CPT | Performed by: INTERNAL MEDICINE

## 2020-12-08 PROCEDURE — 78452 HT MUSCLE IMAGE SPECT MULT: CPT | Performed by: INTERNAL MEDICINE

## 2020-12-08 RX ORDER — AMINOPHYLLINE DIHYDRATE 25 MG/ML
100 INJECTION, SOLUTION INTRAVENOUS ONCE
Status: COMPLETED | OUTPATIENT
Start: 2020-12-08 | End: 2020-12-08

## 2020-12-08 RX ADMIN — TETROFOSMIN 30 MILLICURIE: 1.38 INJECTION, POWDER, LYOPHILIZED, FOR SOLUTION INTRAVENOUS at 09:11

## 2020-12-08 RX ADMIN — AMINOPHYLLINE 100 MG: 25 INJECTION, SOLUTION INTRAVENOUS at 09:14

## 2020-12-08 RX ADMIN — REGADENOSON 0.4 MG: 0.08 INJECTION, SOLUTION INTRAVENOUS at 09:02

## 2020-12-08 RX ADMIN — TETROFOSMIN 10 MILLICURIE: 1.38 INJECTION, POWDER, LYOPHILIZED, FOR SOLUTION INTRAVENOUS at 08:05

## 2020-12-08 NOTE — TELEPHONE ENCOUNTER
From: Mario Briseno  To: Jeffrey Barker MD  Sent: 12/8/2020 1:24 PM EST  Subject: Non-Urgent Medical Question    [place], Can you call me as soon as you get a minute. Quick question regarding tomorrow procedure.  Calixto Thomas

## 2020-12-08 NOTE — PATIENT INSTRUCTIONS

## 2020-12-08 NOTE — PROGRESS NOTES
Instructed on Lexiscan Stress Test Procedure including possible side effects/ adverse reactions. Patient verbalizes  understanding and denies having any questions . See [address] [place] [address] Cardiology

## 2020-12-08 NOTE — TELEPHONE ENCOUNTER
I spoke to Ania Joyce he has an infected ingrown toe nail and has an appt tomorrow at 2 with podiatrist.  He just wanted to make sure he could get there in time. I told him its 1 hour for procedure. Dr. Ray Arnold said it was ok to proceed with [facility] tomorrow.

## 2020-12-08 NOTE — PROGRESS NOTES
Neal Marcos received a viral test for COVID-19. They were educated on isolation and quarantine as appropriate. For any symptoms, they were directed to seek care from their PCP, given contact information to establish with a doctor, directed to an urgent care or the emergency room.

## 2020-12-09 ENCOUNTER — VIRTUAL VISIT (OUTPATIENT)
Dept: PULMONOLOGY | Age: 60
End: 2020-12-09
Payer: COMMERCIAL

## 2020-12-09 ENCOUNTER — TELEPHONE (OUTPATIENT)
Dept: BARIATRICS/WEIGHT MGMT | Age: 60
End: 2020-12-09

## 2020-12-09 ENCOUNTER — HOSPITAL ENCOUNTER (OUTPATIENT)
Age: 60
Setting detail: OUTPATIENT SURGERY
Discharge: HOME | End: 2020-12-09
Attending: ORTHOPAEDIC SURGERY | Admitting: ORTHOPAEDIC SURGERY
Payer: COMMERCIAL

## 2020-12-09 VITALS
DIASTOLIC BLOOD PRESSURE: 70 MMHG | WEIGHT: 315 LBS | HEIGHT: 68 IN | TEMPERATURE: 97 F | HEART RATE: 77 BPM | BODY MASS INDEX: 47.74 KG/M2 | RESPIRATION RATE: 16 BRPM | OXYGEN SATURATION: 98 % | SYSTOLIC BLOOD PRESSURE: 128 MMHG

## 2020-12-09 PROBLEM — E11.9 TYPE 2 DIABETES MELLITUS WITHOUT COMPLICATION, WITHOUT LONG-TERM CURRENT USE OF INSULIN (HCC): Chronic | Status: ACTIVE | Noted: 2020-11-05

## 2020-12-09 LAB — SARS-COV-2: NOT DETECTED

## 2020-12-09 PROCEDURE — 64624 DSTRJ NULYT AGT GNCLR NRV: CPT | Performed by: ORTHOPAEDIC SURGERY

## 2020-12-09 PROCEDURE — 2720000010 HC SURG SUPPLY STERILE: Performed by: ORTHOPAEDIC SURGERY

## 2020-12-09 PROCEDURE — 3610000056 HC PAIN LEVEL 4 BASE (NON-OR): Performed by: ORTHOPAEDIC SURGERY

## 2020-12-09 PROCEDURE — 3610000057 HC PAIN LEVEL 4 ADDL 15 MIN (NON-OR): Performed by: ORTHOPAEDIC SURGERY

## 2020-12-09 PROCEDURE — 2500000003 HC RX 250 WO HCPCS: Performed by: ORTHOPAEDIC SURGERY

## 2020-12-09 PROCEDURE — 2709999900 HC NON-CHARGEABLE SUPPLY: Performed by: ORTHOPAEDIC SURGERY

## 2020-12-09 PROCEDURE — 3051F HG A1C>EQUAL 7.0%<8.0%: CPT | Performed by: INTERNAL MEDICINE

## 2020-12-09 PROCEDURE — 99214 OFFICE O/P EST MOD 30 MIN: CPT | Performed by: INTERNAL MEDICINE

## 2020-12-09 RX ORDER — BUPIVACAINE HYDROCHLORIDE 5 MG/ML
INJECTION, SOLUTION EPIDURAL; INTRACAUDAL
Status: COMPLETED | OUTPATIENT
Start: 2020-12-09 | End: 2020-12-09

## 2020-12-09 RX ORDER — LIDOCAINE HYDROCHLORIDE 20 MG/ML
INJECTION, SOLUTION EPIDURAL; INFILTRATION; INTRACAUDAL; PERINEURAL
Status: COMPLETED | OUTPATIENT
Start: 2020-12-09 | End: 2020-12-09

## 2020-12-09 ASSESSMENT — SLEEP AND FATIGUE QUESTIONNAIRES
HOW LIKELY ARE YOU TO NOD OFF OR FALL ASLEEP WHILE WATCHING TV: 1
HOW LIKELY ARE YOU TO NOD OFF OR FALL ASLEEP IN A CAR, WHILE STOPPED FOR A FEW MINUTES IN TRAFFIC: 0
HOW LIKELY ARE YOU TO NOD OFF OR FALL ASLEEP WHEN YOU ARE A PASSENGER IN A CAR FOR AN HOUR WITHOUT A BREAK: 0
HOW LIKELY ARE YOU TO NOD OFF OR FALL ASLEEP WHILE SITTING AND TALKING TO SOMEONE: 1
HOW LIKELY ARE YOU TO NOD OFF OR FALL ASLEEP WHILE SITTING QUIETLY AFTER LUNCH WITHOUT ALCOHOL: 1
HOW LIKELY ARE YOU TO NOD OFF OR FALL ASLEEP WHILE SITTING INACTIVE IN A PUBLIC PLACE: 0
HOW LIKELY ARE YOU TO NOD OFF OR FALL ASLEEP WHILE LYING DOWN TO REST IN THE AFTERNOON WHEN CIRCUMSTANCES PERMIT: 1
ESS TOTAL SCORE: 5
HOW LIKELY ARE YOU TO NOD OFF OR FALL ASLEEP WHILE SITTING AND READING: 1

## 2020-12-09 ASSESSMENT — PAIN DESCRIPTION - DESCRIPTORS: DESCRIPTORS: CONSTANT

## 2020-12-09 ASSESSMENT — PAIN - FUNCTIONAL ASSESSMENT
PAIN_FUNCTIONAL_ASSESSMENT: PREVENTS OR INTERFERES SOME ACTIVE ACTIVITIES AND ADLS
PAIN_FUNCTIONAL_ASSESSMENT: 0-10

## 2020-12-09 ASSESSMENT — PAIN SCALES - GENERAL: PAINLEVEL_OUTOF10: 0

## 2020-12-09 NOTE — H&P
HISTORY AND PHYSICAL             Date: 12/9/2020        Patient Name: Lev Leggett     YOB: 1960      Age:  64 y.o. Chief Complaint   Osteoarthritis of left knee       History Obtained From   patient, spouse    History of Present Illness   Deangelo Cadet is a 64year old disabled  with a long history of increasingly severe left knee pain. He had arthroscopic surgery in August for the left knee which failed to give lasting relief. He is to have bariatric surgery for his morbid obesity this coming spring. X-rays show grade 3K-L arthritis. Past Medical History     Past Medical History:   Diagnosis Date    Bilateral venous leg ulcers 09/2020    Cellulitis of lower extremity 9/25/2019    Hypertension     Impaired fasting glucose 5/2013    Obesity     TOM treated with BiPAP 11/30/2020    Preoperative clearance 10/26/2020    Screening PSA (prostate specific antigen) 12/30/2015;5/1/17    Nml:0.53(12/30/2015);5/1/17=nml.  Sleep apnea     Stasis dermatitis of both legs 8/28/2020    Tobacco use     Tubular adenoma of colon 09/14/2017        Past Surgical History     Past Surgical History:   Procedure Laterality Date    CIRCUMCISION      COLONOSCOPY  09/14/2017    Colonoscopy with polypectomy (cold biopsy):Dr. Walker:next in 3yrs=9/14/2020    KNEE ARTHROSCOPY Left 8/3/2020    VIDEO ARTHROSCOPY LEFT KNEE, PARTIAL MEDIAL MENISCECTOMY, CHONDROPLASTY, SYNOVIAL BIOPSY -TOM=4- performed by Aury Morley MD at [address] [facility]  05/28/2011    VASECTOMY      WISDOM TOOTH EXTRACTION          Medications Prior to Admission     Prior to Admission medications    Medication Sig Start Date End Date Taking?  Authorizing Provider   Prodigy Twist Top Lancets 28G MISC TEST TWO TIMES A DAY 12/4/20  Yes Everette Baumgarten, APRN - CNP   blood glucose test strips (PRODIGY NO CODING BLOOD GLUC) strip TEST TWO TIMES A DAY 12/4/20  Yes Everette Baumgarten, APRN - CNP   atorvastatin (LIPITOR) 40 MG tablet Take 1 tablet by mouth daily 12/4/20  Yes Nguyen Hahn MD   metFORMIN (GLUCOPHAGE) 500 MG tablet Take 1 tablet by mouth 2 times daily (with meals) 11/5/20  Yes JACKLYN Martinez CNP   blood glucose monitor kit and supplies Check BG twice daily 11/5/20  Yes JACKLYN Martinez CNP   furosemide (LASIX) 80 MG tablet Take 1 tablet by mouth 2 times daily 10/22/20  Yes Kaveh Martin MD   metOLazone (ZAROXOLYN) 2.5 MG tablet Take 1 tablet by mouth three times a week 10/23/20  Yes Kaveh Martin MD   potassium chloride (KLOR-CON M) 20 MEQ TBCR extended release tablet Take 1 tablet by mouth 2 times daily 10/22/20  Yes Kaveh Martin MD   etodolac (LODINE) 400 MG tablet Take 1 tablet by mouth 2 times daily 10/5/20 10/5/21 Yes Aleta Lane MD   rOPINIRole (REQUIP) 2 MG tablet Take 1 tablet by mouth nightly 9/21/20  Yes JACKLYN Martinez CNP   hydroCHLOROthiazide (HYDRODIURIL) 25 MG tablet TAKE ONE TABLET BY MOUTH ONE TIME DAILY 9/10/20  Yes JACKLYN Martinez CNP   fluticasone (FLONASE) 50 MCG/ACT nasal spray 1 spray by Nasal route daily 9/8/20  Yes JACKLYN Martinez CNP   lisinopril (PRINIVIL;ZESTRIL) 10 MG tablet Take 1 tablet by mouth daily 9/8/20  Yes JACKLYN Martinez CNP   loratadine (CLARITIN) 10 MG tablet Take 1 tablet by mouth daily 9/8/20  Yes JACKLYN Martinez CNP        Allergies   Bactrim [sulfamethoxazole-trimethoprim]    Social History     Social History     Tobacco History     Smoking Status  Former Smoker Quit date  5/1/2006 Smoking Frequency  2 packs/day for 25 years (48 pk yrs) Smoking Tobacco Type  Cigarettes    Smokeless Tobacco Use  Never Used          Alcohol History     Alcohol Use Status  Not Currently Comment  ocas          Drug Use     Drug Use Status  No          Sexual Activity     Sexually Active  Yes Partners  Female                Family History     Family History   Problem Relation Age of Onset    High Blood Pressure Mother     Heart Disease Mother         MI    AT 66    Diabetes Brother        Review of Systems   Review of Systems    Physical Exam   /85   Pulse 77   Temp 96.9 °F (36.1 °C) (Temporal)   Resp 18   Ht 5' 8\" (1.727 m)   Wt (!) 392 lb (177.8 kg)   SpO2 98%   BMI 59.60 kg/m²     Physical Exam   Cor: RRRwithout murmur. Pulm:  Clear to auscultation    Labs    No results found for this or any previous visit (from the past 24 hour(s)). Imaging/Diagnostics Last 24 Hours   Nm Cardiac Stress Test Nuclear Imaging    Result Date: 2020  Cardiac Perfusion Imaging  Demographics   Patient Name      Kristi Cesar   Date of Study     2020          Gender              Male   Patient Number    [id number]          Date of Birth       1960   Visit Number      [id number]           Age                 64 year(s)   Accession Number  [id number]          Room Number         op   Corporate ID      [id number]            NM Technician       [employer] [employer]   Nurse             Mearl Riedel,   Interpreting        Waqar Carrizales MD,                    RN                  Physician           [facility]   Ordering          Ki Files,  Physician         Campos Mancini, SILVIA   The procedure was explained in detail to the patient. Risks,  complications and alternative treatments were reviewed. Written consent  was obtained. Procedure Procedure Type:   Nuclear Stress Test:Pharmacological, NM MYOCARDIAL SPECT REST EXERCISE OR  RX   Study location: [facility] - Nuclear Medicine   Indications: Pre-op clearance. Hospital Status: Outpatient. Height: 68 inches Weight: 385 pounds  Risk Factors   The patient risk factors include:obesity, physical activity, former tobacco  use, treated and controlled hypercholesterolemia, treated and controlled  hypertension, orally-treated diabetes mellitus, dyslipidemia and ( years not  smoking: 15). Conclusions   Summary  Normal myocardial perfusion study. Normal LV size and systolic function. Recommendation  Based on negative stress test, risk for perioperative cardiac complications  is low. Stress Protocols   Resting ECG  Normal sinus rhythm. Normal ECG. Resting HR:97 bpm     Resting BP:135/62 mmHg   Pre-stress physical exam: Resting pulse ox 97%. Stress Protocol:Pharmacologic - Lexiscan's  Peak HR:100 bpm                           HR/BP product:22025  Peak BP:156/61 mmHg  Predicted HR: 162 bpm  % of predicted HR: 62  Test duration: 4 min  Reason for termination:Completed   ECG Findings  Normal response to lexiscan . Symptoms  Developed symptoms likely related to [address] [place] [address]. There was stress induced shortness of breath and nausea. Symptoms resolved with aminophylline. Denies any chest pain or discomfort. Pulse ox 99% RA. Complications  Procedure complication was none. Stress Interpretation  Appropriate hemodynamic response to lexiscan with no significant ST  changes. Procedure Medications   - Lexiscan I.V. 0.4 mg.10 sec   - Aminophylline I.V. 100 mg. Imaging Protocols   - One Day   Rest                          Stress   Isotope:Myoview/Tetrofosmin   Isotope: Myoview/Tetrofosmin  Isotope dose:10.92 mCi        Isotope dose:33.6 mCi  Administration Route:I.V. Administration Route:I.V.  Date:12/08/2020 08:07         Date:12/08/2020 09:12                                 Technique:      Gated  Imaging Results    Stress ejection    Ejection fraction:75 %    EDV :110 ml    ESV :28 ml    Stroke volume :82 ml    LV mass :157 gr  Medical History  Signatures   ------------------------------------------------------------------  Electronically signed by Rene Greer MD, [facility] (Interpreting  physician) on 12/08/2020 at 10:38  ------------------------------------------------------------------        Assessment    1. Osteoarthritis of left knee    Plan   1.  Coolief geniculate nerve ablation.     Consultations Ordered:  None    Electronically signed by Didier Castellanos MD on 12/9/20 at 12:06 PM EST

## 2020-12-09 NOTE — PROGRESS NOTES
Kyle Cummings MD, Florina Minium, [facility]  Tiffanie Kehrt CNP Claude Jillian CNP [address]  3rd Floor, [address] [facility], [facility] Dylan Irvin E [phone]   [facility] [address] Austen Bowman. [address] [facility]. Bita Mcduffie 37 [phone]     Video Visit- Follow up    Pursuant to the emergency declaration under the [address] [place] [address], [facility] waiver authority and the [employer] Act, this Virtual  Visit was conducted, with patient's consent, to reduce the patient's risk of exposure to COVID-19 and provide continuity of care for an established patient. Services were provided through a video synchronous discussion virtually to substitute for in-person clinic visit. Patient was located in their home. Assessment/Plan:     1. TOM treated with BiPAP  Assessment & Plan:  New Problem - On Tx. Reviewed sleep study (copy given to pt for their records) and download compliance data with patient. Supplies and parts as needed for his machine. These are medically necessary. Limit caffeine use after 3pm.  EPAPmin-12, Ramp pressure-8. Patient is compliant on bilevel, no restrictions for wt loss surgery from sleep med standpoint with continued compliant use of his machine. 2. Essential hypertension, benign  Assessment & Plan:  Chronic- Stable. Cont meds per PCP and other physicians. 3. Type 2 diabetes mellitus without complication, without long-term current use of insulin (HCC)  Assessment & Plan:  Chronic- Stable. Cont meds per PCP and other physicians. 4. Allergic rhinitis, unspecified seasonality, unspecified trigger  Assessment & Plan:  Chronic- Stable. Cont meds per PCP and other physicians. 5. Class 3 severe obesity due to excess calories with serious comorbidity and body mass index (BMI) of 50.0 to 59.9 in adult [facility])  Assessment & Plan:  Chronic-Stable. Encouraged him to work on weight loss through diet and exercise.        Diagnoses of TOM treated with BiPAP, Essential hypertension, benign, Type 2 diabetes mellitus without complication, without long-term current use of insulin ([facility] [address] Allergic rhinitis, unspecified seasonality, unspecified trigger, and Class 3 severe obesity due to excess calories with serious comorbidity and body mass index (BMI) of 50.0 to 59.9 in adult [facility]) were pertinent to this visit. The chronic medical conditions listed are directly related to the primary diagnosis listed above. The management of the primary diagnosis affects the secondary diagnosis and vice versa. Subjective:     Patient ID: Elvin Miles is a 64 y.o. male. Chief Complaint   Patient presents with    Sleep Apnea     Subjective   HPI:    Machine Modem/Download Info:  Compliance (hours/night): 6 hrs/night  Download AHI (/hour): 0.3 /HR  Average IPAP Pressure: 18.5 cmH2O  Average EPAP Pressure: 14.3 cmH2O AUTO BIPAP - Settings (Ginny)  IPAP Max: 25 cmH2O  EPAP Min: 14 cmH2O  Pressure Support Min: 3  Pressure Support Max: 8     Comfort Settings  Humidity Level (0-8): 4  Flex/EPR (0-3): 3       TOM: Emergency split-night on 10/1/20 showed AHI-69.1/hr with low sat-79%. Failed CPAP and was changed to bilevel. No HA, dryness, or congestion. Pressure feels good, not having SOB nor aerophagia. Still waking at night due to pain issues. Sleeping better, waking with more energy. Download shows sitting on low pressure limit.     [address]    [place] - Total score: 5    Current Outpatient Medications   Medication Sig Dispense Refill    Prodigy Twist Top Lancets 28G MISC TEST TWO TIMES A  each 0    blood glucose test strips (PRODIGY NO CODING BLOOD GLUC) strip TEST TWO TIMES A  each 0    atorvastatin (LIPITOR) 40 MG tablet Take 1 tablet by mouth daily 90 tablet 3    metFORMIN (GLUCOPHAGE) 500 MG tablet Take 1 tablet by mouth 2 times daily (with meals) 180 tablet 1    blood glucose monitor kit and supplies Check BG twice daily 1 kit 0    furosemide (LASIX) 80 MG tablet Take 1 tablet by mouth 2 times daily 120 tablet 2    metOLazone (ZAROXOLYN) 2.5 MG tablet Take 1 tablet by mouth three times a week 25 tablet 2    potassium chloride (KLOR-CON M) 20 MEQ TBCR extended release tablet Take 1 tablet by mouth 2 times daily 120 tablet 2    etodolac (LODINE) 400 MG tablet Take 1 tablet by mouth 2 times daily 60 tablet 3    rOPINIRole (REQUIP) 2 MG tablet Take 1 tablet by mouth nightly 90 tablet 0    hydroCHLOROthiazide (HYDRODIURIL) 25 MG tablet TAKE ONE TABLET BY MOUTH ONE TIME DAILY 90 tablet 0    fluticasone (FLONASE) 50 MCG/ACT nasal spray 1 spray by Nasal route daily 3 Bottle 0    lisinopril (PRINIVIL;ZESTRIL) 10 MG tablet Take 1 tablet by mouth daily 90 tablet 0    loratadine (CLARITIN) 10 MG tablet Take 1 tablet by mouth daily 90 tablet 0     No current facility-administered medications for this visit.         Allergies as of 12/09/2020 - Review Complete 12/09/2020   Allergen Reaction Noted    Bactrim [sulfamethoxazole-trimethoprim] Rash 09/04/2020         Electronically signed by Delmis Lord MD on 12/9/2020 at 9:46 AM

## 2020-12-09 NOTE — OP NOTE
Postoperative Note      Patient: Peggy Vela  YOB: 1960  MRN: [id number]    Date of Procedure: 12/9/2020    Pre-Op Diagnosis: Arthritis of left knee    Post-Op Diagnosis: Same       Procedure(s):  COOLIEF RADIOFREQUENCY ABLATION - LEFT    Surgeon(s):  Toni Blackburn MD    Anesthesia: Local    Estimated Blood Loss (mL): 0 ml    Complications: None     Specimens: None    Implants: None      Drains: N/A    Procedure narrartive:  1. The geniculate nerve ablation procedure for the left knee using ultrasound visualization was explained to the patient  . He understands the risks and benefits of the injection procedure,  and describes no potential allergies. Time out was performed to verify correct person, correct procedure, and correct site. 2. A bolster was placed under the  left knee and the knee was prepped with ChloroPrep and draped in a sterile manner   3. Ultrasound visualization was first performed utilizing the [facility] Ultrasound unit using  18/4 MHz Linear Probe with sterile drape in long axes to the tibia, finding the neurovascular bundle of the inferomedial geniculate nerve at the junction of the diaphysis and metaphysis below the medial tibial plateau. The ultrasound doppler function was utilized to aid in location of the bundle. The location of the needle insertion was determined anterior to the geniculate nerve and the site was anesthetized with 4 ml of a 50/50 mixture of 2% Lidocaine, and 0.5 % Marcaine using a 25 gauge needle. After sufficient time was allowed for anesthesia, the Coolief procedure needle was then introduced under ultrasound control to Inferomedial geniculate neurovascular bundle using out of plane technique. Once the needle was in position, the radiofrequency probe was placed through the needle. Using the [facility] Unit, ablation was performed at 80 degrees for 2.5 minutes. 20 mg of Depomedrol was introduced into the site prior to removing the needle.   4. Attention was turned to the medial aspect of the distal femur. The transducer was utilized in long axis to identify the superomedial geniculate neurovascular bundle just proximal to  the adductor ophelia insertion into the medial femoral epicondyle at the distal femoral metaphyseal diaphyseal junction. The ultrasound doppler function was utilized to aid in location of the bundle. The location of the needle insertion was determined anterior to the geniculate nerve and the site was anesthetized with 4 ml of a 50/50 mixture of 2% Lidocaine, and 0.5 % Marcaine using a 25 gauge needle. After sufficient time was allowed for anesthesia, the Coolief procedure needle was then introduced under ultrasound control to superomedial geniculate neurovascular bundle using out of plane technique. Once the needle was in position, the radiofrequency probe was placed through the needle. Using the [facility] Unit, ablation was performed at 80 degrees for 2.5 minutes. 20 mg of Depomedrol was introduced into the site prior to removing the needle. 5. Next the attention was turned to the lateral aspect of the distal femur where the superolateral geniculate neurovascular bundle was identified using ultrasound at the distal femoral metaphyseal diaphyseal junction. The ultrasound doppler function was utilized to aid in location of the bundle. The location of the needle insertion was determined anterior to the geniculate nerve and the site was anesthetized with 4 ml of a 50/50 mixture of 2% Lidocaine, and 0.5 % Marcaine using a 25 gauge needle. After sufficient time was allowed for anesthesia, the Coolief procedure needle was then introduced under ultrasound control to the superolaeral geniculate neurovascular bundle using out of plane technique. Once the needle was in position, the radiofrequency probe was placed through the needle. Using the [facility] Unit, ablation was performed at 80 degrees for 2.5 minutes.  20 mg of Depomedrol was introduced into the site prior to removing the needle. 6. Attention was turned to the Suprapatellar branch of the femoral nerve which was located with ultrasound  just distal to the vastus intermedius muscle and proximal to the supra patellar pouch. The location of the needle insertion was determined lateral to the geniculate nerve and the site was anesthetized with 4 ml of a 50/50 mixture of 2% Lidocaine  and 0.5 % Marcaine using a 25 gauge needle. After sufficient time was allowed for anesthesia, the Coolief procedure needle was then introduced under ultrasound control to the suprapatellar geniculate neurovascular bundle utilizing in-plane technique. Using the [facility] Unit, ablation was performed at 80 degrees for 2.5 minutes. 20 mg of Depomedrol was introduced into the site prior to removing the needle. 7. Bandaids were applied to the injection sites, and the patient was taken to the recovery room in satisfactory condition.    Electronically signed by Didier Castellanos MD on 12/9/2020 at 1:25 PM

## 2020-12-09 NOTE — LETTER
[facility]  [place] [address] [place] [address]  Phone: [phone]  Fax: [phone]    December 9, 2020       Patient: Cori Bender   MR Number: [id number]   YOB: 1960   Date of Visit: 12/9/2020       Cori Bender was seen for a follow up visit today. Here is my assessment and plan as well as an attached copy of his visit today:    TOM treated with BiPAP  New Problem - On Tx. Reviewed sleep study (copy given to pt for their records) and download compliance data with patient. Supplies and parts as needed for his machine. These are medically necessary. Limit caffeine use after 3pm.  EPAPmin-12, Ramp pressure-8. Patient is compliant on bilevel, no restrictions for wt loss surgery from sleep med standpoint with continued compliant use of his machine. Essential hypertension, benign  Chronic- Stable. Cont meds per PCP and other physicians. Type 2 diabetes mellitus without complication, without long-term current use of insulin (HCC)  Chronic- Stable. Cont meds per PCP and other physicians. Allergic rhinitis  Chronic- Stable. Cont meds per PCP and other physicians. Class 3 severe obesity due to excess calories with serious comorbidity and body mass index (BMI) of 50.0 to 59.9 in adult (HCC)  Chronic-Stable. Encouraged him to work on weight loss through diet and exercise. If you have questions or concerns, please do not hesitate to call me. I look forward to following Eusebio Sanabria along with you.     Sincerely,    Vicente Rao MD    CC providers:  Joanna Romero, APRN - CNP  [address]  [address] [place], [address] [facility] [address] [facility] [address]  [address] [facility] [id number]  VIA In [place]

## 2020-12-09 NOTE — TELEPHONE ENCOUNTER
Pt states that he has to go in to see podiatry today d/t 2 infected toenails     States that more than likely he will be on ATB, will this be ok?     Apt today at 3:40    Spoke with Dr Cyndi Stephens and he would prefer the infection to be taken care of and push EGD off till Tuan Lin, please cancel EGD and we can reschedule for January

## 2020-12-09 NOTE — TELEPHONE ENCOUNTER
Patient is scheduled for EGD with Dr. Michele Trevino 12/14/20. He calls the office requesting to speak with Dr. Usman Ruano nurse regarding his medications & a possible infection. Patient requested to be contacted between 1:30 - 2:30 due to other doctor appointments scheduled today.      # [phone]

## 2020-12-10 ENCOUNTER — APPOINTMENT (OUTPATIENT)
Dept: PHYSICAL THERAPY | Age: 60
End: 2020-12-10
Payer: COMMERCIAL

## 2020-12-14 ENCOUNTER — APPOINTMENT (OUTPATIENT)
Dept: PHYSICAL THERAPY | Age: 60
End: 2020-12-14
Payer: COMMERCIAL

## 2020-12-16 RX ORDER — LISINOPRIL 10 MG/1
TABLET ORAL
Qty: 90 TABLET | Refills: 0 | Status: SHIPPED | OUTPATIENT
Start: 2020-12-16 | End: 2021-03-17 | Stop reason: SDUPTHER

## 2020-12-16 RX ORDER — HYDROCHLOROTHIAZIDE 25 MG/1
TABLET ORAL
Qty: 90 TABLET | Refills: 0 | Status: ON HOLD | OUTPATIENT
Start: 2020-12-16 | End: 2021-03-03 | Stop reason: SDUPTHER

## 2020-12-17 ENCOUNTER — APPOINTMENT (OUTPATIENT)
Dept: PHYSICAL THERAPY | Age: 60
End: 2020-12-17
Payer: COMMERCIAL

## 2020-12-17 ENCOUNTER — TELEPHONE (OUTPATIENT)
Dept: INTERNAL MEDICINE CLINIC | Age: 60
End: 2020-12-17

## 2020-12-17 RX ORDER — LORATADINE 10 MG/1
10 TABLET ORAL DAILY
Qty: 90 TABLET | Refills: 0 | Status: SHIPPED | OUTPATIENT
Start: 2020-12-17 | End: 2020-12-17 | Stop reason: SDUPTHER

## 2020-12-17 RX ORDER — LORATADINE 10 MG/1
10 TABLET ORAL DAILY
Qty: 90 TABLET | Refills: 0 | Status: SHIPPED | OUTPATIENT
Start: 2020-12-17 | End: 2021-03-17 | Stop reason: SDUPTHER

## 2020-12-17 NOTE — TELEPHONE ENCOUNTER
Patient requesting a medication refill.   Medication loratadine (CLARITIN)   Dosage  10 MG tablet   FrequencyTake 1 tablet by mouth daily  Last filled on 9/8/20  Lynette Singh [phone] - F [phone]

## 2020-12-21 ENCOUNTER — HOSPITAL ENCOUNTER (OUTPATIENT)
Dept: PHYSICAL THERAPY | Age: 60
Setting detail: THERAPIES SERIES
Discharge: HOME | End: 2020-12-21
Payer: COMMERCIAL

## 2020-12-21 NOTE — FLOWSHEET NOTE
[place] [address] [facility],  [address]        Physical Therapy  Cancellation/No-show Note  Patient Name:  Moises Mckeon  :  1960   Date:  2020  Cancelled visits to date: 4  No-shows to date: 0    For today's appointment patient:  X? Cancelled  ? Rescheduled appointment  ? No-show     Reason given by patient:  ?  Patient ill  ? Conflicting appointment  ? No transportation    ? Conflict with work  X? No reason given  ?   Other:     Comments:      Electronically signed by:  Nury Rodriguez, PT, DPT

## 2020-12-23 ENCOUNTER — OFFICE VISIT (OUTPATIENT)
Dept: BARIATRICS/WEIGHT MGMT | Age: 60
End: 2020-12-23
Payer: COMMERCIAL

## 2020-12-23 VITALS
TEMPERATURE: 97 F | DIASTOLIC BLOOD PRESSURE: 106 MMHG | HEIGHT: 68 IN | WEIGHT: 315 LBS | BODY MASS INDEX: 47.74 KG/M2 | SYSTOLIC BLOOD PRESSURE: 161 MMHG | HEART RATE: 102 BPM

## 2020-12-23 PROCEDURE — 99213 OFFICE O/P EST LOW 20 MIN: CPT | Performed by: SURGERY

## 2020-12-23 NOTE — PROGRESS NOTES
Emma Holland lost 8.2 lbs over the past month. Is pt eating at least 4 times everyday? yes 3 meals and 1-2 snacks    Is pt eating a lean protein source with all meals and snacks? yes almonds, chix or tuna salad, cottage cheese    Has pt decreased their portions using the plate method? yes portions are better    Is pt choosing low fat/sugar free options? yes choosing leaner meats, salad    Is pt drinking at least 64 oz of clear liquids everyday? yes water with lemon    Has pt stopped drinking carbonation, caffeinated, and sugar sweetened beverages? yes he has    Has pt sampled Unjury and/or Nectar protein?  yes tried and tolerated    Participating in intentional exercise? none because of abcess on toe    Plan/Recommendations: continue meal plan as presented    Handouts: none    Nancy Franco

## 2020-12-24 ENCOUNTER — APPOINTMENT (OUTPATIENT)
Dept: PHYSICAL THERAPY | Age: 60
End: 2020-12-24
Payer: COMMERCIAL

## 2020-12-26 NOTE — PROGRESS NOTES
[facility]) Physicians   General & Laparoscopic Surgery  [employer]       HPI:     Elvin Miles is a very pleasant 64 y.o. male with Body mass index is 58.6 kg/m². , Pre-Surgery. Pre-operative clearance and work up pending. Working hard to keep good dietary habits as well level of activity. Patient denies any nausea, vomiting, fevers, chills, shortness of breath, chest pain, cough, constipation or difficulty urinating. Past Medical History:   Diagnosis Date    Bilateral venous leg ulcers 2020    Cellulitis of lower extremity 2019    Hypertension     Impaired fasting glucose 2013    Obesity     TOM treated with BiPAP 2020    Preoperative clearance 10/26/2020    Screening PSA (prostate specific antigen) 2015;17    Nml:0.53(2015);17=nml.     Sleep apnea     Stasis dermatitis of both legs 2020    Tobacco use     Tubular adenoma of colon 2017     Past Surgical History:   Procedure Laterality Date    CIRCUMCISION      COLONOSCOPY  2017    Colonoscopy with polypectomy (cold biopsy):Dr. Walker:next in 3yrs=2020    KNEE ARTHROSCOPY Left 8/3/2020    VIDEO ARTHROSCOPY LEFT KNEE, PARTIAL MEDIAL MENISCECTOMY, CHONDROPLASTY, SYNOVIAL BIOPSY -TOM=4- performed by Mook Rodriguez MD at [place] [address] PAIN MANAGEMENT PROCEDURE Left 2020    COOLIEF RADIOFREQUENCY ABLATION - LEFT performed by Sonia Becerra MD at [place] [address]  2011    VASECTOMY      WISDOM TOOTH EXTRACTION       Family History   Problem Relation Age of Onset    High Blood Pressure Mother     Heart Disease Mother         MI    AT 66    Diabetes Brother      Social History     Tobacco Use    Smoking status: Former Smoker     Packs/day: 2.00     Years: 25.00     Pack years: 50.00     Types: Cigarettes     Quit date: 2006     Years since quittin.6    Smokeless tobacco: Never Used Substance Use Topics    Alcohol use: Not Currently     Frequency: Never     Comment: ocas     I counseled the patient on the importance of not smoking and risks of ETOH. Allergies   Allergen Reactions    Bactrim [Sulfamethoxazole-Trimethoprim] Rash     POSSIBLE fixed drug eruption on his cheeks, but diagnosis is not certain (only happened once). Vitals:    12/23/20 0746   BP: (!) 161/106   Pulse: 102   Temp: 97 °F (36.1 °C)   Weight: (!) 385 lb 6.4 oz (174.8 kg)   Height: 5' 8\" (1.727 m)       Body mass index is 58.6 kg/m².       Current Outpatient Medications:     loratadine (CLARITIN) 10 MG tablet, Take 1 tablet by mouth daily, Disp: 90 tablet, Rfl: 0    hydroCHLOROthiazide (HYDRODIURIL) 25 MG tablet, TAKE ONE TABLET BY MOUTH ONE TIME DAILY, Disp: 90 tablet, Rfl: 0    lisinopril (PRINIVIL;ZESTRIL) 10 MG tablet, TAKE 1 TABLET BY MOUTH ONCE A DAY, Disp: 90 tablet, Rfl: 0    Prodigy Twist Top Lancets 28G MISC, TEST TWO TIMES A DAY, Disp: 100 each, Rfl: 0    blood glucose test strips (PRODIGY NO CODING BLOOD GLUC) strip, TEST TWO TIMES A DAY, Disp: 100 each, Rfl: 0    atorvastatin (LIPITOR) 40 MG tablet, Take 1 tablet by mouth daily, Disp: 90 tablet, Rfl: 3    metFORMIN (GLUCOPHAGE) 500 MG tablet, Take 1 tablet by mouth 2 times daily (with meals), Disp: 180 tablet, Rfl: 1    blood glucose monitor kit and supplies, Check BG twice daily, Disp: 1 kit, Rfl: 0    furosemide (LASIX) 80 MG tablet, Take 1 tablet by mouth 2 times daily, Disp: 120 tablet, Rfl: 2    metOLazone (ZAROXOLYN) 2.5 MG tablet, Take 1 tablet by mouth three times a week, Disp: 25 tablet, Rfl: 2    potassium chloride (KLOR-CON M) 20 MEQ TBCR extended release tablet, Take 1 tablet by mouth 2 times daily, Disp: 120 tablet, Rfl: 2    etodolac (LODINE) 400 MG tablet, Take 1 tablet by mouth 2 times daily, Disp: 60 tablet, Rfl: 3    rOPINIRole (REQUIP) 2 MG tablet, Take 1 tablet by mouth nightly, Disp: 90 tablet, Rfl: 0    fluticasone (FLONASE) 50 MCG/ACT nasal spray, 1 spray by Nasal route daily, Disp: 3 Bottle, Rfl: 0      Review of Systems - History obtained from the patient  General ROS: negative  Psychological ROS: negative  Endocrine ROS: negative  Respiratory ROS: negative  Cardiovascular ROS: negative  Gastrointestinal ROS:negative  Genito-Urinary ROS: negative  Musculoskeletal ROS: negative   Skin ROS: negative    Physical Exam   Vitals Reviewed   Constitutional: Patient is oriented to person, place, and time. Patient appears well-developed and well-nourished. Patient is active and cooperative. Non-toxic appearance. No distress. Neck: Trachea normal and normal range of motion. No JVD present. Pulmonary/Chest: Effort normal. No accessory muscle usage or stridor. No apnea. No respiratory distress. Cardiovascular: Normal rate and no JVD. Abdominal: Normal appearance. Patient exhibits no distension. Abdomen is soft, obese, non tender. Musculoskeletal: Normal range of motion. Patient exhibits no edema. Neurological: Patient is alert and oriented to person, place, and time. Patient has normal strength. GCS eye subscore is 4. GCS verbal subscore is 5. GCS motor subscore is 6. Skin: Skin is warm and dry. No abrasion and no rash noted. Patient is not diaphoretic. No cyanosis or erythema. Psychiatric: Patient has a normal mood and affect. Speech is normal and behavior is normal. Cognition and memory are normal.       A/P    Keisha Chowdhury is 64 y.o. male, Body mass index is 58.6 kg/m². pre surgery, has lost 8# since last visit. The patient underwent dietary counseling with registered dietician. I have reviewed, discussed and agree with the dietary plan. Patient is trying hard to keep good dietary and behavior modifications. Patient is monitoring portion sizes, food choices and liquid calories. Patient is trying to exercise regularly as much as possible.   We discussed how his weight affects his overall health including:  Mala Carrier was seen today for weight management. Diagnoses and all orders for this visit:    Morbid obesity with BMI of 50.0-59.9, adult ([address]    TOM treated with BiPAP    Essential hypertension, benign       and importance of weight loss to alleviate those co morbid conditions. I encouraged the patient to continue exercise and keeping healthy eating habits. Discussed pre-op labs and work up till now. Also counseled the patient extensively on Surgery. I spent 20 minutes face to face with patient with more than 50% of the time counseling and/or coordinating care for weight loss surgery. RTC in 4 weeks  Obtain rest of pre-op work up / clearances  Diet and Exercise      Patient advised that its their responsibility to follow up for studies and/or labs ordered today.      Beto Laureano

## 2020-12-28 ENCOUNTER — HOSPITAL ENCOUNTER (OUTPATIENT)
Dept: PHYSICAL THERAPY | Age: 60
Setting detail: THERAPIES SERIES
Discharge: HOME | End: 2020-12-28
Payer: COMMERCIAL

## 2020-12-28 PROCEDURE — 97110 THERAPEUTIC EXERCISES: CPT

## 2020-12-28 NOTE — PROGRESS NOTES
[facility],  [address]  Phone: [phone]  Fax [phone]    Physical Therapy Treatment Note/ Progress Report:           Date:  2020    Patient Name:  Jessica Myers    :  1960  MRN: [id number]  Restrictions/Precautions:    Medical/Treatment Diagnosis Information:  · Diagnosis: V37.311K (ICD-10-CM) - Complex tear of medial meniscus of left knee as current injury, initial encounter  · Treatment Diagnosis: M25.562 Left knee pain; M25.662 Left knee stiffness; R26.2 Difficulty in walking  Insurance/Certification information:  PT Insurance Information: [employer] [place]  Physician Information:  Referring Practitioner: Dr. Dulce Sousa  Has the plan of care been signed (Y/N):        [x]  Yes  []  No     Date of Patient follow up with Physician:       Is this a Progress Report:     []  Yes  [x]  No        If Yes:  Date Range for reporting period:  Beginning 20  Ending 20    Progress report will be due (10 Rx or 30 days whichever is less):   Recertification will be due (POC Duration  / 90 days whichever is less):    Visit # Insurance Allowable Requires auth    (new insu , 6 visits on previous insurance)    [x]no        []yes:     Functional Scale:LEFS 75% disability   Date assessed:  20  LEFS 78% disability      20  LEFS 81% disability      10/15/20  LEFS 76% disability      20  Therapy Diagnosis/Practice Pattern:E    Number of Comorbidities:  []0     []1-2    [x]3+    Latex Allergy:  [x]NO      []YES  Preferred Language for Healthcare:   [x]English       []other:        Pain level: 5/10    SUBJECTIVE:  Pt states that he has been very pain since he had the ablation on the left knee. She also has an infected toenail on the right side and had to cancel last week. He is starting to feel better, but is still much more painful than before the surgery, is stiff, and slightly swollen. He has his tentative gastric bypass surgery in March 2020. OBJECTIVE:   ? Observation: enters wearing  knee brace and using bilateral crutches. ? Test measurements:  Knee flexion AROM 120  ?  MMT hip ABD L 4/5  36.3#, R 4-/5 33.1#, hip flexion L 5-/49.2#, R 5/5/53.9#, knee extension R 32.0# 4/5 (knee more painful today), R 38.0# 4+/5, knee flexion L 5-, R 5    RESTRICTIONS/PRECAUTIONS: HTN   Procedure(s) 8/3/2020:  1.  Left knee arthroscopy with partial medial meniscectomy and chondroplasty medial femoral condyle with synovial biopsy ([id number])    Exercises/Interventions:     Therapeutic Ex (11139) Sets/sec Notes/CUES HEP   Pt ed: aquatic therapy vs in this clinic to improve walking distance     Seated calf stretch c strap  Incline board calf stretch lvl 2- toes on board     X   Seated HS stretch 3x30\"  X   HL adductor stretch   X   Rectus stretch EOB 30\"x3     Bridge with increasing knee flex  Bridge on  deg    SLR-   \" ER- indep today x15 R,L 1.5#   X   SL hip abd R, L  Clamshell R,L  Reverse clamshell R,L 2x101.5#  1# on knee  1# ankle    Seated hip IR iso 5\"x15 GVl at ankles    Standing HR  At [facility]    Side step 1/2 wall (short) 3 laps UE support, GVL at ankles    Step up 3\"    2x10 Heavy UE support,   [facility] platform    Standing glider: abd, ext diag  UE supp on bar Cue for posture   Sit to stand - Second to highest box   2x10No UE assist    LP DL  SL 70#  40#   Sore L by second set   [address]   SL 2x1035#    LAQ 90-40 2x1010#    Soleus press 45#    LATOYA TKE  LATOYA hip ABD 2x10 R,L60#  15#    Bike (low)  Seat 17 Pt too sore today   Manual Intervention (87363) 5' total     Patellar mobs sup, inf        Stick to IT band, quad 5'  Showed pt how to use stick from home   HS s  Knee slightly bent                NMR re-education (55335)   CUES NEEDED   Gait training s AD   Decreased stance time LLE, lateral trunk lean during L stance    Weight shifting A/P   intermittent fingertip support today Weight shifting A/P with one crutch   Intermittent CL UE support when L foot back- not ready to wean to one crutch   Split stance balance + airex at 1/2 wall     Intermittent UE support                           Therapeutic Activity (21198)                                                Therapeutic Exercise and NMR EXR  [x] (01214) Provided verbal/tactile cueing for activities related to strengthening, flexibility, endurance, ROM for improvements in LE, proximal hip, and core control with self care, mobility, lifting, ambulation.  [] (94944) Provided verbal/tactile cueing for activities related to improving balance, coordination, kinesthetic sense, posture, motor skill, proprioception  to assist with LE, proximal hip, and core control in self care, mobility, lifting, ambulation and eccentric single leg control.      NMR and Therapeutic Activities:    [x] (91314 or 70872) Provided verbal/tactile cueing for activities related to improving balance, coordination, kinesthetic sense, posture, motor skill, proprioception and motor activation to allow for proper function of core, proximal hip and LE with self care and ADLs  [x] (67003) Gait Re-education- Provided training and instruction to the patient for proper LE, core and proximal hip recruitment and positioning and eccentric body weight control with ambulation re-education including up and down stairs     Home Exercise Program:    [x] (87970) Reviewed/Progressed HEP activities related to strengthening, flexibility, endurance, ROM of core, proximal hip and LE for functional self-care, mobility, lifting and ambulation/stair navigation   [] (36036)Reviewed/Progressed HEP activities related to improving balance, coordination, kinesthetic sense, posture, motor skill, proprioception of core, proximal hip and LE for self care, mobility, lifting, and ambulation/stair navigation      Manual Treatments:  PROM / STM / Oscillations-Mobs:  G-I, II, III, IV (PA's, Inf., Post.) [x] (93947) Provided manual therapy to mobilize LE, proximal hip and/or LS spine soft tissue/joints for the purpose of modulating pain, promoting relaxation,  increasing ROM, reducing/eliminating soft tissue swelling/inflammation/restriction, improving soft tissue extensibility and allowing for proper ROM for normal function with self care, mobility, lifting and ambulation. Modalities:     [] GAME READY (VASO)- for significant edema, swelling, pain control. (low) in long-sitting   CP x 15' R,L knee long sit with pillow under knees  Charges:  Timed Code Treatment Minutes: 43   Total Treatment Minutes: 58 (ice)     BWC time in/time out:   (and requires time in and out for each CPT code)    [] EVAL (LOW) 71266 (typically 20 minutes face-to-face)  [] EVAL (MOD) 87313 (typically 30 minutes face-to-face)  [] EVAL (HIGH) 19625 (typically 45 minutes face-to-face)  [] RE-EVAL     [x] CI(64090) x 3   [] IONTO  [] NMR (13891) x     [] VASO  [x] Manual (55874) x      [] Other: gait x  [] TA x      [] Mech Traction (32554)  [] ES(attended) (64010)      [] ES (un) (35023):       GOALS:   Patient stated goal: 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 Progressing: []? Met: []? Not Met: []? Adjusted     Therapist goals for Patient:   Short Term Goals: To be achieved in: 2 weeks  1. Independent in HEP and progression per patient tolerance, in order to prevent re-injury. []? Progressing: [x]? Met: []? Not Met: []? Adjusted  2. Patient will have a decrease in pain to facilitate improvement in movement, function, and ADLs as indicated by Functional Deficits. [x]? Progressing: []? Met: []? Not Met: []? Adjusted     Long Term Goals: To be achieved in: 6 weeks  1. Disability index score of 40% or less for the LEFS to assist with reaching prior level of function. []? Progressing: []? Met: [x]? Not Met: []? Adjusted Lower score on LEFS despite pt reporting improvements. ASSESSMENT: Pt able to ambulate without one crutch safely for short distance, but is very antalgic. He continues to improve with tolerance to transitions and with strength despite knee pain from OA. He continues to make strength gains in bilateral LE's as evidenced by the objective section. The LLE continues to be weaker than the RLE overall. Pt anticipates a left TKR after his gastric bypass. This may occur in 2021, so have recommended that he decrease the frequency of his visits to 1x/week over the next month. Will assess if he can transition to an [facility] at that time. Return to Play: (if applicable)   []  Stage 1: Intro to Strength   []  Stage 2: Return to Run and Strength   []  Stage 3: Return to Jump and Strength   []  Stage 4: Dynamic Strength and Agility   []  Stage 5: Sport Specific Training     []  Ready to Return to Play, Meets All Above Stages   []  Not Ready for Return to Sports   Comments:                                PLAN: Continue PT 2x/week, extend POC another 6 weeks  [x] Continue per plan of care [] Alter current plan (see comments above)  [] Plan of care initiated [] Hold pending MD visit [] Discharge      Electronically signed by:  Allan Felix, PT, DPT    Note: If patient does not return for scheduled/ recommended follow up visits, this note will serve as a discharge from care along with most recent update on progress. Access Code: [email or web address]   URL: [email or web address]   Date: 10/08/2020   Prepared by: Allan Felix     Exercises   Supine Hip Adductor Stretch - 3 sets - 15s hold - 1x daily - 7x weekly   Bridge - 10 reps - 1x daily - 7x weekly   Small Range Straight Leg Raise - 5 reps - 3 sets - 1x daily - 7x weekly   Sidelying Hip Abduction - 10 reps - 2 sets - 1x daily - 4x weekly   Clamshell - 10 reps - 2 sets - 1x daily - 4x weekly   Seated Gastroc Stretch with Strap - 3 sets - 30s hold - 1x daily - 7x weekly

## 2020-12-31 ENCOUNTER — HOSPITAL ENCOUNTER (OUTPATIENT)
Dept: PHYSICAL THERAPY | Age: 60
Setting detail: THERAPIES SERIES
Discharge: HOME | End: 2020-12-31
Payer: COMMERCIAL

## 2020-12-31 PROCEDURE — 97110 THERAPEUTIC EXERCISES: CPT

## 2020-12-31 NOTE — FLOWSHEET NOTE
[facility],  [address]  Phone: [phone]  Fax [phone]    Physical Therapy Treatment Note/ Progress Report:           Date:  2020    Patient Name:  Erum Gray    :  1960  MRN: [id number]  Restrictions/Precautions:    Medical/Treatment Diagnosis Information:  · Diagnosis: M62.007G (ICD-10-CM) - Complex tear of medial meniscus of left knee as current injury, initial encounter  · Treatment Diagnosis: M25.562 Left knee pain; M25.662 Left knee stiffness; R26.2 Difficulty in walking  Insurance/Certification information:  PT Insurance Information: [employer] [place]  Physician Information:  Referring Practitioner: Dr. Sofie Barkley  Has the plan of care been signed (Y/N):        [x]  Yes  []  No     Date of Patient follow up with Physician:       Is this a Progress Report:     []  Yes  [x]  No        If Yes:  Date Range for reporting period:  Beginning 20  Ending 20    Progress report will be due (10 Rx or 30 days whichever is less):   Recertification will be due (POC Duration  / 90 days whichever is less):    Visit # Insurance Allowable Requires auth    30 (new insu , 6 visits on previous insurance)    [x]no        []yes:     Functional Scale:LEFS 75% disability   Date assessed:  20  LEFS 78% disability      20  LEFS 81% disability      10/15/20  LEFS 76% disability      20  Therapy Diagnosis/Practice Pattern:E    Number of Comorbidities:  []0     []1-2    [x]3+    Latex Allergy:  [x]NO      []YES  Preferred Language for Healthcare:   [x]English       []other:        Pain level: 5/10    SUBJECTIVE:  Pt states that he didn't feel much worse after last visit- still moderately sore from his procedure. He has his tentative gastric bypass surgery in 2020. OBJECTIVE:   ? Observation: enters wearing  knee brace and using bilateral crutches. ? Test measurements:  Knee flexion AROM 120  ?      RESTRICTIONS/PRECAUTIONS: HTN   Procedure(s) 8/3/2020:  1.  Left knee arthroscopy with partial medial meniscectomy and chondroplasty medial femoral condyle with synovial biopsy (01970-63)    Exercises/Interventions:     Therapeutic Ex (76205) Sets/sec Notes/CUES HEP   Pt ed: aquatic therapy vs in this clinic to improve walking distance     Seated calf stretch c strap  Incline board calf stretch lvl 2- toes on board     X   Seated HS stretch   X   HL adductor stretch   X   Rectus stretch EOB 30\"x3     Bridge with increasing knee flex  Bridge on  deg    SLR-   \" ER- indep today x15 R,L 1.5#   X   SL hip abd R, L  Clamshell R,L  Reverse clamshell R,L 2x101.5#  1# on knee  1# ankle    Seated hip IR iso 5\"x15 GVl at ankles    Standing HR 2x10 At [facility]    Side step 1/2 wall (short) 3 laps UE support, GVL at ankles    Step up 3\"    2x10 R,L Heavy UE support,   [facility] platform    Standing glider: abd, ext diag  UE supp on bar Cue for posture   Sit to stand - Second to highest box   2x10No UE assist    LP DL  SL 70#  40#   Sore L by second set   [address]   SL 2x1035#    LAQ 90-40 2x1010#    Soleus press 45#    LATOYA hip extension   LATOYA hip ABD x15 R,L2x10 R,L15#  15#    Bike (low)  Seat 17 Pt too sore today   Manual Intervention (35543) 5' total     Patellar mobs sup, inf        Stick to IT band, quad 5'  Showed pt how to use stick from home   HS s  Knee slightly bent                NMR re-education (19457)   CUES NEEDED   Gait training s AD   Decreased stance time LLE, lateral trunk lean during L stance    Weight shifting A/P   intermittent fingertip support today    Weight shifting A/P with one crutch   Intermittent CL UE support when L foot back- not ready to wean to one crutch   Split stance balance + airex at 1/2 wall     Intermittent UE support                           Therapeutic Activity (77838) Therapeutic Exercise and NMR EXR  [x] (33479) Provided verbal/tactile cueing for activities related to strengthening, flexibility, endurance, ROM for improvements in LE, proximal hip, and core control with self care, mobility, lifting, ambulation.  [] (45161) Provided verbal/tactile cueing for activities related to improving balance, coordination, kinesthetic sense, posture, motor skill, proprioception  to assist with LE, proximal hip, and core control in self care, mobility, lifting, ambulation and eccentric single leg control.      NMR and Therapeutic Activities:    [x] (47218 or 69794) Provided verbal/tactile cueing for activities related to improving balance, coordination, kinesthetic sense, posture, motor skill, proprioception and motor activation to allow for proper function of core, proximal hip and LE with self care and ADLs  [x] (17387) Gait Re-education- Provided training and instruction to the patient for proper LE, core and proximal hip recruitment and positioning and eccentric body weight control with ambulation re-education including up and down stairs     Home Exercise Program:    [x] (43867) Reviewed/Progressed HEP activities related to strengthening, flexibility, endurance, ROM of core, proximal hip and LE for functional self-care, mobility, lifting and ambulation/stair navigation   [] (70524)Reviewed/Progressed HEP activities related to improving balance, coordination, kinesthetic sense, posture, motor skill, proprioception of core, proximal hip and LE for self care, mobility, lifting, and ambulation/stair navigation      Manual Treatments:  PROM / STM / Oscillations-Mobs:  G-I, II, III, IV (PA's, Inf., Post.) [x] (24856) Provided manual therapy to mobilize LE, proximal hip and/or LS spine soft tissue/joints for the purpose of modulating pain, promoting relaxation,  increasing ROM, reducing/eliminating soft tissue swelling/inflammation/restriction, improving soft tissue extensibility and allowing for proper ROM for normal function with self care, mobility, lifting and ambulation. Modalities:     [] GAME READY (VASO)- for significant edema, swelling, pain control. (low) in long-sitting   CP x 15' R,L knee long sit with pillow under knees  Charges:  Timed Code Treatment Minutes: 40   Total Treatment Minutes: 55 (ice)     BWC time in/time out:   (and requires time in and out for each CPT code)    [] EVAL (LOW) 22564 (typically 20 minutes face-to-face)  [] EVAL (MOD) 06383 (typically 30 minutes face-to-face)  [] EVAL (HIGH) 17106 (typically 45 minutes face-to-face)  [] RE-EVAL     [x] OC(55455) x 3   [] IONTO  [] NMR (93415) x     [] VASO  [x] Manual (55732) x      [] Other: gait x  [] TA x      [] Mech Traction (34125)  [] ES(attended) (43090)      [] ES (un) (45586):       GOALS:   Patient stated goal: 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 Progressing: []? Met: []? Not Met: []? Adjusted     Therapist goals for Patient:   Short Term Goals: To be achieved in: 2 weeks  1. Independent in HEP and progression per patient tolerance, in order to prevent re-injury. []? Progressing: [x]? Met: []? Not Met: []? Adjusted  2. Patient will have a decrease in pain to facilitate improvement in movement, function, and ADLs as indicated by Functional Deficits. [x]? Progressing: []? Met: []? Not Met: []? Adjusted     Long Term Goals: To be achieved in: 6 weeks  1. Disability index score of 40% or less for the LEFS to assist with reaching prior level of function. []? Progressing: []? Met: [x]? Not Met: []? Adjusted Lower score on LEFS despite pt reporting improvements. 2. Patient will demonstrate increased left knee AROM to 0-110  to allow for proper joint functioning for car/truck mobility, stair amb.   []? Progressing: [x]? Met: []? Not Met: []? Adjusted  3. Patient will demonstrate an increase in Strength to at least 4+/5 for the left knee and hip stabilizers in order to ambulate s AD for community distances. [x]? Progressing: []? Met: []? Not Met: []? Adjusted  4. Patient will return to walking community distances s AD with mild bilateral knee pain (<3/10). [x]? Progressing: []? Met: []? Not Met: []? Adjusted  5. Pt will be able to drive x 2 hours without increased knee pain; car mobility without knee pain. pt able to drive 1.5 hours, but more uncomfortable as a passenger  [x]? Progressing: []? Met: []? Not Met: []? Adjusted          Progression Towards Functional goals:  [] Patient is progressing as expected towards functional goals listed. [x] Progression is slowed due to complexities listed. [] Progression has been slowed due to co-morbidities. [] Plan just implemented, too soon to assess goals progression  [] Other:         Overall Progression Towards Functional goals/ Treatment Progress Update:  [] Patient is progressing as expected towards functional goals listed. [x] Progression is slowed due to complexities/Impairments listed. [] Progression has been slowed due to co-morbidities.   [] Plan just implemented, too soon to assess goals progression <30days   [] Goals require adjustment due to lack of progress  [] Patient is not progressing as expected and requires additional follow up with physician  [] Other    Prognosis for POC: [x] Good [] Fair  [] Poor      Patient requires continued skilled intervention: [x] Yes  [] No    Treatment/Activity Tolerance:  [x] Patient able to complete treatment  [] Patient limited by fatigue  [x] Patient limited by pain    [] Patient limited by other medical complications  [] Other: ASSESSMENT: Pt able to ambulate without one crutch safely for short distance, but is very antalgic. He continues to improve with tolerance to transitions and with strength despite knee pain from OA. He continues to make strength gains in bilateral LE's as evidenced by the objective section. The LLE continues to be weaker than the RLE overall. Pt anticipates a left TKR after his gastric bypass. This may occur in 2021, so have recommended that he decrease the frequency of his visits to 1x/week over the next month. Will assess if he can transition to an [facility] at that time. Return to Play: (if applicable)   []  Stage 1: Intro to Strength   []  Stage 2: Return to Run and Strength   []  Stage 3: Return to Jump and Strength   []  Stage 4: Dynamic Strength and Agility   []  Stage 5: Sport Specific Training     []  Ready to Return to Play, Meets All Above Stages   []  Not Ready for Return to Sports   Comments:                                PLAN: Continue PT 2x/week, extend POC another 6 weeks  [x] Continue per plan of care [] Alter current plan (see comments above)  [] Plan of care initiated [] Hold pending MD visit [] Discharge      Electronically signed by:  Johnny De Souza, PT, DPT    Note: If patient does not return for scheduled/ recommended follow up visits, this note will serve as a discharge from care along with most recent update on progress. Access Code: [email or web address]   URL: [email or web address]   Date: 10/08/2020   Prepared by: Johnny De Souza     Exercises   Supine Hip Adductor Stretch - 3 sets - 15s hold - 1x daily - 7x weekly   Bridge - 10 reps - 1x daily - 7x weekly   Small Range Straight Leg Raise - 5 reps - 3 sets - 1x daily - 7x weekly   Sidelying Hip Abduction - 10 reps - 2 sets - 1x daily - 4x weekly   Clamshell - 10 reps - 2 sets - 1x daily - 4x weekly   Seated Gastroc Stretch with Strap - 3 sets - 30s hold - 1x daily - 7x weekly Seated Hamstring Stretch - 3 sets - 20s hold - 1x daily - 7x weekly   Heel rises with counter support - 10 reps - 3 sets - 1x daily - 7x weekly   Patient Education   Pain Management

## 2021-01-04 ENCOUNTER — TELEPHONE (OUTPATIENT)
Dept: BARIATRICS/WEIGHT MGMT | Age: 61
End: 2021-01-04

## 2021-01-04 ENCOUNTER — HOSPITAL ENCOUNTER (OUTPATIENT)
Dept: PHYSICAL THERAPY | Age: 61
Setting detail: THERAPIES SERIES
Discharge: HOME | End: 2021-01-04
Payer: COMMERCIAL

## 2021-01-04 DIAGNOSIS — M22.41 CHONDROMALACIA PATELLAE OF RIGHT KNEE: ICD-10-CM

## 2021-01-04 DIAGNOSIS — M17.11 PRIMARY OSTEOARTHRITIS OF RIGHT KNEE: Primary | ICD-10-CM

## 2021-01-04 DIAGNOSIS — M25.562 CHRONIC PAIN OF LEFT KNEE: ICD-10-CM

## 2021-01-04 DIAGNOSIS — M25.561 ACUTE PAIN OF RIGHT KNEE: ICD-10-CM

## 2021-01-04 DIAGNOSIS — M17.12 PRIMARY OSTEOARTHRITIS OF LEFT KNEE: ICD-10-CM

## 2021-01-04 DIAGNOSIS — G89.29 CHRONIC PAIN OF LEFT KNEE: ICD-10-CM

## 2021-01-04 DIAGNOSIS — M94.262 CHONDROMALACIA OF LEFT KNEE: ICD-10-CM

## 2021-01-04 PROCEDURE — 97530 THERAPEUTIC ACTIVITIES: CPT

## 2021-01-04 NOTE — TELEPHONE ENCOUNTER
Patient states he had to sheree 1st EGD so this is his 2nd covid test needed. Said last time they gave him a hard time when he went for test.  Wants to make sure someone scheduled this for him. I don't see any EGD forms or paperwork in chart where the information is usually put. Surg Frankey Specter is out of office until Wed. Please confirm with patient.   [phone]

## 2021-01-04 NOTE — FLOWSHEET NOTE
[facility],  [address]  Phone: [phone]  Fax [phone]    Physical Therapy Treatment Note/ Progress Report:           Date:  2021    Patient Name:  Kasey Richardson    :  1960  MRN: [id number]  Restrictions/Precautions:    Medical/Treatment Diagnosis Information:  · Diagnosis: A79.074N (ICD-10-CM) - Complex tear of medial meniscus of left knee as current injury, initial encounter  · Treatment Diagnosis: M25.562 Left knee pain; M25.662 Left knee stiffness; R26.2 Difficulty in walking  Insurance/Certification information:  PT Insurance Information: [employer]  Physician Information:  Referring Practitioner: Dr. Niesha Evangelista  Has the plan of care been signed (Y/N):        [x]  Yes  []  No     Date of Patient follow up with Physician:       Is this a Progress Report:     []  Yes  [x]  No        If Yes:  Date Range for reporting period:  Beginning 20  Ending 20    Progress report will be due (10 Rx or 30 days whichever is less):   Recertification will be due (POC Duration  / 90 days whichever is less):    Visit # Insurance Allowable Requires auth   30 30 (2021)    [x]no        []yes:     Functional Scale:LEFS 75% disability   Date assessed:  20  LEFS 78% disability      20  LEFS 81% disability      10/15/20  LEFS 76% disability      20  Therapy Diagnosis/Practice Pattern:E    Number of Comorbidities:  []0     []1-2    [x]3+    Latex Allergy:  [x]NO      []YES  Preferred Language for Healthcare:   [x]English       []other:        Pain level: 5/10    SUBJECTIVE:  Pt states that he is still pretty sore. The right knee is more sore today for some reason. He has his tentative gastric bypass surgery in 2020. OBJECTIVE:   ? Observation: enters wearing  knee brace and using bilateral crutches. ? Test measurements:  Knee flexion AROM 120  ? RESTRICTIONS/PRECAUTIONS: HTN   Procedure(s) 8/3/2020:  1.  Left knee arthroscopy with partial medial meniscectomy and chondroplasty medial femoral condyle with synovial biopsy (02888-55)    Exercises/Interventions:     Therapeutic Ex (31445) Sets/sec Notes/CUES HEP   Pt ed: aquatic therapy vs in this clinic to improve walking distance     Seated calf stretch c strap  Incline board calf stretch lvl 2- toes on board     X   Seated HS stretch   X   HL adductor stretch   X   Rectus stretch EOB 30\"x3     Bridge with increasing knee flex  Bridge on SWB 2x5115 deg    SLR- x15 R,L 1.5# X   SL hip abd R, L  Clamshell R,L  Reverse clamshell R,L 2x101.5#  1# on knee  1# ankle    Seated hip IR iso 5\"x15 GVl at ankles    Standing HR 2x10 At [facility]    Side step 1/2 wall (short) 3 laps UE support, GVL at ankles    Step up 3\"    x10 R,L Heavy UE support,   [facility] platform    Sit to stand - Second to highest box   2x10No UE assist    LP DL  SL 70#  40#   Sore L by second set   [address]   SL 3x1035#    LAQ 90-40 2x1010#    Soleus press 45#    LATOYA hip extension   LATOYA hip ABD 2x10 R,L2x10 R,L15#  15#    Manual Intervention (97250) 5' total     Patellar mobs sup, inf        Stick to IT band, quad 5'  Showed pt how to use stick from home   HS s  Knee slightly bent                NMR re-education (28196)   CUES NEEDED   Gait training s AD   Decreased stance time LLE, lateral trunk lean during L stance    Weight shifting A/P   intermittent fingertip support today    Weight shifting A/P with one crutch   Intermittent CL UE support when L foot back- not ready to wean to one crutch   Split stance balance  At [facility]   2x30\" R,L  Intermittent UE support                           Therapeutic Activity (19255)                                                Therapeutic Exercise and NMR EXR [x] (03240) Provided verbal/tactile cueing for activities related to strengthening, flexibility, endurance, ROM for improvements in LE, proximal hip, and core control with self care, mobility, lifting, ambulation.  [] (88480) Provided verbal/tactile cueing for activities related to improving balance, coordination, kinesthetic sense, posture, motor skill, proprioception  to assist with LE, proximal hip, and core control in self care, mobility, lifting, ambulation and eccentric single leg control.      NMR and Therapeutic Activities:    [x] (72771 or 46167) Provided verbal/tactile cueing for activities related to improving balance, coordination, kinesthetic sense, posture, motor skill, proprioception and motor activation to allow for proper function of core, proximal hip and LE with self care and ADLs  [x] (77771) Gait Re-education- Provided training and instruction to the patient for proper LE, core and proximal hip recruitment and positioning and eccentric body weight control with ambulation re-education including up and down stairs     Home Exercise Program:    [x] (14818) Reviewed/Progressed HEP activities related to strengthening, flexibility, endurance, ROM of core, proximal hip and LE for functional self-care, mobility, lifting and ambulation/stair navigation   [] (24972)Reviewed/Progressed HEP activities related to improving balance, coordination, kinesthetic sense, posture, motor skill, proprioception of core, proximal hip and LE for self care, mobility, lifting, and ambulation/stair navigation      Manual Treatments:  PROM / STM / Oscillations-Mobs:  G-I, II, III, IV (PA's, Inf., Post.) [x] (92477) Provided manual therapy to mobilize LE, proximal hip and/or LS spine soft tissue/joints for the purpose of modulating pain, promoting relaxation,  increasing ROM, reducing/eliminating soft tissue swelling/inflammation/restriction, improving soft tissue extensibility and allowing for proper ROM for normal function with self care, mobility, lifting and ambulation. Modalities:     [] GAME READY (VASO)- for significant edema, swelling, pain control. (low) in long-sitting   CP x 15' R,L knee long sit with pillow under knees  Charges:  Timed Code Treatment Minutes: 41   Total Treatment Minutes: 56 (ice)     BWC time in/time out:   (and requires time in and out for each CPT code)    [] EVAL (LOW) 24364 (typically 20 minutes face-to-face)  [] EVAL (MOD) 01199 (typically 30 minutes face-to-face)  [] EVAL (HIGH) 85224 (typically 45 minutes face-to-face)  [] RE-EVAL     [x] DM(94004) x 3   [] IONTO  [] NMR (41129) x     [] VASO  [x] Manual (72837) x      [] Other: gait x  [] TA x      [] Mech Traction (19927)  [] ES(attended) (05890)      [] ES (un) (96733):       GOALS:   Patient stated goal: 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 Progressing: []? Met: []? Not Met: []? Adjusted     Therapist goals for Patient:   Short Term Goals: To be achieved in: 2 weeks  1. Independent in HEP and progression per patient tolerance, in order to prevent re-injury. []? Progressing: [x]? Met: []? Not Met: []? Adjusted  2. Patient will have a decrease in pain to facilitate improvement in movement, function, and ADLs as indicated by Functional Deficits. [x]? Progressing: []? Met: []? Not Met: []? Adjusted     Long Term Goals: To be achieved in: 6 weeks  1. Disability index score of 40% or less for the LEFS to assist with reaching prior level of function. []? Progressing: []? Met: [x]? Not Met: []? Adjusted Lower score on LEFS despite pt reporting improvements. 2. Patient will demonstrate increased left knee AROM to 0-110  to allow for proper joint functioning for car/truck mobility, stair amb.   []? Progressing: [x]? Met: []? Not Met: []? Adjusted  3. Patient will demonstrate an increase in Strength to at least 4+/5 for the left knee and hip stabilizers in order to ambulate s AD for community distances. [x]? Progressing: []? Met: []? Not Met: []? Adjusted  4. Patient will return to walking community distances s AD with mild bilateral knee pain (<3/10). [x]? Progressing: []? Met: []? Not Met: []? Adjusted  5. Pt will be able to drive x 2 hours without increased knee pain; car mobility without knee pain. pt able to drive 1.5 hours, but more uncomfortable as a passenger  [x]? Progressing: []? Met: []? Not Met: []? Adjusted          Progression Towards Functional goals:  [] Patient is progressing as expected towards functional goals listed. [x] Progression is slowed due to complexities listed. [] Progression has been slowed due to co-morbidities. [] Plan just implemented, too soon to assess goals progression  [] Other:         Overall Progression Towards Functional goals/ Treatment Progress Update:  [] Patient is progressing as expected towards functional goals listed. [x] Progression is slowed due to complexities/Impairments listed. [] Progression has been slowed due to co-morbidities.   [] Plan just implemented, too soon to assess goals progression <30days   [] Goals require adjustment due to lack of progress  [] Patient is not progressing as expected and requires additional follow up with physician  [] Other    Prognosis for POC: [x] Good [] Fair  [] Poor      Patient requires continued skilled intervention: [x] Yes  [] No    Treatment/Activity Tolerance:  [x] Patient able to complete treatment  [] Patient limited by fatigue  [x] Patient limited by pain    [] Patient limited by other medical complications  [] Other: ASSESSMENT: Despite higher levels of pain since his ablation, pt is able to continue with strengthening well. Able to perform more standing exercise than previous visits today. Pt anticipates a left TKR after his gastric bypass. This may occur in 2021, so have recommended that he decrease the frequency of his visits to 1x/week over the next month. Will assess if he can transition to an [facility] at that time. Return to Play: (if applicable)   []  Stage 1: Intro to Strength   []  Stage 2: Return to Run and Strength   []  Stage 3: Return to Jump and Strength   []  Stage 4: Dynamic Strength and Agility   []  Stage 5: Sport Specific Training     []  Ready to Return to Play, Meets All Above Stages   []  Not Ready for Return to Sports   Comments:                                PLAN: Continue PT 2x/week, extend POC another 6 weeks  [x] Continue per plan of care [] Alter current plan (see comments above)  [] Plan of care initiated [] Hold pending MD visit [] Discharge      Electronically signed by:  Sharmin Reed PT, DPT    Note: If patient does not return for scheduled/ recommended follow up visits, this note will serve as a discharge from care along with most recent update on progress. Access Code: [email or web address]   URL: [email or web address]   Date: 10/08/2020   Prepared by: Sharmin Reed     Exercises   Supine Hip Adductor Stretch - 3 sets - 15s hold - 1x daily - 7x weekly   Bridge - 10 reps - 1x daily - 7x weekly   Small Range Straight Leg Raise - 5 reps - 3 sets - 1x daily - 7x weekly   Sidelying Hip Abduction - 10 reps - 2 sets - 1x daily - 4x weekly   Clamshell - 10 reps - 2 sets - 1x daily - 4x weekly   Seated Gastroc Stretch with Strap - 3 sets - 30s hold - 1x daily - 7x weekly   Seated Hamstring Stretch - 3 sets - 20s hold - 1x daily - 7x weekly   Heel rises with counter support - 10 reps - 3 sets - 1x daily - 7x weekly   Patient Education   Pain Management

## 2021-01-04 NOTE — TELEPHONE ENCOUNTER
Called pt. Pt was given the information needed regarding his Covid test prior to EGD. Pt states he was given a hard time when he went to [facility] for Covid testing, and that he'd lost the Covid info paperwork with the number to make an appt at that location. He will be going to [facility] where appointments are not needed for Covid testing, and pt states [facility] is closest to his home as well. He will be getting tested on 1/5/21.

## 2021-01-05 ENCOUNTER — NURSE ONLY (OUTPATIENT)
Dept: PRIMARY CARE CLINIC | Age: 61
End: 2021-01-05
Payer: COMMERCIAL

## 2021-01-05 ENCOUNTER — OFFICE VISIT (OUTPATIENT)
Dept: ORTHOPEDIC SURGERY | Age: 61
End: 2021-01-05
Payer: COMMERCIAL

## 2021-01-05 ENCOUNTER — PATIENT MESSAGE (OUTPATIENT)
Dept: ORTHOPEDIC SURGERY | Age: 61
End: 2021-01-05

## 2021-01-05 VITALS — WEIGHT: 315 LBS | TEMPERATURE: 96.8 F | HEIGHT: 68 IN | BODY MASS INDEX: 47.74 KG/M2

## 2021-01-05 DIAGNOSIS — M17.12 PRIMARY OSTEOARTHRITIS OF LEFT KNEE: Primary | ICD-10-CM

## 2021-01-05 DIAGNOSIS — Z01.818 PREOP EXAMINATION: Primary | ICD-10-CM

## 2021-01-05 PROCEDURE — 99211 OFF/OP EST MAY X REQ PHY/QHP: CPT | Performed by: NURSE PRACTITIONER

## 2021-01-05 PROCEDURE — 99213 OFFICE O/P EST LOW 20 MIN: CPT | Performed by: ORTHOPAEDIC SURGERY

## 2021-01-05 NOTE — PATIENT INSTRUCTIONS
Impression:   1. The geniculate nerve ablation procedure has not been successful in controlling his pain to date. 2.  Severe osteoarthritis of left knee with varus deformity. 3.  Morbid obesity. Plan/Treatment:   1. He was told to to new and complete his physical therapy and then continue with home exercise program to keep his knee strong. 2.  Proceed with his bariatric surgery as needed. 3.  He is return to Dr[facility] for now but will return in 3 months repeat x-ray of his left knee.     Roxy Mohr MD  1/5/2021

## 2021-01-05 NOTE — TELEPHONE ENCOUNTER
From: Juanpablo Morrison  To: Kayla Hill MD  Sent: 1/5/2021 8:50 AM EST  Subject: Visit Follow-Up Question    Sher Vazquez, didn't take long for me to send you a message lol. Dr Tommy Crowley never did tell me the amount of weight needed I needed to lose or BMI for the knee replacement.  Thanks

## 2021-01-05 NOTE — PROGRESS NOTES
FOLLOW-UP EVALUATION OF KNEE    1/5/2021    Sven Done      1960      Stef Conklin is seen for Knee Pain (LT Knee  Coolief done on 12/9/2020)    Stef Conklin returns now 4 weeks post geniculate ablation procedure for his left knee. He states he is not seen the expected benefit of pain relief from that procedure. He states that he is about the same as previously with constant sharp pains in his medial aspect of his left knee. Pain is up to 5 at rest and above 7 with activities such as standing and walking. He currently does require 2 crutches for ambulation as he has bilateral disease. He states there was no signs or symptoms of infection. His pain started up about 12 hours following the procedure. He states he did have some bruising which has resolved. Stef Conklin is scheduled for bariatric surgery in early March. He currently has a BMI of 57.7 and has a goal of getting a total knee replacement once he gets his weight down. He does receive occasional Euflexxa injections from Dr. Anushka Kitchen. He is also continuing his physical therapy to keep his legs strong in preparation for his operation. I have today reviewed with Sven Done the clinically relevant past medical history, medications, allergies, family history, and Review of Systems from the patients most recent history form, and I have documented any details relevant to today's presenting complaints in my history above. The patient's self recorded documents concerning the above have been scanned  into the chart under the \"Media\" tab.   Temp 96.8 °F (36 °C)   Ht 5' 8\" (1.727 m)   Wt (!) 380 lb (172.4 kg)   BMI 57.78 kg/m²     Physical examination:  General Appearance: no acute distress, alert, oriented x 3, appropriate mood and affect, obese  Limps when walking: yes - moderate with crutches, Right                                         Left       Swelling  mild   Effusion  neg   Ecchymosis  neg   Errythemia  neg   Warmth   neg   Deformity     Crepitus     Patellar Subluxation  neg   Tenderness  Medial joint   Log Roll  neg   Patellar Apprehension  neg   Patellar Grind   +   Extension Lag neg neg     Neurovascular Status:  Bronzing discoloration jose antonio legs    Range of Motion Extension Flexion Pules (0-4) Dorsalis  Pedis Posterior  Tibialis   Right 5 120         Degrees Right       Left   5 120         Degrees Left         Test Including Ligamentous Stability/ Positive or Negative                                       Test          Right         Left   Valgus  neg   Varus                      5               Lachman  neg   Anterior Drawer  neg   Posterior Drawer  neg   Monik     Pivot Shift     Quadraceps Active     Atrophy         Impression:   1. The geniculate nerve ablation procedure has not been successful in controlling his pain to date. 2.  Severe osteoarthritis of left knee with varus deformity. 3.  Morbid obesity. Plan/Treatment:   1. He was told to to continue and complete his physical therapy and then continue with home exercise program to keep his knee strong. 2.  Proceed with his bariatric surgery as needed. 3.  He is return to Dr. Fabricio levy for now but will return in 3 months repeat x-ray of his left knee. Ema Erwin MD  1/5/2021    This dictation was done with Dragon dictation and may contain mechanical errors related to translation.

## 2021-01-06 ENCOUNTER — TELEPHONE (OUTPATIENT)
Dept: ORTHOPEDIC SURGERY | Age: 61
End: 2021-01-06

## 2021-01-06 LAB — SARS-COV-2: NOT DETECTED

## 2021-01-06 NOTE — TELEPHONE ENCOUNTER
01/06/2021    VISCO-3  (SERIES OF 3)    BILATERAL KNEES. DRUG REQUEST IS AUTHORIZED ON THE PHARMACY BENEFIT FOR THIS MEMBER GROUP ([facility]). PATIENT INFORMATION AND VERBAL SCRIPT GIVEN TO LAURA AT [address] [place] [address] ON 1/6/2021 . THEY WILL CONTACT PATIENT FOR CONSENT AND COPAY THEN CALL TO SCHEDULE DELIVERY. DRUG WILL BE DELIVERED TO THE [facility] OFFICE. AP    NOTE: Original request was for non-preferred drug EUFLEXXA. Okay to switch to preferred drug VISCO-3. Per Tacos Bobo. [employer]  AP

## 2021-01-07 ENCOUNTER — APPOINTMENT (OUTPATIENT)
Dept: PHYSICAL THERAPY | Age: 61
End: 2021-01-07
Payer: COMMERCIAL

## 2021-01-07 DIAGNOSIS — E78.2 MIXED HYPERLIPIDEMIA: ICD-10-CM

## 2021-01-07 LAB
ALBUMIN SERPL-MCNC: 4.3 G/DL (ref 3.4–5)
ALP BLD-CCNC: 82 U/L (ref 40–129)
ALT SERPL-CCNC: 47 U/L (ref 10–40)
AST SERPL-CCNC: 39 U/L (ref 15–37)
BILIRUB SERPL-MCNC: 0.4 MG/DL (ref 0–1)
BILIRUBIN DIRECT: <0.2 MG/DL (ref 0–0.3)
BILIRUBIN, INDIRECT: ABNORMAL MG/DL (ref 0–1)
CHOLESTEROL, TOTAL: 124 MG/DL (ref 0–199)
HDLC SERPL-MCNC: 52 MG/DL (ref 40–60)
LDL CHOLESTEROL CALCULATED: 43 MG/DL
TOTAL CK: 70 U/L (ref 39–308)
TOTAL PROTEIN: 7.3 G/DL (ref 6.4–8.2)
TRIGL SERPL-MCNC: 145 MG/DL (ref 0–150)
VLDLC SERPL CALC-MCNC: 29 MG/DL

## 2021-01-08 ENCOUNTER — ANESTHESIA EVENT (OUTPATIENT)
Dept: ENDOSCOPY | Age: 61
End: 2021-01-08
Payer: COMMERCIAL

## 2021-01-11 ENCOUNTER — ANESTHESIA (OUTPATIENT)
Dept: ENDOSCOPY | Age: 61
End: 2021-01-11
Payer: COMMERCIAL

## 2021-01-11 ENCOUNTER — HOSPITAL ENCOUNTER (OUTPATIENT)
Age: 61
Setting detail: OUTPATIENT SURGERY
Discharge: HOME | End: 2021-01-11
Attending: SURGERY | Admitting: SURGERY
Payer: COMMERCIAL

## 2021-01-11 ENCOUNTER — APPOINTMENT (OUTPATIENT)
Dept: PHYSICAL THERAPY | Age: 61
End: 2021-01-11
Payer: COMMERCIAL

## 2021-01-11 VITALS
RESPIRATION RATE: 16 BRPM | BODY MASS INDEX: 47.74 KG/M2 | SYSTOLIC BLOOD PRESSURE: 127 MMHG | HEART RATE: 90 BPM | WEIGHT: 315 LBS | HEIGHT: 68 IN | TEMPERATURE: 97.8 F | DIASTOLIC BLOOD PRESSURE: 72 MMHG | OXYGEN SATURATION: 99 %

## 2021-01-11 VITALS
RESPIRATION RATE: 24 BRPM | OXYGEN SATURATION: 98 % | DIASTOLIC BLOOD PRESSURE: 59 MMHG | SYSTOLIC BLOOD PRESSURE: 137 MMHG

## 2021-01-11 DIAGNOSIS — Z01.818 PRE-OPERATIVE CLEARANCE: ICD-10-CM

## 2021-01-11 LAB
GLUCOSE BLD-MCNC: 129 MG/DL (ref 70–99)
PERFORMED ON: ABNORMAL

## 2021-01-11 PROCEDURE — 43239 EGD BIOPSY SINGLE/MULTIPLE: CPT | Performed by: SURGERY

## 2021-01-11 PROCEDURE — 3609012400 HC EGD TRANSORAL BIOPSY SINGLE/MULTIPLE: Performed by: SURGERY

## 2021-01-11 PROCEDURE — 7100000010 HC PHASE II RECOVERY - FIRST 15 MIN: Performed by: SURGERY

## 2021-01-11 PROCEDURE — 6360000002 HC RX W HCPCS: Performed by: NURSE ANESTHETIST, CERTIFIED REGISTERED

## 2021-01-11 PROCEDURE — 7100000011 HC PHASE II RECOVERY - ADDTL 15 MIN: Performed by: SURGERY

## 2021-01-11 PROCEDURE — 2709999900 HC NON-CHARGEABLE SUPPLY: Performed by: SURGERY

## 2021-01-11 PROCEDURE — 2500000003 HC RX 250 WO HCPCS: Performed by: NURSE ANESTHETIST, CERTIFIED REGISTERED

## 2021-01-11 PROCEDURE — 2580000003 HC RX 258: Performed by: ANESTHESIOLOGY

## 2021-01-11 PROCEDURE — 88305 TISSUE EXAM BY PATHOLOGIST: CPT

## 2021-01-11 PROCEDURE — 3700000000 HC ANESTHESIA ATTENDED CARE: Performed by: SURGERY

## 2021-01-11 PROCEDURE — 3700000001 HC ADD 15 MINUTES (ANESTHESIA): Performed by: SURGERY

## 2021-01-11 RX ORDER — PROPOFOL 10 MG/ML
INJECTION, EMULSION INTRAVENOUS PRN
Status: DISCONTINUED | OUTPATIENT
Start: 2021-01-11 | End: 2021-01-11 | Stop reason: SDUPTHER

## 2021-01-11 RX ORDER — ACETAMINOPHEN 500 MG
1000 TABLET ORAL EVERY 8 HOURS PRN
Status: ON HOLD | COMMUNITY
End: 2021-07-14 | Stop reason: HOSPADM

## 2021-01-11 RX ORDER — SODIUM CHLORIDE, SODIUM LACTATE, POTASSIUM CHLORIDE, CALCIUM CHLORIDE 600; 310; 30; 20 MG/100ML; MG/100ML; MG/100ML; MG/100ML
INJECTION, SOLUTION INTRAVENOUS CONTINUOUS
Status: DISCONTINUED | OUTPATIENT
Start: 2021-01-11 | End: 2021-01-11 | Stop reason: HOSPADM

## 2021-01-11 RX ORDER — LIDOCAINE HYDROCHLORIDE 20 MG/ML
INJECTION, SOLUTION EPIDURAL; INFILTRATION; INTRACAUDAL; PERINEURAL PRN
Status: DISCONTINUED | OUTPATIENT
Start: 2021-01-11 | End: 2021-01-11 | Stop reason: SDUPTHER

## 2021-01-11 RX ADMIN — SODIUM CHLORIDE, POTASSIUM CHLORIDE, SODIUM LACTATE AND CALCIUM CHLORIDE: 600; 310; 30; 20 INJECTION, SOLUTION INTRAVENOUS at 07:49

## 2021-01-11 RX ADMIN — LIDOCAINE HYDROCHLORIDE 50 MG: 20 INJECTION, SOLUTION EPIDURAL; INFILTRATION; INTRACAUDAL; PERINEURAL at 08:54

## 2021-01-11 RX ADMIN — PROPOFOL 120 MG: 10 INJECTION, EMULSION INTRAVENOUS at 08:54

## 2021-01-11 RX ADMIN — PROPOFOL 80 MG: 10 INJECTION, EMULSION INTRAVENOUS at 08:56

## 2021-01-11 ASSESSMENT — PULMONARY FUNCTION TESTS
PIF_VALUE: 1
PIF_VALUE: 0
PIF_VALUE: 1

## 2021-01-11 ASSESSMENT — PAIN DESCRIPTION - DESCRIPTORS: DESCRIPTORS: ACHING;STABBING

## 2021-01-11 ASSESSMENT — PAIN - FUNCTIONAL ASSESSMENT: PAIN_FUNCTIONAL_ASSESSMENT: PREVENTS OR INTERFERES SOME ACTIVE ACTIVITIES AND ADLS

## 2021-01-11 NOTE — ANESTHESIA PRE PROCEDURE
Department of Anesthesiology  Preprocedure Note       Name:  Fallon Garcia   Age:  64 y.o.  :  1960                                          MRN:  [id number]         Date:  2021      Surgeon: Jackie Wiley): Aliza Garcia DO    Procedure: Procedure(s):  ESOPHAGOGASTRODUODENOSCOPY    Medications prior to admission:   Prior to Admission medications    Medication Sig Start Date End Date Taking?  Authorizing Provider   etodolac (LODINE) 400 MG tablet Take 1 tablet by mouth 2 times daily 21  Erika Topete MD   loratadine (CLARITIN) 10 MG tablet Take 1 tablet by mouth daily 20   JACKLYN Rogel CNP   hydroCHLOROthiazide (HYDRODIURIL) 25 MG tablet TAKE ONE TABLET BY MOUTH ONE TIME DAILY 20   JACKLYN Rogel CNP   lisinopril (PRINIVIL;ZESTRIL) 10 MG tablet TAKE 1 TABLET BY MOUTH ONCE A DAY 20   JACKLYN Rogel CNP   Prodigy Twist Top Lancets 28G MISC TEST TWO TIMES A DAY 20   JACKLYN Rogel CNP   blood glucose test strips (PRODIGY NO CODING BLOOD GLUC) strip TEST TWO TIMES A DAY 20   JACKLYN Rogel CNP   atorvastatin (LIPITOR) 40 MG tablet Take 1 tablet by mouth daily 20   Piotr Neely MD   metFORMIN (GLUCOPHAGE) 500 MG tablet Take 1 tablet by mouth 2 times daily (with meals) 20   JACKLYN Rogel CNP   blood glucose monitor kit and supplies Check BG twice daily 20   JACKLYN Rogel CNP   furosemide (LASIX) 80 MG tablet Take 1 tablet by mouth 2 times daily 10/22/20   Bobo Carver MD   metOLazone (ZAROXOLYN) 2.5 MG tablet Take 1 tablet by mouth three times a week 10/23/20   Bobo Carver MD   potassium chloride (KLOR-CON M) 20 MEQ TBCR extended release tablet Take 1 tablet by mouth 2 times daily 10/22/20   Bobo Carver MD   rOPINIRole (REQUIP) 2 MG tablet Take 1 tablet by mouth nightly 20   JACKLYN Rogel CNP fluticasone (FLONASE) 50 MCG/ACT nasal spray 1 spray by Nasal route daily 9/8/20   Michael Beach APRN - CNP       Current medications:    Current Facility-Administered Medications   Medication Dose Route Frequency Provider Last Rate Last Admin    lactated ringers infusion   Intravenous Continuous Collin Dao MD           Allergies: Allergies   Allergen Reactions    Bactrim [Sulfamethoxazole-Trimethoprim] Rash     POSSIBLE fixed drug eruption on his cheeks, but diagnosis is not certain (only happened once). Problem List:    Patient Active Problem List   Diagnosis Code    Class 3 severe obesity due to excess calories with serious comorbidity and body mass index (BMI) of 50.0 to 59.9 in adult ([facility] [address] E66.01, Z68.43    Mixed hypertriglyceridemia J40.4    Nonalcoholic fatty liver disease K76.0    RLS (restless legs syndrome) G25.81    Essential hypertension, benign I10    Impaired fasting glucose R73.01    Chronic pain of left knee M25.562, G89.29    Osteoarthritis of left knee M17.12    Chondromalacia of left knee M94.262    Pes planus M21.40    Lymphedema I89.0    Peripheral venous insufficiency I87.2    Allergic rhinitis J30.9    Type 2 diabetes mellitus without complication, without long-term current use of insulin (HCC) E11.9    Ingrown right big toenail L60.0    TOM treated with BiPAP G47.33    History of tobacco use disorder Z87.891    Arthritis of left knee M17.12       Past Medical History:        Diagnosis Date    Bilateral venous leg ulcers 09/2020    Cellulitis of lower extremity 9/25/2019    Hypertension     Impaired fasting glucose 5/2013    Obesity     TOM treated with BiPAP 11/30/2020    Preoperative clearance 10/26/2020    Screening PSA (prostate specific antigen) 12/30/2015;5/1/17    Nml:0.53(12/30/2015);5/1/17=nml.     Sleep apnea     Stasis dermatitis of both legs 8/28/2020    Tobacco use     Tubular adenoma of colon 09/14/2017 Past Surgical History:        Procedure Laterality Date    CIRCUMCISION      COLONOSCOPY  2017    Colonoscopy with polypectomy (cold biopsy):Dr. Walker:next in 3yrs=2020    KNEE ARTHROSCOPY Left 8/3/2020    VIDEO ARTHROSCOPY LEFT KNEE, PARTIAL MEDIAL MENISCECTOMY, CHONDROPLASTY, SYNOVIAL BIOPSY -TOM=4- performed by Shalonda Robles MD at [place] [address] PAIN MANAGEMENT PROCEDURE Left 2020    COOLIEF RADIOFREQUENCY ABLATION - LEFT performed by Sadia Alberto MD at [place] [address]  2011    VASECTOMY      WISDOM TOOTH EXTRACTION         Social History:    Social History     Tobacco Use    Smoking status: Former Smoker     Packs/day: 2.00     Years: 25.00     Pack years: 50.00     Types: Cigarettes     Quit date: 2006     Years since quittin.7    Smokeless tobacco: Never Used   Substance Use Topics    Alcohol use: Not Currently     Frequency: Never     Comment: ocas                                Counseling given: Not Answered      Vital Signs (Current): There were no vitals filed for this visit.                                            BP Readings from Last 3 Encounters:   20 (!) 161/106   20 128/70   20 132/76       NPO Status:                                                                                 BMI:   Wt Readings from Last 3 Encounters:   21 (!) 380 lb (172.4 kg)   20 (!) 385 lb 6.4 oz (174.8 kg)   20 (!) 392 lb (177.8 kg)     There is no height or weight on file to calculate BMI.    CBC:   Lab Results   Component Value Date    WBC 5.8 10/30/2020    RBC 4.43 10/30/2020    HGB 13.9 10/30/2020    HCT 41.9 10/30/2020    MCV 94.7 10/30/2020    RDW 14.8 10/30/2020     10/30/2020       CMP:   Lab Results   Component Value Date     10/30/2020    K 4.6 10/30/2020    K 4.0 10/08/2020     10/30/2020    CO2 25 10/30/2020    BUN 18 10/30/2020

## 2021-01-11 NOTE — ANESTHESIA POSTPROCEDURE EVALUATION
Department of Anesthesiology  Postprocedure Note    Patient: Lizandro Graham  MRN: [id number]  YOB: 1960  Date of evaluation: 1/11/2021  Time:  11:10 AM     Procedure Summary     Date: 01/11/21 Room / Location: [facility]    Anesthesia Start: 0188 Anesthesia Stop: 7904    Procedure: EGD BIOPSY (N/A ) Diagnosis:       Pre-operative clearance      (Pre-operative clearance [Z01.818])    Surgeons: Nayely Rubin DO Responsible Provider: Cleveland Mcdaniel MD    Anesthesia Type: MAC ASA Status: 3          Anesthesia Type: MAC    Ramon Phase I: Ramon Score: 10    Ramon Phase II: Ramon Score: 10    Last vitals: Reviewed and per EMR flowsheets.        Anesthesia Post Evaluation    Patient participation: complete - patient participated  Level of consciousness: awake  Airway patency: patent  Nausea & Vomiting: no nausea and no vomiting  Complications: no  Cardiovascular status: hemodynamically stable  Respiratory status: acceptable  Hydration status: stable

## 2021-01-11 NOTE — H&P
Update History & Physical    The patient's History and Physical of December 26, office visit was reviewed with the patient and I examined the patient. There was no change. The patient presents for EGD for preop workup for bariatric surgery. Plan: The risks, benefits, expected outcome, and alternative to the recommended procedure have been discussed with the patient. Patient understands and wants to proceed with the procedure.      Electronically signed by Lauren Camp MD on 1/11/2021 at 6:55 AM

## 2021-01-11 NOTE — OP NOTE
Esophagogastroduodenoscopy     Indications: Pre-op gastric Surgery, rule out Gastroesophageal reflux disease. Pre-operative Diagnosis: Obesity/pre-op bariatric surgery . Post-operative Diagnosis: Superficial Gastritis    Surgeon: Darren Busby    Anesthesia: MAC      Procedure Details   The patient was seen in the Holding Room. The risks, benefits, complications, treatment options, and expected outcomes were discussed with the patient. Pre-op Endoscopy recommended to rule any intragastric pathology that would interfere with proposed procedure /  And or due to current conditions. The possibilities of reaction to medication, pulmonary aspiration, perforation of viscus, bleeding, recurrent infection, the need for additional procedures, failure to diagnose a condition, and creating a complication requiring transfusion or operation were discussed with the patient. The patient concurred with the proposed plan, giving informed consent. The site of surgery properly noted/marked. The patient was taken to Endoscopy Suite, identified as Talavera Breach and the procedure verified as  Esophagogastroduodenoscopy  A Time Out was held and the above information confirmed. Upper Endoscopy: An endoscope was inserted through the oropharynx into the upper esophagus. The endoscope was passed into the stomach to the level of the pylorus and passed to the duodenum where the ampulla was visualized and duodenum was normal. Then the scope was retracted back to the stomach and it was abnormal except for gastritis, biopsy of the antrum obtained for H. Pylori, then retroflexed and the gastroesophageal junction was inspected.  There was no hiatal hernia and no evidence of Amador's     Findings:  Esophageal findings include:  Upper: normal Esophageal Mucosa  Lower:normal Esophageal Mucosa  Distal: normal Esophageal Mucosa      Gastric findings include: abnormal Gastric Mucosa    Duodenal Findings: normal Duodenal Mucosa Estimated Blood Loss:  none    Biopsy:  Antrum    Complications:  None; patient tolerated the procedure well. Disposition: PACU - hemodynamically stable. Condition: stable    Attending Attestation: I was present and scrubbed for the entire procedure.

## 2021-01-11 NOTE — PROGRESS NOTES
Ambulatory Surgery/Procedure Discharge Note    Vitals:    01/11/21 0919   BP: 128/70   Pulse: 90   Resp: 16   Temp:    SpO2: 99%       In: 200 [I.V.:200]  Out: -     Restroom use offered before discharge. yes    Pain assessment:    Pain Level: 5  Patient stated his knee pain is within his normal pain. Patient discharged to home/self care.  Patient discharged via wheel chair by transporter to waiting family/S.O.       1/11/2021 9:41 AM

## 2021-01-14 ENCOUNTER — APPOINTMENT (OUTPATIENT)
Dept: PHYSICAL THERAPY | Age: 61
End: 2021-01-14
Payer: COMMERCIAL

## 2021-01-14 ENCOUNTER — HOSPITAL ENCOUNTER (OUTPATIENT)
Dept: PHYSICAL THERAPY | Age: 61
Setting detail: THERAPIES SERIES
Discharge: HOME | End: 2021-01-14
Payer: COMMERCIAL

## 2021-01-14 PROCEDURE — 97110 THERAPEUTIC EXERCISES: CPT

## 2021-01-14 RX ORDER — POTASSIUM CHLORIDE 1500 MG/1
20 TABLET, FILM COATED, EXTENDED RELEASE ORAL 2 TIMES DAILY
Qty: 120 TABLET | Refills: 2 | Status: SHIPPED | OUTPATIENT
Start: 2021-01-14 | End: 2021-04-21 | Stop reason: SDUPTHER

## 2021-01-14 NOTE — FLOWSHEET NOTE
[facility],  [address] [place]  Phone: [phone]  Fax [phone]    Physical Therapy Treatment Note/ Progress Report:           Date:  2021    Patient Name:  Kacey Petersen    :  1960  MRN: [id number]  Restrictions/Precautions:    Medical/Treatment Diagnosis Information:  · Diagnosis: S68.826E (ICD-10-CM) - Complex tear of medial meniscus of left knee as current injury, initial encounter  · Treatment Diagnosis: M25.562 Left knee pain; M25.662 Left knee stiffness; R26.2 Difficulty in walking  Insurance/Certification information:  PT Insurance Information: [facility] [place]  Physician Information:  Referring Practitioner: Dr. Nonie Oppenheim  Has the plan of care been signed (Y/N):        [x]  Yes  []  No     Date of Patient follow up with Physician:       Is this a Progress Report:     []  Yes  [x]  No        If Yes:  Date Range for reporting period:  Beginning 20  Ending 20    Progress report will be due (10 Rx or 30 days whichever is less):   Recertification will be due (POC Duration  / 90 days whichever is less):    Visit # Insurance Allowable Requires auth    (2021)    [x]no        []yes:     Functional Scale:LEFS 75% disability   Date assessed:  20  LEFS 78% disability      20  LEFS 81% disability      10/15/20  LEFS 76% disability      20  Therapy Diagnosis/Practice Pattern:E    Number of Comorbidities:  []0     []1-2    [x]3+    Latex Allergy:  [x]NO      []YES  Preferred Language for Healthcare:   [x]English       []other:        Pain level: 5/10    SUBJECTIVE:  Pt states that he saw Dr. Amanda Camacho regarding the ablation in his knees- it may take a few more weeks for things to feel better. He was unable to take his anti-inflammatory last week, so he was very painful all week too. He has his tentative gastric bypass surgery in 2020.      OBJECTIVE: ? Observation: enters wearing  knee brace and using bilateral crutches. ? Test measurements:  Knee flexion AROM 120  ?      RESTRICTIONS/PRECAUTIONS: HTN   Procedure(s) 8/3/2020:  1.  Left knee arthroscopy with partial medial meniscectomy and chondroplasty medial femoral condyle with synovial biopsy (40110-93)    Exercises/Interventions:     Therapeutic Ex (70381) Sets/sec Notes/CUES HEP   Pt ed: aquatic therapy vs in this clinic to improve walking distance     Seated calf stretch c strap  Incline board calf stretch lvl 2- toes on board     X   Seated HS stretch   X   HL adductor stretch   X   Rectus stretch EOB 30\"x3     Bridge with increasing knee flex  Bridge on SWB 5\"s95416 deg    SLR- x15 R,L 1.5# X   SL hip abd R, L  Clamshell R,L  Reverse clamshell R,L 2x101.5#  1# on knee  1# ankle    Seated hip IR iso 5\" 2x10 GVl at ankles    Standing HR  At [facility]    Side step 1/2 wall (short) 3 laps UE support, GVL at ankles    Step up 3\"    x10 R,L Heavy UE support,   [facility] platform    Sit to stand - Second to highest box   2x10No UE assist    LP DL  SL 70#  40#   Sore L by second set   [address]   SL 35#    LAQ 90-40 10#    Soleus press 45#    LATOYA hip extension   [facility] hip ABD x15 R,L2x10 R,L15#  15#    Manual Intervention (20737) 5' total     Patellar mobs sup, inf        Stick to IT band, quad 5'  Showed pt how to use stick from home   HS s  Knee slightly bent                NMR re-education (08249)   CUES NEEDED   Gait training s AD   Decreased stance time LLE, lateral trunk lean during L stance    Weight shifting A/P   intermittent fingertip support today    Weight shifting A/P with one crutch   Intermittent CL UE support when L foot back- not ready to wean to one crutch   Split stance balance  At [facility]   2x30\" R,L  Intermittent UE support                           Therapeutic Activity (83254)                                                Therapeutic Exercise and NMR EXR [x] (15191) Provided verbal/tactile cueing for activities related to strengthening, flexibility, endurance, ROM for improvements in LE, proximal hip, and core control with self care, mobility, lifting, ambulation.  [] (84002) Provided verbal/tactile cueing for activities related to improving balance, coordination, kinesthetic sense, posture, motor skill, proprioception  to assist with LE, proximal hip, and core control in self care, mobility, lifting, ambulation and eccentric single leg control.      NMR and Therapeutic Activities:    [x] (31656 or 60322) Provided verbal/tactile cueing for activities related to improving balance, coordination, kinesthetic sense, posture, motor skill, proprioception and motor activation to allow for proper function of core, proximal hip and LE with self care and ADLs  [x] (97450) Gait Re-education- Provided training and instruction to the patient for proper LE, core and proximal hip recruitment and positioning and eccentric body weight control with ambulation re-education including up and down stairs     Home Exercise Program:    [x] (79813) Reviewed/Progressed HEP activities related to strengthening, flexibility, endurance, ROM of core, proximal hip and LE for functional self-care, mobility, lifting and ambulation/stair navigation   [] (65594)Reviewed/Progressed HEP activities related to improving balance, coordination, kinesthetic sense, posture, motor skill, proprioception of core, proximal hip and LE for self care, mobility, lifting, and ambulation/stair navigation      Manual Treatments:  PROM / STM / Oscillations-Mobs:  G-I, II, III, IV (PA's, Inf., Post.) [x] (13142) Provided manual therapy to mobilize LE, proximal hip and/or LS spine soft tissue/joints for the purpose of modulating pain, promoting relaxation,  increasing ROM, reducing/eliminating soft tissue swelling/inflammation/restriction, improving soft tissue extensibility and allowing for proper ROM for normal function with self care, mobility, lifting and ambulation. Modalities:     [] GAME READY (VASO)- for significant edema, swelling, pain control. (low) in long-sitting   CP x 15' R,L knee long sit with pillow under knees  Charges:  Timed Code Treatment Minutes: 40   Total Treatment Minutes: 55 (ice)     BWC time in/time out:   (and requires time in and out for each CPT code)    [] EVAL (LOW) 21054 (typically 20 minutes face-to-face)  [] EVAL (MOD) 12046 (typically 30 minutes face-to-face)  [] EVAL (HIGH) 03496 (typically 45 minutes face-to-face)  [] RE-EVAL     [x] EF(05492) x 3   [] IONTO  [] NMR (29482) x     [] VASO  [x] Manual (20141) x      [] Other: gait x  [] TA x      [] Mech Traction (47475)  [] ES(attended) (97636)      [] ES (un) (38007):       GOALS:   Patient stated goal: 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 Progressing: []? Met: []? Not Met: []? Adjusted     Therapist goals for Patient:   Short Term Goals: To be achieved in: 2 weeks  1. Independent in HEP and progression per patient tolerance, in order to prevent re-injury. []? Progressing: [x]? Met: []? Not Met: []? Adjusted  2. Patient will have a decrease in pain to facilitate improvement in movement, function, and ADLs as indicated by Functional Deficits. [x]? Progressing: []? Met: []? Not Met: []? Adjusted     Long Term Goals: To be achieved in: 6 weeks  1. Disability index score of 40% or less for the LEFS to assist with reaching prior level of function. []? Progressing: []? Met: [x]? Not Met: []? Adjusted Lower score on LEFS despite pt reporting improvements. 2. Patient will demonstrate increased left knee AROM to 0-110  to allow for proper joint functioning for car/truck mobility, stair amb.   []? Progressing: [x]? Met: []? Not Met: []? Adjusted  3. Patient will demonstrate an increase in Strength to at least 4+/5 for the left knee and hip stabilizers in order to ambulate s AD for community distances. [x]? Progressing: []? Met: []? Not Met: []? Adjusted  4. Patient will return to walking community distances s AD with mild bilateral knee pain (<3/10). [x]? Progressing: []? Met: []? Not Met: []? Adjusted  5. Pt will be able to drive x 2 hours without increased knee pain; car mobility without knee pain. pt able to drive 1.5 hours, but more uncomfortable as a passenger  [x]? Progressing: []? Met: []? Not Met: []? Adjusted          Progression Towards Functional goals:  [] Patient is progressing as expected towards functional goals listed. [x] Progression is slowed due to complexities listed. [] Progression has been slowed due to co-morbidities. [] Plan just implemented, too soon to assess goals progression  [] Other:         Overall Progression Towards Functional goals/ Treatment Progress Update:  [] Patient is progressing as expected towards functional goals listed. [x] Progression is slowed due to complexities/Impairments listed. [] Progression has been slowed due to co-morbidities.   [] Plan just implemented, too soon to assess goals progression <30days   [] Goals require adjustment due to lack of progress  [] Patient is not progressing as expected and requires additional follow up with physician  [] Other    Prognosis for POC: [x] Good [] Fair  [] Poor      Patient requires continued skilled intervention: [x] Yes  [] No    Treatment/Activity Tolerance:  [x] Patient able to complete treatment  [] Patient limited by fatigue  [x] Patient limited by pain    [] Patient limited by other medical complications  [] Other: ASSESSMENT: Pt had more pain with transitions and standing exercise d/t a week without anti-inflammatories, so he had less tolerance to TE. However, he is still motivated to build strength with there-ex. Pt anticipates a left TKR after his gastric bypass. This may occur in 2021, so have recommended that he decrease the frequency of his visits to 1x/week over the next month. Will assess if he can transition to an indep HEP at that time. Return to Play: (if applicable)   []  Stage 1: Intro to Strength   []  Stage 2: Return to Run and Strength   []  Stage 3: Return to Jump and Strength   []  Stage 4: Dynamic Strength and Agility   []  Stage 5: Sport Specific Training     []  Ready to Return to Play, Meets All Above Stages   []  Not Ready for Return to Sports   Comments:                                PLAN: Continue PT 2x/week, extend POC another 6 weeks  [x] Continue per plan of care [] Alter current plan (see comments above)  [] Plan of care initiated [] Hold pending MD visit [] Discharge      Electronically signed by:  Honey Ibarra, PT, DPT    Note: If patient does not return for scheduled/ recommended follow up visits, this note will serve as a discharge from care along with most recent update on progress. Access Code: [email or web address]   URL: [email or web address]   Date: 01/14/2021   Prepared by: Honey Ibarra     Exercises   Supine Hip Adductor Stretch - 3 sets - 15s hold - 1x daily - 7x weekly   Bridge - 10 reps - 1x daily - 3x weekly   Small Range Straight Leg Raise - 5 reps - 3 sets - 1x daily - 3x weekly   Sidelying Hip Abduction - 10 reps - 2 sets - 1x daily - 3x weekly   Clamshell - 10 reps - 2 sets - 1x daily - 3x weekly   Seated Gastroc Stretch with Strap - 3 sets - 30s hold - 1x daily - 7x weekly   Seated Hamstring Stretch - 3 sets - 20s hold - 1x daily - 7x weekly   Heel rises with counter support - 10 reps - 3 sets - 1x daily - 3x weekly Side Stepping with Counter Support - 10 reps - 3 sets - 1x daily - 3x weekly   Standing Tandem Balance with Counter Support - 2 sets - 30s hold - 1x daily - 3x weekly   Patient Education   Pain Management

## 2021-01-18 ENCOUNTER — HOSPITAL ENCOUNTER (OUTPATIENT)
Dept: PHYSICAL THERAPY | Age: 61
Setting detail: THERAPIES SERIES
Discharge: HOME | End: 2021-01-18
Payer: COMMERCIAL

## 2021-01-18 NOTE — FLOWSHEET NOTE
[facility],  [address]        Physical Therapy  Cancellation/No-show Note  Patient Name:  Roque Allen  :  1960   Date:  2021  Cancelled visits to date: 5  No-shows to date: 0    For today's appointment patient:  X? Cancelled  ? Rescheduled appointment  ? No-show     Reason given by patient:  ?  Patient ill  ? Conflicting appointment  ? No transportation    ? Conflict with work  X? No reason given  ?   Other:     Comments:      Electronically signed by:  Michael Ruby, PT, DPT

## 2021-01-19 ENCOUNTER — OFFICE VISIT (OUTPATIENT)
Dept: ORTHOPEDIC SURGERY | Age: 61
End: 2021-01-19
Payer: COMMERCIAL

## 2021-01-19 VITALS — WEIGHT: 315 LBS | HEIGHT: 68 IN | BODY MASS INDEX: 47.74 KG/M2

## 2021-01-19 DIAGNOSIS — G89.29 CHRONIC PAIN OF LEFT KNEE: ICD-10-CM

## 2021-01-19 DIAGNOSIS — M25.561 ACUTE PAIN OF RIGHT KNEE: ICD-10-CM

## 2021-01-19 DIAGNOSIS — M22.41 CHONDROMALACIA PATELLAE OF RIGHT KNEE: ICD-10-CM

## 2021-01-19 DIAGNOSIS — M17.12 PRIMARY OSTEOARTHRITIS OF LEFT KNEE: Primary | ICD-10-CM

## 2021-01-19 DIAGNOSIS — M17.11 PRIMARY OSTEOARTHRITIS OF RIGHT KNEE: ICD-10-CM

## 2021-01-19 DIAGNOSIS — M25.562 CHRONIC PAIN OF LEFT KNEE: ICD-10-CM

## 2021-01-19 DIAGNOSIS — M94.262 CHONDROMALACIA OF LEFT KNEE: ICD-10-CM

## 2021-01-19 PROCEDURE — 99213 OFFICE O/P EST LOW 20 MIN: CPT | Performed by: FAMILY MEDICINE

## 2021-01-19 PROCEDURE — 20610 DRAIN/INJ JOINT/BURSA W/O US: CPT | Performed by: FAMILY MEDICINE

## 2021-01-19 RX ORDER — PREGABALIN 75 MG/1
75 CAPSULE ORAL 2 TIMES DAILY
Qty: 60 CAPSULE | Refills: 3 | Status: ON HOLD | OUTPATIENT
Start: 2021-01-19 | End: 2021-03-02 | Stop reason: ALTCHOICE

## 2021-01-19 NOTE — PROGRESS NOTES
Chief Complaint  Knee Pain (VISCO-3 #1 BILATERAL KNEES)      Parag Norris is a 64 y.o. 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 History of Present Illness for Follow Up Patient:      Patient is being seen today for acute left knee pain. The patient reports that since the first week in March 2020 he began noticed the insidious onset of pain to the anterior medial portion of his left knee. Nathaly Mulligan He describes the symptoms as aching, sharp and stabbing. The pain is located medial, lateral and the patient rates their current pain as a 1-7 out of 10 on the pain scale. This problem has been an issue for 2 weeks. 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 Patient has other associated symptoms: Mild swelling instability. Patient has attempted rest, ice, heat, NSAID-6-12 over-the-counter ibuprofen daily to treat this problem and symptoms are are worsening. Patient does not have a previous history previous knee injury. He does recall an episode a couple months ago when he believes he pulled his right hip adductor which may have led to a gait change causing his left knee to bother him. Only mild night discomfort. Patient is being seen today for orthopedic and sports consultation and review of imaging. 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   Following a steroid injection he did report at least an 80% improvement of his symptoms but this lasted only about 4 weeks and over the last month he has noticed recurrence of his pain to the point where his pain is about a 5 out of 10 currently. He has noticed some catching and has definite difficulty repositioning his knee if it is in a bent position. He has not had juventino locking and admits he has been a little bit lax in performing his home-based exercises but has continued with his diclofenac twice daily. He has not noticed a great deal of swelling and has not had true instability symptoms and has gotten benefit from use of his brace. His pain is primarily anterior but also medial.  He just returned back to town this past weekend after being on the road for 8 straight weeks. Milagro Robb was last seen in the office on 5/27/2020 due to worsening pain, and sent for an MRI of his left knee. His MRI does show more prominent degenerative changes with high-grade medial and patellofemoral compartment chondromalacia with degenerative tearing and maceration to the posterior horn and body of the medial meniscus. There was a probable area of synovial thickening versus loose body in the suprapatellar recess medially but he is really not complaining of sensations of loose bodies and is not really had true locking or catching. Over this past weekend his pain symptoms have worsened substantially where he is unable to comfortably walk. He is having 10 out of 10 pain difficulty with motion and flexion. Once again he was supposed to leave [facility] to get back on the road as a  but he is unable to walk and unable to climb into his cab. He is having difficulty sleeping at this point. He has been icing and has been taking his steroid taper which did not provide any type of pain relief now his right knee is bothering him anteriorly and medially. Once again no active locking or catching.   Does have short-term disability available to him. He was last seen in the office on 2020 was continued on conservative treatment post imaging. Once again he is quite symptomatic and we did have to take him off work to allow for rehabilitation. He was quite painful and definitely requiring crutches to walk. Once again he does believe he has short-term disability. He continues to deny sensations of loose bodies are active locking or catching. He is continue with his ibuprofen 800 mg 3 times daily we did give him tramadol for breakthrough pain. He has been contemplating Visco supplementation. He is set up to begin supervised physical therapy this week and has been measured for his  brace. There is improvement on the left at about 50% and has at least an 80 to 90% improvement on the right knee. Josephine Dorado was last seen in the office on 2020 we completed Visco supplementation to his knees bilaterally. He states his right knee is doing reasonably well and grades improved about 75% however since finishing up his final Euflexxa injection to his left knee, his symptoms have apparently been getting worse to the point where he is still having to use his crutch. He is not accustomed to realizing his  brace but is having very substantial pain both at rest but primarily with weightbearing. They did have a second opinion consultation with Dr. Yue Cummings on 2020 who did agree that given his weight currently, that he is not an optimal surgical candidate. He has lost 8 pounds initially with diet but I think he would benefit as well from a consultation with [facility] weight loss. Potential for gastric bypass under banding was also discussed. He is still on short-term disability which will initially  next week but he cannot ambulate enough to get into his truck and has continued with physical therapy.   He has been taking his ibuprofen 3 times daily as well as his periodic tramadol but still is having fairly consistent pain up to an 8 out of 10. We last saw Modesta Whalen in the office on 7/6/2020 and ultimately underwent arthroscopic meniscectomy and chondroplasty to his left knee with some improvement of his symptoms. For the most part it is only a 2 to 3-10 but he has not been overly active and still requiring crutches for ambulation. He did have some venous stasis ulcerations and was seeing in wound clinic and these do seem to be improving. He also had no recent increase in his weight which was likely fluid accumulation as he was developing some pitting edema. He is trying to perform his exercise program as best he can. We did complete Visco supplementation in his knees on 6/23/2020 but this was fairly ineffective and he is going to be seeing weight loss management. Progressive standing and walking is still problematic for him and he is trying to lose weight. He unfortunately did not temporarily lose his job because of his worsening knee pain which she has been undergoing treatment for for the past 6 months. Most of his pain is anterior medial in nature. He has been continuing with his diclofenac and acetaminophen. Modesta Whalen was last seen in the office on 9/14/2020 for his underlying significant left knee osteoarthritis. He is now 2 months out from his left knee arthroscopy and was seemingly making progress up until a few days ago when he began his worsening pain once again with regard to his knee anteriorly and medially. There is no history of injury or fall. He has continued trying to lose weight and does have an upcoming appointment with his bariatric surgeon on 10/28/2020. We have been trying to get approval for the geniculate block in preparation for possible coolief treatment with Dr. Jim Calle. We are awaiting for medical mutual for authorization as we need to get the proper codes for approval of this.   We once again did complete Euflexxa on him on 6/23/2020 but that was before his surgery on 8/4/2020 his last steroid injection was 2020. He has not been using his  brace as we have had a difficult time getting him fitted with this properly and he has been in physical therapy. He is now back to using both crutches. We last saw Melisa Toscano in the office on 11/3/2020 and he last completed viscosupplementation to his knees on 2020 with subsequent arthroscopic meniscectomy and chondroplasty on 2020 by Dr. Binta Ochoa. More recently he has been undergoing trials of cool leaf radiofrequency ablation. We were hopeful that he made some initial strides with the test portion however he did not get longstanding pain relief following his ablation and continued use of 2 crutches and attend physical therapy. He is having considerable discomfort but would like to pursue viscosupplementation once again. He is going through the approval process for bariatric surgery potentially and he is using 2 crutches and work on his home-based exercises. He did have to stop his Lodine for a week with increasing pain to his knees and has been supplementing with Tylenol. He does have both rest and weightbearing type pain but has lost 25 pounds since starting his bariatric process. He does have a follow-up in 2021 for this. He would like to pursue viscosupplementation once again is trying to perform his home-based exercises. Attest: I have reviewed and attest the documentation of the HPI documented by my . I will make any changes if necessary.      Enc Date: 2021  Time: 12:22 PM  Provider: Ramona Melton MD        Social History     Tobacco Use    Smoking status: Former Smoker     Packs/day: 2.00     Years: 25.00     Pack years: 50.00     Types: Cigarettes     Quit date: 2006     Years since quittin.7    Smokeless tobacco: Never Used   Substance Use Topics    Alcohol use: Not Currently     Frequency: Never     Comment: ocas    Drug use: No        Review of Systems  Pertinent items are noted in HPI  Review of systems reviewed from Patient History Form dated on 3/23/2020 and available in the patient's chart under the Media tab. Vital Signs     Ht 5' 8\" (1.727 m)   Wt (!) 380 lb (172.4 kg)   BMI 57.78 kg/m²       General Exam:   Constitutional: Patient is adequately groomed with no evidence of malnutrition  DTRs: Deep tendon reflexes are intact  Mental Status: The patient is oriented to time, place and person. The patient's mood and affect are appropriate. Lymphatic: The lymphatic examination bilaterally reveals all areas to be without enlargement or induration. Vascular: Examination reveals no swelling or calf tenderness. Peripheral pulses are palpable and 2+. Neurological: The patient has good coordination. There is no weakness or sensory deficit. Left knee examination     Inspection: There is no high-grade deformity or soft tissue swelling although he may have a trace knee joint effusion. Only mild patellofemoral crepitation.     Palpation: He does have more prominent tenderness over the lateral greater than medial patellofemoral facet as well as over the anterior and posterior third medial joint line. Increasing tenderness over the medial tibial plateau and femoral condyle without high-grade effusion.     Rang of Motion: He does definitely stiff in the terminal 10 degrees of extension with flexion to about 120. Hamstrings and IT bands are tight.     Strength: 4 to 4+ out of 5 with flexion and extension.     Special Tests: He does have substantial pain reproduced patellar grind testing and with palpation of the primarily the anterior third medial joint line. I cannot really elicit any high-grade meniscal click so this causes some moderate discomfort medially more so than laterally. No obvious instability. Screening left hip testing is benign.     Skin: There are no rashes, ulcerations or lesions.   Distal motor sensory and vascular exam is intact.     Gait:  He is still having difficulty with bearing weight. He is currently using 1 crutch on the right although he will occasionally use 2 crutches as well. .     Reflex symmetrically preserved       Additional Comments:     Evaluation of his right knee does reveal trace swelling. He does have tenderness over the medial and lateral patellofemoral facet and most of his pain is reproduced with palpation of the anterior third medial joint line with patellar grind testing. He is stiff in the terminal 10 degrees of extension with flexion on the right to about 120-125.  4-5 strength with flexion extension. Pain with medial Monik's but no high-grade click. Negative lateral Monik's. No obvious instability. Screening right hip testing appears benign.         Additional Examinations:          Right Lower Extremity: Examination of the right lower extremity does not show any tenderness, deformity or injury. Range of motion is unremarkable. There is no gross instability. There are no rashes, ulcerations or lesions. Strength and tone are normal.  Left Lower Extremity: Examination of the left lower extremity does not show any tenderness, deformity or injury. Range of motion is unremarkable. There is no gross instability. There are no rashes, ulcerations or lesions. Strength and tone are normal.          Diagnostic Test Findings: No results found.    Right knee AP and PA weightbearing sunrise and lateral films obtained 6/2/2020 does show more mild medial compartment narrowing with patellofemoral tilt and mild apparent patellofemoral arthropathy.     Left knee MRI obtained at [address] [place] [address] on 5/29/2020 as listed above  Narrative   Site: [employer] [facility] #: [id number] #: [id number] Procedure: MR Left Knee w/o Contrast ; Reason for Exam: primary osteoarthritis of left knee; chondromalacia of left knee; left knee pain; eval OA/CMP; r/o medial meniscus tear   This document is confidential medical information.  Unauthorized disclosure or use of this information is prohibited by law. If you are not the intended recipient of this document, please advise us by calling immediately [phone].       [facility]           Patient Name: Kirsten Bingham   Case ID: [id number]   Patient : 1960   Referring Physician: Marvin Bustillos MD   Exam Date: 2020   Exam Description: MR Left Knee w/o Contrast            HISTORY:  Primary osteoarthritis of left knee.  Chondromalacia of left knee.  Left knee pain.     Evaluate OA/CMP.  Evaluate for medial meniscus tear.       TECHNICAL FACTORS:  Long- and short-axis fat- and water-weighted images were performed.       COMPARISON:  None.       FINDINGS: Teja Light is minimally tilted.  High-grade patellar and trochlear groove    chondromalacia.       ACL, PCL, LCL, MCL, flexor mechanism, and extensor mechanism are intact.       Thickened MCL.  Chronic sprain, scarring, and capsulitis is present.       High-grade chondromalacia involves the weightbearing medial femur and tibia.  Eccentric    spurring is present.       Lateral meniscus is intact.       Medial meniscus free edge tearing involves the body.       Nodular region of synovial thickening or body is noted within the suprapatellar recess    medially.  This measures at least 1.75 x 1 x 2.3 cm.       CONCLUSION:   1. High-grade medial and patellofemoral compartment chondromalacia.  Degenerative tearing    involves the free edge of the posterior horn and body.  Subchondral marrow changes, spurring    and remodeling of the medial and less so patellofemoral joint spaces. 2. Loose osteochondral body suprapatellar recess medially measures 1.75 x 1 x 2.3 cm.   3. Anteromedial and anterolateral subcutis swelling.  Contusion and sprain present.    4. Please see above.       Thank you for the opportunity to provide your interpretation.               Lucho Ambriz MD       A: YOANNA/ava 2020 7:29 PM   T: AVA 2020 2:35 PM       Follow-up left knee MRI obtained at [facility] [place] on 2020 as listed above  Narrative    [facility]: [facility] #: [id number] #: [id number] Procedure: MR Left Knee w/o Contrast ; Reason for Exam: primary osteoarthritis of left knee; chondromalacia of left knee; left knee pain; r/o insufficiency fracture    This document is confidential medical information.  Unauthorized disclosure or use of this information is prohibited by law. If you are not the intended recipient of this document, please advise us by calling immediately [phone].         [facility] [address]              Patient Name: Eugenia Hidalgo    Case ID: [id number]    Patient : 1960    Referring Physician: Torey Harrison MD    Exam Date: 2020    Exam Description: MR Left Knee w/o Contrast              HISTORY:  Primary osteoarthritis of left knee, chondromalacia of left knee, left knee pain,    evaluate for insufficiency fracture.         TECHNICAL FACTORS:  Long- and short-axis fat- and water-weighted images were performed.         COMPARISON:  None.         FINDINGS:  ACL, PCL, LCL and MCL are intact.         3-4 cm trizonal pattern of tearing involves the posterior horn and body of the medial meniscus.         Lateral meniscus is intact.         High-grade class 4 medial femoral and tibial chondromalacia.         MCL is thickened.  Sprain, scarring and capsulitis are present.         CONCLUSION:    1. 3-4 cm trizonal pattern of tearing involves the posterior horn body of the medial meniscus. 2. No lateral meniscal or cruciate tear. 3. Chronic MCL and LCL sprain and capsulitis.     4. High-grade medial and patellofemoral compartment chondromalacia.         Thank you for the opportunity to provide your interpretation.                   Dinora Chapman MD         A: YOANNA/jules 2020 2:27 PM    T: JULES 2020 12:41 PM             Assessment & Plan:    Encounter Diagnoses questions or concerns. This dictation was performed with a verbal recognition program (DRAGON) and it was checked for errors. It is possible that there are still dictated errors within this office note. If so, please bring any errors to my attention for an addendum. All efforts were made to ensure that this office note is accurate.

## 2021-01-20 ENCOUNTER — OFFICE VISIT (OUTPATIENT)
Dept: BARIATRICS/WEIGHT MGMT | Age: 61
End: 2021-01-20
Payer: COMMERCIAL

## 2021-01-20 VITALS
TEMPERATURE: 97.2 F | SYSTOLIC BLOOD PRESSURE: 132 MMHG | HEIGHT: 68 IN | BODY MASS INDEX: 47.74 KG/M2 | HEART RATE: 117 BPM | DIASTOLIC BLOOD PRESSURE: 79 MMHG | WEIGHT: 315 LBS

## 2021-01-20 DIAGNOSIS — E66.01 MORBID OBESITY WITH BMI OF 50.0-59.9, ADULT (HCC): Primary | ICD-10-CM

## 2021-01-20 DIAGNOSIS — G47.33 OSA TREATED WITH BIPAP: ICD-10-CM

## 2021-01-20 DIAGNOSIS — I10 ESSENTIAL HYPERTENSION, BENIGN: ICD-10-CM

## 2021-01-20 PROCEDURE — 99213 OFFICE O/P EST LOW 20 MIN: CPT | Performed by: SURGERY

## 2021-01-20 RX ORDER — BLOOD SUGAR DIAGNOSTIC
STRIP MISCELLANEOUS
Qty: 100 EACH | Refills: 0 | Status: ON HOLD
Start: 2021-01-20 | End: 2021-03-02 | Stop reason: SDUPTHER

## 2021-01-20 RX ORDER — ROPINIROLE 2 MG/1
2 TABLET, FILM COATED ORAL NIGHTLY
Qty: 90 TABLET | Refills: 0 | Status: ON HOLD
Start: 2021-01-20 | End: 2021-03-02 | Stop reason: ALTCHOICE

## 2021-01-20 RX ORDER — ROPINIROLE 2 MG/1
2 TABLET, FILM COATED ORAL NIGHTLY
Qty: 90 TABLET | Refills: 0 | Status: SHIPPED
Start: 2021-01-20 | End: 2021-02-22 | Stop reason: DRUGHIGH

## 2021-01-20 RX ORDER — LANCETS 28 GAUGE
EACH MISCELLANEOUS
Qty: 100 EACH | Refills: 0 | Status: SHIPPED | OUTPATIENT
Start: 2021-01-20 | End: 2021-03-07 | Stop reason: SDUPTHER

## 2021-01-20 RX ORDER — BLOOD SUGAR DIAGNOSTIC
STRIP MISCELLANEOUS
Qty: 100 EACH | Refills: 0 | Status: SHIPPED | OUTPATIENT
Start: 2021-01-20 | End: 2021-03-07 | Stop reason: SDUPTHER

## 2021-01-20 RX ORDER — LANCETS 28 GAUGE
EACH MISCELLANEOUS
Qty: 100 EACH | Refills: 0 | Status: ON HOLD
Start: 2021-01-20 | End: 2021-03-02 | Stop reason: SDUPTHER

## 2021-01-20 NOTE — PROGRESS NOTES
[facility]) Physicians   General & Laparoscopic Surgery  [employer]       HPI:     Primitivo Costello is a very pleasant 64 y.o. male with Body mass index is 57.96 kg/m². , Pre-Surgery. Pre-operative clearance and work up pending. Working hard to keep good dietary habits as well level of activity. Patient denies any nausea, vomiting, fevers, chills, shortness of breath, chest pain, cough, constipation or difficulty urinating. Past Medical History:   Diagnosis Date    Bilateral venous leg ulcers 2020    Cellulitis of lower extremity 2019    Hypertension     Impaired fasting glucose 2013    Obesity     TMO treated with BiPAP 2020    Preoperative clearance 10/26/2020    Screening PSA (prostate specific antigen) 2015;17    Nml:0.53(2015);17=nml.     Sleep apnea     Stasis dermatitis of both legs 2020    Tobacco use     Tubular adenoma of colon 2017     Past Surgical History:   Procedure Laterality Date    CIRCUMCISION      COLONOSCOPY  2017    Colonoscopy with polypectomy (cold biopsy):Dr. Walker:next in 3yrs=2020    KNEE ARTHROSCOPY Left 8/3/2020    VIDEO ARTHROSCOPY LEFT KNEE, PARTIAL MEDIAL MENISCECTOMY, CHONDROPLASTY, SYNOVIAL BIOPSY -TOM=4- performed by Ezequiel Temple MD at [place] [address] PAIN MANAGEMENT PROCEDURE Left 2020    COOLIEF RADIOFREQUENCY ABLATION - LEFT performed by Shanon Rowley MD at [place] [address]  2011    UPPER GASTROINTESTINAL ENDOSCOPY N/A 2021    EGD BIOPSY performed by Maida Villalobos DO at [address] EXTRACTION       Family History   Problem Relation Age of Onset    High Blood Pressure Mother     Heart Disease Mother         MI    AT 66    Diabetes Brother      Social History     Tobacco Use    Smoking status: Former Smoker     Packs/day: 2.00     Years: 25.00     Pack years: 50. 00     Types: Cigarettes     Quit date: 2006     Years since quittin.7    Smokeless tobacco: Never Used   Substance Use Topics    Alcohol use: Not Currently     Frequency: Never     Comment: ocas     I counseled the patient on the importance of not smoking and risks of ETOH. Allergies   Allergen Reactions    Bactrim [Sulfamethoxazole-Trimethoprim] Rash     POSSIBLE fixed drug eruption on his cheeks, but diagnosis is not certain (only happened once). Vitals:    21 0949   BP: 132/79   Pulse: 117   Temp: 97.2 °F (36.2 °C)   Weight: (!) 381 lb 3.2 oz (172.9 kg)   Height: 5' 8\" (1.727 m)       Body mass index is 57.96 kg/m². Current Outpatient Medications:     pregabalin (LYRICA) 75 MG capsule, Take 1 capsule by mouth 2 times daily for 30 days. , Disp: 60 capsule, Rfl: 3    potassium chloride (KLOR-CON M) 20 MEQ TBCR extended release tablet, Take 1 tablet by mouth 2 times daily, Disp: 120 tablet, Rfl: 2    acetaminophen (TYLENOL) 500 MG tablet, Take 1,000 mg by mouth every 8 hours as needed for Pain, Disp: , Rfl:     etodolac (LODINE) 400 MG tablet, Take 1 tablet by mouth 2 times daily, Disp: 180 tablet, Rfl: 1    loratadine (CLARITIN) 10 MG tablet, Take 1 tablet by mouth daily, Disp: 90 tablet, Rfl: 0    hydroCHLOROthiazide (HYDRODIURIL) 25 MG tablet, TAKE ONE TABLET BY MOUTH ONE TIME DAILY, Disp: 90 tablet, Rfl: 0    lisinopril (PRINIVIL;ZESTRIL) 10 MG tablet, TAKE 1 TABLET BY MOUTH ONCE A DAY, Disp: 90 tablet, Rfl: 0    atorvastatin (LIPITOR) 40 MG tablet, Take 1 tablet by mouth daily, Disp: 90 tablet, Rfl: 3    blood glucose monitor kit and supplies, Check BG twice daily, Disp: 1 kit, Rfl: 0    furosemide (LASIX) 80 MG tablet, Take 1 tablet by mouth 2 times daily, Disp: 120 tablet, Rfl: 2    metOLazone (ZAROXOLYN) 2.5 MG tablet, Take 1 tablet by mouth three times a week, Disp: 25 tablet, Rfl: 2    fluticasone (FLONASE) 50 MCG/ACT nasal spray, 1 spray by Nasal route daily, Disp: 3 Bottle, Rfl: 0    rOPINIRole (REQUIP) 2 MG tablet, Take 1 tablet by mouth nightly, Disp: 90 tablet, Rfl: 0    metFORMIN (GLUCOPHAGE) 500 MG tablet, Take 1 tablet by mouth 2 times daily (with meals), Disp: 180 tablet, Rfl: 1    Prodigy Twist Top Lancets 28G MISC, TEST TWO TIMES A DAY, Disp: 100 each, Rfl: 0    blood glucose test strips (PRODIGY NO CODING BLOOD GLUC) strip, TEST TWO TIMES A DAY, Disp: 100 each, Rfl: 0    rOPINIRole (REQUIP) 2 MG tablet, Take 1 tablet by mouth nightly, Disp: 90 tablet, Rfl: 0    metFORMIN (GLUCOPHAGE) 500 MG tablet, Take 1 tablet by mouth 2 times daily (with meals), Disp: 180 tablet, Rfl: 1    Prodigy Twist Top Lancets 28G MISC, TEST TWO TIMES A DAY, Disp: 100 each, Rfl: 0    blood glucose test strips (PRODIGY NO CODING BLOOD GLUC) strip, TEST TWO TIMES A DAY, Disp: 100 each, Rfl: 0      Review of Systems - History obtained from the patient  General ROS: negative  Psychological ROS: negative  Endocrine ROS: negative  Respiratory ROS: negative  Cardiovascular ROS: negative  Gastrointestinal ROS:negative  Genito-Urinary ROS: negative  Musculoskeletal ROS: negative   Skin ROS: negative    Physical Exam   Vitals Reviewed   Constitutional: Patient is oriented to person, place, and time. Patient appears well-developed and well-nourished. Patient is active and cooperative. Non-toxic appearance. No distress. Neck: Trachea normal and normal range of motion. No JVD present. Pulmonary/Chest: Effort normal. No accessory muscle usage or stridor. No apnea. No respiratory distress. Cardiovascular: Normal rate and no JVD. Abdominal: Normal appearance. Patient exhibits no distension. Abdomen is soft, obese, non tender. Musculoskeletal: Normal range of motion. Patient exhibits no edema. Neurological: Patient is alert and oriented to person, place, and time. Patient has normal strength. GCS eye subscore is 4. GCS verbal subscore is 5. GCS motor subscore is 6.    Skin: Skin is warm and dry. No abrasion and no rash noted. Patient is not diaphoretic. No cyanosis or erythema. Psychiatric: Patient has a normal mood and affect. Speech is normal and behavior is normal. Cognition and memory are normal.       A/P    Petty Rivero is 64 y.o. male, Body mass index is 57.96 kg/m². pre surgery, has lost 4# since last visit. The patient underwent dietary counseling with registered dietician. I have reviewed, discussed and agree with the dietary plan. Patient is trying hard to keep good dietary and behavior modifications. Patient is monitoring portion sizes, food choices and liquid calories. Patient is trying to exercise regularly as much as possible. We discussed how his weight affects his overall health including:  Nydia Chambers was seen today for obesity. Diagnoses and all orders for this visit:    Morbid obesity with BMI of 50.0-59.9, adult ([facility] [address]    TOM treated with BiPAP    Essential hypertension, benign       and importance of weight loss to alleviate those co morbid conditions. I encouraged the patient to continue exercise and keeping healthy eating habits. Discussed pre-op labs and work up till now. Also counseled the patient extensively on Surgery. Total encounter time: 20 minutes including any number of the following: review of labs, imaging, provider notes, outside hospital records; performing examination/evaluation; counseling patient and family; ordering medications/tests; placing referrals and communication with referring physicians; coordination of care, and documentation in the EHR. RTC in 4 weeks  Obtain rest of pre-op work up / clearances  Diet and Exercise      Patient advised that its their responsibility to follow up for studies and/or labs ordered today.      Teetee Rodriguez

## 2021-01-20 NOTE — PROGRESS NOTES
Juanpablo Morrison lost 4.2 lbs over 1 month. Pt reports working on food choices, interrupting grazing this month. Is pt eating at least 4 times everyday? 3-5x daily   B - tuna salad / CC / leftover chix / eggs  L - chix with broth and sometimes WG rotini  S - sometimes CC  D - spaghetti with WG pasta / chix with broccoli / fish with steamed veggies  S - pickle / SF popsicle    Is pt eating a lean protein source with all meals and snacks? Yes    Has pt decreased their portions using the plate method? Yes - mindful of portion sizes, pt states difficult at times    Is pt choosing low fat/sugar free options? Yes - trying to make mindful food choices    Is pt drinking at least 64 oz of clear liquids everyday? Yes - water / decaf tea / sometimes Gatorade Zero    Has pt stopped drinking carbonation, caffeinated, and sugar sweetened beverages? Yes    Has pt sampled Unjury and/or Nectar protein?  Yes - tried and tolerated    Participating in intentional exercise? none d/t knee    Plan/Recommendations:   - Continue to eat 4-5x day to include lean protein with each meal / snack  - Continue to be mindful of portion size and grazing    Handouts: none    Nate Chong

## 2021-01-21 ENCOUNTER — APPOINTMENT (OUTPATIENT)
Dept: PHYSICAL THERAPY | Age: 61
End: 2021-01-21
Payer: COMMERCIAL

## 2021-01-24 ENCOUNTER — PATIENT MESSAGE (OUTPATIENT)
Dept: INTERNAL MEDICINE CLINIC | Age: 61
End: 2021-01-24

## 2021-01-25 ENCOUNTER — HOSPITAL ENCOUNTER (OUTPATIENT)
Dept: PHYSICAL THERAPY | Age: 61
Setting detail: THERAPIES SERIES
Discharge: HOME | End: 2021-01-25
Payer: COMMERCIAL

## 2021-01-25 RX ORDER — METOLAZONE 2.5 MG/1
2.5 TABLET ORAL
Qty: 25 TABLET | Refills: 2 | Status: ON HOLD | OUTPATIENT
Start: 2021-01-25 | End: 2021-03-03 | Stop reason: SDUPTHER

## 2021-01-25 ASSESSMENT — ENCOUNTER SYMPTOMS
ALLERGIC/IMMUNOLOGIC NEGATIVE: 1
RESPIRATORY NEGATIVE: 1
GASTROINTESTINAL NEGATIVE: 1
COUGH: 0
EYES NEGATIVE: 1
SHORTNESS OF BREATH: 0

## 2021-01-25 NOTE — TELEPHONE ENCOUNTER
From: Roque Allen  To: JACKLYN Goetz - CNP  Sent: 1/24/2021 9:54 AM EST  Subject: Prescription Question    Peyman Farnsworthen, Almost out of my Metolazone 2.5 MG tablets. Won't let me refill online. Need sent to [facility].  Thank you

## 2021-01-25 NOTE — PROGRESS NOTES
[facility]) Physicians   [employer]    Subjective:      Patient ID: Kacey Petersen is a 64 y.o. male    HPI    The patient is here for their bariatric surgery preoperative education group visit. He has made several attempts at weight loss in the past without success and now has been scheduled to have bariatric surgery on 2021 for future weight loss. He is working to change his dietary behaviors and lose weight to improve comorbid conditions such as hypertension, diabetes mellitus, hyperlipidemia, obstructive sleep apnea and fatty liver. Kacey Petersen is a very pleasant 64 y.o. male with Body mass index is 59.76 kg/m². Past Medical History:   Diagnosis Date    Bilateral venous leg ulcers 2020    Cellulitis of lower extremity 2019    Hypertension     Impaired fasting glucose 2013    Obesity     TOM treated with BiPAP 2020    Preoperative clearance 10/26/2020    Screening PSA (prostate specific antigen) 2015;17    Nml:0.53(2015);17=nml.     Sleep apnea     Stasis dermatitis of both legs 2020    Tobacco use     Tubular adenoma of colon 2017     Past Surgical History:   Procedure Laterality Date    CIRCUMCISION      COLONOSCOPY  2017    Colonoscopy with polypectomy (cold biopsy):Dr. Walker:next in 3yrs=2020    KNEE ARTHROSCOPY Left 8/3/2020    VIDEO ARTHROSCOPY LEFT KNEE, PARTIAL MEDIAL MENISCECTOMY, CHONDROPLASTY, SYNOVIAL BIOPSY -TOM=4- performed by Odalis Hernandes MD at [place] [address] PAIN MANAGEMENT PROCEDURE Left 2020    COOLIEF RADIOFREQUENCY ABLATION - LEFT performed by Kian Prado MD at [place] [address]  2011    UPPER GASTROINTESTINAL ENDOSCOPY N/A 2021    EGD BIOPSY performed by Gera Forbes DO at [facility] ENDOSCOPY    VASECTOMY      WISDOM TOOTH EXTRACTION       Family History   Problem Relation Age of Onset    High Blood Pressure Mother     Heart Disease Mother         MI    AT 66    Diabetes Brother      Social History     Tobacco Use    Smoking status: Former Smoker     Packs/day: 2.00     Years: 25.00     Pack years: 50.00     Types: Cigarettes     Quit date: 2006     Years since quittin.7    Smokeless tobacco: Never Used   Substance Use Topics    Alcohol use: Not Currently     Frequency: Never     Comment: ocas     I counseled the patient on the importance of not smoking and risks of ETOH. Allergies   Allergen Reactions    Bactrim [Sulfamethoxazole-Trimethoprim] Rash     POSSIBLE fixed drug eruption on his cheeks, but diagnosis is not certain (only happened once). Vitals:    21 1257   Weight: (!) 393 lb (178.3 kg)   Height: 5' 8\" (1.727 m)     Body mass index is 59.76 kg/m². Current Outpatient Medications:     metOLazone (ZAROXOLYN) 2.5 MG tablet, Take 1 tablet by mouth three times a week, Disp: 25 tablet, Rfl: 2    rOPINIRole (REQUIP) 2 MG tablet, Take 1 tablet by mouth nightly, Disp: 90 tablet, Rfl: 0    metFORMIN (GLUCOPHAGE) 500 MG tablet, Take 1 tablet by mouth 2 times daily (with meals), Disp: 180 tablet, Rfl: 1    Prodigy Twist Top Lancets 28G MISC, TEST TWO TIMES A DAY, Disp: 100 each, Rfl: 0    blood glucose test strips (PRODIGY NO CODING BLOOD GLUC) strip, TEST TWO TIMES A DAY, Disp: 100 each, Rfl: 0    rOPINIRole (REQUIP) 2 MG tablet, Take 1 tablet by mouth nightly, Disp: 90 tablet, Rfl: 0    metFORMIN (GLUCOPHAGE) 500 MG tablet, Take 1 tablet by mouth 2 times daily (with meals), Disp: 180 tablet, Rfl: 1    Prodigy Twist Top Lancets 28G MISC, TEST TWO TIMES A DAY, Disp: 100 each, Rfl: 0    blood glucose test strips (PRODIGY NO CODING BLOOD GLUC) strip, TEST TWO TIMES A DAY, Disp: 100 each, Rfl: 0    pregabalin (LYRICA) 75 MG capsule, Take 1 capsule by mouth 2 times daily for 30 days. , Disp: 60 capsule, Rfl: 3    potassium chloride (KLOR-CON M) 20 MEQ TBCR extended release tablet, Take 1 tablet by mouth 2 times daily, Disp: 120 tablet, Rfl: 2    acetaminophen (TYLENOL) 500 MG tablet, Take 1,000 mg by mouth every 8 hours as needed for Pain, Disp: , Rfl:     etodolac (LODINE) 400 MG tablet, Take 1 tablet by mouth 2 times daily, Disp: 180 tablet, Rfl: 1    loratadine (CLARITIN) 10 MG tablet, Take 1 tablet by mouth daily, Disp: 90 tablet, Rfl: 0    hydroCHLOROthiazide (HYDRODIURIL) 25 MG tablet, TAKE ONE TABLET BY MOUTH ONE TIME DAILY, Disp: 90 tablet, Rfl: 0    lisinopril (PRINIVIL;ZESTRIL) 10 MG tablet, TAKE 1 TABLET BY MOUTH ONCE A DAY, Disp: 90 tablet, Rfl: 0    atorvastatin (LIPITOR) 40 MG tablet, Take 1 tablet by mouth daily, Disp: 90 tablet, Rfl: 3    blood glucose monitor kit and supplies, Check BG twice daily, Disp: 1 kit, Rfl: 0    furosemide (LASIX) 80 MG tablet, Take 1 tablet by mouth 2 times daily, Disp: 120 tablet, Rfl: 2    fluticasone (FLONASE) 50 MCG/ACT nasal spray, 1 spray by Nasal route daily, Disp: 3 Bottle, Rfl: 0    Lab Results   Component Value Date    WBC 5.8 10/30/2020    RBC 4.43 10/30/2020    HGB 13.9 10/30/2020    HCT 41.9 10/30/2020    MCV 94.7 10/30/2020    MCH 31.3 10/30/2020    MCHC 33.0 10/30/2020    MPV 8.4 10/30/2020    NEUTOPHILPCT 74.0 10/30/2020    LYMPHOPCT 14.0 10/30/2020    MONOPCT 8.0 10/30/2020    EOSRELPCT 2.0 10/30/2020    BASOPCT 0.0 10/30/2020    NEUTROABS 4.4 10/30/2020    LYMPHSABS 0.8 10/30/2020    MONOSABS 0.5 10/30/2020    EOSABS 0.1 10/30/2020     Lab Results   Component Value Date     10/30/2020    K 4.6 10/30/2020    K 4.0 10/08/2020     10/30/2020    CO2 25 10/30/2020    ANIONGAP 12 10/30/2020    GLUCOSE 150 10/30/2020    BUN 18 10/30/2020    CREATININE 0.8 10/30/2020    LABGLOM >60 10/30/2020    GFRAA >60 10/30/2020    GFRAA >60 06/11/2013    CALCIUM 9.7 10/30/2020    PROT 7.3 01/07/2021    PROT 6.9 02/08/2012    LABALBU 4.3 01/07/2021    AGRATIO 1.3 10/30/2020    BILITOT 0.4 01/07/2021    ALKPHOS 82 01/07/2021 ALT 47 01/07/2021    AST 39 01/07/2021    GLOB 2.8 10/30/2020     Lab Results   Component Value Date    CHOL 124 01/07/2021    TRIG 145 01/07/2021    HDL 52 01/07/2021    HDL 40 02/08/2012    LDLCALC 43 01/07/2021    LABVLDL 29 01/07/2021     Lab Results   Component Value Date    TSHREFLEX 3.73 10/30/2020     Lab Results   Component Value Date    IRON 91 10/30/2020    TIBC 270 10/30/2020    LABIRON 34 10/30/2020     Lab Results   Component Value Date    XISHURXY49 196 10/30/2020    FOLATE >20.00 10/30/2020     Lab Results   Component Value Date    VITD25 24.7 10/30/2020     Lab Results   Component Value Date    LABA1C 7.6 12/04/2020    .8 10/30/2020       Review of Systems   Constitutional: Negative. Negative for chills, fatigue and fever. HENT: Negative. Eyes: Negative. Respiratory: Negative. Negative for cough and shortness of breath. Cardiovascular: Negative. Gastrointestinal: Negative. Endocrine: Negative. Genitourinary: Negative. Musculoskeletal: Negative. Skin: Negative. Allergic/Immunologic: Negative. Neurological: Negative. Hematological: Negative. Psychiatric/Behavioral: Negative. Objective:     Physical Exam  Vitals signs reviewed. Constitutional:       Appearance: He is well-developed. HENT:      Head: Normocephalic and atraumatic. Eyes:      Conjunctiva/sclera: Conjunctivae normal.      Pupils: Pupils are equal, round, and reactive to light. Neck:      Musculoskeletal: Normal range of motion and neck supple. Pulmonary:      Effort: Pulmonary effort is normal.   Abdominal:      Palpations: Abdomen is soft. Musculoskeletal: Normal range of motion. Skin:     General: Skin is warm and dry. Neurological:      Mental Status: He is alert and oriented to person, place, and time. Psychiatric:         Behavior: Behavior normal.         Thought Content:  Thought content normal.         Judgment: Judgment normal.       Assessment and Plan: Patient is here for their preoperative education group visit for sleeve gastrectomy. The patient is up 11.8 lbs today. The patient's current Body mass index is 59.76 kg/m². (2/2/21). He is making good dietary and behavior modifications and is considered to be a good surgical candidate. Patient has a diagnosis of hypertension. Reviewed the importance of checking blood pressure preoperatively and postoperatively. In addition, following up with their PCP for medication adjustments as needed. Discussed the fact that they will be required to crush or open their medications for the first two weeks after surgery and reviewed those medications that can not be crushed. Patient has a diagnosis of diabetes. Reviewed the importance of checking blood sugars preoperatively and postoperatively. In addition, following up with their PCP or endocrinologist for medication adjustments as needed. Discussed the fact that they will be required to crush or open their medications for the first two weeks after surgery and reviewed those medications that can not be crushed. Patient has a diagnosis of hyperlipidemia. Discussed the benefits of weight loss and dietary changes on lipids and cholesterol. Discussed the fact that they will be required to crush or open their medications for the first two weeks after surgery and reviewed those medications that can not be crushed. Patient has a diagnosis of obstructive sleep apnea and uses a CPAP for sleep. Discussed that weight loss can assist with improving sleep apnea symptoms. Explained to patient to make sure they bring their CPAP with them to the hospital for their surgery. Patient has a diagnosis of fatty liver. Discussed the benefits of weight loss and dietary changes on a fatty liver. In addition, the preoperative diet is designed to help shrink the liver to make it easier for the surgeon to work on the stomach.     Patient received instructions from the registered dietitian in reference to the two week preoperative diet and the four phases of their postoperative diet. In addition I reviewed these instructions and stressed the importance of following these recommendations for their safety. Patient completed the preoperative class where they were provided with education related to their bariatric surgery, common surgical complications, medications preoperatively & postoperatively, special concerns related to bariatric surgery postoperatively, vitamin supplementation, patient agreement, PAT & scheduling, hospital course, wellness discovery program and what to do in the case of an emergency postoperatively. The dietitians reviewed all preoperative and postoperative diet instructions. Patient was given the opportunity to ask questions during the group visit and these questions were answered by myself and/or the dietitian. I spent a total of 40 minutes on the day of the visit and over half of that time was spent counseling the patient on proper dietary behaviors, exercise and surgery protocols.

## 2021-01-25 NOTE — FLOWSHEET NOTE
[place] [address] [facility],  [address]        Physical Therapy  Cancellation/No-show Note  Patient Name:  Juan Carlos Hu  :  1960   Date:  2021  Cancelled visits to date: 6  No-shows to date: 0    For today's appointment patient:  X? Cancelled  ? Rescheduled appointment  ? No-show     Reason given by patient:  ?  Patient ill  ? Conflicting appointment  ? No transportation    ? Conflict with work  ? No reason given  X?   Other:     Comments:  Rescheduled d/t expecting inclement weather    Electronically signed by:  Jess Edgar, PT, DPT

## 2021-01-26 ENCOUNTER — OFFICE VISIT (OUTPATIENT)
Dept: ORTHOPEDIC SURGERY | Age: 61
End: 2021-01-26
Payer: COMMERCIAL

## 2021-01-26 VITALS — HEIGHT: 68 IN | WEIGHT: 315 LBS | BODY MASS INDEX: 47.74 KG/M2

## 2021-01-26 DIAGNOSIS — M94.262 CHONDROMALACIA OF LEFT KNEE: ICD-10-CM

## 2021-01-26 DIAGNOSIS — M25.562 CHRONIC PAIN OF LEFT KNEE: ICD-10-CM

## 2021-01-26 DIAGNOSIS — M17.12 PRIMARY OSTEOARTHRITIS OF LEFT KNEE: Primary | ICD-10-CM

## 2021-01-26 DIAGNOSIS — M25.561 ACUTE PAIN OF RIGHT KNEE: ICD-10-CM

## 2021-01-26 DIAGNOSIS — M17.11 PRIMARY OSTEOARTHRITIS OF RIGHT KNEE: ICD-10-CM

## 2021-01-26 DIAGNOSIS — G89.29 CHRONIC PAIN OF LEFT KNEE: ICD-10-CM

## 2021-01-26 DIAGNOSIS — M22.41 CHONDROMALACIA PATELLAE OF RIGHT KNEE: ICD-10-CM

## 2021-01-26 PROCEDURE — 20610 DRAIN/INJ JOINT/BURSA W/O US: CPT | Performed by: FAMILY MEDICINE

## 2021-01-26 NOTE — PROGRESS NOTES
CC:  FU Knee Osteoarthritis with Viscosupplementation       HPI:Dannie Moya is a 64 y.o. 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 Here to continue with his Visco 3 injections. Is here today for his second injection bilaterally to his knees. He is once again in the process of being worked up for potential bypass surgery. He is continue with his Lodine and taken tramadol periodically for breakthrough pain. He once again failed geniculate nerve ablation. We did start him on Lyrica 75 mg 1 pill twice daily last week while he still has pain one thing he has noticed is that he is sleeping more comfortably at night. He cannot sleep about 4 hours without pain awakening him which is certainly a plus. He continues to require his crutches and does utilize his knee braces. Denies true locking catching or instability symptoms. PE: no substantial change in exam.    Assessment:  Knee Osteoarthritis with viscosupplementation      PROCEDURE NOTE:    PRE-PROCEDURE DIAGNOSIS: DJD knee    POST-PROCEDURE DIAGNOSIS: DJD knee    Injection #2 of Visco 3 bilaterally. PROCEDURE:  With the patient's permission, his bilaterally knee was prepped in standard sterile fashion with Betadine and Alcohol and the prefilled 2cc injection of Visco 3 was injected intra-articularly into the bilaterally knee via a superolateral approach without difficulty. The patient tolerated this well without difficulty. A band-aid was applied.      POST-PROCEDURE INSTRUCTIONS GIVEN TO PATIENT: The patient was advised to ice the knee for 15-20 minutes to relieve any

## 2021-01-28 ENCOUNTER — APPOINTMENT (OUTPATIENT)
Dept: PHYSICAL THERAPY | Age: 61
End: 2021-01-28
Payer: COMMERCIAL

## 2021-01-29 ENCOUNTER — HOSPITAL ENCOUNTER (OUTPATIENT)
Dept: PHYSICAL THERAPY | Age: 61
Setting detail: THERAPIES SERIES
Discharge: HOME | End: 2021-01-29
Payer: COMMERCIAL

## 2021-01-29 PROCEDURE — 97110 THERAPEUTIC EXERCISES: CPT

## 2021-01-29 NOTE — FLOWSHEET NOTE
[facility],  [address] [place]  Phone: [phone]  Fax [phone]    Physical Therapy Treatment Note/ Progress Report:           Date:  2021    Patient Name:  Sathya Lacy    :  1960  MRN: [id number]  Restrictions/Precautions:    Medical/Treatment Diagnosis Information:  · Diagnosis: K63.462O (ICD-10-CM) - Complex tear of medial meniscus of left knee as current injury, initial encounter  · Treatment Diagnosis: M25.562 Left knee pain; M25.662 Left knee stiffness; R26.2 Difficulty in walking  Insurance/Certification information:  PT Insurance Information: [employer]  Physician Information:  Referring Practitioner: Dr. Blanca Rodriguez  Has the plan of care been signed (Y/N):        [x]  Yes  []  No     Date of Patient follow up with Physician:       Is this a Progress Report:     []  Yes  [x]  No        If Yes:  Date Range for reporting period:  Beginning 20  Ending 21    Progress report will be due (10 Rx or 30 days whichever is less):   Recertification will be due (POC Duration  / 90 days whichever is less):    Visit # Insurance Allowable Requires auth   32 30 (3/30 2021)    [x]no        []yes:     Functional Scale:LEFS 75% disability   Date assessed:  20  LEFS 78% disability      20  LEFS 81% disability      10/15/20  LEFS 76% disability      20  LEFS 79%       21  Therapy Diagnosis/Practice Pattern:E    Number of Comorbidities:  []0     []1-2    [x]3+    Latex Allergy:  [x]NO      []YES  Preferred Language for Healthcare:   [x]English       []other:        Pain level: 510    SUBJECTIVE:  Pt states that he had injections in both knees since he's been seen last, but pain levels are still high. She is in agreement with working independently after this visit. He has his tentative gastric bypass surgery in 2020.      OBJECTIVE:    Observation: enters wearing  knee brace and using bilateral crutches.     Test measurements:  Knee flexion AROM 122   MMT hip ABD L 4/5 36.3#, R 4-/5 33.1#,    hip flexion L 5/61.3#, R 5/5/54.4#,    knee extension R 40.2# 5/5 , L 37.8# 4+/5,    knee flexion L 5-, R 5    RESTRICTIONS/PRECAUTIONS: HTN   Procedure(s) 8/3/2020:  1.  Left knee arthroscopy with partial medial meniscectomy and chondroplasty medial femoral condyle with synovial biopsy (40531-10)    Exercises/Interventions:     Therapeutic Ex (59619) Sets/sec Notes/CUES HEP   Pt ed: aquatic therapy vs in this clinic to improve walking distance     Seated calf stretch c strap  Incline board calf stretch lvl 2- toes on board     X   Seated HS stretch   X   HL adductor stretch   X   Rectus stretch EOB 30\"x3     Bridge with increasing knee flex  Bridge on  deg    SAQ + ball squeeze at feet 5\"x10    SLR- x10 R,L 1.5# X   SL hip abd R, L  Clamshell R,L  Reverse clamshell R,L 2x101.5#  1# on knee  1# ankle    Seated hip IR iso GVl at ankles    Standing HR At [facility]    Side step 1/2 wall (short) 3 laps UE support, GVL at ankles    Step up 3\"    x10 R,L Heavy UE support,   [facility] platform    Sit to stand - Second to highest box   2x10No UE assist    LP DL  SL 70#  40#   Sore L by second set   [address]   SL 35#    LAQ 90-40 10#    Soleus press 45#    LATOYA hip extension   [facility] hip ABD x15 R,L2x10 R,L15#  15#    Manual Intervention (36411) 5' total     Patellar mobs sup, inf        Stick to IT band, quad 5'  Showed pt how to use stick from home   HS s  Knee slightly bent                NMR re-education (52220)   CUES NEEDED   Gait training s AD   Decreased stance time LLE, lateral trunk lean during L stance    Weight shifting A/P   intermittent fingertip support today    Weight shifting A/P with one crutch   Intermittent CL UE support when L foot back- not ready to wean to one crutch   Split stance balance  At [facility]   2x30\" R,L  Intermittent UE support                           Therapeutic Activity (32131)                                                Therapeutic Exercise and NMR EXR  [x] (20255) Provided verbal/tactile cueing for activities related to strengthening, flexibility, endurance, ROM for improvements in LE, proximal hip, and core control with self care, mobility, lifting, ambulation.  [] (43373) Provided verbal/tactile cueing for activities related to improving balance, coordination, kinesthetic sense, posture, motor skill, proprioception  to assist with LE, proximal hip, and core control in self care, mobility, lifting, ambulation and eccentric single leg control.      NMR and Therapeutic Activities:    [x] (05576 or 64968) Provided verbal/tactile cueing for activities related to improving balance, coordination, kinesthetic sense, posture, motor skill, proprioception and motor activation to allow for proper function of core, proximal hip and LE with self care and ADLs  [x] (49714) Gait Re-education- Provided training and instruction to the patient for proper LE, core and proximal hip recruitment and positioning and eccentric body weight control with ambulation re-education including up and down stairs     Home Exercise Program:    [x] (56762) Reviewed/Progressed HEP activities related to strengthening, flexibility, endurance, ROM of core, proximal hip and LE for functional self-care, mobility, lifting and ambulation/stair navigation   [] (56008)Reviewed/Progressed HEP activities related to improving balance, coordination, kinesthetic sense, posture, motor skill, proprioception of core, proximal hip and LE for self care, mobility, lifting, and ambulation/stair navigation      Manual Treatments:  PROM / STM / Oscillations-Mobs:  G-I, II, III, IV (PA's, Inf., Post.)  [x] (09596) Provided manual therapy to mobilize LE, proximal hip and/or LS spine soft tissue/joints for the purpose of modulating pain, promoting relaxation,  increasing ROM, reducing/eliminating soft tissue swelling/inflammation/restriction, improving soft tissue extensibility and allowing for proper ROM for normal function with self care, mobility, lifting and ambulation. Modalities:     [] GAME READY (VASO)- for significant edema, swelling, pain control. (low) in long-sitting   CP x 15' R,L knee long sit with pillow under knees  Charges:  Timed Code Treatment Minutes: 40   Total Treatment Minutes: 55 (ice)     BWC time in/time out:   (and requires time in and out for each CPT code)    [] EVAL (LOW) 12331 (typically 20 minutes face-to-face)  [] EVAL (MOD) 42552 (typically 30 minutes face-to-face)  [] EVAL (HIGH) 44581 (typically 45 minutes face-to-face)  [] RE-EVAL     [x] KX(97584) x 3   [] IONTO  [] NMR (05048) x     [] VASO  [x] Manual (97347) x      [] Other: gait x  [] TA x      [] Mech Traction (00632)  [] ES(attended) (25544)      [] ES (un) (76541):       GOALS:   Patient stated goal: get in and out of the trailer  improve strength and pain  []? Progressing: [x]? Met: []? Not Met: []? Adjusted     Therapist goals for Patient:   Short Term Goals: To be achieved in: 2 weeks  1. Independent in HEP and progression per patient tolerance, in order to prevent re-injury. []? Progressing: [x]? Met: []? Not Met: []? Adjusted  2. Patient will have a decrease in pain to facilitate improvement in movement, function, and ADLs as indicated by Functional Deficits. [x]? Progressing: []? Met: []? Not Met: []? Adjusted     Long Term Goals: To be achieved in: 6 weeks  1. Disability index score of 40% or less for the LEFS to assist with reaching prior level of function. []? Progressing: []? Met: [x]? Not Met: []? Adjusted Lower score on LEFS despite pt reporting improvements. 2. Patient will demonstrate increased left knee AROM to 0-110  to allow for proper joint functioning for car/truck mobility, stair amb.   []? Progressing: [x]? Met: []? Not Met: []? Adjusted  3.  Patient will demonstrate an increase in Strength to at least 4+/5 for the left knee and hip stabilizers in order to ambulate s AD for community distances. []? Progressing: [x]? Met: []? Not Met: []? Adjusted  4. Patient will return to walking community distances s AD with mild bilateral knee pain (<3/10). [x]? Progressing: []? Met: []? Not Met: []? Adjusted  5. Pt will be able to drive x 2 hours without increased knee pain; car mobility without knee pain. pt able to drive 1.5 hours, but more uncomfortable as a passenger  [x]? Progressing: []? Met: []? Not Met: []? Adjusted          Progression Towards Functional goals:  [] Patient is progressing as expected towards functional goals listed. [x] Progression is slowed due to complexities listed. [x] Progression has been slowed due to co-morbidities. [] Plan just implemented, too soon to assess goals progression  [] Other:         Overall Progression Towards Functional goals/ Treatment Progress Update:  [] Patient is progressing as expected towards functional goals listed. [x] Progression is slowed due to complexities/Impairments listed. [] Progression has been slowed due to co-morbidities. [] Plan just implemented, too soon to assess goals progression <30days   [] Goals require adjustment due to lack of progress  [] Patient is not progressing as expected and requires additional follow up with physician  [] Other    Prognosis for POC: [x] Good [] Fair  [] Poor      Patient requires continued skilled intervention: [x] Yes  [] No    Treatment/Activity Tolerance:  [x] Patient able to complete treatment  [] Patient limited by fatigue  [x] Patient limited by pain    [] Patient limited by other medical complications  [] Other:     ASSESSMENT: pt has plateaued in progress at this time, but did make notable strength improvements while in therapy. He still uses two crutches for most ambulation or one crutch if walking shorter distance. This is d/t pain from OA. Will have pt d/c and continue strengthening independently.  He may have a knee replacement this year and thus he will be able to conserve PT visits for after this intervention. Return to Play: (if applicable)   []  Stage 1: Intro to Strength   []  Stage 2: Return to Run and Strength   []  Stage 3: Return to Jump and Strength   []  Stage 4: Dynamic Strength and Agility   []  Stage 5: Sport Specific Training     []  Ready to Return to Play, Meets All Above Stages   []  Not Ready for Return to Sports   Comments:                                PLAN: D/c to indep HEP  [] Continue per plan of care [] Alter current plan (see comments above)  [] Plan of care initiated [] Hold pending MD visit [x] Discharge      Electronically signed by:  Rowdy Chisholm, PT, DPT    Note: If patient does not return for scheduled/ recommended follow up visits, this note will serve as a discharge from care along with most recent update on progress. Access Code: [email or web address]   URL: [employer]/   Date: 01/29/2021   Prepared by: Rowdy Chisholm     Exercises   Supine Hip Adductor Stretch - 3 sets - 15s hold - 1x daily - 7x weekly   Bridge - 10 reps - 1x daily - 3x weekly   Short Arc Quad with [place] [employer] [place] - 10 reps - 5s hold - 1x daily - 7x weekly   Small Range Straight Leg Raise - 5 reps - 3 sets - 1x daily - 3x weekly   Sidelying Hip Abduction - 10 reps - 2 sets - 1x daily - 3x weekly   Clamshell - 10 reps - 2 sets - 1x daily - 3x weekly   Seated Gastroc Stretch with Strap - 3 sets - 30s hold - 1x daily - 7x weekly   Seated Hamstring Stretch - 3 sets - 20s hold - 1x daily - 7x weekly   Heel rises with counter support - 10 reps - 3 sets - 1x daily - 3x weekly   Side Stepping with Counter Support - 10 reps - 3 sets - 1x daily - 3x weekly   Standing Tandem Balance with Counter Support - 2 sets - 30s hold - 1x daily - 3x weekly   Patient Education   Pain Management

## 2021-01-29 NOTE — DISCHARGE SUMMARY
[facility],  [address]  Phone: [phone]  Fax [phone]       Physical Therapy Discharge  Date: 2021        Patient Name:  Amy Romeo    :  1960  MRN: [id number]  Referring Physician: Dr. Sandra Matthew  Diagnosis:S83.232A (ICD-10-CM) - Complex tear of medial meniscus of left knee as current injury, initial encounter          ICD Code: A88.307U  [x] Surgical [] Conservative   Therapy Diagnosis/Practice Pattern:E      Number of Comorbidities:  []0     []1-2    [x]3+  Total number of visits: 32  Reporting Period:   Beginning Date:20   End Date:21    OBJECTIVE  Test used Initial score Discharge Score   Pain Summary VAS 3-7/10 5/10   Functional questionnaire LEFS 75% disability 79%   Functional Testing       gait two crutches One-two crutches   ROM      Knee flexion  L 100  R 110 L 122   Knee extension   L lacking 3  R lacking 5 L 0  R lacking 5   Strength      Knee extension  L 3- L 4+  R 5   Knee flexion  NT L 5-  R 5   Hip flexion  NT 5 R,L   Hip abduction   NT L 4  R 4-          Treatment to date:  [x] Therapeutic Exercise    [x] Modalities:  [] Therapeutic Activity             []Ultrasound            [x]Electrical Stimulation  [x] Gait Training     []Cervical Traction    [] Lumbar Traction  [x] Neuromuscular Re-education [x] Cold/hotpack         []Iontophoresis  [x] Instruction in HEP      Other:  [x] Manual Therapy                   []                                  []   Assessment:  ? [] All Goals were achieved. ? [] The following goals were achieved (#'s):  ? [x] The following goals were not achieved for the following reasons:/assessmen of improvement as it relates to each goal:  Patient stated goal: get in and out of the trailer  improve strength and pain  []? ? Progressing: [x]? ? Met: []?? Not Met: []?? Adjusted     Therapist goals for Patient: [] Patient was unable to adhere to the plan of care established at evaluation. [] Referred back to physician for re-evaluation and did not return.      [] Other:       Electronically signed by:  Gemma Parson PT, DPT

## 2021-02-01 ENCOUNTER — OFFICE VISIT (OUTPATIENT)
Dept: ORTHOPEDIC SURGERY | Age: 61
End: 2021-02-01
Payer: COMMERCIAL

## 2021-02-01 VITALS — HEIGHT: 68 IN | WEIGHT: 315 LBS | BODY MASS INDEX: 47.74 KG/M2

## 2021-02-01 DIAGNOSIS — M25.562 CHRONIC PAIN OF LEFT KNEE: ICD-10-CM

## 2021-02-01 DIAGNOSIS — M22.41 CHONDROMALACIA PATELLAE OF RIGHT KNEE: ICD-10-CM

## 2021-02-01 DIAGNOSIS — M25.561 ACUTE PAIN OF RIGHT KNEE: ICD-10-CM

## 2021-02-01 DIAGNOSIS — M17.12 PRIMARY OSTEOARTHRITIS OF LEFT KNEE: Primary | ICD-10-CM

## 2021-02-01 DIAGNOSIS — M17.11 PRIMARY OSTEOARTHRITIS OF RIGHT KNEE: ICD-10-CM

## 2021-02-01 DIAGNOSIS — M94.262 CHONDROMALACIA OF LEFT KNEE: ICD-10-CM

## 2021-02-01 DIAGNOSIS — G89.29 CHRONIC PAIN OF LEFT KNEE: ICD-10-CM

## 2021-02-01 PROCEDURE — 20610 DRAIN/INJ JOINT/BURSA W/O US: CPT | Performed by: FAMILY MEDICINE

## 2021-02-01 NOTE — PROGRESS NOTES
CC:  FU Knee Osteoarthritis with Viscosupplementation       HPI:aDnnie Vicente is a 64 y.o. 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 Here to continue with his Visco 3 injections. Is here today for his third injection bilaterally to his knees. He is once again in the process of being worked up for potential bypass surgery. He is continue with his Lodine and taken tramadol periodically for breakthrough pain. He once again failed geniculate nerve ablation. We did start him on Lyrica 75 mg 1 pill twice daily last week while he still has pain one thing he has noticed is that he is sleeping more comfortably at night. He cannot sleep about 4 hours without pain awakening him which is certainly a plus. He continues to require his crutches and does utilize his knee braces. Denies true locking catching or instability symptoms. PE: no substantial change in exam.    Assessment:  Knee Osteoarthritis with viscosupplementation      PROCEDURE NOTE:    PRE-PROCEDURE DIAGNOSIS: DJD knee    POST-PROCEDURE DIAGNOSIS: DJD knee    Injection #3 of Visco 3 bilaterally. PROCEDURE:  With the patient's permission, his bilaterally knee was prepped in standard sterile fashion with Betadine and Alcohol and the prefilled 2cc injection of Visco 3 was injected intra-articularly into the bilaterally knee via a superolateral approach without difficulty. The patient tolerated this well without difficulty. A band-aid was applied.      POST-PROCEDURE INSTRUCTIONS GIVEN TO PATIENT: The patient was advised to ice the knee for 15-20 minutes to relieve any injection site related pain. FOLLOW-UP: as directed. Continue use of his crutches and his knee bracing as well as his exercise and stretching program.  He will continue with his icing as well as his Lodine 400 mg 1 pill twice daily supplementing with periodic tramadol. Has at least initially got some benefit from his Lyrica 75 mg twice daily and that he does seem to be sleeping more comfortably. He is aware that he can have repeat viscosupplementation at 6-month intervals or even an interim steroid injection. We will see him back in 3 months for follow-up and consider an interim steroid injection. He does have his bariatric follow-up on 3/2/2021. He will contact us in the interim with questions or concerns. Icing and activity modification was discussed. Contact us in the interim with questions or concerns.

## 2021-02-02 ENCOUNTER — OFFICE VISIT (OUTPATIENT)
Dept: BARIATRICS/WEIGHT MGMT | Age: 61
End: 2021-02-02
Payer: COMMERCIAL

## 2021-02-02 ENCOUNTER — PATIENT MESSAGE (OUTPATIENT)
Dept: INTERNAL MEDICINE CLINIC | Age: 61
End: 2021-02-02

## 2021-02-02 VITALS — HEIGHT: 68 IN | BODY MASS INDEX: 47.74 KG/M2 | WEIGHT: 315 LBS

## 2021-02-02 DIAGNOSIS — K76.0 NONALCOHOLIC FATTY LIVER DISEASE: ICD-10-CM

## 2021-02-02 DIAGNOSIS — I10 ESSENTIAL HYPERTENSION, BENIGN: Primary | Chronic | ICD-10-CM

## 2021-02-02 DIAGNOSIS — E66.01 MORBID OBESITY WITH BMI OF 50.0-59.9, ADULT (HCC): ICD-10-CM

## 2021-02-02 DIAGNOSIS — E78.2 MIXED HYPERTRIGLYCERIDEMIA: Chronic | ICD-10-CM

## 2021-02-02 DIAGNOSIS — G47.33 OSA TREATED WITH BIPAP: ICD-10-CM

## 2021-02-02 DIAGNOSIS — E11.9 TYPE 2 DIABETES MELLITUS WITHOUT COMPLICATION, WITHOUT LONG-TERM CURRENT USE OF INSULIN (HCC): Chronic | ICD-10-CM

## 2021-02-02 PROCEDURE — 99214 OFFICE O/P EST MOD 30 MIN: CPT | Performed by: NURSE PRACTITIONER

## 2021-02-02 NOTE — TELEPHONE ENCOUNTER
From: Ilan Williamson  To: Nicki Balderas APRN - CNP  Sent: 2/2/2021 10:05 AM EST  Subject: Visit Follow-Up Question    Peyman Ramos, I need to schedule my pre op appointment with you for my March 2 sleeve operation with Dr Romana Blazer. I guess we can combine my March 4th appt with the new one. I prefer mornings if possible but no biggie if nothing available. Thanks.  Sunil Alegria

## 2021-02-02 NOTE — Clinical Note
Cherrie Lockwood Part,  Patient was up 11.8 lbs today not sure of you want to see him for a weight check. He is still down a net of 12.2 lbs.   Thank you,  Dipti Collier

## 2021-02-03 ENCOUNTER — OFFICE VISIT (OUTPATIENT)
Dept: BARIATRICS/WEIGHT MGMT | Age: 61
End: 2021-02-03
Payer: COMMERCIAL

## 2021-02-03 VITALS
HEIGHT: 68 IN | DIASTOLIC BLOOD PRESSURE: 62 MMHG | SYSTOLIC BLOOD PRESSURE: 108 MMHG | TEMPERATURE: 96.6 F | BODY MASS INDEX: 47.74 KG/M2 | WEIGHT: 315 LBS

## 2021-02-03 DIAGNOSIS — G47.33 OSA TREATED WITH BIPAP: ICD-10-CM

## 2021-02-03 DIAGNOSIS — I10 ESSENTIAL HYPERTENSION, BENIGN: ICD-10-CM

## 2021-02-03 DIAGNOSIS — E66.01 MORBID OBESITY WITH BMI OF 50.0-59.9, ADULT (HCC): Primary | ICD-10-CM

## 2021-02-03 DIAGNOSIS — E78.2 MIXED HYPERTRIGLYCERIDEMIA: ICD-10-CM

## 2021-02-03 DIAGNOSIS — E11.9 TYPE 2 DIABETES MELLITUS WITHOUT COMPLICATION, WITHOUT LONG-TERM CURRENT USE OF INSULIN (HCC): ICD-10-CM

## 2021-02-03 PROCEDURE — 99214 OFFICE O/P EST MOD 30 MIN: CPT | Performed by: SURGERY

## 2021-02-03 NOTE — PROGRESS NOTES
[facility]) Physicians   General & Laparoscopic Surgery  [employer]       HPI:     Claudette Feeler is a very pleasant 64 y.o. male with Body mass index is 58.3 kg/m². , Pre-Surgery. Pre-operative clearance and work up pending. Working hard to keep good dietary habits as well level of activity. Patient denies any nausea, vomiting, fevers, chills, shortness of breath, chest pain, cough, constipation or difficulty urinating. Past Medical History:   Diagnosis Date    Bilateral venous leg ulcers 2020    Cellulitis of lower extremity 2019    Hypertension     Impaired fasting glucose 2013    Obesity     TOM treated with BiPAP 2020    Preoperative clearance 10/26/2020    Screening PSA (prostate specific antigen) 2015;17    Nml:0.53(2015);17=nml.     Sleep apnea     Stasis dermatitis of both legs 2020    Tobacco use     Tubular adenoma of colon 2017     Past Surgical History:   Procedure Laterality Date    CIRCUMCISION      COLONOSCOPY  2017    Colonoscopy with polypectomy (cold biopsy):Dr. Walker:next in 3yrs=2020    KNEE ARTHROSCOPY Left 8/3/2020    VIDEO ARTHROSCOPY LEFT KNEE, PARTIAL MEDIAL MENISCECTOMY, CHONDROPLASTY, SYNOVIAL BIOPSY -TOM=4- performed by Karina Lubin MD at [place] [address] PAIN MANAGEMENT PROCEDURE Left 2020    COOLIEF RADIOFREQUENCY ABLATION - LEFT performed by Louisa Wheeler MD at [place] [address]  2011    UPPER GASTROINTESTINAL ENDOSCOPY N/A 2021    EGD BIOPSY performed by Naomi Martin DO at [address] EXTRACTION       Family History   Problem Relation Age of Onset    High Blood Pressure Mother     Heart Disease Mother         MI    AT 66    Diabetes Brother      Social History     Tobacco Use    Smoking status: Former Smoker     Packs/day: 2.00     Years: 25.00 Pack years: 50.00     Types: Cigarettes     Quit date: 2006     Years since quittin.7    Smokeless tobacco: Never Used   Substance Use Topics    Alcohol use: Not Currently     Frequency: Never     Comment: ocas     I counseled the patient on the importance of not smoking and risks of ETOH. Allergies   Allergen Reactions    Bactrim [Sulfamethoxazole-Trimethoprim] Rash     POSSIBLE fixed drug eruption on his cheeks, but diagnosis is not certain (only happened once). Vitals:    21 0918   BP: 108/62   Temp: 96.6 °F (35.9 °C)   Weight: (!) 383 lb 6.4 oz (173.9 kg)   Height: 5' 8\" (1.727 m)       Body mass index is 58.3 kg/m². Current Outpatient Medications:     metOLazone (ZAROXOLYN) 2.5 MG tablet, Take 1 tablet by mouth three times a week, Disp: 25 tablet, Rfl: 2    rOPINIRole (REQUIP) 2 MG tablet, Take 1 tablet by mouth nightly, Disp: 90 tablet, Rfl: 0    metFORMIN (GLUCOPHAGE) 500 MG tablet, Take 1 tablet by mouth 2 times daily (with meals), Disp: 180 tablet, Rfl: 1    Prodigy Twist Top Lancets 28G MISC, TEST TWO TIMES A DAY, Disp: 100 each, Rfl: 0    blood glucose test strips (PRODIGY NO CODING BLOOD GLUC) strip, TEST TWO TIMES A DAY, Disp: 100 each, Rfl: 0    rOPINIRole (REQUIP) 2 MG tablet, Take 1 tablet by mouth nightly, Disp: 90 tablet, Rfl: 0    metFORMIN (GLUCOPHAGE) 500 MG tablet, Take 1 tablet by mouth 2 times daily (with meals), Disp: 180 tablet, Rfl: 1    Prodigy Twist Top Lancets 28G MISC, TEST TWO TIMES A DAY, Disp: 100 each, Rfl: 0    blood glucose test strips (PRODIGY NO CODING BLOOD GLUC) strip, TEST TWO TIMES A DAY, Disp: 100 each, Rfl: 0    pregabalin (LYRICA) 75 MG capsule, Take 1 capsule by mouth 2 times daily for 30 days. , Disp: 60 capsule, Rfl: 3    potassium chloride (KLOR-CON M) 20 MEQ TBCR extended release tablet, Take 1 tablet by mouth 2 times daily, Disp: 120 tablet, Rfl: 2   acetaminophen (TYLENOL) 500 MG tablet, Take 1,000 mg by mouth every 8 hours as needed for Pain, Disp: , Rfl:     etodolac (LODINE) 400 MG tablet, Take 1 tablet by mouth 2 times daily, Disp: 180 tablet, Rfl: 1    loratadine (CLARITIN) 10 MG tablet, Take 1 tablet by mouth daily, Disp: 90 tablet, Rfl: 0    hydroCHLOROthiazide (HYDRODIURIL) 25 MG tablet, TAKE ONE TABLET BY MOUTH ONE TIME DAILY, Disp: 90 tablet, Rfl: 0    lisinopril (PRINIVIL;ZESTRIL) 10 MG tablet, TAKE 1 TABLET BY MOUTH ONCE A DAY, Disp: 90 tablet, Rfl: 0    atorvastatin (LIPITOR) 40 MG tablet, Take 1 tablet by mouth daily, Disp: 90 tablet, Rfl: 3    blood glucose monitor kit and supplies, Check BG twice daily, Disp: 1 kit, Rfl: 0    furosemide (LASIX) 80 MG tablet, Take 1 tablet by mouth 2 times daily, Disp: 120 tablet, Rfl: 2    fluticasone (FLONASE) 50 MCG/ACT nasal spray, 1 spray by Nasal route daily, Disp: 3 Bottle, Rfl: 0      Review of Systems - History obtained from the patient  General ROS: negative  Psychological ROS: negative  Endocrine ROS: negative  Respiratory ROS: negative  Cardiovascular ROS: negative  Gastrointestinal ROS:negative  Genito-Urinary ROS: negative  Musculoskeletal ROS: negative   Skin ROS: negative    Physical Exam   Vitals Reviewed   Constitutional: Patient is oriented to person, place, and time. Patient appears well-developed and well-nourished. Patient is active and cooperative. Non-toxic appearance. No distress. Neck: Trachea normal and normal range of motion. No JVD present. Pulmonary/Chest: Effort normal. No accessory muscle usage or stridor. No apnea. No respiratory distress. Cardiovascular: Normal rate and no JVD. Abdominal: Normal appearance. Patient exhibits no distension. Abdomen is soft, obese, non tender. Musculoskeletal: Normal range of motion. Patient exhibits no edema. Neurological: Patient is alert and oriented to person, place, and time. Patient has normal strength. GCS eye subscore is 4. GCS verbal subscore is 5. GCS motor subscore is 6. Skin: Skin is warm and dry. No abrasion and no rash noted. Patient is not diaphoretic. No cyanosis or erythema. Psychiatric: Patient has a normal mood and affect. Speech is normal and behavior is normal. Cognition and memory are normal.       A/P    Tin Silvestre is 64 y.o. male, Body mass index is 58.3 kg/m². pre surgery, has gained 2# since last visit. The patient underwent dietary counseling with registered dietician. I have reviewed, discussed and agree with the dietary plan. Patient is trying hard to keep good dietary and behavior modifications. Patient is monitoring portion sizes, food choices and liquid calories. Patient is trying to exercise regularly as much as possible. We discussed how his weight affects his overall health including:  Lisha Simon was seen today for weight management. Diagnoses and all orders for this visit:    Morbid obesity with BMI of 50.0-59.9, adult ([facility] [address]    Essential hypertension, benign    Mixed hypertriglyceridemia    Type 2 diabetes mellitus without complication, without long-term current use of insulin (HCC)    TOM treated with BiPAP       and importance of weight loss to alleviate those co morbid conditions. I encouraged the patient to continue exercise and keeping healthy eating habits. Discussed pre-op labs and work up till now. Also counseled the patient extensively on Surgery. Total encounter time: 30 minutes including any number of the following: review of labs, imaging, provider notes, outside hospital records; performing examination/evaluation; counseling patient and family; ordering medications/tests; placing referrals and communication with referring physicians; coordination of care, and documentation in the EHR. Consent obtained for sleeve gastrectomy, possible lysis of adhesions    Eliseo Wooten

## 2021-02-03 NOTE — PATIENT INSTRUCTIONS
Patient received dietary handouts and education.   - Continue to eat 4-5x day to include protein food with each meal Jackie Granger  - Practice not gulping fluids  - Practice food journaling    Handouts: emailed - pre-op teaching powerpoint (larger print)

## 2021-02-03 NOTE — PROGRESS NOTES
Petty Ates gained 2.2 lbs over 1 month - today's weight without knee brace. Pt states \"creature of habit\" always eating the same things\", trying to be mindful of portion size. Is pt eating at least 4 times everyday? Yes   B - 3 eggs with 8/2S CC and slice of WG toast  L- chix with WG rotini OR simply pasta sauce and bocca crumbles and mushrooms  S - chix salad with olive oil tucker and zesty zinger pickles  D - low sodium chix soup and broccoli  S - SF popsicles    Is pt eating a lean protein source with all meals and snacks? Yes - pt trying    Has pt decreased their portions using the plate method? Yes. mindful of portion size    Is pt choosing low fat/sugar free options? Yes, trying    Is pt drinking at least 64 oz of clear liquids everyday? Yes - water / Gatorade Zero    Has pt stopped drinking carbonation, caffeinated, and sugar sweetened beverages? no    Has pt sampled Unjury and/or Nectar protein?  Yes - tried and tolerated    Participating in intentional exercise? no    Plan/Recommendations:  - Continue to eat 4-5x day to include protein food with each meal Katherine Argue  - Practice not gulping fluids  - Practice food journaling    Handouts: emailed - pre-op teaching powerpoint (larger print)    Lulu Right

## 2021-02-04 ENCOUNTER — TELEPHONE (OUTPATIENT)
Dept: BARIATRICS/WEIGHT MGMT | Age: 61
End: 2021-02-04

## 2021-02-04 NOTE — TELEPHONE ENCOUNTER
[facility] [place] prior auth submission sleeve [id number]        Authorization #: [id number] Effective: 02/04/2021  Disposition pending review  Date of Service: 02/04/2021  Service Type: Prior Approval - Procedure  Place of Service: Other Place of Service       Diagnoses (6)    Diagnosis  1   E66.01 - Morbid (severe) obesity due to excess calories  2   I10 - Essential (primary) hypertension  3   E11.9 - Type 2 diabetes mellitus without complications  4   P51.3 - Mixed hyperlipidemia  5   G47.33 - Obstructive sleep apnea (adult) (pediatric)  6   M19.90 - Unspecified osteoarthritis, unspecified site     Services (1)    Service Dates Procedure Code Units Status  03/02/2021 13511   1 Unit(s)  Pending  Disposition pending review       Attachments (1)    Attachment File Name  1   Medical Record Attachment [facility] clinicals. pdf

## 2021-02-05 ENCOUNTER — TELEPHONE (OUTPATIENT)
Dept: ORTHOPEDIC SURGERY | Age: 61
End: 2021-02-05

## 2021-02-05 NOTE — PROGRESS NOTES
may or may not be in effect during this procedure. I give my doctor permission to give me blood or blood products. I understand that there are risks with receiving blood such as hepatitis, AIDS, fever, or allergic reaction. I acknowledge that the risks, benefits, and alternatives of this treatment have been explained to me and that no express or implied warranty has been given by the hospital, any blood bank, or any person or entity as to the blood or blood components transfused. At the discretion of my doctor, I agree to allow observers, equipment/product representatives and allow photographing, and/or televising of the procedure, provided my name or identity is maintained confidentially. I agree the hospital may dispose of or use for scientific or educational purposes any tissue, fluid, or body parts which may be removed.     ________________________________Date________Time______ am/pm  ([place] [facility])  Patient or Signature of Closest Relative or Legal Guardian    ________________________________Date________Time______am/pm      Page 1 of  1  Witness

## 2021-02-12 ENCOUNTER — OFFICE VISIT (OUTPATIENT)
Dept: INTERNAL MEDICINE CLINIC | Age: 61
End: 2021-02-12
Payer: COMMERCIAL

## 2021-02-12 VITALS
OXYGEN SATURATION: 97 % | TEMPERATURE: 98.2 F | HEART RATE: 100 BPM | WEIGHT: 315 LBS | DIASTOLIC BLOOD PRESSURE: 76 MMHG | BODY MASS INDEX: 47.74 KG/M2 | HEIGHT: 68 IN | SYSTOLIC BLOOD PRESSURE: 126 MMHG

## 2021-02-12 DIAGNOSIS — E11.9 TYPE 2 DIABETES MELLITUS WITHOUT COMPLICATION, WITHOUT LONG-TERM CURRENT USE OF INSULIN (HCC): Chronic | ICD-10-CM

## 2021-02-12 DIAGNOSIS — Z01.818 PREOP EXAMINATION: ICD-10-CM

## 2021-02-12 DIAGNOSIS — E78.2 MIXED HYPERTRIGLYCERIDEMIA: Chronic | ICD-10-CM

## 2021-02-12 DIAGNOSIS — I10 ESSENTIAL HYPERTENSION, BENIGN: Chronic | ICD-10-CM

## 2021-02-12 DIAGNOSIS — G47.33 OSA TREATED WITH BIPAP: ICD-10-CM

## 2021-02-12 DIAGNOSIS — G25.81 RLS (RESTLESS LEGS SYNDROME): ICD-10-CM

## 2021-02-12 DIAGNOSIS — M17.12 PRIMARY OSTEOARTHRITIS OF LEFT KNEE: ICD-10-CM

## 2021-02-12 DIAGNOSIS — E66.01 CLASS 3 SEVERE OBESITY DUE TO EXCESS CALORIES WITH SERIOUS COMORBIDITY AND BODY MASS INDEX (BMI) OF 50.0 TO 59.9 IN ADULT (HCC): Chronic | ICD-10-CM

## 2021-02-12 LAB — HBA1C MFR BLD: 6.4 %

## 2021-02-12 PROCEDURE — 99244 OFF/OP CNSLTJ NEW/EST MOD 40: CPT | Performed by: NURSE PRACTITIONER

## 2021-02-12 PROCEDURE — 83036 HEMOGLOBIN GLYCOSYLATED A1C: CPT | Performed by: NURSE PRACTITIONER

## 2021-02-12 ASSESSMENT — PATIENT HEALTH QUESTIONNAIRE - PHQ9
SUM OF ALL RESPONSES TO PHQ QUESTIONS 1-9: 0
SUM OF ALL RESPONSES TO PHQ QUESTIONS 1-9: 0
DEPRESSION UNABLE TO ASSESS: FUNCTIONAL CAPACITY MOTIVATION LIMITS ACCURACY
1. LITTLE INTEREST OR PLEASURE IN DOING THINGS: 0

## 2021-02-12 ASSESSMENT — ENCOUNTER SYMPTOMS
CONSTIPATION: 0
SHORTNESS OF BREATH: 0
COLOR CHANGE: 0
WHEEZING: 0
SINUS PAIN: 0
COUGH: 0
ABDOMINAL PAIN: 0
DIARRHEA: 0
SINUS PRESSURE: 0
BACK PAIN: 0

## 2021-02-12 NOTE — ASSESSMENT & PLAN NOTE
Perioperative risk related to the patient's upcoming surgery is considered low. he is cleared for surgery.   Pre-op exam was completed on 2/12/21 9:59 AM.   Recent ECG and stress test reviewed   Recent labs reviewed   Continue medications until surgery, hold NSAIDs 7 days prior to surgery

## 2021-02-12 NOTE — PROGRESS NOTES
Subjective:     Aaron Bermudez presents to the office today for a preoperative consultation at the request of surgeon Dr. Jade Kanner who plans on performing ROBOTIC ASSISTED LAPAROSCOPIC [address] on March 2, 2021. Planned anesthesia is General. The patient has the following known anesthesia issues: none  Patient has a bleeding risk of : no recent abnormal bleeding, no remote history of abnormal bleeding    Patient's medications, allergies, past medical, surgical,social and family histories were reviewed and updated as appropriate. Past Medical History:   Diagnosis Date    Bilateral venous leg ulcers 09/2020    Cellulitis of lower extremity 9/25/2019    Hypertension     Impaired fasting glucose 5/2013    Obesity     TOM treated with BiPAP 11/30/2020    Preoperative clearance 10/26/2020    Screening PSA (prostate specific antigen) 12/30/2015;5/1/17    Nml:0.53(12/30/2015);5/1/17=nml.     Sleep apnea     Stasis dermatitis of both legs 8/28/2020    Tobacco use     Tubular adenoma of colon 09/14/2017     Past Surgical History:   Procedure Laterality Date    CIRCUMCISION      COLONOSCOPY  09/14/2017    Colonoscopy with polypectomy (cold biopsy):Dr. Walker:next in 3yrs=9/14/2020    KNEE ARTHROSCOPY Left 8/3/2020    VIDEO ARTHROSCOPY LEFT KNEE, PARTIAL MEDIAL MENISCECTOMY, CHONDROPLASTY, SYNOVIAL BIOPSY -TOM=4- performed by Ezequiel Temple MD at [place] [address] PAIN MANAGEMENT PROCEDURE Left 12/9/2020    COOLIEF RADIOFREQUENCY ABLATION - LEFT performed by Shanon Rowley MD at [facility]  05/28/2011    UPPER GASTROINTESTINAL ENDOSCOPY N/A 1/11/2021    EGD BIOPSY performed by Maida Villalobos DO at [address] EXTRACTION       Social History     Socioeconomic History    Marital status:      Spouse name: Not on file    Number of children: 2    Years of education: Not on file    Highest education level: Not on file   Occupational History    Occupation:  security:   Social Needs    Financial resource strain: Not on file    Food insecurity     Worry: Not on file     Inability: Not on file   [employer] needs     Medical: Not on file     Non-medical: Not on file   Tobacco Use    Smoking status: Former Smoker     Packs/day: 2.00     Years: 25.00     Pack years: 50.00     Types: Cigarettes     Quit date: 2006     Years since quittin.7    Smokeless tobacco: Never Used   Substance and Sexual Activity    Alcohol use: Not Currently     Frequency: Never     Comment: ocas    Drug use: No    Sexual activity: Yes     Partners: Female   Lifestyle    Physical activity     Days per week: Not on file     Minutes per session: Not on file    Stress: Not on file   Relationships    Social connections     Talks on phone: Not on file     Gets together: Not on file     Attends Jew service: Not on file     Active member of club or organization: Not on file     Attends meetings of clubs or organizations: Not on file     Relationship status: Not on file    Intimate partner violence     Fear of current or ex partner: Not on file     Emotionally abused: Not on file     Physically abused: Not on file     Forced sexual activity: Not on file   Other Topics Concern    Not on file   Social History Narrative    Not on file       Review of Systems  Review of Systems   Constitutional: Negative for chills, fatigue and fever. HENT: Negative for congestion, sinus pressure and sinus pain. Respiratory: Negative for cough, shortness of breath and wheezing. Cardiovascular: Negative for chest pain and palpitations. Gastrointestinal: Negative for abdominal pain, constipation and diarrhea. Musculoskeletal: Positive for arthralgias (bilateral knees). Negative for back pain and myalgias. Skin: Negative for color change, pallor and rash.    Neurological: Negative for dizziness, syncope, weakness, light-headedness and headaches. Psychiatric/Behavioral: Negative for behavioral problems, confusion and sleep disturbance. The patient is not nervous/anxious. Objective:     Vitals:    02/12/21 0936   BP: 126/76   Pulse: 100   Temp: 98.2 °F (36.8 °C)   SpO2: 97%        Physical Exam  Physical Exam  Constitutional:       Appearance: He is well-developed. He is obese. HENT:      Head: Normocephalic and atraumatic. Mouth/Throat:      Mouth: Mucous membranes are moist.      Pharynx: Oropharynx is clear. Eyes:      Conjunctiva/sclera: Conjunctivae normal.      Pupils: Pupils are equal, round, and reactive to light. Neck:      Musculoskeletal: Normal range of motion and neck supple. Thyroid: No thyromegaly. Vascular: No JVD. Cardiovascular:      Rate and Rhythm: Normal rate and regular rhythm. Heart sounds: Normal heart sounds. Pulmonary:      Effort: Pulmonary effort is normal. No respiratory distress. Breath sounds: Normal breath sounds. No wheezing. Musculoskeletal: Normal range of motion. General: No deformity. Skin:     General: Skin is warm and dry. Capillary Refill: Capillary refill takes less than 2 seconds. Neurological:      Mental Status: He is alert and oriented to person, place, and time.    Psychiatric:         Mood and Affect: Mood normal.         Behavior: Behavior normal.         Cardiographics  ECG: Dated 12/4/2020- normal sinus rhythm, no blocks or conduction defects, no ischemic changes   Received cardiac clearance per Dr. Randall Armas     Lab Review   Lab Results   Component Value Date     10/30/2020    K 4.6 10/30/2020    K 4.0 10/08/2020     10/30/2020    CO2 25 10/30/2020    BUN 18 10/30/2020    CREATININE 0.8 10/30/2020    GLUCOSE 150 10/30/2020    CALCIUM 9.7 10/30/2020     Lab Results   Component Value Date    WBC 5.8 10/30/2020    HGB 13.9 10/30/2020    HCT 41.9 10/30/2020    MCV 94.7 10/30/2020     10/30/2020 Medication Review  Current Outpatient Medications on File Prior to Visit   Medication Sig Dispense Refill    metOLazone (ZAROXOLYN) 2.5 MG tablet Take 1 tablet by mouth three times a week 25 tablet 2    rOPINIRole (REQUIP) 2 MG tablet Take 1 tablet by mouth nightly 90 tablet 0    metFORMIN (GLUCOPHAGE) 500 MG tablet Take 1 tablet by mouth 2 times daily (with meals) 180 tablet 1    Prodigy Twist Top Lancets 28G MISC TEST TWO TIMES A  each 0    blood glucose test strips (PRODIGY NO CODING BLOOD GLUC) strip TEST TWO TIMES A  each 0    rOPINIRole (REQUIP) 2 MG tablet Take 1 tablet by mouth nightly 90 tablet 0    metFORMIN (GLUCOPHAGE) 500 MG tablet Take 1 tablet by mouth 2 times daily (with meals) 180 tablet 1    Prodigy Twist Top Lancets 28G MISC TEST TWO TIMES A  each 0    blood glucose test strips (PRODIGY NO CODING BLOOD GLUC) strip TEST TWO TIMES A  each 0    pregabalin (LYRICA) 75 MG capsule Take 1 capsule by mouth 2 times daily for 30 days.  60 capsule 3    potassium chloride (KLOR-CON M) 20 MEQ TBCR extended release tablet Take 1 tablet by mouth 2 times daily 120 tablet 2    acetaminophen (TYLENOL) 500 MG tablet Take 1,000 mg by mouth every 8 hours as needed for Pain      etodolac (LODINE) 400 MG tablet Take 1 tablet by mouth 2 times daily 180 tablet 1    loratadine (CLARITIN) 10 MG tablet Take 1 tablet by mouth daily 90 tablet 0    hydroCHLOROthiazide (HYDRODIURIL) 25 MG tablet TAKE ONE TABLET BY MOUTH ONE TIME DAILY 90 tablet 0    lisinopril (PRINIVIL;ZESTRIL) 10 MG tablet TAKE 1 TABLET BY MOUTH ONCE A DAY 90 tablet 0    atorvastatin (LIPITOR) 40 MG tablet Take 1 tablet by mouth daily 90 tablet 3    blood glucose monitor kit and supplies Check BG twice daily 1 kit 0    furosemide (LASIX) 80 MG tablet Take 1 tablet by mouth 2 times daily 120 tablet 2    fluticasone (FLONASE) 50 MCG/ACT nasal spray 1 spray by Nasal route daily 3 Bottle 0     No current facility-administered medications on file prior to visit. Assessment:       1. Class 3 severe obesity due to excess calories with serious comorbidity and body mass index (BMI) of 50.0 to 59.9 in [facility])    2. Preop examination    3. RLS (restless legs syndrome)    4. Mixed hypertriglyceridemia    5. Essential hypertension, benign    6. Primary osteoarthritis of left knee    7. TOM treated with BiPAP    8. Type 2 diabetes mellitus without complication, without long-term current use of insulin (HCC)           Plan:        Class 3 severe obesity due to excess calories with serious comorbidity and body mass index (BMI) of 50.0 to 59.9 in [facility])  Surgery as planned per Dr. Delio Flores examination  Perioperative risk related to the patient's upcoming surgery is considered low. he is cleared for surgery.   Pre-op exam was completed on 2/12/21 9:59 AM.   Recent ECG and stress test reviewed   Recent labs reviewed   Continue medications until surgery, hold NSAIDs 7 days prior to surgery     RLS (restless legs syndrome)  Stable, controlled   Continue Requip    Mixed hypertriglyceridemia  Stable, controlled   Continue regimen     Essential hypertension, benign  Stable, controlled   Continue current regimen     Osteoarthritis of left knee  Following with ortho   Hold NSAIDs 7 days prior to surgery     TOM treated with BiPAP  Compliant with BiPap     Type 2 diabetes mellitus without complication, without long-term current use of insulin (HCC)  Stable, controlled   Continue metformin          - Preoperative workup as follows none  - Change in medication regimen before surgery: discontinue NSAIDs 7d before surgery  - Prophylaxisfor cardiac events with perioperative beta-blockers: should be considered, specific regimen per anesthesia

## 2021-02-15 DIAGNOSIS — M25.562 CHRONIC PAIN OF LEFT KNEE: ICD-10-CM

## 2021-02-15 DIAGNOSIS — G89.29 CHRONIC PAIN OF LEFT KNEE: ICD-10-CM

## 2021-02-15 DIAGNOSIS — M17.12 PRIMARY OSTEOARTHRITIS OF LEFT KNEE: Primary | ICD-10-CM

## 2021-02-15 DIAGNOSIS — M94.262 CHONDROMALACIA OF LEFT KNEE: ICD-10-CM

## 2021-02-15 DIAGNOSIS — M17.11 PRIMARY OSTEOARTHRITIS OF RIGHT KNEE: ICD-10-CM

## 2021-02-15 DIAGNOSIS — M25.561 ACUTE PAIN OF RIGHT KNEE: ICD-10-CM

## 2021-02-15 DIAGNOSIS — M22.41 CHONDROMALACIA PATELLAE OF RIGHT KNEE: ICD-10-CM

## 2021-02-15 RX ORDER — PREGABALIN 75 MG/1
75 CAPSULE ORAL 3 TIMES DAILY
Qty: 270 CAPSULE | Refills: 0 | Status: SHIPPED | OUTPATIENT
Start: 2021-02-15 | End: 2021-05-13 | Stop reason: SDUPTHER

## 2021-02-15 NOTE — TELEPHONE ENCOUNTER
Authorization #: [id number] Effective: 02/04/2021  Elyssa Yanciroland  Relationship to subscriber:Dependent  PATIENT'S INSURANCE  Member ID: [id number]  PARENT/SUBSCRIBER DETAILS  [facility]  [address], [place] [id number]  Requesting Provider  Javi Cerrato  NPI: [id number]  [address] [facility],   [address]  [facility] , [place] [address]  Servicing Provider  Javi Cerrato  NPI: [id number]  [address] [place] [address] , [address] [place] [address]  Date of Service: 02/04/2021  Level of Service: Standard Request  Service Type: Prior Approval - Procedure  Place of Service: Other Place of Service      The  has added a new note.  View >>     Diagnoses (6)    Diagnosis  1   E66.01 - Morbid (severe) obesity due to excess calories  2   I10 - Essential (primary) hypertension  3   E11.9 - Type 2 diabetes mellitus without complications  4   X75.5 - Mixed hyperlipidemia  5   G47.33 - Obstructive sleep apnea (adult) (pediatric)  6   M19.90 - Unspecified osteoarthritis, unspecified site     Services (1)    Service Dates Procedure Code Units Status  03/02/2021 56096   0 Unit(s)  Approved    Notes from  (2)    2/5/2021 4:29:33 PM  Outcome: Approve Laparoscopic Sleeve Gastrectomy (CPT 77348) including 1 day inpatient stay   Criteria: [facility] [address]  Valid until 02/04/2022

## 2021-02-22 DIAGNOSIS — E11.9 TYPE 2 DIABETES MELLITUS WITHOUT COMPLICATION, WITHOUT LONG-TERM CURRENT USE OF INSULIN (HCC): ICD-10-CM

## 2021-02-22 RX ORDER — ATORVASTATIN CALCIUM 40 MG/1
40 TABLET, FILM COATED ORAL DAILY
Qty: 90 TABLET | Refills: 3 | Status: SHIPPED | OUTPATIENT
Start: 2021-02-22 | End: 2021-05-29 | Stop reason: SDUPTHER

## 2021-02-22 RX ORDER — ROPINIROLE 3 MG/1
3 TABLET, FILM COATED ORAL NIGHTLY
Qty: 30 TABLET | Refills: 3 | Status: SHIPPED | OUTPATIENT
Start: 2021-02-22 | End: 2021-05-13 | Stop reason: SDUPTHER

## 2021-02-23 ENCOUNTER — TELEPHONE (OUTPATIENT)
Dept: ORTHOPEDIC SURGERY | Age: 61
End: 2021-02-23

## 2021-02-23 ENCOUNTER — TELEPHONE (OUTPATIENT)
Dept: BARIATRICS/WEIGHT MGMT | Age: 61
End: 2021-02-23

## 2021-02-23 NOTE — PROGRESS NOTES
[address] [facility] patients having surgery or anesthesia are required to be Covid tested. You will need to quarantine from the time you are tested until your surgery. PRIOR TO PROCEDURE DATE:        1. PLEASE FOLLOW ANY  GUIDELINES/ INSTRUCTIONS PRIOR TO YOUR PROCEDURE AS ADVISED BY YOUR SURGEON. 2. Arrange for someone to drive you home and be with you for the first 24 hours after discharge for your safety after your procedure for which you received sedation. Ensure it is someone we can share information with regarding your discharge. 3. You must contact your surgeon for instructions IF:  ? You are taking any blood thinners, aspirin, anti-inflammatory or vitamin E.  ? There is a change in your physical condition such as a cold, fever, rash, cuts, sores or any other infection, especially near your surgical site. 4. Do not drink alcohol the day before or day of your procedure. 5. A Pre-op History and Physical for surgery MUST be completed by your Physician or Urgent Care within 30 days of your procedure date. Please bring a copy with you on the day of your procedure and along with any other testing performed. THE DAY OF YOUR PROCEDURE:  1. Follow instructions for ARRIVAL TIME as DIRECTED BY YOUR SURGEON. I    1. Enter the MAIN entrance from [address] and follow the signs to the free [employer] or [employer] parking (offered free of charge 6am-5pm). 2. Enter the Main Entrance of the hospital (do not enter from the lower level of the parking garage). Upon entrance, check in with the  at the main desk on your left. If no one is available at the desk, proceed into the [facility] Waiting Room and go through the door directly into the [facility]. There is a Check-in desk ACROSS from Room 5 (marked with a sign hanging from the ceiling). The phone number for the surgery center is [phone]. 4. Please call [phone] option #2 option #2 if you have not been preregistered yet. On the day of your procedure bring your insurance card and photo ID. You will be registered at your bedside once brought back to your room. 5. DO NOT EAT ANYTHING eight hours prior to your arrival for surgery. May have 8 ounces of water 4 hours prior to your arrival for surgery. NOTE: ALL Gastric, Bariatric and Bowel surgery patients MUST follow their surgeon's instructions. 6. MEDICATIONS   ? Take the following medications with a SMALL sip of water: none  ? Use your usual dose of inhalers the morning of surgery. BRING your rescue inhaler with you to hospital.   ? Anesthesia does NOT want you to take insulin the morning of surgery. They will control your blood sugar while you are at the hospital. Please contact your ordering physician for instructions regarding your insulin the night before your procedure. If you have an insulin pump, please keep it set on basal rate. 7. Do not swallow water when brushing teeth. No gum, candy, mints or ice chips. Refrain from smoking or at least decrease the amount. 8. Dress in loose, comfortable clothing appropriate for redressing after your procedure. Do not wear jewelry (including body piercings), make-up (especially NO eye make-up), fingernail polish (NO toenail polish if foot/leg surgery), lotion, powders or metal hairclips. 9. Dentures, glasses, or contacts will need to be removed before your procedure. Bring cases for your glasses, contacts, dentures, or hearing aids to protect them while you are in surgery. 10. If you use a CPAP, please bring it with you on the day of your procedure. 11. We recommend that valuable personal  belongings such as cash, cell phones, e-tablets or jewelry, be left at home during your stay. The hospital will not be responsible for valuables that are not secured in the hospital safe. However, if your insurance requires a co-pay, you may want to bring a method of payment, i.e. Check or credit card, if you wish to pay your co-pay the day of surgery. 12. If you are to stay overnight, you may bring a bag with personal items. Please have any large items you may need brought in by your family after your arrival to your hospital room. 15. If you have a Living Will or Durable Power of , please bring a copy on the day of your procedure. 15. With your permission, one family member may accompany you while you are being prepared for surgery. Once you are ready, additional family members may join you. HOW WE KEEP YOU SAFE and WORK TO PREVENT SURGICAL SITE INFECTIONS:  1. Health care workers should always check your ID bracelet to verify your name and birth date. You will be asked many times to state your name, date of birth, and allergies. 2. Health care workers should always clean their hands with soap or alcohol gel before providing care to you. It is okay to ask anyone if they cleaned their hands before they touch you. 3. You will be actively involved in verifying the type of procedure you are having and ensuring the correct surgical site. This will be confirmed multiple times prior to your procedure. Do NOT tootie your surgery site UNLESS instructed to by your surgeon. 4. Do not shave or wax for 72 hours prior to procedure near your operative site. Shaving with a razor can irritate your skin and make it easier to develop an infection. On the day of your procedure, any hair that needs to be removed near the surgical site will be clipped by a healthcare worker using a special clippers designed to avoid skin irritation. 5. When you are in the operating room, your surgical site will be cleansed with a special soap, and in most cases, you will be given an antibiotic before the surgery begins. What to expect AFTER YOUR PROCEDURE:  1. Immediately following your procedure, your will be taken to the PACU for the first phase of your recovery. Your nurse will help you recover from any potential side effects of anesthesia, such as extreme drowsiness, changes in your vital signs or breathing patterns. Nausea, headache, muscle aches, or sore throat may also occur after anesthesia. Your nurse will help you manage these potential side effects. 2. For comfort and safety, arrange to have someone at home with you for the first 24 hours after discharge. 3. You and your family will be given written instructions about your diet, activity, dressing care, medications, and return visits. 4. Once at home, should issues with nausea, pain, or bleeding occur, or should you notice any signs of infection, you should call your surgeon. 5. Always clean your hands before and after caring for your wound. Do not let your family touch your surgery site without cleaning their hands. 6. Narcotic pain medications can cause significant constipation. You may want to add a stool softener to your postoperative medication schedule or speak to your surgeon on how best to manage this SIDE EFFECT. SPECIAL INSTRUCTIONS       Thank you for allowing us to care for you. We strive to exceed your expectations in the delivery of care and service provided to you and your family. If you need to contact the [facility] [place] [address] staff for any reason, please call us at [phone]    Instructions reviewed with patient during preadmission testing phone interview. Ruby Thompson. 2/23/2021 .4:35 PM      ADDITIONAL EDUCATIONAL INFORMATION REVIEWED PER PHONE WITH YOU AND/OR YOUR FAMILY:  No Bring a urine sample on day of surgery No Hibiclens® Bathing Instructions   Yes Antibacterial Soap

## 2021-02-23 NOTE — TELEPHONE ENCOUNTER
I spoke with Yvonne Colbert and she said she was working on the forms today. I told her to please include Dr Lana Day notes also because they want all the information. She said that Dr Alma Cain was keeping the patient off until 7/2021 estimated or until he had his knee replacement. I told her about the e-mail we received from the patient stating that [facility] did not have the forms and they needed then before 3/1/21. She assured me that she will fax the form and notes from Dr Marilee Ann today. I called patient and gave him this message. He will check with [facility] on Thursday to see if they received them.

## 2021-02-23 NOTE — TELEPHONE ENCOUNTER
FX'D UPDATED APS AND MEDICAL RECORDS [facility] AND MARI) TO MAGUI SZYMANSKI [phone] (PER SHUKRI)     PATIENT OFF WORK UNTIL AFTER KNEE REPLACEMENT SURGERY (TBS)

## 2021-02-24 ENCOUNTER — OFFICE VISIT (OUTPATIENT)
Dept: PRIMARY CARE CLINIC | Age: 61
End: 2021-02-24
Payer: COMMERCIAL

## 2021-02-24 DIAGNOSIS — Z01.818 PREOP EXAMINATION: Primary | ICD-10-CM

## 2021-02-24 PROCEDURE — 99211 OFF/OP EST MAY X REQ PHY/QHP: CPT | Performed by: NURSE PRACTITIONER

## 2021-02-25 LAB — SARS-COV-2: NOT DETECTED

## 2021-03-01 ENCOUNTER — TELEPHONE (OUTPATIENT)
Dept: BARIATRICS/WEIGHT MGMT | Age: 61
End: 2021-03-01

## 2021-03-01 ENCOUNTER — ANESTHESIA EVENT (OUTPATIENT)
Dept: OPERATING ROOM | Age: 61
DRG: 621 | End: 2021-03-01
Payer: COMMERCIAL

## 2021-03-01 NOTE — TELEPHONE ENCOUNTER
LM for pt to confirm his 9:45am arrival for his 11:45 am surgery on 3/2/21. Pt should have completed his pre op diet, clear liquids today and NPO after midnight. Pt was asked to call and confirm receipt of this message.

## 2021-03-02 ENCOUNTER — ANESTHESIA (OUTPATIENT)
Dept: OPERATING ROOM | Age: 61
DRG: 621 | End: 2021-03-02
Payer: COMMERCIAL

## 2021-03-02 ENCOUNTER — HOSPITAL ENCOUNTER (INPATIENT)
Age: 61
LOS: 1 days | Discharge: HOME | DRG: 621 | End: 2021-03-03
Attending: SURGERY | Admitting: SURGERY
Payer: COMMERCIAL

## 2021-03-02 VITALS — OXYGEN SATURATION: 97 % | SYSTOLIC BLOOD PRESSURE: 223 MMHG | TEMPERATURE: 97.3 F | DIASTOLIC BLOOD PRESSURE: 93 MMHG

## 2021-03-02 DIAGNOSIS — R10.9 ACUTE POSTOPERATIVE PAIN OF ABDOMEN: Primary | ICD-10-CM

## 2021-03-02 DIAGNOSIS — G89.18 ACUTE POSTOPERATIVE PAIN OF ABDOMEN: Primary | ICD-10-CM

## 2021-03-02 DIAGNOSIS — I10 ESSENTIAL HYPERTENSION: ICD-10-CM

## 2021-03-02 DIAGNOSIS — E66.01 MORBID OBESITY (HCC): ICD-10-CM

## 2021-03-02 LAB
A/G RATIO: 1.3 (ref 1.1–2.2)
ABO/RH: NORMAL
ALBUMIN SERPL-MCNC: 4.5 G/DL (ref 3.4–5)
ALP BLD-CCNC: 105 U/L (ref 40–129)
ALT SERPL-CCNC: 34 U/L (ref 10–40)
ANION GAP SERPL CALCULATED.3IONS-SCNC: 12 MMOL/L (ref 3–16)
ANTIBODY SCREEN: NORMAL
AST SERPL-CCNC: 37 U/L (ref 15–37)
BILIRUB SERPL-MCNC: 0.6 MG/DL (ref 0–1)
BUN BLDV-MCNC: 16 MG/DL (ref 7–20)
CALCIUM SERPL-MCNC: 9.9 MG/DL (ref 8.3–10.6)
CHLORIDE BLD-SCNC: 100 MMOL/L (ref 99–110)
CO2: 26 MMOL/L (ref 21–32)
CREAT SERPL-MCNC: 0.9 MG/DL (ref 0.9–1.3)
GFR AFRICAN AMERICAN: >60
GFR NON-AFRICAN AMERICAN: >60
GLOBULIN: 3.5 G/DL
GLUCOSE BLD-MCNC: 109 MG/DL (ref 70–99)
GLUCOSE BLD-MCNC: 122 MG/DL (ref 70–99)
GLUCOSE BLD-MCNC: 129 MG/DL (ref 70–99)
HCT VFR BLD CALC: 42.8 % (ref 40.5–52.5)
HEMOGLOBIN: 14.6 G/DL (ref 13.5–17.5)
MCH RBC QN AUTO: 33.5 PG (ref 26–34)
MCHC RBC AUTO-ENTMCNC: 34.1 G/DL (ref 31–36)
MCV RBC AUTO: 98 FL (ref 80–100)
PDW BLD-RTO: 13.2 % (ref 12.4–15.4)
PERFORMED ON: ABNORMAL
PERFORMED ON: ABNORMAL
PLATELET # BLD: 165 K/UL (ref 135–450)
PMV BLD AUTO: 8.6 FL (ref 5–10.5)
POTASSIUM SERPL-SCNC: 4.5 MMOL/L (ref 3.5–5.1)
RBC # BLD: 4.36 M/UL (ref 4.2–5.9)
SODIUM BLD-SCNC: 138 MMOL/L (ref 136–145)
TOTAL PROTEIN: 8 G/DL (ref 6.4–8.2)
WBC # BLD: 5.5 K/UL (ref 4–11)

## 2021-03-02 PROCEDURE — 86901 BLOOD TYPING SEROLOGIC RH(D): CPT

## 2021-03-02 PROCEDURE — 80053 COMPREHEN METABOLIC PANEL: CPT

## 2021-03-02 PROCEDURE — 2709999900 HC NON-CHARGEABLE SUPPLY: Performed by: SURGERY

## 2021-03-02 PROCEDURE — 3600000009 HC SURGERY ROBOT BASE: Performed by: SURGERY

## 2021-03-02 PROCEDURE — 6360000002 HC RX W HCPCS: Performed by: SURGERY

## 2021-03-02 PROCEDURE — 6370000000 HC RX 637 (ALT 250 FOR IP): Performed by: NURSE PRACTITIONER

## 2021-03-02 PROCEDURE — 3700000001 HC ADD 15 MINUTES (ANESTHESIA): Performed by: SURGERY

## 2021-03-02 PROCEDURE — 2500000003 HC RX 250 WO HCPCS: Performed by: SURGERY

## 2021-03-02 PROCEDURE — 7100000000 HC PACU RECOVERY - FIRST 15 MIN: Performed by: SURGERY

## 2021-03-02 PROCEDURE — 6370000000 HC RX 637 (ALT 250 FOR IP): Performed by: SURGERY

## 2021-03-02 PROCEDURE — S2900 ROBOTIC SURGICAL SYSTEM: HCPCS | Performed by: SURGERY

## 2021-03-02 PROCEDURE — 85027 COMPLETE CBC AUTOMATED: CPT

## 2021-03-02 PROCEDURE — 8E0W4CZ ROBOTIC ASSISTED PROCEDURE OF TRUNK REGION, PERCUTANEOUS ENDOSCOPIC APPROACH: ICD-10-PCS | Performed by: SURGERY

## 2021-03-02 PROCEDURE — 6360000002 HC RX W HCPCS: Performed by: NURSE ANESTHETIST, CERTIFIED REGISTERED

## 2021-03-02 PROCEDURE — 2580000003 HC RX 258: Performed by: SURGERY

## 2021-03-02 PROCEDURE — 3700000000 HC ANESTHESIA ATTENDED CARE: Performed by: SURGERY

## 2021-03-02 PROCEDURE — 3600000019 HC SURGERY ROBOT ADDTL 15MIN: Performed by: SURGERY

## 2021-03-02 PROCEDURE — 6360000002 HC RX W HCPCS: Performed by: NURSE PRACTITIONER

## 2021-03-02 PROCEDURE — 94660 CPAP INITIATION&MGMT: CPT

## 2021-03-02 PROCEDURE — 86900 BLOOD TYPING SEROLOGIC ABO: CPT

## 2021-03-02 PROCEDURE — 0DB64Z3 EXCISION OF STOMACH, PERCUTANEOUS ENDOSCOPIC APPROACH, VERTICAL: ICD-10-PCS | Performed by: SURGERY

## 2021-03-02 PROCEDURE — 2720000010 HC SURG SUPPLY STERILE: Performed by: SURGERY

## 2021-03-02 PROCEDURE — 2580000003 HC RX 258: Performed by: ANESTHESIOLOGY

## 2021-03-02 PROCEDURE — 2700000000 HC OXYGEN THERAPY PER DAY

## 2021-03-02 PROCEDURE — 6360000002 HC RX W HCPCS: Performed by: ANESTHESIOLOGY

## 2021-03-02 PROCEDURE — 43775 LAP SLEEVE GASTRECTOMY: CPT | Performed by: SURGERY

## 2021-03-02 PROCEDURE — 94761 N-INVAS EAR/PLS OXIMETRY MLT: CPT

## 2021-03-02 PROCEDURE — 7100000001 HC PACU RECOVERY - ADDTL 15 MIN: Performed by: SURGERY

## 2021-03-02 PROCEDURE — 1200000000 HC SEMI PRIVATE

## 2021-03-02 PROCEDURE — 86850 RBC ANTIBODY SCREEN: CPT

## 2021-03-02 PROCEDURE — 88307 TISSUE EXAM BY PATHOLOGIST: CPT

## 2021-03-02 PROCEDURE — 2500000003 HC RX 250 WO HCPCS: Performed by: NURSE ANESTHETIST, CERTIFIED REGISTERED

## 2021-03-02 PROCEDURE — C9113 INJ PANTOPRAZOLE SODIUM, VIA: HCPCS | Performed by: SURGERY

## 2021-03-02 PROCEDURE — C1713 ANCHOR/SCREW BN/BN,TIS/BN: HCPCS | Performed by: SURGERY

## 2021-03-02 PROCEDURE — 2580000003 HC RX 258: Performed by: NURSE ANESTHETIST, CERTIFIED REGISTERED

## 2021-03-02 PROCEDURE — 2580000003 HC RX 258: Performed by: NURSE PRACTITIONER

## 2021-03-02 RX ORDER — PROPOFOL 10 MG/ML
INJECTION, EMULSION INTRAVENOUS PRN
Status: DISCONTINUED | OUTPATIENT
Start: 2021-03-02 | End: 2021-03-02 | Stop reason: SDUPTHER

## 2021-03-02 RX ORDER — PROCHLORPERAZINE EDISYLATE 5 MG/ML
5 INJECTION INTRAMUSCULAR; INTRAVENOUS
Status: DISCONTINUED | OUTPATIENT
Start: 2021-03-02 | End: 2021-03-02 | Stop reason: HOSPADM

## 2021-03-02 RX ORDER — ONDANSETRON 2 MG/ML
INJECTION INTRAMUSCULAR; INTRAVENOUS PRN
Status: DISCONTINUED | OUTPATIENT
Start: 2021-03-02 | End: 2021-03-02 | Stop reason: SDUPTHER

## 2021-03-02 RX ORDER — SODIUM CHLORIDE 9 MG/ML
INJECTION, SOLUTION INTRAVENOUS CONTINUOUS
Status: DISCONTINUED | OUTPATIENT
Start: 2021-03-02 | End: 2021-03-03 | Stop reason: HOSPADM

## 2021-03-02 RX ORDER — DIPHENHYDRAMINE HYDROCHLORIDE 50 MG/ML
12.5 INJECTION INTRAMUSCULAR; INTRAVENOUS
Status: DISCONTINUED | OUTPATIENT
Start: 2021-03-02 | End: 2021-03-02 | Stop reason: HOSPADM

## 2021-03-02 RX ORDER — PANTOPRAZOLE SODIUM 40 MG/10ML
40 INJECTION, POWDER, LYOPHILIZED, FOR SOLUTION INTRAVENOUS DAILY
Status: DISCONTINUED | OUTPATIENT
Start: 2021-03-02 | End: 2021-03-03 | Stop reason: HOSPADM

## 2021-03-02 RX ORDER — SODIUM CHLORIDE 9 MG/ML
10 INJECTION INTRAVENOUS DAILY
Status: DISCONTINUED | OUTPATIENT
Start: 2021-03-02 | End: 2021-03-03 | Stop reason: HOSPADM

## 2021-03-02 RX ORDER — SCOLOPAMINE TRANSDERMAL SYSTEM 1 MG/1
1 PATCH, EXTENDED RELEASE TRANSDERMAL
Status: DISCONTINUED | OUTPATIENT
Start: 2021-03-05 | End: 2021-03-03

## 2021-03-02 RX ORDER — SODIUM CHLORIDE, SODIUM LACTATE, POTASSIUM CHLORIDE, AND CALCIUM CHLORIDE .6; .31; .03; .02 G/100ML; G/100ML; G/100ML; G/100ML
IRRIGANT IRRIGATION PRN
Status: DISCONTINUED | OUTPATIENT
Start: 2021-03-02 | End: 2021-03-02 | Stop reason: HOSPADM

## 2021-03-02 RX ORDER — SODIUM CHLORIDE, SODIUM LACTATE, POTASSIUM CHLORIDE, CALCIUM CHLORIDE 600; 310; 30; 20 MG/100ML; MG/100ML; MG/100ML; MG/100ML
INJECTION, SOLUTION INTRAVENOUS CONTINUOUS
Status: DISCONTINUED | OUTPATIENT
Start: 2021-03-02 | End: 2021-03-02

## 2021-03-02 RX ORDER — ACETAMINOPHEN 160 MG/5ML
650 SOLUTION ORAL EVERY 4 HOURS PRN
Status: DISCONTINUED | OUTPATIENT
Start: 2021-03-02 | End: 2021-03-02 | Stop reason: HOSPADM

## 2021-03-02 RX ORDER — LIDOCAINE 4 G/G
1 PATCH TOPICAL DAILY
Status: DISCONTINUED | OUTPATIENT
Start: 2021-03-02 | End: 2021-03-03 | Stop reason: HOSPADM

## 2021-03-02 RX ORDER — MAGNESIUM HYDROXIDE 1200 MG/15ML
LIQUID ORAL PRN
Status: DISCONTINUED | OUTPATIENT
Start: 2021-03-02 | End: 2021-03-02 | Stop reason: HOSPADM

## 2021-03-02 RX ORDER — HYDROCODONE BITARTRATE AND ACETAMINOPHEN 5; 325 MG/1; MG/1
1 TABLET ORAL
Status: DISCONTINUED | OUTPATIENT
Start: 2021-03-02 | End: 2021-03-02 | Stop reason: HOSPADM

## 2021-03-02 RX ORDER — SODIUM CHLORIDE, SODIUM LACTATE, POTASSIUM CHLORIDE, CALCIUM CHLORIDE 600; 310; 30; 20 MG/100ML; MG/100ML; MG/100ML; MG/100ML
INJECTION, SOLUTION INTRAVENOUS CONTINUOUS PRN
Status: DISCONTINUED | OUTPATIENT
Start: 2021-03-02 | End: 2021-03-02 | Stop reason: SDUPTHER

## 2021-03-02 RX ORDER — 0.9 % SODIUM CHLORIDE 0.9 %
500 INTRAVENOUS SOLUTION INTRAVENOUS
Status: DISCONTINUED | OUTPATIENT
Start: 2021-03-02 | End: 2021-03-02 | Stop reason: HOSPADM

## 2021-03-02 RX ORDER — SODIUM CHLORIDE 0.9 % (FLUSH) 0.9 %
10 SYRINGE (ML) INJECTION PRN
Status: DISCONTINUED | OUTPATIENT
Start: 2021-03-02 | End: 2021-03-03 | Stop reason: HOSPADM

## 2021-03-02 RX ORDER — METOCLOPRAMIDE HYDROCHLORIDE 5 MG/ML
10 INJECTION INTRAMUSCULAR; INTRAVENOUS EVERY 6 HOURS PRN
Status: DISCONTINUED | OUTPATIENT
Start: 2021-03-02 | End: 2021-03-03 | Stop reason: HOSPADM

## 2021-03-02 RX ORDER — PREGABALIN 150 MG/1
150 CAPSULE ORAL
Status: COMPLETED | OUTPATIENT
Start: 2021-03-02 | End: 2021-03-02

## 2021-03-02 RX ORDER — ONDANSETRON 2 MG/ML
4 INJECTION INTRAMUSCULAR; INTRAVENOUS EVERY 6 HOURS PRN
Status: DISCONTINUED | OUTPATIENT
Start: 2021-03-02 | End: 2021-03-03 | Stop reason: HOSPADM

## 2021-03-02 RX ORDER — SUCCINYLCHOLINE/SOD CL,ISO/PF 200MG/10ML
SYRINGE (ML) INTRAVENOUS PRN
Status: DISCONTINUED | OUTPATIENT
Start: 2021-03-02 | End: 2021-03-02 | Stop reason: SDUPTHER

## 2021-03-02 RX ORDER — FENTANYL CITRATE 50 UG/ML
INJECTION, SOLUTION INTRAMUSCULAR; INTRAVENOUS PRN
Status: DISCONTINUED | OUTPATIENT
Start: 2021-03-02 | End: 2021-03-02 | Stop reason: SDUPTHER

## 2021-03-02 RX ORDER — ONDANSETRON 2 MG/ML
4 INJECTION INTRAMUSCULAR; INTRAVENOUS
Status: DISCONTINUED | OUTPATIENT
Start: 2021-03-02 | End: 2021-03-02 | Stop reason: HOSPADM

## 2021-03-02 RX ORDER — SODIUM CHLORIDE 0.9 % (FLUSH) 0.9 %
10 SYRINGE (ML) INJECTION EVERY 12 HOURS SCHEDULED
Status: DISCONTINUED | OUTPATIENT
Start: 2021-03-02 | End: 2021-03-03 | Stop reason: HOSPADM

## 2021-03-02 RX ORDER — SCOLOPAMINE TRANSDERMAL SYSTEM 1 MG/1
1 PATCH, EXTENDED RELEASE TRANSDERMAL ONCE
Status: DISCONTINUED | OUTPATIENT
Start: 2021-03-02 | End: 2021-03-03

## 2021-03-02 RX ORDER — SODIUM CHLORIDE 0.9 % (FLUSH) 0.9 %
10 SYRINGE (ML) INJECTION EVERY 12 HOURS SCHEDULED
Status: DISCONTINUED | OUTPATIENT
Start: 2021-03-02 | End: 2021-03-02 | Stop reason: HOSPADM

## 2021-03-02 RX ORDER — HYDRALAZINE HYDROCHLORIDE 20 MG/ML
5 INJECTION INTRAMUSCULAR; INTRAVENOUS EVERY 10 MIN PRN
Status: DISCONTINUED | OUTPATIENT
Start: 2021-03-02 | End: 2021-03-02 | Stop reason: HOSPADM

## 2021-03-02 RX ORDER — APREPITANT 40 MG/1
80 CAPSULE ORAL ONCE
Status: COMPLETED | OUTPATIENT
Start: 2021-03-02 | End: 2021-03-02

## 2021-03-02 RX ORDER — METHYLENE BLUE 10 MG/ML
INJECTION INTRAVENOUS PRN
Status: DISCONTINUED | OUTPATIENT
Start: 2021-03-02 | End: 2021-03-02 | Stop reason: HOSPADM

## 2021-03-02 RX ORDER — SODIUM CHLORIDE 9 MG/ML
INJECTION, SOLUTION INTRAVENOUS CONTINUOUS
Status: DISCONTINUED | OUTPATIENT
Start: 2021-03-02 | End: 2021-03-02

## 2021-03-02 RX ORDER — MEPERIDINE HYDROCHLORIDE 25 MG/ML
12.5 INJECTION INTRAMUSCULAR; INTRAVENOUS; SUBCUTANEOUS EVERY 5 MIN PRN
Status: DISCONTINUED | OUTPATIENT
Start: 2021-03-02 | End: 2021-03-02 | Stop reason: HOSPADM

## 2021-03-02 RX ORDER — PROCHLORPERAZINE EDISYLATE 5 MG/ML
10 INJECTION INTRAMUSCULAR; INTRAVENOUS EVERY 6 HOURS PRN
Status: DISCONTINUED | OUTPATIENT
Start: 2021-03-02 | End: 2021-03-03 | Stop reason: HOSPADM

## 2021-03-02 RX ORDER — LIDOCAINE HYDROCHLORIDE 20 MG/ML
INJECTION, SOLUTION INTRAVENOUS PRN
Status: DISCONTINUED | OUTPATIENT
Start: 2021-03-02 | End: 2021-03-02 | Stop reason: SDUPTHER

## 2021-03-02 RX ORDER — SODIUM CHLORIDE 0.9 % (FLUSH) 0.9 %
10 SYRINGE (ML) INJECTION PRN
Status: DISCONTINUED | OUTPATIENT
Start: 2021-03-02 | End: 2021-03-02 | Stop reason: HOSPADM

## 2021-03-02 RX ORDER — ROCURONIUM BROMIDE 10 MG/ML
INJECTION, SOLUTION INTRAVENOUS PRN
Status: DISCONTINUED | OUTPATIENT
Start: 2021-03-02 | End: 2021-03-02 | Stop reason: SDUPTHER

## 2021-03-02 RX ORDER — NALOXONE HYDROCHLORIDE 0.4 MG/ML
0.4 INJECTION, SOLUTION INTRAMUSCULAR; INTRAVENOUS; SUBCUTANEOUS PRN
Status: DISCONTINUED | OUTPATIENT
Start: 2021-03-02 | End: 2021-03-03

## 2021-03-02 RX ORDER — BUPIVACAINE HYDROCHLORIDE 5 MG/ML
INJECTION, SOLUTION EPIDURAL; INTRACAUDAL PRN
Status: DISCONTINUED | OUTPATIENT
Start: 2021-03-02 | End: 2021-03-02 | Stop reason: HOSPADM

## 2021-03-02 RX ADMIN — FENTANYL CITRATE 100 MCG: 50 INJECTION, SOLUTION INTRAMUSCULAR; INTRAVENOUS at 15:41

## 2021-03-02 RX ADMIN — SODIUM CHLORIDE, POTASSIUM CHLORIDE, SODIUM LACTATE AND CALCIUM CHLORIDE: 600; 310; 30; 20 INJECTION, SOLUTION INTRAVENOUS at 11:25

## 2021-03-02 RX ADMIN — SODIUM CHLORIDE, SODIUM LACTATE, POTASSIUM CHLORIDE, AND CALCIUM CHLORIDE: .6; .31; .03; .02 INJECTION, SOLUTION INTRAVENOUS at 13:47

## 2021-03-02 RX ADMIN — ONDANSETRON 4 MG: 2 INJECTION INTRAMUSCULAR; INTRAVENOUS at 15:20

## 2021-03-02 RX ADMIN — FENTANYL CITRATE 50 MCG: 50 INJECTION, SOLUTION INTRAMUSCULAR; INTRAVENOUS at 14:17

## 2021-03-02 RX ADMIN — HYDRALAZINE HYDROCHLORIDE 5 MG: 20 INJECTION INTRAMUSCULAR; INTRAVENOUS at 17:06

## 2021-03-02 RX ADMIN — CEFAZOLIN 3 G: 10 INJECTION, POWDER, FOR SOLUTION INTRAVENOUS at 13:47

## 2021-03-02 RX ADMIN — HYDROMORPHONE HYDROCHLORIDE 0.5 MG: 1 INJECTION, SOLUTION INTRAMUSCULAR; INTRAVENOUS; SUBCUTANEOUS at 15:51

## 2021-03-02 RX ADMIN — Medication 10 ML: at 19:47

## 2021-03-02 RX ADMIN — SODIUM CHLORIDE: 9 INJECTION, SOLUTION INTRAVENOUS at 23:00

## 2021-03-02 RX ADMIN — FENTANYL CITRATE 50 MCG: 50 INJECTION, SOLUTION INTRAMUSCULAR; INTRAVENOUS at 15:06

## 2021-03-02 RX ADMIN — Medication 10 ML: at 23:01

## 2021-03-02 RX ADMIN — METHOCARBAMOL 1000 MG: 100 INJECTION, SOLUTION INTRAMUSCULAR; INTRAVENOUS at 17:22

## 2021-03-02 RX ADMIN — ROCURONIUM BROMIDE 50 MG: 10 INJECTION INTRAVENOUS at 14:17

## 2021-03-02 RX ADMIN — APREPITANT 80 MG: 40 CAPSULE ORAL at 10:39

## 2021-03-02 RX ADMIN — FENTANYL CITRATE 50 MCG: 50 INJECTION, SOLUTION INTRAMUSCULAR; INTRAVENOUS at 14:42

## 2021-03-02 RX ADMIN — PANTOPRAZOLE SODIUM 40 MG: 40 INJECTION, POWDER, FOR SOLUTION INTRAVENOUS at 19:46

## 2021-03-02 RX ADMIN — ENOXAPARIN SODIUM 40 MG: 40 INJECTION SUBCUTANEOUS at 10:46

## 2021-03-02 RX ADMIN — PREGABALIN 150 MG: 150 CAPSULE ORAL at 10:43

## 2021-03-02 RX ADMIN — ROCURONIUM BROMIDE 50 MG: 10 INJECTION INTRAVENOUS at 14:02

## 2021-03-02 RX ADMIN — Medication 120 MG: at 13:58

## 2021-03-02 RX ADMIN — SODIUM CHLORIDE, SODIUM LACTATE, POTASSIUM CHLORIDE, AND CALCIUM CHLORIDE: .6; .31; .03; .02 INJECTION, SOLUTION INTRAVENOUS at 14:23

## 2021-03-02 RX ADMIN — FENTANYL CITRATE 50 MCG: 50 INJECTION, SOLUTION INTRAMUSCULAR; INTRAVENOUS at 13:53

## 2021-03-02 RX ADMIN — HYDROMORPHONE HYDROCHLORIDE: 10 INJECTION, SOLUTION INTRAMUSCULAR; INTRAVENOUS; SUBCUTANEOUS at 16:13

## 2021-03-02 RX ADMIN — SODIUM CHLORIDE: 9 INJECTION, SOLUTION INTRAVENOUS at 16:07

## 2021-03-02 RX ADMIN — HYDROMORPHONE HYDROCHLORIDE 0.5 MG: 1 INJECTION, SOLUTION INTRAMUSCULAR; INTRAVENOUS; SUBCUTANEOUS at 16:01

## 2021-03-02 RX ADMIN — ACETAMINOPHEN 650 MG: 650 SOLUTION ORAL at 10:37

## 2021-03-02 RX ADMIN — LIDOCAINE HYDROCHLORIDE 100 MG: 20 INJECTION, SOLUTION INTRAVENOUS at 13:55

## 2021-03-02 RX ADMIN — PROPOFOL 200 MG: 10 INJECTION, EMULSION INTRAVENOUS at 13:57

## 2021-03-02 RX ADMIN — HYDROMORPHONE HYDROCHLORIDE 0.5 MG: 1 INJECTION, SOLUTION INTRAMUSCULAR; INTRAVENOUS; SUBCUTANEOUS at 16:07

## 2021-03-02 RX ADMIN — HYDROMORPHONE HYDROCHLORIDE 0.5 MG: 1 INJECTION, SOLUTION INTRAMUSCULAR; INTRAVENOUS; SUBCUTANEOUS at 15:56

## 2021-03-02 RX ADMIN — SODIUM CHLORIDE, POTASSIUM CHLORIDE, SODIUM LACTATE AND CALCIUM CHLORIDE: 600; 310; 30; 20 INJECTION, SOLUTION INTRAVENOUS at 11:07

## 2021-03-02 ASSESSMENT — PULMONARY FUNCTION TESTS
PIF_VALUE: 24
PIF_VALUE: 30
PIF_VALUE: 3
PIF_VALUE: 7
PIF_VALUE: 30
PIF_VALUE: 27
PIF_VALUE: 32
PIF_VALUE: 23
PIF_VALUE: 29
PIF_VALUE: 27
PIF_VALUE: 2
PIF_VALUE: 5
PIF_VALUE: 30
PIF_VALUE: 30
PIF_VALUE: 2
PIF_VALUE: 22
PIF_VALUE: 27
PIF_VALUE: 31
PIF_VALUE: 27
PIF_VALUE: 30
PIF_VALUE: 25
PIF_VALUE: 30
PIF_VALUE: 1
PIF_VALUE: 30
PIF_VALUE: 25
PIF_VALUE: 25
PIF_VALUE: 30
PIF_VALUE: 29
PIF_VALUE: 30
PIF_VALUE: 27
PIF_VALUE: 30
PIF_VALUE: 28
PIF_VALUE: 3
PIF_VALUE: 26
PIF_VALUE: 0
PIF_VALUE: 30
PIF_VALUE: 25
PIF_VALUE: 31
PIF_VALUE: 25
PIF_VALUE: 15
PIF_VALUE: 32
PIF_VALUE: 31
PIF_VALUE: 23
PIF_VALUE: 35
PIF_VALUE: 34
PIF_VALUE: 30
PIF_VALUE: 30
PIF_VALUE: 28
PIF_VALUE: 25
PIF_VALUE: 31
PIF_VALUE: 30
PIF_VALUE: 35
PIF_VALUE: 29
PIF_VALUE: 31
PIF_VALUE: 31
PIF_VALUE: 30
PIF_VALUE: 31
PIF_VALUE: 28
PIF_VALUE: 25
PIF_VALUE: 30
PIF_VALUE: 0

## 2021-03-02 ASSESSMENT — PAIN - FUNCTIONAL ASSESSMENT: PAIN_FUNCTIONAL_ASSESSMENT: 0-10

## 2021-03-02 ASSESSMENT — LIFESTYLE VARIABLES: SMOKING_STATUS: 0

## 2021-03-02 ASSESSMENT — PAIN DESCRIPTION - FREQUENCY
FREQUENCY: CONTINUOUS

## 2021-03-02 ASSESSMENT — PAIN DESCRIPTION - PAIN TYPE
TYPE: SURGICAL PAIN

## 2021-03-02 ASSESSMENT — PAIN DESCRIPTION - ORIENTATION
ORIENTATION: MID

## 2021-03-02 ASSESSMENT — PAIN SCALES - GENERAL
PAINLEVEL_OUTOF10: 5
PAINLEVEL_OUTOF10: 10
PAINLEVEL_OUTOF10: 0
PAINLEVEL_OUTOF10: 10

## 2021-03-02 ASSESSMENT — ENCOUNTER SYMPTOMS
DYSPNEA ACTIVITY LEVEL: AFTER AMBULATING 1 FLIGHT OF STAIRS
SHORTNESS OF BREATH: 1

## 2021-03-02 ASSESSMENT — PAIN DESCRIPTION - ONSET
ONSET: AWAKENED FROM SLEEP
ONSET: ON-GOING
ONSET: ON-GOING

## 2021-03-02 ASSESSMENT — PAIN DESCRIPTION - DESCRIPTORS
DESCRIPTORS: ACHING
DESCRIPTORS: ACHING

## 2021-03-02 ASSESSMENT — PAIN DESCRIPTION - LOCATION
LOCATION: ABDOMEN

## 2021-03-02 NOTE — FLOWSHEET NOTE
Pt has room that is being cleaned on [address] [facility]. Brittney Mary called to see if she would like to come down to get report. Carolyn Banegas said will be down.

## 2021-03-02 NOTE — FLOWSHEET NOTE
O2 saturation drops to 85% while pt resting with eyes closed. Once pt wakes up O2 increases to 94%. Home CPAP at bedside. RT called to pt place on home CPAP. PCA turned off. SBP 180s will give PRN Hydralazine.

## 2021-03-02 NOTE — PROGRESS NOTES
4 Eyes Admission Assessment     I agree as the admission nurse that 2 RN's have performed a thorough Head to Toe Skin Assessment on the patient. ALL assessment sites listed below have been assessed on admission. Areas assessed by both nurses:   [x]   Head, Face, and Ears   [x]   Shoulders, Back, and Chest  [x]   Arms, Elbows, and Hands   [x]   Coccyx, Sacrum, and Ischium  [x]   Legs, Feet, and Heels        Does the Patient have Skin Breakdown? Surgical sites all along ABD. With ABD binder in place. Redness on coccyx and carmelita area. Pt has what looks like scattered zits on bilat lower extremities. Did put intrawick in ABD fold.          Jose Prevention initiated:  {no  Wound Care Orders initiated:  no      WOC nurse consulted for Pressure Injury (Stage 3,4, Unstageable, DTI, NWPT, and Complex wounds) or Jose score 18 or lower: no  Nurse 1 eSignature: Electronically signed by Marge Valdes RN on 3/2/21 at 6:53 PM EST    **SHARE this note so that the co-signing nurse is able to place an eSignature**    Nurse 2 eSignature: Electronically signed by Winnie Diaz RN on 3/2/21 at 6:55 PM EST

## 2021-03-02 NOTE — PROGRESS NOTES
There is a delay in the start time of the procedure.   Patient and family informed of delay by call form Pinky Quintero

## 2021-03-02 NOTE — OP NOTE
[facility]) Physicians   [employer]              Procedure Note    Indications: The patient was evaluated by our multidisciplinary team and was found to be a good candidate for weight loss surgery. Body mass index is 56.26 kg/m². Pre-operative Diagnosis:   Patient Active Problem List   Diagnosis    Class 3 severe obesity due to excess calories with serious comorbidity and body mass index (BMI) of 50.0 to 59.9 in adult [facility])    Mixed hypertriglyceridemia    Nonalcoholic fatty liver disease    RLS (restless legs syndrome)    Essential hypertension, benign    Morbid obesity with BMI of 50.0-59.9, adult ([facility] [address]    Chronic pain of left knee    Osteoarthritis of left knee    Chondromalacia of left knee    Pes planus    Lymphedema    Preop examination    Peripheral venous insufficiency    Allergic rhinitis    Type 2 diabetes mellitus without complication, without long-term current use of insulin (HCC)    Ingrown right big toenail    TOM treated with BiPAP    History of tobacco use disorder    Arthritis of left knee         Post-operative Diagnosis:   Same      Procedure:    - Robotic Sleeve Gastrectomy    Surgeon:  Tim Garcia DO    Anesthesia: General endotracheal anesthesia        Procedure Details The patient was seen again in the Holding Room. The risks, benefits, complications, treatment options, and expected outcomes were discussed with the patient and/or family. The possibilities of reaction to medication, pulmonary aspiration, perforation of viscus, bleeding, recurrent infection, strictures, leaks, failure to lose weight, regaining weight,  the need for additional procedures, failure to diagnose a condition, and creating a complication requiring transfusion or operation were discussed. There was concurrence with the proposed plan and informed consent was obtained. The site of surgery was properly noted/marked. The patient was taken to Operating Room, identified as Juan Carlos Hu and the procedure verified as Laparoscopic Sleeve Gastrectomy. A Time Out was held and the above information confirmed. The patient was placed in the supine position and general anesthesia was induced, along with placement of orogastric tube and SCD's. Lovenox SQ given pre-operatively. IV Antibiotics given pre-operatively. All pressure points were padded properly. A left upper quadrant incision was made and a veres needle was inserted after confirming with saline drop test.  After adequate pneumoperitoneum was obtained, a 5 mm trocar was inserted under direct vision and there was no injury on initial trocar placement. Additional ports were placed in the standard positions under direct vision. The abdomen was initially explored and no abnormalities were identified. A liver retractor was inserted through a small incision in the upper midline, lifted the liver upward, and was then secured to the OR table. The pylorus was identified and measurement was taken approximately 4 cm from the pylorus along the greater curvature of the stomach. The vessel sealer was used to take down the attachments and short gastric vessels along the greater curve of the stomach. This was continued until all attachments were taken down and continued to the gastro-esophageal junction. A 36 Moroccan dilator was advanced along the lesser curvature and into the pylorus. A stapler was fired along the dilator to create an appropriate sized gastric sleeve pouch. Black, Green, then Bank of Denise with seamguard were used to create the sleeve. The staple line looked very healthy and no bleeding from staple line. The stomach was confirmed to be completely divided with uniform shape,  no twist in the sleeve and wide patent incisura. The stomach distal to the staple line was clamped and methylene blue saline was injected under pressure confirming no obstruction and no leak. The abdomen was carefully inspected and there was no bleeding or any other abnormality. The stomach was brought out through the RUQ incision. Hemostasis was confirmed. The 12 port sites was closed using 0.0 Vicryl  suture at the level of the fascia. The trocar site skin wounds were closed using 4.0 Vicryl after copious Irrigation of the wounds. Local Anesthetic used at port sites then Dermabond applied. Instrument, sponge, and needle counts were correct at the conclusion of the case. Findings: Morbid Obesity. Estimated Blood Loss:  Minimal           Drains: none           Specimens: Stomach (Subtotal)            Complications:  None; patient tolerated the procedure well. Disposition: PACU - hemodynamically stable. Condition: stable    Attending Attestation: I was present and scrubbed for the entire procedure.

## 2021-03-02 NOTE — PROGRESS NOTES
Department of Bariatric Surgery    Post Op Note  Subjective:    Objective:  C/o severe pain. Has received 2 mg Dilaudid with minimal relief.   No nausea  Anesthesia type: General    Exam:   VITALS:  BP (!) 184/86   Pulse 89   Temp 97.6 °F (36.4 °C) (Temporal)   Resp 14   Ht 5' 8\" (1.727 m)   Wt (!) 370 lb (167.8 kg)   SpO2 97%   BMI 56.26 kg/m²   24HR INTAKE/OUTPUT:    Intake/Output Summary (Last 24 hours) at 3/2/2021 1626  Last data filed at 3/2/2021 1423  Gross per 24 hour   Intake 1000 ml   Output 50 ml   Net 950 ml     Post-op vital signs:  Stable     Exam:General appearance: Oriented, appears stated age  Lungs: clear to auscultation bilaterally  Heart: regular rate and rhythm, S1, S2 normal, no murmur, click, rub or gallop and S1, S2 normal  Abdomen: soft, obese, appropriately-tender; no guarding, non-peritoneal, abd binder in place  Trocar sites C/D/I  Assessment and Plan  Pt is a 64year old male s/p Robotic assisted Laparoscopic Gastrectomy POD #0    Pain management, PCA-add robaxin and lidoderm, ice  FeNa:  Diet - NPO except ice , Fluids NS @  150 ml/hr  :  -no void  Ambulation: OOB to chair, must walk every 2 hours  Respiratory:  IS at bedside, encourage hourly IS and deep breathing  Prophylaxis: AC boots, lovenox

## 2021-03-02 NOTE — FLOWSHEET NOTE
PCA still remains off d/t pt on CPAP and not appropriate at this time. Pt resting in bed with eyes closed and on home CPAP with 5 L.

## 2021-03-02 NOTE — FLOWSHEET NOTE
Allan Brown 5 S RN called and updated on pt and made aware BP improved, pt on 5 L with CPAP, Robaxin and Lidocaine patch given. BP now 162/66.

## 2021-03-02 NOTE — FLOWSHEET NOTE
BP elevated. Pt c/o pain 10/10. PRN Pain medication given. Will see if BP improves with pain management.

## 2021-03-02 NOTE — H&P
Fallon Garcia    [id number]  [facility] Same Day Surgery Update H & P  Department of General Surgery   Surgical Service   Pre-operative History and Physical  Last H & P within the last 30 days. DIAGNOSIS:   Morbid obesity ([facility] [address] [E66.01]    Procedure(s):  ROBOTIC ASSISTED LAPAROSCOPIC SLEEVE GASTRECTOMY    HISTORY OF PRESENT ILLNESS:   Patient is a morbidly obese (Body mass index is 56.26 kg/m².), 64 y.o. male who presents today for the above procedure. Patient with a history of multiple weight loss attempts without long term success. Covid 19:  Patient denies fever, chills, cough or known exposure to Covid-19. Past Medical History:        Diagnosis Date    Bilateral venous leg ulcers 09/2020    Cellulitis of lower extremity 9/25/2019    Diabetes mellitus ([facility] [address]     Hypertension     Impaired fasting glucose 5/2013    Obesity     TOM treated with BiPAP 11/30/2020    Preoperative clearance 10/26/2020    Screening PSA (prostate specific antigen) 12/30/2015;5/1/17    Nml:0.53(12/30/2015);5/1/17=nml.     Sleep apnea     uses CPAP    Stasis dermatitis of both legs 8/28/2020    Tobacco use     Tubular adenoma of colon 09/14/2017     Past Surgical History:        Procedure Laterality Date    CIRCUMCISION      COLONOSCOPY  09/14/2017    Colonoscopy with polypectomy (cold biopsy):Dr. Walker:next in 3yrs=9/14/2020    KNEE ARTHROSCOPY Left 8/3/2020    VIDEO ARTHROSCOPY LEFT KNEE, PARTIAL MEDIAL MENISCECTOMY, CHONDROPLASTY, SYNOVIAL BIOPSY -TOM=4- performed by Memo Reese MD at [place] [address] PAIN MANAGEMENT PROCEDURE Left 12/9/2020    COOLIEF RADIOFREQUENCY ABLATION - LEFT performed by Estephania Bowles MD at [address]  05/28/2011    UPPER GASTROINTESTINAL ENDOSCOPY N/A 1/11/2021    EGD BIOPSY performed by Aliza Garcia DO at [address] EXTRACTION       Past Social History:  Social History Socioeconomic History    Marital status:      Spouse name: None    Number of children: 2    Years of education: None    Highest education level: None   Occupational History    Occupation: [employer]   Social Needs    Financial resource strain: None    Food insecurity     Worry: None     Inability: None    Transportation needs     Medical: None     Non-medical: None   Tobacco Use    Smoking status: Former Smoker     Packs/day: 2.00     Years: 25.00     Pack years: 50.00     Types: Cigarettes     Quit date: 2006     Years since quittin.8    Smokeless tobacco: Never Used   Substance and Sexual Activity    Alcohol use: Not Currently     Frequency: Never     Comment: ocas    Drug use: No    Sexual activity: Yes     Partners: Female   Lifestyle    Physical activity     Days per week: None     Minutes per session: None    Stress: None   Relationships    Social connections     Talks on phone: None     Gets together: None     Attends Synagogue service: None     Active member of club or organization: None     Attends meetings of clubs or organizations: None     Relationship status: None    Intimate partner violence     Fear of current or ex partner: None     Emotionally abused: None     Physically abused: None     Forced sexual activity: None   Other Topics Concern    None   Social History Narrative    None         Medications Prior to Admission:      Prior to Admission medications    Medication Sig Start Date End Date Taking? Authorizing Provider   rOPINIRole (REQUIP) 3 MG tablet Take 1 tablet by mouth nightly 21  Yes JACKLYN Sheppard CNP   atorvastatin (LIPITOR) 40 MG tablet Take 1 tablet by mouth daily 21  Yes JACKLYN Colbert CNP   pregabalin (LYRICA) 75 MG capsule Take 1 capsule by mouth 3 times daily for 90 days.  2/15/21 5/16/21 Yes Jordy Guerrero MD glucose 5 g chewable tablet Take 3 tablets by mouth as needed for Low blood sugar 2/12/21 2/7/22 Yes JACKLYN Brennan CNP   metOLazone (ZAROXOLYN) 2.5 MG tablet Take 1 tablet by mouth three times a week 1/25/21  Yes JACKLYN Calix CNP   metFORMIN (GLUCOPHAGE) 500 MG tablet Take 1 tablet by mouth 2 times daily (with meals) 1/20/21  Yes JACKLYN Calix CNP   potassium chloride (KLOR-CON M) 20 MEQ TBCR extended release tablet Take 1 tablet by mouth 2 times daily 1/14/21  Yes JACKLYN De La Torre CNP   acetaminophen (TYLENOL) 500 MG tablet Take 1,000 mg by mouth every 8 hours as needed for Pain   Yes Historical Provider, MD   loratadine (CLARITIN) 10 MG tablet Take 1 tablet by mouth daily 12/17/20  Yes JACKLYN Calix CNP   hydroCHLOROthiazide (HYDRODIURIL) 25 MG tablet TAKE ONE TABLET BY MOUTH ONE TIME DAILY 12/16/20  Yes JACKLYN Calix CNP   lisinopril (PRINIVIL;ZESTRIL) 10 MG tablet TAKE 1 TABLET BY MOUTH ONCE A DAY 12/16/20  Yes JACKLYN Calix CNP   furosemide (LASIX) 80 MG tablet Take 1 tablet by mouth 2 times daily 10/22/20  Yes Len Richardson MD   fluticasone (FLONASE) 50 MCG/ACT nasal spray 1 spray by Nasal route daily 9/8/20  Yes JACKLYN Calix CNP Twist Top Lancets 28G MISC TEST TWO TIMES A DAY 1/20/21   JACKLYN Calix CNP   blood glucose test strips (PRODIGY NO CODING BLOOD GLUC) strip TEST TWO TIMES A DAY 1/20/21   JACKLYN Calix CNP   rOPINIRole (REQUIP) 2 MG tablet Take 1 tablet by mouth nightly 1/20/21   JACKLYN Calix CNP   metFORMIN (GLUCOPHAGE) 500 MG tablet Take 1 tablet by mouth 2 times daily (with meals) 1/20/21   JACKLYN Calix CNP   Prodigy Twist Top Lancets 28G MISC TEST TWO TIMES A DAY 1/20/21   JACKLYN Calix CNP blood glucose test strips (PRODIGY NO CODING BLOOD GLUC) strip TEST TWO TIMES A DAY 1/20/21   JACKLYN Soriano CNP   pregabalin (LYRICA) 75 MG capsule Take 1 capsule by mouth 2 times daily for 30 days. 1/19/21 2/18/21  Mirtha Florez MD   etodolac (LODINE) 400 MG tablet Take 1 tablet by mouth 2 times daily 1/5/21 1/5/22  Mirtha Florez MD   blood glucose monitor kit and supplies Check BG twice daily 11/5/20   JACKLYN Soriano CNP         Allergies:  Bactrim [sulfamethoxazole-trimethoprim]    PHYSICAL EXAM:      BP (!) 160/88   Pulse 80   Temp 97.9 °F (36.6 °C) (Oral)   Resp 18   Ht 5' 8\" (1.727 m)   Wt (!) 370 lb (167.8 kg)   SpO2 93%   BMI 56.26 kg/m²      Airway:  Airway patent with no audible stridor    Heart:  regular rate and rhythm, no murmur noted    Lungs:  No increased work of breathing, good air exchange, clear to auscultation bilaterally, no crackles or wheezing    Abdomen:  Obese, soft, non-distended, non-tender, no rebound tenderness or guarding, normal active bowel sounds and no masses palpated    ASSESSMENT AND PLAN:    1. The patients history and physical was obtained and signed off by the pre-admission testing department. Patient seen and focused exam done today- no new changes since last physical exam on 2-    2. Access to ancillary services are available per request of the provider.     Elnora Eisenmenger, [place]     3/2/2021

## 2021-03-02 NOTE — PROGRESS NOTES
PACU Transfer Note    Vitals:    03/02/21 1800   BP: (!) 159/62   Pulse: 90   Resp: 20   Temp: 97 °F (36.1 °C)   SpO2: 95%     BP within 20% of baseline. No intake/output data recorded. Pain assessment:  none  Pain Level: 0    Report given to Receiving unit RN with personal belongings including home CPAP, crutches, and clothing. Pt wife has cellphone and glasses.     3/2/2021 6:08 PM

## 2021-03-02 NOTE — ANESTHESIA PRE PROCEDURE
Department of Anesthesiology  Preprocedure Note       Name:  Tin Silvestre   Age:  64 y.o.  :  1960                                          MRN:  [id number]         Date:  3/2/2021      Surgeon: Vasiliy Abraham): Morgan Mcnair DO    Procedure: Procedure(s):  ROBOTIC ASSISTED LAPAROSCOPIC SLEEVE GASTRECTOMY    Medications prior to admission:   Prior to Admission medications    Medication Sig Start Date End Date Taking? Authorizing Provider   rOPINIRole (REQUIP) 3 MG tablet Take 1 tablet by mouth nightly 21  Yes JACKLYN Preciado CNP   atorvastatin (LIPITOR) 40 MG tablet Take 1 tablet by mouth daily 21  Yes JACKLYN Palmer CNP   pregabalin (LYRICA) 75 MG capsule Take 1 capsule by mouth 3 times daily for 90 days.  2/15/21 5/16/21 Yes Amy Flores MD   metOLazone (ZAROXOLYN) 2.5 MG tablet Take 1 tablet by mouth three times a week 21  Yes JACKLYN Preciado CNP   metFORMIN (GLUCOPHAGE) 500 MG tablet Take 1 tablet by mouth 2 times daily (with meals) 21  Yes JACKLYN Preciado CNP   potassium chloride (KLOR-CON M) 20 MEQ TBCR extended release tablet Take 1 tablet by mouth 2 times daily 21  Yes JACKLYN Mayer CNP   acetaminophen (TYLENOL) 500 MG tablet Take 1,000 mg by mouth every 8 hours as needed for Pain   Yes Historical Provider, MD   loratadine (CLARITIN) 10 MG tablet Take 1 tablet by mouth daily 20  Yes JACKLYN Preciado CNP   hydroCHLOROthiazide (HYDRODIURIL) 25 MG tablet TAKE ONE TABLET BY MOUTH ONE TIME DAILY 20  Yes JACKLYN Preciado CNP   lisinopril (PRINIVIL;ZESTRIL) 10 MG tablet TAKE 1 TABLET BY MOUTH ONCE A DAY 20  Yes JACKLYN Preciado CNP   furosemide (LASIX) 80 MG tablet Take 1 tablet by mouth 2 times daily 10/22/20  Yes Janeen Rivera MD   glucose 5 g chewable tablet Take 3 tablets by mouth as needed for Low blood sugar 21  Oliver Stoddard, APRN - CNP Prodigy Twist Top Lancets 28G MISC TEST TWO TIMES A DAY 1/20/21   JACKLYN Ahumada CNP   blood glucose test strips (PRODIGY NO CODING BLOOD GLUC) strip TEST TWO TIMES A DAY 1/20/21   JACKLYN Ahumada CNP   etodolac (LODINE) 400 MG tablet Take 1 tablet by mouth 2 times daily 1/5/21 1/5/22  Elisabeth Torrez MD   blood glucose monitor kit and supplies Check BG twice daily 11/5/20   JACKLYN Ahumada CNP   fluticasone (FLONASE) 50 MCG/ACT nasal spray 1 spray by Nasal route daily 9/8/20   JACKLYN Ahumada CNP       Current medications:    Current Facility-Administered Medications   Medication Dose Route Frequency Provider Last Rate Last Admin    lactated ringers infusion   Intravenous Continuous Nayely Caryn, DO        ceFAZolin (ANCEF) 3,000 mg in dextrose 5 % 100 mL IVPB  3,000 mg Intravenous Once Nayely Caryn, DO        scopolamine (TRANSDERM-SCOP) transdermal patch 1 patch  1 patch Transdermal Once Nayely Watkins, DO   1 patch at 03/02/21 1035    acetaminophen (TYLENOL) 160 MG/5ML solution 650 mg  650 mg Oral Q4H PRN Nayely Watkins, DO   650 mg at 03/02/21 1037    sodium chloride flush 0.9 % injection 10 mL  10 mL Intravenous 2 times per day John Milo, DO        sodium chloride flush 0.9 % injection 10 mL  10 mL Intravenous PRN John Milo, DO        0.9 % sodium chloride infusion   Intravenous Continuous John Milo, DO        lactated ringers infusion   Intravenous Continuous [place] Milo, DO        meperidine (DEMEROL) injection 12.5 mg  12.5 mg Intravenous Q5 Min PRN Arlina Leyden, DO        HYDROmorphone (DILAUDID) injection 0.25 mg  0.25 mg Intravenous Q5 Min PRN Arlina Leyden, DO        HYDROmorphone (DILAUDID) injection 0.5 mg  0.5 mg Intravenous Q5 Min PRN Arlina Leyden, DO        HYDROcodone-acetaminophen (NORCO) 5-325 MG per tablet 1 tablet  1 tablet Oral Once PRN Courtneya Briceen, DO        ondansetron [facility]) injection 4 mg  4 mg Intravenous Once PRN Jani Mayor, DO        prochlorperazine (COMPAZINE) injection 5 mg  5 mg Intravenous Once PRN Jani Mayor, DO        0.9 % sodium chloride bolus  500 mL Intravenous Once PRN Jani Romeor, DO        diphenhydrAMINE (BENADRYL) injection 12.5 mg  12.5 mg Intravenous Once PRN Jani Romeor, DO        hydrALAZINE (APRESOLINE) injection 5 mg  5 mg Intravenous Q10 Min PRN Jani Castellanos, DO           Allergies: Allergies   Allergen Reactions    Bactrim [Sulfamethoxazole-Trimethoprim] Rash     POSSIBLE fixed drug eruption on his cheeks, but diagnosis is not certain (only happened once).         Problem List:    Patient Active Problem List   Diagnosis Code    Class 3 severe obesity due to excess calories with serious comorbidity and body mass index (BMI) of 50.0 to 59.9 in adult ([facility] E66.01, Z68.43    Mixed hypertriglyceridemia S56.9    Nonalcoholic fatty liver disease K76.0    RLS (restless legs syndrome) G25.81    Essential hypertension, benign I10    Morbid obesity with BMI of 50.0-59.9, adult ([facility] E66.01, Z68.43    Chronic pain of left knee M25.562, G89.29    Osteoarthritis of left knee M17.12    Chondromalacia of left knee M94.262    Pes planus M21.40    Lymphedema I89.0    Preop examination Z01.818    Peripheral venous insufficiency I87.2    Allergic rhinitis J30.9    Type 2 diabetes mellitus without complication, without long-term current use of insulin (HCC) E11.9    Ingrown right big toenail L60.0    TOM treated with BiPAP G47.33    History of tobacco use disorder Z87.891    Arthritis of left knee M17.12       Past Medical History:        Diagnosis Date    Bilateral venous leg ulcers 09/2020    Cellulitis of lower extremity 9/25/2019    Diabetes mellitus ([facility]     Hypertension     Impaired fasting glucose 5/2013    Obesity     TOM treated with BiPAP 11/30/2020    Preoperative clearance 10/26/2020    Screening PSA (prostate specific antigen) 2015;17    Nml:0.53(2015);17=nml.     Sleep apnea     uses CPAP    Stasis dermatitis of both legs 2020    Tobacco use     Tubular adenoma of colon 2017       Past Surgical History:        Procedure Laterality Date    CIRCUMCISION      COLONOSCOPY  2017    Colonoscopy with polypectomy (cold biopsy):Dr. Walker:next in 3yrs=2020    KNEE ARTHROSCOPY Left 8/3/2020    VIDEO ARTHROSCOPY LEFT KNEE, PARTIAL MEDIAL MENISCECTOMY, CHONDROPLASTY, SYNOVIAL BIOPSY -TOM=4- performed by Carmen Kimball MD at [facility] PAIN MANAGEMENT PROCEDURE Left 2020    COOLIEF RADIOFREQUENCY ABLATION - LEFT performed by Jacque Condon MD at [place] [address]  2011    UPPER GASTROINTESTINAL ENDOSCOPY N/A 2021    EGD BIOPSY performed by Jeane Alvarado DO at [address] EXTRACTION         Social History:    Social History     Tobacco Use    Smoking status: Former Smoker     Packs/day: 2.00     Years: 25.00     Pack years: 50.00     Types: Cigarettes     Quit date: 2006     Years since quittin.8    Smokeless tobacco: Never Used   Substance Use Topics    Alcohol use: Not Currently     Frequency: Never     Comment: ocas                                Counseling given: Not Answered      Vital Signs (Current):   Vitals:    21 1633 21 1005   BP:  (!) 160/88   Pulse:  80   Resp:  18   Temp:  97.9 °F (36.6 °C)   TempSrc:  Oral   SpO2:  93%   Weight: (!) 376 lb (170.6 kg) (!) 370 lb (167.8 kg)   Height: 5' 8\" (1.727 m) 5' 8\" (1.727 m)                                              BP Readings from Last 3 Encounters:   21 (!) 160/88   21 126/76   21 108/62       NPO Status: Time of last liquid consumption: 2300                        Time of last solid consumption: 2300                                                      BMI:   Wt Readings from Last 3

## 2021-03-02 NOTE — FLOWSHEET NOTE
Pt with TOM and has home cpap. Virgilio Jaimes NP said to leave take off CO2 off pt and place CPAP. Unable to for PCA and safety purposes.

## 2021-03-03 VITALS
HEIGHT: 68 IN | HEART RATE: 79 BPM | SYSTOLIC BLOOD PRESSURE: 155 MMHG | DIASTOLIC BLOOD PRESSURE: 71 MMHG | BODY MASS INDEX: 47.74 KG/M2 | TEMPERATURE: 98.1 F | OXYGEN SATURATION: 94 % | WEIGHT: 315 LBS | RESPIRATION RATE: 18 BRPM

## 2021-03-03 LAB
ANION GAP SERPL CALCULATED.3IONS-SCNC: 10 MMOL/L (ref 3–16)
BUN BLDV-MCNC: 13 MG/DL (ref 7–20)
CALCIUM SERPL-MCNC: 8.9 MG/DL (ref 8.3–10.6)
CHLORIDE BLD-SCNC: 101 MMOL/L (ref 99–110)
CO2: 24 MMOL/L (ref 21–32)
CREAT SERPL-MCNC: 0.9 MG/DL (ref 0.9–1.3)
GFR AFRICAN AMERICAN: >60
GFR NON-AFRICAN AMERICAN: >60
GLUCOSE BLD-MCNC: 130 MG/DL (ref 70–99)
HCT VFR BLD CALC: 39.7 % (ref 40.5–52.5)
HEMOGLOBIN: 13.2 G/DL (ref 13.5–17.5)
MCH RBC QN AUTO: 33.2 PG (ref 26–34)
MCHC RBC AUTO-ENTMCNC: 33.3 G/DL (ref 31–36)
MCV RBC AUTO: 99.7 FL (ref 80–100)
PDW BLD-RTO: 13.3 % (ref 12.4–15.4)
PLATELET # BLD: 143 K/UL (ref 135–450)
PMV BLD AUTO: 8.1 FL (ref 5–10.5)
POTASSIUM SERPL-SCNC: 4.1 MMOL/L (ref 3.5–5.1)
RBC # BLD: 3.99 M/UL (ref 4.2–5.9)
SODIUM BLD-SCNC: 135 MMOL/L (ref 136–145)
WBC # BLD: 7.3 K/UL (ref 4–11)

## 2021-03-03 PROCEDURE — 85027 COMPLETE CBC AUTOMATED: CPT

## 2021-03-03 PROCEDURE — 6370000000 HC RX 637 (ALT 250 FOR IP): Performed by: SURGERY

## 2021-03-03 PROCEDURE — C9113 INJ PANTOPRAZOLE SODIUM, VIA: HCPCS | Performed by: SURGERY

## 2021-03-03 PROCEDURE — 6370000000 HC RX 637 (ALT 250 FOR IP): Performed by: NURSE PRACTITIONER

## 2021-03-03 PROCEDURE — 2580000003 HC RX 258: Performed by: NURSE PRACTITIONER

## 2021-03-03 PROCEDURE — 80048 BASIC METABOLIC PNL TOTAL CA: CPT

## 2021-03-03 PROCEDURE — 2580000003 HC RX 258: Performed by: SURGERY

## 2021-03-03 PROCEDURE — 2700000000 HC OXYGEN THERAPY PER DAY

## 2021-03-03 PROCEDURE — 6360000002 HC RX W HCPCS: Performed by: SURGERY

## 2021-03-03 PROCEDURE — 6360000002 HC RX W HCPCS: Performed by: NURSE PRACTITIONER

## 2021-03-03 PROCEDURE — 94761 N-INVAS EAR/PLS OXIMETRY MLT: CPT

## 2021-03-03 RX ORDER — ONDANSETRON 4 MG/1
4 TABLET, FILM COATED ORAL EVERY 6 HOURS PRN
Qty: 30 TABLET | Refills: 0 | Status: SHIPPED | OUTPATIENT
Start: 2021-03-03 | End: 2021-10-20

## 2021-03-03 RX ORDER — HYOSCYAMINE SULFATE 0.12 MG/1
1 TABLET SUBLINGUAL EVERY 6 HOURS PRN
Qty: 30 EACH | Refills: 0 | Status: SHIPPED | OUTPATIENT
Start: 2021-03-03 | End: 2021-06-09 | Stop reason: ALTCHOICE

## 2021-03-03 RX ORDER — METOLAZONE 2.5 MG/1
2.5 TABLET ORAL
Qty: 25 TABLET | Refills: 2 | Status: SHIPPED | OUTPATIENT
Start: 2021-03-03 | End: 2021-10-20

## 2021-03-03 RX ORDER — LISINOPRIL 10 MG/1
10 TABLET ORAL DAILY
Status: DISCONTINUED | OUTPATIENT
Start: 2021-03-03 | End: 2021-03-03 | Stop reason: HOSPADM

## 2021-03-03 RX ORDER — OXYCODONE HYDROCHLORIDE AND ACETAMINOPHEN 5; 325 MG/1; MG/1
1 TABLET ORAL EVERY 6 HOURS PRN
Qty: 28 TABLET | Refills: 0 | Status: SHIPPED | OUTPATIENT
Start: 2021-03-03 | End: 2021-03-10

## 2021-03-03 RX ORDER — HYDROCHLOROTHIAZIDE 25 MG/1
TABLET ORAL
Qty: 90 TABLET | Refills: 0
Start: 2021-03-03 | End: 2021-03-17 | Stop reason: SDUPTHER

## 2021-03-03 RX ORDER — KETOROLAC TROMETHAMINE 30 MG/ML
30 INJECTION, SOLUTION INTRAMUSCULAR; INTRAVENOUS ONCE
Status: COMPLETED | OUTPATIENT
Start: 2021-03-03 | End: 2021-03-03

## 2021-03-03 RX ORDER — ACETAMINOPHEN 160 MG/5ML
650 SOLUTION ORAL EVERY 4 HOURS PRN
Status: DISCONTINUED | OUTPATIENT
Start: 2021-03-03 | End: 2021-03-03 | Stop reason: HOSPADM

## 2021-03-03 RX ORDER — OXYCODONE HCL 5 MG/5 ML
10 SOLUTION, ORAL ORAL EVERY 4 HOURS PRN
Status: DISCONTINUED | OUTPATIENT
Start: 2021-03-03 | End: 2021-03-03 | Stop reason: HOSPADM

## 2021-03-03 RX ORDER — HYDROCHLOROTHIAZIDE 25 MG/1
TABLET ORAL
Qty: 90 TABLET | Refills: 0
Start: 2021-03-03 | End: 2021-03-03 | Stop reason: SDUPTHER

## 2021-03-03 RX ORDER — OXYCODONE HCL 5 MG/5 ML
5 SOLUTION, ORAL ORAL EVERY 4 HOURS PRN
Status: DISCONTINUED | OUTPATIENT
Start: 2021-03-03 | End: 2021-03-03 | Stop reason: HOSPADM

## 2021-03-03 RX ORDER — METHOCARBAMOL 750 MG/1
750 TABLET, FILM COATED ORAL 4 TIMES DAILY
Qty: 28 TABLET | Refills: 0 | Status: SHIPPED | OUTPATIENT
Start: 2021-03-03 | End: 2021-03-13

## 2021-03-03 RX ORDER — OMEPRAZOLE 20 MG/1
20 CAPSULE, DELAYED RELEASE ORAL DAILY
Qty: 30 CAPSULE | Refills: 3 | Status: SHIPPED | OUTPATIENT
Start: 2021-03-03 | End: 2021-06-14 | Stop reason: SDUPTHER

## 2021-03-03 RX ORDER — HYDRALAZINE HYDROCHLORIDE 20 MG/ML
10 INJECTION INTRAMUSCULAR; INTRAVENOUS ONCE
Status: COMPLETED | OUTPATIENT
Start: 2021-03-03 | End: 2021-03-03

## 2021-03-03 RX ADMIN — METHOCARBAMOL 1000 MG: 100 INJECTION, SOLUTION INTRAMUSCULAR; INTRAVENOUS at 09:14

## 2021-03-03 RX ADMIN — KETOROLAC TROMETHAMINE 30 MG: 30 INJECTION, SOLUTION INTRAMUSCULAR at 14:45

## 2021-03-03 RX ADMIN — PANTOPRAZOLE SODIUM 40 MG: 40 INJECTION, POWDER, FOR SOLUTION INTRAVENOUS at 09:14

## 2021-03-03 RX ADMIN — OXYCODONE HYDROCHLORIDE 5 MG: 5 SOLUTION ORAL at 10:34

## 2021-03-03 RX ADMIN — HYDRALAZINE HYDROCHLORIDE 10 MG: 20 INJECTION INTRAMUSCULAR; INTRAVENOUS at 12:41

## 2021-03-03 RX ADMIN — HYDROMORPHONE HYDROCHLORIDE 1 MG: 1 INJECTION, SOLUTION INTRAMUSCULAR; INTRAVENOUS; SUBCUTANEOUS at 07:22

## 2021-03-03 RX ADMIN — ACETAMINOPHEN ORAL SOLUTION 650 MG: 650 SOLUTION ORAL at 13:20

## 2021-03-03 RX ADMIN — Medication 10 ML: at 09:15

## 2021-03-03 RX ADMIN — ENOXAPARIN SODIUM 40 MG: 40 INJECTION SUBCUTANEOUS at 01:38

## 2021-03-03 RX ADMIN — HYOSCYAMINE SULFATE 125 MCG: 0.12 TABLET, ORALLY DISINTEGRATING ORAL at 01:01

## 2021-03-03 RX ADMIN — LISINOPRIL 10 MG: 10 TABLET ORAL at 09:15

## 2021-03-03 RX ADMIN — METHOCARBAMOL 1000 MG: 100 INJECTION, SOLUTION INTRAMUSCULAR; INTRAVENOUS at 01:04

## 2021-03-03 ASSESSMENT — PAIN DESCRIPTION - PAIN TYPE: TYPE: SURGICAL PAIN

## 2021-03-03 ASSESSMENT — PAIN SCALES - GENERAL
PAINLEVEL_OUTOF10: 2
PAINLEVEL_OUTOF10: 3
PAINLEVEL_OUTOF10: 3
PAINLEVEL_OUTOF10: 5

## 2021-03-03 ASSESSMENT — PAIN DESCRIPTION - DESCRIPTORS: DESCRIPTORS: ACHING;SORE

## 2021-03-03 ASSESSMENT — PAIN DESCRIPTION - FREQUENCY: FREQUENCY: INTERMITTENT

## 2021-03-03 NOTE — PLAN OF CARE
Problem: Falls - Risk of:  Goal: Will remain free from falls  Description: Will remain free from falls  3/3/2021 1523 by Deepti Baker RN  Outcome: Met This Shift  Note: Pt has remained free of falls, as Pt is a fall risk. Bed/chair alarm remains armed and bed is in lowest position. Pt's room door remains open, and call light is within reach. Pt has on non-skid socks and calls out appropriately       Problem: Pain:  Goal: Pain level will decrease  Description: Pain level will decrease  3/3/2021 1523 by Deepti Baker RN  Outcome: Ongoing  Note: Pt has received scheduled and PRN pain medication per orders for pain in abdomen.  Call light within reach

## 2021-03-03 NOTE — CARE COORDINATION
Case Management Assessment            Discharge Note                    Date / Time of Note: 3/3/2021 3:48 PM                  Discharge Note Completed by: nAy Arvizu    Patient Name: Juanpablo Morrison   YOB: 1960  Diagnosis: Morbid obesity ([facility] [E66.01]  Morbid obesity with BMI of 50.0-59.9, adult ([facility] [E66.01, Z68.43]   Date / Time: 3/2/2021  9:37 AM    Current PCP: Elian Draper, JACKLYN - CNP  Clinic patient: No    Hospitalization in the last 30 days: No    Advance Directives:  Code Status: Full Code  [place] DNR form completed and on chart: No    Financial:  Payor: [employer] / Plan: [address] [facility] EMP / Product Type: *No Product type* /      Pharmacy:    [address] [phone] - F [phone]  [address]  Phone: [phone] Fax: 806 [facility], [address] [facility] [phone] - F [phone]  [facility] [address]  Phone: [phone] Fax: [phone]      Assistance purchasing medications?: Potential Assistance Purchasing Medications: No  Assistance provided by Case Management: None at this time    Does patient want to participate in local refill/ meds to beds program?: No    Meds To Beds General Rules:  1. Can ONLY be done Monday- Friday between 8:30am-5pm  2. Prescription(s) must be in pharmacy by 3pm to be filled same day  3. Copy of patient's insurance/ prescription drug card and patient face sheet must be sent along with the prescription(s)  4. Cost of Rx cannot be added to hospital bill. If financial assistance is needed, please contact unit  or ;   or  CANNOT provide pharmacy voucher for patients co-pays 5. Patients can then  the prescription on their way out of the hospital at discharge, or pharmacy can deliver to the bedside if staff is available. (payment due at time of pick-up or delivery - cash, check, or card accepted)     Able to afford home medications/ co-pay costs: Yes    ADLS:  Current PT AM-PAC Score:   /24  Current OT AM-PAC Score:   /24      DISCHARGE Disposition: Home- No Services Needed    LOC at discharge: Not Applicable  TRACY Completed: Not Indicated    Notification completed in HENS/PAS?:  Not Applicable    IMM Completed:   Not Indicated    Transportation:  Transportation PLAN for discharge: family   Mode of Transport: [employer] ordered at discharge: Not [address]: Not Applicable  Orders faxed: No    Durable Medical Equipment:  DME Provider: none  Equipment obtained during hospitalization:     Home Oxygen and Respiratory Equipment:  Oxygen needed at discharge?: Not [address]: Not Applicable  Portable tank available for discharge?: Not Indicated    Dialysis:  Dialysis patient: No    Dialysis Center:  Not Applicable        Additional CM Notes: Pt from home will return home no needs at Dc    The Plan for Transition of Care is related to the following treatment goals of Morbid obesity ([facility] [E66.01]  Morbid obesity with BMI of 50.0-59.9, adult ([facility] [E66.01, Z68.43]    The Patient and/or patient representative Peter Garcia and his family were provided with a choice of provider and agrees with the discharge plan Yes    Freedom of choice list was provided with basic dialogue that supports the patient's individualized plan of care/goals and shares the quality data associated with the providers.  Not Indicated    Care Transitions patient: Yes    Governor Ernesto Ravi   Case Management Department  Ph: [phone]  Fax: [phone]

## 2021-03-03 NOTE — PROGRESS NOTES
Surgery Daily Progress Note  Alpesh White  CC: Morbid Obesity  Subjective :  No emesis/nausea overnight. Pain 10/10; describes it as intermittent. Feels like spasms. Has been up OOB and ambulating. Objective    Infusions:   sodium chloride 150 mL/hr at 03/02/21 2300        I/O:I/O last 3 completed shifts: In: 1000 [I.V.:1000]  Out: 50 [Urine:50]           Wt Readings from Last 1 Encounters:   03/02/21 (!) 370 lb (167.8 kg)                 LABS:    Recent Labs     03/02/21  1115 03/03/21  0550   WBC 5.5 7.3   HGB 14.6 13.2*   HCT 42.8 39.7*   MCV 98.0 99.7    143        Recent Labs     03/02/21  1115 03/03/21  0550    135*   K 4.5 4.1    101   CO2 26 24   BUN 16 13   CREATININE 0.9 0.9        Recent Labs     03/02/21  1115   AST 37   ALT 34   BILITOT 0.6   ALKPHOS 105      No results for input(s): LIPASE, AMYLASE in the last 72 hours. Recent Labs     03/02/21  1115   PROT 8.0      No results for input(s): CKTOTAL, CKMB, CKMBINDEX, TROPONINI in the last 72 hours. Exam:BP (!) 145/75   Pulse 91   Temp 98.9 °F (37.2 °C) (Oral)   Resp 18   Ht 5' 8\" (1.727 m)   Wt (!) 370 lb (167.8 kg)   SpO2 95%   BMI 56.26 kg/m²   General appearance: alert, appears stated age and cooperative  Lungs: clear to auscultation bilaterally  Heart: regular rate and rhythm, S1, S2 normal, no murmur, click, rub or gallop  Abdomen: soft, appropriately-tender; bowel sounds quiet  Trocar sites well approx      ASSESSMENT/PLAN: Pt. is a 64 y.o. male s/p Robotic Assisted Laparoscopic Sleeve Gastrectomy POD# 1  Overall, doing well  Start on Bariatric Clears  Pharmacy to review home meds -pills to be crushed for 2 weeks post op    Monitor progress throughout the day. D/C late afternoon as long as able to take adequate po to remain hydrated without IVF, voiding, pain and nausea tolerable with oral meds, using IS frequently and ambulating.     D/W Dr. Norm Mckeon 3/3/2021 6:29 AM  [phone]    I have personally examined this patient, reviewed all pertinent labs and radiologic imaging, and agree with the Advanced Practice Provider note.     Eliseo Wooten

## 2021-03-03 NOTE — PROGRESS NOTES
Patient alert and oriented. SBP elevated. PCA pump in use. CDI surgical sites,abdominal binder in place. Patient unable to void, bladder scan performed and showed over 500 cc of urine. Straight cath done and 950 ml urine output noted. Patient denies any pain or nausea. Patient ambulates in davison twice and tolerates fairly well. Patient in bed lowest position call light and bedside table within reach. All needs are met at this time. Patient aware to call if any help needed. Will continue to monitor  BP (!) 145/75   Pulse 91   Temp 98.9 °F (37.2 °C) (Oral)   Resp 18   Ht 5' 8\" (1.727 m)   Wt (!) 370 lb (167.8 kg)   SpO2 95%   BMI 56.26 kg/m²

## 2021-03-03 NOTE — PLAN OF CARE
Problem: Falls - Risk of:  Goal: Will remain free from falls  Description: Will remain free from falls  Outcome: Ongoing  Note: Patient remains free from physical injury. Patient ambulates in davison and tolerates fairly well. Fall precautions in place: Patient in bed lowest position,wheels locked. 2/4 side rails up Call light and beside table within reach. Will continue to monitor    Problem: Pain:  Goal: Pain level will decrease  Description: Pain level will decrease  Outcome: Ongoing  Note: Pain controlled with continuous IV Dilaudid. Will continue to monitor     Problem: Infection - Surgical Site:  Goal: Will show no infection signs and symptoms  Description: Will show no infection signs and symptoms  Outcome: Ongoing  Note: CDI surgical sites DEVANTE no signs of infection noted.  Will continue to monitor

## 2021-03-03 NOTE — ANESTHESIA POSTPROCEDURE EVALUATION
Department of Anesthesiology  Postprocedure Note    Patient: Juan Carlos Hu  MRN: [id number]  YOB: 1960  Date of evaluation: 3/2/2021  Time:  7:03 PM     Procedure Summary     Date: 03/02/21 Room / Location: [address] [place] [address]    Anesthesia Start: 9190 Anesthesia Stop: 1416    Procedure: ROBOTIC ASSISTED LAPAROSCOPIC SLEEVE GASTRECTOMY (N/A ) Diagnosis:       Morbid obesity ([address]      (Morbid obesity ([address] [E66.01])    Surgeons: Sera Abbott DO Responsible Provider: Jose David Sofia MD    Anesthesia Type: general ASA Status: 4          Anesthesia Type: general    Ramon Phase I: Ramon Score: 9    Ramon Phase II:      Last vitals: Reviewed and per EMR flowsheets.        Anesthesia Post Evaluation    Patient location during evaluation: PACU  Patient participation: complete - patient participated  Level of consciousness: awake and alert  Pain score: 0  Airway patency: patent  Nausea & Vomiting: no nausea and no vomiting  Complications: no  Cardiovascular status: hemodynamically stable  Respiratory status: acceptable  Hydration status: euvolemic

## 2021-03-03 NOTE — PROGRESS NOTES
New from pacu. Pt is alert and oriented. Bp elevated. C/o pain. On pca pump. Pt NPO. Pt has 6 lap sites clean, dry, and intact. abd binder in place. Pt urinal at bedside. See 4 eyes for skin assessment. Pt on at home cpap at 5 Lo2. Will continue to monitor.

## 2021-03-04 ENCOUNTER — TELEPHONE (OUTPATIENT)
Dept: BARIATRICS/WEIGHT MGMT | Age: 61
End: 2021-03-04

## 2021-03-04 ENCOUNTER — CARE COORDINATION (OUTPATIENT)
Dept: OTHER | Facility: CLINIC | Age: 61
End: 2021-03-04

## 2021-03-04 NOTE — TELEPHONE ENCOUNTER
RD called pt regarding post-op questions. Pt and wife on phone. Pt concerned about lots of gas and has not had a BM since sx. Encouraged pt that gas is instilled in abdomen during surgery and being up and moving every hour will help pass gas. Reviewed with pt and wife that it may take up to a week to pass stool - can use Colace up to twice a day and then Miralax if needed. Pt concerned about calf and thigh stiffness, denies redness and states they are not warm. Continued to encourage pt to walk every waking hour. Pt currently on track to meet 48oz fluid goal today including water, broth, SF popsicles. Encouraged pt to try some Powerade Zero or Gatorade Zero for additional electrolytes. Pt's wife with questions r/t crushing Lisinopril. Per Dm Johansen NP pt does not need to crush Lisinopril, relayed information to pt. Pt and wife voiced understanding to above. Notified pt that Dietitian will be calling to follow up on Monday.

## 2021-03-04 NOTE — CARE COORDINATION
of home medications. Advised obtaining a 90-day supply of all daily and as-needed medications. Covid Risk Education    Patient has following risk factors of: diabetes. Education provided regarding infection prevention, and signs and symptoms of COVID-19 and when to seek medical attention with patient who verbalized understanding. Discussed exposure protocols and quarantine From CDC: Are you at higher risk for severe illness?   and given an opportunity for questions and concerns. The patient agrees to contact the COVID-19 hotline [phone] or PCP office for questions related to COVID-19. For more information on steps you can take to protect yourself, see CDC's How to Protect Yourself     Was patient discharged with a pulse oximeter? No     Patient/family/caregiver given information for Get[employer] Loop and agrees to enroll no  Patient's preferred e-mail: declines  Patient's preferred phone number: declines    Discussed follow-up appointments. If no appointment was previously scheduled, appointment scheduling offered: No. Is follow up appointment scheduled within 7 days of discharge? No  Non-[facility] follow up appointment(s): none    Plan for follow-up call in 5-7 days based on severity of symptoms and risk factors. Plan for next call: follow up on hydration, diet (food diary?), and medications/vitamins. CTN provided contact information for future needs. Non-face-to-face services provided:  Obtained and reviewed discharge summary and/or continuity of care documents  Assessment and support for treatment adherence and medication management-med review completed. no questions per patient. Summary note: patient is s/p gastric sleeve. Patient states he is doing well. Patient c/o soreness when getting out of bed. Has medications at home. Pain is controlled with pain medications. Reviewed AVS/discharge instructions with patient. Patient did have questions regarding how long he has to crush his medications.  ACM advised patient of instructions per AVS: Unless otherwise noted you will crush all your medications for the first 2 weeks post op and mix them with  clears for the first 2 days and then with your pureed food for the remainder of the 2 weeks. Patient verbalized understanding. patient states he is staying hydrated. States he had difficulty urinating while in the hospital but has not had this issue when he has been home. Patient has f/u appt scheduled with Dr. Jh Howard on 3/17/2021.      Care Transitions 24 Hour Call    Schedule Follow Up Appointment with PCP: Completed  Do you have any ongoing symptoms?: No  Do you have a copy of your discharge instructions?: Yes  Do you have all of your prescriptions and are they filled?: Yes  Have you been contacted by a [employer] [address]?: No  Have you scheduled your follow up appointment?: Yes  How are you going to get to your appointment?: Car - family or friend to transport  Were you discharged with any Home Care or Post Acute Services: No  Do you have support at home?: Partner/Spouse/SO  Do you feel like you have everything you need to keep you well at home?: Yes  Are you an active caregiver in your home?: No  Care Transitions Interventions         Follow Up  Future Appointments   Date Time Provider Ernie Duron   3/17/2021 11:30 AM Aliza Garcia DO [address] [place]   5/5/2021  8:20 AM Hailee Schultz, APRN - [address] [facility]       Lion Horner RN

## 2021-03-04 NOTE — TELEPHONE ENCOUNTER
Dr. Isatu Martinez is requesting a dietician call this patient regarding questions. Pt had sleeve yesterday on 3/2/21.

## 2021-03-05 NOTE — DISCHARGE SUMMARY
Patient ID:  Tin Silvestre  [id number]  10 y.o.  1960    Admit date: 3/2/2021    Discharge date and time: 3/3/21    Admitting Physician: Morgan Mcnair DO     Discharge Physician: same    Admission Diagnoses: Morbid obesity ([facility] [E66.01]  Morbid obesity with BMI of 50.0-59.9, adult ([facility] [E66.01, Z68.43]  Sleep apnea  HTN  Discharge Diagnoses: same    Admission Condition: fair    Discharged Condition: stable    Indication for Admission: Surgery: Robotic assisted Laparoscopic Sleeve Gastrectomy, IV hydration, monitoring, pain and nausea management    Hospital Course: 64 y.o. male admitted with morbid obesity who underwent Robotic assisted laparoscopic sleeve gastrectomy. Surgery was uneventful and he was admitted to bariatric post-operative surgical floor in stable condition for IV hydration, monitoring and pain and nausea management. The following morning the pain was tolerable on po pain medication and was taking adequate po. He was discharged in stable condition. Treatments: IV hydration        Disposition: home    Patient Instructions: Activity: activity as tolerated and no driving while on analgesics  Diet: clear liquids  Wound Care: as directed    Follow-up with Dr. Alexander Suárez in two weeks.         Signed:  Dionicia Harada  3/5/2021  8:40 AM

## 2021-03-08 ENCOUNTER — TELEPHONE (OUTPATIENT)
Dept: BARIATRICS/WEIGHT MGMT | Age: 61
End: 2021-03-08

## 2021-03-08 RX ORDER — BLOOD SUGAR DIAGNOSTIC
STRIP MISCELLANEOUS
Qty: 100 EACH | Refills: 0 | Status: SHIPPED | OUTPATIENT
Start: 2021-03-08 | End: 2021-04-21 | Stop reason: SDUPTHER

## 2021-03-08 RX ORDER — LANCETS 28 GAUGE
EACH MISCELLANEOUS
Qty: 100 EACH | Refills: 0 | Status: SHIPPED | OUTPATIENT
Start: 2021-03-08 | End: 2021-04-21 | Stop reason: SDUPTHER

## 2021-03-08 NOTE — TELEPHONE ENCOUNTER
Pt is s/p sleeve 03/02/2021. Pt returning dietician call. Pt states him and dietician talked about certain medications and pt wasn't sure what those medications were. Pt just wanted to confirm with dietician. Call back number is [phone].

## 2021-03-08 NOTE — TELEPHONE ENCOUNTER
Surgery Type: Gastric sleeve    Surgery Date: 3/2/21    Surgeon: Dr. Baldomero Burgos    The patient was contacted to follow up on their recent bariatric surgery. The following topics were reviewed:    [x] Hydration is Adequate - 48-52oz - water, Gatorade Zero, Powerade Zero and SF jello na sometime SF popsicles           --Patient is getting at least 48-64 oz of fluids a day, not including protein shakes. [x]Consuming Adequate Protein - 2 Protein shakes with 8oz 1% milk         [x]Consuming 2 protein shakes a day                [x]Consuming 60-80 grams of protein a day    [x] Food intake is appropriate - Ate 2oz, 3x day yesterday - has tried pureed eggs, tuna, canned chix with LF gravy and mashed potato, pureed LF veggie beef soup. Pt did have 2oz of tuna and then 2oz of SF jello close together - reviewed to limit portion to 2oz and then wait before doing more purees. [x]Adequately pureeing foods, so that there are no chunks left. []Taking in 1-2 oz at a time  [x] Eating 4-6 times a day  [x] Following the 30-30-30 rule  [x] Reminded patient to keep food diary to bring to their 2 week follow up appointment.       [x] Pain relief techniques utilized   [x] Taking pain medication as prescribed - as needed  [] Utilizing Lidoderm patches (if prescribed)  [x]Taking Tylenol instead of prescription pain medication - reviewed as option  [x] Wearing abdominal binder  [x] Using ice for incisional pain - reviewed as option    [x] Activity is appropriate  [] Walking 10 minutes out of every hour - pt stated up every hour but probably not 10min d/t crutches  [x]Avoiding heavy lifting (>10lbs)  [x] Utilizing their incentive spirometer    [x] Issues with Nausea/Vomiting/Reflux - some issues with spasms, using Levsin  [] Using Zofran PRN for nausea/vomiting   []Taking Prilosec for reflux - pt feels like taking but not sure, encouraged to double check     [] Issues with Constipation - no issues currently, has had a couple BMs   []Tried Colace - reviewed as option  []Tried Miralax    Overall pt is doing well, meeting hydration and protein goals. Pt reports leg pain has improved with fluids. Pt stated some swelling of knees, not currently taking diuretic - encourage to walk 10min of every hour and to F/U with prescriber of diuretic as pt consuming less fluids than prior to sx. Pt reports some spasm, reviewed sipping guidelines, small bites, continued to encourage pt to take [facility] and [facility]. Advised pt to limit puree portion size to 2oz, and can consume purees up to 4x daily. Pt with questions r/t pureed prep, reviewed when to strain prior to blending for appropriate pureed texture. All questions and concerns addressed. Patient was asked to please fill out hospital survey if she receives one in the mail.

## 2021-03-11 ENCOUNTER — CARE COORDINATION (OUTPATIENT)
Dept: OTHER | Facility: CLINIC | Age: 61
End: 2021-03-11

## 2021-03-11 NOTE — CARE COORDINATION
Estephanie [facility] Transitions Follow Up Call    3/11/2021    Patient: Jesus Sosa  Patient : 1960   MRN: [id number]  Reason for Admission: gastric sleeve  Discharge Date: 3/3/21 RARS: Readmission Risk Score: 12    Associate Care Manager [facility]) sent CX message for Associate Care Management follow up related to Care Transitions. See patient message for details and response (as appropriate).         Aurora Gonsales RN

## 2021-03-14 PROBLEM — Z01.818 PREOP EXAMINATION: Status: RESOLVED | Noted: 2020-07-16 | Resolved: 2021-03-14

## 2021-03-17 ENCOUNTER — OFFICE VISIT (OUTPATIENT)
Dept: BARIATRICS/WEIGHT MGMT | Age: 61
End: 2021-03-17

## 2021-03-17 VITALS
HEIGHT: 68 IN | SYSTOLIC BLOOD PRESSURE: 108 MMHG | DIASTOLIC BLOOD PRESSURE: 60 MMHG | TEMPERATURE: 97.1 F | WEIGHT: 315 LBS | HEART RATE: 93 BPM | BODY MASS INDEX: 47.74 KG/M2

## 2021-03-17 DIAGNOSIS — I10 ESSENTIAL HYPERTENSION: ICD-10-CM

## 2021-03-17 DIAGNOSIS — J30.1 SEASONAL ALLERGIC RHINITIS DUE TO POLLEN: ICD-10-CM

## 2021-03-17 DIAGNOSIS — Z98.84 STATUS POST LAPAROSCOPIC SLEEVE GASTRECTOMY: Primary | ICD-10-CM

## 2021-03-17 PROCEDURE — 99024 POSTOP FOLLOW-UP VISIT: CPT | Performed by: SURGERY

## 2021-03-17 RX ORDER — LISINOPRIL 10 MG/1
TABLET ORAL
Qty: 90 TABLET | Refills: 0 | Status: SHIPPED | OUTPATIENT
Start: 2021-03-17 | End: 2021-06-13 | Stop reason: SDUPTHER

## 2021-03-17 RX ORDER — HYDROCHLOROTHIAZIDE 25 MG/1
TABLET ORAL
Qty: 90 TABLET | Refills: 0 | Status: SHIPPED | OUTPATIENT
Start: 2021-03-17 | End: 2021-06-13 | Stop reason: SDUPTHER

## 2021-03-17 RX ORDER — LORATADINE 10 MG/1
10 TABLET ORAL DAILY
Qty: 90 TABLET | Refills: 0 | Status: SHIPPED | OUTPATIENT
Start: 2021-03-17 | End: 2021-06-13 | Stop reason: SDUPTHER

## 2021-03-17 NOTE — PROGRESS NOTES
[facility]) Physicians   General & Laparoscopic Surgery  [employer]       HPI:     Alethea Roberson is a very pleasant 64 y.o. obese male , Body mass index is 54.59 kg/m². Wava Prim And multiple medical problems who is presenting for bariatric follow up care. Alethea Roberson is s/p laparoscopic sleeve gastrectomy by me   Comes today to the clinic without any complaints. Patient denies any nausea, vomiting, fevers, chills, shortness of breath, chest pain, constipation or urinary symptoms. Denies any heartburn nor dysphagia. Patient is feeling very well, and is very active. Patient is very pleased with the weight loss and resolution of co-morbid conditions. Past Medical History:   Diagnosis Date    Bilateral venous leg ulcers 09/2020    Cellulitis of lower extremity 9/25/2019    Diabetes mellitus ([address]     Hypertension     Impaired fasting glucose 5/2013    Obesity     TOM treated with BiPAP 11/30/2020    Preoperative clearance 10/26/2020    Screening PSA (prostate specific antigen) 12/30/2015;5/1/17    Nml:0.53(12/30/2015);5/1/17=nml.     Sleep apnea     uses CPAP    Stasis dermatitis of both legs 8/28/2020    Tobacco use     Tubular adenoma of colon 09/14/2017     Past Surgical History:   Procedure Laterality Date    CIRCUMCISION      COLONOSCOPY  09/14/2017    Colonoscopy with polypectomy (cold biopsy):Dr. Walker:next in 3yrs=9/14/2020    KNEE ARTHROSCOPY Left 8/3/2020    VIDEO ARTHROSCOPY LEFT KNEE, PARTIAL MEDIAL MENISCECTOMY, CHONDROPLASTY, SYNOVIAL BIOPSY -TOM=4- performed by Jonny Hill MD at [place] [address] PAIN MANAGEMENT PROCEDURE Left 12/9/2020    COOLIEF RADIOFREQUENCY ABLATION - LEFT performed by Lala Robles MD at [address] N/A 3/2/2021    ROBOTIC ASSISTED LAPAROSCOPIC SLEEVE GASTRECTOMY performed by Collin Alexandra DO at [address] [place] [address] Road  05/28/2011    UPPER GASTROINTESTINAL ENDOSCOPY N/A 2021    EGD BIOPSY performed by Polo Escobedo DO at [address] EXTRACTION       Family History   Problem Relation Age of Onset    High Blood Pressure Mother     Heart Disease Mother         MI    AT 66    Diabetes Brother      Social History     Tobacco Use    Smoking status: Former Smoker     Packs/day: 2.00     Years: 25.00     Pack years: 50.00     Types: Cigarettes     Quit date: 2006     Years since quittin.8    Smokeless tobacco: Never Used   Substance Use Topics    Alcohol use: Not Currently     Frequency: Never     Comment: ocas     I counseled the patient on the importance of not smoking and risks of ETOH. Allergies   Allergen Reactions    Bactrim [Sulfamethoxazole-Trimethoprim] Rash     POSSIBLE fixed drug eruption on his cheeks, but diagnosis is not certain (only happened once). Vitals:    21 1132   BP: 108/60   Pulse: 93   Temp: 97.1 °F (36.2 °C)   Weight: (!) 359 lb (162.8 kg)   Height: 5' 8\" (1.727 m)       Body mass index is 54.59 kg/m².       Current Outpatient Medications:     hydroCHLOROthiazide (HYDRODIURIL) 25 MG tablet, Hold until Monday, Disp: 90 tablet, Rfl: 0    Prodigy Twist Top Lancets 28G MISC, TEST TWO TIMES A DAY, Disp: 100 each, Rfl: 0    blood glucose test strips (PRODIGY NO CODING BLOOD GLUC) strip, TEST TWO TIMES A DAY, Disp: 100 each, Rfl: 0    Hyoscyamine Sulfate SL (LEVSIN/SL) 0.125 MG SUBL, Place 1 tablet under the tongue every 6 hours as needed (spasm), Disp: 30 each, Rfl: 0    omeprazole (PRILOSEC) 20 MG delayed release capsule, Take 1 capsule by mouth Daily, Disp: 30 capsule, Rfl: 3    ondansetron (ZOFRAN) 4 MG tablet, Take 1 tablet by mouth every 6 hours as needed for Nausea or Vomiting, Disp: 30 tablet, Rfl: 0    metOLazone (ZAROXOLYN) 2.5 MG tablet, Take 1 tablet by mouth three times a week Hold for 7 days, Disp: 25 tablet, Rfl: 2    rOPINIRole (REQUIP) 3 MG tablet, Take 1 tablet by mouth nightly, Disp: 30 tablet, Rfl: 3    atorvastatin (LIPITOR) 40 MG tablet, Take 1 tablet by mouth daily, Disp: 90 tablet, Rfl: 3    pregabalin (LYRICA) 75 MG capsule, Take 1 capsule by mouth 3 times daily for 90 days. , Disp: 270 capsule, Rfl: 0    glucose 5 g chewable tablet, Take 3 tablets by mouth as needed for Low blood sugar, Disp: 30 tablet, Rfl: 0    metFORMIN (GLUCOPHAGE) 500 MG tablet, Take 1 tablet by mouth 2 times daily (with meals), Disp: 180 tablet, Rfl: 1    potassium chloride (KLOR-CON M) 20 MEQ TBCR extended release tablet, Take 1 tablet by mouth 2 times daily, Disp: 120 tablet, Rfl: 2    acetaminophen (TYLENOL) 500 MG tablet, Take 1,000 mg by mouth every 8 hours as needed for Pain, Disp: , Rfl:     loratadine (CLARITIN) 10 MG tablet, Take 1 tablet by mouth daily, Disp: 90 tablet, Rfl: 0    lisinopril (PRINIVIL;ZESTRIL) 10 MG tablet, TAKE 1 TABLET BY MOUTH ONCE A DAY, Disp: 90 tablet, Rfl: 0    blood glucose monitor kit and supplies, Check BG twice daily, Disp: 1 kit, Rfl: 0    fluticasone (FLONASE) 50 MCG/ACT nasal spray, 1 spray by Nasal route daily, Disp: 3 Bottle, Rfl: 0      Review of Systems - History obtained from the patient  General ROS: negative  Psychological ROS: negative  Hematological and Lymphatic ROS: negative  Endocrine ROS: negative  Respiratory ROS: negative  Cardiovascular ROS: negative  Gastrointestinal ROS:negative  Genito-Urinary ROS: negative  Musculoskeletal ROS: negative   Skin ROS: negative    Physical Exam   Vitals Reviewed   Constitutional: Patient is oriented to person, place, and time. Patient appears well-developed and well-nourished. Patient is active and cooperative. Non-toxic appearance. No distress. Neck: Trachea normal and normal range of motion. No JVD present. Pulmonary/Chest: Effort normal. No accessory muscle usage or stridor. No apnea. No respiratory distress. Cardiovascular: Normal rate and no JVD. Abdominal: Normal appearance.  Patient exhibits no distension. Abdomen is soft, obese, non tender. Musculoskeletal: Normal range of motion. Patient exhibits no edema. Neurological: Patient is alert and oriented to person, place, and time. Patient has normal strength. GCS eye subscore is 4. GCS verbal subscore is 5. GCS motor subscore is 6. Skin: Skin is warm and dry. No abrasion and no rash noted. Patient is not diaphoretic. No cyanosis or erythema. Psychiatric: Patient has a normal mood and affect. Speech is normal and behavior is normal. Cognition and memory are normal.         A/P     Primitivo Cain is 64 y.o. male , now with Body mass index is 54.59 kg/m². s/p Sleeve gastrectomy, has lost 24 lbs since last visit, total of 46 lbs weight loss. The patient underwent dietary counseling with registered dietician. I have reviewed, discussed and agree with the dietary plan. Patient is trying hard to keep good dietary and behavior modifications. Patient is monitoring portion sizes, food choices and liquid calories. Patient is trying to exercise regularly. Patient pleased with the surgery outcomes. We discussed how his weight affects his overall health including: There are no diagnoses linked to this encounter. Morbid Obesity     and importance of weight loss to alleviate those co morbid conditions. I encouraged the patient to continue exercise and keeping healthy eating habits. Also counseled the patient extensively on post surgery care. RTC in 1 month  Diet and Exercise      Patient advised that its their responsibility to follow up for studies and/or labs ordered today.

## 2021-03-25 ENCOUNTER — OFFICE VISIT (OUTPATIENT)
Dept: INTERNAL MEDICINE CLINIC | Age: 61
End: 2021-03-25
Payer: COMMERCIAL

## 2021-03-25 VITALS
SYSTOLIC BLOOD PRESSURE: 124 MMHG | OXYGEN SATURATION: 98 % | HEART RATE: 80 BPM | HEIGHT: 68 IN | WEIGHT: 315 LBS | DIASTOLIC BLOOD PRESSURE: 68 MMHG | BODY MASS INDEX: 47.74 KG/M2

## 2021-03-25 DIAGNOSIS — E11.9 TYPE 2 DIABETES MELLITUS WITHOUT COMPLICATION, WITHOUT LONG-TERM CURRENT USE OF INSULIN (HCC): Chronic | ICD-10-CM

## 2021-03-25 DIAGNOSIS — I87.2 PERIPHERAL VENOUS INSUFFICIENCY: ICD-10-CM

## 2021-03-25 DIAGNOSIS — E66.01 CLASS 3 SEVERE OBESITY DUE TO EXCESS CALORIES WITH SERIOUS COMORBIDITY AND BODY MASS INDEX (BMI) OF 50.0 TO 59.9 IN ADULT (HCC): Chronic | ICD-10-CM

## 2021-03-25 DIAGNOSIS — G25.81 RLS (RESTLESS LEGS SYNDROME): ICD-10-CM

## 2021-03-25 DIAGNOSIS — I10 ESSENTIAL HYPERTENSION, BENIGN: Chronic | ICD-10-CM

## 2021-03-25 PROCEDURE — 99214 OFFICE O/P EST MOD 30 MIN: CPT | Performed by: NURSE PRACTITIONER

## 2021-03-25 ASSESSMENT — ENCOUNTER SYMPTOMS
BLOOD IN STOOL: 0
COUGH: 0
BACK PAIN: 0
EYE ITCHING: 0
RHINORRHEA: 0
CHEST TIGHTNESS: 0
EYE REDNESS: 0
DIARRHEA: 0
CONSTIPATION: 0
VOMITING: 0
SINUS PRESSURE: 0
WHEEZING: 0
NAUSEA: 0
ABDOMINAL PAIN: 0
SORE THROAT: 0
COLOR CHANGE: 0
SHORTNESS OF BREATH: 0

## 2021-03-25 ASSESSMENT — PATIENT HEALTH QUESTIONNAIRE - PHQ9
DEPRESSION UNABLE TO ASSESS: FUNCTIONAL CAPACITY MOTIVATION LIMITS ACCURACY
SUM OF ALL RESPONSES TO PHQ QUESTIONS 1-9: 0
SUM OF ALL RESPONSES TO PHQ9 QUESTIONS 1 & 2: 0

## 2021-03-25 NOTE — PROGRESS NOTES
Sathya Lacy (:  1960) is a 64 y.o. male,Established patient, here for evaluation of the following chief complaint(s):  Follow-up      ASSESSMENT/PLAN:  1. Peripheral venous insufficiency  Assessment & Plan:  His legs continue with swelling and pain. He is not on hctz due to dehydration risk--discuss with surgery when he may be able to start this again? States that his drinking 48 oz of water daily. 2. Class 3 severe obesity due to excess calories with serious comorbidity and body mass index (BMI) of 50.0 to 59.9 in adult [facility])  Assessment & Plan:  He is 4 weeks post op bariatric surgery  He states that he is at a plateau, but hopefull it will pick back up again soon. 3. Essential hypertension, benign  Assessment & Plan:  Stable, controlled  No changes  Continue current treatment plan     4. RLS (restless legs syndrome)  Assessment & Plan:  Stable, controlled  No changes  Continue current treatment plan     5. Type 2 diabetes mellitus without complication, without long-term current use of insulin (HCC)  Assessment & Plan:  Stable, controlled  No changes  Continue current treatment plan       No follow-ups on file. SUBJECTIVE/OBJECTIVE:  DIANE Osborn is in the office today for routine follow up of his chronic medical conditions. He is also 4 weeks post op from bariatric surgery. He states that he has been doing well with all of his medications. His BP has been stable. His labs while in the hospital was great. He brought his blood sugar log today and his sugars have been excellent. He states that he has been following with Dr. Sandee Martinez post op and he is doing well. He states that he has been following also with the dietician and watching is intake. He reports that he has noticed some mood changes as expected post op, but trying to watch his emotions. He is having leg swelling still post op, his hctz is still held at this time due to dehydration risk post op.    Current Outpatient Medications Medication Sig Dispense Refill    hydroCHLOROthiazide (HYDRODIURIL) 25 MG tablet Hold until Monday 90 tablet 0    loratadine (CLARITIN) 10 MG tablet Take 1 tablet by mouth daily 90 tablet 0    lisinopril (PRINIVIL;ZESTRIL) 10 MG tablet TAKE 1 TABLET BY MOUTH ONCE A DAY 90 tablet 0    Prodigy Twist Top Lancets 28G MISC TEST TWO TIMES A  each 0    blood glucose test strips (PRODIGY NO CODING BLOOD GLUC) strip TEST TWO TIMES A  each 0    Hyoscyamine Sulfate SL (LEVSIN/SL) 0.125 MG SUBL Place 1 tablet under the tongue every 6 hours as needed (spasm) 30 each 0    omeprazole (PRILOSEC) 20 MG delayed release capsule Take 1 capsule by mouth Daily 30 capsule 3    ondansetron (ZOFRAN) 4 MG tablet Take 1 tablet by mouth every 6 hours as needed for Nausea or Vomiting 30 tablet 0    metOLazone (ZAROXOLYN) 2.5 MG tablet Take 1 tablet by mouth three times a week Hold for 7 days 25 tablet 2    rOPINIRole (REQUIP) 3 MG tablet Take 1 tablet by mouth nightly 30 tablet 3    atorvastatin (LIPITOR) 40 MG tablet Take 1 tablet by mouth daily 90 tablet 3    pregabalin (LYRICA) 75 MG capsule Take 1 capsule by mouth 3 times daily for 90 days. 270 capsule 0    glucose 5 g chewable tablet Take 3 tablets by mouth as needed for Low blood sugar 30 tablet 0    metFORMIN (GLUCOPHAGE) 500 MG tablet Take 1 tablet by mouth 2 times daily (with meals) 180 tablet 1    potassium chloride (KLOR-CON M) 20 MEQ TBCR extended release tablet Take 1 tablet by mouth 2 times daily 120 tablet 2    acetaminophen (TYLENOL) 500 MG tablet Take 1,000 mg by mouth every 8 hours as needed for Pain      blood glucose monitor kit and supplies Check BG twice daily 1 kit 0    fluticasone (FLONASE) 50 MCG/ACT nasal spray 1 spray by Nasal route daily 3 Bottle 0     No current facility-administered medications for this visit. Review of Systems   Constitutional: Negative for chills, fatigue and fever.    HENT: Negative for congestion, ear pain, postnasal drip, rhinorrhea, sinus pressure, sneezing and sore throat. Eyes: Negative for redness and itching. Respiratory: Negative for cough, chest tightness, shortness of breath and wheezing. Cardiovascular: Negative for chest pain and palpitations. Gastrointestinal: Negative for abdominal pain, blood in stool, constipation, diarrhea, nausea and vomiting. Endocrine: Negative for cold intolerance and heat intolerance. Genitourinary: Negative for difficulty urinating, dysuria, flank pain, frequency, hematuria and urgency. Musculoskeletal: Negative for arthralgias, back pain, joint swelling and myalgias. Skin: Negative for color change, pallor, rash and wound. Allergic/Immunologic: Negative for environmental allergies and food allergies. Neurological: Negative for dizziness, seizures, syncope, weakness, light-headedness, numbness and headaches. Hematological: Negative for adenopathy. Does not bruise/bleed easily. Psychiatric/Behavioral: Negative for confusion, sleep disturbance and suicidal ideas. The patient is not nervous/anxious and is not hyperactive. Vitals:    03/25/21 0819   BP: 124/68   Site: Left Upper Arm   Position: Sitting   Cuff Size: Large Adult   Pulse: 80   SpO2: 98%   Weight: (!) 357 lb (161.9 kg)   Height: 5' 8\" (1.727 m)       Physical Exam  Constitutional:       Appearance: Normal appearance. He is well-developed. HENT:      Head: Normocephalic and atraumatic. Right Ear: Hearing normal.      Left Ear: Hearing normal.      Nose: No mucosal edema. Right Sinus: No maxillary sinus tenderness or frontal sinus tenderness. Left Sinus: No maxillary sinus tenderness or frontal sinus tenderness. Mouth/Throat: Tonsils: No tonsillar abscesses. Eyes:      Extraocular Movements: Extraocular movements intact. Pupils: Pupils are equal, round, and reactive to light. Cardiovascular:      Rate and Rhythm: Normal rate and regular rhythm. Pulses: Normal pulses. Heart sounds: Normal heart sounds. Pulmonary:      Effort: Pulmonary effort is normal.      Breath sounds: Normal breath sounds. Lymphadenopathy:      Head:      Right side of head: No submental, submandibular, tonsillar, preauricular, posterior auricular or occipital adenopathy. Left side of head: No submental, submandibular, tonsillar, preauricular, posterior auricular or occipital adenopathy. Skin:     General: Skin is warm and dry. Neurological:      Mental Status: He is alert. Psychiatric:         Mood and Affect: Mood normal.         Behavior: Behavior normal.                 An electronic signature was used to authenticate this note.     --JACKLYN Nelson - CNP

## 2021-03-26 ENCOUNTER — TELEPHONE (OUTPATIENT)
Dept: BARIATRICS/WEIGHT MGMT | Age: 61
End: 2021-03-26

## 2021-03-26 NOTE — TELEPHONE ENCOUNTER
Pt with question r/t portion size on post-op diet. Pt prepping lean ground turkey with soft mushy veggies and LF sour cream.  Reviewed from 3-6 weeks portions size may grow to 3-4oz, but should not go over 4oz as stomach is still healing. Continue to follow 30/30/30 as increasing portion size and trying more textured foods. Pt voiced understanding and to call with any additional questions.

## 2021-03-29 ENCOUNTER — CARE COORDINATION (OUTPATIENT)
Dept: OTHER | Facility: CLINIC | Age: 61
End: 2021-03-29

## 2021-03-29 NOTE — CARE COORDINATION
[facility]stephanie [facility] Transitions Follow Up Call    3/29/2021    Patient: Grace Trevino  Patient : 1960   MRN: [id number]  Reason for Admission: gastric sleeve  Discharge Date: 3/3/21 RARS: Readmission Risk Score: 12      Needs to be reviewed by the provider   Additional needs identified to be addressed with provider No  none           Method of communication with provider : none      Care Transition Nurse (CTN) contacted the patient by telephone to follow up after admission on 3/2/2021-3/3/2021. Verified name and  with patient as identifiers. Addressed changes since last contact: symptom management-patient is feeling great. able to eat and drinking fluids with no issues. and follow up appointment-saw Dr. Isatu Martinez on 3/17/2021 and everything went good per patient. Discharged needs reviewed: none  Follow up appointment completed? Yes    CTN reviewed discharge instructions, medical action plan and red flags with patient and discussed any barriers to care and/or understanding of plan of care after discharge. Discussed appropriate site of care based on symptoms and resources available to patient including: PCP and Specialist. The patient agrees to contact the PCP office for questions related to their healthcare. Patients top risk factors for readmission: medical condition  Interventions to address risk factors: Obtained and reviewed discharge summary and/or continuity of care documents    Discussed follow-up appointments. If no appointment was previously scheduled, appointment scheduling offered: patient has appointments scheduled. Is follow up appointment scheduled within 7 days of discharge? n/a  Non-[facility] follow up appointment(s): none    Plan for follow-up call in 7-10 days based on severity of symptoms and risk factors. Plan for next call: symptom management-any issues with hydration, diet, or pain. CTN provided contact information for future needs. Summary note: patient states he is doing great.  No issues with hydration or diet. Patient states he had a f/u appt with Dr. Jessi Whitfield on 3/17/2021 and everything went well. Patient states he does have some pain on right and left side right under his nipple line that is improving. States he forgot to ask Dr. Jessi Whitfield about this but will follow up with him about it. Next f/u appt with Dr. Jessi Whitfield is 4/14/2021. Care Transitions Subsequent and Final Call    Schedule Follow Up Appointment with PCP: Completed  Subsequent and Final Calls  Do you have any ongoing symptoms?: No  Have your medications changed?: No  Do you have any questions related to your medications?: No  Do you currently have any active services?: No  Do you have any needs or concerns that I can assist you with?: No  Identified Barriers: None  Care Transitions Interventions  Other Interventions:            Follow Up  Future Appointments   Date Time Provider Ernie Duron   4/14/2021  8:30 AM DO SOPHIE Upton [facility]   5/5/2021  8:20 AM Hailee Talavera APRN - CNP [facility]   6/25/2021  8:00 AM Karley Hernandes APRN - CNP [address] [facility]       Cassidy Michele RN

## 2021-04-07 ENCOUNTER — CARE COORDINATION (OUTPATIENT)
Dept: OTHER | Facility: CLINIC | Age: 61
End: 2021-04-07

## 2021-04-14 ENCOUNTER — OFFICE VISIT (OUTPATIENT)
Dept: BARIATRICS/WEIGHT MGMT | Age: 61
End: 2021-04-14

## 2021-04-14 VITALS
TEMPERATURE: 97.1 F | HEIGHT: 68 IN | WEIGHT: 315 LBS | HEART RATE: 62 BPM | BODY MASS INDEX: 47.74 KG/M2 | DIASTOLIC BLOOD PRESSURE: 78 MMHG | SYSTOLIC BLOOD PRESSURE: 132 MMHG

## 2021-04-14 DIAGNOSIS — Z98.84 STATUS POST LAPAROSCOPIC SLEEVE GASTRECTOMY: Primary | ICD-10-CM

## 2021-04-14 PROCEDURE — 99024 POSTOP FOLLOW-UP VISIT: CPT | Performed by: SURGERY

## 2021-04-14 NOTE — PATIENT INSTRUCTIONS
Patient received dietary handouts and education.   - Consistent with eating 5-6x day focusing on protein and produce to help with satiety  - Track protein to 60-80g daily  - May increase fluids to 60-64oz  - Continue to follow 30/30/30  - Phase 4 provided and reviewed

## 2021-04-14 NOTE — PROGRESS NOTES
Dietary Assessment Note      Vitals:   Vitals:    21 0825   Weight: (!) 348 lb 3.2 oz (157.9 kg)   Height: 5' 8\" (1.727 m)    Patient lost 10.8 lbs over 1 month. Total Weight Loss: 56.8 lbs    Labs reviewed: no lab studies available for review at time of visit    Protein intake: 60-80 grams/day     Fluid intake: 50-60oz - water + Gatorade Zero    Multivitamin/mineral intake: Yes - 4 Fusion    Calcium intake: no    Other: none    Exercise: walking    Nutrition Assessment: 6 weeks s/p gastric sleeve post-op visit.    Meals - Eating 4x day, 4oz per sitting of following options - tuna / chix / egg salad OR turkey buffalo balls OR zucchini boat with lean turkey and cheese OR custless pizza (turkey + veggies + marinara + cheese and eggs)  Snack - sometimes - Protein shake    Amount able to eat per sittinoz     Following  rule: Yes    Food Intolerances/issues: none    Client Concerns: none, hungry eating 4x day    Goals:  - Consistent with eating 5-6x day focusing on protein and produce to help with satiety  - Track protein to 60-80g daily  - May increase fluids to 60-64oz  - Continue to follow   - Phase 4 provided and reviewed    Plan: F/U per Provider    Nathaniel Mark

## 2021-04-14 NOTE — PROGRESS NOTES
N/A 2021    EGD BIOPSY performed by Diamante Smith DO at [address] EXTRACTION       Family History   Problem Relation Age of Onset    High Blood Pressure Mother     Heart Disease Mother         MI    AT 66    Diabetes Brother      Social History     Tobacco Use    Smoking status: Former Smoker     Packs/day: 2.00     Years: 25.00     Pack years: 50.00     Types: Cigarettes     Quit date: 2006     Years since quittin.9    Smokeless tobacco: Never Used   Substance Use Topics    Alcohol use: Not Currently     Frequency: Never     Comment: ocas     I counseled the patient on the importance of not smoking and risks of ETOH. Allergies   Allergen Reactions    Bactrim [Sulfamethoxazole-Trimethoprim] Rash     POSSIBLE fixed drug eruption on his cheeks, but diagnosis is not certain (only happened once). Vitals:    21 0825   BP: 132/78   Pulse: 62   Temp: 97.1 °F (36.2 °C)   Weight: (!) 348 lb 3.2 oz (157.9 kg)   Height: 5' 8\" (1.727 m)       Body mass index is 52.94 kg/m².       Current Outpatient Medications:     hydroCHLOROthiazide (HYDRODIURIL) 25 MG tablet, Hold until Monday, Disp: 90 tablet, Rfl: 0    loratadine (CLARITIN) 10 MG tablet, Take 1 tablet by mouth daily, Disp: 90 tablet, Rfl: 0    lisinopril (PRINIVIL;ZESTRIL) 10 MG tablet, TAKE 1 TABLET BY MOUTH ONCE A DAY, Disp: 90 tablet, Rfl: 0    Prodigy Twist Top Lancets 28G MISC, TEST TWO TIMES A DAY, Disp: 100 each, Rfl: 0    blood glucose test strips (PRODIGY NO CODING BLOOD GLUC) strip, TEST TWO TIMES A DAY, Disp: 100 each, Rfl: 0    Hyoscyamine Sulfate SL (LEVSIN/SL) 0.125 MG SUBL, Place 1 tablet under the tongue every 6 hours as needed (spasm), Disp: 30 each, Rfl: 0    omeprazole (PRILOSEC) 20 MG delayed release capsule, Take 1 capsule by mouth Daily, Disp: 30 capsule, Rfl: 3    ondansetron (ZOFRAN) 4 MG tablet, Take 1 tablet by mouth every 6 hours as needed for Nausea or Vomiting, Disp: 30 tablet, Rfl: 0    metOLazone (ZAROXOLYN) 2.5 MG tablet, Take 1 tablet by mouth three times a week Hold for 7 days, Disp: 25 tablet, Rfl: 2    rOPINIRole (REQUIP) 3 MG tablet, Take 1 tablet by mouth nightly, Disp: 30 tablet, Rfl: 3    atorvastatin (LIPITOR) 40 MG tablet, Take 1 tablet by mouth daily, Disp: 90 tablet, Rfl: 3    pregabalin (LYRICA) 75 MG capsule, Take 1 capsule by mouth 3 times daily for 90 days. , Disp: 270 capsule, Rfl: 0    glucose 5 g chewable tablet, Take 3 tablets by mouth as needed for Low blood sugar, Disp: 30 tablet, Rfl: 0    metFORMIN (GLUCOPHAGE) 500 MG tablet, Take 1 tablet by mouth 2 times daily (with meals), Disp: 180 tablet, Rfl: 1    potassium chloride (KLOR-CON M) 20 MEQ TBCR extended release tablet, Take 1 tablet by mouth 2 times daily, Disp: 120 tablet, Rfl: 2    acetaminophen (TYLENOL) 500 MG tablet, Take 1,000 mg by mouth every 8 hours as needed for Pain, Disp: , Rfl:     blood glucose monitor kit and supplies, Check BG twice daily, Disp: 1 kit, Rfl: 0    fluticasone (FLONASE) 50 MCG/ACT nasal spray, 1 spray by Nasal route daily, Disp: 3 Bottle, Rfl: 0      Review of Systems - History obtained from the patient  General ROS: negative  Psychological ROS: negative  Hematological and Lymphatic ROS: negative  Endocrine ROS: negative  Respiratory ROS: negative  Cardiovascular ROS: negative  Gastrointestinal ROS:negative  Genito-Urinary ROS: negative  Musculoskeletal ROS: negative   Skin ROS: negative    Physical Exam   Vitals Reviewed   Constitutional: Patient is oriented to person, place, and time. Patient appears well-developed and well-nourished. Patient is active and cooperative. Non-toxic appearance. No distress. Neck: Trachea normal and normal range of motion. No JVD present. Pulmonary/Chest: Effort normal. No accessory muscle usage or stridor. No apnea. No respiratory distress. Cardiovascular: Normal rate and no JVD. Abdominal: Normal appearance. Patient exhibits no distension. Abdomen is soft, obese, non tender. Musculoskeletal: Normal range of motion. Patient exhibits no edema. Neurological: Patient is alert and oriented to person, place, and time. Patient has normal strength. GCS eye subscore is 4. GCS verbal subscore is 5. GCS motor subscore is 6. Skin: Skin is warm and dry. No abrasion and no rash noted. Patient is not diaphoretic. No cyanosis or erythema. Psychiatric: Patient has a normal mood and affect. Speech is normal and behavior is normal. Cognition and memory are normal.         A/P     Brian Newer is 64 y.o. male , now with Body mass index is 52.94 kg/m². s/p Sleeve gastrectomy, has lost 11 lbs since last visit, total of 56 lbs weight loss. The patient underwent dietary counseling with registered dietician. I have reviewed, discussed and agree with the dietary plan. Patient is trying hard to keep good dietary and behavior modifications. Patient is monitoring portion sizes, food choices and liquid calories. Patient is trying to exercise regularly. Patient pleased with the surgery outcomes. We discussed how his weight affects his overall health including: There are no diagnoses linked to this encounter. and importance of weight loss to alleviate those co morbid conditions. I encouraged the patient to continue exercise and keeping healthy eating habits. Also counseled the patient extensively on post surgery care. RTC in 2 months  Diet and Exercise      Patient advised that its their responsibility to follow up for studies and/or labs ordered today.

## 2021-04-21 RX ORDER — LANCETS 28 GAUGE
EACH MISCELLANEOUS
Qty: 100 EACH | Refills: 0 | Status: SHIPPED | OUTPATIENT
Start: 2021-04-21 | End: 2021-06-13 | Stop reason: SDUPTHER

## 2021-04-21 RX ORDER — POTASSIUM CHLORIDE 1500 MG/1
20 TABLET, FILM COATED, EXTENDED RELEASE ORAL 2 TIMES DAILY
Qty: 120 TABLET | Refills: 2 | Status: SHIPPED | OUTPATIENT
Start: 2021-04-21 | End: 2021-07-08 | Stop reason: SDUPTHER

## 2021-04-21 RX ORDER — BLOOD SUGAR DIAGNOSTIC
STRIP MISCELLANEOUS
Qty: 100 EACH | Refills: 0 | Status: SHIPPED | OUTPATIENT
Start: 2021-04-21 | End: 2021-06-13 | Stop reason: SDUPTHER

## 2021-04-23 ENCOUNTER — CARE COORDINATION (OUTPATIENT)
Dept: OTHER | Facility: CLINIC | Age: 61
End: 2021-04-23

## 2021-04-23 NOTE — CARE COORDINATION
[facility] Transitions Follow Up Call    2021    Patient: Nura Rivera  Patient : 1960   MRN: [id number]  Reason for Admission: gastric sleeve  Discharge Date: 3/3/21 RARS: Readmission Risk Score: 12         Needs to be reviewed by the provider   Additional needs identified to be addressed with provider No  none           Method of communication with provider : none      Care Transition Nurse (CTN) contacted the patient by telephone to follow up after admission on 3/2-3/3/2021. Verified name and  with patient as identifiers. Addressed changes since last contact: symptom management-patient states he is doing very well. no issues. able to keep fluids down, eating well. blood pressure is stable. diabetes is stable. and follow up appointment-f/u with Dr. Angelika Meade went well. patient will follow up with Dr. Angelika Meade in . Discharged needs reviewed: none  Follow up appointment completed? Yes    Advance Care Planning:   Does patient have an Advance Directive:  reviewed and current. CTN reviewed discharge instructions, medical action plan and red flags with patient and discussed any barriers to care and/or understanding of plan of care after discharge. Discussed appropriate site of care based on symptoms and resources available to patient including: PCP and Specialist. The patient agrees to contact the PCP office for questions related to their healthcare. Patients top risk factors for readmission: medical condition-s/p gastric sleeve  Interventions to address risk factors: Obtained and reviewed discharge summary and/or continuity of care documents    Discussed follow-up appointments. If no appointment was previously scheduled, appointment scheduling offered: patient has appointment scheduled. Is follow up appointment scheduled within 7 days of discharge? n/a  Non-[facility] follow up appointment(s):     No further follow up needed per patient since he is doing very well.  based on severity of symptoms and risk factors. No further outreach scheduled with this ACM, ACM will sign off care team at this time. Episode of Care resolved. Patient has this ACM's contact information if future needs arise. Care Transitions Subsequent and Final Call    Schedule Follow Up Appointment with PCP: Completed  Subsequent and Final Calls  Do you have any ongoing symptoms?: No  Have your medications changed?: No  Do you have any questions related to your medications?: No  Do you have any needs or concerns that I can assist you with?: No  Identified Barriers: None  Care Transitions Interventions  Other Interventions:            Follow Up  Future Appointments   Date Time Provider Ernie Duron   5/5/2021  8:20 AM Hailee Rodgers APRN - CNP [facility] SLEEP [facility]   5/14/2021  9:00 AM Johanna Bertrand MD W ORTHO [facility]   6/9/2021  8:15 AM DO SOPHIE Salazar WT [facility]   6/25/2021  8:00 AM Jesus Alberto Davis APRN - CNP [facility] IM [facility]       Popeye Mccoy RN

## 2021-05-05 ENCOUNTER — VIRTUAL VISIT (OUTPATIENT)
Dept: PULMONOLOGY | Age: 61
End: 2021-05-05
Payer: COMMERCIAL

## 2021-05-05 ENCOUNTER — PATIENT MESSAGE (OUTPATIENT)
Dept: PULMONOLOGY | Age: 61
End: 2021-05-05

## 2021-05-05 DIAGNOSIS — G25.81 RLS (RESTLESS LEGS SYNDROME): ICD-10-CM

## 2021-05-05 DIAGNOSIS — I10 ESSENTIAL HYPERTENSION, BENIGN: Chronic | ICD-10-CM

## 2021-05-05 DIAGNOSIS — E11.9 TYPE 2 DIABETES MELLITUS WITHOUT COMPLICATION, WITHOUT LONG-TERM CURRENT USE OF INSULIN (HCC): Chronic | ICD-10-CM

## 2021-05-05 DIAGNOSIS — J30.1 ALLERGIC RHINITIS DUE TO POLLEN, UNSPECIFIED SEASONALITY: Chronic | ICD-10-CM

## 2021-05-05 DIAGNOSIS — G47.33 OSA TREATED WITH BIPAP: Primary | ICD-10-CM

## 2021-05-05 DIAGNOSIS — E66.01 MORBID OBESITY WITH BMI OF 50.0-59.9, ADULT (HCC): ICD-10-CM

## 2021-05-05 PROCEDURE — 99214 OFFICE O/P EST MOD 30 MIN: CPT | Performed by: NURSE PRACTITIONER

## 2021-05-05 ASSESSMENT — SLEEP AND FATIGUE QUESTIONNAIRES
HOW LIKELY ARE YOU TO NOD OFF OR FALL ASLEEP WHILE SITTING INACTIVE IN A PUBLIC PLACE: 0
ESS TOTAL SCORE: 7
HOW LIKELY ARE YOU TO NOD OFF OR FALL ASLEEP WHEN YOU ARE A PASSENGER IN A CAR FOR AN HOUR WITHOUT A BREAK: 1
HOW LIKELY ARE YOU TO NOD OFF OR FALL ASLEEP IN A CAR, WHILE STOPPED FOR A FEW MINUTES IN TRAFFIC: 0
HOW LIKELY ARE YOU TO NOD OFF OR FALL ASLEEP WHILE WATCHING TV: 1

## 2021-05-05 NOTE — TELEPHONE ENCOUNTER
From: Radha Drop  To: Lurdes Wing, APRN - CNP  Sent: 5/5/2021 9:13 AM EDT  Subject: Visit Follow-Up Question    Peyman Stephen, I know it's irrelevant now but I did some checking and found out that my weight on December 9th was 392lbs so it was a 56lbs loss. So my guess of 50ish was pretty spot on. It was nice meeting you for the first time today and will see you in November!     Hola Puentes

## 2021-05-05 NOTE — PROGRESS NOTES
Je Arambula MD, FAASM, [facility]  Alexandra Peters, MSN, RN, CNP     [address] [facility] SLEEP MEDICINE  [place] [address] [address] [facility] [address]  Dept: [phone]  Dept Fax: [phone]  Loc: [phone]    Subjective:     Patient ID: Domonique Maynard is a 64 y.o. male. Chief Complaint   Patient presents with    Sleep Apnea       HPI:      Sleep Medicine Video Visit    Pursuant to the emergency declaration under the [address] [place] [address] [facility] authority and the [employer] Act this Telephone Visit was insisted, with patient's consent, to reduce the patient's risk of exposure to COVID-19 and provide continuity of care for an established patient. Services were provided through a synchronous discussion over a telephone and/or Video chat to substitute for in-person clinic visit, and coded as such. While patient is at home.       Machine Modem/Download Info:  Compliance (hours/night): 5.2 hrs/night  Download AHI (/hour): 0.4 /HR     Average IPAP Pressure: 17 cmH2O  Average EPAP Pressure: 12 cmH2O         AUTO BIPAP - Settings (Ginny)  IPAP Max: 25 cmH2O  EPAP Min: 12 cmH2O  Pressure Support Min: 3  Pressure Support Max: 8             Comfort Settings  Humidity Level (0-8): 1  Flex/EPR (0-3): 3 PAP Mask  Mask Type: Full Face mask  Clinically Relevant Leak: No     [place] - Total score: 7    Follow-up :     Last Visit : December 2020      Subjective Health Changes: Weigh loss 50+#'s     Over Night Oximetry: [] Yes  [] No  [x] NA [] WNL   Using O2: [] Yes  [] No  [x] NA   Patient is compliant with the machine  [x] Yes  [] No [] Per patient   Feeling rested when using the machine   [x] Yes  [] No     Pressure is comfortable with inspiration and expiration  [x] Yes  [] No   ([x] NA   [] Feeling of suffocation  [] Feeling like not enough air    [] To much pressure)     Noticed changes in pressure  [] stable. After speaking with patient:    Agree with current plan, and would agree to continue this plan per prescribing and managing physician. Allergic rhinitis  Chronic- stable. After speaking with patient:    Agree with current plan, and would agree to continue this plan per prescribing and managing physician. TOM treated with BiPAP  After downloading data and reviewing  Reviewed compliance download with pt. Supplies and parts as needed for his machine. These are medically necessary. Continue medications per his PCP and other physicians. Limit caffeine use after 3pm.    The chronic medical conditions listed are directly related to the primary diagnosis listed above. The management of the primary diagnosis affects the secondary diagnosis and vice versa    Patient is complaint encouraged to maintain compliance to aide on controlling other stated healthcare concerns. 1. TOM treated with BiPAP  Assessment & Plan:  After downloading data and reviewing  Reviewed compliance download with pt. Supplies and parts as needed for his machine. These are medically necessary. Continue medications per his PCP and other physicians. Limit caffeine use after 3pm.    The chronic medical conditions listed are directly related to the primary diagnosis listed above. The management of the primary diagnosis affects the secondary diagnosis and vice versa    Patient is complaint encouraged to maintain compliance to aide on controlling other stated healthcare concerns. 2. Type 2 diabetes mellitus without complication, without long-term current use of insulin (HCC)  Assessment & Plan:  Chronic- stable. After speaking with patient:    Agree with current plan, and would agree to continue this plan per prescribing and managing physician. 3. RLS (restless legs syndrome)  Assessment & Plan:  Chronic- stable.       After speaking with patient:    Agree with current plan, and would agree to continue this plan per prescribing and managing physician. 4. Morbid obesity with BMI of 50.0-59.9, adult [facility])  Assessment & Plan:  Patient encouraged to work on maintaining a healthy weight per height. Achievable with diet restriction/modifications and exercise (may consult primary care if unsure of any restrictions or concerns). Weight management directly correlates to risk of control and maintenance of Obstructive Sleep Apnea. 5. Essential hypertension, benign  Assessment & Plan:  Chronic- stable. After speaking with patient:    Agree with current plan, and would agree to continue this plan per prescribing and managing physician. 6. Allergic rhinitis due to pollen, unspecified seasonality  Assessment & Plan:  Chronic- stable. After speaking with patient:    Agree with current plan, and would agree to continue this plan per prescribing and managing physician. The chronic medical conditions listed are directly related to the primary diagnosis listed above. The management of the primary diagnosis affects the secondary diagnosis and vice versa. - Educated patient and reviewed compliance download with pt. -Medically necessary supplies and parts as needed for his machine.    - Patient using [employer] for supplies  -Continue medications per his Primary care and other physicians.   -Limit caffeine use after 3pm.    - He continues to do well with his machine.    - He is complaint and getting good symptom control.    - He is encouraged to keep up with his machine.    - Will see him back in a 6 month f/u for DOT/FAA compliance check (he understands that he he needs to be seen every 6 months) unless there are issues, then he is to call for an earlier appointment.   - Tube Temperature  Humidifier  (if you are dry turn it high)  Pressure changes per prescription: Decrease EPAP 8  Psmin Decrease  2  Ramp Decrease 6  Unplugging the machine to force over pressure changes  Removing the mask if not enough pressure or humidity   When to trial off the machine with weight loss (Average pressure 5-6cmH20)  Office locations  Weight loss and AHI  Compliance  Follow up  -Will see him in follow up in 6 month.     Electronically signed by JACKLYN Riley CNP on 5/5/2021 at 8:35 AM

## 2021-05-13 DIAGNOSIS — M94.262 CHONDROMALACIA OF LEFT KNEE: ICD-10-CM

## 2021-05-13 DIAGNOSIS — G89.29 CHRONIC PAIN OF LEFT KNEE: ICD-10-CM

## 2021-05-13 DIAGNOSIS — M25.561 ACUTE PAIN OF RIGHT KNEE: ICD-10-CM

## 2021-05-13 DIAGNOSIS — M25.562 CHRONIC PAIN OF LEFT KNEE: ICD-10-CM

## 2021-05-13 DIAGNOSIS — M17.11 PRIMARY OSTEOARTHRITIS OF RIGHT KNEE: ICD-10-CM

## 2021-05-13 DIAGNOSIS — M22.41 CHONDROMALACIA PATELLAE OF RIGHT KNEE: ICD-10-CM

## 2021-05-13 DIAGNOSIS — M17.12 PRIMARY OSTEOARTHRITIS OF LEFT KNEE: ICD-10-CM

## 2021-05-13 RX ORDER — PREGABALIN 75 MG/1
75 CAPSULE ORAL 3 TIMES DAILY
Qty: 270 CAPSULE | Refills: 0 | Status: SHIPPED | OUTPATIENT
Start: 2021-05-13 | End: 2021-07-29 | Stop reason: SDUPTHER

## 2021-05-14 ENCOUNTER — PREP FOR PROCEDURE (OUTPATIENT)
Dept: ORTHOPEDIC SURGERY | Age: 61
End: 2021-05-14

## 2021-05-14 ENCOUNTER — OFFICE VISIT (OUTPATIENT)
Dept: ORTHOPEDIC SURGERY | Age: 61
End: 2021-05-14
Payer: COMMERCIAL

## 2021-05-14 VITALS — WEIGHT: 315 LBS | BODY MASS INDEX: 47.74 KG/M2 | HEIGHT: 68 IN

## 2021-05-14 DIAGNOSIS — M17.12 PRIMARY OSTEOARTHRITIS OF LEFT KNEE: Primary | ICD-10-CM

## 2021-05-14 DIAGNOSIS — M17.11 PRIMARY OSTEOARTHRITIS OF RIGHT KNEE: ICD-10-CM

## 2021-05-14 DIAGNOSIS — M25.552 HIP PAIN, LEFT: ICD-10-CM

## 2021-05-14 DIAGNOSIS — I73.9 PERIPHERAL VASCULAR DISEASE, UNSPECIFIED (HCC): Primary | ICD-10-CM

## 2021-05-14 PROCEDURE — 99213 OFFICE O/P EST LOW 20 MIN: CPT | Performed by: ORTHOPAEDIC SURGERY

## 2021-05-14 NOTE — PROGRESS NOTES
[facility]  [facility]    Chief Complaint: Bilateral knee pain    HPI:  See Peters is a 64 y.o. who is here for evaluation of bilateral knee pain. He has had bilateral knee pain for a number of years, currently worse in the left knee. He has a history of left knee scope in August 2020. He has also had steroid injections and viscosupplementation and radiofrequency ablation of the left knee. Despite all these measures, he continues to have pain on a daily basis. He walks with crutches. The patient has difficulty climbing stairs, difficulty getting out of a chair, difficulty with activities of daily living including hygiene and pain at night. Pain level at rest is 7 and with activities 9-10/10. He denies any hip pain. He had a gastric bypass done 2 months ago and is now unable to take NSAIDs. He had previously been taking these. Medical history significant for diabetes, sleep apnea, venous stasis, if needed. He denies any heart or lung issues, blood clots, anticoagulants, tobacco use, hormone placement therapy, metal allergies, low back pain. He does note a family history of osteoarthritis.       Patient Active Problem List   Diagnosis    Class 3 severe obesity due to excess calories with serious comorbidity and body mass index (BMI) of 50.0 to 59.9 in adult [facility])    Mixed hypertriglyceridemia    Nonalcoholic fatty liver disease    RLS (restless legs syndrome)    Essential hypertension, benign    Morbid obesity with BMI of 50.0-59.9, adult ([facility] [address]    Chronic pain of left knee    Osteoarthritis of left knee    Chondromalacia of left knee    Pes planus    Lymphedema    Peripheral venous insufficiency    Allergic rhinitis    Type 2 diabetes mellitus without complication, without long-term current use of insulin (HCC)    Ingrown right big toenail    TOM treated with BiPAP    History of tobacco use disorder    Arthritis of left knee ROS:  Constitutional: denies fever, chills, weight loss  MSK: denies pain in other joints, muscle aches  Neurological: denies numbness, tingling, weakness    Exam:  Height 5' 8\" (1.727 m), weight (!) 330 lb (149.7 kg). Appearance: sitting in exam room chair, appears to be in no acute distress, awake and alert  Resp: unlabored breathing on room air  Skin: warm, dry and intact with out erythema or significant increased temperature  Neuro: grossly intact both lower extremities. Intact sensation to light touch. Motor exam 4+ to 5/5 in all major motor groups. Bilateral knees: Examination reveals that knee range of motion is 0 to 125 degrees. There is varus deformity, positive crepitus, positive joint line tenderness, positive antalgic gait. Neurologically, plantar flexion and dorsiflexion is intact. Pulses palpable distally. 5/5 strength. Changes due to venous stasis both lower extremities    Imaging:  3 views of both knees were performed and interpreted today. He has severe tricompartmental degenerative changes in both knees. He has bone-on-bone osteoarthritis of the medial compartment and joint space narrowing of the patellofemoral compartment of the left knee. He has near bone-on-bone osteoarthritis of the medial compartment of the right knee. AP pelvis was performed and interpreted today. Significant for mild osteoarthritis of the left hip no fractures or dislocations. Assessment:  Bilateral knee osteoarthritis, more symptomatic on the left    Plan:  We discussed the diagnosis and treatment options. He has essentially undergone every nonoperative treatment measures and continues to have left knee pain on a daily basis. We discussed total knee arthroplasty. The operative procedure, alternatives, and risks were discussed in detail with the patient. The risks include but are not limited to:  Infection, vessel injury, nerve injury, DVT, pulmonary embolism, implant loosening, need for revision surgery, loss of motion, continued pain. Despite these risks the patient would like to proceed. All questions have been answered and no guarantees have been made. The patient is unable to do further physical therapy due to disabling pain. I discussed with the patient the diagnosis in detail and answered all the questions. The patient verbalized understanding of the plan as it has been described above and is in agreement. We specifically discussed his BMI of 50 and his venous stasis. He has an increased risk of infection given his diabetes and morbid obesity. He does have a recent gastric bypass and his BMI has gone from 60 down to 50. He is at increased risk of swelling and skin breakdown due to his venous stasis. I recommended seeing a vascular surgeon for evaluation prior to any knee replacement. I also recommended lowering the BMI to at least below 50 and ideally around 40. We will tentatively plan for left total knee replacement later this fall. Total time spent on today's encounter was at least 26 minutes. This time included reviewing prior notes, radiographs, and lab results when available, reviewing history obtained by medical assistant, performing history and physical exam, reviewing tests/radiographs with the patient, counseling the patient, ordering medications or tests, documentation in the electronic health record, and coordination of care. This dictation was done with [employer] dictation and may contain mechanical errors related to translation.

## 2021-05-14 NOTE — LETTER
[facility]  Surgery Scheduling Form:  [facility]    DEMOGRAPHICS:                                                                                                                Patient Name:  Ruslan Guajardo  Patient :  1960   Patient SS#:      Patient Phone:  [phone] (home)                            Patient Address:  [address] [place] [phone]    PCP:  Bevin Hashimoto, APRN - CNP  Insurance:    Payor/Plan Subscr  Sex Relation Sub. Ins. ID Effective Group Num   1.  [address] [facility] 1962 Female Spouse [id number] 20 [id number]                                   [address]     DIAGNOSIS & PROCEDURE:                                                                                              Diagnosis:     Left arthritis knee   M17.12  Operation:  Left Total Knee Replacement robotic assisted    [id number]  Location:  [facility]  Surgeon:  Rich Garcia MD    SCHEDULING INFORMATION:                                                                                         .  Surgeon's Scheduling Instruction:       Requested Date:    OR Time:  8:30 Patient Arrival Time:      OR TIME REQUIRED  :  90 MIN  Anesthesia:  Choice  Equipment: [place], advanced                COVID:   INJ   xx         Card  xx  Mini C-Arm:  No   Standard C-Arm:  No  Status:  same day admit  PAT Required:  Yes  Comments: NO femoral nerve block   ALLERGIES:Bactrim [sulfamethoxazole-trimethoprim]                    Carmen Alvarado MD      21 10:03 AM  BILLING INFORMATION:                                                                                                     Procedure:       CPT Code Modifier    With Honey Fierro, [address]          H&P AT:       PCP  XX                      [address]  LEFT TOTAL KNEE REPLACEMENT   1960     PHYSICIANS ORDERS    HEIGHT:     5'8              WEIGHT:   330      ALLERGIES:Bactrim "  

## 2021-05-17 ENCOUNTER — HOSPITAL ENCOUNTER (OUTPATIENT)
Dept: VASCULAR LAB | Age: 61
Discharge: HOME | End: 2021-05-17
Payer: COMMERCIAL

## 2021-05-17 DIAGNOSIS — I73.9 PERIPHERAL VASCULAR DISEASE, UNSPECIFIED (HCC): ICD-10-CM

## 2021-05-17 PROCEDURE — 93925 LOWER EXTREMITY STUDY: CPT

## 2021-05-19 ENCOUNTER — TELEPHONE (OUTPATIENT)
Dept: ORTHOPEDIC SURGERY | Age: 61
End: 2021-05-19

## 2021-05-21 ENCOUNTER — OFFICE VISIT (OUTPATIENT)
Dept: VASCULAR SURGERY | Age: 61
End: 2021-05-21
Payer: COMMERCIAL

## 2021-05-21 VITALS
DIASTOLIC BLOOD PRESSURE: 78 MMHG | WEIGHT: 315 LBS | BODY MASS INDEX: 47.74 KG/M2 | HEIGHT: 68 IN | SYSTOLIC BLOOD PRESSURE: 120 MMHG

## 2021-05-21 DIAGNOSIS — E66.01 MORBID OBESITY WITH BMI OF 50.0-59.9, ADULT (HCC): ICD-10-CM

## 2021-05-21 DIAGNOSIS — M17.12 ARTHRITIS OF LEFT KNEE: ICD-10-CM

## 2021-05-21 DIAGNOSIS — I87.2 PERIPHERAL VENOUS INSUFFICIENCY: Primary | ICD-10-CM

## 2021-05-21 PROCEDURE — 99243 OFF/OP CNSLTJ NEW/EST LOW 30: CPT | Performed by: SURGERY

## 2021-05-21 NOTE — PROGRESS NOTES
1 tablet by mouth three times a week Hold for 7 days 3/3/21  Yes JACKLYN Segura CNP   atorvastatin (LIPITOR) 40 MG tablet Take 1 tablet by mouth daily 2/22/21  Yes JACKLYN Oliveros CNP   glucose 5 g chewable tablet Take 3 tablets by mouth as needed for Low blood sugar 2/12/21 2/7/22 Yes Clayton KingJACKLYN CNP   acetaminophen (TYLENOL) 500 MG tablet Take 1,000 mg by mouth every 8 hours as needed for Pain   Yes Historical Provider, MD   blood glucose monitor kit and supplies Check BG twice daily 11/5/20  Yes JACKLYN Oliveros CNP   fluticasone (FLONASE) 50 MCG/ACT nasal spray 1 spray by Nasal route daily 9/8/20  Yes JACKLYN Oliveros CNP        Allergies:  Bactrim [sulfamethoxazole-trimethoprim]      Social History:      Social History     Socioeconomic History    Marital status:      Spouse name: Not on file    Number of children: 2    Years of education: Not on file    Highest education level: Not on file   Occupational History    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:   Tobacco Use    Smoking status: Former Smoker     Packs/day: 2.00     Years: 25.00     Pack years: 50.00     Types: Cigarettes     Quit date: 5/1/2006     Years since quitting: 15.0    Smokeless tobacco: Never Used   Vaping Use    Vaping Use: Never used   Substance and Sexual Activity    Alcohol use: Not Currently     Comment: ocas    Drug use: No    Sexual activity: Yes     Partners: Female   Other Topics Concern    Not on file   Social History Narrative    Not on file     Social Determinants of Health     Financial Resource Strain:     Difficulty of Paying Living Expenses:    Food Insecurity:     Worried About Running Out of Food in the Last Year:     [address] in the Last Year:    Transportation Needs:     Lack of Transportation (Medical):      Lack of Transportation (Non-Medical):    Physical Activity:     Days of Exercise per Week:     Minutes of Exercise per Session: (the DP is 140 and the PT is 138 mmHg). The common femoral artery demonstrates multiphasic flow indicating no   significant aorto-iliac inflow disease. There is multiphasic flow throughout the entire left lower extremity. There is minimal atherosclerotic plaque noted in the common femoral, femoral,   and popliteal arteries consistent with <50% stenosis. No prior exams for comparison.       Conclusions        Summary        There is no resting arterial insufficiency noted within the lower    extremities bilaterally.    There is mild diffuse heterogeneous plaque noted throughout the bilateral    lower extremities. Assessment:      Diagnosis Orders   1. Peripheral venous insufficiency     2. Arthritis of left knee     3. Morbid obesity with BMI of 50.0-59.9, adult ([facility] [address]       No evidence of arterial disease. There is significant venous insufficiency. Recommendations/Plan:    I have recommended daily use of support/compression stockings leading up to surgery to decrease edema as much as possible. Standard carmelita-op DVT prophylaxis recommended. No additional testing or intervention needed.      Jake Correia MD, MD, FACS

## 2021-05-21 NOTE — LETTER
[facility] ([facility]) Vascular & Endovascular Surgery  [address]  [address] [address]  Phone: [phone]  Fax: [phone]           Luis Antonio Zaragoza MD      May 21, 2021     Patient: Becky Bo   MR Number: [id number]   YOB: 1960   Date of Visit: 5/21/2021       Dear Dr. Kristian Watson: Thank you for referring Linda Banks to me for evaluation/treatment. Below are the relevant portions of my assessment and plan of care. If you have questions, please do not hesitate to call me. I look forward to following Maríadella Del Real along with you.     Sincerely,        Luis Antonio Zaragoza MD    CC providers:  Ani Huffman MD  [address] [place] [address] Drive  [address]

## 2021-05-26 ENCOUNTER — HOSPITAL ENCOUNTER (OUTPATIENT)
Dept: CT IMAGING | Age: 61
Discharge: HOME | End: 2021-05-26
Payer: COMMERCIAL

## 2021-05-26 DIAGNOSIS — M17.12 PRIMARY OSTEOARTHRITIS OF LEFT KNEE: ICD-10-CM

## 2021-05-26 PROCEDURE — 73700 CT LOWER EXTREMITY W/O DYE: CPT

## 2021-05-29 DIAGNOSIS — E11.9 TYPE 2 DIABETES MELLITUS WITHOUT COMPLICATION, WITHOUT LONG-TERM CURRENT USE OF INSULIN (HCC): ICD-10-CM

## 2021-06-01 RX ORDER — ATORVASTATIN CALCIUM 40 MG/1
40 TABLET, FILM COATED ORAL DAILY
Qty: 90 TABLET | Refills: 3 | Status: SHIPPED | OUTPATIENT
Start: 2021-06-01 | End: 2021-08-19 | Stop reason: SDUPTHER

## 2021-06-03 ENCOUNTER — HOSPITAL ENCOUNTER (OUTPATIENT)
Dept: PHYSICAL THERAPY | Age: 61
Setting detail: THERAPIES SERIES
Discharge: HOME | End: 2021-06-03
Payer: COMMERCIAL

## 2021-06-03 PROCEDURE — 97161 PT EVAL LOW COMPLEX 20 MIN: CPT

## 2021-06-03 PROCEDURE — 97530 THERAPEUTIC ACTIVITIES: CPT

## 2021-06-03 NOTE — FLOWSHEET NOTE
[facility], [address]  Phone: [phone]  Fax [phone]    Physical Therapy Daily Treatment Note  Date:  6/3/2021    Patient Name:  Elias Agrawal    :  1960  MRN: [id number]  Restrictions/Precautions:    Medical/Treatment Diagnosis Information:  Diagnosis: M17.12 (ICD-10-CM) - Primary osteoarthritis of left knee  Treatment Diagnosis: M17.12 (ICD-10-CM) - Primary osteoarthritis of left knee  Insurance/Certification information:  PT Insurance Information: [employer] [place]  Physician Information:  Referring Practitioner: Mallory Barker  Plan of care signed (Y/N):     Date of Patient follow up with Physician: sx 21    G-Code (if applicable):      Date G-Code Applied:     LEFS = 83% disability            21    Progress Note: [x]  Yes  []  No  Next due by: Visit #10       Latex Allergy:  [x]NO      []YES  Preferred Language for Healthcare:   [x]English       []other:       Visit # Insurance Allowable Auth Required   In-person 1 BMN []  Yes [x]  No    Telehealth   []  Yes []  No    Total        Pain level:  -10/10     SUBJECTIVE:  See eval    OBJECTIVE: See eval   Observation:    Test measurements:      RESTRICTIONS/PRECAUTIONS: OA, HTN, hx recent bariatric sx 3/2021, L knee menisectomy 2020l hx wounds B LEs (shins); L TKA scheduled 21    Exercises/Interventions:     Therapeutic Ex Sets/reps Notes   Pt ed:role of prehab assess, pt's goals, answered pt's questions pre-sx 5'    Seated HS stretch HEP   Seated calf stretch HEP   Seated QS BLEs HEP   SLR FLX supine HEP   Seated heelslide with strap HEP   LAQ  HEP   minisquats HEP                  There Act: asked pt to consider railing installation for home prior to sx so he can get up/down steps more safety, discussed raised commode, shower chair, need for 24 hr A after sx then supv as needed to aide mobility/bathing/dressing/SONI hose/ice etc. 8'                        Manual Intervention                                   NMR re-education                                                      Therapeutic Exercise and NMR EXR  [x] (54898) Provided verbal/tactile cueing for activities related to strengthening, flexibility, endurance, ROM for improvements in LE, proximal hip, and core control with self care, mobility, lifting, ambulation.  [] (83710) Provided verbal/tactile cueing for activities related to improving balance, coordination, kinesthetic sense, posture, motor skill, proprioception  to assist with LE, proximal hip, and core control in self care, mobility, lifting, ambulation and eccentric single leg control.      NMR and Therapeutic Activities:    [] (47317 or 48912) Provided verbal/tactile cueing for activities related to improving balance, coordination, kinesthetic sense, posture, motor skill, proprioception and motor activation to allow for proper function of core, proximal hip and LE with self care and ADLs  [] (45061) Gait Re-education- Provided training and instruction to the patient for proper LE, core and proximal hip recruitment and positioning and eccentric body weight control with ambulation re-education including up and down stairs     Home Exercise Program:    [x] (03986) Reviewed/Progressed HEP activities related to strengthening, flexibility, endurance, ROM of core, proximal hip and LE for functional self-care, mobility, lifting and ambulation/stair navigation   [] (41659)Reviewed/Progressed HEP activities related to improving balance, coordination, kinesthetic sense, posture, motor skill, proprioception of core, proximal hip and LE for self care, mobility, lifting, and ambulation/stair navigation      Manual Treatments:  PROM / STM / Oscillations-Mobs:  G-I, II, III, IV (PA's, Inf., Post.)  [] (64027) Provided manual therapy to mobilize LE, proximal hip and/or LS spine soft tissue/joints for the purpose of modulating pain, promoting relaxation,  increasing ROM, reducing/eliminating soft tissue swelling/inflammation/restriction, improving soft tissue extensibility and allowing for proper ROM for normal function with self care, mobility, lifting and ambulation. Modalities:      Charges:  Timed Code Treatment Minutes: 13   Total Treatment Minutes: 45 (eval)     [x] EVAL (LOW) 49219 (typically 20 minutes face-to-face)  [] EVAL (MOD) 50576 (typically 30 minutes face-to-face)  [] EVAL (HIGH) 98286 (typically 45 minutes face-to-face)  [] RE-EVAL     [] WL(72992) x     [] IONTO  [] NMR (70100) x     [] VASO  [] Manual (93107) x      [] Other:  [x] TA x 1     [] Mech Traction (88189)  [] ES(attended) (49680)      [] ES (un) (13883):     GOALS:   Patient stated goal: To be prepared for surgery      Therapist goals for Patient:    Short Term Goals: To be achieved in: 5  weeks  1. Independent in HEP and progression per patient tolerance, in order to prevent re-injury. [] Progressing: [] Met: [] Not Met: [] Adjusted  2. Patient will have a decrease in pain to facilitate improvement in movement, function, and ADLs as indicated by Functional Deficits. [] Progressing: [] Met: [] Not Met: [] Adjusted  3. Pt will improve ROM of R knee to 0-130 deg for improved L stance during gait with use of B crutches vs RW.  [] Progressing: [] Met: [] Not Met: [] Adjusted  4. Pt will improve strength of B hips/knees x5 lb on HHD to prepare for ability to perform sit-->stand  and ascend/descend steps post-sx to enter home with step-to pattern and B crutches. [] Progressing: [] Met: [] Not Met: [] Adjusted    Progression Towards Functional goals:  [] Patient is progressing as expected towards functional goals listed. [] Progression is slowed due to complexities listed. [] Progression has been slowed due to co-morbidities.   [x] Plan just implemented, too soon to assess goals progression  [] Other:     ASSESSMENT:  See eval    Treatment/Activity Tolerance:  [] Patient tolerated treatment well [] Patient limited by fatique  [x] Patient limited by pain  [] Patient limited by other medical complications  [] Other:   [] Patient did not tolerate physical therapy activity due to substantial increase in pain    Prognosis: [x] Good [] Fair  [] Poor          Patient Requires Follow-up: [x] Yes  [] No    PLAN: See eval      [x] Continue per plan of care  [] Alter current plan (see comments)  [] Plan of care initiated [] Discharge    [] Discharge to home program at this time due to inability to tolerate physical therapy activity       Electronically signed by: Deepa Collado, PT, DPT

## 2021-06-03 NOTE — PLAN OF CARE
[facility], [address], [address] [place] [address]  Phone: [phone]  Fax [phone]     Physical Therapy Certification    Dear Referring Practitioner: Tawanna Calixto,    We had the pleasure of evaluating the following patient for physical therapy services at [address] [place] [address]. A summary of our findings can be found in the initial assessment below. This includes our plan of care. If you have any questions or concerns regarding these findings, please do not hesitate to contact me at the office phone number checked above. Thank you for the referral.       Physician Signature:_______________________________Date:__________________  By signing above (or electronic signature), therapists plan is approved by physician    Patient: Hesham Rojas   : 1960   MRN: [id number]  Referring Physician: Referring Practitioner: Tawanna Calixto      Evaluation Date: 6/3/2021      Medical Diagnosis Information:  Diagnosis: M17.12 (ICD-10-CM) - Primary osteoarthritis of left knee   Treatment Diagnosis: M17.12 (ICD-10-CM) - Primary osteoarthritis of left knee                                         Insurance information: PT Insurance Information: Med [place]     Precautions/ Contra-indications: HTN, OA, hx wounds B LEs, recent bariatric sx 3/2021  Latex Allergy:  [x]NO      []YES  Preferred Language for Healthcare:   [x]English       []other:    SUBJECTIVE: Patient stated complaint: Pt is known to this pt for hx L knee OP PT sessions; he was last seen in  of  for rehab following L menisectomy. He struggled to get pain under control, get back full and ability to WB. He's been using 1 crutch and brace for household distances, but still uses B crutches for any longer amb as his R knee also gets sore and fatigued from [address]. He has been scheduled for L TKA on 21 and is here to assess function prior to sx.      Relevant prolonged functional positions   [x]Reduced ability or difficulty with changes of positions or transfers between positions   [x]Reduced ability to maintain good posture and demonstrate good body mechanics with sitting, bending, and lifting   []Reduced ability to sleep   [x] Reduced ability or tolerance with driving and/or computer work   [x]Reduced ability to perform lifting, carrying tasks   [x]Reduced ability to squat   []Reduced ability to forward bend   [x]Reduced ability to ambulate prolonged functional periods/distances/surfaces   [x]Reduced ability to ascend/descend stairs   []Reduced ability to run, hop, cut or jump   []other:    Participation Restrictions   []Reduced participation in self care activities   [x]Reduced participation in home management activities   [x]Reduced participation in work activities   []Reduced participation in social activities. [x]Reduced participation in sport/recreation activities. Classification :    []Signs/symptoms consistent with post-surgical status including decreased ROM, strength and function.    []Signs/symptoms consistent with joint sprain/strain   []Signs/symptoms consistent with patella-femoral syndrome   [x]Signs/symptoms consistent with knee OA/hip OA   []Signs/symptoms consistent with internal derangement of knee/Hip   []Signs/symptoms consistent with functional hip weakness/NMR control      []Signs/symptoms consistent with tendinitis/tendinosis    []signs/symptoms consistent with pathology which may benefit from Dry needling      []other:      Prognosis/Rehab Potential:      []Excellent   [x]Good    []Fair   []Poor    Tolerance of evaluation/treatment:    []Excellent   [x]Good    []Fair   []Poor  Physical Therapy Evaluation Complexity Justification  [x] A history of present problem with:  [] no personal factors and/or comorbidities that impact the plan of care;  []1-2 personal factors and/or comorbidities that impact the plan of care  [x]3 personal factors be:   [x] Home with home exercise program and outpatient PT pending ability to ascend steps post sx  [] Home with home health care   [x] Skilled nursing facility/ Inpatient Rehab if unable to ascend steps to enter home post-sx    HEP instruction: (see scanned forms)    GOALS:  Patient stated goal: Prepare for sx    Therapist goals for Patient:   Short Term Goals: To be achieved in: 5  weeks  1. Independent in HEP and progression per patient tolerance, in order to prevent re-injury. [] Progressing: [] Met: [] Not Met: [] Adjusted  2. Patient will have a decrease in pain to facilitate improvement in movement, function, and ADLs as indicated by Functional Deficits. [] Progressing: [] Met: [] Not Met: [] Adjusted  3. Pt will improve ROM of R knee to 0-130 deg for improved L stance during gait with use of B crutches vs RW.  [] Progressing: [] Met: [] Not Met: [] Adjusted  4. Pt will improve strength of B hips/knees x5 lb on HHD to prepare for ability to perform sit-->stand  and ascend/descend steps post-sx to enter home with step-to pattern and B crutches.    [] Progressing: [] Met: [] Not Met: [] Adjusted    Electronically signed by:  Spencer Johnson, PT, DPT

## 2021-06-07 ENCOUNTER — OFFICE VISIT (OUTPATIENT)
Dept: INTERNAL MEDICINE CLINIC | Age: 61
End: 2021-06-07
Payer: COMMERCIAL

## 2021-06-07 VITALS
HEIGHT: 68 IN | HEART RATE: 78 BPM | BODY MASS INDEX: 47.74 KG/M2 | WEIGHT: 315 LBS | SYSTOLIC BLOOD PRESSURE: 126 MMHG | OXYGEN SATURATION: 98 % | DIASTOLIC BLOOD PRESSURE: 68 MMHG

## 2021-06-07 DIAGNOSIS — H61.23 IMPACTED CERUMEN, BILATERAL: ICD-10-CM

## 2021-06-07 PROCEDURE — 69210 REMOVE IMPACTED EAR WAX UNI: CPT | Performed by: NURSE PRACTITIONER

## 2021-06-07 PROCEDURE — 99214 OFFICE O/P EST MOD 30 MIN: CPT | Performed by: NURSE PRACTITIONER

## 2021-06-07 SDOH — ECONOMIC STABILITY: FOOD INSECURITY: WITHIN THE PAST 12 MONTHS, THE FOOD YOU BOUGHT JUST DIDN'T LAST AND YOU DIDN'T HAVE MONEY TO GET MORE.: NEVER TRUE

## 2021-06-07 SDOH — ECONOMIC STABILITY: FOOD INSECURITY: WITHIN THE PAST 12 MONTHS, YOU WORRIED THAT YOUR FOOD WOULD RUN OUT BEFORE YOU GOT MONEY TO BUY MORE.: NEVER TRUE

## 2021-06-07 ASSESSMENT — ENCOUNTER SYMPTOMS
RHINORRHEA: 0
EYE REDNESS: 0
SHORTNESS OF BREATH: 0
BACK PAIN: 0
NAUSEA: 0
CONSTIPATION: 0
SINUS PRESSURE: 0
ABDOMINAL PAIN: 0
COUGH: 0
DIARRHEA: 0
COLOR CHANGE: 0
BLOOD IN STOOL: 0
WHEEZING: 0
SORE THROAT: 0
CHEST TIGHTNESS: 0
EYE ITCHING: 0
VOMITING: 0

## 2021-06-07 ASSESSMENT — PATIENT HEALTH QUESTIONNAIRE - PHQ9
2. FEELING DOWN, DEPRESSED OR HOPELESS: 0
SUM OF ALL RESPONSES TO PHQ QUESTIONS 1-9: 0
1. LITTLE INTEREST OR PLEASURE IN DOING THINGS: 0
SUM OF ALL RESPONSES TO PHQ9 QUESTIONS 1 & 2: 0
SUM OF ALL RESPONSES TO PHQ QUESTIONS 1-9: 0
SUM OF ALL RESPONSES TO PHQ QUESTIONS 1-9: 0

## 2021-06-07 ASSESSMENT — SOCIAL DETERMINANTS OF HEALTH (SDOH): HOW HARD IS IT FOR YOU TO PAY FOR THE VERY BASICS LIKE FOOD, HOUSING, MEDICAL CARE, AND HEATING?: NOT HARD AT ALL

## 2021-06-07 NOTE — PROGRESS NOTES
Radha Holland (:  1960) is a 64 y.o. male,Established patient, here for evaluation of the following chief complaint(s):  Ear Fullness      ASSESSMENT/PLAN:  1. Impacted cerumen, bilateral  Assessment & Plan:  Ears cleaned in office      No follow-ups on file. SUBJECTIVE/OBJECTIVE:  Sahara Collins is being seen in the office today for complaints of ear wax build up. He denies any other health issues. Of note, he is scheduled for a left knee revision in July with Dr. Landon Herring. Ear Fullness   Pertinent negatives include no abdominal pain, coughing, diarrhea, headaches, rash, rhinorrhea, sore throat or vomiting. Current Outpatient Medications   Medication Sig Dispense Refill    atorvastatin (LIPITOR) 40 MG tablet Take 1 tablet by mouth daily 90 tablet 3    pregabalin (LYRICA) 75 MG capsule Take 1 capsule by mouth 3 times daily for 90 days.  270 capsule 0    rOPINIRole (REQUIP) 3 MG tablet Take 1 tablet by mouth nightly 90 tablet 0    potassium chloride (KLOR-CON M) 20 MEQ TBCR extended release tablet Take 1 tablet by mouth 2 times daily 120 tablet 2    metFORMIN (GLUCOPHAGE) 500 MG tablet Take 1 tablet by mouth 2 times daily (with meals) 180 tablet 1    Prodigy Twist Top Lancets 28G MISC TEST TWO TIMES A  each 0    blood glucose test strips (PRODIGY NO CODING BLOOD GLUC) strip TEST TWO TIMES A  each 0    hydroCHLOROthiazide (HYDRODIURIL) 25 MG tablet Hold until Monday 90 tablet 0    loratadine (CLARITIN) 10 MG tablet Take 1 tablet by mouth daily 90 tablet 0    lisinopril (PRINIVIL;ZESTRIL) 10 MG tablet TAKE 1 TABLET BY MOUTH ONCE A DAY 90 tablet 0    Hyoscyamine Sulfate SL (LEVSIN/SL) 0.125 MG SUBL Place 1 tablet under the tongue every 6 hours as needed (spasm) 30 each 0    omeprazole (PRILOSEC) 20 MG delayed release capsule Take 1 capsule by mouth Daily 30 capsule 3    ondansetron (ZOFRAN) 4 MG tablet Take 1 tablet by mouth every 6 hours as needed for Nausea or Vomiting 30 tablet 0    metOLazone (ZAROXOLYN) 2.5 MG tablet Take 1 tablet by mouth three times a week Hold for 7 days 25 tablet 2    glucose 5 g chewable tablet Take 3 tablets by mouth as needed for Low blood sugar 30 tablet 0    acetaminophen (TYLENOL) 500 MG tablet Take 1,000 mg by mouth every 8 hours as needed for Pain      blood glucose monitor kit and supplies Check BG twice daily 1 kit 0    fluticasone (FLONASE) 50 MCG/ACT nasal spray 1 spray by Nasal route daily 3 Bottle 0     No current facility-administered medications for this visit. Review of Systems   Constitutional: Negative for chills, fatigue and fever. HENT: Negative for congestion, ear pain, postnasal drip, rhinorrhea, sinus pressure, sneezing and sore throat. Eyes: Negative for redness and itching. Respiratory: Negative for cough, chest tightness, shortness of breath and wheezing. Cardiovascular: Negative for chest pain and palpitations. Gastrointestinal: Negative for abdominal pain, blood in stool, constipation, diarrhea, nausea and vomiting. Endocrine: Negative for cold intolerance and heat intolerance. Genitourinary: Negative for difficulty urinating, dysuria, flank pain, frequency, hematuria and urgency. Musculoskeletal: Negative for arthralgias, back pain, joint swelling and myalgias. Skin: Negative for color change, pallor, rash and wound. Allergic/Immunologic: Negative for environmental allergies and food allergies. Neurological: Negative for dizziness, seizures, syncope, weakness, light-headedness, numbness and headaches. Hematological: Negative for adenopathy. Does not bruise/bleed easily. Psychiatric/Behavioral: Negative for confusion, sleep disturbance and suicidal ideas. The patient is not nervous/anxious and is not hyperactive.         Vitals:    06/07/21 0943   BP: 126/68   Site: Left Upper Arm   Position: Sitting   Cuff Size: Large Adult   Pulse: 78   SpO2: 98%   Weight: (!) 326 lb (147.9 kg)   Height: 5' 8\" (1.727 m)       Physical Exam  Vitals reviewed. Constitutional:       Appearance: Normal appearance. He is well-developed. HENT:      Head: Normocephalic and atraumatic. Right Ear: Hearing, tympanic membrane, ear canal and external ear normal.      Left Ear: Hearing, tympanic membrane, ear canal and external ear normal. There is impacted cerumen. Nose: No mucosal edema. Right Sinus: No maxillary sinus tenderness or frontal sinus tenderness. Left Sinus: No maxillary sinus tenderness or frontal sinus tenderness. Mouth/Throat: Tonsils: No tonsillar abscesses. Eyes:      Extraocular Movements: Extraocular movements intact. Pupils: Pupils are equal, round, and reactive to light. Cardiovascular:      Rate and Rhythm: Normal rate and regular rhythm. Pulses: Normal pulses. Heart sounds: Normal heart sounds. Pulmonary:      Effort: Pulmonary effort is normal.      Breath sounds: Normal breath sounds. Lymphadenopathy:      Head:      Right side of head: No submental, submandibular, tonsillar, preauricular, posterior auricular or occipital adenopathy. Left side of head: No submental, submandibular, tonsillar, preauricular, posterior auricular or occipital adenopathy. Skin:     General: Skin is warm and dry. Neurological:      Mental Status: He is alert. Psychiatric:         Mood and Affect: Mood normal.         Behavior: Behavior normal.                 An electronic signature was used to authenticate this note.     --JACKLYN Goldman - CNP 5

## 2021-06-09 ENCOUNTER — OFFICE VISIT (OUTPATIENT)
Dept: BARIATRICS/WEIGHT MGMT | Age: 61
End: 2021-06-09
Payer: COMMERCIAL

## 2021-06-09 VITALS — HEIGHT: 68 IN | BODY MASS INDEX: 47.74 KG/M2 | WEIGHT: 315 LBS

## 2021-06-09 DIAGNOSIS — E66.01 MORBID OBESITY WITH BMI OF 40.0-44.9, ADULT (HCC): Primary | ICD-10-CM

## 2021-06-09 DIAGNOSIS — Z98.84 STATUS POST LAPAROSCOPIC SLEEVE GASTRECTOMY: ICD-10-CM

## 2021-06-09 PROCEDURE — 99213 OFFICE O/P EST LOW 20 MIN: CPT | Performed by: SURGERY

## 2021-06-09 RX ORDER — FUROSEMIDE 40 MG/1
40 TABLET ORAL 2 TIMES DAILY
COMMUNITY
End: 2021-06-14 | Stop reason: SDUPTHER

## 2021-06-09 NOTE — PROGRESS NOTES
[facility]) Physicians   General & Laparoscopic Surgery  [employer] [facility] [employer]       HPI:     Jackie Brooks is a very pleasant 64 y.o. obese male , Body mass index is 49.66 kg/m². [employer] And multiple medical problems who is presenting for bariatric follow up care. Jackie Brooks is s/p laparoscopic sleeve gastrectomy by me   Comes today to the clinic without any complaints. Patient denies any nausea, vomiting, fevers, chills, shortness of breath, chest pain, constipation or urinary symptoms. Denies any heartburn nor dysphagia. Patient is feeling very well, and is very active. Patient is very pleased with the weight loss and resolution of co-morbid conditions. Past Medical History:   Diagnosis Date    Bilateral venous leg ulcers 09/2020    Cellulitis of lower extremity 9/25/2019    Diabetes mellitus ([address]     Hypertension     Impaired fasting glucose 5/2013    Obesity     TOM treated with BiPAP 11/30/2020    Preoperative clearance 10/26/2020    Screening PSA (prostate specific antigen) 12/30/2015;5/1/17    Nml:0.53(12/30/2015);5/1/17=nml.     Sleep apnea     uses CPAP    Stasis dermatitis of both legs 8/28/2020    Tobacco use     Tubular adenoma of colon 09/14/2017     Past Surgical History:   Procedure Laterality Date    CIRCUMCISION      COLONOSCOPY  09/14/2017    Colonoscopy with polypectomy (cold biopsy):Dr. Walker:next in 3yrs=9/14/2020    KNEE ARTHROSCOPY Left 8/3/2020    VIDEO ARTHROSCOPY LEFT KNEE, PARTIAL MEDIAL MENISCECTOMY, CHONDROPLASTY, SYNOVIAL BIOPSY -TOM=4- performed by Dakota Francois MD at [place] [address] PAIN MANAGEMENT PROCEDURE Left 12/9/2020    COOLIEF RADIOFREQUENCY ABLATION - LEFT performed by Livia Del Real MD at [address] N/A 3/2/2021    ROBOTIC ASSISTED LAPAROSCOPIC SLEEVE GASTRECTOMY performed by Dalila [facility] DO at [facility]  05/28/2011    UPPER GASTROINTESTINAL ENDOSCOPY N/A 2021    EGD BIOPSY performed by Nikita Cleveland DO at [address] EXTRACTION       Family History   Problem Relation Age of Onset    High Blood Pressure Mother     Heart Disease Mother         MI    AT 66    Diabetes Brother      Social History     Tobacco Use    Smoking status: Former Smoker     Packs/day: 2.00     Years: 25.00     Pack years: 50.00     Types: Cigarettes     Quit date: 2006     Years since quitting: 15.1    Smokeless tobacco: Never Used   Substance Use Topics    Alcohol use: Not Currently     Comment: ocas     I counseled the patient on the importance of not smoking and risks of ETOH. Allergies   Allergen Reactions    Bactrim [Sulfamethoxazole-Trimethoprim] Rash     POSSIBLE fixed drug eruption on his cheeks, but diagnosis is not certain (only happened once). Vitals:    21 0815   Weight: (!) 326 lb 9.6 oz (148.1 kg)   Height: 5' 8\" (1.727 m)       Body mass index is 49.66 kg/m². Current Outpatient Medications:     Multiple Vitamin (MULTIVITAMIN, BARIATRIC FUSION COMPLETE, CHEW TAB), Take 4 tablets by mouth daily, Disp: , Rfl:     furosemide (LASIX) 40 MG tablet, Take 40 mg by mouth 2 times daily, Disp: , Rfl:     atorvastatin (LIPITOR) 40 MG tablet, Take 1 tablet by mouth daily, Disp: 90 tablet, Rfl: 3    pregabalin (LYRICA) 75 MG capsule, Take 1 capsule by mouth 3 times daily for 90 days. , Disp: 270 capsule, Rfl: 0    rOPINIRole (REQUIP) 3 MG tablet, Take 1 tablet by mouth nightly, Disp: 90 tablet, Rfl: 0    potassium chloride (KLOR-CON M) 20 MEQ TBCR extended release tablet, Take 1 tablet by mouth 2 times daily, Disp: 120 tablet, Rfl: 2    metFORMIN (GLUCOPHAGE) 500 MG tablet, Take 1 tablet by mouth 2 times daily (with meals), Disp: 180 tablet, Rfl: 1    Prodigy Twist Top Lancets 28G MISC, TEST TWO TIMES A DAY, Disp: 100 each, Rfl: 0    blood glucose test strips (PRODIGY NO CODING BLOOD GLUC) strip, TEST TWO TIMES A DAY, Disp: 100 each, Rfl: 0    hydroCHLOROthiazide (HYDRODIURIL) 25 MG tablet, Hold until Monday, Disp: 90 tablet, Rfl: 0    loratadine (CLARITIN) 10 MG tablet, Take 1 tablet by mouth daily, Disp: 90 tablet, Rfl: 0    lisinopril (PRINIVIL;ZESTRIL) 10 MG tablet, TAKE 1 TABLET BY MOUTH ONCE A DAY, Disp: 90 tablet, Rfl: 0    omeprazole (PRILOSEC) 20 MG delayed release capsule, Take 1 capsule by mouth Daily, Disp: 30 capsule, Rfl: 3    glucose 5 g chewable tablet, Take 3 tablets by mouth as needed for Low blood sugar, Disp: 30 tablet, Rfl: 0    acetaminophen (TYLENOL) 500 MG tablet, Take 1,000 mg by mouth every 8 hours as needed for Pain, Disp: , Rfl:     blood glucose monitor kit and supplies, Check BG twice daily, Disp: 1 kit, Rfl: 0    fluticasone (FLONASE) 50 MCG/ACT nasal spray, 1 spray by Nasal route daily, Disp: 3 Bottle, Rfl: 0    ondansetron (ZOFRAN) 4 MG tablet, Take 1 tablet by mouth every 6 hours as needed for Nausea or Vomiting (Patient not taking: Reported on 6/9/2021), Disp: 30 tablet, Rfl: 0    metOLazone (ZAROXOLYN) 2.5 MG tablet, Take 1 tablet by mouth three times a week Hold for 7 days (Patient not taking: Reported on 6/9/2021), Disp: 25 tablet, Rfl: 2      Review of Systems - History obtained from the patient  General ROS: negative  Psychological ROS: negative  Hematological and Lymphatic ROS: negative  Endocrine ROS: negative  Respiratory ROS: negative  Cardiovascular ROS: negative  Gastrointestinal ROS:negative  Genito-Urinary ROS: negative  Musculoskeletal ROS: negative   Skin ROS: negative    Physical Exam   Vitals Reviewed   Constitutional: Patient is oriented to person, place, and time. Patient appears well-developed and well-nourished. Patient is active and cooperative. Non-toxic appearance. No distress. Neck: Trachea normal and normal range of motion. No JVD present. Pulmonary/Chest: Effort normal. No accessory muscle usage or stridor. No apnea. No respiratory distress. Cardiovascular: Normal rate and no JVD. Abdominal: Normal appearance. Patient exhibits no distension. Abdomen is soft, obese, non tender. Musculoskeletal: Normal range of motion. Patient exhibits no edema. Neurological: Patient is alert and oriented to person, place, and time. Patient has normal strength. GCS eye subscore is 4. GCS verbal subscore is 5. GCS motor subscore is 6. Skin: Skin is warm and dry. No abrasion and no rash noted. Patient is not diaphoretic. No cyanosis or erythema. Psychiatric: Patient has a normal mood and affect. Speech is normal and behavior is normal. Cognition and memory are normal.         A/P     Gertrude Stout is 64 y.o. male , now with Body mass index is 49.66 kg/m². s/p Sleeve gastrectomy, has lost 22 lbs since last visit, total of 78 lbs weight loss. The patient underwent dietary counseling with registered dietician. I have reviewed, discussed and agree with the dietary plan. Patient is trying hard to keep good dietary and behavior modifications. Patient is monitoring portion sizes, food choices and liquid calories. Patient is trying to exercise regularly. Patient pleased with the surgery outcomes. We discussed how his weight affects his overall health including:  Chioma Mejia was seen today for bariatrics post op follow up. Diagnoses and all orders for this visit:    Morbid obesity with BMI of 40.0-44.9, adult ([facility] [address]    Status post laparoscopic sleeve gastrectomy       and importance of weight loss to alleviate those co morbid conditions. I encouraged the patient to continue exercise and keeping healthy eating habits. Also counseled the patient extensively on post surgery care.      Total encounter time:  20 minutes including any number of the following: review of labs, imaging, provider notes, outside hospital records; performing examination/evaluation; counseling patient and family; ordering medications/tests; placing referrals and communication with referring physicians; coordination of care, and documentation in the EHR. RTC in 2.5 months  Diet and Exercise      Patient advised that its their responsibility to follow up for studies and/or labs ordered today.

## 2021-06-09 NOTE — PROGRESS NOTES
Dietary Assessment Note      Vitals:   Vitals:    21 0815   Weight: (!) 326 lb 9.6 oz (148.1 kg)   Height: 5' 8\" (1.727 m)    Patient lost 21.6 lbs over past 6 weeks. Total Weight Loss: 78.4 lbs    Labs reviewed: No new nutrition related labs     Protein intake:  g/pro      Fluid intake: 60-90 oz/day     Multivitamin/mineral intake:  4 fusion     Calcium intake: none     Other: none     Exercise: Walking more. Pt plans to have knee replacement sx next month, walking with crutches. Nutrition Assessment: 14 week s/p sleeve post-op visit. Pt frustrated as he feels he has had some weight loss pleateaus. Eats 4-6 x day. Tracks intakes with [employer] and intakes range from 800-1200 calories/day, sometimes calories higher than this. Breakfast: \"chinese slop\" cauliflower rice with lean turkey with light soy sauce with peas/carrots/water chestnuts    Snack: nothing     Lunch: \"chinese slop\" cauliflower rice with lean turkey with light soy sauce with peas/carrots/water chestnuts OR buffalo stuffed peppers - ground turkey with buffalo sauce     Snack: nothing     Dinner: 6 oz spinach/asparagus - sometimes eats only veggies if already met protein goal for the day     Snack: beef jerky OR CC OR small salad OR veggies     Fluids: Gatorade zero, water, caffeine free tea    Amount able to eat per sittin oz/sitting     Following 30 rule: Yes     Food Intolerances/issues: none     Client Concerns: slow weight loss    Goals:    Include protein with all meals/snacks to help with satiety  Track intakes to 1200 calories/day   Weigh 3-4 oz protein per sitting and measure 1/2 c veggies     Plan: F/U per Provider    Sohail Gray RD, LD

## 2021-06-10 ENCOUNTER — HOSPITAL ENCOUNTER (OUTPATIENT)
Dept: PHYSICAL THERAPY | Age: 61
Setting detail: THERAPIES SERIES
Discharge: HOME | End: 2021-06-10
Payer: COMMERCIAL

## 2021-06-10 PROCEDURE — 97110 THERAPEUTIC EXERCISES: CPT

## 2021-06-10 NOTE — FLOWSHEET NOTE
[place] [address] [facility], [address] [place]  Phone: [phone]  Fax [phone]    Physical Therapy Daily Treatment Note  Date:  6/10/2021    Patient Name:  Sophia Barkley    :  1960  MRN: [id number]  Restrictions/Precautions:    Medical/Treatment Diagnosis Information:  Diagnosis: M17.12 (ICD-10-CM) - Primary osteoarthritis of left knee  Treatment Diagnosis: M17.12 (ICD-10-CM) - Primary osteoarthritis of left knee  Insurance/Certification information:  PT Insurance Information: [employer]  Physician Information:  Referring Practitioner: Amina Samuels  Plan of care signed (Y/N):     Date of Patient follow up with Physician: israel 21    G-Code (if applicable):      Date G-Code Applied:     LEFS = 83% disability            21    Progress Note: [x]  Yes  []  No  Next due by: Visit #10       Latex Allergy:  [x]NO      []YES  Preferred Language for Healthcare:   [x]English       []other:       Visit # Insurance Allowable Auth Required   In-person 2 BMN []  Yes [x]  No    Telehealth   []  Yes []  No    Total        Pain level:  -10/10     SUBJECTIVE:  Pt is frustrated bc he got a blister on anterior distal shin after wearing new pair comp stockings yesterday. He has call into wound care clinic d/t hx mult wounds this area and concern for infection.     OBJECTIVE:    Observation: wrap anterior distal shin d/t new blister/open skin    Test measurements:      RESTRICTIONS/PRECAUTIONS: OA, HTN, hx recent bariatric sx 3/2021, L knee menisectomy 2020l hx wounds B LEs (shins); L TKA scheduled 21    Exercises/Interventions:     Therapeutic Ex Sets/reps Notes   Pt ed:role of prehab assess, pt's goals, answered pt's questions pre-sx 5'    SB hip/knee flex B  SL  Bridge on table x10  x5 R< L  x10 5\"   LS HS stretch HEP   Standing calf stretch HEP   Sup[ine SAQ    Seated LAQ BLEs HEP   SLR FLX supine  SL hip abd  SL hip add HEP   Seated heelslide with strap HEP   Step-up R on 4\", L LATOYA platform HEP   LP HEP   HS  2x10 30#    LATOYA TKE  45# x15 ea         There Act: asked pt to consider railing installation for home prior to sx so he can get up/down steps more safety, discussed raised commode, shower chair, need for 24 hr A after sx then supv as needed to aide mobility/bathing/dressing/SONI hose/ice etc.  8'                        Manual Intervention                                   NMR re-education                                                      Therapeutic Exercise and NMR EXR  [x] (48951) Provided verbal/tactile cueing for activities related to strengthening, flexibility, endurance, ROM for improvements in LE, proximal hip, and core control with self care, mobility, lifting, ambulation.  [] (40846) Provided verbal/tactile cueing for activities related to improving balance, coordination, kinesthetic sense, posture, motor skill, proprioception  to assist with LE, proximal hip, and core control in self care, mobility, lifting, ambulation and eccentric single leg control.      NMR and Therapeutic Activities:    [] (14800 or 48551) Provided verbal/tactile cueing for activities related to improving balance, coordination, kinesthetic sense, posture, motor skill, proprioception and motor activation to allow for proper function of core, proximal hip and LE with self care and ADLs  [] (11714) Gait Re-education- Provided training and instruction to the patient for proper LE, core and proximal hip recruitment and positioning and eccentric body weight control with ambulation re-education including up and down stairs     Home Exercise Program:    [x] (91895) Reviewed/Progressed HEP activities related to strengthening, flexibility, endurance, ROM of core, proximal hip and LE for functional self-care, mobility, lifting and ambulation/stair navigation   [] (29276)Reviewed/Progressed HEP activities related to improving balance, coordination, kinesthetic sense, posture, motor skill, proprioception of core, proximal hip and LE for self care, mobility, lifting, and ambulation/stair navigation      Manual Treatments:  PROM / STM / Oscillations-Mobs:  G-I, II, III, IV (PA's, Inf., Post.)  [] (73896) Provided manual therapy to mobilize LE, proximal hip and/or LS spine soft tissue/joints for the purpose of modulating pain, promoting relaxation,  increasing ROM, reducing/eliminating soft tissue swelling/inflammation/restriction, improving soft tissue extensibility and allowing for proper ROM for normal function with self care, mobility, lifting and ambulation. Modalities:  Ice B knees x10'    Charges:  Timed Code Treatment Minutes: 45   Total Treatment Minutes: 70 (exc with AT, ice)     [] EVAL (LOW) 38687 (typically 20 minutes face-to-face)  [] EVAL (MOD) 29546 (typically 30 minutes face-to-face)  [] EVAL (HIGH) 86905 (typically 45 minutes face-to-face)  [] RE-EVAL        [x] EG(98689) x 3    [] IONTO  [] NMR (19808) x     [] VASO  [] Manual (17402) x      [] Other:  [] TA x     [] Mech Traction (68466)  [] ES(attended) (51764)      [] ES (un) (79811):     GOALS:   Patient stated goal: To be prepared for surgery      Therapist goals for Patient:    Short Term Goals: To be achieved in: 5  weeks  1. Independent in HEP and progression per patient tolerance, in order to prevent re-injury. [] Progressing: [] Met: [] Not Met: [] Adjusted  2. Patient will have a decrease in pain to facilitate improvement in movement, function, and ADLs as indicated by Functional Deficits. [] Progressing: [] Met: [] Not Met: [] Adjusted  3. Pt will improve ROM of R knee to 0-130 deg for improved L stance during gait with use of B crutches vs RW.  [] Progressing: [] Met: [] Not Met: [] Adjusted  4.  Pt will improve strength of B hips/knees x5 lb on HHD to prepare for ability to perform sit-->stand  and ascend/descend steps post-sx to enter home with step-to pattern and B crutches. [] Progressing: [] Met: [] Not Met: [] Adjusted    Progression Towards Functional goals:  [] Patient is progressing as expected towards functional goals listed. [] Progression is slowed due to complexities listed. [] Progression has been slowed due to co-morbidities. [x] Plan just implemented, too soon to assess goals progression  [] Other:     ASSESSMENT:  Pt had fair dayanna for there-ex, WB'ing aggravates knee OA but he is willing to work through some pain to build strength to prepare for sx. He will f/u with wound care MD re skin on L shin. Treatment/Activity Tolerance:  [] Patient tolerated treatment well [] Patient limited by fatique  [x] Patient limited by pain  [] Patient limited by other medical complications  [] Other:   [] Patient did not tolerate physical therapy activity due to substantial increase in pain    Prognosis: [x] Good [] Fair  [] Poor          Patient Requires Follow-up: [x] Yes  [] No    PLAN: Progress strength pre-op as dayanna to promote ability to ascend steps.     [x] Continue per plan of care  [] Alter current plan (see comments)  [] Plan of care initiated [] Discharge    [] Discharge to home program at this time due to inability to tolerate physical therapy activity       Electronically signed by: Magen Kohli, PT, DPT

## 2021-06-13 DIAGNOSIS — J30.1 SEASONAL ALLERGIC RHINITIS DUE TO POLLEN: ICD-10-CM

## 2021-06-13 DIAGNOSIS — I10 ESSENTIAL HYPERTENSION: ICD-10-CM

## 2021-06-14 ENCOUNTER — TELEPHONE (OUTPATIENT)
Dept: ORTHOPEDIC SURGERY | Age: 61
End: 2021-06-14

## 2021-06-14 ENCOUNTER — PATIENT MESSAGE (OUTPATIENT)
Dept: PULMONOLOGY | Age: 61
End: 2021-06-14

## 2021-06-14 RX ORDER — LANCETS 28 GAUGE
EACH MISCELLANEOUS
Qty: 100 EACH | Refills: 0 | Status: SHIPPED | OUTPATIENT
Start: 2021-06-14 | End: 2021-08-19 | Stop reason: SDUPTHER

## 2021-06-14 RX ORDER — LORATADINE 10 MG/1
10 TABLET ORAL DAILY
Qty: 90 TABLET | Refills: 0 | Status: SHIPPED | OUTPATIENT
Start: 2021-06-14 | End: 2021-09-15 | Stop reason: SDUPTHER

## 2021-06-14 RX ORDER — BLOOD SUGAR DIAGNOSTIC
STRIP MISCELLANEOUS
Qty: 100 EACH | Refills: 0 | Status: SHIPPED | OUTPATIENT
Start: 2021-06-14 | End: 2021-08-19 | Stop reason: SDUPTHER

## 2021-06-14 RX ORDER — HYDROCHLOROTHIAZIDE 25 MG/1
TABLET ORAL
Qty: 90 TABLET | Refills: 0 | Status: SHIPPED | OUTPATIENT
Start: 2021-06-14 | End: 2021-09-15 | Stop reason: SDUPTHER

## 2021-06-14 RX ORDER — LISINOPRIL 10 MG/1
TABLET ORAL
Qty: 90 TABLET | Refills: 0 | Status: SHIPPED | OUTPATIENT
Start: 2021-06-14 | End: 2021-09-15 | Stop reason: SDUPTHER

## 2021-06-14 RX ORDER — FUROSEMIDE 40 MG/1
40 TABLET ORAL 2 TIMES DAILY
Qty: 60 TABLET | Refills: 3 | Status: SHIPPED | OUTPATIENT
Start: 2021-06-14 | End: 2021-11-30 | Stop reason: SDUPTHER

## 2021-06-14 RX ORDER — OMEPRAZOLE 20 MG/1
20 CAPSULE, DELAYED RELEASE ORAL DAILY
Qty: 30 CAPSULE | Refills: 3 | Status: SHIPPED | OUTPATIENT
Start: 2021-06-14 | End: 2021-09-15 | Stop reason: SDUPTHER

## 2021-06-14 NOTE — TELEPHONE ENCOUNTER
Auth: NPR  Date: 7/14/2021  Reference # None  Spoke with: None  Type of SX: Inpatient  Location: [facility]  CPT [id number]   SX area: Lt knee  Insurance: [facility] 110 to precert admission.

## 2021-06-15 ENCOUNTER — PREP FOR PROCEDURE (OUTPATIENT)
Dept: ORTHOPEDICS UNIT | Age: 61
End: 2021-06-15

## 2021-06-15 RX ORDER — OXYCODONE HYDROCHLORIDE 10 MG/1
10 TABLET ORAL ONCE
Status: CANCELLED | OUTPATIENT
Start: 2021-06-15 | End: 2021-06-15

## 2021-06-16 ENCOUNTER — HOSPITAL ENCOUNTER (OUTPATIENT)
Dept: WOUND CARE | Age: 61
Discharge: HOME | End: 2021-06-16
Payer: COMMERCIAL

## 2021-06-16 VITALS
RESPIRATION RATE: 20 BRPM | HEART RATE: 70 BPM | WEIGHT: 315 LBS | DIASTOLIC BLOOD PRESSURE: 72 MMHG | BODY MASS INDEX: 47.74 KG/M2 | HEIGHT: 68 IN | TEMPERATURE: 98.4 F | SYSTOLIC BLOOD PRESSURE: 159 MMHG

## 2021-06-16 DIAGNOSIS — L97.221 ULCER OF LEFT CALF, LIMITED TO BREAKDOWN OF SKIN (HCC): ICD-10-CM

## 2021-06-16 PROCEDURE — 99213 OFFICE O/P EST LOW 20 MIN: CPT | Performed by: INTERNAL MEDICINE

## 2021-06-16 PROCEDURE — 99213 OFFICE O/P EST LOW 20 MIN: CPT

## 2021-06-16 RX ORDER — LIDOCAINE 40 MG/G
CREAM TOPICAL ONCE
Status: CANCELLED | OUTPATIENT
Start: 2021-06-16 | End: 2021-06-16

## 2021-06-16 RX ORDER — LIDOCAINE HYDROCHLORIDE 40 MG/ML
SOLUTION TOPICAL ONCE
Status: CANCELLED | OUTPATIENT
Start: 2021-06-16 | End: 2021-06-16

## 2021-06-16 RX ORDER — BACITRACIN ZINC AND POLYMYXIN B SULFATE 500; 1000 [USP'U]/G; [USP'U]/G
OINTMENT TOPICAL ONCE
Status: CANCELLED | OUTPATIENT
Start: 2021-06-16 | End: 2021-06-16

## 2021-06-16 RX ORDER — LIDOCAINE 50 MG/G
OINTMENT TOPICAL ONCE
Status: CANCELLED | OUTPATIENT
Start: 2021-06-16 | End: 2021-06-16

## 2021-06-16 ASSESSMENT — PAIN DESCRIPTION - ONSET: ONSET: ON-GOING

## 2021-06-16 ASSESSMENT — PAIN DESCRIPTION - DESCRIPTORS: DESCRIPTORS: BURNING

## 2021-06-16 ASSESSMENT — PAIN - FUNCTIONAL ASSESSMENT: PAIN_FUNCTIONAL_ASSESSMENT: PREVENTS OR INTERFERES SOME ACTIVE ACTIVITIES AND ADLS

## 2021-06-16 ASSESSMENT — PAIN SCALES - GENERAL: PAINLEVEL_OUTOF10: 2

## 2021-06-16 ASSESSMENT — PAIN DESCRIPTION - ORIENTATION: ORIENTATION: LEFT

## 2021-06-16 ASSESSMENT — PAIN DESCRIPTION - PROGRESSION: CLINICAL_PROGRESSION: NOT CHANGED

## 2021-06-16 ASSESSMENT — PAIN DESCRIPTION - FREQUENCY: FREQUENCY: INTERMITTENT

## 2021-06-16 ASSESSMENT — PAIN DESCRIPTION - LOCATION: LOCATION: LEG

## 2021-06-16 ASSESSMENT — PAIN DESCRIPTION - PAIN TYPE: TYPE: ACUTE PAIN

## 2021-06-17 ENCOUNTER — HOSPITAL ENCOUNTER (OUTPATIENT)
Dept: PHYSICAL THERAPY | Age: 61
Setting detail: THERAPIES SERIES
Discharge: HOME | End: 2021-06-17
Payer: COMMERCIAL

## 2021-06-17 PROBLEM — L60.0 INGROWN RIGHT BIG TOENAIL: Status: RESOLVED | Noted: 2020-11-23 | Resolved: 2021-06-17

## 2021-06-17 PROBLEM — Z87.891 HISTORY OF TOBACCO USE DISORDER: Status: RESOLVED | Noted: 2020-11-30 | Resolved: 2021-06-17

## 2021-06-17 PROCEDURE — 97110 THERAPEUTIC EXERCISES: CPT

## 2021-06-17 NOTE — PROGRESS NOTES
[facility] Progress Note    Radha Fisher     : 1960    DATE OF VISIT:  2021    Subjective:     Radha Fisher is a 64 y.o. male who has a venous ulcer located on the left , posterior, distal lower leg. Current complaint of pain in this ulcer? yes. Quality of pain: aching and sharp  Timing: intermittent and stable  Severity: mild  Associated Signs/Symptoms: swelling and drainage (moderate, clear)  Other significant symptoms or pertinent ulcer history: I haven't seen Mr. Benny Laird in about 6-8 months, when he healed the last of his original venous-lymphedema ulcers. He had a pair of [employer] CompreFlex garments at that time, and wore them for a couple of months, a bit less consistently since then, but he was doing well. Had bariatric surgery back in March, and I believe has lost about 70# since I last saw him. Leg edema is much improved overall, with weight loss, compression garments, a bit more leg exercise and diuretics. In need of a left knee surgery, which is scheduled in about a month. Saw Dr. Rolando Mclean for a pre-op evaluation, but had no concerning findings for significant PAD. For his venous stasis and leg edema, he recommended a pair of compression stockings (20-30 mmHg, instead of his CompreFlex garments it seems) to keep his swelling under control around the time of surgery. He did ok with these at first, but found them a bit hard to don and doff, and it sounds like eventually a friction injury occurred at the area of some fragile skin at his left distal calf, caused a bit of a blister and skin tear, which now persists as an open ulcer. No pus or malodor, no spreading redness, no F/C/D, mild pruritus. His CompreFlex garments ARE getting a bit worn, and also with the significant loss of weight and swelling from last year, I'm not positive they're the correct size going forward.     Additional ulcer(s) noted? no.      Mr. Benny Laird has a past medical history of Bilateral venous leg ulcers, Cellulitis of lower extremity, Diabetes mellitus ([facility] [facility] Hypertension, Impaired fasting glucose, Obesity, TOM treated with BiPAP, Preoperative clearance, Screening PSA (prostate specific antigen), Sleep apnea, Stasis dermatitis of both legs, Tobacco use, and Tubular adenoma of colon. He has a past surgical history that includes Mouth surgery; [place] tooth extraction; Circumcision; Vasectomy; Colonoscopy (09/14/2017); Umbilical hernia repair (05/28/2011); Knee arthroscopy (Left, 8/3/2020); Pain management procedure (Left, 12/9/2020); Upper gastrointestinal endoscopy (N/A, 1/11/2021); and Sleeve Gastrectomy (N/A, 3/2/2021). His family history includes Diabetes in his brother; Heart Disease in his mother; High Blood Pressure in his mother. Mr. Mejia Garcia reports that he quit smoking about 15 years ago. His smoking use included cigarettes. He has a 50.00 pack-year smoking history. He has never used smokeless tobacco. He reports previous alcohol use. He reports that he does not use drugs. His current medication list consists of Multiple Vitamin, Prodigy Twist Top Lancets 28G, acetaminophen, atorvastatin, blood glucose monitor kit and supplies, blood glucose test strips, fluticasone, furosemide, glucose, hydroCHLOROthiazide, lisinopril, loratadine, metFORMIN, metOLazone, omeprazole, ondansetron, potassium chloride, pregabalin, and rOPINIRole. Allergies: Bactrim [sulfamethoxazole-trimethoprim]    Pertinent items from the review of systems are discussed in the HPI; the remainder of the ROS was reviewed and is negative.      Objective:     Vitals:    06/16/21 0813 06/16/21 0827   BP:  (!) 159/72   Pulse:  70   Resp: 22 20   Temp:  98.4 °F (36.9 °C)   TempSrc: Oral    Weight: (!) 320 lb (145.2 kg)    Height: 5' 8\" (1.727 m)        Constitutional:  well-developed, well-nourished, overweight, NAD  Psychiatric:  oriented to person, place and time; mood and affect appropriate for the situation   Eyes:  pupils equal, round and reactive to light; sclerae anicteric, conjunctivae not pale  Cardiovascular:  bilateral pedal pulses palpable, feet warm, good cap refill; milder BL lower extremity edema  Lymphatic:  no inguinal or popliteal adenopathy, no angitis or cellulitis, just a bit of santa venous erythema at distal legs  Musculoskeletal:  no clubbing, cyanosis or petechiae; RLE and LLE with no gross effusions, joint misalignment or acute arthritis  Edwina-ulcer skin: indurated, santa, warm and dry. Ulcer(s): fairly small, partial thickness, pink, a bit of fibrin, serous exudate, no signs of infection, small adjacent bulla remains. Photos also saved in electronic chart.     Today's Wound Measurements, per RN documentation:  Wound 06/16/21 #4, Left Posterior Lower Leg, Venous, Partial Thickness, Onset 6/9/21-Wound Length (cm): 1 cm    Wound 06/16/21 #4, Left Posterior Lower Leg, Venous, Partial Thickness, Onset 6/9/21-Wound Width (cm): 1.1 cm    Wound 06/16/21 #4, Left Posterior Lower Leg, Venous, Partial Thickness, Onset 6/9/21-Wound Depth (cm): 0.1 cm  ______________________________    Lab Results   Component Value Date    LABALBU 4.5 03/02/2021     Lab Results   Component Value Date    CREATININE 0.9 03/03/2021     Lab Results   Component Value Date    HGB 13.2 (L) 03/03/2021     Lab Results   Component Value Date    LABA1C 6.4 02/12/2021     Assessment:     Patient Active Problem List   Diagnosis Code    Class 3 severe obesity due to excess calories with serious comorbidity and body mass index (BMI) of 50.0 to 59.9 in adult ([facility] [address] E66.01, Z68.43    Mixed hypertriglyceridemia K96.0    Nonalcoholic fatty liver disease K76.0    RLS (restless legs syndrome) G25.81    Essential hypertension, benign I10    Morbid obesity with BMI of 50.0-59.9, adult ([facility]) E66.01, Z68.43    Chronic pain of left knee M25.562, G89.29    Osteoarthritis of left knee M17.12    Chondromalacia of left knee M94.262    Pes planus M21.40    Lymphedema I89.0    Peripheral venous insufficiency I87.2    Allergic rhinitis J30.9    Type 2 diabetes mellitus without complication, without long-term current use of insulin ([facility]) E11.9    TOM treated with BiPAP G47.33    Arthritis of left knee M17.12    Impacted cerumen, bilateral H61.23    Ulcer of left calf, limited to breakdown of skin ([facility]) L97.221       Assessment of today's active condition(s): venous stasis, chronic skin changes, LE edema, small recurrent left calf venous ulcer that started as a bit of a friction injury from his new stockings. No signs of infection or ischemia; primarily need to just work on exudate mgmt and compression, I think, and he ought to be healed in 2-3 weeks, in time for his knee surgery, if there are no surprises. I don't want him to continue these new compression stockings; considered a weekly compression wrap as we did before, but they occasionally had trouble with sliding down and causing new abrasions or ulcers, so for how I'll have him just use a weekly dressing and go back to his CompreFlex garments for edema; I can order one new one this week with the new unilateral ulcer. Factors contributing to occurrence and/or persistence of the chronic ulcer include edema, venous stasis, shear force and obesity. Medical necessity of today's visit is shown by the above documentation. Sharp debridement is not indicated today, based upon the exam findings in the wound(s) above. Discharge plan:     Treatment in the wound care center today, per RN documentation: Wound 06/16/21 #4, Left Posterior Lower Leg, Venous, Partial Thickness, Onset 6/9/21-Dressing/Treatment: Other (comment) (procellera moistened hydrogel benzoin mepilex border). Keep dressing in place for the week.      I reminded the patient of the importance of weight management and smoking cessation, if applicable; also encouraged ambulation as tolerated, additional lower extremity exercises as instructed in our education sheet, leg elevation when at rest, and compliance with any recommended dietary, diuretic and compression therapies. Would wear his CompreFlex garments BL. I'll order a new one for his left leg this week. If he feels that he needs a 2nd replacement garment for the other leg, I can let him know where to purchase one out-of-pocket. For this week, to avoid additional friction against the dressing, it might actually make the most sense for him to keep the transition liner in place 24/7, and just remove the Velcro portion of the garment overnight. Written discharge instructions given to patient. Follow up in 1 week.     Electronically signed by Johann Oleary MD on 6/17/2021 at 9:41 AM.

## 2021-06-17 NOTE — FLOWSHEET NOTE
[facility],  [address]  Phone: [phone]  Fax [phone]    Physical Therapy Daily Treatment Note  Date:  2021    Patient Name:  Alfred Martinez    :  1960  MRN: [id number]  Restrictions/Precautions:    Medical/Treatment Diagnosis Information:  Diagnosis: M17.12 (ICD-10-CM) - Primary osteoarthritis of left knee  Treatment Diagnosis: M17.12 (ICD-10-CM) - Primary osteoarthritis of left knee  Insurance/Certification information:  PT Insurance Information: [employer]  Physician Information:  Referring Practitioner: Meg Humphrey  Plan of care signed (Y/N):     Date of Patient follow up with Physician: [email or web address] 21    G-Code (if applicable):      Date G-Code Applied:     LEFS = 83% disability            21    Progress Note: [x]  Yes  []  No  Next due by: Visit #10       Latex Allergy:  [x]NO      []YES  Preferred Language for Healthcare:   [x]English       []other:       Visit # Insurance Allowable Auth Required   In-person 3 BMN []  Yes [x]  No    Telehealth   []  Yes []  No    Total        Pain level:  -10/10     SUBJECTIVE:  Pt did get into see wound care MD, is using a wrap and medication on L anterior shin and he's hopefull for healing but if not, may have to delay surgery. He did measure the steps in his home, they are 8\" risers. He's been able to do the steps currently with B crutches and bracing himself against the wall.     OBJECTIVE:    Observation: wrap anterior distal shin d/t new blister/open skin    Test measurements:      RESTRICTIONS/PRECAUTIONS: OA, HTN, hx recent bariatric sx 3/2021, L knee menisectomy 2020l hx wounds B LEs (shins); L TKA scheduled 21    Exercises/Interventions:     Therapeutic Ex Sets/reps Notes   Pt ed:role of prehab assess, pt's goals, answered pt's questions pre-sx 5'    SB hip/knee flex B  SL  Bridge on table  SL bridge x10  x5 R< L  x10 5\"  x10 R, L ea 3\"   LS HS stretch 3x30 sec HEP   Standing calf stretch-incline board 3x30 sec HEP   Sup[ine SAQ    ecc LAQ machine R, L LEs 5 sec 10x 10#HEP   SLR FLX supine  SL hip abd  SL hip add HEP   Seated heelslide with strap HEP   SS table-side 5 tripsRed tband ant ankle below wound area. Step-up R on 4\", L LATOYA platform 3\" 1x10 R  x5 L on 4\" x 5 on 3\" HEP   LP  SL ecc 2x10 100#  10 R, L x80# HEP   HS  2x10 40#    LATOYA TKE   abd 60# x15 R, L  15# x15 R, L         There Act: asked pt to consider railing installation for home prior to sx so he can get up/down steps more safety, discussed raised commode, shower chair, need for 24 hr A after sx then supv as needed to aide mobility/bathing/dressing/SONI hose/ice etc.                          Manual Intervention                                   NMR re-education                                                      Therapeutic Exercise and NMR EXR  [x] (53815) Provided verbal/tactile cueing for activities related to strengthening, flexibility, endurance, ROM for improvements in LE, proximal hip, and core control with self care, mobility, lifting, ambulation.  [] (04869) Provided verbal/tactile cueing for activities related to improving balance, coordination, kinesthetic sense, posture, motor skill, proprioception  to assist with LE, proximal hip, and core control in self care, mobility, lifting, ambulation and eccentric single leg control.      NMR and Therapeutic Activities:    [] (53255 or 18528) Provided verbal/tactile cueing for activities related to improving balance, coordination, kinesthetic sense, posture, motor skill, proprioception and motor activation to allow for proper function of core, proximal hip and LE with self care and ADLs  [] (30354) Gait Re-education- Provided training and instruction to the patient for proper LE, core and proximal hip recruitment and positioning and eccentric body weight control with ambulation re-education including up and down stairs to 0-130 deg for improved L stance during gait with use of B crutches vs RW.  [] Progressing: [] Met: [] Not Met: [] Adjusted  4. Pt will improve strength of B hips/knees x5 lb on HHD to prepare for ability to perform sit-->stand  and ascend/descend steps post-sx to enter home with step-to pattern and B crutches. [] Progressing: [] Met: [] Not Met: [] Adjusted    Progression Towards Functional goals:  [] Patient is progressing as expected towards functional goals listed. [] Progression is slowed due to complexities listed. [] Progression has been slowed due to co-morbidities. [x] Plan just implemented, too soon to assess goals progression  [] Other:     ASSESSMENT:  Pt continues to struggle with full WB\"ing on LLE, there is concern for ability to return to home after sx d/t 8\" height steps. His RLE is currently strong enough to perform steps at home. He will f/u with wound care MD re skin on L shin to determine if cleared for sx. Treatment/Activity Tolerance:  [] Patient tolerated treatment well [] Patient limited by fatique  [x] Patient limited by pain  [] Patient limited by other medical complications  [] Other:   [] Patient did not tolerate physical therapy activity due to substantial increase in pain    Prognosis: [x] Good [] Fair  [] Poor          Patient Requires Follow-up: [x] Yes  [] No    PLAN: Progress strength pre-op as dayanna to promote ability to ascend steps.     [x] Continue per plan of care  [] Alter current plan (see comments)  [] Plan of care initiated [] Discharge    [] Discharge to home program at this time due to inability to tolerate physical therapy activity       Electronically signed by: Deepa Collado, PT, DPT

## 2021-06-23 ENCOUNTER — HOSPITAL ENCOUNTER (OUTPATIENT)
Dept: WOUND CARE | Age: 61
Discharge: HOME | End: 2021-06-23
Payer: COMMERCIAL

## 2021-06-23 VITALS
TEMPERATURE: 98.1 F | HEART RATE: 61 BPM | DIASTOLIC BLOOD PRESSURE: 68 MMHG | SYSTOLIC BLOOD PRESSURE: 140 MMHG | RESPIRATION RATE: 18 BRPM

## 2021-06-23 DIAGNOSIS — L97.221 ULCER OF LEFT CALF, LIMITED TO BREAKDOWN OF SKIN (HCC): Primary | ICD-10-CM

## 2021-06-23 DIAGNOSIS — I87.2 PERIPHERAL VENOUS INSUFFICIENCY: ICD-10-CM

## 2021-06-23 PROCEDURE — 99212 OFFICE O/P EST SF 10 MIN: CPT | Performed by: INTERNAL MEDICINE

## 2021-06-23 PROCEDURE — 99212 OFFICE O/P EST SF 10 MIN: CPT

## 2021-06-23 RX ORDER — BACITRACIN ZINC AND POLYMYXIN B SULFATE 500; 1000 [USP'U]/G; [USP'U]/G
OINTMENT TOPICAL ONCE
Status: CANCELLED | OUTPATIENT
Start: 2021-06-23 | End: 2021-06-23

## 2021-06-23 RX ORDER — LIDOCAINE HYDROCHLORIDE 40 MG/ML
SOLUTION TOPICAL ONCE
Status: CANCELLED | OUTPATIENT
Start: 2021-06-23 | End: 2021-06-23

## 2021-06-23 RX ORDER — LIDOCAINE 50 MG/G
OINTMENT TOPICAL ONCE
Status: CANCELLED | OUTPATIENT
Start: 2021-06-23 | End: 2021-06-23

## 2021-06-23 RX ORDER — LIDOCAINE 40 MG/G
CREAM TOPICAL ONCE
Status: CANCELLED | OUTPATIENT
Start: 2021-06-23 | End: 2021-06-23

## 2021-06-23 ASSESSMENT — PAIN SCALES - GENERAL: PAINLEVEL_OUTOF10: 0

## 2021-06-23 NOTE — PROGRESS NOTES
88 [facility] Progress Note    Domonique Maynard     : 1960    DATE OF VISIT:  2021    Subjective:     Domonique Maynard is a 64 y.o. male who has a venous and  lymphedema ulcer located on the left  lower leg. Current complaint of pain in this ulcer? yes. Quality of pain: aching  Timing: intermittent and decreasing in frequency  Severity: mild  Associated Signs/Symptoms: swelling and itching  Other significant symptoms or pertinent ulcer history: no definite drainage this week, no fever, swelling is a bit better. Going through some pre-op preparations for his upcoming knee surgery. Additional ulcer(s) noted? no.      Mr. Rena Laboy has a past medical history of Bilateral venous leg ulcers, Cellulitis of lower extremity, Diabetes mellitus ([address] Hypertension, Impaired fasting glucose, Obesity, TOM treated with BiPAP, Preoperative clearance, Screening PSA (prostate specific antigen), Sleep apnea, Stasis dermatitis of both legs, Tobacco use, and Tubular adenoma of colon. He has a past surgical history that includes Mouth surgery; [place] tooth extraction; Circumcision; Vasectomy; Colonoscopy (2017); Umbilical hernia repair (2011); Knee arthroscopy (Left, 8/3/2020); Pain management procedure (Left, 2020); Upper gastrointestinal endoscopy (N/A, 2021); and Sleeve Gastrectomy (N/A, 3/2/2021). His family history includes Diabetes in his brother; Heart Disease in his mother; High Blood Pressure in his mother. Mr. Rena Laboy reports that he quit smoking about 15 years ago. His smoking use included cigarettes. He has a 50.00 pack-year smoking history. He has never used smokeless tobacco. He reports previous alcohol use. He reports that he does not use drugs.     His current medication list consists of Multiple Vitamin, Prodigy Twist Top Lancets 28G, acetaminophen, atorvastatin, blood glucose monitor kit and supplies, blood glucose test strips, fluticasone, furosemide, glucose, hydroCHLOROthiazide, lisinopril, loratadine, metFORMIN, metOLazone, omeprazole, ondansetron, potassium chloride, pregabalin, and rOPINIRole. Allergies: Bactrim [sulfamethoxazole-trimethoprim]    Pertinent items from the review of systems are discussed in the HPI; the remainder of the ROS was reviewed and is negative. Objective:     Vitals:    06/23/21 0824   BP: (!) 140/68   Pulse: 61   Resp: 18   Temp: 98.1 °F (36.7 °C)   TempSrc: Oral       Constitutional:  well-developed, well-nourished, overweight, NAD  Psychiatric:  oriented to person, place and time; mood and affect appropriate for the situation   Eyes:  pupils equal, round and reactive to light; sclerae anicteric, conjunctivae not pale  Cardiovascular:  bilateral pedal pulses palpable, feet warm, good cap refill; mild-mod BL lower extremity edema  Lymphatic:  no inguinal or popliteal adenopathy, no angitis or cellulitis  Musculoskeletal:  no clubbing, cyanosis or petechiae; RLE and LLE with no gross effusions, joint misalignment or acute arthritis  Edwina-ulcer skin: santa, warm, dry and very fragile, with the appearance of a collapsed bulla in one area. Ulcer(s): very delicately healed. Photos also saved in electronic chart.     Today's Wound Measurements, per RN documentation:  [REMOVED] Wound 06/16/21 #4, Left Posterior Lower Leg, Venous, Partial Thickness, Onset 6/9/21-Wound Length (cm): 0 cm    [REMOVED] Wound 06/16/21 #4, Left Posterior Lower Leg, Venous, Partial Thickness, Onset 6/9/21-Wound Width (cm): 0 cm    [REMOVED] Wound 06/16/21 #4, Left Posterior Lower Leg, Venous, Partial Thickness, Onset 6/9/21-Wound Depth (cm): 0 cm  ______________________________    Lab Results   Component Value Date    LABALBU 4.5 03/02/2021     Lab Results   Component Value Date    CREATININE 0.9 03/03/2021     Lab Results   Component Value Date    HGB 13.2 (L) 03/03/2021     Lab Results   Component Value Date    LABA1C 6.4 02/12/2021 Assessment:     Patient Active Problem List   Diagnosis Code    Class 3 severe obesity due to excess calories with serious comorbidity and body mass index (BMI) of 50.0 to 59.9 in adult ([facility] E66.01, Z68.43    Mixed hypertriglyceridemia P31.6    Nonalcoholic fatty liver disease K76.0    RLS (restless legs syndrome) G25.81    Essential hypertension, benign I10    Morbid obesity with BMI of 50.0-59.9, adult ([facility]) E66.01, Z68.43    Chronic pain of left knee M25.562, G89.29    Osteoarthritis of left knee M17.12    Chondromalacia of left knee M94.262    Pes planus M21.40    Lymphedema I89.0    Peripheral venous insufficiency I87.2    Allergic rhinitis J30.9    Type 2 diabetes mellitus without complication, without long-term current use of insulin ([facility]) E11.9    TOM treated with BiPAP G47.33    Arthritis of left knee M17.12    Impacted cerumen, bilateral H61.23    Ulcer of left calf, limited to breakdown of skin ([facility]) L97.221       Assessment of today's active condition(s): venous stasis, chronic skin changes, LE edema, small reopened left calf ulcer, but delicately healed this week. Definitely at risk for future ulcerations, and we think he might do better in the CompreFlex wraps as opposed to stockings, in terms of risk of friction damage to his skin with the latter. I ordered one new CompreFlex garment for the left leg last week, but it had to get forwarded from [facility] to [facility] [address] because of insurance network issues, so he doesn't have that; his old wraps are a bit worn, but I think adequate for now. Factors contributing to occurrence and/or persistence of the chronic ulcer include venous stasis, lymphedema, diabetes, decreased mobility and obesity. Medical necessity of today's visit is shown by the above documentation. Sharp debridement is not indicated today, based upon the exam findings in the wound(s) above.      Discharge plan:     Treatment in the wound care center today, per RN documentation: [REMOVED] Wound 06/16/21 #4, Left Posterior Lower Leg, Venous, Partial Thickness, Onset 6/9/21-Dressing/Treatment:  (benzoin(carmelita),adaptic,mepilex border). Leave that dressing in place for the week, then carefully remove. No ID or wound-care contraindications to upcoming knee surgery, assuming that nothing new opens up. I reminded the patient of the importance of weight management and smoking cessation, if applicable; also encouraged ambulation as tolerated, additional lower extremity exercises as instructed in our education sheet, leg elevation when at rest, and compliance with any recommended dietary, diuretic and compression therapies. Continue to use CompreFlex wraps; I gave him some papers today to help him to purchase additional ones when these wear out completely (typically 6 mos of daily use), and some extra transition liners for beneath. I am hopeful, but it's too soon to tell, that with enough weight loss and increase in physical activity, and after he fully recovers from his knee surgery, he might not need these for the long-term. Call us next week if he doesn't receive his wrap from [address] by then. Written discharge instructions given to patient. Follow up here PRN.     Electronically signed by Abigail Early MD on 6/23/2021 at 12:56 PM.

## 2021-06-24 ENCOUNTER — HOSPITAL ENCOUNTER (OUTPATIENT)
Dept: PHYSICAL THERAPY | Age: 61
Setting detail: THERAPIES SERIES
Discharge: HOME | End: 2021-06-24
Payer: COMMERCIAL

## 2021-06-24 PROCEDURE — 97110 THERAPEUTIC EXERCISES: CPT

## 2021-06-24 NOTE — FLOWSHEET NOTE
[facility],  [address] [place]  Phone: [phone]  Fax [phone]    Physical Therapy Daily Treatment Note  Date:  2021    Patient Name:  Radha Fisher    :  1960  MRN: [id number]  Restrictions/Precautions:    Medical/Treatment Diagnosis Information:  Diagnosis: M17.12 (ICD-10-CM) - Primary osteoarthritis of left knee  Treatment Diagnosis: M17.12 (ICD-10-CM) - Primary osteoarthritis of left knee  Insurance/Certification information:  PT Insurance Information: [employer] [place]  Physician Information:  Referring Practitioner: Eric Camacho  Plan of care signed (Y/N):     Date of Patient follow up with Physician: sx 21    G-Code (if applicable):      Date G-Code Applied:     LEFS = 83% disability            21    Progress Note: [x]  Yes  []  No  Next due by: Visit #10       Latex Allergy:  [x]NO      []YES  Preferred Language for Healthcare:   [x]English       []other:       Visit # Insurance Allowable Auth Required   In-person 4 BMN []  Yes [x]  No    Telehealth   []  Yes []  No    Total        Pain level:  -10/10     SUBJECTIVE:  Pt had f/u with wound care MD, states he's healing well and should be able to proceed with sx. His L knee is doing ok after PT sessions, he still uses 1 crutch at all times at home; 2 for in comm. He has had L knee \"locking\" on him (usually when pivoting when sitting, or with initial sit-->stand) that causes sharp pain he rates as 12-15/10!      OBJECTIVE:    Observation: wrap anterior distal shin d/t new blister/open skin; healing well per wound care MD    Test measurements:      RESTRICTIONS/PRECAUTIONS: OA, HTN, hx recent bariatric sx 3/2021, L knee menisectomy 2020l hx wounds B LEs (shins); L TKA scheduled 21    Exercises/Interventions:     Therapeutic Ex Sets/reps Notes   Pt ed:role of prehab assess, pt's goals, answered pt's questions pre-sx     SB hip/knee flex B  SL  Bridge on table  SL bridge    LS HS stretch 3x30 sec HEP   Standing calf stretch-incline board 3x30 sec HEP   Sup[ine SAQ    ecc LAQ machine R, L LEs 5 sec 10x 10#HEP   SLR FLX supine  SL hip abd  SL hip add HEP   Seated heelslide with strap HEP   SS table-side 6 tripsRed tband ant ankle below wound area. Step-up R on 4\", L LATOYA platform 3\"      LSU on [facility] 3\" 1x10 R  x5 L on 4\" x 5 on 3\"  3x5 R, L HEP   LLE march onto 8\" step 2x5 L B UE supp, cue to reduce circumduction   LP  SL ecc  SL 2x10 100#  10 R, L x80#  x10 R, L 40# HEP   HS  3x10 45#    LATOYA TKE   abd  ext 60# x15 R, L  35# x15 R, L  60# x15 R, L    Sit-->Stand no hands with ecc control from hi/low 20\"  2x5    There Act: asked pt to consider railing installation for home prior to sx so he can get up/down steps more safety, discussed raised commode, shower chair, need for 24 hr A after sx then supv as needed to aide mobility/bathing/dressing/SONI hose/ice etc.                          Manual Intervention                                   NMR re-education     Tandem balance (near)  With head turns 30\" R, L  30\" R, L                                                Therapeutic Exercise and NMR EXR  [x] (76691) Provided verbal/tactile cueing for activities related to strengthening, flexibility, endurance, ROM for improvements in LE, proximal hip, and core control with self care, mobility, lifting, ambulation.  [] (62224) Provided verbal/tactile cueing for activities related to improving balance, coordination, kinesthetic sense, posture, motor skill, proprioception  to assist with LE, proximal hip, and core control in self care, mobility, lifting, ambulation and eccentric single leg control.      NMR and Therapeutic Activities:    [] (75697 or 33788) Provided verbal/tactile cueing for activities related to improving balance, coordination, kinesthetic sense, posture, motor skill, proprioception and motor activation to allow for proper function of core, proximal hip and LE with self care and ADLs  [] (37396) Gait Re-education- Provided training and instruction to the patient for proper LE, core and proximal hip recruitment and positioning and eccentric body weight control with ambulation re-education including up and down stairs     Home Exercise Program:    [x] (61095) Reviewed/Progressed HEP activities related to strengthening, flexibility, endurance, ROM of core, proximal hip and LE for functional self-care, mobility, lifting and ambulation/stair navigation   [] (68126)Reviewed/Progressed HEP activities related to improving balance, coordination, kinesthetic sense, posture, motor skill, proprioception of core, proximal hip and LE for self care, mobility, lifting, and ambulation/stair navigation      Manual Treatments:  PROM / STM / Oscillations-Mobs:  G-I, II, III, IV (PA's, Inf., Post.)  [] (53232) Provided manual therapy to mobilize LE, proximal hip and/or LS spine soft tissue/joints for the purpose of modulating pain, promoting relaxation,  increasing ROM, reducing/eliminating soft tissue swelling/inflammation/restriction, improving soft tissue extensibility and allowing for proper ROM for normal function with self care, mobility, lifting and ambulation. Modalities:  Ice B knees x15'    Charges:  Timed Code Treatment Minutes: 45   Total Treatment Minutes: 60 (ice)     [] EVAL (LOW) 53904 (typically 20 minutes face-to-face)  [] EVAL (MOD) 05006 (typically 30 minutes face-to-face)  [] EVAL (HIGH) 16281 (typically 45 minutes face-to-face)  [] RE-EVAL        [x] XZ(47661) x 3    [] IONTO  [] NMR (41336) x     [] VASO  [] Manual (68685) x      [] Other:  [] TA x     [] Mech Traction (98303)  [] ES(attended) (65615)      [] ES (un) (20143):     GOALS:   Patient stated goal: To be prepared for surgery      Therapist goals for Patient:    Short Term Goals: To be achieved in: 5  weeks  1.  Independent in HEP and progression per patient tolerance, in order to prevent re-injury. [] Progressing: [] Met: [] Not Met: [] Adjusted  2. Patient will have a decrease in pain to facilitate improvement in movement, function, and ADLs as indicated by Functional Deficits. [] Progressing: [] Met: [] Not Met: [] Adjusted  3. Pt will improve ROM of R knee to 0-130 deg for improved L stance during gait with use of B crutches vs RW.  [] Progressing: [] Met: [] Not Met: [] Adjusted  4. Pt will improve strength of B hips/knees x5 lb on HHD to prepare for ability to perform sit-->stand  and ascend/descend steps post-sx to enter home with step-to pattern and B crutches. [] Progressing: [] Met: [] Not Met: [] Adjusted    Progression Towards Functional goals:  [] Patient is progressing as expected towards functional goals listed. [] Progression is slowed due to complexities listed. [] Progression has been slowed due to co-morbidities. [x] Plan just implemented, too soon to assess goals progression  [] Other:     ASSESSMENT:  Pt able to load LLE with reduced weight on LP to build quad strength. He continues to be able to ascend with RLE up his 8\" step, descend with LLE leading from on 8\" step for ability to do steps in his home, but march up to 8\" step with LLE is still limited and will be concern for after sx still. Treatment/Activity Tolerance:  [] Patient tolerated treatment well [] Patient limited by fatique  [x] Patient limited by pain  [] Patient limited by other medical complications  [] Other:   [] Patient did not tolerate physical therapy activity due to substantial increase in pain    Prognosis: [x] Good [] Fair  [] Poor          Patient Requires Follow-up: [x] Yes  [] No    PLAN: Progress strength pre-op as dayanna to promote ability to ascend steps.     [x] Continue per plan of care  [] Alter current plan (see comments)  [] Plan of care initiated [] Discharge    [] Discharge to home program at this time due to inability to tolerate physical therapy activity       Electronically signed by: Shalini Jesus, PT, DPT

## 2021-06-25 ENCOUNTER — OFFICE VISIT (OUTPATIENT)
Dept: INTERNAL MEDICINE CLINIC | Age: 61
End: 2021-06-25
Payer: COMMERCIAL

## 2021-06-25 VITALS
HEIGHT: 68 IN | DIASTOLIC BLOOD PRESSURE: 70 MMHG | WEIGHT: 315 LBS | OXYGEN SATURATION: 98 % | BODY MASS INDEX: 47.74 KG/M2 | SYSTOLIC BLOOD PRESSURE: 120 MMHG | HEART RATE: 80 BPM

## 2021-06-25 DIAGNOSIS — Z01.818 PREOP EXAM FOR INTERNAL MEDICINE: ICD-10-CM

## 2021-06-25 DIAGNOSIS — E66.01 CLASS 3 SEVERE OBESITY DUE TO EXCESS CALORIES WITH SERIOUS COMORBIDITY AND BODY MASS INDEX (BMI) OF 45.0 TO 49.9 IN ADULT (HCC): ICD-10-CM

## 2021-06-25 DIAGNOSIS — M17.12 PRIMARY OSTEOARTHRITIS OF LEFT KNEE: ICD-10-CM

## 2021-06-25 DIAGNOSIS — Z01.818 PREOP EXAM FOR INTERNAL MEDICINE: Primary | ICD-10-CM

## 2021-06-25 DIAGNOSIS — I10 ESSENTIAL HYPERTENSION, BENIGN: Chronic | ICD-10-CM

## 2021-06-25 DIAGNOSIS — E11.9 TYPE 2 DIABETES MELLITUS WITHOUT COMPLICATION, WITHOUT LONG-TERM CURRENT USE OF INSULIN (HCC): Chronic | ICD-10-CM

## 2021-06-25 DIAGNOSIS — E78.2 MIXED HYPERTRIGLYCERIDEMIA: Chronic | ICD-10-CM

## 2021-06-25 PROBLEM — H61.23 IMPACTED CERUMEN, BILATERAL: Status: RESOLVED | Noted: 2021-06-07 | Resolved: 2021-06-25

## 2021-06-25 LAB
A/G RATIO: 2 (ref 1.1–2.2)
ALBUMIN SERPL-MCNC: 4.7 G/DL (ref 3.4–5)
ALP BLD-CCNC: 99 U/L (ref 40–129)
ALT SERPL-CCNC: 28 U/L (ref 10–40)
ANION GAP SERPL CALCULATED.3IONS-SCNC: 12 MMOL/L (ref 3–16)
APTT: 33.5 SEC (ref 24.2–36.2)
AST SERPL-CCNC: 23 U/L (ref 15–37)
BILIRUB SERPL-MCNC: 0.4 MG/DL (ref 0–1)
BUN BLDV-MCNC: 17 MG/DL (ref 7–20)
CALCIUM SERPL-MCNC: 9.7 MG/DL (ref 8.3–10.6)
CHLORIDE BLD-SCNC: 103 MMOL/L (ref 99–110)
CO2: 27 MMOL/L (ref 21–32)
CREAT SERPL-MCNC: 0.8 MG/DL (ref 0.9–1.3)
GFR AFRICAN AMERICAN: >60
GFR NON-AFRICAN AMERICAN: >60
GLOBULIN: 2.3 G/DL
GLUCOSE BLD-MCNC: 95 MG/DL (ref 70–99)
HCT VFR BLD CALC: 44.8 % (ref 40.5–52.5)
HEMOGLOBIN: 15.3 G/DL (ref 13.5–17.5)
INR BLD: 1.01 (ref 0.86–1.14)
MCH RBC QN AUTO: 33.2 PG (ref 26–34)
MCHC RBC AUTO-ENTMCNC: 34 G/DL (ref 31–36)
MCV RBC AUTO: 97.6 FL (ref 80–100)
PDW BLD-RTO: 14.2 % (ref 12.4–15.4)
PLATELET # BLD: 151 K/UL (ref 135–450)
PMV BLD AUTO: 8.9 FL (ref 5–10.5)
POTASSIUM SERPL-SCNC: 4.8 MMOL/L (ref 3.5–5.1)
PROTHROMBIN TIME: 11.7 SEC (ref 10–13.2)
RBC # BLD: 4.59 M/UL (ref 4.2–5.9)
SEDIMENTATION RATE, ERYTHROCYTE: 13 MM/HR (ref 0–20)
SODIUM BLD-SCNC: 142 MMOL/L (ref 136–145)
TOTAL PROTEIN: 7 G/DL (ref 6.4–8.2)
WBC # BLD: 5.4 K/UL (ref 4–11)

## 2021-06-25 PROCEDURE — 93000 ELECTROCARDIOGRAM COMPLETE: CPT | Performed by: NURSE PRACTITIONER

## 2021-06-25 PROCEDURE — 99244 OFF/OP CNSLTJ NEW/EST MOD 40: CPT | Performed by: NURSE PRACTITIONER

## 2021-06-25 ASSESSMENT — ENCOUNTER SYMPTOMS
EYE ITCHING: 0
CONSTIPATION: 0
NAUSEA: 0
BACK PAIN: 0
RHINORRHEA: 0
SINUS PRESSURE: 0
SHORTNESS OF BREATH: 0
BLOOD IN STOOL: 0
CHEST TIGHTNESS: 0
COLOR CHANGE: 0
SORE THROAT: 0
COUGH: 0
ABDOMINAL PAIN: 0
EYE REDNESS: 0
VOMITING: 0
WHEEZING: 0
DIARRHEA: 0

## 2021-06-25 ASSESSMENT — PATIENT HEALTH QUESTIONNAIRE - PHQ9
1. LITTLE INTEREST OR PLEASURE IN DOING THINGS: 0
SUM OF ALL RESPONSES TO PHQ9 QUESTIONS 1 & 2: 0
2. FEELING DOWN, DEPRESSED OR HOPELESS: 0
SUM OF ALL RESPONSES TO PHQ QUESTIONS 1-9: 0

## 2021-06-25 NOTE — ASSESSMENT & PLAN NOTE
"   03/20/18 1130   Visit Information   Visit Made By Staff    Type of Visit Initial   Visited Patient   Interventions   Plan of Care Review   With interdisciplinary team   Basic Spiritual Interventions    introduction/orientation to Spiritual Health Services;Assessment of spiritual needs/resources;Reflective conversation   Advanced Assessments/Interventions   Presenting Concerns/Issues Spiritual/Anabaptist/emotional support   SPIRITUAL HEALTH SERVICES Progress Note  [facility] [place], Outpt.    DATA:  Initial one to one in which Soraya shared \"At first I wanted to be here. I came on my own and its hard on me without (outside) consequences\" (to continue the program.) \"I know I do want to be sober.\" She states her depression started in 7th grade and that she has a therapist. She shared about her relationship with her Mom, stating \"she has boundary issues, she goes through my stuff. I've told her its annoying and she ignores me.\" She shares that she was upset when her Mom found her melatonin pills and threw them away. Soraya states  \"I yelled at her 'those are my sleeping pills, I need them to sleep.'\" Kendra shares about difficulties atschool and states \"I believe in God, but I don't go to [employer].\" She adds \"He listens, God will make you go through hard stuff\" and \"he has a plan for you. When I pray to him, I feel listened to.\" She states she is hopeful \"I will be happy and get where I want to be - graduate, job, and I want a kid - a minny me.\"  INTERVENTION:  Introduction to Spiritual Health Services, listening and reflective conversation integrating mental health/addiction, family, oli and hopes into a current narrative that expresses pt's vision of life.    OUTCOME: Soraya named some of her struggles and that her God connection is a place where she feels heard.     PLAN: Will continue to follow and engage in conversation while in treatment at [place].                      " Stable, controlled  No changes  Continue current treatment plan                                                                                                                                 Gia Lia M.Div.  Staff [facility]   Pager [phone]

## 2021-06-25 NOTE — PROGRESS NOTES
Subjective:     Ryan Us who presentsto the office today for a preoperative consultation at the request of surgeon Dr. Kennedy Huntley who plans on performing left knee replacement on 7/14/21 . This consultation is requested for the specific conditions prompting preoperative evaluation (i.e. because of potential affect on operative risk): HTN, DM, TOM. Planned anesthesia isGeneral.  The patient has the following known anesthesia issues: none  Patient has a bleeding risk of : no recent abnormal bleeding, no remote history of abnormal bleeding. Patient's medications, allergies, past medical, surgical,social and family histories were reviewed and updated as appropriate. Past Medical History:   Diagnosis Date    Bilateral venous leg ulcers 09/2020    Cellulitis of lower extremity 9/25/2019    Diabetes mellitus ([address]     Hypertension     Impaired fasting glucose 5/2013    Obesity     TOM treated with BiPAP 11/30/2020    Preoperative clearance 10/26/2020    Screening PSA (prostate specific antigen) 12/30/2015;5/1/17    Nml:0.53(12/30/2015);5/1/17=nml.     Sleep apnea     uses CPAP    Stasis dermatitis of both legs 8/28/2020    Tobacco use     Tubular adenoma of colon 09/14/2017     Past Surgical History:   Procedure Laterality Date    CIRCUMCISION      COLONOSCOPY  09/14/2017    Colonoscopy with polypectomy (cold biopsy):Dr. Walker:next in 3yrs=9/14/2020    KNEE ARTHROSCOPY Left 8/3/2020    VIDEO ARTHROSCOPY LEFT KNEE, PARTIAL MEDIAL MENISCECTOMY, CHONDROPLASTY, SYNOVIAL BIOPSY -TOM=4- performed by Jesus Johns MD at [place] [address] PAIN MANAGEMENT PROCEDURE Left 12/9/2020    COOLIEF RADIOFREQUENCY ABLATION - LEFT performed by Syed Baird MD at [address] N/A 3/2/2021    ROBOTIC ASSISTED LAPAROSCOPIC SLEEVE GASTRECTOMY performed by Sariah Gracia DO at [facility]  05/28/2011    UPPER GASTROINTESTINAL ENDOSCOPY N/A 2021    EGD BIOPSY performed by Catrina Chavez DO at [address] EXTRACTION       Family History   Problem Relation Age of Onset    High Blood Pressure Mother     Heart Disease Mother         MI    AT 66    Diabetes Brother      Social History     Socioeconomic History    Marital status:      Spouse name: Not on file    Number of children: 2    Years of education: Not on file    Highest education level: Not on file   Occupational History    Occupation: [employer]   Tobacco Use    Smoking status: Former Smoker     Packs/day: 2.00     Years: 25.00     Pack years: 50.00     Types: Cigarettes     Quit date: 2006     Years since quitting: 15.1    Smokeless tobacco: Never Used   Vaping Use    Vaping Use: Never used   Substance and Sexual Activity    Alcohol use: Not Currently     Comment: ocas    Drug use: No    Sexual activity: Yes     Partners: Female   Other Topics Concern    Not on file   Social History Narrative    Not on file     Social Determinants of Health     Financial Resource Strain: Low Risk     Difficulty of Paying Living Expenses: Not hard at all   Food Insecurity: No Food Insecurity    Worried About [address] in the Last Year: Never true    [facility] in the Last Year: Never true   Transportation Needs:     Lack of Transportation (Medical):      Lack of Transportation (Non-Medical):    Physical Activity:     Days of Exercise per Week:     Minutes of Exercise per Session:    Stress:     Feeling of Stress :    Social Connections:     Frequency of Communication with Friends and Family:     Frequency of Social Gatherings with Friends and Family:     Attends Spiritism Services:     Active Member of Clubs or Organizations:     Attends Club or Organization Meetings:     Marital Status:    Intimate Partner Violence:     Fear of Current or Ex-Partner:     Emotionally Abused:     Physically Abused:  Sexually Abused:        Review of Systems  Review of Systems   Constitutional: Negative for chills, fatigue and fever. HENT: Negative for congestion, ear pain, postnasal drip, rhinorrhea, sinus pressure, sneezing and sore throat. Eyes: Negative for redness and itching. Respiratory: Negative for cough, chest tightness, shortness of breath and wheezing. Cardiovascular: Negative for chest pain and palpitations. Gastrointestinal: Negative for abdominal pain, blood in stool, constipation, diarrhea, nausea and vomiting. Endocrine: Negative for cold intolerance and heat intolerance. Genitourinary: Negative for difficulty urinating, dysuria, flank pain, frequency, hematuria and urgency. Musculoskeletal: Negative for arthralgias, back pain, joint swelling and myalgias. Skin: Negative for color change, pallor, rash and wound. Allergic/Immunologic: Negative for environmental allergies and food allergies. Neurological: Negative for dizziness, seizures, syncope, weakness, light-headedness, numbness and headaches. Hematological: Negative for adenopathy. Does not bruise/bleed easily. Psychiatric/Behavioral: Negative for confusion, sleep disturbance and suicidal ideas. The patient is not nervous/anxious and is not hyperactive. Objective:     Vitals:    06/25/21 1021   BP: 120/70   Pulse: 80   SpO2: 98%        Physical Exam  Physical Exam  Constitutional:       Appearance: Normal appearance. He is normal weight. HENT:      Head: Normocephalic and atraumatic. Right Ear: Tympanic membrane, ear canal and external ear normal.      Left Ear: Tympanic membrane, ear canal and external ear normal.      Nose: Nose normal.      Mouth/Throat:      Mouth: Mucous membranes are dry. Eyes:      Extraocular Movements: Extraocular movements intact. Conjunctiva/sclera: Conjunctivae normal.      Pupils: Pupils are equal, round, and reactive to light.    Cardiovascular:      Rate and Rhythm: Normal rate and regular rhythm. Pulses: Normal pulses. Heart sounds: Normal heart sounds. Pulmonary:      Effort: Pulmonary effort is normal.      Breath sounds: Normal breath sounds. Abdominal:      General: Abdomen is flat. Bowel sounds are normal.      Palpations: Abdomen is soft. Tenderness: There is no abdominal tenderness. Musculoskeletal:         General: Normal range of motion. Cervical back: Normal range of motion and neck supple. Skin:     General: Skin is warm and dry. Neurological:      General: No focal deficit present. Mental Status: He is alert and oriented to person, place, and time. Psychiatric:         Mood and Affect: Mood normal.         Behavior: Behavior normal.             Medication Review  Current Outpatient Medications on File Prior to Visit   Medication Sig Dispense Refill    hydroCHLOROthiazide (HYDRODIURIL) 25 MG tablet Hold until Monday (Patient taking differently: Take 25 mg by mouth daily ) 90 tablet 0    loratadine (CLARITIN) 10 MG tablet Take 1 tablet by mouth daily 90 tablet 0    lisinopril (PRINIVIL;ZESTRIL) 10 MG tablet TAKE 1 TABLET BY MOUTH ONCE A DAY 90 tablet 0    Prodigy Twist Top Lancets 28G MISC TEST TWO TIMES A  each 0    blood glucose test strips (PRODIGY NO CODING BLOOD GLUC) strip TEST TWO TIMES A  each 0    omeprazole (PRILOSEC) 20 MG delayed release capsule Take 1 capsule by mouth Daily 30 capsule 3    furosemide (LASIX) 40 MG tablet Take 1 tablet by mouth 2 times daily (Patient taking differently: Take 40 mg by mouth daily ) 60 tablet 3    Multiple Vitamin (MULTIVITAMIN, BARIATRIC FUSION COMPLETE, CHEW TAB) Take 4 tablets by mouth daily      atorvastatin (LIPITOR) 40 MG tablet Take 1 tablet by mouth daily 90 tablet 3    pregabalin (LYRICA) 75 MG capsule Take 1 capsule by mouth 3 times daily for 90 days.  270 capsule 0    rOPINIRole (REQUIP) 3 MG tablet Take 1 tablet by mouth nightly 90 tablet 0    potassium chloride (KLOR-CON M) 20 MEQ TBCR extended release tablet Take 1 tablet by mouth 2 times daily 120 tablet 2    metFORMIN (GLUCOPHAGE) 500 MG tablet Take 1 tablet by mouth 2 times daily (with meals) 180 tablet 1    ondansetron (ZOFRAN) 4 MG tablet Take 1 tablet by mouth every 6 hours as needed for Nausea or Vomiting 30 tablet 0    metOLazone (ZAROXOLYN) 2.5 MG tablet Take 1 tablet by mouth three times a week Hold for 7 days 25 tablet 2    glucose 5 g chewable tablet Take 3 tablets by mouth as needed for Low blood sugar 30 tablet 0    acetaminophen (TYLENOL) 500 MG tablet Take 1,000 mg by mouth every 8 hours as needed for Pain      blood glucose monitor kit and supplies Check BG twice daily 1 kit 0    fluticasone (FLONASE) 50 MCG/ACT nasal spray 1 spray by Nasal route daily 3 Bottle 0     No current facility-administered medications on file prior to visit. Assessment:       1. Preop exam for internal medicine    2. Class 3 severe obesity due to excess calories with serious comorbidity and body mass index (BMI) of 45.0 to 49.9 in adult ([facility] [address]    3. Primary osteoarthritis of left knee    4. Essential hypertension, benign    5. Mixed hypertriglyceridemia    6. Type 2 diabetes mellitus without complication, without long-term current use of insulin ([facility])           Plan:        Class 3 severe obesity due to excess calories with serious comorbidity and body mass index (BMI) of 45.0 to 49.9 in adult [facility])   Perioperative risk related to the patient's upcoming surgery is considered low. he is cleared for surgery.   Pre-op exam was completed on 6/25/21 10:33 AM.        Osteoarthritis of left knee   Surgery as planned      Essential hypertension, benign   Stable, controlled  No changes  Continue current treatment plan       Mixed hypertriglyceridemia   Stable, controlled  No changes  Continue current treatment plan       Type 2 diabetes mellitus without complication, without long-term current use of insulin ([facility]   Stable, controlled  No changes  Continue current treatment plan

## 2021-06-26 LAB
ESTIMATED AVERAGE GLUCOSE: 102.5 MG/DL
HBA1C MFR BLD: 5.2 %

## 2021-06-28 ENCOUNTER — HOSPITAL ENCOUNTER (OUTPATIENT)
Dept: PREADMISSION TESTING | Age: 61
Discharge: HOME | End: 2021-07-02
Payer: COMMERCIAL

## 2021-06-28 DIAGNOSIS — M17.12 PRIMARY OSTEOARTHRITIS OF LEFT KNEE: ICD-10-CM

## 2021-06-28 LAB
ABO/RH: NORMAL
ALBUMIN SERPL-MCNC: 4.3 G/DL (ref 3.4–5)
ANTIBODY SCREEN: NORMAL
BILIRUBIN URINE: NEGATIVE
BLOOD, URINE: NEGATIVE
CLARITY: CLEAR
COLOR: YELLOW
GLUCOSE URINE: NEGATIVE MG/DL
KETONES, URINE: NEGATIVE MG/DL
LEUKOCYTE ESTERASE, URINE: NEGATIVE
MICROSCOPIC EXAMINATION: NORMAL
MRSA SCREEN RT-PCR: ABNORMAL
NITRITE, URINE: NEGATIVE
ORGANISM: ABNORMAL
PH UA: 7.5 (ref 5–8)
PREALBUMIN: 28.4 MG/DL (ref 20–40)
PROTEIN UA: NEGATIVE MG/DL
SPECIFIC GRAVITY UA: 1.02 (ref 1–1.03)
URINE REFLEX TO CULTURE: NORMAL
URINE TYPE: NORMAL
UROBILINOGEN, URINE: 1 E.U./DL
VITAMIN D 25-HYDROXY: 35.5 NG/ML

## 2021-06-28 PROCEDURE — 86850 RBC ANTIBODY SCREEN: CPT

## 2021-06-28 PROCEDURE — 84134 ASSAY OF PREALBUMIN: CPT

## 2021-06-28 PROCEDURE — 86900 BLOOD TYPING SEROLOGIC ABO: CPT

## 2021-06-28 PROCEDURE — 82306 VITAMIN D 25 HYDROXY: CPT

## 2021-06-28 PROCEDURE — 87641 MR-STAPH DNA AMP PROBE: CPT

## 2021-06-28 PROCEDURE — 82040 ASSAY OF SERUM ALBUMIN: CPT

## 2021-06-28 PROCEDURE — 81003 URINALYSIS AUTO W/O SCOPE: CPT

## 2021-06-28 PROCEDURE — 86901 BLOOD TYPING SEROLOGIC RH(D): CPT

## 2021-06-29 ENCOUNTER — TELEPHONE (OUTPATIENT)
Dept: ORTHOPEDIC SURGERY | Age: 61
End: 2021-06-29

## 2021-07-01 ENCOUNTER — HOSPITAL ENCOUNTER (OUTPATIENT)
Dept: PHYSICAL THERAPY | Age: 61
Setting detail: THERAPIES SERIES
Discharge: HOME | End: 2021-07-01
Payer: COMMERCIAL

## 2021-07-01 DIAGNOSIS — G89.29 CHRONIC PAIN OF LEFT KNEE: ICD-10-CM

## 2021-07-01 DIAGNOSIS — M17.12 PRIMARY OSTEOARTHRITIS OF LEFT KNEE: Primary | ICD-10-CM

## 2021-07-01 DIAGNOSIS — M25.562 CHRONIC PAIN OF LEFT KNEE: ICD-10-CM

## 2021-07-01 PROCEDURE — MISCCOLD COLD THERAPY UNIT AND PAD: Performed by: ORTHOPAEDIC SURGERY

## 2021-07-01 PROCEDURE — 97110 THERAPEUTIC EXERCISES: CPT

## 2021-07-01 NOTE — FLOWSHEET NOTE
[facility], [address] [place]  Phone: [phone]  Fax [phone]    Physical Therapy Daily Treatment Note  Date:  2021    Patient Name:  Jace Guajardo    :  1960  MRN: [id number]  Restrictions/Precautions:    Medical/Treatment Diagnosis Information:  Diagnosis: M17.12 (ICD-10-CM) - Primary osteoarthritis of left knee  Treatment Diagnosis: M17.12 (ICD-10-CM) - Primary osteoarthritis of left knee  Insurance/Certification information:  PT Insurance Information: Med [place]  Physician Information:  Referring Practitioner: Ayo Helton  Plan of care signed (Y/N):     Date of Patient follow up with Physician: israel 21    G-Code (if applicable):      Date G-Code Applied:     LEFS = 83% disability            21    Progress Note: [x]  Yes  []  No  Next due by: Visit #10       Latex Allergy:  [x]NO      []YES  Preferred Language for Healthcare:   [x]English       []other:       Visit # Insurance Allowable Auth Required   In-person 5-PN vs D/C NV BMN []  Yes [x]  No    Telehealth   []  Yes []  No    Total        Pain level:  -10/10     SUBJECTIVE:  Pt continues to feel exercise is ok despite pain/crepitus.      OBJECTIVE:    Observation: wrap anterior distal shin d/t new blister/open skin; healing well per wound care MD    Test measurements:      RESTRICTIONS/PRECAUTIONS: OA, HTN, hx recent bariatric sx 3/2021, L knee menisectomy 2020l hx wounds B LEs (shins); L TKA scheduled 21    Exercises/Interventions:     Therapeutic Ex Sets/reps Notes   Pt ed:role of prehab assess, pt's goals, answered pt's questions pre-sx     SB hip/knee flex B  SL  Bridge on table  SL bridge    LS HS stretch 3x30 sec HEP   Standing calf stretch-incline board 3x30 sec HEP   Sup[ine SAQ    ecc LAQ machine R, L LEs 5 sec 10x 10#HEP   SLR FLX supine  SL hip abd  SL hip add HEP   Seated heelslide with strap HEP   SS 1/2 wall 6 tripsRed tband ant ankle below wound area. Step-up R on 4\", L LATOYA platform 3\"      LSU on [facility] 3\"  HEP   LLE march onto 8\" step 2x5 L B UE supp, cue to reduce circumduction   LP  SL ecc  SL 2x10 120#  10 R, L x120#  2x10 R, L 120# R/80# L HEP   HS  3x10 50#    LATOYA TKE   abd  ext 90# x15 R, L  30# x15 R, L  75# x15 R, L    Sit-->Stand no hands with ecc control from hi/low 20\"  2x5    There Act: asked pt to consider railing installation for home prior to sx so he can get up/down steps more safety, discussed raised commode, shower chair, need for 24 hr A after sx then supv as needed to aide mobility/bathing/dressing/SONI hose/ice etc.                          Manual Intervention                                   NMR re-education     Tandem balance (near)  With head turns 30\" R, L  30\" R, L    Hip hike for SLS attempt 3\" x10 R, L ea Heavy lean in L stance, needs 1 hand A   SLS with 3 D LE kick     Up/down 4 steps leading L, descending R x5 trips Heavy UE use but better than with hands on bar                                 Therapeutic Exercise and NMR EXR  [x] (22264) Provided verbal/tactile cueing for activities related to strengthening, flexibility, endurance, ROM for improvements in LE, proximal hip, and core control with self care, mobility, lifting, ambulation.  [] (42622) Provided verbal/tactile cueing for activities related to improving balance, coordination, kinesthetic sense, posture, motor skill, proprioception  to assist with LE, proximal hip, and core control in self care, mobility, lifting, ambulation and eccentric single leg control.      NMR and Therapeutic Activities:    [x] (02289 or 06984) Provided verbal/tactile cueing for activities related to improving balance, coordination, kinesthetic sense, posture, motor skill, proprioception and motor activation to allow for proper function of core, proximal hip and LE with self care and ADLs  [] (31119) Gait Re-education- Provided training and instruction to the patient for proper LE, core and proximal hip recruitment and positioning and eccentric body weight control with ambulation re-education including up and down stairs     Home Exercise Program:    [x] (73864) Reviewed/Progressed HEP activities related to strengthening, flexibility, endurance, ROM of core, proximal hip and LE for functional self-care, mobility, lifting and ambulation/stair navigation   [] (34611)Reviewed/Progressed HEP activities related to improving balance, coordination, kinesthetic sense, posture, motor skill, proprioception of core, proximal hip and LE for self care, mobility, lifting, and ambulation/stair navigation      Manual Treatments:  PROM / STM / Oscillations-Mobs:  G-I, II, III, IV (PA's, Inf., Post.)  [] (03271) Provided manual therapy to mobilize LE, proximal hip and/or LS spine soft tissue/joints for the purpose of modulating pain, promoting relaxation,  increasing ROM, reducing/eliminating soft tissue swelling/inflammation/restriction, improving soft tissue extensibility and allowing for proper ROM for normal function with self care, mobility, lifting and ambulation. Modalities:  Ice B knees x15'    Charges:  Timed Code Treatment Minutes: 45   Total Treatment Minutes: 60 (ice)     [] EVAL (LOW) 17312 (typically 20 minutes face-to-face)  [] EVAL (MOD) 01852 (typically 30 minutes face-to-face)  [] EVAL (HIGH) 50738 (typically 45 minutes face-to-face)  [] RE-EVAL        [x] TB(21353) x 3    [] IONTO  [] NMR (06021) x     [] VASO  [] Manual (77178) x      [] Other:  [] TA x     [] Mech Traction (38935)  [] ES(attended) (98664)      [] ES (un) (88351):     GOALS:   Patient stated goal: To be prepared for surgery      Therapist goals for Patient:    Short Term Goals: To be achieved in: 5  weeks  1. Independent in HEP and progression per patient tolerance, in order to prevent re-injury. [] Progressing: [] Met: [] Not Met: [] Adjusted  2.  Patient will have a decrease in pain to facilitate improvement in movement, function, and ADLs as indicated by Functional Deficits. [] Progressing: [] Met: [] Not Met: [] Adjusted  3. Pt will improve ROM of R knee to 0-130 deg for improved L stance during gait with use of B crutches vs RW.  [] Progressing: [] Met: [] Not Met: [] Adjusted  4. Pt will improve strength of B hips/knees x5 lb on HHD to prepare for ability to perform sit-->stand  and ascend/descend steps post-sx to enter home with step-to pattern and B crutches. [] Progressing: [] Met: [] Not Met: [] Adjusted    Progression Towards Functional goals:  [] Patient is progressing as expected towards functional goals listed. [] Progression is slowed due to complexities listed. [] Progression has been slowed due to co-morbidities. [x] Plan just implemented, too soon to assess goals progression  [] Other:     ASSESSMENT:  Pt able to ascend/descend flight of 6\" steps with LLE leading/R descending for 1st time. He will not need to do this after sx, but this shows his progress in quad strength. He has also has progressed all resistance and CKC activities. He does have 8\" steps at home, so this is still the biggest concern for D/C to home after sx. Treatment/Activity Tolerance:  [] Patient tolerated treatment well [] Patient limited by fatique  [x] Patient limited by pain  [] Patient limited by other medical complications  [] Other:   [] Patient did not tolerate physical therapy activity due to substantial increase in pain    Prognosis: [x] Good [] Fair  [] Poor          Patient Requires Follow-up: [x] Yes  [] No    PLAN: Progress strength pre-op as dayanna to promote ability to ascend steps.     [x] Continue per plan of care  [] Alter current plan (see comments)  [] Plan of care initiated [] Discharge    [] Discharge to home program at this time due to inability to tolerate physical therapy activity       Electronically signed by: Keenan Cuevas, PT, DPT

## 2021-07-08 ENCOUNTER — OFFICE VISIT (OUTPATIENT)
Dept: ORTHOPEDIC SURGERY | Age: 61
End: 2021-07-08
Payer: COMMERCIAL

## 2021-07-08 VITALS — HEIGHT: 68 IN | BODY MASS INDEX: 47.74 KG/M2 | RESPIRATION RATE: 18 BRPM | WEIGHT: 315 LBS

## 2021-07-08 DIAGNOSIS — M17.12 PRIMARY OSTEOARTHRITIS OF LEFT KNEE: Primary | ICD-10-CM

## 2021-07-08 PROCEDURE — 99214 OFFICE O/P EST MOD 30 MIN: CPT | Performed by: ORTHOPAEDIC SURGERY

## 2021-07-08 NOTE — PROGRESS NOTES
[facility]  Office Visit    Chief Complaint: Left knee pain    HPI:  Augustus Castelan is a 64 y.o. who is here ahead of planned left total knee arthroplasty. For review, he has had bilateral knee pain for a number of years, currently worse in the left knee. He has a history of left knee scope in August 2020. He has also had steroid injections and viscosupplementation and radiofrequency ablation of the left knee. Despite all these measures, he continues to have pain on a daily basis. He walks with crutches. The patient has difficulty climbing stairs, difficulty getting out of a chair, difficulty with activities of daily living including hygiene and pain at night. Pain level at rest is 7 and with activities 9-10/10. He denies any hip pain. He had a gastric bypass done 2 months ago and is now unable to take NSAIDs. He had previously been taking these. Medical history significant for diabetes, sleep apnea, venous stasis, if needed. He denies any heart or lung issues, blood clots, anticoagulants, tobacco use, hormone placement therapy, metal allergies, low back pain. He does note a family history of osteoarthritis. He does have 2 small wounds in his posterior distal left leg that opened up a few days ago due to tight compression stockings.       Patient Active Problem List   Diagnosis    Class 3 severe obesity due to excess calories with serious comorbidity and body mass index (BMI) of 45.0 to 49.9 in adult [facility])    Mixed hypertriglyceridemia    Nonalcoholic fatty liver disease    RLS (restless legs syndrome)    Essential hypertension, benign    Morbid obesity with BMI of 50.0-59.9, adult ([facility] [address]    Chronic pain of left knee    Osteoarthritis of left knee    Chondromalacia of left knee    Pes planus    Lymphedema    Peripheral venous insufficiency    Allergic rhinitis    Preop exam for internal medicine    Type 2 diabetes mellitus without complication, without long-term current use of insulin ([facility] [address]    TOM treated with BiPAP    Arthritis of left knee       ROS:  Constitutional: denies fever, chills, weight loss  MSK: denies pain in other joints, muscle aches  Neurological: denies numbness, tingling, weakness    Exam:  Resp. rate 18, height 5' 8\" (1.727 m), weight (!) 316 lb 3.2 oz (143.4 kg). Appearance: sitting in exam room chair, appears to be in no acute distress, awake and alert  Resp: unlabored breathing on room air  Skin: warm, dry and intact with out erythema or significant increased temperature  Neuro: grossly intact both lower extremities. Intact sensation to light touch. Motor exam 4+ to 5/5 in all major motor groups. Left knee: Examination reveals that knee range of motion is 0 to 125 degrees. There is varus deformity, positive crepitus, positive joint line tenderness, positive antalgic gait. Neurologically, plantar flexion and dorsiflexion is intact. Pulses palpable distally. 5/5 strength. Changes due to venous stasis both lower extremities  Two small wounds posterior distal left leg with no erythema, fluctuance, drainage. Imaging:  Left knee radiographs were reviewed today. He has bone-on-bone osteoarthritis of the medial compartment and joint space narrowing of the patellofemoral compartment of the left knee. Assessment:  Left knee osteoarthritis    Plan:  We discussed the diagnosis and treatment options. We discussed total knee arthroplasty. The operative procedure, alternatives, and risks were discussed in detail with the patient. The risks include but are not limited to: Infection, vessel injury, nerve injury, DVT, pulmonary embolism, implant loosening, need for revision surgery, loss of motion, continued pain. Despite these risks the patient would like to proceed. All questions have been answered and no guarantees have been made. The patient is unable to do further physical therapy due to disabling pain.   I discussed with the patient

## 2021-07-08 NOTE — CARE COORDINATION
DATE OF JET PHONE INTERVIEW: 7/8/21      HISTORY OF JOINT REPLACEMENT(S):  None      DC TRANSPORTATION:    Wife, Chanell ph:  [phone]    STEPS INTO HOME:  8 steps    STEPS TO BATHROOM/BEDROOM:  8 steps      DME NEEDS:      Walker  Shower seat  Will look into raised toilet seat        HOME ASSISTANCE - WHO WILL BE STAYING WITH YOU AT [place] [address]? Chanell, wife will be home. LENGTH OF STAY HAS BEEN DISCUSSED WITH THE PT, APPROPRIATE TO HIS/ HER PROCEDURE. PT HAS BEEN INFORMED THAT THEY WILL BE DISCHARGED WHEN THE PHYSICIAN DEEMS THEM MEDICALLY READY. MOST PATIENTS CAN EXPECT  TO BE IN THE HOSPITAL ONE NIGHT AS AN OBSERVATION ONLY, OR 1-2 DAYS AS AN ADMISSION FOR THOSE WITH MEDICAL HEALTH  ISSUES/COMPLICATIONS. CHOICES FOR HHC, DME VENDORS AND SKILLED/ REHAB FACILITIES PROVIDED TO PT DURING THIS INTERVIEW. HOME CARE CHOICE(S):  [facility] - has first OP therapy on the 20th. SNF/REHAB CHOICES (S): n/a      MEDS TO BEDS FROM [facility]:     Yes      Contact information for case management provided to pt. Will follow with therapies and social service. Koffi Mckeon Sr.  Administrative Assist, Case Management  [phone]  Electronically signed by Koffi Mckeon on 7/8/2021 at 10:46 AM

## 2021-07-09 ENCOUNTER — HOSPITAL ENCOUNTER (OUTPATIENT)
Dept: PHYSICAL THERAPY | Age: 61
Setting detail: THERAPIES SERIES
Discharge: HOME | End: 2021-07-09
Payer: COMMERCIAL

## 2021-07-09 PROCEDURE — 97110 THERAPEUTIC EXERCISES: CPT

## 2021-07-09 RX ORDER — POTASSIUM CHLORIDE 1500 MG/1
20 TABLET, FILM COATED, EXTENDED RELEASE ORAL 2 TIMES DAILY
Qty: 120 TABLET | Refills: 2 | Status: SHIPPED | OUTPATIENT
Start: 2021-07-09 | End: 2021-10-10 | Stop reason: SDUPTHER

## 2021-07-09 NOTE — DISCHARGE SUMMARY
[facility], [address] [place]  Phone: [phone]  Fax [phone]    Physical Therapy Daily Treatment Note  Date:  2021    Patient Name:  Albertina Guajardo    :  1960  MRN: [id number]  Restrictions/Precautions:    Medical/Treatment Diagnosis Information:  Diagnosis: M17.12 (ICD-10-CM) - Primary osteoarthritis of left knee  Treatment Diagnosis: M17.12 (ICD-10-CM) - Primary osteoarthritis of left knee  Insurance/Certification information:  PT Insurance Information: Med [place]  Physician Information:  Referring Practitioner: Faustina Garay  Plan of care signed (Y/N):     Date of Patient follow up with Physician: sx 21    G-Code (if applicable):      Date G-Code Applied:     LEFS = 83% disability            21  LEFS = 78% disability            21    Progress Note: [x]  Yes  []  No  Next due by: Visit #10       Latex Allergy:  [x]NO      []YES  Preferred Language for Healthcare:   [x]English       []other:       Visit # Insurance Allowable Auth Required   In-person 6- D/C  BMN []  Yes [x]  No    Telehealth   []  Yes []  No    Total        Pain level:  6-10/10     SUBJECTIVE:  Pt still has open wound on L anterior shin and has been in communication with wound care MD, who is still ok with proceeding with sx. He is scheduled for sx on 21 with plan for OP PT at D/C pending ability to ascend steps to get into his home.       OBJECTIVE:    Observation: wrap anterior distal shin d/t new blister/open skin; healing well per wound care MD    Test measurements:  0-130 R 0-123 L    Quad strength: 39.6 R quad, 37.1 L     RESTRICTIONS/PRECAUTIONS: OA, HTN, hx recent bariatric sx 3/2021, L knee menisectomy 2020l hx wounds B LEs (shins); L TKA scheduled 21    Exercises/Interventions:     Therapeutic Ex Sets/reps Notes   Pt ed:role of prehab assess, pt's goals, answered pt's questions pre-sx SB hip/knee flex B  SL  Bridge on table  SL bridge    LS HS stretch 3x30 sec HEP   Standing calf stretch-incline board 3x30 sec HEP   Sup[ine SAQ    ecc LAQ machine R, L LEs 5 sec 10x 10#HEP   SLR FLX supine  SL hip abd  SL hip add HEP   Seated heelslide with strap HEP   SS 1/2 wall 6 tripsRed tband ant ankle below wound area. Step-up R on 4\", L LATOYA platform 3\"      LSU on [facility] [place] 3\"  HEP   LLE march onto 8\" step 3x5 L B UE supp, cue to reduce circumduction   LP  SL ecc  SL 2x10 120#  10 R, L x120#  2x10 R, L 120# R/80# L HEP   HS  3x10 50#    LATOYA TKE   abd  Ext  Flex to SLS CL LE 90# x15 R, L  30# x15 R, L  75# x15 R, L  15# x10 R, L    Sit-->Stand no hands with ecc control from hi/low 20\": neutral,  3x5    There Act: asked pt to consider railing installation for home prior to sx so he can get up/down steps more safety, discussed raised commode, shower chair, need for 24 hr A after sx then supv as needed to aide mobility/bathing/dressing/SONI hose/ice etc.                          Manual Intervention                                   NMR re-education     Tandem balance (near)  With head turns 30\" R, L  30\" R, L    Hip hike for SLS attempt  Heavy lean in L stance, needs 1 hand A   SLS with 3 D LE kick     Up/down 4 steps leading L, descending R x5 trips Heavy UE use but better than with hands on bar                                 Therapeutic Exercise and NMR EXR  [x] (53816) Provided verbal/tactile cueing for activities related to strengthening, flexibility, endurance, ROM for improvements in LE, proximal hip, and core control with self care, mobility, lifting, ambulation.  [] (70366) Provided verbal/tactile cueing for activities related to improving balance, coordination, kinesthetic sense, posture, motor skill, proprioception  to assist with LE, proximal hip, and core control in self care, mobility, lifting, ambulation and eccentric single leg control.      NMR and Therapeutic Activities:    [x] (48670 or 55218) Provided verbal/tactile cueing for activities related to improving balance, coordination, kinesthetic sense, posture, motor skill, proprioception and motor activation to allow for proper function of core, proximal hip and LE with self care and ADLs  [] (20154) Gait Re-education- Provided training and instruction to the patient for proper LE, core and proximal hip recruitment and positioning and eccentric body weight control with ambulation re-education including up and down stairs     Home Exercise Program:    [x] (65312) Reviewed/Progressed HEP activities related to strengthening, flexibility, endurance, ROM of core, proximal hip and LE for functional self-care, mobility, lifting and ambulation/stair navigation   [] (81979)Reviewed/Progressed HEP activities related to improving balance, coordination, kinesthetic sense, posture, motor skill, proprioception of core, proximal hip and LE for self care, mobility, lifting, and ambulation/stair navigation      Manual Treatments:  PROM / STM / Oscillations-Mobs:  G-I, II, III, IV (PA's, Inf., Post.)  [] (34706) Provided manual therapy to mobilize LE, proximal hip and/or LS spine soft tissue/joints for the purpose of modulating pain, promoting relaxation,  increasing ROM, reducing/eliminating soft tissue swelling/inflammation/restriction, improving soft tissue extensibility and allowing for proper ROM for normal function with self care, mobility, lifting and ambulation.      Modalities:  Ice B knees x15'    Charges:  Timed Code Treatment Minutes: 45   Total Treatment Minutes: 60 (ice)     [] EVAL (LOW) 18369 (typically 20 minutes face-to-face)  [] EVAL (MOD) 93602 (typically 30 minutes face-to-face)  [] EVAL (HIGH) 48507 (typically 45 minutes face-to-face)  [] RE-EVAL        [x] KM(59064) x 3    [] IONTO  [] NMR (82986) x     [] VASO  [] Manual (06621) x      [] Other:  [] TA x     [] Mech Traction (68684)  [] ES(attended) (28400)      [] ES (un) (58779):     GOALS: Patient stated goal: To be prepared for surgery      Therapist goals for Patient:    Short Term Goals: To be achieved in: 5  weeks  1. Independent in HEP and progression per patient tolerance, in order to prevent re-injury. [] Progressing: [x] Met: [] Not Met: [] Adjusted  2. Patient will have a decrease in pain to facilitate improvement in movement, function, and ADLs as indicated by Functional Deficits. [x] Progressing:pain fluctuates but able to progress strength each session despite pain. [] Met: [] Not Met: [] Adjusted  3. Pt will improve ROM of R knee to 0-130 deg for improved L stance during gait with use of B crutches vs RW.  [] Progressing: [] Met: [x] Not Met: [] Adjusted  4. Pt will improve strength of B hips/knees x5 lb on HHD to prepare for ability to perform sit-->stand  and ascend/descend steps post-sx to enter home with step-to pattern and B crutches. [x] Progressing: can perform sit-->stand and can ascend/descend step but did not meet strength goal(dynamometer testing) [] Met: [] Not Met: [] Adjusted    Progression Towards Functional goals:  [] Patient is progressing as expected towards functional goals listed. [x] Progression is slowed due to complexities listed. [] Progression has been slowed due to co-morbidities. [] Plan just implemented, too soon to assess goals progression  [] Other:     ASSESSMENT:  Pt able to perform functional mobility needed for return to home after sx, however will depend on pain control, edema, wound status on L shin, and also WB'ing tolerance of LLE. He is planned for OP PT at D/C but if unable to get up steps (no rails) with crutches after DC, may benefit from rehab stay.     Treatment/Activity Tolerance:  [] Patient tolerated treatment well [] Patient limited by fatique  [x] Patient limited by pain  [] Patient limited by other medical complications  [] Other:   [] Patient did not tolerate physical therapy activity due to substantial increase in pain    Prognosis: [x] Good [] Fair  [] Poor          Patient Requires Follow-up: [x] Yes  [] No    PLAN: Sx scheduled 7/14/21. Will see PT in OP PT post-op.     [] Continue per plan of care  [] Alter current plan (see comments)  [] Plan of care initiated [x] Discharge    [] Discharge to home program at this time due to inability to tolerate physical therapy activity       Electronically signed by: Teofilo Mejia, PT, DPT

## 2021-07-12 NOTE — PROGRESS NOTES
C-Difficile admission screening and protocol:     * Admitted with diarrhea?n     *Prior history of C-Diff. In last 3 months?n   *Antibiotic use in the past 6-8 weeks?n     *Prior hospitalization or nursing home in the last month?n      SAFETY FIRST. .call before you fall  [address] time______7______        Surgery time____830_    Take the following medications with a sip of water:    Do not eat or drink anything after 12:00 midnight prior to your surgery. This includes water chewing gum, mints and ice chips. You may brush your teeth and gargle the morning of your surgery, but do not swallow the water     Please see your family doctor/pediatrician for a history and physical and/or concerning medications. Bring any test results/reports from your physicians office. If you are under the care of a heart doctor or specialist doctor, please be aware that you may be asked to them for clearance    You may be asked to stop blood thinners such as Coumadin, Plavix, Fragmin, Lovenox, etc., or any anti-inflammatories such as:  Aspirin, Ibuprofen, Advil, Naproxen prior to your surgery. We also ask that you stop any OTC medications such as fish oil, vitamin E, glucosamine, garlic, Multivitamins, COQ 10, etc.    We ask that you do not smoke 24 hours prior to surgery  We ask that you do not  drink any alcoholic beverages 24 hours prior to surgery     You must make arrangements for a responsible adult to take you home after your surgery. For your safety you will not be allowed to leave alone or drive yourself home. Your surgery will be cancelled if you do not have a ride home. Also for your safety, it is strongly suggested that someone stay with you the first 24 hours after your surgery. A parent or legal guardian must accompany a child scheduled for surgery and plan to stay at the hospital until the child is discharged.     Please do not bring other children with you.    For your comfort, please wear simple loose fitting clothing to the hospital.  Please do not bring valuables. Do not wear any make-up or nail polish on your fingers or toes      For your safety, please do not wear any jewelry or body piercing's on the day of surgery. All jewelry must be removed. If you have dentures, they will be removed before going to operating room. For your convenience, we will provide you with a container. If you wear contact lenses or glasses, they will be removed, please bring a case for them. If you have a living will and a durable power of  for healthcare, please bring in a copy. As part of our patient safety program to minimize surgical site infections, we ask you to do the following:    · Please notify your surgeon if you develop any illness between         now and the  day of your surgery. · This includes a cough, cold, fever, sore throat, nausea,         or vomiting, and diarrhea, etc.  ·  Please notify your surgeon if you experience dizziness, shortness         of breath or blurred vision between now and the time of your surgery. Do not shave your operative site 96 hours prior to surgery. For face and neck surgery, men may use an electric razor 48 hours   prior to surgery. You may shower the night before surgery or the morning of   your surgery with an antibacterial soap. You will need to bring a photo ID and insurance card    [facility] has an onsite pharmacy, would you like to utilize our pharmacy     If you will be staying overnight and use a C-pap machine, please bring   your C-pap to hospital     Our goal is to provide you with excellent care, therefore, visitors will be limited to two(2) in the room at a time so that we may focus on providing this care for you. Please contact pre-admission testing if you have any further questions.                  [facility] phone number:  [phone] PAT fax number: [phone]  Please note these are generalized instructions for all surgical cases, you may be provided with more specific instructions according to your surgery. Preoperative Screening for Elective Surgery/Invasive Procedures While COVID-19 present in the community     Have you tested positive or have been told to self-isolate for COVID-19 like symptoms within the past 28 days? n   Do you currently have any of the following symptoms? n  o Fever >100.0 F or 99.9 F in immunocompromised patients?n  o New onset cough, shortness of breath or difficulty breathing?n   New onset sore throat, myalgia (muscle aches and pains), headache, loss of taste/smell or diarrhea?n   Have you had a potential exposure to COVID-19 within the past 14 days by:  o Close contact with a confirmed case?n  o Close contact with a healthcare worker,  or essential infrastructure worker (grocery store, [employer], gas station, public utilities or transportation)? y  o Do you reside in a congregate setting such as; skilled nursing facility, adult home, correctional facility, homeless? n  o Have you had recent travel to a known COVID-19 hotspot?n    Indicate if the patient has a positive screen by answering yes to one or more of the above questions. Patients who test positive or screen positive prior to surgery or on the day of surgery should be evaluated in conjunction with the surgeon/proceduralist/anesthesiologist to determine the urgency of the procedure.

## 2021-07-13 ENCOUNTER — ANESTHESIA EVENT (OUTPATIENT)
Dept: OPERATING ROOM | Age: 61
DRG: 470 | End: 2021-07-13
Payer: COMMERCIAL

## 2021-07-14 ENCOUNTER — APPOINTMENT (OUTPATIENT)
Dept: GENERAL RADIOLOGY | Age: 61
DRG: 470 | End: 2021-07-14
Attending: ORTHOPAEDIC SURGERY
Payer: COMMERCIAL

## 2021-07-14 ENCOUNTER — ANESTHESIA (OUTPATIENT)
Dept: OPERATING ROOM | Age: 61
DRG: 470 | End: 2021-07-14
Payer: COMMERCIAL

## 2021-07-14 ENCOUNTER — HOSPITAL ENCOUNTER (INPATIENT)
Age: 61
LOS: 1 days | Discharge: HOME HEALTH | DRG: 470 | End: 2021-07-15
Attending: ORTHOPAEDIC SURGERY | Admitting: ORTHOPAEDIC SURGERY
Payer: COMMERCIAL

## 2021-07-14 VITALS
RESPIRATION RATE: 15 BRPM | DIASTOLIC BLOOD PRESSURE: 63 MMHG | TEMPERATURE: 97.9 F | OXYGEN SATURATION: 67 % | SYSTOLIC BLOOD PRESSURE: 136 MMHG

## 2021-07-14 DIAGNOSIS — M17.12 ARTHRITIS OF LEFT KNEE: ICD-10-CM

## 2021-07-14 LAB
ABO/RH: NORMAL
ANTIBODY SCREEN: NORMAL
GLUCOSE BLD-MCNC: 100 MG/DL (ref 70–99)
GLUCOSE BLD-MCNC: 148 MG/DL (ref 70–99)
GLUCOSE BLD-MCNC: 152 MG/DL (ref 70–99)
GLUCOSE BLD-MCNC: 174 MG/DL (ref 70–99)
GLUCOSE BLD-MCNC: 190 MG/DL (ref 70–99)
PERFORMED ON: ABNORMAL

## 2021-07-14 PROCEDURE — 2709999900 HC NON-CHARGEABLE SUPPLY: Performed by: ORTHOPAEDIC SURGERY

## 2021-07-14 PROCEDURE — 86900 BLOOD TYPING SEROLOGIC ABO: CPT

## 2021-07-14 PROCEDURE — 2580000003 HC RX 258: Performed by: ORTHOPAEDIC SURGERY

## 2021-07-14 PROCEDURE — 86850 RBC ANTIBODY SCREEN: CPT

## 2021-07-14 PROCEDURE — G0378 HOSPITAL OBSERVATION PER HR: HCPCS

## 2021-07-14 PROCEDURE — 6370000000 HC RX 637 (ALT 250 FOR IP): Performed by: ORTHOPAEDIC SURGERY

## 2021-07-14 PROCEDURE — 73560 X-RAY EXAM OF KNEE 1 OR 2: CPT

## 2021-07-14 PROCEDURE — 76942 ECHO GUIDE FOR BIOPSY: CPT | Performed by: ANESTHESIOLOGY

## 2021-07-14 PROCEDURE — 1200000000 HC SEMI PRIVATE

## 2021-07-14 PROCEDURE — 94760 N-INVAS EAR/PLS OXIMETRY 1: CPT

## 2021-07-14 PROCEDURE — 7100000001 HC PACU RECOVERY - ADDTL 15 MIN: Performed by: ORTHOPAEDIC SURGERY

## 2021-07-14 PROCEDURE — 97116 GAIT TRAINING THERAPY: CPT

## 2021-07-14 PROCEDURE — 27447 TOTAL KNEE ARTHROPLASTY: CPT | Performed by: PHYSICIAN ASSISTANT

## 2021-07-14 PROCEDURE — 87641 MR-STAPH DNA AMP PROBE: CPT

## 2021-07-14 PROCEDURE — 64447 NJX AA&/STRD FEMORAL NRV IMG: CPT | Performed by: ANESTHESIOLOGY

## 2021-07-14 PROCEDURE — 2500000003 HC RX 250 WO HCPCS: Performed by: NURSE ANESTHETIST, CERTIFIED REGISTERED

## 2021-07-14 PROCEDURE — 6360000002 HC RX W HCPCS: Performed by: ORTHOPAEDIC SURGERY

## 2021-07-14 PROCEDURE — 27447 TOTAL KNEE ARTHROPLASTY: CPT | Performed by: ORTHOPAEDIC SURGERY

## 2021-07-14 PROCEDURE — 6360000002 HC RX W HCPCS: Performed by: NURSE ANESTHETIST, CERTIFIED REGISTERED

## 2021-07-14 PROCEDURE — 97530 THERAPEUTIC ACTIVITIES: CPT

## 2021-07-14 PROCEDURE — 94150 VITAL CAPACITY TEST: CPT

## 2021-07-14 PROCEDURE — 97162 PT EVAL MOD COMPLEX 30 MIN: CPT

## 2021-07-14 PROCEDURE — 86901 BLOOD TYPING SEROLOGIC RH(D): CPT

## 2021-07-14 PROCEDURE — 2580000003 HC RX 258: Performed by: ANESTHESIOLOGY

## 2021-07-14 PROCEDURE — 3E0T3BZ INTRODUCTION OF ANESTHETIC AGENT INTO PERIPHERAL NERVES AND PLEXI, PERCUTANEOUS APPROACH: ICD-10-PCS | Performed by: ORTHOPAEDIC SURGERY

## 2021-07-14 PROCEDURE — 8E0Y0CZ ROBOTIC ASSISTED PROCEDURE OF LOWER EXTREMITY, OPEN APPROACH: ICD-10-PCS | Performed by: ORTHOPAEDIC SURGERY

## 2021-07-14 PROCEDURE — 3700000000 HC ANESTHESIA ATTENDED CARE: Performed by: ORTHOPAEDIC SURGERY

## 2021-07-14 PROCEDURE — 3600000015 HC SURGERY LEVEL 5 ADDTL 15MIN: Performed by: ORTHOPAEDIC SURGERY

## 2021-07-14 PROCEDURE — C1713 ANCHOR/SCREW BN/BN,TIS/BN: HCPCS | Performed by: ORTHOPAEDIC SURGERY

## 2021-07-14 PROCEDURE — 6360000002 HC RX W HCPCS: Performed by: ANESTHESIOLOGY

## 2021-07-14 PROCEDURE — 88311 DECALCIFY TISSUE: CPT

## 2021-07-14 PROCEDURE — 20985 CPTR-ASST DIR MS PX: CPT | Performed by: ORTHOPAEDIC SURGERY

## 2021-07-14 PROCEDURE — 3700000001 HC ADD 15 MINUTES (ANESTHESIA): Performed by: ORTHOPAEDIC SURGERY

## 2021-07-14 PROCEDURE — 7100000000 HC PACU RECOVERY - FIRST 15 MIN: Performed by: ORTHOPAEDIC SURGERY

## 2021-07-14 PROCEDURE — 2700000000 HC OXYGEN THERAPY PER DAY

## 2021-07-14 PROCEDURE — 2720000010 HC SURG SUPPLY STERILE: Performed by: ORTHOPAEDIC SURGERY

## 2021-07-14 PROCEDURE — 0SRD06A REPLACEMENT OF LEFT KNEE JOINT WITH OXIDIZED ZIRCONIUM ON POLYETHYLENE SYNTHETIC SUBSTITUTE, UNCEMENTED, OPEN APPROACH: ICD-10-PCS | Performed by: ORTHOPAEDIC SURGERY

## 2021-07-14 PROCEDURE — 88305 TISSUE EXAM BY PATHOLOGIST: CPT

## 2021-07-14 PROCEDURE — C1776 JOINT DEVICE (IMPLANTABLE): HCPCS | Performed by: ORTHOPAEDIC SURGERY

## 2021-07-14 PROCEDURE — 3600000005 HC SURGERY LEVEL 5 BASE: Performed by: ORTHOPAEDIC SURGERY

## 2021-07-14 DEVICE — IMPLANTABLE DEVICE: Type: IMPLANTABLE DEVICE | Site: KNEE | Status: FUNCTIONAL

## 2021-07-14 DEVICE — COMPONENT PAT DIA35MM THK10MM SUPERIOR/INFERIOR KNEE: Type: IMPLANTABLE DEVICE | Site: KNEE | Status: FUNCTIONAL

## 2021-07-14 DEVICE — BASEPLATE TIB SZ 5 AP49MM ML74MM KNEE TRITANIUM 4 CRUCFRM: Type: IMPLANTABLE DEVICE | Site: KNEE | Status: FUNCTIONAL

## 2021-07-14 DEVICE — INSERT TIB CS 5 12 MM ARTC POST KNEE BEAR TECHNOLOGY X3: Type: IMPLANTABLE DEVICE | Site: KNEE | Status: FUNCTIONAL

## 2021-07-14 RX ORDER — ROPIVACAINE HYDROCHLORIDE 5 MG/ML
INJECTION, SOLUTION EPIDURAL; INFILTRATION; PERINEURAL
Status: COMPLETED | OUTPATIENT
Start: 2021-07-14 | End: 2021-07-14

## 2021-07-14 RX ORDER — PROPOFOL 10 MG/ML
INJECTION, EMULSION INTRAVENOUS PRN
Status: DISCONTINUED | OUTPATIENT
Start: 2021-07-14 | End: 2021-07-14 | Stop reason: SDUPTHER

## 2021-07-14 RX ORDER — MEPERIDINE HYDROCHLORIDE 25 MG/ML
12.5 INJECTION INTRAMUSCULAR; INTRAVENOUS; SUBCUTANEOUS
Status: COMPLETED | OUTPATIENT
Start: 2021-07-14 | End: 2021-07-14

## 2021-07-14 RX ORDER — BUPIVACAINE HYDROCHLORIDE 5 MG/ML
INJECTION, SOLUTION EPIDURAL; INTRACAUDAL
Status: DISCONTINUED | OUTPATIENT
Start: 2021-07-14 | End: 2021-07-14

## 2021-07-14 RX ORDER — SODIUM CHLORIDE 0.9 % (FLUSH) 0.9 %
10 SYRINGE (ML) INJECTION PRN
Status: DISCONTINUED | OUTPATIENT
Start: 2021-07-14 | End: 2021-07-14 | Stop reason: HOSPADM

## 2021-07-14 RX ORDER — SODIUM CHLORIDE 9 MG/ML
25 INJECTION, SOLUTION INTRAVENOUS PRN
Status: DISCONTINUED | OUTPATIENT
Start: 2021-07-14 | End: 2021-07-15 | Stop reason: HOSPADM

## 2021-07-14 RX ORDER — OXYCODONE HYDROCHLORIDE 10 MG/1
10 TABLET ORAL ONCE
Status: COMPLETED | OUTPATIENT
Start: 2021-07-14 | End: 2021-07-14

## 2021-07-14 RX ORDER — PANTOPRAZOLE SODIUM 40 MG/1
40 TABLET, DELAYED RELEASE ORAL
Status: DISCONTINUED | OUTPATIENT
Start: 2021-07-15 | End: 2021-07-15 | Stop reason: HOSPADM

## 2021-07-14 RX ORDER — OXYCODONE HYDROCHLORIDE 10 MG/1
10 TABLET ORAL EVERY 4 HOURS PRN
Status: DISCONTINUED | OUTPATIENT
Start: 2021-07-14 | End: 2021-07-15 | Stop reason: HOSPADM

## 2021-07-14 RX ORDER — SODIUM CHLORIDE 9 MG/ML
INJECTION, SOLUTION INTRAVENOUS CONTINUOUS
Status: DISCONTINUED | OUTPATIENT
Start: 2021-07-14 | End: 2021-07-14

## 2021-07-14 RX ORDER — CETIRIZINE HYDROCHLORIDE 10 MG/1
10 TABLET ORAL DAILY
Status: DISCONTINUED | OUTPATIENT
Start: 2021-07-15 | End: 2021-07-15 | Stop reason: HOSPADM

## 2021-07-14 RX ORDER — MORPHINE SULFATE 2 MG/ML
2 INJECTION, SOLUTION INTRAMUSCULAR; INTRAVENOUS
Status: DISCONTINUED | OUTPATIENT
Start: 2021-07-14 | End: 2021-07-15 | Stop reason: HOSPADM

## 2021-07-14 RX ORDER — HYDROCHLOROTHIAZIDE 25 MG/1
25 TABLET ORAL DAILY
Status: DISCONTINUED | OUTPATIENT
Start: 2021-07-15 | End: 2021-07-15 | Stop reason: HOSPADM

## 2021-07-14 RX ORDER — PREGABALIN 75 MG/1
75 CAPSULE ORAL 3 TIMES DAILY
Status: DISCONTINUED | OUTPATIENT
Start: 2021-07-14 | End: 2021-07-15 | Stop reason: HOSPADM

## 2021-07-14 RX ORDER — MORPHINE SULFATE 4 MG/ML
4 INJECTION, SOLUTION INTRAMUSCULAR; INTRAVENOUS
Status: DISCONTINUED | OUTPATIENT
Start: 2021-07-14 | End: 2021-07-15 | Stop reason: HOSPADM

## 2021-07-14 RX ORDER — ROPINIROLE 1 MG/1
3 TABLET, FILM COATED ORAL NIGHTLY
Status: DISCONTINUED | OUTPATIENT
Start: 2021-07-14 | End: 2021-07-15 | Stop reason: HOSPADM

## 2021-07-14 RX ORDER — ACETAMINOPHEN 325 MG/1
650 TABLET ORAL EVERY 6 HOURS
Status: DISCONTINUED | OUTPATIENT
Start: 2021-07-14 | End: 2021-07-15 | Stop reason: HOSPADM

## 2021-07-14 RX ORDER — LISINOPRIL 10 MG/1
10 TABLET ORAL DAILY
Status: DISCONTINUED | OUTPATIENT
Start: 2021-07-15 | End: 2021-07-15 | Stop reason: HOSPADM

## 2021-07-14 RX ORDER — HYDROCODONE BITARTRATE AND ACETAMINOPHEN 5; 325 MG/1; MG/1
2 TABLET ORAL PRN
Status: DISCONTINUED | OUTPATIENT
Start: 2021-07-14 | End: 2021-07-14 | Stop reason: HOSPADM

## 2021-07-14 RX ORDER — ATORVASTATIN CALCIUM 40 MG/1
40 TABLET, FILM COATED ORAL DAILY
Status: DISCONTINUED | OUTPATIENT
Start: 2021-07-15 | End: 2021-07-15 | Stop reason: HOSPADM

## 2021-07-14 RX ORDER — DEXTROSE MONOHYDRATE 25 G/50ML
12.5 INJECTION, SOLUTION INTRAVENOUS PRN
Status: DISCONTINUED | OUTPATIENT
Start: 2021-07-14 | End: 2021-07-15 | Stop reason: HOSPADM

## 2021-07-14 RX ORDER — NICOTINE POLACRILEX 4 MG
15 LOZENGE BUCCAL PRN
Status: DISCONTINUED | OUTPATIENT
Start: 2021-07-14 | End: 2021-07-15 | Stop reason: HOSPADM

## 2021-07-14 RX ORDER — OXYCODONE HYDROCHLORIDE 5 MG/1
5 TABLET ORAL EVERY 4 HOURS PRN
Status: DISCONTINUED | OUTPATIENT
Start: 2021-07-14 | End: 2021-07-15 | Stop reason: HOSPADM

## 2021-07-14 RX ORDER — DEXAMETHASONE SODIUM PHOSPHATE 4 MG/ML
INJECTION, SOLUTION INTRA-ARTICULAR; INTRALESIONAL; INTRAMUSCULAR; INTRAVENOUS; SOFT TISSUE PRN
Status: DISCONTINUED | OUTPATIENT
Start: 2021-07-14 | End: 2021-07-14 | Stop reason: SDUPTHER

## 2021-07-14 RX ORDER — SODIUM CHLORIDE 0.9 % (FLUSH) 0.9 %
10 SYRINGE (ML) INJECTION PRN
Status: DISCONTINUED | OUTPATIENT
Start: 2021-07-14 | End: 2021-07-15 | Stop reason: HOSPADM

## 2021-07-14 RX ORDER — HYDROCODONE BITARTRATE AND ACETAMINOPHEN 5; 325 MG/1; MG/1
1 TABLET ORAL PRN
Status: DISCONTINUED | OUTPATIENT
Start: 2021-07-14 | End: 2021-07-14 | Stop reason: HOSPADM

## 2021-07-14 RX ORDER — FENTANYL CITRATE 50 UG/ML
25 INJECTION, SOLUTION INTRAMUSCULAR; INTRAVENOUS EVERY 5 MIN PRN
Status: DISCONTINUED | OUTPATIENT
Start: 2021-07-14 | End: 2021-07-14 | Stop reason: HOSPADM

## 2021-07-14 RX ORDER — PROMETHAZINE HYDROCHLORIDE 25 MG/ML
6.25 INJECTION, SOLUTION INTRAMUSCULAR; INTRAVENOUS
Status: DISCONTINUED | OUTPATIENT
Start: 2021-07-14 | End: 2021-07-14 | Stop reason: HOSPADM

## 2021-07-14 RX ORDER — MIDAZOLAM HYDROCHLORIDE 1 MG/ML
INJECTION INTRAMUSCULAR; INTRAVENOUS PRN
Status: DISCONTINUED | OUTPATIENT
Start: 2021-07-14 | End: 2021-07-14 | Stop reason: SDUPTHER

## 2021-07-14 RX ORDER — FENTANYL CITRATE 50 UG/ML
50 INJECTION, SOLUTION INTRAMUSCULAR; INTRAVENOUS EVERY 5 MIN PRN
Status: DISCONTINUED | OUTPATIENT
Start: 2021-07-14 | End: 2021-07-14 | Stop reason: HOSPADM

## 2021-07-14 RX ORDER — INSULIN GLARGINE 100 [IU]/ML
0.25 INJECTION, SOLUTION SUBCUTANEOUS NIGHTLY
Status: DISCONTINUED | OUTPATIENT
Start: 2021-07-14 | End: 2021-07-15 | Stop reason: HOSPADM

## 2021-07-14 RX ORDER — LIDOCAINE HYDROCHLORIDE 20 MG/ML
INJECTION, SOLUTION EPIDURAL; INFILTRATION; INTRACAUDAL; PERINEURAL PRN
Status: DISCONTINUED | OUTPATIENT
Start: 2021-07-14 | End: 2021-07-14 | Stop reason: SDUPTHER

## 2021-07-14 RX ORDER — SODIUM CHLORIDE 0.9 % (FLUSH) 0.9 %
10 SYRINGE (ML) INJECTION EVERY 12 HOURS SCHEDULED
Status: DISCONTINUED | OUTPATIENT
Start: 2021-07-14 | End: 2021-07-15 | Stop reason: HOSPADM

## 2021-07-14 RX ORDER — SODIUM CHLORIDE 0.9 % (FLUSH) 0.9 %
10 SYRINGE (ML) INJECTION EVERY 12 HOURS SCHEDULED
Status: DISCONTINUED | OUTPATIENT
Start: 2021-07-14 | End: 2021-07-14 | Stop reason: HOSPADM

## 2021-07-14 RX ORDER — OXYCODONE HYDROCHLORIDE 5 MG/1
5-10 TABLET ORAL EVERY 6 HOURS PRN
Qty: 40 TABLET | Refills: 0 | Status: SHIPPED | OUTPATIENT
Start: 2021-07-14 | End: 2021-07-20 | Stop reason: SDUPTHER

## 2021-07-14 RX ORDER — MAGNESIUM HYDROXIDE 1200 MG/15ML
LIQUID ORAL CONTINUOUS PRN
Status: COMPLETED | OUTPATIENT
Start: 2021-07-14 | End: 2021-07-14

## 2021-07-14 RX ORDER — FENTANYL CITRATE 50 UG/ML
INJECTION, SOLUTION INTRAMUSCULAR; INTRAVENOUS PRN
Status: DISCONTINUED | OUTPATIENT
Start: 2021-07-14 | End: 2021-07-14 | Stop reason: SDUPTHER

## 2021-07-14 RX ORDER — ONDANSETRON 2 MG/ML
INJECTION INTRAMUSCULAR; INTRAVENOUS PRN
Status: DISCONTINUED | OUTPATIENT
Start: 2021-07-14 | End: 2021-07-14 | Stop reason: SDUPTHER

## 2021-07-14 RX ORDER — SODIUM CHLORIDE 450 MG/100ML
INJECTION, SOLUTION INTRAVENOUS CONTINUOUS
Status: DISCONTINUED | OUTPATIENT
Start: 2021-07-14 | End: 2021-07-15 | Stop reason: HOSPADM

## 2021-07-14 RX ORDER — ONDANSETRON 2 MG/ML
4 INJECTION INTRAMUSCULAR; INTRAVENOUS EVERY 6 HOURS PRN
Status: DISCONTINUED | OUTPATIENT
Start: 2021-07-14 | End: 2021-07-15 | Stop reason: HOSPADM

## 2021-07-14 RX ORDER — SODIUM CHLORIDE 9 MG/ML
25 INJECTION, SOLUTION INTRAVENOUS PRN
Status: DISCONTINUED | OUTPATIENT
Start: 2021-07-14 | End: 2021-07-14 | Stop reason: HOSPADM

## 2021-07-14 RX ORDER — ROCURONIUM BROMIDE 10 MG/ML
INJECTION, SOLUTION INTRAVENOUS PRN
Status: DISCONTINUED | OUTPATIENT
Start: 2021-07-14 | End: 2021-07-14 | Stop reason: SDUPTHER

## 2021-07-14 RX ORDER — ONDANSETRON 2 MG/ML
4 INJECTION INTRAMUSCULAR; INTRAVENOUS
Status: COMPLETED | OUTPATIENT
Start: 2021-07-14 | End: 2021-07-14

## 2021-07-14 RX ORDER — DEXTROSE MONOHYDRATE 50 MG/ML
100 INJECTION, SOLUTION INTRAVENOUS PRN
Status: DISCONTINUED | OUTPATIENT
Start: 2021-07-14 | End: 2021-07-15 | Stop reason: HOSPADM

## 2021-07-14 RX ORDER — HYDRALAZINE HYDROCHLORIDE 20 MG/ML
5 INJECTION INTRAMUSCULAR; INTRAVENOUS EVERY 10 MIN PRN
Status: DISCONTINUED | OUTPATIENT
Start: 2021-07-14 | End: 2021-07-14 | Stop reason: HOSPADM

## 2021-07-14 RX ORDER — SENNA AND DOCUSATE SODIUM 50; 8.6 MG/1; MG/1
1 TABLET, FILM COATED ORAL 2 TIMES DAILY
Status: DISCONTINUED | OUTPATIENT
Start: 2021-07-14 | End: 2021-07-15 | Stop reason: HOSPADM

## 2021-07-14 RX ORDER — ONDANSETRON 4 MG/1
4 TABLET, ORALLY DISINTEGRATING ORAL EVERY 8 HOURS PRN
Status: DISCONTINUED | OUTPATIENT
Start: 2021-07-14 | End: 2021-07-15 | Stop reason: HOSPADM

## 2021-07-14 RX ORDER — FUROSEMIDE 40 MG/1
40 TABLET ORAL DAILY
Status: DISCONTINUED | OUTPATIENT
Start: 2021-07-15 | End: 2021-07-15 | Stop reason: HOSPADM

## 2021-07-14 RX ADMIN — ONDANSETRON 4 MG: 2 INJECTION INTRAMUSCULAR; INTRAVENOUS at 09:44

## 2021-07-14 RX ADMIN — DOCUSATE SODIUM 50 MG AND SENNOSIDES 8.6 MG 1 TABLET: 8.6; 5 TABLET, FILM COATED ORAL at 13:21

## 2021-07-14 RX ADMIN — MIDAZOLAM 2 MG: 1 INJECTION INTRAMUSCULAR; INTRAVENOUS at 08:24

## 2021-07-14 RX ADMIN — OXYCODONE HYDROCHLORIDE 10 MG: 10 TABLET ORAL at 07:52

## 2021-07-14 RX ADMIN — ROCURONIUM BROMIDE 50 MG: 10 INJECTION INTRAVENOUS at 08:34

## 2021-07-14 RX ADMIN — FENTANYL CITRATE 25 MCG: 50 INJECTION, SOLUTION INTRAMUSCULAR; INTRAVENOUS at 11:04

## 2021-07-14 RX ADMIN — HYDROMORPHONE HYDROCHLORIDE 1 MG: 1 INJECTION, SOLUTION INTRAMUSCULAR; INTRAVENOUS; SUBCUTANEOUS at 08:56

## 2021-07-14 RX ADMIN — OXYCODONE HYDROCHLORIDE 10 MG: 10 TABLET ORAL at 21:41

## 2021-07-14 RX ADMIN — VANCOMYCIN HYDROCHLORIDE 2000 MG: 1 INJECTION, POWDER, LYOPHILIZED, FOR SOLUTION INTRAVENOUS at 07:53

## 2021-07-14 RX ADMIN — SUGAMMADEX 200 MG: 100 INJECTION, SOLUTION INTRAVENOUS at 09:47

## 2021-07-14 RX ADMIN — SODIUM CHLORIDE: 4.5 INJECTION, SOLUTION INTRAVENOUS at 13:21

## 2021-07-14 RX ADMIN — INSULIN LISPRO 1 UNITS: 100 INJECTION, SOLUTION INTRAVENOUS; SUBCUTANEOUS at 13:52

## 2021-07-14 RX ADMIN — ROPINIROLE HYDROCHLORIDE 3 MG: 1 TABLET, FILM COATED ORAL at 21:41

## 2021-07-14 RX ADMIN — OXYCODONE HYDROCHLORIDE 10 MG: 10 TABLET ORAL at 12:13

## 2021-07-14 RX ADMIN — LIDOCAINE HYDROCHLORIDE 50 ML: 20 INJECTION, SOLUTION EPIDURAL; INFILTRATION; INTRACAUDAL; PERINEURAL at 08:33

## 2021-07-14 RX ADMIN — ROPIVACAINE HYDROCHLORIDE 15 ML: 5 INJECTION, SOLUTION EPIDURAL; INFILTRATION; PERINEURAL at 08:26

## 2021-07-14 RX ADMIN — OXYCODONE HYDROCHLORIDE 10 MG: 10 TABLET ORAL at 16:45

## 2021-07-14 RX ADMIN — ROPIVACAINE HYDROCHLORIDE 15 ML: 5 INJECTION, SOLUTION EPIDURAL; INFILTRATION; PERINEURAL at 08:24

## 2021-07-14 RX ADMIN — PREGABALIN 75 MG: 75 CAPSULE ORAL at 21:41

## 2021-07-14 RX ADMIN — FENTANYL CITRATE 50 MCG: 50 INJECTION, SOLUTION INTRAMUSCULAR; INTRAVENOUS at 10:19

## 2021-07-14 RX ADMIN — Medication 3000 MG: at 08:43

## 2021-07-14 RX ADMIN — PROPOFOL 150 MG: 10 INJECTION, EMULSION INTRAVENOUS at 08:34

## 2021-07-14 RX ADMIN — SODIUM CHLORIDE: 9 INJECTION, SOLUTION INTRAVENOUS at 08:28

## 2021-07-14 RX ADMIN — MORPHINE SULFATE 4 MG: 4 INJECTION, SOLUTION INTRAMUSCULAR; INTRAVENOUS at 13:50

## 2021-07-14 RX ADMIN — FENTANYL CITRATE 100 MCG: 50 INJECTION INTRAMUSCULAR; INTRAVENOUS at 08:24

## 2021-07-14 RX ADMIN — CEFAZOLIN SODIUM 3000 MG: 10 INJECTION, POWDER, FOR SOLUTION INTRAVENOUS at 16:19

## 2021-07-14 RX ADMIN — INSULIN LISPRO 12 UNITS: 100 INJECTION, SOLUTION INTRAVENOUS; SUBCUTANEOUS at 13:53

## 2021-07-14 RX ADMIN — FENTANYL CITRATE 50 MCG: 50 INJECTION, SOLUTION INTRAMUSCULAR; INTRAVENOUS at 10:14

## 2021-07-14 RX ADMIN — APIXABAN 2.5 MG: 2.5 TABLET, FILM COATED ORAL at 21:41

## 2021-07-14 RX ADMIN — DOCUSATE SODIUM 50 MG AND SENNOSIDES 8.6 MG 1 TABLET: 8.6; 5 TABLET, FILM COATED ORAL at 21:41

## 2021-07-14 RX ADMIN — DEXAMETHASONE SODIUM PHOSPHATE 4 MG: 4 INJECTION, SOLUTION INTRAMUSCULAR; INTRAVENOUS at 08:46

## 2021-07-14 RX ADMIN — INSULIN LISPRO 1 UNITS: 100 INJECTION, SOLUTION INTRAVENOUS; SUBCUTANEOUS at 21:42

## 2021-07-14 RX ADMIN — INSULIN LISPRO 12 UNITS: 100 INJECTION, SOLUTION INTRAVENOUS; SUBCUTANEOUS at 18:26

## 2021-07-14 RX ADMIN — INSULIN LISPRO 1 UNITS: 100 INJECTION, SOLUTION INTRAVENOUS; SUBCUTANEOUS at 18:26

## 2021-07-14 RX ADMIN — MEPERIDINE HYDROCHLORIDE 12.5 MG: 25 INJECTION INTRAMUSCULAR; INTRAVENOUS; SUBCUTANEOUS at 10:15

## 2021-07-14 RX ADMIN — FENTANYL CITRATE 25 MCG: 50 INJECTION, SOLUTION INTRAMUSCULAR; INTRAVENOUS at 10:32

## 2021-07-14 RX ADMIN — ACETAMINOPHEN 650 MG: 325 TABLET ORAL at 18:25

## 2021-07-14 RX ADMIN — ACETAMINOPHEN 650 MG: 325 TABLET ORAL at 13:22

## 2021-07-14 RX ADMIN — INSULIN GLARGINE 36 UNITS: 100 INJECTION, SOLUTION SUBCUTANEOUS at 21:42

## 2021-07-14 RX ADMIN — ONDANSETRON 4 MG: 2 INJECTION INTRAMUSCULAR; INTRAVENOUS at 11:03

## 2021-07-14 RX ADMIN — ONDANSETRON 4 MG: 2 INJECTION INTRAMUSCULAR; INTRAVENOUS at 16:47

## 2021-07-14 ASSESSMENT — PULMONARY FUNCTION TESTS
PIF_VALUE: 21
PIF_VALUE: 0
PIF_VALUE: 25
PIF_VALUE: 24
PIF_VALUE: 12
PIF_VALUE: 15
PIF_VALUE: 26
PIF_VALUE: 23
PIF_VALUE: 26
PIF_VALUE: 23
PIF_VALUE: 26
PIF_VALUE: 24
PIF_VALUE: 22
PIF_VALUE: 27
PIF_VALUE: 31
PIF_VALUE: 24
PIF_VALUE: 26
PIF_VALUE: 30
PIF_VALUE: 0
PIF_VALUE: 28
PIF_VALUE: 3
PIF_VALUE: 24
PIF_VALUE: 28
PIF_VALUE: 1
PIF_VALUE: 26
PIF_VALUE: 29
PIF_VALUE: 30
PIF_VALUE: 24
PIF_VALUE: 15
PIF_VALUE: 25
PIF_VALUE: 24
PIF_VALUE: 0
PIF_VALUE: 1
PIF_VALUE: 25
PIF_VALUE: 27
PIF_VALUE: 25
PIF_VALUE: 23
PIF_VALUE: 25
PIF_VALUE: 25
PIF_VALUE: 13
PIF_VALUE: 24
PIF_VALUE: 25
PIF_VALUE: 16
PIF_VALUE: 26
PIF_VALUE: 24
PIF_VALUE: 26
PIF_VALUE: 26
PIF_VALUE: 27
PIF_VALUE: 23
PIF_VALUE: 30
PIF_VALUE: 26
PIF_VALUE: 27
PIF_VALUE: 26
PIF_VALUE: 27
PIF_VALUE: 21
PIF_VALUE: 25
PIF_VALUE: 25
PIF_VALUE: 27
PIF_VALUE: 30
PIF_VALUE: 26
PIF_VALUE: 25
PIF_VALUE: 25
PIF_VALUE: 26
PIF_VALUE: 25
PIF_VALUE: 28
PIF_VALUE: 26
PIF_VALUE: 24
PIF_VALUE: 15
PIF_VALUE: 24
PIF_VALUE: 0
PIF_VALUE: 27
PIF_VALUE: 23
PIF_VALUE: 26
PIF_VALUE: 2
PIF_VALUE: 0
PIF_VALUE: 28
PIF_VALUE: 27
PIF_VALUE: 23
PIF_VALUE: 26
PIF_VALUE: 15
PIF_VALUE: 2
PIF_VALUE: 0
PIF_VALUE: 15
PIF_VALUE: 23
PIF_VALUE: 26
PIF_VALUE: 25
PIF_VALUE: 28
PIF_VALUE: 23
PIF_VALUE: 15

## 2021-07-14 ASSESSMENT — PAIN DESCRIPTION - ONSET
ONSET: ON-GOING

## 2021-07-14 ASSESSMENT — PAIN DESCRIPTION - ORIENTATION
ORIENTATION: LEFT

## 2021-07-14 ASSESSMENT — PAIN DESCRIPTION - DESCRIPTORS
DESCRIPTORS: ACHING
DESCRIPTORS: THROBBING
DESCRIPTORS: SHARP;SORE;SPASM
DESCRIPTORS: THROBBING

## 2021-07-14 ASSESSMENT — PAIN SCALES - GENERAL
PAINLEVEL_OUTOF10: 8
PAINLEVEL_OUTOF10: 5
PAINLEVEL_OUTOF10: 6
PAINLEVEL_OUTOF10: 7
PAINLEVEL_OUTOF10: 7
PAINLEVEL_OUTOF10: 10
PAINLEVEL_OUTOF10: 9
PAINLEVEL_OUTOF10: 8
PAINLEVEL_OUTOF10: 4
PAINLEVEL_OUTOF10: 5
PAINLEVEL_OUTOF10: 7
PAINLEVEL_OUTOF10: 8
PAINLEVEL_OUTOF10: 5
PAINLEVEL_OUTOF10: 10
PAINLEVEL_OUTOF10: 5

## 2021-07-14 ASSESSMENT — PAIN DESCRIPTION - PROGRESSION
CLINICAL_PROGRESSION: NOT CHANGED
CLINICAL_PROGRESSION: GRADUALLY IMPROVING
CLINICAL_PROGRESSION: NOT CHANGED
CLINICAL_PROGRESSION: NOT CHANGED
CLINICAL_PROGRESSION: GRADUALLY WORSENING
CLINICAL_PROGRESSION: GRADUALLY IMPROVING
CLINICAL_PROGRESSION: NOT CHANGED

## 2021-07-14 ASSESSMENT — PAIN - FUNCTIONAL ASSESSMENT
PAIN_FUNCTIONAL_ASSESSMENT: PREVENTS OR INTERFERES SOME ACTIVE ACTIVITIES AND ADLS
PAIN_FUNCTIONAL_ASSESSMENT: ACTIVITIES ARE NOT PREVENTED
PAIN_FUNCTIONAL_ASSESSMENT: PREVENTS OR INTERFERES SOME ACTIVE ACTIVITIES AND ADLS
PAIN_FUNCTIONAL_ASSESSMENT: 0-10

## 2021-07-14 ASSESSMENT — PAIN DESCRIPTION - LOCATION
LOCATION: KNEE

## 2021-07-14 ASSESSMENT — PAIN DESCRIPTION - PAIN TYPE
TYPE: SURGICAL PAIN

## 2021-07-14 ASSESSMENT — PAIN DESCRIPTION - FREQUENCY
FREQUENCY: CONTINUOUS

## 2021-07-14 NOTE — H&P
Update History & Physical    The patient's History and Physical of June 25, 2021 was reviewed with the patient and I examined the patient. There was no change. The surgical site was confirmed by the patient and me. Plan: The risks, benefits, expected outcome, and alternative to the recommended procedure have been discussed with the patient. Patient understands and wants to proceed with the procedure.      Electronically signed by Graciela Lizama MD on 7/14/2021 at 8:12 AM

## 2021-07-14 NOTE — PROGRESS NOTES
Met with patient and family at bedside, patient is alert and oriented x4. discussed role of nurse navigator. Reviewed reasons to call with questions or concerns, importance of TEDS, Incentive spirometer, pain medication, and physical and occupational therapy. 2/4 bed rails up, bed in lowest position, fall precautions in place, call light within reach. Pulses present bilaterally +2 pedal, no drainage or odor noted at surgical dressing left knee. ace Dressing clean, dry, and intact. Ice in place. Gene and scds on RLE. Neurovascular checks performed and WNLs with exception of some tingling in LLE, had some prior to surgery, patient denies numbness. DC Plan: home with [facility] vs outpatient therapy starting tuesday. encouraged patient to start with [facility] due to patient pain level during therapy session. wife cody  to transport patient. DME needs:needs raised toilet seat, agreeable to [employer] and elaine informed. Already has a rolling walker and shower chair.        Arthuro Wellington  Orthopedic Nurse Navigator  Phone number: [phone]    Future Appointments   Date Time Provider Ernie Duron   7/20/2021 11:30 AM Ernesto Barclay, PT [facility] AND PT Vel Caryn   7/23/2021  9:15 AM Ernesto Barclay, PT [facility] AND PT Vel Caryn   7/27/2021 11:30 AM Ernesto Barclay, PT [facility] AND PT Vel Michigamme   7/29/2021  8:30 AM Ernesto Barclay, PT [facility] AND PT Vel Caryn   7/29/2021 10:30 AM Ericka Anderson MD [facility]   8/3/2021 10:45 AM Adrienne Frankel Felblinger, PT [facility] AND PT Vel Caryn   8/6/2021  9:15 AM Ernesto Barclay, PT [facility] AND PT Vel Caryn   8/10/2021 10:45 AM Ernesto Barclay, PT [facility] AND PT Vel Michigamme   8/11/2021  8:15 AM Norma Skelton DO [place] [facility]   8/13/2021  9:15 AM Ernesto Barclay, PT [facility] AND PT Vel Rubin   8/17/2021 10:45 AM Ernesto Barclay, PT [facility] [place] AND PT Vel Rubin   8/20/2021  9:15 AM Ernesto Barclay, PT [facility] AND PT Vel Rubin 8/24/2021 10:45 AM Yoly Munguia, PT [facility] [place] AND PT Micaela Keith   8/27/2021  9:15 AM Yoly Munguia, PT [facility] AND PT Micaela Keith   11/8/2021  8:00 AM Hailee Neves, JACKLYN - CNP [facility]     Electronically signed by Sugey Erazo RN on 7/14/2021 at 2:55 PM

## 2021-07-14 NOTE — PLAN OF CARE
Problem: Serum Glucose Level - Abnormal:  Goal: Ability to maintain appropriate glucose levels has stabilized  Description: Ability to maintain appropriate glucose levels has stabilized  Outcome: Ongoing  Note: Ability to maintain appropriate glucose level will stabilize      Problem: Discharge Planning:  Goal: Discharged to appropriate level of care  Description: Discharged to appropriate level of care  Outcome: Ongoing  Note: Pt will be discharged to the appropriate level of care      Problem: Mobility - Impaired:  Goal: Mobility will improve  Description: Mobility will improve  Outcome: Ongoing  Note: Mobility will improve on this shift      Problem: Infection - Surgical Site:  Goal: Will show no infection signs and symptoms  Description: Will show no infection signs and symptoms  Outcome: Ongoing  Note: Monitoring pt for signs and symptoms of infection. Will observe for any redness, warmth, or pus. Problem: Pain - Acute:  Goal: Pain level will decrease  Description: Pain level will decrease  Outcome: Ongoing  Note: Assessing and monitoring pt's pain. PRN pain medication being given if ordered. Educating pt on nonpharmacologic interventions for pain control. Will continue to monitor and reassess. Problem: Pain:  Goal: Pain level will decrease  Description: Pain level will decrease  Outcome: Ongoing  Note: Assessing and monitoring pt's pain. PRN pain medication being given if ordered. Educating pt on nonpharmacologic interventions for pain control. Will continue to monitor and reassess. Problem: Pain:  Goal: Control of acute pain  Description: Control of acute pain  Outcome: Ongoing  Note: Assessing and monitoring pt's pain. PRN pain medication being given if ordered. Educating pt on nonpharmacologic interventions for pain control. Will continue to monitor and reassess.        Problem: Pain:  Goal: Control of chronic pain  Description: Control of chronic pain  Outcome: Ongoing  Note: Assessing and monitoring pt's pain. PRN pain medication being given if ordered. Educating pt on nonpharmacologic interventions for pain control. Will continue to monitor and reassess. Problem: Falls - Risk of:  Goal: Will remain free from falls  Description: Will remain free from falls  Outcome: Ongoing  Note: Fall risk assessment completed. Fall precautions in place. Bed in lowest position, wheels locked, bed/chair exit alarm in place, call light within reach, and non skid footwear on. Walkway free of clutter. Pt alert and oriented and able to make needs known. Pt educated to use call light when needing to get up, and pt utilizes call light to make needs known. Will continue to monitor. Problem: Falls - Risk of:  Goal: Absence of physical injury  Description: Absence of physical injury  Outcome: Ongoing  Note: Pt absent from physical injury on this shift      Problem: Skin Integrity:  Goal: Will show no infection signs and symptoms  Description: Will show no infection signs and symptoms  Outcome: Ongoing  Note: Monitoring pt for signs and symptoms of infection. Will observe for any redness, warmth, or pus.         Problem: Skin Integrity:  Goal: Absence of new skin breakdown  Description: Absence of new skin breakdown  Outcome: Ongoing  Note: Pt absent from new skin breakdown on this shift

## 2021-07-14 NOTE — ANESTHESIA PRE PROCEDURE
[facility] Department of Anesthesiology  Pre-Anesthesia Evaluation/Consultation       Name:  Jean-Pierre Organ  : 1960  Age:  64 y.o. MRN:  [id number]  Date: 2021           Surgeon: Surgeon(s):  Trey Perez MD    Procedure: Procedure(s):  LEFT TOTAL KNEE REPLACEMENT ROBOTIC ASSISTED     Allergies   Allergen Reactions    Celebrex [Celecoxib] Other (See Comments)     Unable to take due to bariatric patient    Mobic [Meloxicam]      Unable to take due to bariatric patient    Nsaids      Bariatric patient    Bactrim [Sulfamethoxazole-Trimethoprim] Rash     POSSIBLE fixed drug eruption on his cheeks, but diagnosis is not certain (only happened once).       Patient Active Problem List   Diagnosis    Class 3 severe obesity due to excess calories with serious comorbidity and body mass index (BMI) of 45.0 to 49.9 in adult [facility])    Mixed hypertriglyceridemia    Nonalcoholic fatty liver disease    RLS (restless legs syndrome)    Essential hypertension, benign    Morbid obesity with BMI of 50.0-59.9, adult ([address]    Chronic pain of left knee    Osteoarthritis of left knee    Chondromalacia of left knee    Pes planus    Lymphedema    Peripheral venous insufficiency    Allergic rhinitis    Preop exam for internal medicine    Type 2 diabetes mellitus without complication, without long-term current use of insulin ([address]    TOM treated with BiPAP    Arthritis of left knee     Past Medical History:   Diagnosis Date    Arthritis     Bilateral venous leg ulcers 2020    Cellulitis of lower extremity 2019    Diabetes mellitus ([address]     GERD (gastroesophageal reflux disease)     Hyperlipidemia     Hypertension     Impaired fasting glucose 2013    MRSA carrier     Obesity     TOM treated with BiPAP 2020    Preoperative clearance 10/26/2020    Restless leg syndrome     Screening PSA (prostate specific antigen) 2015;17 Nml:0.53(12/30/2015);5/1/17=nml.  Seasonal allergies     Sleep apnea     uses CPAP    Stasis dermatitis of both legs 8/28/2020    Tobacco use     Tubular adenoma of colon 09/14/2017     Past Surgical History:   Procedure Laterality Date    CIRCUMCISION      COLONOSCOPY  09/14/2017    Colonoscopy with polypectomy (cold biopsy):Dr. Walker:next in 3yrs=9/14/2020    KNEE ARTHROSCOPY Left 8/3/2020    VIDEO ARTHROSCOPY LEFT KNEE, PARTIAL MEDIAL MENISCECTOMY, CHONDROPLASTY, SYNOVIAL BIOPSY -TOM=4- performed by Nahomy Schulte MD at [facility] PAIN MANAGEMENT PROCEDURE Left 12/9/2020    COOLIEF RADIOFREQUENCY ABLATION - LEFT performed by Yoni Hamilton MD at [address] [facility] N/A 3/2/2021    ROBOTIC ASSISTED LAPAROSCOPIC SLEEVE GASTRECTOMY performed by Saumya Metcalf DO at [facility]  05/28/2011    UPPER GASTROINTESTINAL ENDOSCOPY N/A 1/11/2021    EGD BIOPSY performed by Saumya Metcalf DO at [address] EXTRACTION       Social History     Tobacco Use    Smoking status: Former Smoker     Packs/day: 2.00     Years: 25.00     Pack years: 50.00     Types: Cigarettes     Quit date: 5/1/2006     Years since quitting: 15.2    Smokeless tobacco: Never Used   Vaping Use    Vaping Use: Never used   Substance Use Topics    Alcohol use: Not Currently     Comment: ocas    Drug use: No     Medications  No current facility-administered medications on file prior to encounter. Current Outpatient Medications on File Prior to Encounter   Medication Sig Dispense Refill    pregabalin (LYRICA) 75 MG capsule Take 1 capsule by mouth 3 times daily for 90 days.  270 capsule 0    rOPINIRole (REQUIP) 3 MG tablet Take 1 tablet by mouth nightly 90 tablet 0    metFORMIN (GLUCOPHAGE) 500 MG tablet Take 1 tablet by mouth 2 times daily (with meals) 180 tablet 1    ondansetron (ZOFRAN) 4 MG tablet Take 1 tablet by mouth every 6 hours as needed for Nausea or Vomiting 30 tablet 0    metOLazone (ZAROXOLYN) 2.5 MG tablet Take 1 tablet by mouth three times a week Hold for 7 days 25 tablet 2    glucose 5 g chewable tablet Take 3 tablets by mouth as needed for Low blood sugar 30 tablet 0    acetaminophen (TYLENOL) 500 MG tablet Take 1,000 mg by mouth every 8 hours as needed for Pain      blood glucose monitor kit and supplies Check BG twice daily 1 kit 0    fluticasone (FLONASE) 50 MCG/ACT nasal spray 1 spray by Nasal route daily 3 Bottle 0     No current facility-administered medications for this encounter. Current Outpatient Medications   Medication Sig Dispense Refill    potassium chloride (KLOR-CON M) 20 MEQ TBCR extended release tablet Take 1 tablet by mouth 2 times daily 120 tablet 2    hydroCHLOROthiazide (HYDRODIURIL) 25 MG tablet Hold until Monday (Patient taking differently: Take 25 mg by mouth daily ) 90 tablet 0    loratadine (CLARITIN) 10 MG tablet Take 1 tablet by mouth daily 90 tablet 0    lisinopril (PRINIVIL;ZESTRIL) 10 MG tablet TAKE 1 TABLET BY MOUTH ONCE A DAY 90 tablet 0    Prodigy Twist Top Lancets 28G MISC TEST TWO TIMES A  each 0    blood glucose test strips (PRODIGY NO CODING BLOOD GLUC) strip TEST TWO TIMES A  each 0    omeprazole (PRILOSEC) 20 MG delayed release capsule Take 1 capsule by mouth Daily 30 capsule 3    furosemide (LASIX) 40 MG tablet Take 1 tablet by mouth 2 times daily (Patient taking differently: Take 40 mg by mouth daily ) 60 tablet 3    Multiple Vitamin (MULTIVITAMIN, BARIATRIC FUSION COMPLETE, CHEW TAB) Take 4 tablets by mouth daily      atorvastatin (LIPITOR) 40 MG tablet Take 1 tablet by mouth daily 90 tablet 3    pregabalin (LYRICA) 75 MG capsule Take 1 capsule by mouth 3 times daily for 90 days.  270 capsule 0    rOPINIRole (REQUIP) 3 MG tablet Take 1 tablet by mouth nightly 90 tablet 0    metFORMIN (GLUCOPHAGE) 500 MG tablet Take 1 tablet by mouth 2 times daily (with meals) 180 tablet 1    ondansetron (ZOFRAN) 4 MG tablet Take 1 tablet by mouth every 6 hours as needed for Nausea or Vomiting 30 tablet 0    metOLazone (ZAROXOLYN) 2.5 MG tablet Take 1 tablet by mouth three times a week Hold for 7 days 25 tablet 2    glucose 5 g chewable tablet Take 3 tablets by mouth as needed for Low blood sugar 30 tablet 0    acetaminophen (TYLENOL) 500 MG tablet Take 1,000 mg by mouth every 8 hours as needed for Pain      blood glucose monitor kit and supplies Check BG twice daily 1 kit 0    fluticasone (FLONASE) 50 MCG/ACT nasal spray 1 spray by Nasal route daily 3 Bottle 0     Vital Signs (Current)   Vitals:    07/12/21 1233   Weight: (!) 315 lb (142.9 kg)   Height: 5' 8\" (1.727 m)                                            Vital Signs Statistics (for past 48 hrs)     No data recorded  BP Readings from Last 3 Encounters:   06/25/21 120/70   06/23/21 (!) 140/68   06/16/21 (!) 159/72       BMI  Body mass index is 47.9 kg/m². Estimated body mass index is 47.9 kg/m² as calculated from the following:    Height as of this encounter: 5' 8\" (1.727 m). Weight as of this encounter: 315 lb (142.9 kg).     CBC   Lab Results   Component Value Date    WBC 5.4 06/25/2021    RBC 4.59 06/25/2021    HGB 15.3 06/25/2021    HCT 44.8 06/25/2021    MCV 97.6 06/25/2021    RDW 14.2 06/25/2021     06/25/2021     CMP    Lab Results   Component Value Date     06/25/2021    K 4.8 06/25/2021    K 4.0 10/08/2020     06/25/2021    CO2 27 06/25/2021    BUN 17 06/25/2021    CREATININE 0.8 06/25/2021    GFRAA >60 06/25/2021    GFRAA >60 06/11/2013    AGRATIO 2.0 06/25/2021    LABGLOM >60 06/25/2021    GLUCOSE 95 06/25/2021    PROT 7.0 06/25/2021    PROT 6.9 02/08/2012    CALCIUM 9.7 06/25/2021    BILITOT 0.4 06/25/2021    ALKPHOS 99 06/25/2021    AST 23 06/25/2021    ALT 28 06/25/2021     BMP    Lab Results   Component Value Date     06/25/2021    K 4.8 06/25/2021    K 4.0 10/08/2020     06/25/2021    CO2 27 06/25/2021    BUN 17 06/25/2021    CREATININE 0.8 06/25/2021    CALCIUM 9.7 06/25/2021    GFRAA >60 06/25/2021    GFRAA >60 06/11/2013    LABGLOM >60 06/25/2021    GLUCOSE 95 06/25/2021     POCGlucose  No results for input(s): GLUCOSE in the last 72 hours. [facility]    Lab Results   Component Value Date    PROTIME 11.7 06/25/2021    INR 1.01 06/25/2021    APTT 33.5 86/81/6090     HCG (If Applicable) No results found for: PREGTESTUR, PREGSERUM, HCG, HCGQUANT   ABGs No results found for: PHART, PO2ART, MRQ9IMD, TDW5HHO, BEART, Z8BHKPWB   Type & Screen (If Applicable)  No results found for: LABABO, LABRH                         BMI: Wt Readings from Last 3 Encounters:       NPO Status:  >8h                        Anesthesia Evaluation  Patient summary reviewed no history of anesthetic complications:   Airway: Mallampati: II     Neck ROM: full  Mouth opening: > = 3 FB Dental:      Comment: No loose teeth    Pulmonary: breath sounds clear to auscultation  (+) sleep apnea: on CPAP,      (-) COPD and asthma                           Cardiovascular:    (+) hypertension:, hyperlipidemia    (-) past MI, CABG/stent and  angina        Rate: normal                    Neuro/Psych:      (-) seizures, TIA and CVA           GI/Hepatic/Renal:   (+) GERD:, liver disease:, morbid obesity          Endo/Other:    (+) DiabetesType II DM, , : arthritis:., .                 Abdominal:             Vascular:   + PVD, aortic or cerebral, .  - DVT and PE. Other Findings:             Anesthesia Plan      general and regional     ASA 3       Induction: intravenous. MIPS: Postoperative opioids intended and Prophylactic antiemetics administered. Anesthetic plan and risks discussed with patient. Plan discussed with CRNA.                   This pre-anesthesia assessment may be used as a history and physical.    DOS STAFF ADDENDUM:    Pt seen and examined, chart reviewed (including anesthesia, drug and allergy history). No interval changes to history and physical examination. Anesthetic plan, risks, benefits, alternatives, and personnel involved discussed with patient. Patient verbalized an understanding and agrees to proceed.       Grupo Villareal MD  July 14, 2021  6:30 AM

## 2021-07-14 NOTE — ANESTHESIA POSTPROCEDURE EVALUATION
Department of Anesthesiology  Postprocedure Note    Patient: Augustus Castelan  MRN: [id number]  YOB: 1960  Date of evaluation: 7/14/2021  Time:  4:21 PM     Procedure Summary     Date: 07/14/21 Room / Location: [address]    Anesthesia Start: 2521 Anesthesia Stop: 1003    Procedure: LEFT TOTAL KNEE REPLACEMENT ROBOTIC ASSISTED (Left ) Diagnosis:       Arthritis of left knee      (LEFT ARTHRITIS KNEE)    Surgeons: Porter Santacruz MD Responsible Provider: Binta Rubin MD    Anesthesia Type: general, regional ASA Status: 3          Anesthesia Type: general, regional    Ramon Phase I: Ramon Score: 10    Ramon Phase II:      Last vitals: Reviewed and per EMR flowsheets.        Anesthesia Post Evaluation    Patient location during evaluation: PACU  Patient participation: complete - patient participated  Level of consciousness: awake and alert  Pain score: 4  Airway patency: patent  Nausea & Vomiting: no nausea and no vomiting  Complications: no  Cardiovascular status: blood pressure returned to baseline  Respiratory status: acceptable  Hydration status: euvolemic

## 2021-07-14 NOTE — OP NOTE
Patient: Bishop Hansen  YOB: 1960  MRN: [id number]    DATE OF PROCEDURE: 7/14/2021      PREOPERATIVE DIAGNOSIS:  Tricompartmental degenerative osteoarthritis of the left knee. POSTOPERATIVE DIAGNOSIS:  Tricompartmental degenerative osteoarthritis of the left knee. OPERATION PERFORMED:  Robotic-assisted left total knee arthroplasty. SURGEON:  Micaela Ansari MD     ASSISTANT:  FRANNY Sánchez    EBL: 100 mL     IMPLANTS:  Elaine Triathlon CR cementless femur size 4  Elaine Triathlon cementless tibia size 5  12mm CS polyethylene  35mm cementless asymmetric patella     INDICATIONS:  The patient is a 64 y.o. male who presents for left knee replacement. He had been treated conservatively with anti-inflammatories, physical therapy and intra-articular cortisone injections; none of these gave any significant relief. We discussed total knee arthroplasty. The operative procedure, alternatives, and risks were discussed in detail with the patient. The risks include but are not limited to: Infection, vessel injury, nerve injury, DVT, pulmonary embolism, implant loosening, need for revision surgery, loss of motion, continued pain. Informed consent for surgery was signed by the patient. OPERATIVE PROCEDURE:  The patient was seen in the preoperative holding area where the site of surgery was marked and informed consent was confirmed. The patient was brought back to the operating room by OR personnel. General anesthesia was administered. The patient was positioned supine on the operating table. The left lower extremity was then prepped and draped in a standard and sterile fashion. A final and formal timeout was then performed which confirmed the correct patient, correct position, and correct site of surgery. IV antibiotics were administered within 1 hour of the skin incision. An anterior midline incision was made over the knee.  Skin and subcutaneous tissue were divided down to the extensor mechanism. A medial parapatellar arthrotomy was performed. The knee was fully exposed and then two distal femoral pins and two proximal tibial pins were placed with robotic aerials. Using a third robotic aerial, the knee was registered with the robot. The implant was virtually manipulated to balance the flexion and extension gaps. Once this was done, the robotic cutting arm was brought in and in the order of posterior femoral condyles, anterior femoral condylar cut, anterior femoral chamfer cuts, tibia cut, posterior femoral chamfer cuts, and then distal femoral condylar cuts were made. All bony fragments were removed. The knee was reconstructed to accept a #5 tibial baseplate, a #4 femoral cruciate-retaining femur, and a 12-mm polyethylene liner. The knee came to full extension, excellent flexion, and good overall stability. The patella tracked within the trochlea of the femur. All trial implants were then removed. The ends of the bones were irrigated. The #5 tibial tray and the #4 Elaine Triathlon cruciate-retaining femoral component were then placed. A trial reduction with the 12-mm poly was performed. Then the real 12-mm polyethylene liner was placed. The knee came to full extension, excellent flexion, and good overall stability. The patella was flipped, measured, and cut to accept a 35-mm asymmetric patella. Once this was done then, the 35-mm asymmetric cementless patella was placed. The patella tracked well. The wound was irrigated out. Hemostasis was obtained. The wound was injected with local anesthetic. It was also bathed with aqueous iodine. The wound then was closed in layers. The patient tolerated the procedure well. A dressing was applied, and the patient was brought to the recovery room in good condition. FRANNY Pascual was essential in patient positioning, surgical assistance during the arthroplasty, and in wound closure.     Due to the patient's morbid obesity (BMI 48), this procedure took an additional 30 minutes. This additional time was needed for patient positioning, performance of the arthroplasty, and in wound closure.     Katja Clinton MD  7/14/2021

## 2021-07-14 NOTE — PROGRESS NOTES
Physical Therapy    Facility/Department: [address] [facility]THOPEDICS  Initial Assessment  This note serves as patient discharge summary if pt discharges prior to next PT visit      NAME: Papa Burger  : 1960  MRN: [id number]    Date of Service: 2021    Discharge Recommendations:  Continue to assess pending progress, 24 hour supervision or assist, S Level 1   Papa Burger scored a  on the AM-PAC short mobility form. Current research shows that an AM-PAC score of 18 or greater is typically associated with a discharge to the patient's home setting. Based on the patient's AM-PAC score and their current functional mobility deficits, it is recommended that the patient have 2-3 sessions per week of Physical Therapy at d/c to increase the patient's independence. At this time, this patient demonstrates the endurance and safety to discharge home with home therapy services and a follow up treatment frequency of 2-3x/wk. Please see assessment section for further patient specific details. PT Equipment Recommendations  Equipment Needed: Yes  Mobility Devices: Arnol New: Rolling  Other: patient is 318 pounds; may require wide Wh Walker    Assessment   Body structures, Functions, Activity limitations: Decreased functional mobility ; Decreased ROM; Decreased endurance; Increased pain;Decreased balance  Assessment: Prior to elective L TKR robot assisted via Dr. Laura Valdivia 2021, patient reports living in home with wife (8+8 step entry, no rails), and independence with functional mobility, ADLs (wife assists in and out of tub), transfers, and home gait with axillary crutches. Status 2021: Bed mobility Min A, Transfers CGA-Min A and cues, Gait RW 21' with CGA-Min A and cues for technique and safety. Anticipate patient will be appropriate for DC to home with initial 24 hour assist/supervision.  Patient states plan to transition immediately to outpatient PT starting 2021, but this may be unrealistic due to level of pain and compromised mobility. Will see 7- and progress gait and trial stairs, and determine feasibility of outpatient PT vs home therapy services. He requires wh walker to promote safe and quality mobility. Treatment Diagnosis: Impaired functional mobility  Prognosis: Good  Decision Making: Medium Complexity  History: as noted  Clinical Presentation: Evolving  PT Education: Goals;PT Role;Plan of Care;General Safety;Transfer Training;Gait Training;Weight-bearing Education;Disease Specific Education  REQUIRES PT FOLLOW UP: Yes  Activity Tolerance  Activity Tolerance: Patient limited by pain       Patient Diagnosis(es): The encounter diagnosis was Arthritis of left knee. has a past medical history of Arthritis, Bilateral venous leg ulcers, Cellulitis of lower extremity, Diabetes mellitus ([facility] [address] GERD (gastroesophageal reflux disease), Hyperlipidemia, Hypertension, Impaired fasting glucose, MRSA carrier, Obesity, TOM treated with BiPAP, Preoperative clearance, Restless leg syndrome, Screening PSA (prostate specific antigen), Seasonal allergies, Sleep apnea, Stasis dermatitis of both legs, Tobacco use, and Tubular adenoma of colon. has a past surgical history that includes Mouth surgery; Matthews tooth extraction; Circumcision; Vasectomy; Colonoscopy (09/14/2017); Umbilical hernia repair (05/28/2011); Knee arthroscopy (Left, 8/3/2020); Pain management procedure (Left, 12/9/2020); Upper gastrointestinal endoscopy (N/A, 1/11/2021); and Sleeve Gastrectomy (N/A, 3/2/2021). Restrictions  Restrictions/Precautions  Restrictions/Precautions: Weight Bearing, Fall Risk  Lower Extremity Weight Bearing Restrictions  Left Lower Extremity Weight Bearing: Weight Bearing As Tolerated     Vision/Hearing  Vision: Impaired  Vision Exceptions: Wears glasses at all times  Hearing: Within functional limits       Subjective  General  Chart Reviewed:  Yes  Additional Pertinent Hx: 65 yo male to hospital 7- for elective L TKR robot assisted via Dr. Erven Jeans. Other PMHx as noted  Response To Previous Treatment: Not applicable  Family / Caregiver Present: Yes (wife)  Referring Practitioner: Ayo Helton MD  Referral Date : 07/14/21  Subjective  Subjective: Patient in bed at therapist entry. Has to go to the bathroom. Reports fear regarding moving due to pain L knee. States, \"Why did I have this done? \" Reports L knee pain \"more than 5/10. \" Has planned to go directly to outpatient therapy with first visit 7-. Pain Screening  Patient Currently in Pain: Yes    Orientation  Orientation  Overall Orientation Status: Within Functional Limits     Social/Functional History  Social/Functional History  Lives With: Spouse, Family (spouse will be home 1st week with patient. also adult grandkids)  Type of Home: House  Home Layout: Multi-level, Bed/Bath upstairs  Home Access: Stairs to enter with rails  Entrance Stairs - Number of Steps: 8 + 8  Entrance Stairs - Rails: None (used B crutches for stairs)  Bathroom Shower/Tub: Tub/Shower unit  Bathroom Toilet: Standard  Bathroom Accessibility: Walker accessible  Home Equipment: Crutches  ADL Assistance: Independent (help in and out of tub)  Ambulation Assistance: Independent (with crutches)  Transfer Assistance: Independent  Active : Yes     Cognition   Cognition  Overall Cognitive Status: WFL  Cognition Comment: some anxiety regarding current pain. Likely unrealistic activity expectations in perioperative period. Objective  AROM RLE (degrees)  RLE AROM: WFL  AROM LLE (degrees)  LLE AROM : WFL  LLE General AROM: except moderate knee guarding.  Knee gross ROM 5-60  Strength RLE  Strength RLE: WFL  Strength LLE  Comment: functionally 3+/5  Sensation  Overall Sensation Status: WFL  Bed mobility  Supine to Sit: Minimal assistance  Sit to Supine: Unable to assess (in BS chair at end of session)  Transfers  Sit to Stand: Minimal Assistance;Contact guard assistance (and cues for technique. Extra time. EOB and commode.)  Stand to sit: Minimal Assistance;Contact guard assistance (and cues for technique. Commode and BS chair.)  Ambulation  Ambulation?: Yes  Ambulation 1  Surface: level tile  Device: Rolling Walker  Assistance: Contact guard assistance;Minimal assistance  Quality of Gait: discontinuous, step to pattern. Cues for sequencing, and to stay within wh walker base. Cues also to allow L knee flexion during swing phase of gait  Gait Deviations: Slow Torrie;Decreased step height;Decreased step length  Distance: 10', 21'  Comments: Patient sits on commode and urinates trace amount. RN infomred. Wife assists with carmelita care. Patient stands at sink and washes hands, with CGA. Stairs/Curb  Stairs?: No  Balance  Posture: Good  Sitting - Static: Good  Sitting - Dynamic: Good  Standing - Static: Fair (supported at sink)  Standing - Dynamic: Fair (at [address] [place] [address])        Plan   Plan  Times per week: 1-3 sessions as indicated  Current Treatment Recommendations: Functional Mobility Training, Strengthening, Transfer Training, ROM, Gait Training, Stair training, Safety Education & Training, Home Exercise Program, Patient/Caregiver Education & Training, Equipment Evaluation, Education, & procurement  Safety Devices  Type of devices: Call light within reach, Chair alarm in place, Gait belt, Patient at risk for falls, Left in chair, Nurse notified (Cryocuff on. RNs Cherie and Brie Cook in room and aware. )    AM-PAC Score  AM-PAC Inpatient Mobility Raw Score : 14 (07/14/21 1406)  AM-PAC Inpatient T-Scale Score : 38.1 (07/14/21 1406)  Mobility Inpatient CMS 0-100% Score: 61.29 (07/14/21 1406)  Mobility Inpatient CMS G-Code Modifier : CL (07/14/21 1406)     Goals  Short term goals  Time Frame for Short term goals: By acute DC  Short term goal 1: Tranfers SBA  Short term goal 2: Gait RW 50' SBA  Short term goal 3: Tolerates 5-10 reps each TKR initial exs with cues.   Short term goal 4: 4 stairs B axillary crutches, with Min A and cues. Patient Goals   Patient goals : \"To recover. \"       Therapy Time   Individual Concurrent Group Co-treatment   Time In 1325         Time Out 1410         Minutes 39            Ev 15; Gt 15;  Home Ancelmo John Bearden, PT  Electronically signed by Ann Marie Tabares, PT [id number] [phone])  on 7/14/2021 at 3:49 PM

## 2021-07-14 NOTE — ANESTHESIA PROCEDURE NOTES
Peripheral Block    Patient location during procedure: PACU  Staffing  Performed: anesthesiologist   Anesthesiologist: Hilario Pruett MD  Preanesthetic Checklist  Completed: patient identified, IV checked, site marked, risks and benefits discussed, surgical consent, monitors and equipment checked, pre-op evaluation, timeout performed, anesthesia consent given, oxygen available and patient being monitored  Peripheral Block  Patient position: supine  Prep: ChloraPrep  Patient monitoring: cardiac monitor, continuous pulse ox, frequent blood pressure checks and IV access  Block type: Femoral  Laterality: left  Injection technique: single-shot  Guidance: ultrasound guided  Adductor canal  Provider prep: mask and sterile gloves  Needle  Needle type: combined needle/nerve stimulator   Needle gauge: 22 G  Needle length: 5 cm  Needle localization: ultrasound guidance  Assessment  Injection assessment: negative aspiration for heme, no paresthesia on injection and local visualized surrounding nerve on ultrasound  Slow fractionated injection: yes  Hemodynamics: stable  Additional Notes  Sartorius and Vastus Medialis Muscle, Femoral artery and Saphenous nerve are identified; the tip of the needle and the spread of the local anesthetic around the Saphenous nerve are visualized. The Saphenous nerve appeared to be anatomically normal and there were no abnormal pathologically findings seen.    Medications Administered  Ropivacaine (NAROPIN) injection 0.5%, 15 mL  Reason for block: post-op pain management

## 2021-07-14 NOTE — ANESTHESIA PROCEDURE NOTES
Peripheral Block    Patient location during procedure: pre-op  Staffing  Performed: anesthesiologist   Anesthesiologist: Sylvie James MD  Preanesthetic Checklist  Completed: patient identified, IV checked, site marked, risks and benefits discussed, surgical consent, monitors and equipment checked, pre-op evaluation, timeout performed, anesthesia consent given, oxygen available and patient being monitored  Peripheral Block  Patient position: supine  Prep: ChloraPrep  Patient monitoring: cardiac monitor, continuous pulse ox, frequent blood pressure checks and IV access  Block type: iPacks  Laterality: left  Injection technique: single-shot  Guidance: ultrasound guided  Provider prep: mask and sterile gloves  Needle  Needle gauge: 21 G  Needle length: 10 cm  Needle localization: ultrasound guidance  Assessment  Injection assessment: negative aspiration for heme, no paresthesia on injection and local visualized surrounding nerve on ultrasound  Paresthesia pain: none  Slow fractionated injection: yes  Hemodynamics: stable  Additional Notes  Immediately prior to procedure a \"time out\" was called to verify the correct patient, allergies, laterality, procedure and equipment. Time out performed with Yolanda Velasquez RN    Local Anesthetic: 0.5 %  Ropivacaine   Amount: 15 ml  in 5 ml increments after negative aspiration each time.         Medications Administered  Ropivacaine (NAROPIN) injection 0.5%, 15 mL  Reason for block: post-op pain management and at surgeon's request

## 2021-07-14 NOTE — PROGRESS NOTES
Pt sitting up in chair eating dinner. IV fluids infusing. Pt was complaining of slight nausea, see eMAR for medication administration. PRN pain medication being given as well. Pt was able to ambulate in the hallway with PCA. Calls out appropriately.    Electronically signed by Dunia Spear RN on 7/14/2021 at 6:41 PM

## 2021-07-15 VITALS
WEIGHT: 315 LBS | HEART RATE: 69 BPM | OXYGEN SATURATION: 96 % | SYSTOLIC BLOOD PRESSURE: 157 MMHG | DIASTOLIC BLOOD PRESSURE: 78 MMHG | TEMPERATURE: 98.1 F | HEIGHT: 68 IN | RESPIRATION RATE: 14 BRPM | BODY MASS INDEX: 47.74 KG/M2

## 2021-07-15 LAB
ANION GAP SERPL CALCULATED.3IONS-SCNC: 13 MMOL/L (ref 3–16)
BUN BLDV-MCNC: 16 MG/DL (ref 7–20)
CALCIUM SERPL-MCNC: 9.2 MG/DL (ref 8.3–10.6)
CHLORIDE BLD-SCNC: 101 MMOL/L (ref 99–110)
CO2: 27 MMOL/L (ref 21–32)
CREAT SERPL-MCNC: 0.8 MG/DL (ref 0.9–1.3)
GFR AFRICAN AMERICAN: >60
GFR NON-AFRICAN AMERICAN: >60
GLUCOSE BLD-MCNC: 118 MG/DL (ref 70–99)
GLUCOSE BLD-MCNC: 134 MG/DL (ref 70–99)
GLUCOSE BLD-MCNC: 141 MG/DL (ref 70–99)
GLUCOSE BLD-MCNC: 149 MG/DL (ref 70–99)
MRSA SCREEN RT-PCR: ABNORMAL
ORGANISM: ABNORMAL
PERFORMED ON: ABNORMAL
POTASSIUM SERPL-SCNC: 5 MMOL/L (ref 3.5–5.1)
SODIUM BLD-SCNC: 141 MMOL/L (ref 136–145)

## 2021-07-15 PROCEDURE — 36415 COLL VENOUS BLD VENIPUNCTURE: CPT

## 2021-07-15 PROCEDURE — 97530 THERAPEUTIC ACTIVITIES: CPT

## 2021-07-15 PROCEDURE — 2580000003 HC RX 258: Performed by: ORTHOPAEDIC SURGERY

## 2021-07-15 PROCEDURE — 97116 GAIT TRAINING THERAPY: CPT

## 2021-07-15 PROCEDURE — 80048 BASIC METABOLIC PNL TOTAL CA: CPT

## 2021-07-15 PROCEDURE — G0378 HOSPITAL OBSERVATION PER HR: HCPCS

## 2021-07-15 PROCEDURE — 96375 TX/PRO/DX INJ NEW DRUG ADDON: CPT

## 2021-07-15 PROCEDURE — 6370000000 HC RX 637 (ALT 250 FOR IP): Performed by: ORTHOPAEDIC SURGERY

## 2021-07-15 PROCEDURE — 97110 THERAPEUTIC EXERCISES: CPT

## 2021-07-15 PROCEDURE — 97166 OT EVAL MOD COMPLEX 45 MIN: CPT

## 2021-07-15 PROCEDURE — 96374 THER/PROPH/DIAG INJ IV PUSH: CPT

## 2021-07-15 PROCEDURE — 97535 SELF CARE MNGMENT TRAINING: CPT

## 2021-07-15 PROCEDURE — 94760 N-INVAS EAR/PLS OXIMETRY 1: CPT

## 2021-07-15 PROCEDURE — 6360000002 HC RX W HCPCS: Performed by: ORTHOPAEDIC SURGERY

## 2021-07-15 PROCEDURE — 96376 TX/PRO/DX INJ SAME DRUG ADON: CPT

## 2021-07-15 RX ORDER — CYCLOBENZAPRINE HCL 10 MG
10 TABLET ORAL 3 TIMES DAILY
Status: DISCONTINUED | OUTPATIENT
Start: 2021-07-15 | End: 2021-07-15 | Stop reason: HOSPADM

## 2021-07-15 RX ORDER — DOXYCYCLINE HYCLATE 100 MG
100 TABLET ORAL 2 TIMES DAILY
Qty: 14 TABLET | Refills: 0 | Status: SHIPPED | OUTPATIENT
Start: 2021-07-15 | End: 2021-07-22

## 2021-07-15 RX ORDER — ONDANSETRON 4 MG/1
4 TABLET, ORALLY DISINTEGRATING ORAL 3 TIMES DAILY PRN
Qty: 21 TABLET | Refills: 0 | Status: SHIPPED | OUTPATIENT
Start: 2021-07-15 | End: 2022-08-03 | Stop reason: SDUPTHER

## 2021-07-15 RX ORDER — DOCUSATE SODIUM 100 MG/1
100 CAPSULE, LIQUID FILLED ORAL 2 TIMES DAILY
Qty: 60 CAPSULE | Refills: 0 | Status: SHIPPED | OUTPATIENT
Start: 2021-07-15 | End: 2021-08-14

## 2021-07-15 RX ORDER — CYCLOBENZAPRINE HCL 10 MG
10 TABLET ORAL 3 TIMES DAILY PRN
Qty: 20 TABLET | Refills: 0 | Status: SHIPPED | OUTPATIENT
Start: 2021-07-15 | End: 2021-07-20 | Stop reason: SDUPTHER

## 2021-07-15 RX ADMIN — OXYCODONE HYDROCHLORIDE 10 MG: 10 TABLET ORAL at 09:46

## 2021-07-15 RX ADMIN — OXYCODONE HYDROCHLORIDE 10 MG: 10 TABLET ORAL at 01:35

## 2021-07-15 RX ADMIN — ONDANSETRON 4 MG: 2 INJECTION INTRAMUSCULAR; INTRAVENOUS at 08:38

## 2021-07-15 RX ADMIN — DOCUSATE SODIUM 50 MG AND SENNOSIDES 8.6 MG 1 TABLET: 8.6; 5 TABLET, FILM COATED ORAL at 08:39

## 2021-07-15 RX ADMIN — LISINOPRIL 10 MG: 10 TABLET ORAL at 08:39

## 2021-07-15 RX ADMIN — PREGABALIN 75 MG: 75 CAPSULE ORAL at 08:39

## 2021-07-15 RX ADMIN — INSULIN LISPRO 12 UNITS: 100 INJECTION, SOLUTION INTRAVENOUS; SUBCUTANEOUS at 08:51

## 2021-07-15 RX ADMIN — HYDROCHLOROTHIAZIDE 25 MG: 25 TABLET ORAL at 08:39

## 2021-07-15 RX ADMIN — PANTOPRAZOLE SODIUM 40 MG: 40 TABLET, DELAYED RELEASE ORAL at 05:33

## 2021-07-15 RX ADMIN — ACETAMINOPHEN 650 MG: 325 TABLET ORAL at 11:59

## 2021-07-15 RX ADMIN — MORPHINE SULFATE 4 MG: 4 INJECTION, SOLUTION INTRAMUSCULAR; INTRAVENOUS at 06:40

## 2021-07-15 RX ADMIN — ACETAMINOPHEN 650 MG: 325 TABLET ORAL at 05:33

## 2021-07-15 RX ADMIN — MORPHINE SULFATE 4 MG: 4 INJECTION, SOLUTION INTRAMUSCULAR; INTRAVENOUS at 00:01

## 2021-07-15 RX ADMIN — CETIRIZINE HYDROCHLORIDE 10 MG: 10 TABLET, FILM COATED ORAL at 08:39

## 2021-07-15 RX ADMIN — APIXABAN 2.5 MG: 2.5 TABLET, FILM COATED ORAL at 08:39

## 2021-07-15 RX ADMIN — ATORVASTATIN CALCIUM 40 MG: 40 TABLET, FILM COATED ORAL at 08:39

## 2021-07-15 RX ADMIN — FUROSEMIDE 40 MG: 40 TABLET ORAL at 08:39

## 2021-07-15 RX ADMIN — OXYCODONE HYDROCHLORIDE 10 MG: 10 TABLET ORAL at 05:33

## 2021-07-15 RX ADMIN — MORPHINE SULFATE 4 MG: 4 INJECTION, SOLUTION INTRAMUSCULAR; INTRAVENOUS at 03:32

## 2021-07-15 RX ADMIN — CEFAZOLIN SODIUM 3000 MG: 10 INJECTION, POWDER, FOR SOLUTION INTRAVENOUS at 00:01

## 2021-07-15 RX ADMIN — ACETAMINOPHEN 650 MG: 325 TABLET ORAL at 00:01

## 2021-07-15 RX ADMIN — CYCLOBENZAPRINE 10 MG: 10 TABLET, FILM COATED ORAL at 12:36

## 2021-07-15 ASSESSMENT — PAIN DESCRIPTION - ONSET
ONSET: ON-GOING

## 2021-07-15 ASSESSMENT — PAIN DESCRIPTION - PROGRESSION
CLINICAL_PROGRESSION: GRADUALLY IMPROVING
CLINICAL_PROGRESSION: GRADUALLY WORSENING
CLINICAL_PROGRESSION: GRADUALLY IMPROVING
CLINICAL_PROGRESSION: GRADUALLY IMPROVING
CLINICAL_PROGRESSION: NOT CHANGED
CLINICAL_PROGRESSION: GRADUALLY IMPROVING
CLINICAL_PROGRESSION: GRADUALLY IMPROVING
CLINICAL_PROGRESSION: GRADUALLY WORSENING
CLINICAL_PROGRESSION: NOT CHANGED

## 2021-07-15 ASSESSMENT — PAIN SCALES - GENERAL
PAINLEVEL_OUTOF10: 9
PAINLEVEL_OUTOF10: 8
PAINLEVEL_OUTOF10: 7
PAINLEVEL_OUTOF10: 10
PAINLEVEL_OUTOF10: 5
PAINLEVEL_OUTOF10: 7
PAINLEVEL_OUTOF10: 9
PAINLEVEL_OUTOF10: 7
PAINLEVEL_OUTOF10: 9
PAINLEVEL_OUTOF10: 7
PAINLEVEL_OUTOF10: 9
PAINLEVEL_OUTOF10: 7
PAINLEVEL_OUTOF10: 10

## 2021-07-15 ASSESSMENT — PAIN DESCRIPTION - DESCRIPTORS
DESCRIPTORS: THROBBING

## 2021-07-15 ASSESSMENT — PAIN - FUNCTIONAL ASSESSMENT
PAIN_FUNCTIONAL_ASSESSMENT: PREVENTS OR INTERFERES SOME ACTIVE ACTIVITIES AND ADLS

## 2021-07-15 ASSESSMENT — PAIN DESCRIPTION - ORIENTATION
ORIENTATION: LEFT

## 2021-07-15 ASSESSMENT — PAIN DESCRIPTION - LOCATION
LOCATION: KNEE

## 2021-07-15 ASSESSMENT — PAIN DESCRIPTION - PAIN TYPE
TYPE: SURGICAL PAIN

## 2021-07-15 ASSESSMENT — PAIN DESCRIPTION - FREQUENCY
FREQUENCY: CONTINUOUS

## 2021-07-15 NOTE — PROGRESS NOTES
CLINICAL PHARMACY NOTE: MEDS TO BEDS    Total # of Prescriptions Filled: 5   The following medications were delivered to the patient:  Current Discharge Medication List      START taking these medications    Details   doxycycline hyclate (VIBRA-TABS) 100 MG tablet Take 1 tablet by mouth 2 times daily for 7 days  Qty: 14 tablet, Refills: 0      docusate sodium (COLACE) 100 MG capsule Take 1 capsule by mouth 2 times daily  Qty: 60 capsule, Refills: 0      ondansetron (ZOFRAN-ODT) 4 MG disintegrating tablet Take 1 tablet by mouth 3 times daily as needed for Nausea or Vomiting  Qty: 21 tablet, Refills: 0      oxyCODONE (ROXICODONE) 5 MG immediate release tablet Take 1-2 tablets by mouth every 6 hours as needed for Pain for up to 5 days.   Qty: 40 tablet, Refills: 0    Comments: Reduce doses taken as pain becomes manageable  Associated Diagnoses: Arthritis of left knee      apixaban (ELIQUIS) 2.5 MG TABS tablet Take 1 tablet by mouth 2 times daily  Qty: 60 tablet, Refills: 0         ·   ·     Additional Documentation:

## 2021-07-15 NOTE — CARE COORDINATION
Referral per clinical path. Met with patient and confirmed plans to return to home with wife and [facility]. Verified demographics and that the following DME is needed: bedside commode or raised toilet seat. Patient denies any other needs or concerns. Referred to [address] [place] [address] Drive who will follow up. Patient will move his outpatient therapy appt out two weeks. Wife is ordering bathroom aides as patient is over weight limit for toilet seat we have in stock here through [address] [place] [address].     Electronically signed by Joanie Segura on 7/15/2021 at 12:28 PM

## 2021-07-15 NOTE — PROGRESS NOTES
[facility] Orthopedic Surgery   Progress Note      S/P :  SUBJECTIVE  Up in chair. Alert and oriented. States having some nausea since surgery, requests Zofran for home. Pain is   described in left knee and with the intensity of moderate to severe. Pain is described as aching, throbbing. OBJECTIVE              Physical                      VITALS:  BP (!) 156/75   Pulse 87   Temp 97.8 °F (36.6 °C) (Oral)   Resp 14   Ht 5' 8\" (1.727 m)   Wt (!) 318 lb 5.5 oz (144.4 kg)   SpO2 93%   BMI 48.40 kg/m²                     MUSCULOSKELETAL:  left foot NVI. Wiggles toes to command. Pedal pulses are palpable. NEUROLOGIC:                                  Sensory:  Touch:  Left Lower Extremity:  normal                                                 Surgical wound appears clean and dry left knee with Prineo dressing. SONI hose on right leg. Pt with vascular sore left posterior ankle and prefers to use own compression hose, He has them here. I agree this is a good plan and instructed he and his wife must wear daily for 2 weeks to prevent DVT blood clots, They verbalized understanding.      Data       CBC:   Lab Results   Component Value Date    WBC 5.4 06/25/2021    RBC 4.59 06/25/2021    HGB 15.3 06/25/2021    HCT 44.8 06/25/2021    MCV 97.6 06/25/2021    MCH 33.2 06/25/2021    MCHC 34.0 06/25/2021    RDW 14.2 06/25/2021     06/25/2021    MPV 8.9 06/25/2021        WBC:    Lab Results   Component Value Date    WBC 5.4 06/25/2021        Hemoglobin/Hematocrit:    Lab Results   Component Value Date    HGB 15.3 06/25/2021    HCT 44.8 06/25/2021        PT/INR:    Lab Results   Component Value Date    PROTIME 11.7 06/25/2021    INR 1.01 06/25/2021              Current Inpatient Medications             Current Facility-Administered Medications: atorvastatin (LIPITOR) tablet 40 mg, 40 mg, Oral, Daily  furosemide (LASIX) tablet 40 mg, 40 mg, Oral, Daily  hydroCHLOROthiazide (HYDRODIURIL) tablet 25 mg, 25 mg, Oral, Daily  lisinopril (PRINIVIL;ZESTRIL) tablet 10 mg, 10 mg, Oral, Daily  cetirizine (ZYRTEC) tablet 10 mg, 10 mg, Oral, Daily  pantoprazole (PROTONIX) tablet 40 mg, 40 mg, Oral, QAM AC  pregabalin (LYRICA) capsule 75 mg, 75 mg, Oral, TID  rOPINIRole (REQUIP) tablet 3 mg, 3 mg, Oral, Nightly  insulin glargine (LANTUS) injection vial 36 Units, 0.25 Units/kg, Subcutaneous, Nightly  insulin lispro (HUMALOG) injection vial 12 Units, 0.08 Units/kg, Subcutaneous, TID WC  insulin lispro (HUMALOG) injection vial 0-6 Units, 0-6 Units, Subcutaneous, TID WC  insulin lispro (HUMALOG) injection vial 0-3 Units, 0-3 Units, Subcutaneous, Nightly  glucose (GLUTOSE) 40 % oral gel 15 g, 15 g, Oral, PRN  dextrose 50 % IV solution, 12.5 g, Intravenous, PRN  glucagon (rDNA) injection 1 mg, 1 mg, Intramuscular, PRN  dextrose 5 % solution, 100 mL/hr, Intravenous, PRN  0.45 % sodium chloride infusion, , Intravenous, Continuous  sodium chloride flush 0.9 % injection 10 mL, 10 mL, Intravenous, 2 times per day  sodium chloride flush 0.9 % injection 10 mL, 10 mL, Intravenous, PRN  0.9 % sodium chloride infusion, 25 mL, Intravenous, PRN  acetaminophen (TYLENOL) tablet 650 mg, 650 mg, Oral, Q6H  oxyCODONE (ROXICODONE) immediate release tablet 5 mg, 5 mg, Oral, Q4H PRN **OR** oxyCODONE HCl (OXY-IR) immediate release tablet 10 mg, 10 mg, Oral, Q4H PRN  morphine (PF) injection 2 mg, 2 mg, Intravenous, Q3H PRN **OR** morphine (PF) injection 4 mg, 4 mg, Intravenous, Q3H PRN  ondansetron (ZOFRAN-ODT) disintegrating tablet 4 mg, 4 mg, Oral, Q8H PRN **OR** ondansetron (ZOFRAN) injection 4 mg, 4 mg, Intravenous, Q6H PRN  sennosides-docusate sodium (SENOKOT-S) 8.6-50 MG tablet 1 tablet, 1 tablet, Oral, BID  magnesium hydroxide (MILK OF MAGNESIA) 400 MG/5ML suspension 30 mL, 30 mL, Oral, Daily PRN  apixaban (ELIQUIS) tablet 2.5 mg, 2.5 mg, Oral, BID    ASSESSMENT AND PLAN    Post left TKA, stable exam  DVT prophylaxis ordered, Eliquis bid for 14

## 2021-07-15 NOTE — PLAN OF CARE
pt's pain. PRN pain medication being given if ordered. Educating pt on nonpharmacologic interventions for pain control. Will continue to monitor and reassess. Problem: Pain:  Goal: Control of chronic pain  Description: Control of chronic pain  7/15/2021 1026 by Giovanni Johnston RN  Outcome: Ongoing  Note: Assessing and monitoring pt's pain. PRN pain medication being given if ordered. Educating pt on nonpharmacologic interventions for pain control. Will continue to monitor and reassess. Problem: Falls - Risk of:  Goal: Will remain free from falls  Description: Will remain free from falls  7/15/2021 1026 by Giovanni Johnston RN  Outcome: Ongoing  Note: Fall risk assessment completed. Fall precautions in place. Bed in lowest position, wheels locked, bed/chair exit alarm in place, call light within reach, and non skid footwear on. Walkway free of clutter. Pt alert and oriented and able to make needs known. Pt educated to use call light when needing to get up, and pt utilizes call light to make needs known. Will continue to monitor. Problem: Falls - Risk of:  Goal: Absence of physical injury  Description: Absence of physical injury  7/15/2021 1026 by Giovanni Johnston RN  Outcome: Ongoing  Note: Pt absent from physical injury on this shift      Problem: Skin Integrity:  Goal: Will show no infection signs and symptoms  Description: Will show no infection signs and symptoms  7/15/2021 1026 by Giovanni Johnston RN  Outcome: Ongoing  Note: Monitoring pt for signs and symptoms of infection. Will observe for any redness, warmth, or pus.         Problem: Skin Integrity:  Goal: Absence of new skin breakdown  Description: Absence of new skin breakdown  7/15/2021 1026 by Giovanni Johnston RN  Outcome: Ongoing  Note: Pt absent from new skin breakdown on this shift

## 2021-07-15 NOTE — PROGRESS NOTES
Patient is alert & oriented x4, x1 assist with walker, 2/4 bed rails up, bed in lowest position, fall precautions in place, call light within reach. Morning assessment complete. Morning medications given. PRN zofran given. Pt complaining of pain to his left knee. Ice machine refilled with ice. Wife at bedside. Will cont to monitor and reassess.   Electronically signed by Dunia Spear RN on 7/15/2021 at 9:44 AM

## 2021-07-15 NOTE — CARE COORDINATION
[facility]  Received referral from case management Alana Mendoza  Faxed orders to [address] care to see patient by   7/17/21  Rian De Paz LPN    [facility] CTN Cell [phone], Fax [phone]

## 2021-07-15 NOTE — PROGRESS NOTES
Pt up to chair. A&Ox4. Pt c/o left knee pain overnight, oxycodone, morphine and scheduled tylenol given per orders. No other complaints at this time. Dressing removed this morning, pt tolerated well. Fall precautions in place, call light and bedside table within reach.

## 2021-07-15 NOTE — PROGRESS NOTES
Data- discharge order received, patient verbalized agreement to discharge, disposition to previous residence, needs noted for [facility] and informed Adams Lock NP. Wife and patient in agreement with home health care [facility]. Action- discharge instructions prepared and given to patient and wife cody, patient and cody verbalized understanding. Medication information packet given r/t NEW and/or CHANGED prescriptions emphasizing name/purpose/side effects, pt verbalized understanding. Discharge instruction summary: Diet- general, Activity- wbat, Primary Care Physician as follows: Urmila Monte, APRN - [phone]. f/u appointment with orthopedic office noted below, immunizations reviewed and discussed with patient, prescription medications to be filled by retail pharmacy and then delivered. Inpatient surgical procedure precautions reviewed: . Neurovascular check performed and patient is WNLs with exception of chronic tingling in left foot, denies numbness in extremties. Incision site  prineo glue dressing assessed and is  clean,dry, and intact, no signs of redness, drainage, or odor noted. patient's bedside RN satish notified of patient completing discharge instructions and iv removal. Nurse Navigator and Orthopedic Office contact information on discharge instructions and provided to patient. Flexeril rx sent to patient's pharmacy per telephone order by Claude Blake NP. Response- IV removed. Medications to be delivered to patient via meds to bed program. Disposition is home with [facility], to be transported with family.      Future Appointments   Date Time Provider Erine Duron   7/20/2021 11:30 AM Eris Hansen, PT [facility] AND DARREN Moncada   7/23/2021  9:15 AM Eris Hansen, PT [facility] AND PT Anastasiya Moncada   7/27/2021 11:30 AM Eris Hansen, PT [facility] AND DARREN Moncada   7/29/2021  8:30 AM Ag Heaton, PT [facility] AND PT Anastasiya Moncada   7/29/2021 10:30 AM Evie Reyes MD [facility]   8/3/2021 10:45 AM Albaro Heaton, PT [facility] [place] AND PT Elma Bonner   8/6/2021  9:15 AM Keenan Cuevas, PT [facility] [place] AND PT Elma Bonner   8/10/2021 10:45 AM Keenan Cuevas, PT [facility] AND PT Elma Bonner   8/11/2021  8:15 AM Marc Albert [facility] WT [facility]   8/13/2021  9:15 AM Keenan Cuevas, PT [facility] AND PT Elma Bonner   8/17/2021 10:45 AM Keenan Cuevas, PT [facility] AND PT Elma Bonner   8/20/2021  9:15 AM Keenan Cuevas, PT [facility] AND PT Elma Bonner   8/24/2021 10:45 AM Keenan Cuevas, PT [facility] AND PT Elma Bonner   8/27/2021  9:15 AM Keenan Cuevas, PT [facility] AND PT Elma Bonner   11/8/2021  8:00 AM JACKLYN Claudio - CNP [facility]     Electronically signed by Chaz Lauren RN on 7/15/2021 at 11:46 AM

## 2021-07-15 NOTE — PLAN OF CARE
Problem: Discharge Planning:  Goal: Discharged to appropriate level of care  Description: Discharged to appropriate level of care  7/14/2021 2300 by Srikanth Romo RN  Outcome: Ongoing  Note: Pt will be discharged to appropriate level of care. SW and medical team are following. Pt verbalizes understanding of discharge plans. Will monitor. 7/14/2021 1511 by Tricia Randolph RN  Outcome: Ongoing  Note: Pt will be discharged to the appropriate level of care      Problem: Mobility - Impaired:  Goal: Mobility will improve  Description: Mobility will improve  7/14/2021 2300 by Srikanth Romo RN  Outcome: Ongoing  Note: Pt mobility is increasing. Pt continues with therapy. Pt is SBA x1 with walker. Will monitor. 7/14/2021 1511 by Tricia Randolph RN  Outcome: Ongoing  Note: Mobility will improve on this shift      Problem: Infection - Surgical Site:  Goal: Will show no infection signs and symptoms  Description: Will show no infection signs and symptoms  7/14/2021 2300 by Srikanth Romo RN  Outcome: Ongoing  Note: Pt is free of signs and symptoms of infection. Incision and dressing are clean, dry and intact. Vital signs stable. Will monitor. 7/14/2021 1511 by Tricia Randolph RN  Outcome: Ongoing  Note: Monitoring pt for signs and symptoms of infection. Will observe for any redness, warmth, or pus. Problem: Pain - Acute:  Goal: Pain level will decrease  Description: Pain level will decrease  7/14/2021 2300 by Srikanth Romo RN  Outcome: Ongoing  Note: Pt assessed for pain. Pt in pain and assessed with 0-10 pain rating scale. Pt given prescribed analgesic for pain. (See eMar) Pt satisfied with pain relief thus far. Will reassess and continue to monitor. 7/14/2021 1511 by Tricia Randolph RN  Outcome: Ongoing  Note: Assessing and monitoring pt's pain. PRN pain medication being given if ordered. Educating pt on nonpharmacologic interventions for pain control.  Will continue to monitor and falls  7/14/2021 2300 by Maycol Michaels RN  Outcome: Ongoing  Note: Fall risk assessment completed. Fall precautions in place. Call light within reach. Pt educated on calling for assistance before getting up. Walkway free of clutter. Will continue to monitor. 7/14/2021 1511 by Damian Fan RN  Outcome: Ongoing  Note: Fall risk assessment completed. Fall precautions in place. Bed in lowest position, wheels locked, bed/chair exit alarm in place, call light within reach, and non skid footwear on. Walkway free of clutter. Pt alert and oriented and able to make needs known. Pt educated to use call light when needing to get up, and pt utilizes call light to make needs known. Will continue to monitor. Problem: Falls - Risk of:  Goal: Absence of physical injury  Description: Absence of physical injury  7/14/2021 2300 by Maycol Michaels RN  Outcome: Ongoing  Note: Pt is free of injury. No injury noted. Fall precautions in place. Call light within reach. Will monitor. 7/14/2021 1511 by Damian Fan RN  Outcome: Ongoing  Note: Pt absent from physical injury on this shift      Problem: Skin Integrity:  Goal: Will show no infection signs and symptoms  Description: Will show no infection signs and symptoms  7/14/2021 2300 by Maycol Michaels RN  Outcome: Ongoing  Note: Pt is free of signs and symptoms of infection. Incision and dressing are clean, dry and intact. Vital signs stable. Will monitor. 7/14/2021 1511 by Damian Fan RN  Outcome: Ongoing  Note: Monitoring pt for signs and symptoms of infection. Will observe for any redness, warmth, or pus.

## 2021-07-15 NOTE — PROGRESS NOTES
Medication Reconciliation    List of medications patient is currently taking is complete. Source of information: 1. Conversation with patient at bedside                                      2. EPIC records      Allergies  Celebrex [celecoxib], Mobic [meloxicam], Nsaids, and Bactrim [sulfamethoxazole-trimethoprim]     Notes regarding home medications:   1. Med list is up to date.

## 2021-07-15 NOTE — PROGRESS NOTES
Clinical Pharmacy Note  Medication Counseling    Reviewed new medications started during hospital admission: oxycodone, apixaban, doxycycline. Indications and side effects were emphasized during counseling. All medication-related questions addressed. Patient verbalized understanding of education. Should the patient express any additional questions or concerns regarding their medications, please do not hesitate to contact the pharmacy department. Patient/caregiver aware they may refuse medications during hospital stay. 10 minutes spent educating patient regarding medications.

## 2021-07-15 NOTE — PROGRESS NOTES
Physical Therapy      Facility/Department: [address] ORTHOPEDICS  Daily Treatment / Discharge      NAME: Mari López  : 1960  MRN: [id number]    Date of Service: 7/15/2021    Discharge Recommendations:   24 hour supervision or assist, S Level 1   Mari López scored a 19 on the AM-PAC short mobility form. Current research shows that an AM-PAC score of 18 or greater is typically associated with a discharge to the patient's home setting. Based on the patient's AM-PAC score and their current functional mobility deficits, it is recommended that the patient have 2-3 sessions per week of Physical Therapy at d/c to increase the patient's independence. At this time, this patient demonstrates the endurance and safety to discharge home with home therapy services and a follow up treatment frequency of 2-3x/wk. Please see assessment section for further patient specific details. PT Equipment Recommendations  Equipment Needed: No  Other: patient has rw    Assessment   Body structures, Functions, Activity limitations: Decreased functional mobility ; Decreased ROM; Decreased endurance; Increased pain;Decreased balance  Assessment: Prior to elective L TKR robot assisted via Dr. Michael Cha 2021, patient reports living in home with wife (8+8 step entry, no rails), and independence with functional mobility, ADLs (wife assists in and out of tub), transfers, and home gait with axillary crutches. Status 7-: Transfers SBA sit<>stand, CGA-Min A into car, Gait RW 30' with SBA and occ cues for technique and safety. Stairs as needed for home with CGA-Min A and cues. Patient and wife instructed to install at least 1 railing at their stairs. Patient appropriate for DC to home with initial 24 hour assist/supervision, and home PT level 1. Patient and wife agreeable. Wife reports patient having RW at home. Provided correct height for wh walker.     Treatment Diagnosis: Impaired functional mobility  Prognosis: Good  History: as noted  Exam: Therapist performs soft tissue mobs to L LE, all planes, and instructs wife in the same. PT Education: Goals;PT Role;Plan of Care;General Safety;Transfer Training;Gait Training;Weight-bearing Education;Disease Specific Education  Patient Education: Positioning to maximize knee extension and flexion. Rationale for home therapy initally. Care and ue of cryocuff. HEP  Barriers to Learning: fatigue/pain  REQUIRES PT FOLLOW UP: Yes  Activity Tolerance  Activity Tolerance: Patient limited by pain       Patient Diagnosis(es): The encounter diagnosis was Arthritis of left knee. has a past medical history of Arthritis, Bilateral venous leg ulcers, Cellulitis of lower extremity, Diabetes mellitus ([address] GERD (gastroesophageal reflux disease), Hyperlipidemia, Hypertension, Impaired fasting glucose, MRSA carrier, MRSA nasal colonization, Obesity, TOM treated with BiPAP, Preoperative clearance, Restless leg syndrome, Screening PSA (prostate specific antigen), Seasonal allergies, Sleep apnea, Stasis dermatitis of both legs, Tobacco use, and Tubular adenoma of colon. has a past surgical history that includes Mouth surgery; [place] tooth extraction; Circumcision; Vasectomy; Colonoscopy (09/14/2017); Umbilical hernia repair (05/28/2011); Knee arthroscopy (Left, 8/3/2020); Pain management procedure (Left, 12/9/2020); Upper gastrointestinal endoscopy (N/A, 1/11/2021); Sleeve Gastrectomy (N/A, 3/2/2021); and Total knee arthroplasty (Left, 7/14/2021).     Restrictions  Restrictions/Precautions  Restrictions/Precautions: Weight Bearing, Fall Risk  Lower Extremity Weight Bearing Restrictions  Left Lower Extremity Weight Bearing: Weight Bearing As Tolerated  Vision/Hearing  Vision: Impaired  Vision Exceptions: Wears glasses at all times  Hearing: Within functional limits     Subjective  General  Chart Reviewed: Yes  Patient assessed for rehabilitation services?: Yes  Additional Pertinent Hx: 63 yo male to [facility] 7- for elective L TKR robot assisted via Dr. Todd Herrera. Other PMHx as noted  Response To Previous Treatment: Patient with no complaints from previous session. Family / Caregiver Present: Yes (wife)  Referring Practitioner: Faustina Garay MD  Referral Date : 07/14/21  Subjective  Subjective: In [facility] chair. Agreeable to therapy. States L posterior calf, knee, and proximal thigh pain are Moderate-high. Reports \"constant throbbing. \"  Pain Screening  Patient Currently in Pain:  (Reporting 10/10 pain at start and end of session, RN in room to provide oxy)     Orientation  Orientation  Overall Orientation Status: Within Functional Limits     Social/Functional History  Social/Functional History  Lives With: Spouse, Family (spouse will be home 1st week with patient. also adult grandkids)  Type of Home: House  Home Layout: Multi-level, Bed/Bath upstairs  Home Access: Stairs to enter without rails  Entrance Stairs - Number of Steps: 8 + 8  Entrance Stairs - Rails: None (used B crutches for stairs)  Bathroom Shower/Tub: Tub/Shower unit  Bathroom Toilet: Standard  Bathroom Accessibility: Walker accessible  Home Equipment: Crutches  ADL Assistance: Independent (except help in and out of tub)  Ambulation Assistance: Independent (with crutches)  Transfer Assistance: Independent  Active : Yes     Cognition   Cognition  Overall Cognitive Status: WFL  Cognition Comment: at times anxiety due to pain. Objective  Transfers  Sit to Stand: Stand by assistance (cues for correct hand placement. Extra time. Effortful. Mod guarding. BS chair x 2)  Stand to sit: Stand by assistance (cues for correct hand placement. Extra time. Effortful. Mod guarding.  BS chair x 2)  Car Transfer: Minimal Assistance;Contact guard assistance (to transfer patient into his personal vehicle for DC to home.)  Ambulation  Ambulation?: Yes  Ambulation 1  Surface: level tile  Device: Rolling Walker  Assistance: Stand by assistance  Quality of Gait: discontinuous, step to pattern. Cues for sequencing, Good positioning at walker. Cues also to allow L knee flexion during swing phase of gait. Antalgic but stable at L stance phase of gait. Gait Deviations: Slow Torrie;Decreased step height;Decreased step length  Distance: 30'  Comments: Therapist limits gait distance due to high L knee pain currently and need to trial stairs. Stairs/Curb  Stairs?: Yes  Stairs  # Steps : 4  Stairs Height: 6\"  Assistance: Minimal assistance  Comment: Patient performs 1st step with B rails, then last 3 stairs with his B crutches, using \"his\" techique: places crutches, has hands on axilla portion, wedges crutches against walls (B), and ascends. Therapist does instruct and patient performs 1 step with crutches under arms. Stable with all methods of ascent/descent, but with presentation of high pain throughout, and preference stated to do the stairs using his method. cues for sequencing. Therapist instructs in increased fall risk with his technique. He and wife verbalize understanding. Balance  Posture: Good  Sitting - Static: Good  Sitting - Dynamic: Good  Standing - Static: Good;- (supported at sink)  Standing - Dynamic: Good;- (at RW)  Exercises  Quad Sets: x 10  Gluteal Sets: x 5  Hip Abduction: Adductor Sets x 5  Knee Active Range of Motion: 8-68  Ankle Pumps: x 15  Comments: AROM-AAROM knee flexion in sitting, with foot on glide, x 5. Written HEP, including activity expectation, and proper knee positioning to maximize knee flexion and extension ROM return. Wife and pt verb understanding.      Plan   Plan  Times per week: 1-3 sessions as indicated  Current Treatment Recommendations: Functional Mobility Training, Strengthening, Transfer Training, ROM, Gait Training, Stair training, Safety Education & Training, Home Exercise Program, Patient/Caregiver Education & Training, Equipment Evaluation, Education, & procurement  Safety Devices  Type of devices: Gait belt, Call light

## 2021-07-15 NOTE — PROGRESS NOTES
Occupational Therapy   Occupational Therapy Initial Assessment  Date: 7/15/2021   Patient Name: Charles Lopez  MRN: [id number]     : 1960    Date of Service: 7/15/2021    Assessment: Pt is 64 y.o. M who presents with arthritis of L knee. Pt is s/p L TKA and is WBAT. PTA pt lives with wife in [facility] home with at least 12 KATHLE[address]. Pt reports independence in self-care tasks, shares homemaking responsibilities with wife, and completes functional mobility with no device. Currently, pt presents with ROM/strength in [facility] for self-care and transfers. Pt completed sit <> stand transfers with CGA and increased time. Pt completed functional mobility with RW with SBA/CGA, steady with no overt LOB. Pt required total A for LB dressing, CGA/min A for toileting, SBA for grooming tasks at sink. Pt will likely require max A for LB ADLs at discharge. Anticipate pt safe to d/c home with wife pending his ability to do steps and get inside his home. Recommend 24/7 supervision/assist and home health OT. Discharge Recommendations:  24 hour supervision or assist, Home with Home health OT, S Level 1  OT Equipment Recommendations  Other: Recommend grab bars/toilet safety frame for toilet transfers (wife plans to purchase). Discussed TTB vs shower chair d/t likely inability to step over tub. Pt and wife report they are not going to attempt shower for quite some time. HOME HEALTH CARE: LEVEL 1 STANDARD    - Initial home health evaluation to occur within 24-48 hours, in patient home   - Therapy to evaluate with goal of regaining prior level of functioning   - Therapy to evaluate if patient has [address] needs for personal care    Assessment   Performance deficits / Impairments: Decreased functional mobility ; Decreased strength;Decreased endurance;Decreased ADL status; Decreased balance  Assessment: Pt is 64 y.o. M who presents with arthritis of L knee. Pt is s/p L TKA and is WBAT.  PTA pt lives with wife in [facility] with at least 16 KATHLE[address]. Pt reports independence in self-care tasks, shares homemaking responsibilities with wife, and completes functional mobility with no device. Currently, pt presents with ROM/strength in [facility] for self-care and transfers. Pt completed sit <> stand transfers with CGA and increased time. Pt completed functional mobility with RW with SBA/CGA, steady with no overt LOB. Pt required total A for LB dressing, CGA/min A for toileting, SBA for grooming tasks at sink. Pt will likely require max A for LB ADLs at discharge. Anticipate pt safe to d/c home with wife pending his ability to do steps and get inside his home. Recommend 24/7 supervision/assist and home health OT. Prognosis: Fair;Good  Decision Making: Medium Complexity  History: PMH: DM, HTN, GERD  Exam: ADLs, transfers, func mob, bed mob  Assistance / Modification: CGA for mobility, mod/max A for ADLs  OT Education: OT Role;ADL Adaptive Strategies; Plan of Care;Transfer Training;Precautions  Patient Education: Pain control techniques, safe management with RW, techniques for ADL tasks, safety with equipment in bathroom at toilet/shower  REQUIRES OT FOLLOW UP: Yes  Activity Tolerance  Activity Tolerance: Patient limited by pain  Safety Devices  Safety Devices in place: Yes (RN [facility]) aware)  Type of devices: Left in chair;Call light within reach; Chair alarm in place;Nurse notified           Patient Diagnosis(es): The encounter diagnosis was Arthritis of left knee.      has a past medical history of Arthritis, Bilateral venous leg ulcers, Cellulitis of lower extremity, Diabetes mellitus ([address] GERD (gastroesophageal reflux disease), Hyperlipidemia, Hypertension, Impaired fasting glucose, MRSA carrier, MRSA nasal colonization, Obesity, TOM treated with BiPAP, Preoperative clearance, Restless leg syndrome, Screening PSA (prostate specific antigen), Seasonal allergies, Sleep apnea, Stasis dermatitis of both legs, Tobacco use, and Tubular adenoma of ADLs, SBA for UB ADLs, and CGA/min A for toileting. Tone RUE  RUE Tone: Normotonic  Tone LUE  LUE Tone: Normotonic  Coordination  Movements Are Fluid And Coordinated: Yes     Bed mobility  Supine to Sit: Unable to assess (Up in chair at start of session)  Sit to Supine: Unable to assess (in BS chair at end of session)     Transfers  Sit to stand: Contact guard assistance  Stand to sit: Contact guard assistance  Transfer Comments: CGA for sit <> stand from recliner chair to RW, required significant increased time to lower recliner chair leg rest and prepare for transfer d/t pain. Cognition  Overall Cognitive Status: WFL        Sensation  Overall Sensation Status: WFL        LUE AROM (degrees)  LUE AROM : WFL  Left Hand AROM (degrees)  Left Hand AROM: WFL  RUE AROM (degrees)  RUE AROM : WFL  Right Hand AROM (degrees)  Right Hand AROM: WFL     LUE Strength  Gross LUE Strength: WFL  RUE Strength  Gross RUE Strength: WFL          Plan   Plan  Times per week: 3-5  Current Treatment Recommendations: Strengthening, Functional Mobility Training, Endurance Training, Balance Training, Safety Education & Training, Equipment Evaluation, Education, & procurement, Patient/Caregiver Education & Training, Self-Care / ADL    AM-PAC Score    Shaina Grande scored a 18/24 on the AM-PAC ADL Inpatient form. Current research shows that an AM-PAC score of 18 or greater is typically associated with a discharge to the patient's home setting. Based on the patient's AM-PAC score, and their current ADL deficits, it is recommended that the patient have 2-3 sessions per week of Occupational Therapy at d/c to increase the patient's independence. At this time, this patient demonstrates the endurance and safety to discharge home with home health OT (home vs OP services) and a follow up treatment frequency of 2-3x/wk. Please see assessment section for further patient specific details.     If patient discharges prior to next session this note will serve as a discharge summary. Please see below for the latest assessment towards goals. AM-PAC Inpatient Daily Activity Raw Score: 18 (07/15/21 1044)  AM-PAC Inpatient ADL T-Scale Score : 38.66 (07/15/21 1044)  ADL Inpatient CMS 0-100% Score: 46.65 (07/15/21 1044)  ADL Inpatient CMS G-Code Modifier : CK (07/15/21 1044)    Goals  Short term goals  Time Frame for Short term goals: Pt plans to d/c home this date with 24/7 supervision/assist - recommend home health OT. Therapy Time   Individual Concurrent Group Co-treatment   Time In       0920   Time Out       1045   Minutes       85   Timed Code Treatment Minutes: 70 Minutes (15 min eval)     1 Mod Eval - 15 min   2 ADLs - 30 min   3 Thera Act - 40 min      If pt is discharged prior to next OT session, this note will serve as the discharge summary.     Sushil Ga, VMO/O#[id number]

## 2021-07-15 NOTE — ACP (ADVANCE CARE PLANNING)
Advance Care Planning     Advance Care Planning Inpatient Note  [facility] Department    Today's Date: 7/15/2021  Unit: [facility] ORTHOPEDICS    Received request from [employer]. Upon review of chart and communication with care team, patient's decision making abilities are not in question. Patient and Spousewas/were present in the room during visit. Goals of ACP Conversation:  Discuss advance care planning documents    Health Care Decision Makers:      Primary Decision Maker: Malia Yadav - Spouse - [phone]  Click here to complete [address] including selection of the Healthcare Decision Maker Relationship (ie \"Primary\")     Today we:  Next of Kin, per patient report    Advance Care Planning Documents (Patient Wishes):  None     Assessment:  Patient and wife are going to therapy session. Unable to have much ACP discussion at this time but provided forms and information in writing.     Interventions:  Deferred conversation as patient not interested in completing an advance directive at this time    Outcomes/Plan:  ACP Discussion: Postponed    Electronically signed by Marga Bain, [employer] on 7/15/2021 at 11:02 AM

## 2021-07-16 ENCOUNTER — CARE COORDINATION (OUTPATIENT)
Dept: OTHER | Facility: CLINIC | Age: 61
End: 2021-07-16

## 2021-07-16 ENCOUNTER — TELEPHONE (OUTPATIENT)
Dept: ORTHOPEDIC SURGERY | Age: 61
End: 2021-07-16

## 2021-07-16 NOTE — TELEPHONE ENCOUNTER
[address] [place] Name: [facility]   Contact Name: Dina Guzman Number: [phone]  Request or Information: Calling to let you know that patient has started PT today and they need to know that you will sign the orders.

## 2021-07-16 NOTE — DISCHARGE SUMMARY
Physician Discharge Summary     Patient ID:  Jo Thurman  [id number]  27 y.o.  1960    Admit date: 7/14/2021    Discharge date and time: 7/15/2021 12:56 PM     Admitting Physician: Cami Payan MD     Discharge Physician: Nain Yanez    Admission Diagnoses: Arthritis of left knee [M17.12]    Discharge Diagnoses: left knee OA    Admission Condition: good    Discharged Condition: good    Indication for Admission: Failed conservative treatment as outpatient for joint pain including PT and pain meds. This patient was then electively scheduled for total joint replacement surgery    Surgical procedure: left TKA    Consults: PT OT SS    This patient had no postoperative complications. They has PT and OT for ADL's . IV and PO pain med for pain control and was eventually DC in stable condition    Treatments: analgesia,  therapies: PT OT,  and surgery      Disposition: home    Patient Instructions:   [unfilled]  Activity: activity as tolerated  Diet: regular diet  Wound Care: keep wound clean and dry    Follow-up with TAMIR Thomas in 2 weeks.     Signed:  JACKLYN Miller CNP  7/16/2021  2:49 PM

## 2021-07-16 NOTE — TELEPHONE ENCOUNTER
[address] [place] Name: [address]  Contact Name: Ольга Baldwin Number: [phone]  Request or Information:WOULD LIKE TO SEE IF DR Manish Shipley WOULD SIGN OFF ON ORDER FOR [facility] CARE FOR PATIENT FOLLOWING SX - VERBAL ORDERS WOULD BE OKAY

## 2021-07-16 NOTE — CARE COORDINATION
Ambulatory Care Coordination Note  7/16/2021  CM Risk Score: 2  Charlson 10 Year Mortality Risk Score: 23%     ACC: Beatrice Tam, RN    Summary Note: ACM attempted to reach patient for introduction to Associate Care Management related to s/p left TKA on 7/14/2021. HIPAA compliant message left requesting a return phone call. Will attempt to outreach patient again.        Future Appointments   Date Time Provider Ernie Duron   7/20/2021 11:30 AM Long Ban, PT [facility] [place] AND [facility] [place]    7/23/2021  9:15 AM Long Ban, PT [facility] AND [facility] [place]    7/27/2021 11:30 AM Long Ban, PT [facility] AND [facility] [place]    7/29/2021  8:30 AM Long Ban, PT [facility] [place] AND [facility] [place]    7/29/2021 10:30 AM Geovanny Jones MD [facility]   8/3/2021 10:45 AM Mardel Formosa Felblinger, PT [facility] AND [facility] [place]    8/6/2021  9:15 AM Long Ban, PT [facility] AND PT [place]    8/10/2021 10:45 AM Long Ban, PT [facility] AND PT [facility] HOD   8/11/2021  8:15 AM Anitha Lundy [facility] WT [facility]   8/13/2021  9:15 AM Long Ban, PT [facility] AND PT [place]    8/17/2021 10:45 AM Long Ban, PT [facility] AND PT [place]    8/20/2021  9:15 AM Long Ban, PT [facility] AND PT [place]    8/24/2021 10:45 AM Mardel Formosa Felblinger, PT [facility] AND PT [place]    8/27/2021  9:15 AM Mardel Formosa Felblinger, PT [facility] AND PT [facility] HOD   11/8/2021  8:00 AM JACKLYN Driscoll - CNP [facility] SLEEP MED [facility]

## 2021-07-19 ENCOUNTER — CARE COORDINATION (OUTPATIENT)
Dept: OTHER | Facility: CLINIC | Age: 61
End: 2021-07-19

## 2021-07-19 ENCOUNTER — TELEPHONE (OUTPATIENT)
Dept: ORTHOPEDICS UNIT | Age: 61
End: 2021-07-19

## 2021-07-19 DIAGNOSIS — M17.12 ARTHRITIS OF LEFT KNEE: ICD-10-CM

## 2021-07-19 NOTE — TELEPHONE ENCOUNTER
Spoke with patient regarding post discharge from hospital.    Incision status: No drainage, odor, or redness noted per patient    Edema/Swelling/Teds: edema noted, wearing teds, elevating legs when resting    Pain level and status: 4-5/10 tolerable     Use of pain medications: yes ; Patient stated they are taking their pain medication oxycodone and flexeril as prescribed. Use of ice therapy: yes     Blood thinner: eliquis ; Verified with patient that they are taking their anticoagulant as prescribed twice a day. Bowels: no constipation     Home Care Agency active: yes ; Claims already worked with therapist .  Outpatient therapy: n/a    Do you have all of your medications: yes-  Running low on oxycodone and flexeril   Changes in medications: no    Ortho Vitals: n/a      No other questions/concerns at this time. Encouraged patient to call Orthopedic Nurse Navigator Arthuro Gipsy or Orthopedic office if has any questions/concerns.       Follow up appointments:    Future Appointments   Date Time Provider Ernie Duron   7/29/2021 10:30 AM MD Toyn Peck [facility]   8/3/2021 10:45 AM Adrienne Frankel Felblinger, PT [facility] AND PT [facility] [place]   8/6/2021  9:15 AM Ernesto Barclay, PT [facility] AND PT [place]   8/10/2021 10:45 AM Adrienne Frankel Felblinger, PT [facility] AND PT [place]   8/11/2021  8:15 AM DO SOPHIE Alfonso [facility]   8/13/2021  9:15 AM Ernesto Braune, PT [facility] AND PT [facility] [place]   8/17/2021 10:45 AM Ernesto Pare, PT [facility] AND PT [place]   8/20/2021  9:15 AM Ernesto Pare, PT [facility] AND PT [place]   8/24/2021 10:45 AM [place] Pare, PT [facility] AND PT [place]   8/27/2021  9:15 AM Ernesto Braune, PT [facility] AND PT [place]   9/7/2021 10:00 AM Ernesto Barclay, PT [facility] AND PT [place]   9/10/2021  9:15 AM Adrienne Frankel Felblinger, PT [facility] AND PT [facility]   11/8/2021  8:00 AM Hailee Bryan, JACKLYN - CNP [facility] SLEEP MED [facility]     Electronically signed by Haseeb Vergara, RN on 7/19/2021 at 4:58 PM

## 2021-07-20 ENCOUNTER — APPOINTMENT (OUTPATIENT)
Dept: PHYSICAL THERAPY | Age: 61
End: 2021-07-20
Payer: COMMERCIAL

## 2021-07-20 RX ORDER — CYCLOBENZAPRINE HCL 10 MG
10 TABLET ORAL 3 TIMES DAILY PRN
Qty: 60 TABLET | Refills: 0 | Status: SHIPPED | OUTPATIENT
Start: 2021-07-20 | End: 2021-08-05 | Stop reason: SDUPTHER

## 2021-07-20 RX ORDER — OXYCODONE HYDROCHLORIDE 5 MG/1
5-10 TABLET ORAL EVERY 6 HOURS PRN
Qty: 40 TABLET | Refills: 0 | Status: SHIPPED | OUTPATIENT
Start: 2021-07-20 | End: 2021-07-25

## 2021-07-22 ENCOUNTER — TELEPHONE (OUTPATIENT)
Dept: ORTHOPEDIC SURGERY | Age: 61
End: 2021-07-22

## 2021-07-22 DIAGNOSIS — Z96.651 HISTORY OF TOTAL KNEE ARTHROPLASTY, RIGHT: Primary | ICD-10-CM

## 2021-07-23 ENCOUNTER — APPOINTMENT (OUTPATIENT)
Dept: PHYSICAL THERAPY | Age: 61
End: 2021-07-23
Payer: COMMERCIAL

## 2021-07-23 RX ORDER — OXYCODONE HYDROCHLORIDE 5 MG/1
5 TABLET ORAL EVERY 6 HOURS PRN
Qty: 40 TABLET | Refills: 0 | Status: SHIPPED | OUTPATIENT
Start: 2021-07-23 | End: 2021-07-30 | Stop reason: SDUPTHER

## 2021-07-25 PROBLEM — Z01.818 PREOP EXAM FOR INTERNAL MEDICINE: Status: RESOLVED | Noted: 2020-10-26 | Resolved: 2021-07-25

## 2021-07-27 ENCOUNTER — APPOINTMENT (OUTPATIENT)
Dept: PHYSICAL THERAPY | Age: 61
End: 2021-07-27
Payer: COMMERCIAL

## 2021-07-29 ENCOUNTER — TELEPHONE (OUTPATIENT)
Dept: ORTHOPEDIC SURGERY | Age: 61
End: 2021-07-29

## 2021-07-29 ENCOUNTER — OFFICE VISIT (OUTPATIENT)
Dept: ORTHOPEDIC SURGERY | Age: 61
End: 2021-07-29

## 2021-07-29 ENCOUNTER — HOSPITAL ENCOUNTER (OUTPATIENT)
Dept: VASCULAR LAB | Age: 61
Discharge: HOME | End: 2021-07-29
Payer: COMMERCIAL

## 2021-07-29 ENCOUNTER — APPOINTMENT (OUTPATIENT)
Dept: PHYSICAL THERAPY | Age: 61
End: 2021-07-29
Payer: COMMERCIAL

## 2021-07-29 VITALS — HEIGHT: 68 IN | WEIGHT: 315 LBS | RESPIRATION RATE: 16 BRPM | BODY MASS INDEX: 47.74 KG/M2

## 2021-07-29 DIAGNOSIS — M17.12 PRIMARY OSTEOARTHRITIS OF LEFT KNEE: ICD-10-CM

## 2021-07-29 DIAGNOSIS — G89.29 CHRONIC PAIN OF LEFT KNEE: ICD-10-CM

## 2021-07-29 DIAGNOSIS — M79.662 PAIN OF LEFT CALF: ICD-10-CM

## 2021-07-29 DIAGNOSIS — M17.11 PRIMARY OSTEOARTHRITIS OF RIGHT KNEE: ICD-10-CM

## 2021-07-29 DIAGNOSIS — M25.562 CHRONIC PAIN OF LEFT KNEE: ICD-10-CM

## 2021-07-29 DIAGNOSIS — Z96.652 STATUS POST LEFT KNEE REPLACEMENT: Primary | ICD-10-CM

## 2021-07-29 DIAGNOSIS — M25.561 ACUTE PAIN OF RIGHT KNEE: ICD-10-CM

## 2021-07-29 DIAGNOSIS — M22.41 CHONDROMALACIA PATELLAE OF RIGHT KNEE: ICD-10-CM

## 2021-07-29 DIAGNOSIS — M94.262 CHONDROMALACIA OF LEFT KNEE: ICD-10-CM

## 2021-07-29 PROCEDURE — 93971 EXTREMITY STUDY: CPT

## 2021-07-29 PROCEDURE — 99024 POSTOP FOLLOW-UP VISIT: CPT | Performed by: PHYSICIAN ASSISTANT

## 2021-07-29 NOTE — PROGRESS NOTES
This dictation was done with Dragon dictation and may contain mechanical errors related to translation. Resp. rate 16, height 5' 8\" (1.727 m), weight (!) 318 lb (144.2 kg). This is a pleasant 79-year-old gentleman who recently had a left total knee replacement done by Dr. Robert Renee. Overall incision looks really good getting close to full extension and bending close to 90 degrees already. He was sent for x-rays including an AP lateral and sunrise view of his left knee. At this visit the X-rays, presenting symptoms, and chief complaint were presented to aCmi Payan MD.  He then evaluated the patient. He spent several minutes discussing face to face with the patient the clinical diagnosis and medical course options,from conservative to aggressive including risks and benefits. Xray three views of the total knee arthroplasty reveals satisfactory alignment of the prosthesis . No signs of significant polyethylene wear or failure. No progressive radiolucencies,fractures, tumors or dislocations. All in all he is doing well he is getting some swelling in his lower leg and he does have a history of vascular issues. The incisions healing nicely no signs of infection we talked about wound care. Based on his swelling soreness and medical history we will get an ultrasound venous Doppler to rule out deep venous thrombosis.   Otherwise we will start physical therapy on Tuesday and see him in follow-up in 3 to 4 weeks

## 2021-07-30 ENCOUNTER — TELEPHONE (OUTPATIENT)
Dept: ORTHOPEDIC SURGERY | Age: 61
End: 2021-07-30

## 2021-07-30 DIAGNOSIS — Z96.651 HISTORY OF TOTAL KNEE ARTHROPLASTY, RIGHT: ICD-10-CM

## 2021-07-30 RX ORDER — OXYCODONE HYDROCHLORIDE 5 MG/1
5 TABLET ORAL EVERY 6 HOURS PRN
Qty: 40 TABLET | Refills: 0 | Status: SHIPPED | OUTPATIENT
Start: 2021-07-30 | End: 2021-08-09

## 2021-08-02 RX ORDER — PREGABALIN 75 MG/1
75 CAPSULE ORAL 3 TIMES DAILY
Qty: 270 CAPSULE | Refills: 0 | Status: SHIPPED | OUTPATIENT
Start: 2021-08-02 | End: 2021-11-01 | Stop reason: SDUPTHER

## 2021-08-05 ENCOUNTER — PATIENT MESSAGE (OUTPATIENT)
Dept: ORTHOPEDIC SURGERY | Age: 61
End: 2021-08-05

## 2021-08-05 RX ORDER — CYCLOBENZAPRINE HCL 10 MG
10 TABLET ORAL 3 TIMES DAILY PRN
Qty: 60 TABLET | Refills: 0 | Status: SHIPPED | OUTPATIENT
Start: 2021-08-05 | End: 2021-08-22 | Stop reason: SDUPTHER

## 2021-08-05 NOTE — TELEPHONE ENCOUNTER
From: Burak Jeff  To: Durwood Lombard, MD  Sent: 8/5/2021 8:04 AM EDT  Subject: Prescription Question    Smiley, I     forgot about this. I will be out of my Cyclobenzaprine 10mg tomorrow. Can I please get a refill AND have it filled at : [facility] outpatient pharmacy .

## 2021-08-06 ENCOUNTER — HOSPITAL ENCOUNTER (OUTPATIENT)
Dept: PHYSICAL THERAPY | Age: 61
Setting detail: THERAPIES SERIES
Discharge: HOME | End: 2021-08-06
Payer: COMMERCIAL

## 2021-08-06 PROCEDURE — 97016 VASOPNEUMATIC DEVICE THERAPY: CPT

## 2021-08-06 PROCEDURE — 97110 THERAPEUTIC EXERCISES: CPT

## 2021-08-06 PROCEDURE — 97140 MANUAL THERAPY 1/> REGIONS: CPT

## 2021-08-06 NOTE — FLOWSHEET NOTE
[facility], [address]  Phone: [phone]  Fax [phone]    Physical Therapy Treatment Note/ Progress Report:           Date:  2021    Patient Name:  Augustus Castelan   \"Bo\" :  1960  MRN: [id number]  Restrictions/Precautions:    Medical/Treatment Diagnosis Information:  · Diagnosis: N74.088 (ICD-10-CM) - Status post left knee replacement DOS 21  · Treatment Diagnosis: L knee pain M25.562, difficulty walking Z45.1  Insurance/Certification information:  PT Insurance Information: Med mutual BMN, no auth 90/10 co-ins  Physician Information:  Referring Practitioner: Dr Riky Naik  Has the plan of care been signed (Y/N):        []  Yes  [x]  No     Date of Patient follow up with Physician: 21      Is this a Progress Report:     []  Yes  [x]  No        If Yes:  Date Range for reporting period:  Beginnin/3/21  Ending:     Progress report will be due (10 Rx or 30 days whichever is less): 4/3/44       Recertification will be due (POC Duration  / 90 days whichever is less): 12 weeks      Visit # Insurance Allowable Auth Required   In-person 2 BMN []  Yes []  No    Telehealth   []  Yes []  No    Total          Functional Scale: LEFS 89%    Date assessed:  8/3/21      Therapy Diagnosis/Practice Pattern:H, surgical      Number of Comorbidities:  []0     []1-2    [x]3+    Latex Allergy:  [x]NO      []YES  Preferred Language for Healthcare:   [x]English       []other:      Pain level:  eval 2-8/10    today 6-7/10     SUBJECTIVE:  Pt is having a lot of discomfort when sitting with knees flexed, but extension is getting more tolerable.  Driving in the car is very difficult     OBJECTIVE:    Observation: incision- no bandage now, scabbed throughout no s/s infection; comp wraps B LEs; amb with RW step-to pattern   Test measurements:  AROM pre-tx 3-70, AAROM to 90, PROM supine 0-103    RESTRICTIONS/PRECAUTIONS: to the patient for proper LE, core and proximal hip recruitment and positioning and eccentric body weight control with ambulation re-education including up and down stairs     Home Exercise Program:    [x] (38117) Reviewed/Progressed HEP activities related to strengthening, flexibility, endurance, ROM of core, proximal hip and LE for functional self-care, mobility, lifting and ambulation/stair navigation   [] (73631)Reviewed/Progressed HEP activities related to improving balance, coordination, kinesthetic sense, posture, motor skill, proprioception of core, proximal hip and LE for self care, mobility, lifting, and ambulation/stair navigation      Manual Treatments:  PROM / STM / Oscillations-Mobs:  G-I, II, III, IV (PA's, Inf., Post.)  [x] (68626) Provided manual therapy to mobilize LE, proximal hip and/or LS spine soft tissue/joints for the purpose of modulating pain, promoting relaxation,  increasing ROM, reducing/eliminating soft tissue swelling/inflammation/restriction, improving soft tissue extensibility and allowing for proper ROM for normal function with self care, mobility, lifting and ambulation. Modalities:      [x] GAME READY (VASO)- for significant edema, swelling, pain control. -mod comp  Charges  Timed Code Treatment Minutes: 55   Total Treatment Minutes: 75 (rest breaks, vaso)       [] EVAL (LOW) 73873   [] EVAL (MOD) 65082  [] EVAL (HIGH) 26133   [] RE-EVAL     [x] ZK(82232) x  3   [] IONTO  [] NMR (46639) x     [x] VASO  [x] Manual (19091) x 1     [] Other:  [] TA x      [] Mech Traction (45344)  [] ES(attended) (33410)      [] ES (un) (10474):       GOALS:   Patient stated goal: able to walk without AD  []? Progressing: []? Met: []? Not Met: []? Adjusted     Therapist goals for Patient:   Short Term Goals: To be achieved in: 2 weeks  1. Independent in HEP and progression per patient tolerance, in order to prevent re-injury. []? Progressing: []? Met: []? Not Met: []? Adjusted  2.  Patient will have a decrease in pain to facilitate improvement in movement, function, and ADLs as indicated by Functional Deficits. []? Progressing: []? Met: []? Not Met: []? Adjusted     Long Term Goals: To be achieved in: 12 weeks  1. Disability index score of 40% or less for the LEFS to assist with reaching prior level of function. []? Progressing: []? Met: []? Not Met: []? Adjusted  2. Patient will demonstrate increased AROM to 0-125 to allow for proper joint functioning as indicated by patients Functional Deficits. []? Progressing: []? Met: []? Not Met: []? Adjusted  3. Patient will demonstrate an increase in Strength to good proximal hip strength and control, within 5lb HHD in LE to allow for proper functional mobility as indicated by patients Functional Deficits. []? Progressing: []? Met: []? Not Met: []? Adjusted  4. Patient will return to reciprocal stairs w/ handrail or single crutch use without increased symptoms or restriction. []? Progressing: []? Met: []? Not Met: []? Adjusted  5. Indep with community ambulation >/=30' in order to go grocery shopping. []? Progressing: []? Met: []? Not Met: []? Adjusted      Progression Towards Functional goals:  [] Patient is progressing as expected towards functional goals listed. [] Progression is slowed due to complexities listed. [] Progression has been slowed due to co-morbidities. [x] Plan just implemented, too soon to assess goals progression  [] Other:     Overall Progression Towards Functional goals/ Treatment Progress Update:  [] Patient is progressing as expected towards functional goals listed. [] Progression is slowed due to complexities/Impairments listed. [] Progression has been slowed due to co-morbidities.   [x] Plan just implemented, too soon to assess goals progression <30days   [] Goals require adjustment due to lack of progress  [] Patient is not progressing as expected and requires additional follow up with physician  [] Other    Prognosis for

## 2021-08-10 ENCOUNTER — TELEPHONE (OUTPATIENT)
Dept: ORTHOPEDIC SURGERY | Age: 61
End: 2021-08-10

## 2021-08-10 ENCOUNTER — HOSPITAL ENCOUNTER (OUTPATIENT)
Dept: PHYSICAL THERAPY | Age: 61
Setting detail: THERAPIES SERIES
Discharge: HOME | End: 2021-08-10
Payer: COMMERCIAL

## 2021-08-10 PROCEDURE — 97140 MANUAL THERAPY 1/> REGIONS: CPT

## 2021-08-10 PROCEDURE — 97110 THERAPEUTIC EXERCISES: CPT

## 2021-08-10 RX ORDER — AMOXICILLIN 500 MG/1
CAPSULE ORAL
Qty: 4 CAPSULE | Refills: 1 | Status: ON HOLD | OUTPATIENT
Start: 2021-08-10 | End: 2021-10-26 | Stop reason: HOSPADM

## 2021-08-10 NOTE — FLOWSHEET NOTE
[facility],  [address] [place]  Phone: [phone]  Fax [phone]    Physical Therapy Treatment Note/ Progress Report:           Date:  8/10/2021    Patient Name:  Jean-Pierre Organ   \"Bo\" :  1960  MRN: [id number]  Restrictions/Precautions:    Medical/Treatment Diagnosis Information:  · Diagnosis: S15.165 (ICD-10-CM) - Status post left knee replacement DOS 21  · Treatment Diagnosis: L knee pain M25.562, difficulty walking Z40.4  Insurance/Certification information:  PT Insurance Information: Med mutual BMN, no auth 90/10 co-ins  Physician Information:  Referring Practitioner: Dr Billie Espino  Has the plan of care been signed (Y/N):        []  Yes  [x]  No     Date of Patient follow up with Physician: 21      Is this a Progress Report:     []  Yes  [x]  No        If Yes:  Date Range for reporting period:  Beginnin/3/21  Ending:     Progress report will be due (10 Rx or 30 days whichever is less): 66       Recertification will be due (POC Duration  / 90 days whichever is less): 12 weeks      Visit # Insurance Allowable Auth Required   In-person 3 BMN []  Yes []  No    Telehealth   []  Yes []  No    Total          Functional Scale: LEFS 89%    Date assessed:  8/3/21      Therapy Diagnosis/Practice Pattern:H, surgical      Number of Comorbidities:  []0     []1-2    [x]3+    Latex Allergy:  [x]NO      []YES  Preferred Language for Healthcare:   [x]English       []other:      Pain level:  eval 2-8/10    today 6-7/10     SUBJECTIVE:  Pt states he is putting better weight on his leg when walking. He's still very uncomfortable sleeping at night.      OBJECTIVE:    Observation: incision- no bandage now, scabbed throughout no s/s infection; comp wraps B LEs removed for tx today to be able to massage gastroc; amb with RW step-to pattern   Test measurements:  AROM pre-tx 4-74, AAROM to 100, after ext prop PROM supine 0-102    RESTRICTIONS/PRECAUTIONS: DM, hx leg ulcers, R knee  Severe OA    Exercises/Interventions:     Therapeutic Ex (74254) Sets/sec/reps Notes/CUES   Knee ext prop-bone foam x3' HEP   Seated gastroc S, HS S foot on table-LS (do on stool again NV d/t lower hip angle) 4x30\" ea HEP   Seated glider knee flex 5\"x10 Cue to shift weight onto L hip   Hip/knee flex on SB-supine   On mat with strap x10  x2 PT OP at end   Knee flex rocking on step 5\"x20    Quad set 5\"x10    SAQ 3\"x10    SLR AAROM x5    Bridge with progressive knee flex x10    Mini squat UE supp 1/2 wall x10    Standing TKE band NV    Calf raises-cue for TKE 2x10                   Pt ed: Watch LE alignment during exc, can use pillow/towel to keep LE slightly less ER'd in chair/bed; continue to use RW for quality of gait until quad stronger and better WB'ing dayanna; massage to thigh pre stretching/ROM    Manual Intervention (28112) x23' total    STM distal and lateral quad/ITB, prox to mid gastroc  Pat mobs sup/inf  PROM knee flex/ext  Oscillations for pain x12'    x2'  x3'  3x20\"                             NMR re-education (75490)  CUES NEEDED   Weight shift attempt ~75% stance on LLE 2' UE support   Gait with RW-cue for TKE, leyla/glute activ and upright posture in stance  25'                                       Therapeutic Activity (03609)                                         Therapeutic Exercise and NMR EXR  [x] (30556) Provided verbal/tactile cueing for activities related to strengthening, flexibility, endurance, ROM for improvements in LE, proximal hip, and core control with self care, mobility, lifting, ambulation.  [] (51086) Provided verbal/tactile cueing for activities related to improving balance, coordination, kinesthetic sense, posture, motor skill, proprioception  to assist with LE, proximal hip, and core control in self care, mobility, lifting, ambulation and eccentric single leg control.      NMR and Therapeutic Activities:    [x] (61209 or ) Provided verbal/tactile cueing for activities related to improving balance, coordination, kinesthetic sense, posture, motor skill, proprioception and motor activation to allow for proper function of core, proximal hip and LE with self care and ADLs  [] (38602) Gait Re-education- Provided training and instruction to the patient for proper LE, core and proximal hip recruitment and positioning and eccentric body weight control with ambulation re-education including up and down stairs     Home Exercise Program:    [x] (57971) Reviewed/Progressed HEP activities related to strengthening, flexibility, endurance, ROM of core, proximal hip and LE for functional self-care, mobility, lifting and ambulation/stair navigation   [] (03473)Reviewed/Progressed HEP activities related to improving balance, coordination, kinesthetic sense, posture, motor skill, proprioception of core, proximal hip and LE for self care, mobility, lifting, and ambulation/stair navigation      Manual Treatments:  PROM / STM / Oscillations-Mobs:  G-I, II, III, IV (PA's, Inf., Post.)  [x] (97200) Provided manual therapy to mobilize LE, proximal hip and/or LS spine soft tissue/joints for the purpose of modulating pain, promoting relaxation,  increasing ROM, reducing/eliminating soft tissue swelling/inflammation/restriction, improving soft tissue extensibility and allowing for proper ROM for normal function with self care, mobility, lifting and ambulation.      Modalities:   CP x15'   [] GAME READY (VASO)- for significant edema, swelling, pain control. -mod comp  Charges  Timed Code Treatment Minutes: 55   Total Treatment Minutes: 70 (ice)       [] EVAL (LOW) 31015   [] EVAL (MOD) 99005  [] EVAL (HIGH) 42014   [] RE-EVAL     [x] (37708) x  2   [] IONTO  [] NMR (82689) x     [] VASO  [x] Manual (10880) x 2     [] Other:  [] TA x      [] Mech Traction (06933)  [] ES(attended) (15547)      [] ES (un) (61661):       GOALS:   Patient stated goal: able to walk without AD  []? Progressing: []? Met: []? Not Met: []? Adjusted     Therapist goals for Patient:   Short Term Goals: To be achieved in: 2 weeks  1. Independent in HEP and progression per patient tolerance, in order to prevent re-injury. []? Progressing: []? Met: []? Not Met: []? Adjusted  2. Patient will have a decrease in pain to facilitate improvement in movement, function, and ADLs as indicated by Functional Deficits. []? Progressing: []? Met: []? Not Met: []? Adjusted     Long Term Goals: To be achieved in: 12 weeks  1. Disability index score of 40% or less for the LEFS to assist with reaching prior level of function. []? Progressing: []? Met: []? Not Met: []? Adjusted  2. Patient will demonstrate increased AROM to 0-125 to allow for proper joint functioning as indicated by patients Functional Deficits. []? Progressing: []? Met: []? Not Met: []? Adjusted  3. Patient will demonstrate an increase in Strength to good proximal hip strength and control, within 5lb HHD in LE to allow for proper functional mobility as indicated by patients Functional Deficits. []? Progressing: []? Met: []? Not Met: []? Adjusted  4. Patient will return to reciprocal stairs w/ handrail or single crutch use without increased symptoms or restriction. []? Progressing: []? Met: []? Not Met: []? Adjusted  5. Indep with community ambulation >/=30' in order to go grocery shopping. []? Progressing: []? Met: []? Not Met: []? Adjusted      Progression Towards Functional goals:  [] Patient is progressing as expected towards functional goals listed. [] Progression is slowed due to complexities listed. [] Progression has been slowed due to co-morbidities. [x] Plan just implemented, too soon to assess goals progression  [] Other:     Overall Progression Towards Functional goals/ Treatment Progress Update:  [] Patient is progressing as expected towards functional goals listed.     [] Progression is slowed due to complexities/Impairments listed. [] Progression has been slowed due to co-morbidities. [x] Plan just implemented, too soon to assess goals progression <30days   [] Goals require adjustment due to lack of progress  [] Patient is not progressing as expected and requires additional follow up with physician  [] Other    Prognosis for POC: [x] Good [] Fair  [] Poor      Patient requires continued skilled intervention: [x] Yes  [] No    Treatment/Activity Tolerance:  [x] Patient able to complete treatment  [] Patient limited by fatigue  [] Patient limited by pain    [] Patient limited by other medical complications  [] Other:     ASSESSMENT: Pt stiff in both end range flex and ext, improves with manual tx and he had better tolerance for CKC knee flexion on step/with progressive flexion bridge this date. He needs cueing to ambulate with less weight through UEs on RW as he has improving quad control but habitually leans more heavily on arms. Return to Play: (if applicable)   []  Stage 1: Intro to Strength   []  Stage 2: Return to Run and Strength   []  Stage 3: Return to Jump and Strength   []  Stage 4: Dynamic Strength and Agility   []  Stage 5: Sport Specific Training     []  Ready to Return to Play, Meets All Above Stages   []  Not Ready for Return to Sports   Comments:                         PLAN: Start 2x/week, consider increase to 3 next week if not progressing with ROM. [x] Continue per plan of care [] Alter current plan (see comments above)  [] Plan of care initiated [] Hold pending MD visit [] Discharge      Electronically signed by:  Shireen Baker, PT , DPT  Note: If patient does not return for scheduled/ recommended follow up visits, this note will serve as a discharge from care along with most recent update on progress. Access Code: [email or web address]  URL: [email or web address]  Date: 08/03/2021  Prepared by: Donna Alicia    Exercises  Supine Knee Extension Stretch on Towel Roll - 2-3 x daily - 7 x weekly - 3 sets - 60 seconds hold  Long Sitting Calf Stretch with Strap - 2-3 x daily - 7 x weekly - 3 sets - 30 seconds hold  seated hamstring stretch with stool - 2-3 x daily - 7 x weekly - 3 sets - 30 seconds hold

## 2021-08-11 ENCOUNTER — OFFICE VISIT (OUTPATIENT)
Dept: BARIATRICS/WEIGHT MGMT | Age: 61
End: 2021-08-11
Payer: COMMERCIAL

## 2021-08-11 VITALS — BODY MASS INDEX: 46.41 KG/M2 | HEIGHT: 68 IN | WEIGHT: 306.25 LBS

## 2021-08-11 DIAGNOSIS — Z98.84 STATUS POST LAPAROSCOPIC SLEEVE GASTRECTOMY: ICD-10-CM

## 2021-08-11 DIAGNOSIS — E66.01 MORBID OBESITY WITH BMI OF 40.0-44.9, ADULT (HCC): Primary | ICD-10-CM

## 2021-08-11 PROCEDURE — 99213 OFFICE O/P EST LOW 20 MIN: CPT | Performed by: SURGERY

## 2021-08-11 NOTE — PROGRESS NOTES
Dietary Assessment Note      Vitals:   Vitals:    08/11/21 0818   Weight: (!) 306 lb 4 oz (138.9 kg)   Height: 5' 8\" (1.727 m)    Patient lost 20.2 lbs over 2 months. Total Weight Loss: 98.6 lbs    Labs reviewed: labs reviewed, I note that   Results for Celeste Hannon" (MRN [id number]) as of 8/11/2021 08:39   Ref. Range 7/15/2021 06:33   POC Glucose Latest Ref Range: 70 - 99 mg/dl 118 (H)       Protein intake: 60-80 grams/day     Fluid intake: 48-64 oz/day - Gatorade Zero / water / decaf tea    Multivitamin/mineral intake: Yes - 4 Fusion    Calcium intake: no    Other: none    Exercise: physical therapy for knee 2x week    Nutrition Assessment: 6 months post-op visit. Pt had knee replacement 7/14. Eating 5x day typically.    B - leftovers from dinner  S - SF popsicle  L - tuna salad / chix salad / turkey with water chestnuts / Progresso Light soup  D - pork chop + cauliflower or aspargus / turkey chili / turkey burgers  S - SF popsicle    Amount able to eat per sitting: ~ 6oz - reviewed focusing on ounces for protein and volume for sides    Following 30/30/30 rule: Yes but struggles to pace with meals at times, continued to encourage    Food Intolerances/issues: none    Client Concerns: none    Goals:   - Continue to track protein and fluids to goal  - Track intake / food journal 3x week - goal of 1200kcal  - Continue to follow 30/30/30    Plan: F/U per MD Susan Mckeon, RD, LD

## 2021-08-11 NOTE — PROGRESS NOTES
[facility] [place]) Physicians   General & Laparoscopic Surgery  [employer] [facility] [employer]       HPI:     Cesar Granger is a very pleasant 63 y.o. obese male , Body mass index is 46.57 kg/m². Graylon Lopez And multiple medical problems who is presenting for bariatric follow up care. Cesar Granger is s/p laparoscopic sleeve gastrectomy by me   Comes today to the clinic without any complaints. Patient denies any nausea, vomiting, fevers, chills, shortness of breath, chest pain, constipation or urinary symptoms. Denies any heartburn nor dysphagia. Patient is feeling very well, and is very active. Patient is very pleased with the weight loss and resolution of co-morbid conditions. Past Medical History:   Diagnosis Date    Arthritis     Bilateral venous leg ulcers 09/2020    Cellulitis of lower extremity 09/25/2019    Diabetes mellitus ([address]     GERD (gastroesophageal reflux disease)     Hyperlipidemia     Hypertension     Impaired fasting glucose 05/2013    MRSA carrier     MRSA nasal colonization 07/14/2021    Obesity     TOM treated with BiPAP 11/30/2020    Preoperative clearance 10/26/2020    Restless leg syndrome     Screening PSA (prostate specific antigen) 12/30/2015;5/1/17    Nml:0.53(12/30/2015);5/1/17=nml.     Seasonal allergies     Sleep apnea     uses CPAP    Stasis dermatitis of both legs 08/28/2020    Tobacco use     Tubular adenoma of colon 09/14/2017     Past Surgical History:   Procedure Laterality Date    CIRCUMCISION      COLONOSCOPY  09/14/2017    Colonoscopy with polypectomy (cold biopsy):Dr. Walker:next in 3yrs=9/14/2020    KNEE ARTHROSCOPY Left 8/3/2020    VIDEO ARTHROSCOPY LEFT KNEE, PARTIAL MEDIAL MENISCECTOMY, CHONDROPLASTY, SYNOVIAL BIOPSY -TOM=4- performed by Charly Cote MD at [facility] 51 PAIN MANAGEMENT PROCEDURE Left 12/9/2020    COOLIEF RADIOFREQUENCY ABLATION - LEFT performed by Brittany Wharton MD at [facility] 113 GASTRECTOMY N/A 3/2/2021    ROBOTIC ASSISTED LAPAROSCOPIC SLEEVE GASTRECTOMY performed by Isidor Babinski, DO at [address] Left 2021    LEFT TOTAL KNEE REPLACEMENT ROBOTIC ASSISTED performed by Rico Aleman MD at [address] [facility]  2011    UPPER GASTROINTESTINAL ENDOSCOPY N/A 2021    EGD BIOPSY performed by Isidor Babinski, DO at [address] EXTRACTION       Family History   Problem Relation Age of Onset    High Blood Pressure Mother     Heart Disease Mother         MI    AT 66    Diabetes Brother      Social History     Tobacco Use    Smoking status: Former Smoker     Packs/day: 2.00     Years: 25.00     Pack years: 50.00     Types: Cigarettes     Quit date: 2006     Years since quitting: 15.2    Smokeless tobacco: Never Used   Substance Use Topics    Alcohol use: Not Currently     Comment: ocas     I counseled the patient on the importance of not smoking and risks of ETOH. Allergies   Allergen Reactions    Celebrex [Celecoxib] Other (See Comments)     Unable to take due to bariatric patient    Mobic [Meloxicam]      Unable to take due to bariatric patient    Nsaids      Bariatric patient    Bactrim [Sulfamethoxazole-Trimethoprim] Rash     POSSIBLE fixed drug eruption on his cheeks, but diagnosis is not certain (only happened once). Vitals:    21 0818   Weight: (!) 306 lb 4 oz (138.9 kg)   Height: 5' 8\" (1.727 m)       Body mass index is 46.57 kg/m². Current Outpatient Medications:     amoxicillin (AMOXIL) 500 MG capsule, 4 tablets by mouth 1 hour before dental appointment, Disp: 4 capsule, Rfl: 1    cyclobenzaprine (FLEXERIL) 10 MG tablet, Take 1 tablet by mouth 3 times daily as needed for Muscle spasms, Disp: 60 tablet, Rfl: 0    pregabalin (LYRICA) 75 MG capsule, Take 1 capsule by mouth 3 times daily for 90 days. , Disp: 270 capsule, Rfl: 0    metFORMIN (GLUCOPHAGE) 500 MG tablet, Take 1 tablet by mouth 2 times daily (with meals), Disp: 180 tablet, Rfl: 1    docusate sodium (COLACE) 100 MG capsule, Take 1 capsule by mouth 2 times daily, Disp: 60 capsule, Rfl: 0    ondansetron (ZOFRAN-ODT) 4 MG disintegrating tablet, Take 1 tablet by mouth 3 times daily as needed for Nausea or Vomiting, Disp: 21 tablet, Rfl: 0    apixaban (ELIQUIS) 2.5 MG TABS tablet, Take 1 tablet by mouth 2 times daily, Disp: 60 tablet, Rfl: 0    potassium chloride (KLOR-CON M) 20 MEQ TBCR extended release tablet, Take 1 tablet by mouth 2 times daily, Disp: 120 tablet, Rfl: 2    hydroCHLOROthiazide (HYDRODIURIL) 25 MG tablet, Hold until Monday (Patient taking differently: Take 25 mg by mouth daily ), Disp: 90 tablet, Rfl: 0    loratadine (CLARITIN) 10 MG tablet, Take 1 tablet by mouth daily, Disp: 90 tablet, Rfl: 0    lisinopril (PRINIVIL;ZESTRIL) 10 MG tablet, TAKE 1 TABLET BY MOUTH ONCE A DAY, Disp: 90 tablet, Rfl: 0    Prodigy Twist Top Lancets 28G MISC, TEST TWO TIMES A DAY, Disp: 100 each, Rfl: 0    blood glucose test strips (PRODIGY NO CODING BLOOD GLUC) strip, TEST TWO TIMES A DAY, Disp: 100 each, Rfl: 0    omeprazole (PRILOSEC) 20 MG delayed release capsule, Take 1 capsule by mouth Daily, Disp: 30 capsule, Rfl: 3    furosemide (LASIX) 40 MG tablet, Take 1 tablet by mouth 2 times daily (Patient taking differently: Take 40 mg by mouth daily ), Disp: 60 tablet, Rfl: 3    Multiple Vitamin (MULTIVITAMIN, BARIATRIC FUSION COMPLETE, CHEW TAB), Take 4 tablets by mouth daily, Disp: , Rfl:     atorvastatin (LIPITOR) 40 MG tablet, Take 1 tablet by mouth daily, Disp: 90 tablet, Rfl: 3    rOPINIRole (REQUIP) 3 MG tablet, Take 1 tablet by mouth nightly, Disp: 90 tablet, Rfl: 0    ondansetron (ZOFRAN) 4 MG tablet, Take 1 tablet by mouth every 6 hours as needed for Nausea or Vomiting, Disp: 30 tablet, Rfl: 0    metOLazone (ZAROXOLYN) 2.5 MG tablet, Take 1 tablet by mouth three times a week Hold for 7 days, Disp: 25 tablet, Rfl: 2    glucose 5 g chewable tablet, Take 3 tablets by mouth as needed for Low blood sugar, Disp: 30 tablet, Rfl: 0    blood glucose monitor kit and supplies, Check BG twice daily, Disp: 1 kit, Rfl: 0    fluticasone (FLONASE) 50 MCG/ACT nasal spray, 1 spray by Nasal route daily, Disp: 3 Bottle, Rfl: 0      Review of Systems - History obtained from the patient  General ROS: negative  Psychological ROS: negative  Hematological and Lymphatic ROS: negative  Endocrine ROS: negative  Respiratory ROS: negative  Cardiovascular ROS: negative  Gastrointestinal ROS:negative  Genito-Urinary ROS: negative  Musculoskeletal ROS: negative   Skin ROS: negative    Physical Exam   Vitals Reviewed   Constitutional: Patient is oriented to person, place, and time. Patient appears well-developed and well-nourished. Patient is active and cooperative. Non-toxic appearance. No distress. Neck: Trachea normal and normal range of motion. No JVD present. Pulmonary/Chest: Effort normal. No accessory muscle usage or stridor. No apnea. No respiratory distress. Cardiovascular: Normal rate and no JVD. Abdominal: Normal appearance. Patient exhibits no distension. Abdomen is soft, obese, non tender. Musculoskeletal: Normal range of motion. Patient exhibits no edema. Neurological: Patient is alert and oriented to person, place, and time. Patient has normal strength. GCS eye subscore is 4. GCS verbal subscore is 5. GCS motor subscore is 6. Skin: Skin is warm and dry. No abrasion and no rash noted. Patient is not diaphoretic. No cyanosis or erythema. Psychiatric: Patient has a normal mood and affect. Speech is normal and behavior is normal. Cognition and memory are normal.         A/P     Ankita Johnson is 63 y.o. male , now with Body mass index is 46.57 kg/m². s/p Sleeve gastrectomy, has lost 20 lbs since last visit, total of 99 lbs weight loss. The patient underwent dietary counseling with registered dietician.  I have reviewed, discussed and agree with the dietary plan. Patient is trying hard to keep good dietary and behavior modifications. Patient is monitoring portion sizes, food choices and liquid calories. Patient is trying to exercise regularly. Patient pleased with the surgery outcomes. We discussed how his weight affects his overall health including:  Al Smith was seen today for bariatrics post op follow up. Diagnoses and all orders for this visit:    Morbid obesity with BMI of 40.0-44.9, adult ([facility] [address]    Status post laparoscopic sleeve gastrectomy       and importance of weight loss to alleviate those co morbid conditions. I encouraged the patient to continue exercise and keeping healthy eating habits. Also counseled the patient extensively on post surgery care. Total encounter time:  20 minutes including any number of the following: review of labs, imaging, provider notes, outside hospital records; performing examination/evaluation; counseling patient and family; ordering medications/tests; placing referrals and communication with referring physicians; coordination of care, and documentation in the EHR. RTC in 3 months  Diet and Exercise      Patient advised that its their responsibility to follow up for studies and/or labs ordered today.

## 2021-08-17 ENCOUNTER — HOSPITAL ENCOUNTER (OUTPATIENT)
Dept: PHYSICAL THERAPY | Age: 61
Setting detail: THERAPIES SERIES
Discharge: HOME | End: 2021-08-17
Payer: COMMERCIAL

## 2021-08-17 PROCEDURE — 97110 THERAPEUTIC EXERCISES: CPT

## 2021-08-17 PROCEDURE — 97140 MANUAL THERAPY 1/> REGIONS: CPT

## 2021-08-17 NOTE — FLOWSHEET NOTE
step-to pattern; - Tank's R   Test measurements:  AROM pre-tx 3-94, AAROM to 103 supine, after ext prop PROM supine 0-110    RESTRICTIONS/PRECAUTIONS: DM, hx leg ulcers, R knee  Severe OA    Exercises/Interventions:     Therapeutic Ex (33561) Sets/sec/reps Notes/CUES   Knee ext prop-bone foam  HEP   Seated gastroc S, HS S foot on table-LS (do on stool again NV d/t lower hip angle) 4x30\" ea HEP   Seated glider knee flex  Cue to shift weight onto L hip   Hip/knee flex on SB-supine   On mat with strap x10  x2 PT OP at end   Knee flex rocking on step    Quad set 5\"x10    SAQ 3\" 2x10    SLR AAROM 2x5 I last 2 reps; NMES NV   Bridge with progressive knee flex 5\"x10    Mini squat UE supp 1/2 wall x15    Standing TKE LATOYA  Retro TKE to give end range ext PROM 3\" x10  10\"x5 45#   Calf raises-cue for TKE 2x10    Step-up 3\" [facility] [place] platform  4\" x5  x10 B UE support              Pt ed:    massage to thigh pre stretching/ROM  Also showed supine knee flex with LE up on wall (heel slide) as option for HE; inc PT to 3x/week.  RW still for gait d/t quad control x5'   Manual Intervention (63354) x20' total    STM distal and lateral quad/ITB, prox to mid gastroc  Pat mobs sup/inf  PROM knee flex/ext: supine and seated  Oscillations for pain x10'    x2'  x8'    3x20\"                             NMR re-education (99878)  CUES NEEDED   Weight shift attempt ~75% stance on LLE UE support   Gait with RW-cue for TKE, leyla/glute activ and upright posture in stance                                        Therapeutic Activity (43136)                                         Therapeutic Exercise and NMR EXR  [x] (37466) Provided verbal/tactile cueing for activities related to strengthening, flexibility, endurance, ROM for improvements in LE, proximal hip, and core control with self care, mobility, lifting, ambulation.  [] (63800) Provided verbal/tactile cueing for activities related to improving balance, coordination, kinesthetic sense, posture, motor skill, proprioception  to assist with LE, proximal hip, and core control in self care, mobility, lifting, ambulation and eccentric single leg control. NMR and Therapeutic Activities:    [x] (58783 or 36026) Provided verbal/tactile cueing for activities related to improving balance, coordination, kinesthetic sense, posture, motor skill, proprioception and motor activation to allow for proper function of core, proximal hip and LE with self care and ADLs  [] (19671) Gait Re-education- Provided training and instruction to the patient for proper LE, core and proximal hip recruitment and positioning and eccentric body weight control with ambulation re-education including up and down stairs     Home Exercise Program:    [x] (34003) Reviewed/Progressed HEP activities related to strengthening, flexibility, endurance, ROM of core, proximal hip and LE for functional self-care, mobility, lifting and ambulation/stair navigation   [] (78677)Reviewed/Progressed HEP activities related to improving balance, coordination, kinesthetic sense, posture, motor skill, proprioception of core, proximal hip and LE for self care, mobility, lifting, and ambulation/stair navigation      Manual Treatments:  PROM / STM / Oscillations-Mobs:  G-I, II, III, IV (PA's, Inf., Post.)  [x] (04451) Provided manual therapy to mobilize LE, proximal hip and/or LS spine soft tissue/joints for the purpose of modulating pain, promoting relaxation,  increasing ROM, reducing/eliminating soft tissue swelling/inflammation/restriction, improving soft tissue extensibility and allowing for proper ROM for normal function with self care, mobility, lifting and ambulation.      Modalities:   CP x15'   [] GAME READY (VASO)- for significant edema, swelling, pain control. -mod comp  Charges  Timed Code Treatment Minutes: 47   Total Treatment Minutes: 70 (rest breaks,ice)       [] EVAL (LOW) 61929   [] EVAL (MOD) 13800  [] EVAL (HIGH) 12660   [] RE-EVAL     [x] (35083) x 2   [] IONTO  [] NMR (45943) x     [] VASO  [x] Manual (44287) x 1     [] Other:  [] TA x      [] Mech Traction (76799)  [] ES(attended) (12437)      [] ES (un) (24729):       GOALS:   Patient stated goal: able to walk without AD  []? Progressing: []? Met: []? Not Met: []? Adjusted     Therapist goals for Patient:   Short Term Goals: To be achieved in: 2 weeks  1. Independent in HEP and progression per patient tolerance, in order to prevent re-injury. []? Progressing: []? Met: []? Not Met: []? Adjusted  2. Patient will have a decrease in pain to facilitate improvement in movement, function, and ADLs as indicated by Functional Deficits. []? Progressing: []? Met: []? Not Met: []? Adjusted     Long Term Goals: To be achieved in: 12 weeks  1. Disability index score of 40% or less for the LEFS to assist with reaching prior level of function. []? Progressing: []? Met: []? Not Met: []? Adjusted  2. Patient will demonstrate increased AROM to 0-125 to allow for proper joint functioning as indicated by patients Functional Deficits. []? Progressing: []? Met: []? Not Met: []? Adjusted  3. Patient will demonstrate an increase in Strength to good proximal hip strength and control, within 5lb HHD in LE to allow for proper functional mobility as indicated by patients Functional Deficits. []? Progressing: []? Met: []? Not Met: []? Adjusted  4. Patient will return to reciprocal stairs w/ handrail or single crutch use without increased symptoms or restriction. []? Progressing: []? Met: []? Not Met: []? Adjusted  5. Indep with community ambulation >/=30' in order to go grocery shopping. []? Progressing: []? Met: []? Not Met: []? Adjusted      Progression Towards Functional goals:  [] Patient is progressing as expected towards functional goals listed. [] Progression is slowed due to complexities listed. [] Progression has been slowed due to co-morbidities.   [x] Plan just implemented, too soon to assess goals progression  [] Other:     Overall Progression Towards Functional goals/ Treatment Progress Update:  [] Patient is progressing as expected towards functional goals listed. [] Progression is slowed due to complexities/Impairments listed. [] Progression has been slowed due to co-morbidities. [x] Plan just implemented, too soon to assess goals progression <30days   [] Goals require adjustment due to lack of progress  [] Patient is not progressing as expected and requires additional follow up with physician  [] Other    Prognosis for POC: [x] Good [] Fair  [] Poor      Patient requires continued skilled intervention: [x] Yes  [] No    Treatment/Activity Tolerance:  [x] Patient able to complete treatment  [] Patient limited by fatigue  [] Patient limited by pain    [] Patient limited by other medical complications  [] Other:     ASSESSMENT: Pt able to progress flexion PROM this date. He had inc'd trouble activating quad this date for SLR, can use NMES NV to facilitate. Still rec 3x/week PT this week to help with ROM. Return to Play: (if applicable)   []  Stage 1: Intro to Strength   []  Stage 2: Return to Run and Strength   []  Stage 3: Return to Jump and Strength   []  Stage 4: Dynamic Strength and Agility   []  Stage 5: Sport Specific Training     []  Ready to Return to Play, Meets All Above Stages   []  Not Ready for Return to Sports   Comments:                         PLAN: increase to 3 x this week for progressing with ROM, quad activ. [x] Continue per plan of care [] Alter current plan (see comments above)  [] Plan of care initiated [] Hold pending MD visit [] Discharge      Electronically signed by:  Andre Turpin, PT , DPT  Note: If patient does not return for scheduled/ recommended follow up visits, this note will serve as a discharge from care along with most recent update on progress. Access Code: [email or web address]  URL: [email or web address]  Date: 08/03/2021  Prepared by: Anna Riggs Gauri    Exercises  Supine Knee Extension Stretch on Towel Roll - 2-3 x daily - 7 x weekly - 3 sets - 60 seconds hold  Long Sitting Calf Stretch with Strap - 2-3 x daily - 7 x weekly - 3 sets - 30 seconds hold  seated hamstring stretch with stool - 2-3 x daily - 7 x weekly - 3 sets - 30 seconds hold

## 2021-08-19 ENCOUNTER — OFFICE VISIT (OUTPATIENT)
Dept: ORTHOPEDIC SURGERY | Age: 61
End: 2021-08-19

## 2021-08-19 ENCOUNTER — HOSPITAL ENCOUNTER (OUTPATIENT)
Dept: PHYSICAL THERAPY | Age: 61
Setting detail: THERAPIES SERIES
Discharge: HOME | End: 2021-08-19
Payer: COMMERCIAL

## 2021-08-19 VITALS — WEIGHT: 306 LBS | RESPIRATION RATE: 16 BRPM | BODY MASS INDEX: 46.38 KG/M2 | HEIGHT: 68 IN

## 2021-08-19 DIAGNOSIS — E11.9 TYPE 2 DIABETES MELLITUS WITHOUT COMPLICATION, WITHOUT LONG-TERM CURRENT USE OF INSULIN (HCC): ICD-10-CM

## 2021-08-19 DIAGNOSIS — Z96.652 HISTORY OF TOTAL KNEE ARTHROPLASTY, LEFT: Primary | ICD-10-CM

## 2021-08-19 PROCEDURE — 97112 NEUROMUSCULAR REEDUCATION: CPT

## 2021-08-19 PROCEDURE — 97110 THERAPEUTIC EXERCISES: CPT

## 2021-08-19 PROCEDURE — 99024 POSTOP FOLLOW-UP VISIT: CPT | Performed by: ORTHOPAEDIC SURGERY

## 2021-08-19 PROCEDURE — 97140 MANUAL THERAPY 1/> REGIONS: CPT

## 2021-08-19 RX ORDER — LANCETS 28 GAUGE
EACH MISCELLANEOUS
Qty: 100 EACH | Refills: 0 | Status: SHIPPED | OUTPATIENT
Start: 2021-08-19 | End: 2022-01-31 | Stop reason: SDUPTHER

## 2021-08-19 RX ORDER — BLOOD SUGAR DIAGNOSTIC
STRIP MISCELLANEOUS
Qty: 100 EACH | Refills: 0 | Status: SHIPPED | OUTPATIENT
Start: 2021-08-19 | End: 2022-01-31 | Stop reason: SDUPTHER

## 2021-08-19 RX ORDER — ATORVASTATIN CALCIUM 40 MG/1
40 TABLET, FILM COATED ORAL DAILY
Qty: 90 TABLET | Refills: 3 | Status: SHIPPED | OUTPATIENT
Start: 2021-08-19 | End: 2021-11-18 | Stop reason: SDUPTHER

## 2021-08-19 RX ORDER — ROPINIROLE 3 MG/1
3 TABLET, FILM COATED ORAL 3 TIMES DAILY
Qty: 270 TABLET | Refills: 0 | Status: SHIPPED | OUTPATIENT
Start: 2021-08-19 | End: 2021-08-23 | Stop reason: SDUPTHER

## 2021-08-19 NOTE — FLOWSHEET NOTE
[facility],  [address]  Phone: [phone]  Fax [phone]    Physical Therapy Treatment Note/ Progress Report:           Date:  2021    Patient Name:  Zeus Partida   \"Bo\" :  1960  MRN: [id number]  Restrictions/Precautions:    Medical/Treatment Diagnosis Information:  · Diagnosis: Z96.652 (ICD-10-CM) - Status post left knee replacement DOS 21  · Treatment Diagnosis: L knee pain M25.562, difficulty walking D12.0  Insurance/Certification information:  PT Insurance Information: Med mutual BMN, no auth 90/10 co-ins  Physician Information:  Referring Practitioner: Dr Elie Cornell  Has the plan of care been signed (Y/N):        []  Yes  [x]  No     Date of Patient follow up with Physician: 21      Is this a Progress Report:     []  Yes  [x]  No        If Yes:  Date Range for reporting period:  Beginnin/3/21  Ending:     Progress report will be due (10 Rx or 30 days whichever is less): 20       Recertification will be due (POC Duration  / 90 days whichever is less): 12 weeks      Visit # Insurance Allowable Auth Required   In-person 6 BMN []  Yes []  No    Telehealth   []  Yes []  No    Total          Functional Scale: LEFS 89%    Date assessed:  8/3/21      Therapy Diagnosis/Practice Pattern:H, surgical      Number of Comorbidities:  []0     []1-2    [x]3+    Latex Allergy:  [x]NO      []YES  Preferred Language for Healthcare:   [x]English       []other:      Pain level:  eval 2-810    today 3-7/10     SUBJECTIVE:  Pt had f/u with surgeon, please with progress and will see him back in 1 month. He had 1/2 of L great toenail removed yesterday so foot is sore/bandaged.     OBJECTIVE:    Observation: incision healed proximally still scabbed distal portion and no s/s infection; comp wraps B LEs removed for tx today to be able to massage gastroc; amb with RW step-to pattern; - Tank's R   Test measurements:  AROM pre-tx 4-98, AAROM to 103 supine, after ext prop PROM supine 0-108    RESTRICTIONS/PRECAUTIONS: DM, hx leg ulcers, R knee  Severe OA    Exercises/Interventions:     Therapeutic Ex (62588) Sets/sec/reps Notes/CUES   Knee ext prop-bone foam  HEP   Seated gastroc S, HS S foot on table-LS (do on stool again NV d/t lower hip angle) 4x30\" ea HEP   Seated glider knee flex  Cue to shift weight onto L hip   Hip/knee flex on SB-supine   On mat with strap x10  x2 PT OP at end   Knee flex rocking on step    Quad set 5\"x10 See NMES below      SAQ 3\" 2x10 See NMES below   SLR AAROM 2x5 See NMES below      Bridge with progressive knee flex 5\"x10    Mini squat UE supp 1/2 wall x15    Standing TKE LATOYA  Retro TKE to give end range ext PROM 3\" x10  10\"x5 45#   Calf raises-cue for TKE     Step-up 3\" [facility] platform  4\"   x10 B UE support              Pt ed:    massage to thigh pre stretching/ROM  Also showed supine knee flex with LE up on wall (heel slide) as option for HE; inc PT to 3x/week.  RW still for gait d/t quad control x5'   Manual Intervention (81141) x20' total    STM distal and lateral quad/ITB, prox to mid gastroc  Pat mobs sup/inf  PROM knee flex/ext: supine and seated  Oscillations for pain x10'    x2'  x8'    3x20\"                             NMR re-education (49660)  CUES NEEDED   Weight shift attempt ~75% stance on LLE UE support   Gait with RW-cue for TKE, leyla/glute activ and upright posture in stance     [place] 10:30 L VMO/rectus   Quad iso  SAQ  SLR  LAQ 12' total  3'  3'  3'  3' 15' total (set-up)                                 Therapeutic Activity (82714)                                         Therapeutic Exercise and NMR EXR  [x] (82113) Provided verbal/tactile cueing for activities related to strengthening, flexibility, endurance, ROM for improvements in LE, proximal hip, and core control with self care, mobility, lifting, ambulation.  [] (63565) Provided verbal/tactile cueing for activities related to improving balance, coordination, kinesthetic sense, posture, motor skill, proprioception  to assist with LE, proximal hip, and core control in self care, mobility, lifting, ambulation and eccentric single leg control. NMR and Therapeutic Activities:    [x] (50235 or 72566) Provided verbal/tactile cueing for activities related to improving balance, coordination, kinesthetic sense, posture, motor skill, proprioception and motor activation to allow for proper function of core, proximal hip and LE with self care and ADLs  [] (09674) Gait Re-education- Provided training and instruction to the patient for proper LE, core and proximal hip recruitment and positioning and eccentric body weight control with ambulation re-education including up and down stairs     Home Exercise Program:    [x] (79975) Reviewed/Progressed HEP activities related to strengthening, flexibility, endurance, ROM of core, proximal hip and LE for functional self-care, mobility, lifting and ambulation/stair navigation   [] (22185)Reviewed/Progressed HEP activities related to improving balance, coordination, kinesthetic sense, posture, motor skill, proprioception of core, proximal hip and LE for self care, mobility, lifting, and ambulation/stair navigation      Manual Treatments:  PROM / STM / Oscillations-Mobs:  G-I, II, III, IV (PA's, Inf., Post.)  [x] (02766) Provided manual therapy to mobilize LE, proximal hip and/or LS spine soft tissue/joints for the purpose of modulating pain, promoting relaxation,  increasing ROM, reducing/eliminating soft tissue swelling/inflammation/restriction, improving soft tissue extensibility and allowing for proper ROM for normal function with self care, mobility, lifting and ambulation.      Modalities:   CP x15'   [] GAME READY (VASO)- for significant edema, swelling, pain control. -mod comp  Charges  Timed Code Treatment Minutes: 55   Total Treatment Minutes: 75 (rest breaks,ice)       [] EVAL (LOW) 06153 [] EVAL (MOD) 91001  [] EVAL (HIGH) 47959   [] RE-EVAL     [x] PS(72266) x  2   [] IONTO  [x] NMR (73004) x 1    [] VASO  [x] Manual (82092) x 1     [] Other:  [] TA x      [] Mech Traction (45703)  [] ES(attended) (64755)      [] ES (un) (72113):       GOALS:   Patient stated goal: able to walk without AD  []? Progressing: []? Met: []? Not Met: []? Adjusted     Therapist goals for Patient:   Short Term Goals: To be achieved in: 2 weeks  1. Independent in HEP and progression per patient tolerance, in order to prevent re-injury. []? Progressing: []? Met: []? Not Met: []? Adjusted  2. Patient will have a decrease in pain to facilitate improvement in movement, function, and ADLs as indicated by Functional Deficits. []? Progressing: []? Met: []? Not Met: []? Adjusted     Long Term Goals: To be achieved in: 12 weeks  1. Disability index score of 40% or less for the LEFS to assist with reaching prior level of function. []? Progressing: []? Met: []? Not Met: []? Adjusted  2. Patient will demonstrate increased AROM to 0-125 to allow for proper joint functioning as indicated by patients Functional Deficits. []? Progressing: []? Met: []? Not Met: []? Adjusted  3. Patient will demonstrate an increase in Strength to good proximal hip strength and control, within 5lb HHD in LE to allow for proper functional mobility as indicated by patients Functional Deficits. []? Progressing: []? Met: []? Not Met: []? Adjusted  4. Patient will return to reciprocal stairs w/ handrail or single crutch use without increased symptoms or restriction. []? Progressing: []? Met: []? Not Met: []? Adjusted  5. Indep with community ambulation >/=30' in order to go grocery shopping. []? Progressing: []? Met: []? Not Met: []? Adjusted      Progression Towards Functional goals:  [] Patient is progressing as expected towards functional goals listed. [x] Progression is slowed due to complexities listed.   [] Progression has been slowed due to [email or web address]  Date: 08/03/2021  Prepared by: Nai Astorga    Exercises  Supine Knee Extension Stretch on Towel Roll - 2-3 x daily - 7 x weekly - 3 sets - 60 seconds hold  Long Sitting Calf Stretch with Strap - 2-3 x daily - 7 x weekly - 3 sets - 30 seconds hold  seated hamstring stretch with stool - 2-3 x daily - 7 x weekly - 3 sets - 30 seconds hold

## 2021-08-20 ENCOUNTER — TELEPHONE (OUTPATIENT)
Dept: INTERNAL MEDICINE CLINIC | Age: 61
End: 2021-08-20

## 2021-08-20 ENCOUNTER — HOSPITAL ENCOUNTER (OUTPATIENT)
Dept: PHYSICAL THERAPY | Age: 61
Setting detail: THERAPIES SERIES
Discharge: HOME | End: 2021-08-20
Payer: COMMERCIAL

## 2021-08-20 ENCOUNTER — APPOINTMENT (OUTPATIENT)
Dept: PHYSICAL THERAPY | Age: 61
End: 2021-08-20
Payer: COMMERCIAL

## 2021-08-20 PROCEDURE — 97112 NEUROMUSCULAR REEDUCATION: CPT

## 2021-08-20 PROCEDURE — 97110 THERAPEUTIC EXERCISES: CPT

## 2021-08-20 PROCEDURE — 97140 MANUAL THERAPY 1/> REGIONS: CPT

## 2021-08-20 NOTE — TELEPHONE ENCOUNTER
Pt's rOPINIRole (REQUIP) 3 MG tablet went to the wrong pharmacy  Please send to [facility], [place]

## 2021-08-20 NOTE — PROGRESS NOTES
[facility]  Office Visit    Chief Complaint: Follow-up s/p left total knee arthroplasty    HPI:  Leonor Casey is a 63 y.o. who is here in follow-up of left total knee arthroplasty performed on July 14, 2021. He is doing well overall. He continues to recover and is working with physical therapy. He is here with a walker today. He did have an ingrown toenail is recently worked on the left foot. He also has a small open wound on his left lower leg. He reports knee pain that is 5/10. Patient Active Problem List   Diagnosis    Class 3 severe obesity due to excess calories with serious comorbidity and body mass index (BMI) of 45.0 to 49.9 in adult [facility])    Mixed hypertriglyceridemia    Nonalcoholic fatty liver disease    RLS (restless legs syndrome)    Essential hypertension, benign    Morbid obesity with BMI of 50.0-59.9, adult ([facility] [address]    Chronic pain of left knee    Osteoarthritis of left knee    Chondromalacia of left knee    Pes planus    Lymphedema    Peripheral venous insufficiency    Allergic rhinitis    Type 2 diabetes mellitus without complication, without long-term current use of insulin ([facility] [address]    TOM treated with BiPAP    Arthritis of left knee       ROS:  Constitutional: denies fever, chills, weight loss  MSK: denies pain in other joints, muscle aches  Neurological: denies numbness, tingling, weakness    Exam:  Resp. rate 16, height 5' 8\" (1.727 m), weight (!) 306 lb (138.8 kg). Appearance: sitting in exam room chair, appears to be in no acute distress, awake and alert  Resp: unlabored breathing on room air  Skin: warm, dry and intact with out erythema or significant increased temperature  Neuro: grossly intact both lower extremities. Intact sensation to light touch. Motor exam 4+ to 5/5 in all major motor groups. Left knee: Incision is healed. Range of motion is 5 to 105 degrees. Sensation is intact light touch. There is brisk capillary refill. Dorsalis pedis and posterior tibial pulses are intact. There is 5/5 muscle strength in all muscle groups. Imaging:  None    Assessment:  S/p left total knee arthroplasty    Plan:  He continues to recover after left total knee arthroplasty. He'll continue working with physical therapy. His range of motion is improving and his wound is well-healed. He'll continue local wound care and complete his antibiotics for his left great toe and lower leg wound. He'll follow up in 3 to 4 weeks for repeat assessment. This dictation was done with [employer] dictation and may contain mechanical errors related to translation.

## 2021-08-20 NOTE — FLOWSHEET NOTE
[facility],  [address] [place]  Phone: [phone]  Fax [phone]    Physical Therapy Treatment Note/ Progress Report:           Date:  2021    Patient Name:  Edna Le   \"Bo\" :  1960  MRN: [id number]  Restrictions/Precautions:    Medical/Treatment Diagnosis Information:  · Diagnosis: B61.322 (ICD-10-CM) - Status post left knee replacement DOS 21  · Treatment Diagnosis: L knee pain M25.562, difficulty walking J98.2  Insurance/Certification information:  PT Insurance Information: Med mutual BMN, no auth 90/10 co-ins  Physician Information:  Referring Practitioner: Dr Lavelle Tam  Has the plan of care been signed (Y/N):        []  Yes  [x]  No     Date of Patient follow up with Physician: 21      Is this a Progress Report:     []  Yes  [x]  No        If Yes:  Date Range for reporting period:  Beginnin/3/21  Ending:     Progress report will be due (10 Rx or 30 days whichever is less):        Recertification will be due (POC Duration  / 90 days whichever is less): 12 weeks      Visit # Insurance Allowable Auth Required   In-person 7 BMN []  Yes []  No    Telehealth   []  Yes []  No    Total          Functional Scale: LEFS 89%    Date assessed:  8/3/21      Therapy Diagnosis/Practice Pattern:H, surgical      Number of Comorbidities:  []0     []1-2    [x]3+    Latex Allergy:  [x]NO      []YES  Preferred Language for Healthcare:   [x]English       []other:      Pain level:  eval 2-8/10    today 7-9 at night/10     SUBJECTIVE:  Pt is very sore from yesterday's tx, feels more swollen. His R knee is also sore today,but R calf pain has subsided.     OBJECTIVE:    Observation: incision healed proximally still scabbed distal portion and no s/s infection; comp wraps B LEs removed for tx today to be able to massage gastroc; amb with RW step-to pattern poor knee flex   Test measurements:  AROM pre-tx 90, AAROM to 108 rocking on step, after ext prop PROM supine 0-110    RESTRICTIONS/PRECAUTIONS: DM, hx leg ulcers, R knee  Severe OA    Exercises/Interventions:     Therapeutic Ex (74613) Sets/sec/reps Notes/CUES   Knee ext prop-bone foam  HEP    standing incline board gastroc S, HS S foot on table-LS  4x30\" ea HEP   Seated glider knee flex  Cue to shift weight onto L hip   Hip/knee flex on SB-supine   On mat with strap x10  x2 PT OP at end   Knee flex rocking on step 5\"x20   Quad set 5\"x10 See NMES below      SAQ 3\" 2x10 See NMES below   SLR AAROM 2x5 See NMES below      Bridge with progressive knee flex 5\"x10    Mini squat UE supp 1/2 wall x15    Standing TKE LATOYA  Retro TKE to give end range ext PROM 3\" x10  10\"x5 45#   Calf raises-cue for TKE     Step-up 3\" [facility] platform  4\"  6\" @ steps in back of clinic     x10 B UE support    bike 7' Rocking then full revolutions with comp at trunk/hips        Pt ed:    massage to thigh pre stretching/ROM  Also showed supine knee flex with LE up on wall (heel slide) as option for HE; inc PT to 3x/week.  RW still for gait d/t quad control x5'   Manual Intervention (51537) x20' total    STM distal and lateral quad/ITB, prox to mid gastroc  Pat mobs sup/inf  PROM knee flex/ext: supine and seated  Oscillations for pain x10'    x2'  x8'    3x20\"                             NMR re-education (74777)  CUES NEEDED   Weight shift attempt ~75% stance on LLE UE support   Gait with RW-cue for TKE, leyla/glute activ and upright posture in stancel  Cue for inc'd hip/knee flex 50'x2   [place] 10:30 L VMO/rectus   Quad iso  SAQ  SLR  LAQ 12' total  3'  3'  3'  3' 15' total (set-up)   amb with cane NV                             Therapeutic Activity (91526)                                         Therapeutic Exercise and NMR EXR  [x] (11994) Provided verbal/tactile cueing for activities related to strengthening, flexibility, endurance, ROM for improvements in LE, proximal hip, and core control with as a discharge from care along with most recent update on progress. Access Code: [email or web address]  URL: [email or web address]  Date: 08/03/2021  Prepared by: Dm Astorga    Exercises  Supine Knee Extension Stretch on Towel Roll - 2-3 x daily - 7 x weekly - 3 sets - 60 seconds hold  Long Sitting Calf Stretch with Strap - 2-3 x daily - 7 x weekly - 3 sets - 30 seconds hold  seated hamstring stretch with stool - 2-3 x daily - 7 x weekly - 3 sets - 30 seconds hold

## 2021-08-23 RX ORDER — CYCLOBENZAPRINE HCL 10 MG
10 TABLET ORAL 3 TIMES DAILY PRN
Qty: 60 TABLET | Refills: 0 | Status: SHIPPED | OUTPATIENT
Start: 2021-08-23 | End: 2021-09-12

## 2021-08-23 RX ORDER — ROPINIROLE 3 MG/1
3 TABLET, FILM COATED ORAL 3 TIMES DAILY
Qty: 270 TABLET | Refills: 0 | Status: SHIPPED | OUTPATIENT
Start: 2021-08-23 | End: 2021-11-18 | Stop reason: SDUPTHER

## 2021-08-24 ENCOUNTER — PATIENT MESSAGE (OUTPATIENT)
Dept: ORTHOPEDIC SURGERY | Age: 61
End: 2021-08-24

## 2021-08-24 ENCOUNTER — HOSPITAL ENCOUNTER (OUTPATIENT)
Dept: PHYSICAL THERAPY | Age: 61
Setting detail: THERAPIES SERIES
Discharge: HOME | End: 2021-08-24
Payer: COMMERCIAL

## 2021-08-24 PROCEDURE — 97140 MANUAL THERAPY 1/> REGIONS: CPT

## 2021-08-24 PROCEDURE — 97110 THERAPEUTIC EXERCISES: CPT

## 2021-08-24 NOTE — FLOWSHEET NOTE
[facility],  [address]  Phone: [phone]  Fax [phone]    Physical Therapy Treatment Note/ Progress Report:           Date:  2021    Patient Name:  Anca Gusman   \"Bo\" :  1960  MRN: [id number]  Restrictions/Precautions:    Medical/Treatment Diagnosis Information:  · Diagnosis: Z96.652 (ICD-10-CM) - Status post left knee replacement DOS 21  · Treatment Diagnosis: L knee pain M25.562, difficulty walking H42.6  Insurance/Certification information:  PT Insurance Information: Med mutual BMN, no auth 90/10 co-ins  Physician Information:  Referring Practitioner: Dr Piedad Fernandez  Has the plan of care been signed (Y/N):        []  Yes  [x]  No     Date of Patient follow up with Physician: 21      Is this a Progress Report:     []  Yes  [x]  No        If Yes:  Date Range for reporting period:  Beginnin/3/21  Ending:     Progress report will be due (10 Rx or 30 days whichever is less): 1/3/46       Recertification will be due (POC Duration  / 90 days whichever is less): 12 weeks      Visit # Insurance Allowable Auth Required   In-person 8 BMN []  Yes []  No    Telehealth   []  Yes []  No    Total          Functional Scale: LEFS 89%    Date assessed:  8/3/21      Therapy Diagnosis/Practice Pattern:H, surgical      Number of Comorbidities:  []0     []1-2    [x]3+    Latex Allergy:  [x]NO      []YES  Preferred Language for Healthcare:   [x]English       []other:      Pain level:  eval 2-8/10    today 7-9 at night/10     SUBJECTIVE:  Pt states he feels his knee flexion motion is getting a little better. He enters with B crutches.     OBJECTIVE:    Observation: incision healed proximally still scabbed distal portion and no s/s infection; comp wraps B LEs removed for tx today to be able to massage gastroc; amb with 1 crutch improved step-through pattern but needs cueing for knee flex   Test measurements:  AROM pre-tx 90, AAROM to 110 supine with strap, after ext prop PROM supine 0-112    RESTRICTIONS/PRECAUTIONS: DM, hx leg ulcers, R knee  Severe OA    Exercises/Interventions:     Therapeutic Ex (12457) Sets/sec/reps Notes/CUES   Knee ext prop-bone foam  HEP    standing incline board gastroc S, HS S foot on table-LS  4x30\" ea HEP   Seated glider knee flex  Cue to shift weight onto L hip   Hip/knee flex on SB-supine   On mat with strap x10  x2 PT OP at end   Knee flex rocking on step 5\"x20   Quad set 5\"x10    SAQ  LAQ 2x10 3\"    SLR AAROM 2x5    Bridge with progressive knee flex 5\"x10    Mini squat UE supp 1/2 wall x15    Standing TKE LATOYA  Retro TKE to give end range ext  3\" x10  10\"x5 60#   Calf raises-cue for TKE     Step-up 3\" [facility] platform  4\"  6\" @ steps in back of clinic     x10 B UE support    bike 5' full revolutions with cue for reduced comp at trunk/hips   [address] seated EOB 2x10 green   Leg press 2x10 80#   Pt ed:    massage to thigh pre stretching/ROM  Also showed supine knee flex with LE up on wall (heel slide) as option for HE; inc PT to 3x/week.  RW still for gait d/t quad control x5'   Manual Intervention (34110) x20' total    STM distal and lateral quad/ITB, prox to mid gastroc  Pat mobs sup/inf  PROM knee flex/ext: supine and seated  Oscillations for pain x10'    x2'  x8'    3x20\"                             NMR re-education (53667)  CUES NEEDED   Weight shift attempt ~75% stance on LLE UE support   Gait with RW-cue for TKE, leyla/glute activ and upright posture in stancel  Cue for inc'd hip/knee flex 50'x2   Russian 10:30 L VMO/rectus   Quad iso  SAQ  SLR  LAQ 15' total (set-up)   amb with crutch  SPC NV? 100'x2      Side-stepping NV    Tandem stance (wide) 30\"x2 R, L                   Therapeutic Activity (55121)                                         Therapeutic Exercise and NMR EXR  [x] (72611) Provided verbal/tactile cueing for activities related to strengthening, flexibility, endurance, ROM for improvements in LE, proximal hip, and core control with self care, mobility, lifting, ambulation.  [] (79340) Provided verbal/tactile cueing for activities related to improving balance, coordination, kinesthetic sense, posture, motor skill, proprioception  to assist with LE, proximal hip, and core control in self care, mobility, lifting, ambulation and eccentric single leg control. NMR and Therapeutic Activities:    [x] (83733 or 51993) Provided verbal/tactile cueing for activities related to improving balance, coordination, kinesthetic sense, posture, motor skill, proprioception and motor activation to allow for proper function of core, proximal hip and LE with self care and ADLs  [] (68486) Gait Re-education- Provided training and instruction to the patient for proper LE, core and proximal hip recruitment and positioning and eccentric body weight control with ambulation re-education including up and down stairs     Home Exercise Program:    [x] (20143) Reviewed/Progressed HEP activities related to strengthening, flexibility, endurance, ROM of core, proximal hip and LE for functional self-care, mobility, lifting and ambulation/stair navigation   [] (92212)Reviewed/Progressed HEP activities related to improving balance, coordination, kinesthetic sense, posture, motor skill, proprioception of core, proximal hip and LE for self care, mobility, lifting, and ambulation/stair navigation      Manual Treatments:  PROM / STM / Oscillations-Mobs:  G-I, II, III, IV (PA's, Inf., Post.)  [x] (59056) Provided manual therapy to mobilize LE, proximal hip and/or LS spine soft tissue/joints for the purpose of modulating pain, promoting relaxation,  increasing ROM, reducing/eliminating soft tissue swelling/inflammation/restriction, improving soft tissue extensibility and allowing for proper ROM for normal function with self care, mobility, lifting and ambulation.      Modalities:   CP x15'   [] GAME READY (VASO)- for significant edema, swelling, pain control. -mod comp  Charges  Timed Code Treatment Minutes: 50   Total Treatment Minutes: 80 (I exc, ice, bike)        [] EVAL (LOW) 67073   [] EVAL (MOD) 38568  [] EVAL (HIGH) 51077   [] RE-EVAL     [x] WE(97403) x  2   [] IONTO  [] NMR (68669) x     [] VASO  [x] Manual (33389) x 1     [] Other:  [] TA x      [] Mech Traction (27952)  [] ES(attended) (95665)      [] ES (un) (20818):       GOALS:   Patient stated goal: able to walk without AD  []? Progressing: []? Met: []? Not Met: []? Adjusted     Therapist goals for Patient:   Short Term Goals: To be achieved in: 2 weeks  1. Independent in HEP and progression per patient tolerance, in order to prevent re-injury. []? Progressing: []? Met: []? Not Met: []? Adjusted  2. Patient will have a decrease in pain to facilitate improvement in movement, function, and ADLs as indicated by Functional Deficits. []? Progressing: []? Met: []? Not Met: []? Adjusted     Long Term Goals: To be achieved in: 12 weeks  1. Disability index score of 40% or less for the LEFS to assist with reaching prior level of function. []? Progressing: []? Met: []? Not Met: []? Adjusted  2. Patient will demonstrate increased AROM to 0-125 to allow for proper joint functioning as indicated by patients Functional Deficits. []? Progressing: []? Met: []? Not Met: []? Adjusted  3. Patient will demonstrate an increase in Strength to good proximal hip strength and control, within 5lb HHD in LE to allow for proper functional mobility as indicated by patients Functional Deficits. []? Progressing: []? Met: []? Not Met: []? Adjusted  4. Patient will return to reciprocal stairs w/ handrail or single crutch use without increased symptoms or restriction. []? Progressing: []? Met: []? Not Met: []? Adjusted  5. Indep with community ambulation >/=30' in order to go grocery shopping. []? Progressing: []? Met: []? Not Met: []?  Adjusted      Progression Towards Functional goals:  [] Patient is progressing as expected towards functional goals listed. [x] Progression is slowed due to complexities listed. [] Progression has been slowed due to co-morbidities. [] Plan just implemented, too soon to assess goals progression  [] Other:     Overall Progression Towards Functional goals/ Treatment Progress Update:  [] Patient is progressing as expected towards functional goals listed. [x] Progression is slowed due to complexities/Impairments listed. [] Progression has been slowed due to co-morbidities. [] Plan just implemented, too soon to assess goals progression <30days   [] Goals require adjustment due to lack of progress  [] Patient is not progressing as expected and requires additional follow up with physician  [] Other    Prognosis for POC: [x] Good [] Fair  [] Poor      Patient requires continued skilled intervention: [x] Yes  [] No    Treatment/Activity Tolerance:  [x] Patient able to complete treatment  [] Patient limited by fatigue  [] Patient limited by pain    [] Patient limited by other medical complications  [] Other:     ASSESSMENT: Pt able to improve quality of gait with better quad activ thus can progress to 1 crutch for household amb and still take 2 vs RW for comm amb. Can work towards amb with SPC-will trial in clinic NV. Both flex and ext ROM are making steady progress.     Return to Play: (if applicable)   []  Stage 1: Intro to Strength   []  Stage 2: Return to Run and Strength   []  Stage 3: Return to Jump and Strength   []  Stage 4: Dynamic Strength and Agility   []  Stage 5: Sport Specific Training     []  Ready to Return to Play, Meets All Above Stages   []  Not Ready for Return to Sports   Comments:                         PLAN: 2x week for progressing with ROM, quad activ, gait  [x] Continue per plan of care [] Alter current plan (see comments above)  [] Plan of care initiated [] Hold pending MD visit [] Discharge      Electronically signed by:  Sujit Plascencia Florina, PT , DPT  Note: If patient does not return for scheduled/ recommended follow up visits, this note will serve as a discharge from care along with most recent update on progress. Access Code: [email or web address]  URL: [email or web address]  Date: 08/03/2021  Prepared by: Aminta Astorga    Exercises  Supine Knee Extension Stretch on Towel Roll - 2-3 x daily - 7 x weekly - 3 sets - 60 seconds hold  Long Sitting Calf Stretch with Strap - 2-3 x daily - 7 x weekly - 3 sets - 30 seconds hold  seated hamstring stretch with stool - 2-3 x daily - 7 x weekly - 3 sets - 30 seconds hold

## 2021-08-26 RX ORDER — AMOXICILLIN 500 MG/1
CAPSULE ORAL
Qty: 4 CAPSULE | Refills: 1 | Status: SHIPPED | OUTPATIENT
Start: 2021-08-26 | End: 2021-10-20

## 2021-08-27 ENCOUNTER — HOSPITAL ENCOUNTER (OUTPATIENT)
Dept: PHYSICAL THERAPY | Age: 61
Setting detail: THERAPIES SERIES
Discharge: HOME | End: 2021-08-27
Payer: COMMERCIAL

## 2021-08-27 PROCEDURE — 97112 NEUROMUSCULAR REEDUCATION: CPT

## 2021-08-27 PROCEDURE — 97140 MANUAL THERAPY 1/> REGIONS: CPT

## 2021-08-27 PROCEDURE — 97110 THERAPEUTIC EXERCISES: CPT

## 2021-08-27 NOTE — FLOWSHEET NOTE
[facility],  [address] [place]  Phone: [phone]  Fax [phone]    Physical Therapy Treatment Note/ Progress Report:           Date:  2021    Patient Name:  Kendra Bumpers   \"Bo\" :  1960  MRN: [id number]  Restrictions/Precautions:    Medical/Treatment Diagnosis Information:  · Diagnosis: Z96.652 (ICD-10-CM) - Status post left knee replacement DOS 21  · Treatment Diagnosis: L knee pain M25.562, difficulty walking T48.7  Insurance/Certification information:  PT Insurance Information: Med mutual BMN, no auth 90/10 co-ins  Physician Information:  Referring Practitioner: Dr Alexander Wren  Has the plan of care been signed (Y/N):        []  Yes  [x]  No     Date of Patient follow up with Physician: 21      Is this a Progress Report:     []  Yes  [x]  No        If Yes:  Date Range for reporting period:  Beginnin/3/21  Ending:     Progress report will be due (10 Rx or 30 days whichever is less): 02       Recertification will be due (POC Duration  / 90 days whichever is less): 12 weeks      Visit # Insurance Allowable Auth Required   In-person 9 BMN []  Yes []  No    Telehealth   []  Yes []  No    Total          Functional Scale: LEFS 89%    Date assessed:  8/3/21      Therapy Diagnosis/Practice Pattern:H, surgical      Number of Comorbidities:  []0     []1-2    [x]3+    Latex Allergy:  [x]NO      []YES  Preferred Language for Healthcare:   [x]English       []other:      Pain level:  eval 2-8/10    today 6 at night/10     SUBJECTIVE:  Pt is stiff entering PT today. He is using B crutches most times d/t R knee discomfort but has practiced 1 crutch. He's wondering if he would be ready for R TKA in October.     OBJECTIVE:    Observation: incision healed proximally still scabbed distal portion and no s/s infection; amb with 1-2 crutches improved step-through pattern but needs cueing for knee flex   Test verbal/tactile cueing for activities related to strengthening, flexibility, endurance, ROM for improvements in LE, proximal hip, and core control with self care, mobility, lifting, ambulation.  [] (91480) Provided verbal/tactile cueing for activities related to improving balance, coordination, kinesthetic sense, posture, motor skill, proprioception  to assist with LE, proximal hip, and core control in self care, mobility, lifting, ambulation and eccentric single leg control.      NMR and Therapeutic Activities:    [x] (55877 or 56382) Provided verbal/tactile cueing for activities related to improving balance, coordination, kinesthetic sense, posture, motor skill, proprioception and motor activation to allow for proper function of core, proximal hip and LE with self care and ADLs  [] (57357) Gait Re-education- Provided training and instruction to the patient for proper LE, core and proximal hip recruitment and positioning and eccentric body weight control with ambulation re-education including up and down stairs     Home Exercise Program:    [x] (88241) Reviewed/Progressed HEP activities related to strengthening, flexibility, endurance, ROM of core, proximal hip and LE for functional self-care, mobility, lifting and ambulation/stair navigation   [] (11512)Reviewed/Progressed HEP activities related to improving balance, coordination, kinesthetic sense, posture, motor skill, proprioception of core, proximal hip and LE for self care, mobility, lifting, and ambulation/stair navigation      Manual Treatments:  PROM / STM / Oscillations-Mobs:  G-I, II, III, IV (PA's, Inf., Post.)  [x] (92506) Provided manual therapy to mobilize LE, proximal hip and/or LS spine soft tissue/joints for the purpose of modulating pain, promoting relaxation,  increasing ROM, reducing/eliminating soft tissue swelling/inflammation/restriction, improving soft tissue extensibility and allowing for proper ROM for normal function with self care, mobility, lifting and ambulation. Modalities:   CP x15'   [] GAME READY (VASO)- for significant edema, swelling, pain control. -mod comp  Charges  Timed Code Treatment Minutes: 45   Total Treatment Minutes: 67 ( ice, I bike)        [] EVAL (LOW) 01258   [] EVAL (MOD) 15071  [] EVAL (HIGH) 33932   [] RE-EVAL     [x] SB(19416) x  1   [] IONTO  [x] NMR (21104) x1     [] VASO  [x] Manual (73426) x 1     [] Other:  [] TA x      [] Mech Traction (35585)  [] ES(attended) (80418)      [] ES (un) (93356):       GOALS:   Patient stated goal: able to walk without AD  []? Progressing: []? Met: []? Not Met: []? Adjusted     Therapist goals for Patient:   Short Term Goals: To be achieved in: 2 weeks  1. Independent in HEP and progression per patient tolerance, in order to prevent re-injury. []? Progressing: []? Met: []? Not Met: []? Adjusted  2. Patient will have a decrease in pain to facilitate improvement in movement, function, and ADLs as indicated by Functional Deficits. []? Progressing: []? Met: []? Not Met: []? Adjusted     Long Term Goals: To be achieved in: 12 weeks  1. Disability index score of 40% or less for the LEFS to assist with reaching prior level of function. []? Progressing: []? Met: []? Not Met: []? Adjusted  2. Patient will demonstrate increased AROM to 0-125 to allow for proper joint functioning as indicated by patients Functional Deficits. []? Progressing: []? Met: []? Not Met: []? Adjusted  3. Patient will demonstrate an increase in Strength to good proximal hip strength and control, within 5lb HHD in LE to allow for proper functional mobility as indicated by patients Functional Deficits. []? Progressing: []? Met: []? Not Met: []? Adjusted  4. Patient will return to reciprocal stairs w/ handrail or single crutch use without increased symptoms or restriction. []? Progressing: []? Met: []? Not Met: []? Adjusted  5. Indep with community ambulation >/=30' in order to go grocery shopping.   []? Progressing: []? Met: []? Not Met: []? Adjusted      Progression Towards Functional goals:  [] Patient is progressing as expected towards functional goals listed. [x] Progression is slowed due to complexities listed. [] Progression has been slowed due to co-morbidities. [] Plan just implemented, too soon to assess goals progression  [] Other:     Overall Progression Towards Functional goals/ Treatment Progress Update:  [] Patient is progressing as expected towards functional goals listed. [x] Progression is slowed due to complexities/Impairments listed. [] Progression has been slowed due to co-morbidities. [] Plan just implemented, too soon to assess goals progression <30days   [] Goals require adjustment due to lack of progress  [] Patient is not progressing as expected and requires additional follow up with physician  [] Other    Prognosis for POC: [x] Good [] Fair  [] Poor      Patient requires continued skilled intervention: [x] Yes  [] No    Treatment/Activity Tolerance:  [x] Patient able to complete treatment  [] Patient limited by fatigue  [] Patient limited by pain    [] Patient limited by other medical complications  [] Other:     ASSESSMENT: Pt continues to demo improving quad control for L stance. He should still practice with 1 crutch to prepare for progression to [facility]. He is ambulating with better quality, including up/down steps than he was pre-surgically.     Return to Play: (if applicable)   []  Stage 1: Intro to Strength   []  Stage 2: Return to Run and Strength   []  Stage 3: Return to Jump and Strength   []  Stage 4: Dynamic Strength and Agility   []  Stage 5: Sport Specific Training     []  Ready to Return to Play, Meets All Above Stages   []  Not Ready for Return to Sports   Comments:                         PLAN: 2x week for progressing with ROM, quad activ, gait  [x] Continue per plan of care [] Alter current plan (see comments above)  [] Plan of care initiated [] Hold pending MD visit [] Discharge      Electronically signed by:  Marcy Bennett PT , DPT  Note: If patient does not return for scheduled/ recommended follow up visits, this note will serve as a discharge from care along with most recent update on progress. Access Code: [email or web address]  URL: [email or web address]  Date: 08/03/2021  Prepared by: Zeke Astorga    Exercises  Supine Knee Extension Stretch on Towel Roll - 2-3 x daily - 7 x weekly - 3 sets - 60 seconds hold  Long Sitting Calf Stretch with Strap - 2-3 x daily - 7 x weekly - 3 sets - 30 seconds hold  seated hamstring stretch with stool - 2-3 x daily - 7 x weekly - 3 sets - 30 seconds hold

## 2021-08-31 ENCOUNTER — HOSPITAL ENCOUNTER (OUTPATIENT)
Dept: PHYSICAL THERAPY | Age: 61
Setting detail: THERAPIES SERIES
Discharge: HOME | End: 2021-08-31
Payer: COMMERCIAL

## 2021-08-31 PROCEDURE — 97140 MANUAL THERAPY 1/> REGIONS: CPT

## 2021-08-31 PROCEDURE — 97112 NEUROMUSCULAR REEDUCATION: CPT

## 2021-08-31 PROCEDURE — 97110 THERAPEUTIC EXERCISES: CPT

## 2021-08-31 NOTE — PROGRESS NOTES
[facility],  [address] [place]  Phone: [phone]  Fax [phone]    Physical Therapy Treatment Note/ Progress Report:           Date:  2021    Patient Name:  Jace Guajardo   \"Bo\" :  1960  MRN: [id number]  Restrictions/Precautions:    Medical/Treatment Diagnosis Information:  · Diagnosis: G40.521 (ICD-10-CM) - Status post left knee replacement DOS 21  · Treatment Diagnosis: L knee pain M25.562, difficulty walking N88.1  Insurance/Certification information:  PT Insurance Information: Med mutual BMN, no auth 90/10 co-ins  Physician Information:  Referring Practitioner: Dr Erven Jeans  Has the plan of care been signed (Y/N):        []  Yes  [x]  No     Date of Patient follow up with Physician: 21      Is this a Progress Report:     [x]  Yes  []  No        If Yes:  Date Range for reporting period:  Beginnin/3/21  Endin21    Progress report will be due (10 Rx or 30 days whichever is less): 18       Recertification will be due (POC Duration  / 90 days whichever is less): 12 weeks      Visit # Insurance Allowable Auth Required   In-person 10-PN written BMN []  Yes []  No    Telehealth   []  Yes []  No    Total          Functional Scale: LEFS 89%    Date assessed:  8/3/21   Functional Scale: LEFS 78%    Date assessed:  21           Therapy Diagnosis/Practice Pattern:H, surgical      Number of Comorbidities:  []0     []1-2    [x]3+    Latex Allergy:  [x]NO      []YES  Preferred Language for Healthcare:   [x]English       []other:      Pain level:  eval 2-10    today 4/10     SUBJECTIVE:  Pt can tell his flexion ROM is improved bc he can get in/out of the car more easily.     OBJECTIVE:    Observation: incision healed proximally still scabbed distal portion and no s/s infection; amb with 1 crutch improved step-through pattern but needs cueing for knee flex (ok to progress to [facility])   Test measurements:  AAROM to 0-114 supine with strap    RESTRICTIONS/PRECAUTIONS: DM, hx leg ulcers, R knee  Severe OA    Exercises/Interventions:     Therapeutic Ex (64259) Sets/sec/reps Notes/CUES   Knee ext prop-bone foam  HEP    standing incline board gastroc S, HS S foot on table-LS  4x30\" ea HEP   Seated glider knee flex  Cue to shift weight onto L hip   Hip/knee flex on SB-supine   On mat with strap x10  x2 PT OP at end   Knee flex rocking on step    Quad set 5\"x10    SAQ  LAQ 2x10 3\"    SLR AAROM 2x5    Bridge with progressive knee flex 5\"x10    Mini squat UE supp   L slightly behind 2x10  x10    Standing TKE LATOYA  Retro TKE to give end range ext  3\" x10  10\"x5 60#   Calf raises-cue for TKE     Step-up 3\" [facility] [place] platform  4\"  6\" FSU, LSU       x10 ea B UE support    Standing glider abd, ext x10 ea R, L ea Pain in R stance now   bike 5' I w/u after set-up full revolutions with cue for reduced comp at trunk/hips   SL hip abd 2x10 R, L    HSC seated EOB 2x10 green   Leg press  ecc  SL x10  x10  x10 120#  120#  80#   Pt ed:    massage to thigh pre stretching/ROM  Also showed supine knee flex with LE up on wall (heel slide) as option for HE; inc PT to 3x/week.  RW still for gait d/t quad control    Manual Intervention (27723) x15' total    STM distal and lateral quad/ITB, prox to mid gastroc  Pat mobs sup/inf  PROM knee flex/ext: supine and seated  Oscillations for pain x6'    x2'  x5'    3x20\"                             NMR re-education (82917)  CUES NEEDED   Weight shift attempt ~75% stance on LLE UE support   Gait with RW-cue for TKE, leyla/glute activ and upright posture in stancel  Cue for inc'd hip/knee flex 50'x2   Russian 10:30 L VMO/rectus   Quad iso  SAQ  SLR  LAQ 15' total (set-up)   amb with crutch  SPC-SBA  Sized new cane   50'x2  2'      Side-stepping table-side 6 trips Red tband at upper shins   Tandem stance (wide)  30\"x2 R, L    SLS 5\"x10 Fingertip sup             Therapeutic Activity (42364) Therapeutic Exercise and NMR EXR  [x] (14047) Provided verbal/tactile cueing for activities related to strengthening, flexibility, endurance, ROM for improvements in LE, proximal hip, and core control with self care, mobility, lifting, ambulation.  [] (78834) Provided verbal/tactile cueing for activities related to improving balance, coordination, kinesthetic sense, posture, motor skill, proprioception  to assist with LE, proximal hip, and core control in self care, mobility, lifting, ambulation and eccentric single leg control.      NMR and Therapeutic Activities:    [x] (41415 or 15560) Provided verbal/tactile cueing for activities related to improving balance, coordination, kinesthetic sense, posture, motor skill, proprioception and motor activation to allow for proper function of core, proximal hip and LE with self care and ADLs  [] (24565) Gait Re-education- Provided training and instruction to the patient for proper LE, core and proximal hip recruitment and positioning and eccentric body weight control with ambulation re-education including up and down stairs     Home Exercise Program:    [x] (61798) Reviewed/Progressed HEP activities related to strengthening, flexibility, endurance, ROM of core, proximal hip and LE for functional self-care, mobility, lifting and ambulation/stair navigation   [] (00275)Reviewed/Progressed HEP activities related to improving balance, coordination, kinesthetic sense, posture, motor skill, proprioception of core, proximal hip and LE for self care, mobility, lifting, and ambulation/stair navigation      Manual Treatments:  PROM / STM / Oscillations-Mobs:  G-I, II, III, IV (PA's, Inf., Post.)  [x] (71555) Provided manual therapy to mobilize LE, proximal hip and/or LS spine soft tissue/joints for the purpose of modulating pain, promoting relaxation,  increasing ROM, reducing/eliminating soft tissue swelling/inflammation/restriction, improving soft tissue extensibility and allowing for proper ROM for normal function with self care, mobility, lifting and ambulation. Modalities:   CP x15'   [] GAME READY (VASO)- for significant edema, swelling, pain control. -mod comp  Charges  Timed Code Treatment Minutes: 53   Total Treatment Minutes: 73 ( ice, I bike)        [] EVAL (LOW) 51587   [] EVAL (MOD) 69167  [] EVAL (HIGH) 33361   [] RE-EVAL     [x] BA(44547) x  2   [] IONTO  [x] NMR (59027) x1     [] VASO  [x] Manual (13945) x 1     [] Other: Gait x1  [] TA x      [] Mech Traction (76182)  [] ES(attended) (83373)      [] ES (un) (39394):       GOALS:   Patient stated goal: able to walk without AD  []? Progressing: []? Met: []? Not Met: []? Adjusted     Therapist goals for Patient:   Short Term Goals: To be achieved in: 2 weeks  1. Independent in HEP and progression per patient tolerance, in order to prevent re-injury. []? Progressing: [x]? Met: []? Not Met: []? Adjusted  2. Patient will have a decrease in pain to facilitate improvement in movement, function, and ADLs as indicated by Functional Deficits. []? Progressing: [x]? Met: []? Not Met: []? Adjusted     Long Term Goals: To be achieved in: 12 weeks  1. Disability index score of 40% or less for the LEFS to assist with reaching prior level of function. [x]? Progressing: []? Met: []? Not Met: []? Adjusted  2. Patient will demonstrate increased AROM to 0-125 to allow for proper joint functioning as indicated by patients Functional Deficits. [x]? Progressing: []? Met: []? Not Met: []? Adjusted  3. Patient will demonstrate an increase in Strength to good proximal hip strength and control, within 5lb HHD in LE to allow for proper functional mobility as indicated by patients Functional Deficits. []? Progressing: []? Met: []? Not Met: []? Adjusted  4. Patient will return to reciprocal stairs w/ handrail or single crutch use without increased symptoms or restriction. [x]? Progressing: []? Met: []?  Not Met: []? Adjusted  5. Indep with community ambulation >/=30' in order to go grocery shopping. [x]? Progressing: []? Met: []? Not Met: []? Adjusted      Progression Towards Functional goals:  [] Patient is progressing as expected towards functional goals listed. [x] Progression is slowed due to complexities listed. [] Progression has been slowed due to co-morbidities. [] Plan just implemented, too soon to assess goals progression  [] Other:     Overall Progression Towards Functional goals/ Treatment Progress Update:  [] Patient is progressing as expected towards functional goals listed. [x] Progression is slowed due to complexities/Impairments listed. [] Progression has been slowed due to co-morbidities. [] Plan just implemented, too soon to assess goals progression <30days   [] Goals require adjustment due to lack of progress  [] Patient is not progressing as expected and requires additional follow up with physician  [] Other    Prognosis for POC: [x] Good [] Fair  [] Poor      Patient requires continued skilled intervention: [x] Yes  [] No    Treatment/Activity Tolerance:  [x] Patient able to complete treatment  [] Patient limited by fatigue  [] Patient limited by pain    [] Patient limited by other medical complications  [] Other:     ASSESSMENT: Pt continues to progress flexion ROM and quad control. Proximal strength limits ability to progress to [facility], but can practice at home and fit new SPC purchased this date.      Return to Play: (if applicable)   []  Stage 1: Intro to Strength   []  Stage 2: Return to Run and Strength   []  Stage 3: Return to Jump and Strength   []  Stage 4: Dynamic Strength and Agility   []  Stage 5: Sport Specific Training     []  Ready to Return to Play, Meets All Above Stages   []  Not Ready for Return to Sports   Comments:                         PLAN: 2x week for progressing with ROM, quad activ, gait  [x] Continue per plan of care [] Alter current plan (see comments above)  [] Plan of care initiated [] Hold pending MD visit [] Discharge      Electronically signed by:  Andre Turpin PT , DPT  Note: If patient does not return for scheduled/ recommended follow up visits, this note will serve as a discharge from care along with most recent update on progress. Access Code: [email or web address]  URL: [email or web address]  Date: 08/03/2021  Prepared by: Anna Astorga    Exercises  Supine Knee Extension Stretch on Towel Roll - 2-3 x daily - 7 x weekly - 3 sets - 60 seconds hold  Long Sitting Calf Stretch with Strap - 2-3 x daily - 7 x weekly - 3 sets - 30 seconds hold  seated hamstring stretch with stool - 2-3 x daily - 7 x weekly - 3 sets - 30 seconds hold

## 2021-09-03 ENCOUNTER — HOSPITAL ENCOUNTER (OUTPATIENT)
Dept: PHYSICAL THERAPY | Age: 61
Setting detail: THERAPIES SERIES
Discharge: HOME | End: 2021-09-03
Payer: COMMERCIAL

## 2021-09-03 PROCEDURE — 97110 THERAPEUTIC EXERCISES: CPT

## 2021-09-03 PROCEDURE — 97112 NEUROMUSCULAR REEDUCATION: CPT

## 2021-09-03 PROCEDURE — 97140 MANUAL THERAPY 1/> REGIONS: CPT

## 2021-09-03 NOTE — FLOWSHEET NOTE
[facility],  [address] [place]  Phone: [phone]  Fax [phone]    Physical Therapy Treatment Note/ Progress Report:           Date:  9/3/2021    Patient Name:  Charles Lopez   \"Bo\" :  1960  MRN: [id number]  Restrictions/Precautions:    Medical/Treatment Diagnosis Information:  · Diagnosis: G10.541 (ICD-10-CM) - Status post left knee replacement DOS 21  · Treatment Diagnosis: L knee pain M25.562, difficulty walking Z09.5  Insurance/Certification information:  PT Insurance Information: Med mutual BMN, no auth 90/10 co-ins  Physician Information:  Referring Practitioner: Dr Ivon Vaughan  Has the plan of care been signed (Y/N):        []  Yes  [x]  No     Date of Patient follow up with Physician: 21      Is this a Progress Report:     [x]  Yes  []  No        If Yes:  Date Range for reporting period:  Beginnin/3/21  Endin21    Progress report will be due (10 Rx or 30 days whichever is less):        Recertification will be due (POC Duration  / 90 days whichever is less): 12 weeks      Visit # Insurance Allowable Auth Required   In-person 10-PN written BMN []  Yes []  No    Telehealth   []  Yes []  No    Total          Functional Scale: LEFS 89%    Date assessed:  8/3/21   Functional Scale: LEFS 78%    Date assessed:  21           Therapy Diagnosis/Practice Pattern:H, surgical      Number of Comorbidities:  []0     []1-2    [x]3+    Latex Allergy:  [x]NO      []YES  Preferred Language for Healthcare:   [x]English       []other:      Pain level:  eval 2-810    today 4/10     SUBJECTIVE:  Pt is tight in his joint today, but quad and calf feel looser each day. He's been using his massage gun on his quad.     OBJECTIVE:    Observation: incision healed proximally still scabbed distal portion and no s/s infection; amb with 1 crutch improved step-through pattern but needs cueing for knee flex (ok to progress to [facility])   Test measurements:  AAROM to 0-114 supine with strap    RESTRICTIONS/PRECAUTIONS: DM, hx leg ulcers, R knee  Severe OA    Exercises/Interventions:     Therapeutic Ex (01482) Sets/sec/reps Notes/CUES   Knee ext prop-bone foam  HEP    standing incline board gastroc S, HS S foot on table-LS  4x30\" ea HEP   Seated glider knee flex  Cue to shift weight onto L hip   Hip/knee flex on SB-supine   On mat with strap x10  x2 PT OP at end   Knee flex rocking on step    Quad set 5\"x10    SAQ  LAQ 2x10 3\"    SLR AAROM 2x5    Bridge with progressive knee flex 5\"x10    Mini squat UE supp   L slightly behind 2x10  x10    Standing TKE LATOYA  Retro TKE to give end range ext  3\" x10  10\"x5 60#   Calf raises L ecc's-cue for TKE x10    Step-up 3\" [facility] [place] platform  4\"  6\" FSU, LSU       x10 ea B UE support    Standing glider abd, ext diag x10 ea R, L ea Pain in R stance now   bike 5' I w/u after set-up full revolutions with cue for reduced comp at trunk/hips   SL hip abd 2x10 R, L    HSC seated EOB 2x10 blue   Leg press  ecc  SL Resume #  120#  80#   Pt ed:    massage to thigh pre stretching/ROM  Also showed supine knee flex with LE up on wall (heel slide) as option for HE; inc PT to 3x/week.  RW still for gait d/t quad control    Manual Intervention (62954) x15' total    STM distal and lateral quad/ITB, prox to mid gastroc  Pat mobs sup/inf  PROM knee flex/ext: supine and seated  Oscillations for pain x6'    x2'  x5'    3x20\"                             NMR re-education (29368)  CUES NEEDED   Weight shift attempt ~75% stance on LLE UE support   Gait with RW-cue for TKE, leyla/glute activ and upright posture in stancel  Cue for inc'd hip/knee flex 50'x2   Russian 10:30 L VMO/rectus   Quad iso  SAQ  SLR  LAQ 15' total (set-up)   amb with crutch  SPC-SBA  Sized new cane   50'x2  2'      Side-stepping table-side 6 trips Red tband at upper shins   Tandem stance (wide)  30\"x2 R, L    SLS 5\"x10 Fingertip sup   Standing lunge forward with L          Therapeutic Activity (23818)                                         Therapeutic Exercise and NMR EXR  [x] (75486) Provided verbal/tactile cueing for activities related to strengthening, flexibility, endurance, ROM for improvements in LE, proximal hip, and core control with self care, mobility, lifting, ambulation.  [] (83981) Provided verbal/tactile cueing for activities related to improving balance, coordination, kinesthetic sense, posture, motor skill, proprioception  to assist with LE, proximal hip, and core control in self care, mobility, lifting, ambulation and eccentric single leg control.      NMR and Therapeutic Activities:    [x] (57176 or 55947) Provided verbal/tactile cueing for activities related to improving balance, coordination, kinesthetic sense, posture, motor skill, proprioception and motor activation to allow for proper function of core, proximal hip and LE with self care and ADLs  [] (57645) Gait Re-education- Provided training and instruction to the patient for proper LE, core and proximal hip recruitment and positioning and eccentric body weight control with ambulation re-education including up and down stairs     Home Exercise Program:    [x] (56032) Reviewed/Progressed HEP activities related to strengthening, flexibility, endurance, ROM of core, proximal hip and LE for functional self-care, mobility, lifting and ambulation/stair navigation   [] (10332)Reviewed/Progressed HEP activities related to improving balance, coordination, kinesthetic sense, posture, motor skill, proprioception of core, proximal hip and LE for self care, mobility, lifting, and ambulation/stair navigation      Manual Treatments:  PROM / STM / Oscillations-Mobs:  G-I, II, III, IV (PA's, Inf., Post.)  [x] (48009) Provided manual therapy to mobilize LE, proximal hip and/or LS spine soft tissue/joints for the purpose of modulating pain, promoting relaxation,  increasing ROM, reducing/eliminating soft tissue swelling/inflammation/restriction, improving soft tissue extensibility and allowing for proper ROM for normal function with self care, mobility, lifting and ambulation. Modalities:   CP x15'   [] GAME READY (VASO)- for significant edema, swelling, pain control. -mod comp  Charges  Timed Code Treatment Minutes: 40   Total Treatment Minutes: 60 ( ice, I bike)        [] EVAL (LOW) 18727   [] EVAL (MOD) 63302  [] EVAL (HIGH) 45587   [] RE-EVAL     [x] RS(68280) x  1   [] IONTO  [x] NMR (80279) x1     [] VASO  [x] Manual (56649) x 1     [] Other: Gait   [] TA x      [] Mech Traction (76096)  [] ES(attended) (58748)      [] ES (un) (07230):       GOALS:   Patient stated goal: able to walk without AD  []? Progressing: []? Met: []? Not Met: []? Adjusted     Therapist goals for Patient:   Short Term Goals: To be achieved in: 2 weeks  1. Independent in HEP and progression per patient tolerance, in order to prevent re-injury. []? Progressing: [x]? Met: []? Not Met: []? Adjusted  2. Patient will have a decrease in pain to facilitate improvement in movement, function, and ADLs as indicated by Functional Deficits. []? Progressing: [x]? Met: []? Not Met: []? Adjusted     Long Term Goals: To be achieved in: 12 weeks  1. Disability index score of 40% or less for the LEFS to assist with reaching prior level of function. [x]? Progressing: []? Met: []? Not Met: []? Adjusted  2. Patient will demonstrate increased AROM to 0-125 to allow for proper joint functioning as indicated by patients Functional Deficits. [x]? Progressing: []? Met: []? Not Met: []? Adjusted  3. Patient will demonstrate an increase in Strength to good proximal hip strength and control, within 5lb HHD in LE to allow for proper functional mobility as indicated by patients Functional Deficits. []? Progressing: []? Met: []? Not Met: []? Adjusted  4.  Patient will return to reciprocal stairs w/ handrail or single crutch use without increased symptoms or restriction. [x]? Progressing: []? Met: []? Not Met: []? Adjusted  5. Indep with community ambulation >/=30' in order to go grocery shopping. [x]? Progressing: []? Met: []? Not Met: []? Adjusted      Progression Towards Functional goals:  [] Patient is progressing as expected towards functional goals listed. [x] Progression is slowed due to complexities listed. [] Progression has been slowed due to co-morbidities. [] Plan just implemented, too soon to assess goals progression  [] Other:     Overall Progression Towards Functional goals/ Treatment Progress Update:  [] Patient is progressing as expected towards functional goals listed. [x] Progression is slowed due to complexities/Impairments listed. [] Progression has been slowed due to co-morbidities. [] Plan just implemented, too soon to assess goals progression <30days   [] Goals require adjustment due to lack of progress  [] Patient is not progressing as expected and requires additional follow up with physician  [] Other    Prognosis for POC: [x] Good [] Fair  [] Poor      Patient requires continued skilled intervention: [x] Yes  [] No    Treatment/Activity Tolerance:  [x] Patient able to complete treatment  [] Patient limited by fatigue  [] Patient limited by pain    [] Patient limited by other medical complications  [] Other:     ASSESSMENT: Pt is progressing tolerance for [address] on LLE, RLE limits standing exc now more than L. He will need to progress to 8\" step-ups to get in/out of his home.     Return to Play: (if applicable)   []  Stage 1: Intro to Strength   []  Stage 2: Return to Run and Strength   []  Stage 3: Return to Jump and Strength   []  Stage 4: Dynamic Strength and Agility   []  Stage 5: Sport Specific Training     []  Ready to Return to Play, Meets All Above Stages   []  Not Ready for Return to Sports   Comments:                         PLAN: 2x week for progressing with ROM, quad activ, gait  [x] Continue per plan of care [] Delores Nation

## 2021-09-07 ENCOUNTER — HOSPITAL ENCOUNTER (OUTPATIENT)
Dept: PHYSICAL THERAPY | Age: 61
Setting detail: THERAPIES SERIES
Discharge: HOME | End: 2021-09-07
Payer: COMMERCIAL

## 2021-09-07 PROCEDURE — 97140 MANUAL THERAPY 1/> REGIONS: CPT

## 2021-09-07 PROCEDURE — 97110 THERAPEUTIC EXERCISES: CPT

## 2021-09-07 PROCEDURE — 97112 NEUROMUSCULAR REEDUCATION: CPT

## 2021-09-07 NOTE — FLOWSHEET NOTE
[facility],  [address]  Phone: [phone]  Fax [phone]    Physical Therapy Treatment Note/ Progress Report:           Date:  2021    Patient Name:  iWlliam Butcher   \"Bo\" :  1960  MRN: [id number]  Restrictions/Precautions:    Medical/Treatment Diagnosis Information:  · Diagnosis: L63.863 (ICD-10-CM) - Status post left knee replacement DOS 21  · Treatment Diagnosis: L knee pain M25.562, difficulty walking S41.8  Insurance/Certification information:  PT Insurance Information: Med mutual BMN, no auth 90/10 co-ins  Physician Information:  Referring Practitioner: Dr Paulo Toribio  Has the plan of care been signed (Y/N):        []  Yes  [x]  No     Date of Patient follow up with Physician: 21      Is this a Progress Report:     [x]  Yes  []  No        If Yes:  Date Range for reporting period:  Beginnin/3/21  Endin21    Progress report will be due (10 Rx or 30 days whichever is less):        Recertification will be due (POC Duration  / 90 days whichever is less): 12 weeks      Visit # Insurance Allowable Auth Required   In-person 11 BMN []  Yes []  No    Telehealth   []  Yes []  No    Total          Functional Scale: LEFS 89%    Date assessed:  8/3/21   Functional Scale: LEFS 78%    Date assessed:  21           Therapy Diagnosis/Practice Pattern:H, surgical      Number of Comorbidities:  []0     []1-2    [x]3+    Latex Allergy:  [x]NO      []YES  Preferred Language for Healthcare:   [x]English       []other:      Pain level:  eval 2-810    today 4/10     SUBJECTIVE:  Pt has pulling still at anterior knee (points to distal incision and pat tendon) but he has been able flex knee more comfortably for steps, in/out of the car.      OBJECTIVE:    Observation: incision healed with fascial restrictions in quad; amb with SPC vs 1 crutch improved step-through pattern but needs cueing for knee flex    Test measurements:      RESTRICTIONS/PRECAUTIONS: DM, hx leg ulcers, R knee  Severe OA    Exercises/Interventions:     Therapeutic Ex (09195) Sets/sec/reps Notes/CUES   Knee ext prop-bone foam  HEP    standing incline board gastroc S, HS S foot on table-LS  4x30\" ea HEP   Seated glider knee flex  Cue to shift weight onto L hip   Hip/knee flex on SB-supine   On mat with strap x10  x2 PT OP at end   Knee flex rocking on step    Quad set 5\"x10    SAQ  LAQ 2x10 3\"    SLR AAROM 2x5    Bridge with progressive knee flex 5\"x10    Mini squat UE supp   L slightly behind 2x10  x10    Standing TKE LATOYA  Retro TKE to give end range ext  3\" x10  10\"x5 60#   Calf raises L ecc's-cue for TKE x10    Step-up 3\" [facility] platform  4\"  6\" [employer], [employer]  4\" step up and over       x10 ea  x10 1 UE support    SLS with standing glider abd, 45 deg ext diag, ext x10 ea R, L ea Pain in R stance now  OVL at feet   bike 5' I w/u after set-up full revolution   SL hip abd 2x10 R, L    HSC seated EOB 2x10 blue   Leg press  ecc  SL  PF x10  x10  x10  2x10 120#  120#  80#  120# cue for st knees   Pt ed:    massage to thigh pre stretching/ROM  Also showed supine knee flex with LE up on wall (heel slide) as option for HE; inc PT to 3x/week.  RW still for gait d/t quad control    Manual Intervention (89621) x12' total    STM distal and lateral quad/ITB, prox to mid gastroc  Pat mobs sup/inf  PROM knee flex/ext:  seated  Oscillations for pain   x4'      HG sweeps convex 45 deg bevel to quad  light XFM to pat tendon and incision 6'    2'                        NMR re-education (55285)  CUES NEEDED   Weight shift attempt ~75% stance on LLE UE support   Gait with RW-cue for TKE, leyla/glute activ and upright posture in stancel  Cue for inc'd hip/knee flex 50'x2   Russian 10:30 L VMO/rectus   Quad iso  SAQ  SLR  LAQ 15' total (set-up)   amb with crutch  SPC-SBA  Sized new cane   50'x2  2'      Side-stepping tat 1/2 wall 7 trips OVL at feet    Tandem stance (wide)  30\"x2 R, L    SLS 5\"x10 Fingertip sup   Standing lunge forward with L     Up/down steps B rails LLE leading up/RLE leading down     Therapeutic Activity (32422)                                         Therapeutic Exercise and NMR EXR  [x] (51756) Provided verbal/tactile cueing for activities related to strengthening, flexibility, endurance, ROM for improvements in LE, proximal hip, and core control with self care, mobility, lifting, ambulation.  [] (83530) Provided verbal/tactile cueing for activities related to improving balance, coordination, kinesthetic sense, posture, motor skill, proprioception  to assist with LE, proximal hip, and core control in self care, mobility, lifting, ambulation and eccentric single leg control.      NMR and Therapeutic Activities:    [x] (86388 or 13341) Provided verbal/tactile cueing for activities related to improving balance, coordination, kinesthetic sense, posture, motor skill, proprioception and motor activation to allow for proper function of core, proximal hip and LE with self care and ADLs  [] (42805) Gait Re-education- Provided training and instruction to the patient for proper LE, core and proximal hip recruitment and positioning and eccentric body weight control with ambulation re-education including up and down stairs     Home Exercise Program:    [x] (39747) Reviewed/Progressed HEP activities related to strengthening, flexibility, endurance, ROM of core, proximal hip and LE for functional self-care, mobility, lifting and ambulation/stair navigation   [] (00271)Reviewed/Progressed HEP activities related to improving balance, coordination, kinesthetic sense, posture, motor skill, proprioception of core, proximal hip and LE for self care, mobility, lifting, and ambulation/stair navigation      Manual Treatments:  PROM / STM / Oscillations-Mobs:  G-I, II, III, IV (PA's, Inf., Post.)  [x] (71407) Provided manual therapy to mobilize LE, proximal hip and/or LS spine soft tissue/joints for the purpose of modulating pain, promoting relaxation,  increasing ROM, reducing/eliminating soft tissue swelling/inflammation/restriction, improving soft tissue extensibility and allowing for proper ROM for normal function with self care, mobility, lifting and ambulation. Modalities:   CP x15'   [] GAME READY (VASO)- for significant edema, swelling, pain control. -mod comp  Charges  Timed Code Treatment Minutes: 45   Total Treatment Minutes: 65 ( ice, I bike)        [] EVAL (LOW) 62976   [] EVAL (MOD) 91930  [] EVAL (HIGH) 96074   [] RE-EVAL     [x] IL(34274) x  1   [] IONTO  [x] NMR (25211) x1     [] VASO  [x] Manual (29250) x 1     [] Other: Gait   [] TA x      [] Mech Traction (45427)  [] ES(attended) (98623)      [] ES (un) (93400):       GOALS:   Patient stated goal: able to walk without AD  []? Progressing: []? Met: []? Not Met: []? Adjusted     Therapist goals for Patient:   Short Term Goals: To be achieved in: 2 weeks  1. Independent in HEP and progression per patient tolerance, in order to prevent re-injury. []? Progressing: [x]? Met: []? Not Met: []? Adjusted  2. Patient will have a decrease in pain to facilitate improvement in movement, function, and ADLs as indicated by Functional Deficits. []? Progressing: [x]? Met: []? Not Met: []? Adjusted     Long Term Goals: To be achieved in: 12 weeks  1. Disability index score of 40% or less for the LEFS to assist with reaching prior level of function. [x]? Progressing: []? Met: []? Not Met: []? Adjusted  2. Patient will demonstrate increased AROM to 0-125 to allow for proper joint functioning as indicated by patients Functional Deficits. [x]? Progressing: []? Met: []? Not Met: []? Adjusted  3. Patient will demonstrate an increase in Strength to good proximal hip strength and control, within 5lb HHD in LE to allow for proper functional mobility as indicated by patients Functional Deficits. []? Progressing: []? Met: []?  Not Met: []? Adjusted  4. Patient will return to reciprocal stairs w/ handrail or single crutch use without increased symptoms or restriction. [x]? Progressing: []? Met: []? Not Met: []? Adjusted  5. Indep with community ambulation >/=30' in order to go grocery shopping. [x]? Progressing: []? Met: []? Not Met: []? Adjusted      Progression Towards Functional goals:  [] Patient is progressing as expected towards functional goals listed. [x] Progression is slowed due to complexities listed. [] Progression has been slowed due to co-morbidities. [] Plan just implemented, too soon to assess goals progression  [] Other:     Overall Progression Towards Functional goals/ Treatment Progress Update:  [] Patient is progressing as expected towards functional goals listed. [x] Progression is slowed due to complexities/Impairments listed. [] Progression has been slowed due to co-morbidities. [] Plan just implemented, too soon to assess goals progression <30days   [] Goals require adjustment due to lack of progress  [] Patient is not progressing as expected and requires additional follow up with physician  [] Other    Prognosis for POC: [x] Good [] Fair  [] Poor      Patient requires continued skilled intervention: [x] Yes  [] No    Treatment/Activity Tolerance:  [x] Patient able to complete treatment  [] Patient limited by fatigue  [] Patient limited by pain    [] Patient limited by other medical complications  [] Other:     ASSESSMENT: Pt able to ascend/descend 1 flight of steps with B railings with LLE leading/RLE descending with improved quad control. Gait with SPC is good, but he still lacks glute control for level pelvis in stance and knee flex in swing.     Return to Play: (if applicable)   []  Stage 1: Intro to Strength   []  Stage 2: Return to Run and Strength   []  Stage 3: Return to Jump and Strength   []  Stage 4: Dynamic Strength and Agility   []  Stage 5: Sport Specific Training     []  Ready to Return to Play, Meets All Above Stages   []  Not Ready for Return to Sports   Comments:                         PLAN: 2x week for progressing with ROM, quad activ, gait  [x] Continue per plan of care [] Alter current plan (see comments above)  [] Plan of care initiated [] Hold pending MD visit [] Discharge      Electronically signed by:  Teofilo Mejia, PT , DPT  Note: If patient does not return for scheduled/ recommended follow up visits, this note will serve as a discharge from care along with most recent update on progress. Access Code: [email or web address]  URL: [email or web address]  Date: 08/03/2021  Prepared by: Barney Astorga    Exercises  Supine Knee Extension Stretch on Towel Roll - 2-3 x daily - 7 x weekly - 3 sets - 60 seconds hold  Long Sitting Calf Stretch with Strap - 2-3 x daily - 7 x weekly - 3 sets - 30 seconds hold  seated hamstring stretch with stool - 2-3 x daily - 7 x weekly - 3 sets - 30 seconds hold

## 2021-09-10 ENCOUNTER — HOSPITAL ENCOUNTER (OUTPATIENT)
Dept: PHYSICAL THERAPY | Age: 61
Setting detail: THERAPIES SERIES
Discharge: HOME | End: 2021-09-10
Payer: COMMERCIAL

## 2021-09-10 PROCEDURE — 97110 THERAPEUTIC EXERCISES: CPT

## 2021-09-10 PROCEDURE — 97140 MANUAL THERAPY 1/> REGIONS: CPT

## 2021-09-10 PROCEDURE — 97112 NEUROMUSCULAR REEDUCATION: CPT

## 2021-09-10 NOTE — FLOWSHEET NOTE
step-through pattern but needs cueing for knee flex    Test measurements:  AAROM to 0-114 supine with strap     RESTRICTIONS/PRECAUTIONS: DM, hx leg ulcers, R knee  Severe OA    Exercises/Interventions:     Therapeutic Ex (20622) Sets/sec/reps Notes/CUES   Knee ext prop-bone foam  HEP    standing incline board gastroc S, HS S foot on table-LS   Seated quad stretch table-side 4x30\" ea HEP    Cue for femur alignment   Seated glider knee flex  Cue to shift weight onto L hip   Hip/knee flex on SB-supine   On mat with strap x10  x2 PT OP at end   Knee flex rocking on step    Quad set     SAQ  LAQ 3# 2x10 3\"    SLR AAROM 2x10    Bridge with progressive knee flex  SL bridge CL LE flat 5\"x10   Mini squat UE supp   L slightly behind 2x10  x10    Standing TKE LATOYA  Retro TKE to give end range ext   60#   Calf raises L ecc's-cue for TKE x10    Step-up 3\" [address] platform  4\"  6\" [employer], [employer]  6\" step up and over       x10 ea  x10 1 UE support    SLS with standing glider abd, 45 deg ext diag, ext x10 ea R, L ea Pain in R stance now  OVL at feet   bike 5' I w/u after set-up full revolution   SL hip abd 2x10 R, L    HSC seated EOB 2x10 Black    LATOYA abd  Ext  Flex to 90 deg hip x15 ea45#     Leg press  ecc  SL  PF x10  x10  x10  2x10 120#  120#  80#  120# cue for st knees   Pt ed:    massage to thigh pre stretching/ROM  Also showed supine knee flex with LE up on wall (heel slide) as option for HE; inc PT to 3x/week.  RW still for gait d/t quad control    Manual Intervention (10094) x12' total    STM distal and lateral quad/ITB, prox to mid gastroc  Pat mobs sup/inf  PROM knee flex/ext:  seated  Oscillations for pain   x4'      HG sweeps convex 45 deg bevel to quad  light XFM to pat tendon and incision 6'    2'                        NMR re-education (94088)  CUES NEEDED   Weight shift attempt ~75% stance on LLE UE support   Gait with RW-cue for TKE, leyla/glute activ and upright posture in stancel  Cue for inc'd hip/knee flex 50'x2   [place] 10:30 L VMO/rectus   Quad iso  SAQ  SLR  LAQ 15' total (set-up)   amb with crutch  SPC-SBA  Sized new cane   50'x2  2'      Side-stepping at long 1/2 wall 2 trips Red tband at feet    Tandem stance (wide)  30\"x2 R, L    SLS 5\"x10 Fingertip sup   Hip hike off edge of [facility] x15    Standing lunge forward with L     Up/down steps B rails LLE leading up/RLE leading down     Therapeutic Activity (22608)                                         Therapeutic Exercise and NMR EXR  [x] (05129) Provided verbal/tactile cueing for activities related to strengthening, flexibility, endurance, ROM for improvements in LE, proximal hip, and core control with self care, mobility, lifting, ambulation.  [] (35861) Provided verbal/tactile cueing for activities related to improving balance, coordination, kinesthetic sense, posture, motor skill, proprioception  to assist with LE, proximal hip, and core control in self care, mobility, lifting, ambulation and eccentric single leg control.      NMR and Therapeutic Activities:    [x] (75098 or 47037) Provided verbal/tactile cueing for activities related to improving balance, coordination, kinesthetic sense, posture, motor skill, proprioception and motor activation to allow for proper function of core, proximal hip and LE with self care and ADLs  [] (12485) Gait Re-education- Provided training and instruction to the patient for proper LE, core and proximal hip recruitment and positioning and eccentric body weight control with ambulation re-education including up and down stairs     Home Exercise Program:    [x] (20187) Reviewed/Progressed HEP activities related to strengthening, flexibility, endurance, ROM of core, proximal hip and LE for functional self-care, mobility, lifting and ambulation/stair navigation   [] (27630)Reviewed/Progressed HEP activities related to improving balance, coordination, kinesthetic sense, posture, motor skill, proprioception of core, proximal hip and LE for self care, demonstrate an increase in Strength to good proximal hip strength and control, within 5lb HHD in LE to allow for proper functional mobility as indicated by patients Functional Deficits. []? Progressing: []? Met: []? Not Met: []? Adjusted  4. Patient will return to reciprocal stairs w/ handrail or single crutch use without increased symptoms or restriction. [x]? Progressing: []? Met: []? Not Met: []? Adjusted  5. Indep with community ambulation >/=30' in order to go grocery shopping. [x]? Progressing: []? Met: []? Not Met: []? Adjusted      Progression Towards Functional goals:  [] Patient is progressing as expected towards functional goals listed. [x] Progression is slowed due to complexities listed. [] Progression has been slowed due to co-morbidities. [] Plan just implemented, too soon to assess goals progression  [] Other:     Overall Progression Towards Functional goals/ Treatment Progress Update:  [] Patient is progressing as expected towards functional goals listed. [x] Progression is slowed due to complexities/Impairments listed. [] Progression has been slowed due to co-morbidities. [] Plan just implemented, too soon to assess goals progression <30days   [] Goals require adjustment due to lack of progress  [] Patient is not progressing as expected and requires additional follow up with physician  [] Other    Prognosis for POC: [x] Good [] Fair  [] Poor      Patient requires continued skilled intervention: [x] Yes  [] No    Treatment/Activity Tolerance:  [x] Patient able to complete treatment  [] Patient limited by fatigue  [] Patient limited by pain    [] Patient limited by other medical complications  [] Other:     ASSESSMENT: Pt is progressing well with strength. RLE knee pain still limits tolerance for WB\"ing there-ex. able to ascend/descend 1 flight of steps with B railings with LLE leading/RLE descending with improved quad control.  Gait with SPC is good, but he still lacks glute control for

## 2021-09-14 ENCOUNTER — HOSPITAL ENCOUNTER (OUTPATIENT)
Dept: PHYSICAL THERAPY | Age: 61
Setting detail: THERAPIES SERIES
Discharge: HOME | End: 2021-09-14
Payer: COMMERCIAL

## 2021-09-14 PROCEDURE — 97110 THERAPEUTIC EXERCISES: CPT

## 2021-09-14 PROCEDURE — 97112 NEUROMUSCULAR REEDUCATION: CPT

## 2021-09-14 NOTE — FLOWSHEET NOTE
ulcers, R knee  Severe OA    Exercises/Interventions:     Therapeutic Ex (62445) Sets/sec/reps Notes/CUES   Knee ext prop-bone foam  HEP    standing incline board gastroc S, HS S foot on table-LS   Seated quad stretch table-side 4x30\" ea HEP    Cue for femur alignment   Seated glider knee flex  Cue to shift weight onto L hip   Hip/knee flex on SB-supine   On mat with strap   5\" x10 PT OP at end   Knee flex rocking on step    Quad set     SAQ  LAQ 3# 2x10 3\"    SLR AAROM     Bridge with progressive knee flex  SL bridge CL LE flat 5\"x10   Mini squat UE supp   L slightly behind 2x10  x10    Standing TKE LATOYA  Retro TKE to give end range ext   60#   Calf raises B  L ecc's-cue for TKE x10  x10    Step-up 3\" LATOYA platform  4\"  6\" FSU, LSU  6\" step up and over       x10 ea  x10 1 UE support    SLS with standing glider abd, 45 deg ext diag, ext  Pain in R stance now  OVL at feet   bike 5' I w/u after set-up full revolution   SL hip abd     HSC seated EOB 2x10 Blue   LATOYA abd  Ext  Flex to 90 deg hip x15 ea45#     Leg press  ecc  SL  PF x10  x10   120#  120#  100#  120# cue for st knees   Pt ed:    massage to thigh pre stretching/ROM  Also showed supine knee flex with LE up on wall (heel slide) as option for HE; inc PT to 3x/week.  RW still for gait d/t quad control    Manual Intervention ([address] [facility])     STM distal and lateral quad/ITB, prox to mid gastroc  Pat mobs sup/inf  PROM knee flex/ext:  seated  Oscillations for pain   x4'      HG sweeps convex 45 deg bevel to quad  light XFM to pat tendon and incision                        NMR re-education (71313)  CUES NEEDED   Weight shift attempt ~75% stance on LLE UE support   Gait with RW-cue for TKE, leyla/glute activ and upright posture in stancel  Cue for inc'd hip/knee flex 50'x2   Russian 10:30 L VMO/rectus   Quad iso  SAQ  SLR  LAQ 15' total (set-up)   amb with crutch  SPC-SBA  Sized new cane   50'x2  2'      Side-stepping at long 1/2 wall  MW, retro MW 1 trip ea Red tband at feet Tandem stance (wide)  30\"x2 R, L    SLS 5\"x10 Fingertip sup   Hip hike off edge of [facility] [place] x15    Standing lunge forward with L x15 UE supp, cue for vert shin pos   Up/down steps B rails LLE leading up/RLE leading down x4    Therapeutic Activity (16678)                                         Therapeutic Exercise and NMR EXR  [x] (74333) Provided verbal/tactile cueing for activities related to strengthening, flexibility, endurance, ROM for improvements in LE, proximal hip, and core control with self care, mobility, lifting, ambulation.  [] (05421) Provided verbal/tactile cueing for activities related to improving balance, coordination, kinesthetic sense, posture, motor skill, proprioception  to assist with LE, proximal hip, and core control in self care, mobility, lifting, ambulation and eccentric single leg control.      NMR and Therapeutic Activities:    [x] (53790 or 88924) Provided verbal/tactile cueing for activities related to improving balance, coordination, kinesthetic sense, posture, motor skill, proprioception and motor activation to allow for proper function of core, proximal hip and LE with self care and ADLs  [] (86344) Gait Re-education- Provided training and instruction to the patient for proper LE, core and proximal hip recruitment and positioning and eccentric body weight control with ambulation re-education including up and down stairs     Home Exercise Program:    [x] (20657) Reviewed/Progressed HEP activities related to strengthening, flexibility, endurance, ROM of core, proximal hip and LE for functional self-care, mobility, lifting and ambulation/stair navigation   [] (93771)Reviewed/Progressed HEP activities related to improving balance, coordination, kinesthetic sense, posture, motor skill, proprioception of core, proximal hip and LE for self care, mobility, lifting, and ambulation/stair navigation      Manual Treatments:  PROM / STM / Oscillations-Mobs:  G-I, II, III, IV (PA's, Inf., Post.)  [x] (94764) Provided manual therapy to mobilize LE, proximal hip and/or LS spine soft tissue/joints for the purpose of modulating pain, promoting relaxation,  increasing ROM, reducing/eliminating soft tissue swelling/inflammation/restriction, improving soft tissue extensibility and allowing for proper ROM for normal function with self care, mobility, lifting and ambulation. Modalities:   CP x15'   [] GAME READY (VASO)- for significant edema, swelling, pain control. -mod comp  Charges  Timed Code Treatment Minutes: 45   Total Treatment Minutes: 65 ( ice, I bike)        [] EVAL (LOW) 82038   [] EVAL (MOD) 08456  [] EVAL (HIGH) 30637   [] RE-EVAL     [x] BW(22377) x  2   [] IONTO  [x] NMR (56130) x1     [] VASO  [x] Manual (26890) x      [] Other: Gait   [] TA x      [] Mech Traction (72411)  [] ES(attended) (94759)      [] ES (un) (08518):       GOALS:   Patient stated goal: able to walk without AD  []? Progressing: []? Met: []? Not Met: []? Adjusted     Therapist goals for Patient:   Short Term Goals: To be achieved in: 2 weeks  1. Independent in HEP and progression per patient tolerance, in order to prevent re-injury. []? Progressing: [x]? Met: []? Not Met: []? Adjusted  2. Patient will have a decrease in pain to facilitate improvement in movement, function, and ADLs as indicated by Functional Deficits. []? Progressing: [x]? Met: []? Not Met: []? Adjusted     Long Term Goals: To be achieved in: 12 weeks  1. Disability index score of 40% or less for the LEFS to assist with reaching prior level of function. [x]? Progressing: []? Met: []? Not Met: []? Adjusted  2. Patient will demonstrate increased AROM to 0-125 to allow for proper joint functioning as indicated by patients Functional Deficits. [x]? Progressing: []? Met: []? Not Met: []? Adjusted  3.  Patient will demonstrate an increase in Strength to good proximal hip strength and control, within 5lb HHD in LE to allow for proper functional mobility as indicated by patients Functional Deficits. []? Progressing: []? Met: []? Not Met: []? Adjusted  4. Patient will return to reciprocal stairs w/ handrail or single crutch use without increased symptoms or restriction. [x]? Progressing: []? Met: []? Not Met: []? Adjusted  5. Indep with community ambulation >/=30' in order to go grocery shopping. [x]? Progressing: []? Met: []? Not Met: []? Adjusted      Progression Towards Functional goals:  [] Patient is progressing as expected towards functional goals listed. [x] Progression is slowed due to complexities listed. [] Progression has been slowed due to co-morbidities. [] Plan just implemented, too soon to assess goals progression  [] Other:     Overall Progression Towards Functional goals/ Treatment Progress Update:  [] Patient is progressing as expected towards functional goals listed. [x] Progression is slowed due to complexities/Impairments listed. [] Progression has been slowed due to co-morbidities. [] Plan just implemented, too soon to assess goals progression <30days   [] Goals require adjustment due to lack of progress  [] Patient is not progressing as expected and requires additional follow up with physician  [] Other    Prognosis for POC: [x] Good [] Fair  [] Poor      Patient requires continued skilled intervention: [x] Yes  [] No    Treatment/Activity Tolerance:  [x] Patient able to complete treatment  [] Patient limited by fatigue  [] Patient limited by pain    [] Patient limited by other medical complications  [] Other:     ASSESSMENT: Pt able to progress work on 8\" step. AROM to 113 now without manual tx.     Return to Play: (if applicable)   []  Stage 1: Intro to Strength   []  Stage 2: Return to Run and Strength   []  Stage 3: Return to Jump and Strength   []  Stage 4: Dynamic Strength and Agility   []  Stage 5: Sport Specific Training     []  Ready to Return to Play, Meets All Above Stages   []  Not Ready for Return to Sports   Comments:

## 2021-09-15 DIAGNOSIS — J30.1 SEASONAL ALLERGIC RHINITIS DUE TO POLLEN: ICD-10-CM

## 2021-09-15 DIAGNOSIS — I10 ESSENTIAL HYPERTENSION: ICD-10-CM

## 2021-09-15 RX ORDER — LISINOPRIL 10 MG/1
TABLET ORAL
Qty: 90 TABLET | Refills: 0 | Status: SHIPPED | OUTPATIENT
Start: 2021-09-15 | End: 2021-12-17 | Stop reason: SDUPTHER

## 2021-09-15 RX ORDER — HYDROCHLOROTHIAZIDE 25 MG/1
TABLET ORAL
Qty: 90 TABLET | Refills: 0 | Status: SHIPPED | OUTPATIENT
Start: 2021-09-15 | End: 2021-12-17 | Stop reason: SDUPTHER

## 2021-09-15 RX ORDER — OMEPRAZOLE 20 MG/1
20 CAPSULE, DELAYED RELEASE ORAL DAILY
Qty: 30 CAPSULE | Refills: 3 | Status: SHIPPED | OUTPATIENT
Start: 2021-09-15 | End: 2021-12-19 | Stop reason: SDUPTHER

## 2021-09-15 RX ORDER — LORATADINE 10 MG/1
10 TABLET ORAL DAILY
Qty: 90 TABLET | Refills: 0 | Status: SHIPPED | OUTPATIENT
Start: 2021-09-15 | End: 2021-12-17 | Stop reason: SDUPTHER

## 2021-09-16 ENCOUNTER — PREP FOR PROCEDURE (OUTPATIENT)
Dept: ORTHOPEDIC SURGERY | Age: 61
End: 2021-09-16

## 2021-09-16 ENCOUNTER — OFFICE VISIT (OUTPATIENT)
Dept: ORTHOPEDIC SURGERY | Age: 61
End: 2021-09-16
Payer: COMMERCIAL

## 2021-09-16 VITALS — HEIGHT: 68 IN | WEIGHT: 306 LBS | BODY MASS INDEX: 46.38 KG/M2 | RESPIRATION RATE: 15 BRPM

## 2021-09-16 DIAGNOSIS — M17.11 PRIMARY OSTEOARTHRITIS OF RIGHT KNEE: Primary | ICD-10-CM

## 2021-09-16 PROCEDURE — 99214 OFFICE O/P EST MOD 30 MIN: CPT | Performed by: PHYSICIAN ASSISTANT

## 2021-09-16 NOTE — LETTER
[facility]  Surgery Scheduling Form:  [facility]    DEMOGRAPHICS:                                                                                                                Patient Name:  Edna Le  Patient :  1960   Patient SS#:      Patient Phone:  [phone] (home)                            Patient Address:  [address] [place] [address] [phone]    PCP:  JACKLYN Loo CNP  Payor/Plan Subscr  Sex Relation Sub. Ins. ID Effective Group Num   1.  [address] [facility] 1962 Female Spouse [id number] 1/1/15 [id number]                                   [address]     DIAGNOSIS & PROCEDURE:                                                                                              Diagnosis:     Right arthritis knee  M17.11  Operation:  Right Total Knee Replacement robotic assisted 98046   Location:  [place] [address]  Surgeon:  Haris Cuellar MD    SCHEDULING INFORMATION:                                                                                         .  Surgeon's Scheduling Instruction:  elective    Requested Date:  10-26-21  OR Time:   Patient Arrival Time:    OR TIME REQUIRED  :  90 MIN  Anesthesia:  Choice  Equipment: farheen Echeverria                COVID:  10/20/21  Mini C-Arm:  No   Standard C-Arm:  No  Status:  same day admit  PAT Required:  Yes  Comments: NO femoral nerve block   ALLERGIES:Celebrex [celecoxib], Mobic [meloxicam], Nsaids, and Bactrim [sulfamethoxazole-trimethoprim]                    Yehuda Baig MD      21 10:44 AM  BILLING INFORMATION:                                                                                                     Procedure:       CPT Code Modifier    With Gildardo De La Fuente, [address]                                     H&P AT:       PCP  XX                      URGENT CARE                    Edna Le   Right TOTAL KNEE REPLACEMENT   1960     PHYSICIANS ORDERS    HEIGHT:  Ht Readings from Last 1 Encounters:   09/16/21 5' 8\" (1.727 m)               WEIGHT:  Wt Readings from Last 1 Encounters:   09/16/21 (!) 306 lb (138.8 kg)         ALLERGIES:Celebrex [celecoxib], Mobic [meloxicam], Nsaids, and Bactrim [sulfamethoxazole-trimethoprim]                           SURG  10-26-21         JET                               __________________________________________________________________  PRE-OP ORDERS:  ? CBC WITH DIFFERENTIAL                                                ? TYPE AND SCREEN                                                            ? HgB A1C                                                                               ? EKG                                                                                        ? NASAL CULUTRE MRSA  ? UAR/if positive repeat UAR on admission  ? BMP           ALBUMIN AND PREALBUMIN           VITAMIN D LEVELS  ? COAG PROFILE  ? PT/OT EVAL AND TEACHING  ? INSTRUCT PT TO STOP ALL NSAIDS, ASPIRIN, BLOOD THINNERS 7 DAYS PRIOR SURGERY  DAY OF SURGERY  ? CEFAZOLIN 2 GM IVPB; IF PATIENT WEIGHS > 80 KG AND SERUM CREATININE <2.5 mg/dl, GIVE 2 GM DOSE WITHIN 1 HOUR OF INCISION. ? IF THE PRE-OP NASAL CULTURE FOR MRSA WAS POSITIVE:   REPEAT NASAL SWAB ON ADMISSION AND ADMINISTER VANCOMYCIN 15 mg x kg, REDUCE THE DOSE OF VANCOMYCIN  MG IVPB IF PT < 55 KG OR SERUM CREATININE > 2mg/dl; also to get Cefazolin 2 GM or wt based  ? All patients will receive preop Cefazolin 2 GM or wt based   ? APPLY KNEE HIGH ANTI-EMBOLIC AND PNEUMO-BOOTS TO UNOPERATIVE  LEG  ? MOBIC 7.5 MG  ORALLY  DAY OF SURGERY  ? ROXICODONE 10MG  ORALLY DAY OF SURGERY  ? TRANEXAMIC ACID 1950 MG 2 HOURS PRIOR TO SURGERY  ?  TYPE AND SCREEN  OTHER ORDERS:_______________________________________________________PHYSICIAN SIGNATURE: __   9/16/21                                                                                                       10:44 AM  ________________________DATE: Supplemental Confidentiality & Payment Agreement & Supplemental HIPAA Notice for Shared Medical Appointments        During shared medical appointments, you will hear about other participants health issues and personal information. As a matter of trust, it is your duty to keep everything you hear confidential.  Nothing that identifies a participant in any way (including job, ethnicity, Hinduism, etc.) can be shared outside of this group setting. I have read and agree to the following statements:        · I agree to meet with a group of patients and my doctor. I understand that I have the choice to be seen by my physician in this group or individually and that I may leave the group visit anytime I feel uncomfortable. · I understand that discussions will occur regarding individual identifiable health information during the shared medical appointment and I will maintain the confidentiality of [facility] health information or other information heard during the appointment. · I am committed to maintaining this confidentiality even if I am no longer participating in shared medical appointments. · I understand that the information I share with the physician and staff will be heard by the other participants and there is a possibility that the information disclosed in the shared medical appointment may be re-disclosed by other participants. I have been notified of this potential disclosure and I voluntarily agree to participate in the shared medical appointment. · I know that I dont have to share any personal information with the group or health care providers unless, I choose to do so. · Like any doctors appointment, I agree to be responsible for the bill and / or co-payment associated with this physician appointment.           Shared Medical Appointment              THIS SUPPLEMENTAL NOTICE SUPPLEMENTS AND DOES NOT REPLACE THE [facility] CONSENT, PATIENT RESPONSIBILITY AND NOTICE OF PRIVACY PRACTICE. Yvonne Hankins    1960        Patients Signature: ____________________________________________________         DATE: ____/____/___      [place] / Support Persons Signature (if applicable) _________________________     DATE: __/__/__    **Each person will be asked to sign this commitment before each Shared Medical Appointment. Thank You ! SURGERY SCHEDULING TIMES                                                                                                                                                      Yvonne Hankins                                                                                       1960                                                                           SURGERY DATE: ___/___/___                                                                      SURGERY TIME:  ___:___ AM/PM                                                                      ARRIVAL TIME:   ___:___  AM/PM                                                                                                                        **PLEASE REPORT TO THE                                                       INFORMATION DESK IN THE MAIN LOBBY                                                          OF THE HOSPITAL.         [address] [facility]                           JET INFO     PT NAME:  Yvonne Hankins              Patient Phone:  [phone] (home)                                           1960    PCP:  PCP:  JACKLYN May CNP                APPT DATE:  ___/___/___      DATE:  21        H&P __________    LABS: _________                      NEEDED:  __________    EKG:   _________                     NEEDED:  __________      JET DATE:   _______/_______      SURG DATE: ________/________

## 2021-09-17 ENCOUNTER — HOSPITAL ENCOUNTER (OUTPATIENT)
Dept: PHYSICAL THERAPY | Age: 61
Setting detail: THERAPIES SERIES
Discharge: HOME | End: 2021-09-17
Payer: COMMERCIAL

## 2021-09-17 PROCEDURE — 97112 NEUROMUSCULAR REEDUCATION: CPT

## 2021-09-17 PROCEDURE — 97110 THERAPEUTIC EXERCISES: CPT

## 2021-09-17 PROCEDURE — 97140 MANUAL THERAPY 1/> REGIONS: CPT

## 2021-09-17 RX ORDER — CYCLOBENZAPRINE HCL 10 MG
10 TABLET ORAL 3 TIMES DAILY PRN
Qty: 90 TABLET | Refills: 0 | Status: SHIPPED | OUTPATIENT
Start: 2021-09-17 | End: 2021-10-17

## 2021-09-17 NOTE — FLOWSHEET NOTE
[facility],  [address] [place]  Phone: [phone]  Fax [phone]    Physical Therapy Treatment Note/ Progress Report:           Date:  2021    Patient Name:  Argelia Duff   \"Bo\" :  1960  MRN: [id number]  Restrictions/Precautions:    Medical/Treatment Diagnosis Information:  · Diagnosis: Z96.652 (ICD-10-CM) - Status post left knee replacement DOS 21  · Treatment Diagnosis: L knee pain M25.562, difficulty walking I84.0  Insurance/Certification information:  PT Insurance Information: Med mutual BMN, no auth 90/10 co-ins  Physician Information:  Referring Practitioner: Dr Felice Robles  Has the plan of care been signed (Y/N):        []  Yes  [x]  No     Date of Patient follow up with Physician: 21      Is this a Progress Report:     [x]  Yes  []  No        If Yes:  Date Range for reporting period:  Beginnin/3/21  Endin21    Progress report will be due (10 Rx or 30 days whichever is less):        Recertification will be due (POC Duration  / 90 days whichever is less): 12 weeks      Visit # Insurance Allowable Auth Required   In-person 14 BMN []  Yes []  No    Telehealth   []  Yes []  No    Total          Functional Scale: LEFS 89%    Date assessed:  8/3/21   Functional Scale: LEFS 78%    Date assessed:  21           Therapy Diagnosis/Practice Pattern:H, surgical      Number of Comorbidities:  []0     []1-2    [x]3+    Latex Allergy:  [x]NO      []YES  Preferred Language for Healthcare:   [x]English       []other:      Pain level:  eval 2-8/10    today 3-4/10     SUBJECTIVE:  Pt had f/u with .  He has planned to have R TKA in 10/2021    OBJECTIVE:    Observation: incision healed with fascial restrictions in quad; amb with SPC vs 1 crutch improved step-through pattern but needs cueing for knee flex    Test measurements:  AROM to 0-113 supine    RESTRICTIONS/PRECAUTIONS: DM, hx leg ulcers, R knee  Severe OA    Exercises/Interventions:     Therapeutic Ex (76144) Sets/sec/reps Notes/CUES   Knee ext prop-bone foam  HEP    standing incline board gastroc S, HS S foot on table-LS   Seated quad stretch table-side 4x30\" ea HEP    Cue for femur alignment   Seated glider knee flex  Cue to shift weight onto L hip   Hip/knee flex on SB-supine   On mat with strap   5\" x10 PT OP at end   Knee flex rocking on step    Quad set     SAQ  LAQ 3# 2x10 3\"    SLR AAROM     Bridge with progressive knee flex  SL bridge CL LE flat 5\"x10   Mini squat UE supp   L slightly behind 2x10  x10    Standing TKE LATOYA  Retro TKE to give end range ext   60#   Calf raises B  L ecc's-cue for TKE x10  x10    Step-up 3\" [facility] platform  4\"  6\" FSU, LSU  back Steps x4 trips    6\" step up and over       1 set     1 UE support         B rails, L leads up/R down   SLS with standing glider abd, 45 deg ext diag, ext  Pain in R stance now  OVL at feet   bike 5' I w/u after set-up full revolution   SL hip abd     HSC seated EOB 2x10 Black   [facility] abd  Ext  Flex to 90 deg hip x15 ea45#-inc NV     Leg press  ecc  SL  PF x10  x10   120#  120#  100#  120# cue for st knees   Pt ed:    Clemencia Harmon. RW still for gait d/t quad control    Manual Intervention (27541) x15' total    STM distal and lateral quad/ITB,   Pat mobs sup/inf  PROM knee flex/ext:  seated  Oscillations for pain x6'  15\"x4      HG sweeps convex 45 deg bevel to quad  light XFM to pat tendon and incision 6'    2'                        NMR re-education (14578)  CUES NEEDED   Weight shift attempt ~75% stance on LLE UE support   Gait with RW-cue for TKE, leyla/glute activ and upright posture in stancel  Cue for inc'd hip/knee flex    [place] 10:30 L VMO/rectus   Quad iso  SAQ  SLR  LAQ 15' total (set-up)   amb with crutch  SPC-SBA  Sized new cane         Side-stepping at long 1/2 wall  MW, retro MW 1 trip ea Red tband at feet    Tandem stance +Airex 30\"x2 R, L    SLS 5\"x10 Fingertip sup   Hip hike off edge of [facility]     Standing lunge forward with L x15 UE supp, cue for vert shin pos        Therapeutic Activity (00540)                                         Therapeutic Exercise and NMR EXR  [x] (07844) Provided verbal/tactile cueing for activities related to strengthening, flexibility, endurance, ROM for improvements in LE, proximal hip, and core control with self care, mobility, lifting, ambulation.  [] (42312) Provided verbal/tactile cueing for activities related to improving balance, coordination, kinesthetic sense, posture, motor skill, proprioception  to assist with LE, proximal hip, and core control in self care, mobility, lifting, ambulation and eccentric single leg control.      NMR and Therapeutic Activities:    [x] (33739 or 85929) Provided verbal/tactile cueing for activities related to improving balance, coordination, kinesthetic sense, posture, motor skill, proprioception and motor activation to allow for proper function of core, proximal hip and LE with self care and ADLs  [] (57044) Gait Re-education- Provided training and instruction to the patient for proper LE, core and proximal hip recruitment and positioning and eccentric body weight control with ambulation re-education including up and down stairs     Home Exercise Program:    [x] (16429) Reviewed/Progressed HEP activities related to strengthening, flexibility, endurance, ROM of core, proximal hip and LE for functional self-care, mobility, lifting and ambulation/stair navigation   [] (68368)Reviewed/Progressed HEP activities related to improving balance, coordination, kinesthetic sense, posture, motor skill, proprioception of core, proximal hip and LE for self care, mobility, lifting, and ambulation/stair navigation      Manual Treatments:  PROM / STM / Oscillations-Mobs:  G-I, II, III, IV (PA's, Inf., Post.)  [x] (72323) Provided manual therapy to mobilize LE, proximal hip and/or LS spine soft tissue/joints for the purpose of modulating pain, promoting relaxation,  increasing ROM, reducing/eliminating soft tissue swelling/inflammation/restriction, improving soft tissue extensibility and allowing for proper ROM for normal function with self care, mobility, lifting and ambulation. Modalities:   CP x15'   [] GAME READY (VASO)- for significant edema, swelling, pain control. -mod comp  Charges  Timed Code Treatment Minutes: 55   Total Treatment Minutes: 75 ( ice, I bike)        [] EVAL (LOW) 23310   [] EVAL (MOD) 36659  [] EVAL (HIGH) 87374   [] RE-EVAL     [x] SF(01750) x  2   [] IONTO  [x] NMR (35678) x1     [] VASO  [x] Manual (11809) x  1    [] Other: Gait   [] TA x      [] Mech Traction (85637)  [] ES(attended) (12590)      [] ES (un) (17403):       GOALS:   Patient stated goal: able to walk without AD  []? Progressing: []? Met: []? Not Met: []? Adjusted     Therapist goals for Patient:   Short Term Goals: To be achieved in: 2 weeks  1. Independent in HEP and progression per patient tolerance, in order to prevent re-injury. []? Progressing: [x]? Met: []? Not Met: []? Adjusted  2. Patient will have a decrease in pain to facilitate improvement in movement, function, and ADLs as indicated by Functional Deficits. []? Progressing: [x]? Met: []? Not Met: []? Adjusted     Long Term Goals: To be achieved in: 12 weeks  1. Disability index score of 40% or less for the LEFS to assist with reaching prior level of function. [x]? Progressing: []? Met: []? Not Met: []? Adjusted  2. Patient will demonstrate increased AROM to 0-125 to allow for proper joint functioning as indicated by patients Functional Deficits. [x]? Progressing: []? Met: []? Not Met: []? Adjusted  3. Patient will demonstrate an increase in Strength to good proximal hip strength and control, within 5lb HHD in LE to allow for proper functional mobility as indicated by patients Functional Deficits. []? Progressing: []? Met: []? Not Met: []? Adjusted  4.  Patient will return to reciprocal stairs w/ handrail or single crutch use without increased symptoms or restriction. [x]? Progressing: []? Met: []? Not Met: []? Adjusted  5. Indep with community ambulation >/=30' in order to go grocery shopping. [x]? Progressing: []? Met: []? Not Met: []? Adjusted      Progression Towards Functional goals:  [] Patient is progressing as expected towards functional goals listed. [x] Progression is slowed due to complexities listed. [] Progression has been slowed due to co-morbidities. [] Plan just implemented, too soon to assess goals progression  [] Other:     Overall Progression Towards Functional goals/ Treatment Progress Update:  [] Patient is progressing as expected towards functional goals listed. [x] Progression is slowed due to complexities/Impairments listed. [] Progression has been slowed due to co-morbidities. [] Plan just implemented, too soon to assess goals progression <30days   [] Goals require adjustment due to lack of progress  [] Patient is not progressing as expected and requires additional follow up with physician  [] Other    Prognosis for POC: [x] Good [] Fair  [] Poor      Patient requires continued skilled intervention: [x] Yes  [] No    Treatment/Activity Tolerance:  [x] Patient able to complete treatment  [] Patient limited by fatigue  [] Patient limited by pain    [] Patient limited by other medical complications  [] Other:     ASSESSMENT: Pt has maintained ROM, still jt tightness at end range flex. His gait with SPC looks good, able to progress off SPC though R knee OA/pain now limits walking.      Return to Play: (if applicable)   []  Stage 1: Intro to Strength   []  Stage 2: Return to Run and Strength   []  Stage 3: Return to Jump and Strength   []  Stage 4: Dynamic Strength and Agility   []  Stage 5: Sport Specific Training     []  Ready to Return to Play, Meets All Above Stages   []  Not Ready for Return to Sports   Comments:                         PLAN: 2x week for progressing with ROM, quad activ, gait. Maximize function prior to R TKA 10/2021. [x] Continue per plan of care [] Alter current plan (see comments above)  [] Plan of care initiated [] Hold pending MD visit [] Discharge      Electronically signed by:  Reji Pathak, PT , DPT  Note: If patient does not return for scheduled/ recommended follow up visits, this note will serve as a discharge from care along with most recent update on progress. Access Code: [email or web address]  URL: [email or web address]  Date: 08/03/2021  Prepared by: Ivonne Astorga    Exercises  Supine Knee Extension Stretch on Towel Roll - 2-3 x daily - 7 x weekly - 3 sets - 60 seconds hold  Long Sitting Calf Stretch with Strap - 2-3 x daily - 7 x weekly - 3 sets - 30 seconds hold  seated hamstring stretch with stool - 2-3 x daily - 7 x weekly - 3 sets - 30 seconds hold

## 2021-09-21 ENCOUNTER — TELEPHONE (OUTPATIENT)
Dept: INTERNAL MEDICINE CLINIC | Age: 61
End: 2021-09-21

## 2021-09-21 ENCOUNTER — HOSPITAL ENCOUNTER (OUTPATIENT)
Dept: PHYSICAL THERAPY | Age: 61
Setting detail: THERAPIES SERIES
Discharge: HOME | End: 2021-09-21
Payer: COMMERCIAL

## 2021-09-21 PROCEDURE — 97110 THERAPEUTIC EXERCISES: CPT

## 2021-09-21 PROCEDURE — 97140 MANUAL THERAPY 1/> REGIONS: CPT

## 2021-09-21 NOTE — FLOWSHEET NOTE
[facility],  [address]  Phone: [phone]  Fax [phone]    Physical Therapy Treatment Note/ Progress Report:           Date:  2021    Patient Name:  Ramon Mariscal   \"Bo\" :  1960  MRN: [id number]  Restrictions/Precautions:    Medical/Treatment Diagnosis Information:  · Diagnosis: Z96.652 (ICD-10-CM) - Status post left knee replacement DOS 21  · Treatment Diagnosis: L knee pain M25.562, difficulty walking G02.4  Insurance/Certification information:  PT Insurance Information: Med mutual BMN, no auth 90/10 co-ins  Physician Information:  Referring Practitioner: Dr Layton Every  Has the plan of care been signed (Y/N):        []  Yes  [x]  No     Date of Patient follow up with Physician: 21      Is this a Progress Report:     [x]  Yes  []  No        If Yes:  Date Range for reporting period:  Beginnin/3/21  Endin21    Progress report will be due (10 Rx or 30 days whichever is less): 71       Recertification will be due (POC Duration  / 90 days whichever is less): 12 weeks      Visit # Insurance Allowable Auth Required   In-person 15 BMN []  Yes []  No    Telehealth   []  Yes []  No    Total          Functional Scale: LEFS 89%    Date assessed:  8/3/21   Functional Scale: LEFS 78%    Date assessed:  21           Therapy Diagnosis/Practice Pattern:H, surgical      Number of Comorbidities:  []0     []1-2    [x]3+    Latex Allergy:  [x]NO      []YES  Preferred Language for Healthcare:   [x]English       []other:      Pain level:  eval 2-810    today 3-4/10     SUBJECTIVE:  Pt feels stiff today. His R TKA is scheduled for 10/13/21.      OBJECTIVE:    Observation: incision healed with fascial restrictions in quad; amb with SPC vs 1 crutch improved step-through pattern but needs cueing for knee flex    Test measurements:  AROM to 0-112 pre man tx, 0-117 supine after manual tx/stretching quad    RESTRICTIONS/PRECAUTIONS: DM, hx leg ulcers, R knee  Severe OA    Exercises/Interventions:     Therapeutic Ex (52317) Sets/sec/reps Notes/CUES   Knee ext prop-bone foam  HEP    standing incline board gastroc S, HS S foot on table-LS   Seated quad stretch table-side 4x30\" ea HEP    Cue for femur alignment   Seated glider knee flex  Cue to shift weight onto L hip   Hip/knee flex on SB-supine   On mat with strap   5\" x10 PT OP at end   Knee flex rocking on step    Quad set     SAQ  LAQ 3# 2x10 3\"   Progress NV   SLR AAROM     Bridge with progressive knee flex  SL bridge CL LE flat 5\"x10   Mini squat UE supp   L slightly behind 2x10  x10    Standing TKE LATOYA  Retro TKE to give end range ext   60#   Calf raises B  L ecc's-cue for TKE x10  x10    Step-up 3\" [facility] platform  4\"  6\" FSU, LSU  back Steps x4 trips    6\" step up and over  8\"           x10  x15 1 UE support         B rails, L leads up/R down  1 UE sup  B UE sup   SLS with standing glider abd, 45 deg ext diag, ext  Pain in R stance now  OVL at feet   bike 5' I w/u after set-up full revolution   SL hip abd     HSC seated EOB 2x10 Black   [facility] abd  Ext  Flex to 90 deg hip x15 ea45#-inc NV     Leg press  ecc  SL  PF x10  x10   120#  120#  100#  120# cue for st knees   Pt ed:    Irena Amado. RW still for gait d/t quad control    Manual Intervention (82232) x15' total    STM distal and lateral quad/ITB,   Pat mobs sup/inf  PROM knee flex/:  seated   Oscillations for pain x6'  15\"x4      HG sweeps convex 45 deg bevel to quad  light XFM to pat tendon and incision 6'    2'                        NMR re-education (18963)  CUES NEEDED   Weight shift attempt ~75% stance on LLE UE support   Gait with RW-cue for TKE, leyla/glute activ and upright posture in stancel  Cue for inc'd hip/knee flex    [place] 10:30 L VMO/rectus   Quad iso  SAQ  SLR  LAQ 15' total (set-up)   amb with crutch  SPC-SBA  Sized new cane         Side-stepping at long 1/2 wall  MW, retro MW 1 trip ea Red tband at feet    Tandem stance +Airex    SLS Fingertip sup   Hip hike off edge of [facility]     Standing lunge forward with L x15 UE supp, cue for vert shin pos        Therapeutic Activity (35196)                                         Therapeutic Exercise and NMR EXR  [x] (74898) Provided verbal/tactile cueing for activities related to strengthening, flexibility, endurance, ROM for improvements in LE, proximal hip, and core control with self care, mobility, lifting, ambulation.  [] (16132) Provided verbal/tactile cueing for activities related to improving balance, coordination, kinesthetic sense, posture, motor skill, proprioception  to assist with LE, proximal hip, and core control in self care, mobility, lifting, ambulation and eccentric single leg control.      NMR and Therapeutic Activities:    [x] (12912 or 85069) Provided verbal/tactile cueing for activities related to improving balance, coordination, kinesthetic sense, posture, motor skill, proprioception and motor activation to allow for proper function of core, proximal hip and LE with self care and ADLs  [] (10537) Gait Re-education- Provided training and instruction to the patient for proper LE, core and proximal hip recruitment and positioning and eccentric body weight control with ambulation re-education including up and down stairs     Home Exercise Program:    [x] (74887) Reviewed/Progressed HEP activities related to strengthening, flexibility, endurance, ROM of core, proximal hip and LE for functional self-care, mobility, lifting and ambulation/stair navigation   [] (84128)Reviewed/Progressed HEP activities related to improving balance, coordination, kinesthetic sense, posture, motor skill, proprioception of core, proximal hip and LE for self care, mobility, lifting, and ambulation/stair navigation      Manual Treatments:  PROM / STM / Oscillations-Mobs:  G-I, II, III, IV (PA's, Inf., Post.)  [x] (07479) Provided manual therapy to mobilize LE, proximal hip and/or LS spine soft tissue/joints for the purpose of modulating pain, promoting relaxation,  increasing ROM, reducing/eliminating soft tissue swelling/inflammation/restriction, improving soft tissue extensibility and allowing for proper ROM for normal function with self care, mobility, lifting and ambulation. Modalities:   CP x15'   [] GAME READY (VASO)- for significant edema, swelling, pain control. -mod comp  Charges  Timed Code Treatment Minutes: 45   Total Treatment Minutes: 65 ( ice, I bike)        [] EVAL (LOW) 35144   [] EVAL (MOD) 75899  [] EVAL (HIGH) 43460   [] RE-EVAL     [x] WV(93420) x  2   [] IONTO  [] NMR (37880) x     [] VASO  [x] Manual (59606) x  1    [] Other: Gait   [] TA x      [] Mech Traction (05026)  [] ES(attended) (40726)      [] ES (un) (82796):       GOALS:   Patient stated goal: able to walk without AD  []? Progressing: []? Met: []? Not Met: []? Adjusted     Therapist goals for Patient:   Short Term Goals: To be achieved in: 2 weeks  1. Independent in HEP and progression per patient tolerance, in order to prevent re-injury. []? Progressing: [x]? Met: []? Not Met: []? Adjusted  2. Patient will have a decrease in pain to facilitate improvement in movement, function, and ADLs as indicated by Functional Deficits. []? Progressing: [x]? Met: []? Not Met: []? Adjusted     Long Term Goals: To be achieved in: 12 weeks  1. Disability index score of 40% or less for the LEFS to assist with reaching prior level of function. [x]? Progressing: []? Met: []? Not Met: []? Adjusted  2. Patient will demonstrate increased AROM to 0-125 to allow for proper joint functioning as indicated by patients Functional Deficits. [x]? Progressing: []? Met: []? Not Met: []? Adjusted  3. Patient will demonstrate an increase in Strength to good proximal hip strength and control, within 5lb HHD in LE to allow for proper functional mobility as indicated by patients Functional Deficits.  []? Progressing: []? Met: []? Not Met: []? Adjusted  4. Patient will return to reciprocal stairs w/ handrail or single crutch use without increased symptoms or restriction. [x]? Progressing: []? Met: []? Not Met: []? Adjusted  5. Indep with community ambulation >/=30' in order to go grocery shopping. [x]? Progressing: []? Met: []? Not Met: []? Adjusted      Progression Towards Functional goals:  [] Patient is progressing as expected towards functional goals listed. [x] Progression is slowed due to complexities listed. [] Progression has been slowed due to co-morbidities. [] Plan just implemented, too soon to assess goals progression  [] Other:     Overall Progression Towards Functional goals/ Treatment Progress Update:  [] Patient is progressing as expected towards functional goals listed. [x] Progression is slowed due to complexities/Impairments listed. [] Progression has been slowed due to co-morbidities. [] Plan just implemented, too soon to assess goals progression <30days   [] Goals require adjustment due to lack of progress  [] Patient is not progressing as expected and requires additional follow up with physician  [] Other    Prognosis for POC: [x] Good [] Fair  [] Poor      Patient requires continued skilled intervention: [x] Yes  [] No    Treatment/Activity Tolerance:  [x] Patient able to complete treatment  [] Patient limited by fatigue  [] Patient limited by pain    [] Patient limited by other medical complications  [] Other:     ASSESSMENT: Pt able to ascend 8\" step with LLE, which he will have to do for upcoming R TKA. WIll have to work t build quad control to descend this height step yet. He is still tight in joint, quad and incision but has functional flexion ROM.       Return to Play: (if applicable)   []  Stage 1: Intro to Strength   []  Stage 2: Return to Run and Strength   []  Stage 3: Return to Jump and Strength   []  Stage 4: Dynamic Strength and Agility   []  Stage 5: Sport Specific Training     []  Ready to Return to Play, Meets All Above Stages   []  Not Ready for Return to Sports   Comments:                         PLAN: 2x week for progressing with ROM, quad activ, gait. Maximize function prior to R TKA 10/13/2021. [x] Continue per plan of care [] Alter current plan (see comments above)  [] Plan of care initiated [] Hold pending MD visit [] Discharge      Electronically signed by:  Sienna Christian, PT , DPT  Note: If patient does not return for scheduled/ recommended follow up visits, this note will serve as a discharge from care along with most recent update on progress. Access Code: [email or web address]  URL: [email or web address]  Date: 08/03/2021  Prepared by: Judi Astorga    Exercises  Supine Knee Extension Stretch on Towel Roll - 2-3 x daily - 7 x weekly - 3 sets - 60 seconds hold  Long Sitting Calf Stretch with Strap - 2-3 x daily - 7 x weekly - 3 sets - 30 seconds hold  seated hamstring stretch with stool - 2-3 x daily - 7 x weekly - 3 sets - 30 seconds hold

## 2021-09-21 NOTE — TELEPHONE ENCOUNTER
----- Message from Chase Herrera sent at 9/21/2021  1:55 PM EDT -----  Subject: Message to Provider    QUESTIONS  Information for Provider? Patient calling stating that he has a Pre-Op   physical w/EKG scheduled for 09/27 and also a follow up appointment for   09/29 he would like to know if he can have everything checked out during   his appointment on 09/27. Please advise   ---------------------------------------------------------------------------  --------------  CALL BACK INFO  What is the best way for the office to contact you? OK to leave message on   voicemail  Preferred Call Back Phone Number? [phone]  ---------------------------------------------------------------------------  --------------  SCRIPT ANSWERS  Relationship to Patient?  Self

## 2021-09-22 ENCOUNTER — TELEPHONE (OUTPATIENT)
Dept: ORTHOPEDIC SURGERY | Age: 61
End: 2021-09-22

## 2021-09-22 NOTE — TELEPHONE ENCOUNTER
Auth: NPR  Date: 10/13/2021  Reference # None  Spoke with: [facility]  Type of SX: Inpatient  Location: [facility]  CPT 58045   SX area: Rt knee  [phone] to precert admission if necessary

## 2021-09-24 ENCOUNTER — HOSPITAL ENCOUNTER (OUTPATIENT)
Dept: PHYSICAL THERAPY | Age: 61
Setting detail: THERAPIES SERIES
Discharge: HOME | End: 2021-09-24
Payer: COMMERCIAL

## 2021-09-24 PROCEDURE — 97110 THERAPEUTIC EXERCISES: CPT

## 2021-09-24 PROCEDURE — 97140 MANUAL THERAPY 1/> REGIONS: CPT

## 2021-09-24 NOTE — FLOWSHEET NOTE
[facility],  [address]  Phone: [phone]  Fax [phone]    Physical Therapy Treatment Note/ Progress Report:           Date:  2021    Patient Name:  Jace Guajardo   \"Bo\" :  1960  MRN: [id number]  Restrictions/Precautions:    Medical/Treatment Diagnosis Information:  · Diagnosis: Z96.652 (ICD-10-CM) - Status post left knee replacement DOS 21  · Treatment Diagnosis: L knee pain M25.562, difficulty walking U38.1  Insurance/Certification information:  PT Insurance Information: Med mutual BMN, no auth 90/10 co-ins  Physician Information:  Referring Practitioner: Dr Erven Jeans  Has the plan of care been signed (Y/N):        []  Yes  [x]  No     Date of Patient follow up with Physician: 21      Is this a Progress Report:     [x]  Yes  []  No        If Yes:  Date Range for reporting period:  Beginnin/3/21  Endin21    Progress report will be due (10 Rx or 30 days whichever is less): 51       Recertification will be due (POC Duration  / 90 days whichever is less): 12 weeks      Visit # Insurance Allowable Auth Required   In-person 16 BMN []  Yes []  No    Telehealth   []  Yes []  No    Total          Functional Scale: LEFS 89%    Date assessed:  8/3/21   Functional Scale: LEFS 78%    Date assessed:  21           Therapy Diagnosis/Practice Pattern:H, surgical      Number of Comorbidities:  []0     []1-2    [x]3+    Latex Allergy:  [x]NO      []YES  Preferred Language for Healthcare:   [x]English       []other:      Pain level:  eval 2-810    today 3/10     SUBJECTIVE:  Pt was sore after LV, still more in joint than quad. He's doing steps reciprocally more often at home (8\").     OBJECTIVE:    Observation: incision healed with fascial restrictions in quad; amb with SPC vs 1 crutch improved step-through pattern but needs cueing for knee flex    Test measurements:  AROM to 0-112 pre man tx, 0-115 supine after manual tx/stretching quad    RESTRICTIONS/PRECAUTIONS: DM, hx leg ulcers, R knee  Severe OA    Exercises/Interventions:     Therapeutic Ex (41748) Sets/sec/reps Notes/CUES   Knee ext prop-bone foam  HEP    standing incline board gastroc S, HS S foot on table-LS   Seated quad stretch table-side 4x30\" ea HEP    Cue for femur alignment   Seated glider knee flex  Cue to shift weight onto L hip   Hip/knee flex on SB-supine   On mat with strap   5\" x10 PT OP at end   Knee flex rocking on step    Quad set     SAQ  LAQ 3# 2x10 3\"   Progress NV   SLR AAROM     Bridge with progressive knee flex  SL bridge CL LE flat 5\"x105\"x10   Mini squat UE supp   L slightly behind 2x10  x10    Standing TKE LATOYA  Retro TKE to give end range ext   60#   Calf raises B  L ecc's-cue for TKE x10  x10    Step-up 3\" [facility] platform  4\"  6\" [employer], [employer]  back Steps x4 trips    6\" step up and over  8\"           x10  x15 1 UE support         B rails, L leads up/R down  1 UE sup  B UE sup   SLS with standing glider abd, 45 deg ext diag, ext  Pain in R stance now  OVL at feet   bike 5' I w/u after set-up full revolution   SL hip abd     HSC seated EOB 2x10 Black   [facility] abd  Ext  Flex to 90 deg hip x15 ea60#     Leg press  ecc  SL  PF x10  x10   120#  120#  100#  120# cue for st knees   Pt ed:    [facility] [place]. RW still for gait d/t quad control    Manual Intervention (90948) x15' total    STM distal and lateral quad/ITB,   Pat mobs sup/inf  PROM knee flex/ext: supine and seated   Oscillations for pain x6'  15\"x4      HG sweeps convex 45 deg bevel to quad  light XFM to pat tendon and incision 6'    2'                        NMR re-education (14184)  CUES NEEDED   Weight shift attempt ~75% stance on LLE UE support   Gait with RW-cue for TKE, leyla/glute activ and upright posture in stancel  Cue for inc'd hip/knee flex    Mexican 10:30 L VMO/rectus   Quad iso  SAQ  SLR  LAQ 15' total (set-up)   amb with crutch  SPC-SBA  Sized new cane Side-stepping at long 1/2 wall  MW, retro MW 1 trip ea Red tband at feet    Tandem stance +Airex 30\"x2 R, L    SLS 5\"x10 Fingertip sup   Hip hike off edge of [facility]     Standing lunge forward with L x15 UE supp, cue for vert shin pos        Therapeutic Activity (44775)                                         Therapeutic Exercise and NMR EXR  [x] (45605) Provided verbal/tactile cueing for activities related to strengthening, flexibility, endurance, ROM for improvements in LE, proximal hip, and core control with self care, mobility, lifting, ambulation.  [] (83254) Provided verbal/tactile cueing for activities related to improving balance, coordination, kinesthetic sense, posture, motor skill, proprioception  to assist with LE, proximal hip, and core control in self care, mobility, lifting, ambulation and eccentric single leg control.      NMR and Therapeutic Activities:    [x] (35391 or 75888) Provided verbal/tactile cueing for activities related to improving balance, coordination, kinesthetic sense, posture, motor skill, proprioception and motor activation to allow for proper function of core, proximal hip and LE with self care and ADLs  [] (23718) Gait Re-education- Provided training and instruction to the patient for proper LE, core and proximal hip recruitment and positioning and eccentric body weight control with ambulation re-education including up and down stairs     Home Exercise Program:    [x] (38514) Reviewed/Progressed HEP activities related to strengthening, flexibility, endurance, ROM of core, proximal hip and LE for functional self-care, mobility, lifting and ambulation/stair navigation   [] (59273)Reviewed/Progressed HEP activities related to improving balance, coordination, kinesthetic sense, posture, motor skill, proprioception of core, proximal hip and LE for self care, mobility, lifting, and ambulation/stair navigation      Manual Treatments:  PROM / STM / Oscillations-Mobs:  G-I, II, III, IV (PA's, Inf., Post.)  [x] (56585) Provided manual therapy to mobilize LE, proximal hip and/or LS spine soft tissue/joints for the purpose of modulating pain, promoting relaxation,  increasing ROM, reducing/eliminating soft tissue swelling/inflammation/restriction, improving soft tissue extensibility and allowing for proper ROM for normal function with self care, mobility, lifting and ambulation. Modalities:   CP x15'   [] GAME READY (VASO)- for significant edema, swelling, pain control. -mod comp  Charges  Timed Code Treatment Minutes: 50   Total Treatment Minutes: 75 ( I exc, ice, I bike)        [] EVAL (LOW) 56704   [] EVAL (MOD) 83060  [] EVAL (HIGH) 10063   [] RE-EVAL     [x] CF(44450) x  2   [] IONTO  [] NMR (77878) x     [] VASO  [x] Manual (95810) x  1    [] Other: Gait   [] TA x      [] Mech Traction (75897)  [] ES(attended) (31753)      [] ES (un) (28672):       GOALS:   Patient stated goal: able to walk without AD  []? Progressing: []? Met: []? Not Met: []? Adjusted     Therapist goals for Patient:   Short Term Goals: To be achieved in: 2 weeks  1. Independent in HEP and progression per patient tolerance, in order to prevent re-injury. []? Progressing: [x]? Met: []? Not Met: []? Adjusted  2. Patient will have a decrease in pain to facilitate improvement in movement, function, and ADLs as indicated by Functional Deficits. []? Progressing: [x]? Met: []? Not Met: []? Adjusted     Long Term Goals: To be achieved in: 12 weeks  1. Disability index score of 40% or less for the LEFS to assist with reaching prior level of function. [x]? Progressing: []? Met: []? Not Met: []? Adjusted  2. Patient will demonstrate increased AROM to 0-125 to allow for proper joint functioning as indicated by patients Functional Deficits. [x]? Progressing: []? Met: []? Not Met: []? Adjusted  3.  Patient will demonstrate an increase in Strength to good proximal hip strength and control, within 5lb HHD in LE to allow for proper functional mobility as indicated by patients Functional Deficits. []? Progressing: []? Met: []? Not Met: []? Adjusted  4. Patient will return to reciprocal stairs w/ handrail or single crutch use without increased symptoms or restriction. [x]? Progressing: []? Met: []? Not Met: []? Adjusted  5. Indep with community ambulation >/=30' in order to go grocery shopping. [x]? Progressing: []? Met: []? Not Met: []? Adjusted      Progression Towards Functional goals:  [] Patient is progressing as expected towards functional goals listed. [x] Progression is slowed due to complexities listed. [] Progression has been slowed due to co-morbidities. [] Plan just implemented, too soon to assess goals progression  [] Other:     Overall Progression Towards Functional goals/ Treatment Progress Update:  [] Patient is progressing as expected towards functional goals listed. [x] Progression is slowed due to complexities/Impairments listed. [] Progression has been slowed due to co-morbidities. [] Plan just implemented, too soon to assess goals progression <30days   [] Goals require adjustment due to lack of progress  [] Patient is not progressing as expected and requires additional follow up with physician  [] Other    Prognosis for POC: [x] Good [] Fair  [] Poor      Patient requires continued skilled intervention: [x] Yes  [] No    Treatment/Activity Tolerance:  [x] Patient able to complete treatment  [] Patient limited by fatigue  [] Patient limited by pain    [] Patient limited by other medical complications  [] Other:     ASSESSMENT: Pt is meeting step goal and strength in LLE is progressing well. RLE pain limits progression to no AD, he has R TKA in 2.5 weeks so will maximize strength and function prior to sx.        Return to Play: (if applicable)   []  Stage 1: Intro to Strength   []  Stage 2: Return to Run and Strength   []  Stage 3: Return to Jump and Strength   []  Stage 4: Dynamic Strength and Agility   []  Stage 5: Sport Specific Training     []  Ready to Return to Play, Meets All Above Stages   []  Not Ready for Return to Sports   Comments:                         PLAN: 2x week for progressing with ROM, quad activ, gait. Maximize function prior to R TKA 10/13/2021. [x] Continue per plan of care [] Alter current plan (see comments above)  [] Plan of care initiated [] Hold pending MD visit [] Discharge      Electronically signed by:  Isabela Son, PT , DPT  Note: If patient does not return for scheduled/ recommended follow up visits, this note will serve as a discharge from care along with most recent update on progress. Access Code: [email or web address]  URL: [email or web address]  Date: 08/03/2021  Prepared by: Rd Astorga    Exercises  Supine Knee Extension Stretch on Towel Roll - 2-3 x daily - 7 x weekly - 3 sets - 60 seconds hold  Long Sitting Calf Stretch with Strap - 2-3 x daily - 7 x weekly - 3 sets - 30 seconds hold  seated hamstring stretch with stool - 2-3 x daily - 7 x weekly - 3 sets - 30 seconds hold

## 2021-09-27 ENCOUNTER — OFFICE VISIT (OUTPATIENT)
Dept: INTERNAL MEDICINE CLINIC | Age: 61
End: 2021-09-27
Payer: COMMERCIAL

## 2021-09-27 VITALS
OXYGEN SATURATION: 97 % | HEART RATE: 96 BPM | BODY MASS INDEX: 45.92 KG/M2 | DIASTOLIC BLOOD PRESSURE: 74 MMHG | WEIGHT: 303 LBS | SYSTOLIC BLOOD PRESSURE: 128 MMHG | HEIGHT: 68 IN

## 2021-09-27 DIAGNOSIS — I10 ESSENTIAL HYPERTENSION, BENIGN: ICD-10-CM

## 2021-09-27 DIAGNOSIS — E78.2 MIXED HYPERTRIGLYCERIDEMIA: ICD-10-CM

## 2021-09-27 DIAGNOSIS — E11.9 TYPE 2 DIABETES MELLITUS WITHOUT COMPLICATION, WITHOUT LONG-TERM CURRENT USE OF INSULIN (HCC): Primary | ICD-10-CM

## 2021-09-27 DIAGNOSIS — Z01.818 PREOP EXAM FOR INTERNAL MEDICINE: ICD-10-CM

## 2021-09-27 PROCEDURE — 99244 OFF/OP CNSLTJ NEW/EST MOD 40: CPT | Performed by: NURSE PRACTITIONER

## 2021-09-27 ASSESSMENT — ENCOUNTER SYMPTOMS
DIARRHEA: 0
ABDOMINAL PAIN: 0
COUGH: 0
COLOR CHANGE: 0
EYE REDNESS: 0
SORE THROAT: 0
RHINORRHEA: 0
BLOOD IN STOOL: 0
WHEEZING: 0
CONSTIPATION: 0
CHEST TIGHTNESS: 0
SINUS PRESSURE: 0
NAUSEA: 0
BACK PAIN: 0
SHORTNESS OF BREATH: 0
EYE ITCHING: 0
VOMITING: 0

## 2021-09-27 ASSESSMENT — PATIENT HEALTH QUESTIONNAIRE - PHQ9
DEPRESSION UNABLE TO ASSESS: FUNCTIONAL CAPACITY MOTIVATION LIMITS ACCURACY
SUM OF ALL RESPONSES TO PHQ9 QUESTIONS 1 & 2: 0
SUM OF ALL RESPONSES TO PHQ QUESTIONS 1-9: 0
SUM OF ALL RESPONSES TO PHQ QUESTIONS 1-9: 0
1. LITTLE INTEREST OR PLEASURE IN DOING THINGS: 0
SUM OF ALL RESPONSES TO PHQ QUESTIONS 1-9: 0
2. FEELING DOWN, DEPRESSED OR HOPELESS: 0

## 2021-09-27 NOTE — PROGRESS NOTES
performed by Kylie Mendoza MD at [address] N/A 3/2/2021    ROBOTIC ASSISTED LAPAROSCOPIC SLEEVE GASTRECTOMY performed by Katiana Reese DO at [address] Left 2021    LEFT TOTAL KNEE REPLACEMENT ROBOTIC ASSISTED performed by Ursula Hull MD at [address]  2011    UPPER GASTROINTESTINAL ENDOSCOPY N/A 2021    EGD BIOPSY performed by Katiana Reese DO at [address] EXTRACTION       Family History   Problem Relation Age of Onset    High Blood Pressure Mother     Heart Disease Mother         MI    AT 66    Diabetes Brother      Social History     Socioeconomic History    Marital status:      Spouse name: Not on file    Number of children: 2    Years of education: Not on file    Highest education level: Not on file   Occupational History    Occupation: [employer]   Tobacco Use    Smoking status: Former Smoker     Packs/day: 2.00     Years: 25.00     Pack years: 50.00     Types: Cigarettes     Quit date: 2006     Years since quitting: 15.4    Smokeless tobacco: Never Used   Vaping Use    Vaping Use: Never used   Substance and Sexual Activity    Alcohol use: Not Currently     Comment: ocas    Drug use: No    Sexual activity: Yes     Partners: Female   Other Topics Concern    Not on file   Social History Narrative    Not on file     Social Determinants of Health     Financial Resource Strain: Low Risk     Difficulty of Paying Living Expenses: Not hard at all   Food Insecurity: No Food Insecurity    Worried About [facility] [facility] in the Last Year: Never true    [facility] in the Last Year: Never true   Transportation Needs:     Lack of Transportation (Medical):      Lack of Transportation (Non-Medical):    Physical Activity:     Days of Exercise per Week:     Minutes of Exercise per Session:    Stress:     Feeling of Stress : Social Connections:     Frequency of Communication with Friends and Family:     Frequency of Social Gatherings with Friends and Family:     Attends [employer] Services:     Active Member of Clubs or Organizations:     Attends Club or Organization Meetings:     Marital Status:    Intimate Partner Violence:     Fear of Current or Ex-Partner:     Emotionally Abused:     Physically Abused:     Sexually Abused:        Review of Systems  Review of Systems   Constitutional: Negative for chills, fatigue and fever. HENT: Negative for congestion, ear pain, postnasal drip, rhinorrhea, sinus pressure, sneezing and sore throat. Eyes: Negative for redness and itching. Respiratory: Negative for cough, chest tightness, shortness of breath and wheezing. Cardiovascular: Negative for chest pain and palpitations. Gastrointestinal: Negative for abdominal pain, blood in stool, constipation, diarrhea, nausea and vomiting. Endocrine: Negative for cold intolerance and heat intolerance. Genitourinary: Negative for difficulty urinating, dysuria, flank pain, frequency, hematuria and urgency. Musculoskeletal: Negative for arthralgias, back pain, joint swelling and myalgias. Skin: Negative for color change, pallor, rash and wound. Allergic/Immunologic: Negative for environmental allergies and food allergies. Neurological: Negative for dizziness, seizures, syncope, weakness, light-headedness, numbness and headaches. Hematological: Negative for adenopathy. Does not bruise/bleed easily. Psychiatric/Behavioral: Negative for confusion, sleep disturbance and suicidal ideas. The patient is not nervous/anxious and is not hyperactive. Objective:     Vitals:    09/27/21 1021   BP: 128/74   Pulse: 96   SpO2: 97%        Physical Exam  Physical Exam  Constitutional:       Appearance: Normal appearance. He is obese. HENT:      Head: Normocephalic and atraumatic.       Right Ear: Tympanic membrane, ear canal and external ear normal.      Left Ear: Tympanic membrane, ear canal and external ear normal.      Nose: Nose normal.      Mouth/Throat:      Mouth: Mucous membranes are dry. Eyes:      Extraocular Movements: Extraocular movements intact. Conjunctiva/sclera: Conjunctivae normal.      Pupils: Pupils are equal, round, and reactive to light. Cardiovascular:      Rate and Rhythm: Normal rate and regular rhythm. Pulses: Normal pulses. Heart sounds: Normal heart sounds. Pulmonary:      Effort: Pulmonary effort is normal.      Breath sounds: Normal breath sounds. Abdominal:      General: Abdomen is flat. Bowel sounds are normal.      Palpations: Abdomen is soft. Tenderness: There is no abdominal tenderness. Musculoskeletal:         General: Normal range of motion. Cervical back: Normal range of motion and neck supple. Skin:     General: Skin is warm and dry. Neurological:      General: No focal deficit present. Mental Status: He is alert and oriented to person, place, and time.    Psychiatric:         Mood and Affect: Mood normal.         Behavior: Behavior normal.           Medication Review  Current Outpatient Medications on File Prior to Visit   Medication Sig Dispense Refill    cyclobenzaprine (FLEXERIL) 10 MG tablet Take 1 tablet by mouth 3 times daily as needed for Muscle spasms 90 tablet 0    hydroCHLOROthiazide (HYDRODIURIL) 25 MG tablet Hold until Monday 90 tablet 0    loratadine (CLARITIN) 10 MG tablet Take 1 tablet by mouth daily 90 tablet 0    lisinopril (PRINIVIL;ZESTRIL) 10 MG tablet TAKE 1 TABLET BY MOUTH ONCE A DAY 90 tablet 0    omeprazole (PRILOSEC) 20 MG delayed release capsule Take 1 capsule by mouth Daily 30 capsule 3    amoxicillin (AMOXIL) 500 MG capsule 4 tablets by mouth 1 hour before dental appointment 4 capsule 1    rOPINIRole (REQUIP) 3 MG tablet Take 1 tablet by mouth 3 times daily 270 tablet 0    atorvastatin (LIPITOR) 40 MG tablet Take 1 tablet by mouth daily 90 tablet 3    Prodigy Twist Top Lancets 28G MISC TEST TWO TIMES A  each 0    blood glucose test strips (PRODIGY NO CODING BLOOD GLUC) strip TEST TWO TIMES A  each 0    amoxicillin (AMOXIL) 500 MG capsule 4 tablets by mouth 1 hour before dental appointment 4 capsule 1    pregabalin (LYRICA) 75 MG capsule Take 1 capsule by mouth 3 times daily for 90 days. 270 capsule 0    metFORMIN (GLUCOPHAGE) 500 MG tablet Take 1 tablet by mouth 2 times daily (with meals) 180 tablet 1    ondansetron (ZOFRAN-ODT) 4 MG disintegrating tablet Take 1 tablet by mouth 3 times daily as needed for Nausea or Vomiting 21 tablet 0    potassium chloride (KLOR-CON M) 20 MEQ TBCR extended release tablet Take 1 tablet by mouth 2 times daily 120 tablet 2    furosemide (LASIX) 40 MG tablet Take 1 tablet by mouth 2 times daily (Patient taking differently: Take 40 mg by mouth daily ) 60 tablet 3    Multiple Vitamin (MULTIVITAMIN, BARIATRIC FUSION COMPLETE, CHEW TAB) Take 4 tablets by mouth daily      ondansetron (ZOFRAN) 4 MG tablet Take 1 tablet by mouth every 6 hours as needed for Nausea or Vomiting 30 tablet 0    metOLazone (ZAROXOLYN) 2.5 MG tablet Take 1 tablet by mouth three times a week Hold for 7 days 25 tablet 2    glucose 5 g chewable tablet Take 3 tablets by mouth as needed for Low blood sugar 30 tablet 0    blood glucose monitor kit and supplies Check BG twice daily 1 kit 0    fluticasone (FLONASE) 50 MCG/ACT nasal spray 1 spray by Nasal route daily 3 Bottle 0    apixaban (ELIQUIS) 2.5 MG TABS tablet Take 1 tablet by mouth 2 times daily (Patient not taking: Reported on 9/27/2021) 60 tablet 0     No current facility-administered medications on file prior to visit. Assessment:       1. Type 2 diabetes mellitus without complication, without long-term current use of insulin ([address]    2. Preop exam for internal medicine    3. Essential hypertension, benign    4.  Mixed hypertriglyceridemia Plan:        Preop exam for internal medicine   Perioperative risk related to the patient's upcoming surgery is considered low. he is cleared for surgery.   Pre-op exam was completed on 9/27/21 10:36 AM.        Type 2 diabetes mellitus without complication, without long-term current use of insulin (HCC)   Stable, controlled  No changes  Continue current treatment plan   Check labs      Mixed hypertriglyceridemia   Stable, controlled  No changes  Continue current treatment plan       Essential hypertension, benign   Stable, controlled  No changes  Continue current treatment plan

## 2021-09-27 NOTE — ASSESSMENT & PLAN NOTE
Perioperative risk related to the patient's upcoming surgery is considered low. he is cleared for surgery.   Pre-op exam was completed on 9/27/21 10:36 AM.

## 2021-09-28 ENCOUNTER — TELEPHONE (OUTPATIENT)
Dept: ORTHOPEDIC SURGERY | Age: 61
End: 2021-09-28

## 2021-09-28 ENCOUNTER — HOSPITAL ENCOUNTER (OUTPATIENT)
Dept: PHYSICAL THERAPY | Age: 61
Setting detail: THERAPIES SERIES
Discharge: HOME | End: 2021-09-28
Payer: COMMERCIAL

## 2021-09-28 ENCOUNTER — TELEPHONE (OUTPATIENT)
Dept: INTERNAL MEDICINE CLINIC | Age: 61
End: 2021-09-28

## 2021-09-28 ENCOUNTER — PREP FOR PROCEDURE (OUTPATIENT)
Dept: ORTHOPEDICS UNIT | Age: 61
End: 2021-09-28

## 2021-09-28 LAB
MRSA SCREEN RT-PCR: ABNORMAL
ORGANISM: ABNORMAL

## 2021-09-28 PROCEDURE — 97140 MANUAL THERAPY 1/> REGIONS: CPT

## 2021-09-28 PROCEDURE — 97112 NEUROMUSCULAR REEDUCATION: CPT

## 2021-09-28 PROCEDURE — 97110 THERAPEUTIC EXERCISES: CPT

## 2021-09-28 NOTE — TELEPHONE ENCOUNTER
I called and let him know about nasal swab.   Sent Bactroban ointment to [facility] Gutierrez per his request

## 2021-09-28 NOTE — TELEPHONE ENCOUNTER
----- Message from Leonor Reyes MD sent at 9/28/2021  2:24 PM EDT -----  +MRSA  ----- Message -----  From: JACKLYN Anderson CNP  Sent: 9/28/2021   9:51 AM EDT  To: Leonor Reyes MD    Good morning,    I saw Jacquie Hopper yesterday for his preop and his MRSA nasal swab did come back positive. He has been positive in the past so not surprising. Just wanted to be sure you received the result prior to surgery. I am still awaiting remaining labs, but do not expect anything to impact surgery.      Lu Laird CNP   ----- Message -----  From: León Diez Incoming Lab Results From Soft (Sarah Dsouza)  Sent: 9/28/2021   4:38 AM EDT  To: JACKLYN Anderson CNP

## 2021-09-28 NOTE — PROGRESS NOTES
[facility],  [address]  Phone: [phone]  Fax [phone]    Physical Therapy Treatment Note/ Progress Report:           Date:  2021    Patient Name:  Cesar Granger   \"Bo\" :  1960  MRN: [id number]  Restrictions/Precautions:    Medical/Treatment Diagnosis Information:  · Diagnosis: Z96.652 (ICD-10-CM) - Status post left knee replacement DOS 21  · Treatment Diagnosis: L knee pain M25.562, difficulty walking E54.1  Insurance/Certification information:  PT Insurance Information: Med mutual BMN, no auth 90/10 co-ins  Physician Information:  Referring Practitioner: Dr Katherine Lombardo  Has the plan of care been signed (Y/N):        []  Yes  [x]  No     Date of Patient follow up with Physician: 21      Is this a Progress Report:     [x]  Yes  []  No        If Yes:  Date Range for reporting period:  Beginnin/3/21  Endin21    Progress report will be due (10 Rx or 30 days whichever is less):  -      Recertification will be due (POC Duration  / 90 days whichever is less): 12 weeks      Visit # Insurance Allowable Auth Required   In-person 17-PN written BMN []  Yes []  No    Telehealth   []  Yes []  No    Total          Functional Scale: LEFS 89%    Date assessed:  8/3/21   Functional Scale: LEFS 78%    Date assessed:  21  Functional Scale: LEFS 63%     Date assessed:  21           Therapy Diagnosis/Practice Pattern:H, surgical      Number of Comorbidities:  []0     []1-2    [x]3+    Latex Allergy:  [x]NO      []YES  Preferred Language for Healthcare:   [x]English       []other:      Pain level:  eval 2-10    today 3/10     SUBJECTIVE:  Pt feels his biggest c/o are stiffness in the knee after he's been sitting, and the strength/endurance to walk longer distances (going shopping.) He wants to maximize his strength prior to R knee sx.     OBJECTIVE:    Observation: incision healed with fascial restrictions in quad; amb with SPC vs 1 crutch improved step-through pattern but needs cueing for knee flex    Test measurements:  AROM 0-116 supine after manual tx/stretching quad    RESTRICTIONS/PRECAUTIONS: DM, hx leg ulcers, R knee  Severe OA (sx scheduled 10/13/21)    Exercises/Interventions:     Therapeutic Ex (48503) Sets/sec/reps Notes/CUES   Knee ext prop-bone foam  HEP    standing incline board gastroc S, HS S foot on table-LS   Seated quad stretch table-side 4x30\" ea HEP    Cue for femur alignment   Seated glider knee flex  Cue to shift weight onto L hip   Hip/knee flex on SB-supine   On mat with strap   5\" x10 PT OP at end   Knee flex rocking on step    Quad set     SAQ  LAQ 3# 2x10 3\"   Progress NV   SLR AAROM     Bridge with progressive knee flex  SL bridge CL LE flat 5\"x105\"x10   Mini squat UE supp   L slightly behind 2x10  x10    Standing TKE LATOYA  Retro TKE to give end range ext   60#   Calf raises B  L ecc's-cue for TKE x10  x10    Step-up 3\" [facility] platform  4\"  6\" [employer], [employer]  back Steps x4 trips    6\" step up and over  8\"           x10  x15 1 UE support         B rails, L leads up/R down  1 UE sup  B UE sup   SLS with standing glider abd, 45 deg ext diag, ext  Pain in R stance now  OVL at feet   bike 5' I w/u after set-up full revolution   SL hip abd     HSC seated EOB 2x10 Black   [facility] abd  Ext  Flex to 90 deg hip x15 ea60#     Leg press  ecc  SL  PF x10  x10   130#  130#  100#  120# cue for st knees   Pt ed:    ek. RW still for gait d/t quad control    Manual Intervention (11393) x10' total    HG convex 45 deg to distal and lateral quad/ITB,   Pat mobs sup/inf  PROM knee flex/ext: supine and seated   Oscillations for pain x6'  15\"x4      HG sweeps sup/inf convex 45 deg bevel to quad with AROM flex/ext  light XFM to pat tendon and incision x20'                            NMR re-education (22877)  CUES NEEDED   Weight shift attempt ~75% stance on LLE UE support   Gait with RW-cue for TKE, leyla/glute activ and upright posture in stancel  Cue for inc'd hip/knee flex    Slovenian 10:30 L VMO/rectus   Quad iso  SAQ  SLR  LAQ 15' total (set-up)   amb with crutch  SPC-SBA  Sized new cane         Side-stepping at long 1/2 wall  MW, retro MW  Red tband at feet    Tandem stance +Airex 60\" R, L    SLS + Airex 5\"x10 Fingertip sup   Hip hike off edge of [facility] [place]     Standing lunge forward with L  UE supp, cue for vert shin pos        Therapeutic Activity (58196)                                         Therapeutic Exercise and NMR EXR  [x] (66726) Provided verbal/tactile cueing for activities related to strengthening, flexibility, endurance, ROM for improvements in LE, proximal hip, and core control with self care, mobility, lifting, ambulation.  [] (72518) Provided verbal/tactile cueing for activities related to improving balance, coordination, kinesthetic sense, posture, motor skill, proprioception  to assist with LE, proximal hip, and core control in self care, mobility, lifting, ambulation and eccentric single leg control.      NMR and Therapeutic Activities:    [x] (29699 or 19650) Provided verbal/tactile cueing for activities related to improving balance, coordination, kinesthetic sense, posture, motor skill, proprioception and motor activation to allow for proper function of core, proximal hip and LE with self care and ADLs  [] (45519) Gait Re-education- Provided training and instruction to the patient for proper LE, core and proximal hip recruitment and positioning and eccentric body weight control with ambulation re-education including up and down stairs     Home Exercise Program:    [x] (08462) Reviewed/Progressed HEP activities related to strengthening, flexibility, endurance, ROM of core, proximal hip and LE for functional self-care, mobility, lifting and ambulation/stair navigation   [] (62391)Reviewed/Progressed HEP activities related to improving balance, coordination, kinesthetic sense, posture, motor skill, proprioception of core, proximal hip and LE for self care, mobility, lifting, and ambulation/stair navigation      Manual Treatments:  PROM / STM / Oscillations-Mobs:  G-I, II, III, IV (PA's, Inf., Post.)  [x] (48709) Provided manual therapy to mobilize LE, proximal hip and/or LS spine soft tissue/joints for the purpose of modulating pain, promoting relaxation,  increasing ROM, reducing/eliminating soft tissue swelling/inflammation/restriction, improving soft tissue extensibility and allowing for proper ROM for normal function with self care, mobility, lifting and ambulation. Modalities:   CP x15'   [] GAME READY (VASO)- for significant edema, swelling, pain control. -mod comp     Charges  Timed Code Treatment Minutes: 50   Total Treatment Minutes: 70 (ice, I bike)        [] EVAL (LOW) 50820   [] EVAL (MOD) 43453  [] EVAL (HIGH) 25686   [] RE-EVAL     [x] DU(54921) x  2   [] IONTO  [] NMR (37639) x     [] VASO  [x] Manual (15557) x  1    [] Other: Gait   [] TA x      [] Mech Traction (04255)  [] ES(attended) (85308)      [] ES (un) (95745):       GOALS:   Patient stated goal: able to walk without AD  []? Progressing: []? Met: []? Not Met: []? Adjusted     Therapist goals for Patient:   Short Term Goals: To be achieved in: 2 weeks  1. Independent in HEP and progression per patient tolerance, in order to prevent re-injury. []? Progressing: [x]? Met: []? Not Met: []? Adjusted  2. Patient will have a decrease in pain to facilitate improvement in movement, function, and ADLs as indicated by Functional Deficits. []? Progressing: [x]? Met: []? Not Met: []? Adjusted     Long Term Goals: To be achieved in: 12 weeks  1. Disability index score of 40% or less for the LEFS to assist with reaching prior level of function. [x]? Progressing: []? Met: []? Not Met: []? Adjusted  2. Patient will demonstrate increased AROM to 0-120 to allow for proper joint functioning as indicated by patients Functional Deficits. [x]? Progressing: []? Met: []? Not Met: [x]? Adjusted  3. Patient will demonstrate an increase in Strength to good proximal hip strength and control, within 5lb HHD in LE to allow for proper functional mobility as indicated by patients Functional Deficits. [x]? Progressing: []? Met: []? Not Met: []? Adjusted  4. Patient will return to reciprocal stairs w/ handrail or single crutch use without increased symptoms or restriction. [x]? Progressing: []? Met: []? Not Met: []? Adjusted  5. Indep with community ambulation >/=30' in order to go grocery shopping. [x]? Progressing: []? Met: []? Not Met: []? Adjusted      Progression Towards Functional goals:  [] Patient is progressing as expected towards functional goals listed. [x] Progression is slowed due to complexities listed. [] Progression has been slowed due to co-morbidities. [] Plan just implemented, too soon to assess goals progression  [] Other:     Overall Progression Towards Functional goals/ Treatment Progress Update:  [] Patient is progressing as expected towards functional goals listed. [x] Progression is slowed due to complexities/Impairments listed. [] Progression has been slowed due to co-morbidities. [] Plan just implemented, too soon to assess goals progression <30days   [] Goals require adjustment due to lack of progress  [] Patient is not progressing as expected and requires additional follow up with physician  [] Other    Prognosis for POC: [x] Good [] Fair  [] Poor      Patient requires continued skilled intervention: [x] Yes  [] No    Treatment/Activity Tolerance:  [x] Patient able to complete treatment  [] Patient limited by fatigue  [] Patient limited by pain    [] Patient limited by other medical complications  [] Other:     ASSESSMENT: Pt can perform steps to ascend/descend 8\" tri-level home, and he can ambulate community distances for shopping through pain/swelling and endurance still limit him.  He is on target with L knee AROM, but had to modify goal to 0-120 deg. He has R TKA in 2 weeks so will maximize strength and function prior to sx. Return to Play: (if applicable)   []  Stage 1: Intro to Strength   []  Stage 2: Return to Run and Strength   []  Stage 3: Return to Jump and Strength   []  Stage 4: Dynamic Strength and Agility   []  Stage 5: Sport Specific Training     []  Ready to Return to Play, Meets All Above Stages   []  Not Ready for Return to Sports   Comments:                         PLAN: 2x week for progressing with ROM, quad activ, gait. Maximize function prior to R TKA 10/13/2021. [x] Continue per plan of care [] Alter current plan (see comments above)  [] Plan of care initiated [] Hold pending MD visit [] Discharge      Electronically signed by:  Eris Hansen, PT , DPT  Note: If patient does not return for scheduled/ recommended follow up visits, this note will serve as a discharge from care along with most recent update on progress. Access Code: [email or web address]  URL: [email or web address]  Date: 08/03/2021  Prepared by: Nai Astorga    Exercises  Supine Knee Extension Stretch on Towel Roll - 2-3 x daily - 7 x weekly - 3 sets - 60 seconds hold  Long Sitting Calf Stretch with Strap - 2-3 x daily - 7 x weekly - 3 sets - 30 seconds hold  seated hamstring stretch with stool - 2-3 x daily - 7 x weekly - 3 sets - 30 seconds hold

## 2021-09-30 ENCOUNTER — OFFICE VISIT (OUTPATIENT)
Dept: ORTHOPEDIC SURGERY | Age: 61
End: 2021-09-30
Payer: COMMERCIAL

## 2021-09-30 VITALS — HEIGHT: 68 IN | BODY MASS INDEX: 45.92 KG/M2 | RESPIRATION RATE: 18 BRPM | WEIGHT: 303 LBS

## 2021-09-30 DIAGNOSIS — M17.11 PRIMARY OSTEOARTHRITIS OF RIGHT KNEE: Primary | ICD-10-CM

## 2021-09-30 PROCEDURE — 99213 OFFICE O/P EST LOW 20 MIN: CPT | Performed by: ORTHOPAEDIC SURGERY

## 2021-09-30 NOTE — PROGRESS NOTES
[facility]  [facility] Visit    Chief Complaint: Right knee pain    HPI:  Aydee Horn is a 63 y.o. who is here ahead of planned right total knee arthroplasty. He underwent left total knee arthroplasty on July 14, 2021 and is doing well in his recovery. He is having right knee pain on a daily basis. The patient has difficulty climbing stairs, difficulty getting out of a chair, difficulty with activities of daily living including hygiene and pain at night. Pain level at rest is 7 and with activities 9-10/10. He walks with a cane.     Medical history significant for diabetes, sleep apnea, venous stasis, if needed. He denies any heart or lung issues, blood clots, anticoagulants, tobacco use, hormone placement therapy, metal allergies, low back pain. He does note a family history of osteoarthritis. Patient Active Problem List   Diagnosis    Class 3 severe obesity due to excess calories with serious comorbidity and body mass index (BMI) of 45.0 to 49.9 in adult [facility])    Mixed hypertriglyceridemia    Nonalcoholic fatty liver disease    RLS (restless legs syndrome)    Essential hypertension, benign    Morbid obesity with BMI of 50.0-59.9, adult ([address]    Chronic pain of left knee    Osteoarthritis of left knee    Chondromalacia of left knee    Pes planus    Lymphedema    Peripheral venous insufficiency    Allergic rhinitis    Preop exam for internal medicine    Type 2 diabetes mellitus without complication, without long-term current use of insulin ([address]    TOM treated with BiPAP    Arthritis of left knee       ROS:  Constitutional: denies fever, chills, weight loss  MSK: denies pain in other joints, muscle aches  Neurological: denies numbness, tingling, weakness    Exam:  Resp. rate 18, height 5' 8\" (1.727 m), weight (!) 303 lb (137.4 kg).     Appearance: sitting in exam room chair, appears to be in no acute distress, awake and alert  Resp: unlabored breathing on room air  Skin: warm, dry and intact with out erythema or significant increased temperature  Neuro: grossly intact both lower extremities. Intact sensation to light touch. Motor exam 4+ to 5/5 in all major motor groups. Right knee: Examination reveals that knee range of motion is 0 to 120 degrees. There is varus deformity, positive crepitus, positive joint line tenderness, positive antalgic gait. Neurologically, plantar flexion and dorsiflexion is intact. Pulses palpable distally. 5/5 strength. Imaging:  Prior right knee radiographs were reviewed and are significant for tricompartmental degenerative changes with bone-on-bone osteoarthritis of the medial compartment. Assessment:  Right knee osteoarthritis    Plan:  We discussed the diagnosis and treatment options. We discussed his upcoming right total knee arthroplasty. The operative procedure, alternatives, and risks were discussed in detail with the patient. The risks include but are not limited to: Infection, vessel injury, nerve injury, DVT, pulmonary embolism, implant loosening, need for revision surgery, loss of motion, continued pain. Despite these risks the patient would like to proceed. All questions have been answered and no guarantees have been made. The patient is unable to do further physical therapy due to disabling pain. I discussed with the patient the diagnosis in detail and answered all the questions. The patient verbalized understanding of the plan as it has been described above and is in agreement. Plan for right total knee arthroplasty. We again specifically discussed his increased risk due to BMI greater than 40. He is at increased risk of complications including infection. Total time spent on today's encounter was at least 24 minutes.  This time included reviewing prior notes, radiographs, and lab results when available, reviewing history obtained by medical assistant, performing history and physical exam, reviewing tests/radiographs with the patient, counseling the patient, ordering medications or tests, documentation in the electronic health record, and coordination of care. This dictation was done with Dragon dictation and may contain mechanical errors related to translation.

## 2021-10-01 ENCOUNTER — HOSPITAL ENCOUNTER (OUTPATIENT)
Dept: PHYSICAL THERAPY | Age: 61
Setting detail: THERAPIES SERIES
Discharge: HOME | End: 2021-10-01
Payer: COMMERCIAL

## 2021-10-01 ENCOUNTER — HOSPITAL ENCOUNTER (OUTPATIENT)
Dept: CT IMAGING | Age: 61
Discharge: HOME | End: 2021-10-01
Payer: COMMERCIAL

## 2021-10-01 DIAGNOSIS — M17.11 PRIMARY OSTEOARTHRITIS OF RIGHT KNEE: ICD-10-CM

## 2021-10-01 PROCEDURE — 97140 MANUAL THERAPY 1/> REGIONS: CPT

## 2021-10-01 PROCEDURE — 73700 CT LOWER EXTREMITY W/O DYE: CPT

## 2021-10-01 PROCEDURE — 97112 NEUROMUSCULAR REEDUCATION: CPT

## 2021-10-01 PROCEDURE — 97110 THERAPEUTIC EXERCISES: CPT

## 2021-10-01 NOTE — FLOWSHEET NOTE
[facility],  [address] [place]  Phone: [phone]  Fax [phone]    Physical Therapy Treatment Note/ Progress Report:           Date:  10/1/2021    Patient Name:  Bria Rodriguez   \"Bo\" :  1960  MRN: [id number]  Restrictions/Precautions:    Medical/Treatment Diagnosis Information:  · Diagnosis: Z20.875 (ICD-10-CM) - Status post left knee replacement DOS 21  · Treatment Diagnosis: L knee pain M25.562, difficulty walking C82.8  Insurance/Certification information:  PT Insurance Information: Med mutual BMN, no auth 90/10 co-ins  Physician Information:  Referring Practitioner: Dr Jose De Jesus Prince  Has the plan of care been signed (Y/N):        []  Yes  [x]  No     Date of Patient follow up with Physician: 21      Is this a Progress Report:     [x]  Yes  []  No        If Yes:  Date Range for reporting period:  Beginnin/3/21  Endin21    Progress report will be due (10 Rx or 30 days whichever is less): 10/28/21 -D/C 1st if having R TKA      Recertification will be due (POC Duration  / 90 days whichever is less): 12 weeks      Visit # Insurance Allowable Auth Required   In-person 18 BMN []  Yes []  No    Telehealth   []  Yes []  No    Total          Functional Scale: LEFS 89%    Date assessed:  8/3/21   Functional Scale: LEFS 78%    Date assessed:  21  Functional Scale: LEFS 63%    Date assessed:  21           Therapy Diagnosis/Practice Pattern:H, surgical      Number of Comorbidities:  []0     []1-2    [x]3+    Latex Allergy:  [x]NO      []YES  Preferred Language for Healthcare:   [x]English       []other:      Pain level:  eval 2-8/10    today 2-3/10     SUBJECTIVE:  Pt felt ok after LV, still very stiff when he first gets up to amb but then ok.    OBJECTIVE:    Observation: incision healed with fascial restrictions in quad; amb with SPC vs 1 crutch improved step-through pattern but needs cueing for knee flex    Test measurements:  AROM 0-114 supine after manual tx/stretching quad    RESTRICTIONS/PRECAUTIONS: DM, hx leg ulcers, R knee  Severe OA (sx scheduled 10/13/21)    Exercises/Interventions:     Therapeutic Ex (40426) Sets/sec/reps Notes/CUES   Orthovitals re-assess 10'    Knee ext prop-bone foam  HEP    standing incline board gastroc S, HS S foot on table-LS   Seated quad stretch table-side 4x30\" ea HEP    Cue for femur alignment   Seated glider knee flex  Cue to shift weight onto L hip   Hip/knee flex on SB-supine   On mat with strap   5\" x10 PT OP at end   Knee flex rocking on step    Quad set     SAQ  LAQ 3# 2x10 3\"   Progress NV   SLR AAROM     Bridge with progressive knee flex  SL bridge CL LE flat 5\"x105\"x10   Mini squat UE supp   L slightly behind 2x10  x10    Standing TKE LATOYA  Retro TKE to give end range ext   60#   Calf raises B  L ecc's-cue for TKE x10  x10    Step-up 3\" [facility] platform  4\"  6\" [employer], [employer]  back Steps x4 trips    6\" step up and over  8\"           x10  x15 1 UE support         B rails, L leads up/R down  1 UE sup  B UE sup   SLS with standing glider abd, 45 deg ext diag, ext  Pain in R stance now  OVL at feet   bike 5' I w/u after set-up full revolution   SL hip abd     HSC seated EOB 2x10 Black   [facility] abd  Ext  Flex to 90 deg hip x15 ea60#     Leg press  ecc  SL  PF x10  x10   130#  130#  100#  120# cue for st knees   Pt ed:    ek. RW still for gait d/t quad control    Manual Intervention (59906) x10' total    HG convex 45 deg to distal and lateral quad/ITB,   Pat mobs sup/inf  PROM knee flex/ext: supine and seated   Oscillations for pain x6'  15\"x4      HG sweeps sup/inf convex 45 deg bevel to quad with AROM flex/ext  light XFM to pat tendon and incision x20'                            NMR re-education (71714)  CUES NEEDED   Weight shift attempt ~75% stance on LLE UE support   Gait with RW-cue for TKE, leyla/glute activ and upright posture in stancel  Cue for inc'd hip/knee flex Nigerian 10:30 L VMO/rectus   Quad iso  SAQ  SLR  LAQ 15' total (set-up)   amb with crutch  SPC-SBA  Sized new cane         Side-stepping at long 1/2 wall  MW, retro MW  Red tband at feet    Tandem stance +Airex 60\" R, L    SLS + Airex 5\"x10 Fingertip sup   Hip hike off edge of [facility]     Standing lunge forward with L  UE supp, cue for vert shin pos        Therapeutic Activity (14298)                                         Therapeutic Exercise and NMR EXR  [x] (02802) Provided verbal/tactile cueing for activities related to strengthening, flexibility, endurance, ROM for improvements in LE, proximal hip, and core control with self care, mobility, lifting, ambulation.  [] (18324) Provided verbal/tactile cueing for activities related to improving balance, coordination, kinesthetic sense, posture, motor skill, proprioception  to assist with LE, proximal hip, and core control in self care, mobility, lifting, ambulation and eccentric single leg control.      NMR and Therapeutic Activities:    [x] (73228 or 61197) Provided verbal/tactile cueing for activities related to improving balance, coordination, kinesthetic sense, posture, motor skill, proprioception and motor activation to allow for proper function of core, proximal hip and LE with self care and ADLs  [] (67875) Gait Re-education- Provided training and instruction to the patient for proper LE, core and proximal hip recruitment and positioning and eccentric body weight control with ambulation re-education including up and down stairs     Home Exercise Program:    [x] (18924) Reviewed/Progressed HEP activities related to strengthening, flexibility, endurance, ROM of core, proximal hip and LE for functional self-care, mobility, lifting and ambulation/stair navigation   [] (19550)Reviewed/Progressed HEP activities related to improving balance, coordination, kinesthetic sense, posture, motor skill, proprioception of core, proximal hip and LE for self care, mobility, demonstrate an increase in Strength to good proximal hip strength and control, within 5lb HHD in LE to allow for proper functional mobility as indicated by patients Functional Deficits. [x]? Progressing: []? Met: []? Not Met: []? Adjusted  4. Patient will return to reciprocal stairs w/ handrail or single crutch use without increased symptoms or restriction. [x]? Progressing: []? Met: []? Not Met: []? Adjusted  5. Indep with community ambulation >/=30' in order to go grocery shopping. [x]? Progressing: []? Met: []? Not Met: []? Adjusted       Progression Towards Functional goals:  [] Patient is progressing as expected towards functional goals listed. [x] Progression is slowed due to complexities listed. [] Progression has been slowed due to co-morbidities. [] Plan just implemented, too soon to assess goals progression  [] Other:     Overall Progression Towards Functional goals/ Treatment Progress Update:  [] Patient is progressing as expected towards functional goals listed. [x] Progression is slowed due to complexities/Impairments listed. [] Progression has been slowed due to co-morbidities. [] Plan just implemented, too soon to assess goals progression <30days   [] Goals require adjustment due to lack of progress  [] Patient is not progressing as expected and requires additional follow up with physician  [] Other    Prognosis for POC: [x] Good [] Fair  [] Poor      Patient requires continued skilled intervention: [x] Yes  [] No    Treatment/Activity Tolerance:  [x] Patient able to complete treatment  [] Patient limited by fatigue  [] Patient limited by pain    [] Patient limited by other medical complications  [] Other:     ASSESSMENT: Pt demo's improvement in L knee ROM, strength and function with orthovitals re-assess this date. R knee pain again limits mobility more tat this time. WIll maximize motion/strength prior to sx for R TKA on 10/13/21.       Return to Play: (if applicable)   []  Stage 1:

## 2021-10-05 ENCOUNTER — APPOINTMENT (OUTPATIENT)
Dept: PHYSICAL THERAPY | Age: 61
End: 2021-10-05
Payer: COMMERCIAL

## 2021-10-05 RX ORDER — OXYCODONE HYDROCHLORIDE 10 MG/1
10 TABLET ORAL ONCE
Status: CANCELLED | OUTPATIENT
Start: 2021-10-05 | End: 2021-10-05

## 2021-10-05 RX ORDER — TRANEXAMIC ACID 650 1/1
1950 TABLET ORAL ONCE
Status: CANCELLED | OUTPATIENT
Start: 2021-10-05 | End: 2021-10-05

## 2021-10-05 NOTE — DISCHARGE INSTR - COC
[facility]) Continuity of Care Form    Patient Name:  Juan Cassidy  : 1960    MRN:  [id number]    Admit date:  (Not on file)  Discharge date:  10/27/21    Code Status Order: Prior  Advance Directives: No    Admitting Physician: Garrick Claudio MD  PCP: Shannan Huynh, JACKLYN - CNP    Discharging Nurse: Andrew Maharaj Unit/Room#: No information available for this encounter.   Discharging Unit Phone Number: [phone]    Emergency Contact:        Past Surgical History:  Past Surgical History:   Procedure Laterality Date    CIRCUMCISION      COLONOSCOPY  2017    Colonoscopy with polypectomy (cold biopsy):Dr. Walker:next in 3yrs=2020    KNEE ARTHROSCOPY Left 8/3/2020    VIDEO ARTHROSCOPY LEFT KNEE, PARTIAL MEDIAL MENISCECTOMY, CHONDROPLASTY, SYNOVIAL BIOPSY -TOM=4- performed by Ana Johnson MD at [place] [address] PAIN MANAGEMENT PROCEDURE Left 2020    COOLIEF RADIOFREQUENCY ABLATION - LEFT performed by Kimberlyn Leslie MD at [address] N/A 3/2/2021    ROBOTIC ASSISTED [address] performed by Sylvester Meng DO at [facility] Left 2021    LEFT TOTAL KNEE REPLACEMENT ROBOTIC ASSISTED performed by Garrick Claudio MD at [facility]  2011    UPPER GASTROINTESTINAL ENDOSCOPY N/A 2021    EGD BIOPSY performed by Sylvester Meng DO at [address] EXTRACTION         Immunization History:   Immunization History   Administered Date(s) Administered    COVID-19, Moderna, PF, 100mcg/0.5mL 2021, 2021    Fluzone Quad 3yrs and older 2021    Influenza Vaccine, unspecified formulation 10/01/2015, 2016    Influenza Virus Vaccine 10/20/2014, 10/06/2017    Influenza Whole 10/20/2014    Influenza, Quadv, IM, PF (6 mo and older Fluzone, Flulaval, Fluarix, and 3 yrs and older Afluria) a DME order):  Rolling walker  Other Treatments: Eliquis bid for 14 total days after discharge from hospital for DVT prophylaxis , bilateral SONI hose for 2 weeks after surgery  HOME HEALTH CARE: LEVEL 1 STANDARD    Home health agency to establish plan of care for patient over 60 day period   Nursing  Initial home SN evaluation visit to occur within 24-48 hours for:  medication management  VS and clinical assessment  S&S chronic disease exacerbation education + when to contact MD / NP  care coordination  Medication Reconciliation during 1st SN visit       PT/OT   Evaluate with goal of regaining prior level of functioning   OT to evaluate if patient has [address] needs for personal care      PCP Visit scheduled within 7 days of hospital discharge     Patient's personal belongings (please select all that are sent with patient):  Glasses    RN SIGNATURE:  Electronically signed by Wisam Unger RN on 10/27/21 at 12:32 PM EDT    PHYSICIAN SECTION    Prognosis: Good    Condition at Discharge: Stable    Rehab Potential (if transferring to Rehab): Good    Physician Certification: I certify the above orders, information, and transfer of Bert [place] is necessary for the continuing treatment of the diagnosis listed and that he requires [address] for less 30 days.      Update Admission H&P: No change in H&P    PHYSICIAN SIGNATURE:  Electronically signed by JACKLYN Marie CNP on 10/26/21 at 12:20 PM EDT/ Dr Merary Tobin Date: ***    [facility] Readmission Risk Assessment Score:    Discharging to Facility/ Agency   · Name:  [facility]    · Address: [address] [place] [address], [address] [place] [address]., [place] [address]  · Phone: [phone]  · Fax: [phone]  ·     / signature: {Esignature:[id number]:::0}          Followup with Dr Fernandez Members 2 weeks after surgery in office   [phone] [address], [address] [place], [address]   [address],   [phone]     DISCHARGE INSTRUCTIONS FOR TOTAL KNEE REPLACEMENT  Activity:   Elevate your leg if swelling occurs in your ankle. Use elastic wraps/hose until swelling decreases.  Continue the exercise program as prescribed by physical therapists.  Take frequent walks.  Use walker, crutches, or cane with weight bearing instructions as indicated by the physical therapists.  Take rest periods often. Elevate leg during rest period. Wound Care:   Cover the wound with a sterile gauze dressing and change daily as long as there is drainage.  Do not scrub wound. Pat it dry with a soft towel.  Dont apply any lotions or creams to your wound.  Check the incision every day for redness, swelling, or increase in drainage. Diet:   You can resume your normal diet. There are no limits on your diet due to your surgery.  Pain pills and activity changes may lead to constipation. To prevent this, use prune juice or bran cereals liberally. You may need to use a laxative such as Dulcolax, Senokot, or Milk of Magnesia.  Drink plenty of fluids. Medications:   Take pain pills if needed to maintain comfort.  Never drive while taking pain medicine.  Avoid over the counter medications until checking with your doctor.  Resume previous medications as instructed by your doctor. You will be on aspirin or Eliquis  for 14 days only. Stay off other anti-inflammatory medications (except Celebrex)  Call Your Doctor If:  Mahesh Marquez You have increased pain not controlled by medications.  Excessive swelling in your ankle.  You develop numbness, tingling, or decreased movement.  You have a fever greater than 100 degrees for a day or over 101 degrees at any one time.  Your wound becomes more reddened, starts draining, or opens.  If you fall. You have any questions about your recovery.   Inform your family doctor/dentist or any other doctor who cares for you in the future that you have a joint replacement. You may need antibiotics for dental or surgical procedures if there is any evidence of infection present. ? If you have required the use of insulin to control your blood sugar after surgery, follow up with your family doctor. ? Call your surgeons office to schedule your appointment to be seen after surgery. ? Make your appointment to continue physical therapy per doctors orders. ?  Smoking cessation assistance can be obtained from your family doctor or by calling [place] @ [phone]    _______________________________   _____   _______________________  ____                Patient Signature              Date      Witness                               Date

## 2021-10-06 ENCOUNTER — HOSPITAL ENCOUNTER (OUTPATIENT)
Dept: PHYSICAL THERAPY | Age: 61
Setting detail: THERAPIES SERIES
End: 2021-10-06
Payer: COMMERCIAL

## 2021-10-07 ENCOUNTER — APPOINTMENT (OUTPATIENT)
Dept: PHYSICAL THERAPY | Age: 61
End: 2021-10-07
Payer: COMMERCIAL

## 2021-10-11 RX ORDER — POTASSIUM CHLORIDE 1500 MG/1
20 TABLET, FILM COATED, EXTENDED RELEASE ORAL 2 TIMES DAILY
Qty: 120 TABLET | Refills: 2 | Status: SHIPPED | OUTPATIENT
Start: 2021-10-11 | End: 2021-12-17 | Stop reason: SDUPTHER

## 2021-10-12 ENCOUNTER — HOSPITAL ENCOUNTER (OUTPATIENT)
Dept: PHYSICAL THERAPY | Age: 61
Setting detail: THERAPIES SERIES
Discharge: HOME | End: 2021-10-12
Payer: COMMERCIAL

## 2021-10-12 PROCEDURE — 97140 MANUAL THERAPY 1/> REGIONS: CPT

## 2021-10-12 PROCEDURE — 97112 NEUROMUSCULAR REEDUCATION: CPT

## 2021-10-12 PROCEDURE — 97110 THERAPEUTIC EXERCISES: CPT

## 2021-10-12 NOTE — FLOWSHEET NOTE
[place] [address] [facility],  [address]  Phone: [phone]  Fax [phone]    Physical Therapy Treatment Note/ Progress Report:           Date:  10/12/2021    Patient Name:  Kei Turpin   \"Bo\" :  1960  MRN: [id number]  Restrictions/Precautions:    Medical/Treatment Diagnosis Information:  · Diagnosis: Z96.652 (ICD-10-CM) - Status post left knee replacement DOS 21  · Treatment Diagnosis: L knee pain M25.562, difficulty walking D64.2  Insurance/Certification information:  PT Insurance Information: Med mutual BMN, no auth 90/10 co-ins  Physician Information:  Referring Practitioner: Dr Daniel López  Has the plan of care been signed (Y/N):        []  Yes  [x]  No     Date of Patient follow up with Physician: 21      Is this a Progress Report:     [x]  Yes  []  No        If Yes:  Date Range for reporting period:  Beginnin/3/21  Endin21    Progress report will be due (10 Rx or 30 days whichever is less): 10/28/21 -D/C 1st if having R TKA      Recertification will be due (POC Duration  / 90 days whichever is less): 12 weeks      Visit # Insurance Allowable Auth Required   In-person 19 BMN []  Yes []  No    Telehealth   []  Yes []  No    Total          Functional Scale: LEFS 89%    Date assessed:  8/3/21   Functional Scale: LEFS 78%    Date assessed:  21  Functional Scale: LEFS 63%    Date assessed:  21           Therapy Diagnosis/Practice Pattern:H, surgical      Number of Comorbidities:  []0     []1-2    [x]3+    Latex Allergy:  [x]NO      []YES  Preferred Language for Healthcare:   [x]English       []other:      Pain level:  eval 2-8/10    today 2-3/10     SUBJECTIVE:  Pt's sx is getting delayed until 10/26 d/t ill family member.    OBJECTIVE:    Observation: incision healed with fascial restrictions in quad; amb with SPC vs 1 crutch improved step-through pattern but needs cueing for knee flex L VMO/rectus   Quad iso  SAQ  SLR  LAQ 15' total (set-up)   amb with crutch  SPC-SBA  Sized new cane         Side-stepping at long 1/2 wall  MW, retro MW  Red tband at feet    Tandem stance +Airex     SLS + Airex 5\"x10 Fingertip sup   Hip hike off edge of [facility] [place]     Standing lunge forward with L  UE supp, cue for vert shin pos   SS, fwd, retro walk in CC x5 ea 20#, 40#, 40#   Therapeutic Activity (37051)                                         Therapeutic Exercise and NMR EXR  [x] (95551) Provided verbal/tactile cueing for activities related to strengthening, flexibility, endurance, ROM for improvements in LE, proximal hip, and core control with self care, mobility, lifting, ambulation.  [] (23734) Provided verbal/tactile cueing for activities related to improving balance, coordination, kinesthetic sense, posture, motor skill, proprioception  to assist with LE, proximal hip, and core control in self care, mobility, lifting, ambulation and eccentric single leg control.      NMR and Therapeutic Activities:    [x] (37189 or 05932) Provided verbal/tactile cueing for activities related to improving balance, coordination, kinesthetic sense, posture, motor skill, proprioception and motor activation to allow for proper function of core, proximal hip and LE with self care and ADLs  [] (33071) Gait Re-education- Provided training and instruction to the patient for proper LE, core and proximal hip recruitment and positioning and eccentric body weight control with ambulation re-education including up and down stairs     Home Exercise Program:    [x] (32574) Reviewed/Progressed HEP activities related to strengthening, flexibility, endurance, ROM of core, proximal hip and LE for functional self-care, mobility, lifting and ambulation/stair navigation   [] (10863)Reviewed/Progressed HEP activities related to improving balance, coordination, kinesthetic sense, posture, motor skill, proprioception of core, proximal hip and LE for self care, mobility, lifting, and ambulation/stair navigation      Manual Treatments:  PROM / STM / Oscillations-Mobs:  G-I, II, III, IV (PA's, Inf., Post.)  [x] (29634) Provided manual therapy to mobilize LE, proximal hip and/or LS spine soft tissue/joints for the purpose of modulating pain, promoting relaxation,  increasing ROM, reducing/eliminating soft tissue swelling/inflammation/restriction, improving soft tissue extensibility and allowing for proper ROM for normal function with self care, mobility, lifting and ambulation. Modalities:   CP x15'   [] GAME READY (VASO)- for significant edema, swelling, pain control. -mod comp     Charges  Timed Code Treatment Minutes: 60   Total Treatment Minutes: 80 (ice, I bike)        [] EVAL (LOW) 82021   [] EVAL (MOD) 92947  [] EVAL (HIGH) 99329   [] RE-EVAL     [x] IN(18885) x  2   [] IONTO  [x] NMR (25598) x1     [] VASO  [x] Manual (85417) x  1    [] Other: Gait   [] TA x      [] Mech Traction (03834)  [] ES(attended) (54837)      [] ES (un) (83594):       GOALS:   Patient stated goal: able to walk without AD  [x]? Progressing: []? Met: []? Not Met: []? Adjusted     Therapist goals for Patient:   Short Term Goals: To be achieved in: 2 weeks  1. Independent in HEP and progression per patient tolerance, in order to prevent re-injury. []? Progressing: [x]? Met: []? Not Met: []? Adjusted  2. Patient will have a decrease in pain to facilitate improvement in movement, function, and ADLs as indicated by Functional Deficits. []? Progressing: [x]? Met: []? Not Met: []? Adjusted     Long Term Goals: To be achieved in: 12 weeks  1. Disability index score of 40% or less for the LEFS to assist with reaching prior level of function. [x]? Progressing: []? Met: []? Not Met: []? Adjusted  2. Patient will demonstrate increased AROM to 0-120 to allow for proper joint functioning as indicated by patients Functional Deficits. [x]? Progressing: []? Met: []? Not Met: [x]? Adjusted  3. Patient will demonstrate an increase in Strength to good proximal hip strength and control, within 5lb HHD in LE to allow for proper functional mobility as indicated by patients Functional Deficits. [x]? Progressing: []? Met: []? Not Met: []? Adjusted  4. Patient will return to reciprocal stairs w/ handrail or single crutch use without increased symptoms or restriction. [x]? Progressing: []? Met: []? Not Met: []? Adjusted  5. Indep with community ambulation >/=30' in order to go grocery shopping. [x]? Progressing: []? Met: []? Not Met: []? Adjusted       Progression Towards Functional goals:  [] Patient is progressing as expected towards functional goals listed. [x] Progression is slowed due to complexities listed. [] Progression has been slowed due to co-morbidities. [] Plan just implemented, too soon to assess goals progression  [] Other:     Overall Progression Towards Functional goals/ Treatment Progress Update:  [] Patient is progressing as expected towards functional goals listed. [x] Progression is slowed due to complexities/Impairments listed. [] Progression has been slowed due to co-morbidities. [] Plan just implemented, too soon to assess goals progression <30days   [] Goals require adjustment due to lack of progress  [] Patient is not progressing as expected and requires additional follow up with physician  [] Other    Prognosis for POC: [x] Good [] Fair  [] Poor      Patient requires continued skilled intervention: [x] Yes  [] No    Treatment/Activity Tolerance:  [x] Patient able to complete treatment  [] Patient limited by fatigue  [] Patient limited by pain    [] Patient limited by other medical complications  [] Other:     ASSESSMENT: Pt 's sx delayed, wIll maximize motion/strength prior to sx for R TKA on 10/26/21.       Return to Play: (if applicable)   []  Stage 1: Intro to Strength   []  Stage 2: Return to Run and Strength   []  Stage 3: Return to Jump and Strength   []  Stage 4: Dynamic Strength and Agility   []  Stage 5: Sport Specific Training     []  Ready to Return to Play, Meets All Above Stages   []  Not Ready for Return to Sports   Comments:                         PLAN: Maximize function prior to R TKA 10/26/2021. [x] Continue per plan of care [] Alter current plan (see comments above)  [] Plan of care initiated [] Hold pending MD visit [] Discharge      Electronically signed by:  Desmond Hazel PT , DPT  Note: If patient does not return for scheduled/ recommended follow up visits, this note will serve as a discharge from care along with most recent update on progress. Access Code: [email or web address]  URL: [email or web address]  Date: 08/03/2021  Prepared by: Jany Astorga    Exercises  Supine Knee Extension Stretch on Towel Roll - 2-3 x daily - 7 x weekly - 3 sets - 60 seconds hold  Long Sitting Calf Stretch with Strap - 2-3 x daily - 7 x weekly - 3 sets - 30 seconds hold  seated hamstring stretch with stool - 2-3 x daily - 7 x weekly - 3 sets - 30 seconds hold

## 2021-10-15 ENCOUNTER — HOSPITAL ENCOUNTER (OUTPATIENT)
Dept: PHYSICAL THERAPY | Age: 61
Setting detail: THERAPIES SERIES
Discharge: HOME | End: 2021-10-15
Payer: COMMERCIAL

## 2021-10-15 ENCOUNTER — APPOINTMENT (OUTPATIENT)
Dept: PHYSICAL THERAPY | Age: 61
End: 2021-10-15
Payer: COMMERCIAL

## 2021-10-15 PROCEDURE — 97140 MANUAL THERAPY 1/> REGIONS: CPT

## 2021-10-15 PROCEDURE — 97110 THERAPEUTIC EXERCISES: CPT

## 2021-10-15 NOTE — FLOWSHEET NOTE
[facility],  [address] [place]  Phone: [phone]  Fax [phone]    Physical Therapy Treatment Note/ Progress Report:           Date:  10/15/2021    Patient Name:  Esvin Silver   \"Bo\" :  1960  MRN: [id number]  Restrictions/Precautions:    Medical/Treatment Diagnosis Information:  · Diagnosis: Z96.652 (ICD-10-CM) - Status post left knee replacement DOS 21  · Treatment Diagnosis: L knee pain M25.562, difficulty walking U18.5  Insurance/Certification information:  PT Insurance Information: Med mutual BMN, no auth 90/10 co-ins  Physician Information:  Referring Practitioner: Dr Jose David Jones  Has the plan of care been signed (Y/N):        []  Yes  [x]  No     Date of Patient follow up with Physician: 21      Is this a Progress Report:     [x]  Yes  []  No        If Yes:  Date Range for reporting period:  Beginnin/3/21  Endin21    Progress report will be due (10 Rx or 30 days whichever is less): 10/28/21 -D/C 1st if having R TKA      Recertification will be due (POC Duration  / 90 days whichever is less): 12 weeks      Visit # Insurance Allowable Auth Required   In-person 20 BMN []  Yes []  No    Telehealth   []  Yes []  No    Total          Functional Scale: LEFS 89%    Date assessed:  8/3/21   Functional Scale: LEFS 78%    Date assessed:  21  Functional Scale: LEFS 63%    Date assessed:  21           Therapy Diagnosis/Practice Pattern:H, surgical       Number of Comorbidities:  []0     []1-2    [x]3+    Latex Allergy:  [x]NO      []YES  Preferred Language for Healthcare:   [x]English       []other:      Pain level:  eval 2-8/10    today -3/10     SUBJECTIVE:  Pt reports that he is feeling slightly stiffer this morning and that his L knee was sore from the LV. Pt notes that he is ready to have surgery on his R knee. Pt's sx is getting delayed until 10/26 d/t ill family member. OBJECTIVE:    Observation: incision healed with fascial restrictions in quad; amb with SPC vs 1 crutch improved step-through pattern but needs cueing for knee flex    Test measurements:  AROM 0-112 supine after manual tx/stretching quad    RESTRICTIONS/PRECAUTIONS: DM, hx leg ulcers, R knee  Severe OA (sx scheduled 10/13/21)    Exercises/Interventions:     Therapeutic Ex (33590) Sets/sec/reps Notes/CUES   Orthovitals re-assess     Knee ext prop-bone foam  HEP    standing incline board gastroc S, HS S foot on table-LS   Seated quad stretch table-side 4x30\" ea HEP    Cue for femur alignment   Seated glider knee flex  Cue to shift weight onto L hip   Hip/knee flex on SB-supine   On mat with strap   5\" x10 PT OP at end   Knee flex rocking on step    Quad set     SAQ  LAQ  Seated Marches 2x10 R/L2x10 R/L   Progress NV   SLR AAROM     Bridge with Hip ABD iso  SL bridge CL LE flat 2'' x 2 x 10   Mini squat UE supp   L slightly behind 2x10      Standing TKE LATOYA  Retro TKE to give end range ext   60#   Calf raises B  TKE at CC 2 x 10  2 x 10, 10#, 15#    Step-up 3\" [facility] platform  4\"  6\" FSU, LSU  back Steps x4 trips    6\" step up and over  8\", 6''             Ea height, x10  R, L 1 UE support         B rails, L leads up/R down  1 UE sup  B UE sup   SLS with standing glider abd, 45 deg ext diag, ext  Pain in R stance now  OVL at feet   bike 5' I w/u after set-up full revolution   Standing hip abd, ext x10 R, L Cues to keep foot neutral and avoid leaning   HSC seated EOB Black   LATOYA abd  Ext  Flex to 90 deg hip 75#     Leg press  ecc  SL  PF 2x10  2x10  x10  X 56733#, cues to avoid valgus  130#  110#  120# cue for st knees   Pt ed:    ek. RW still for gait d/t quad control    Manual Intervention (49515) x8' total    HG convex 45 deg to distal and lateral quad/ITB,   Pat mobs sup/inf  PROM knee flex/ext: supine and seated   Oscillations for pain x8'        HG sweeps sup/inf convex 45 deg bevel to quad with AROM flex/ext  light XFM to pat tendon and incision                             NMR re-education (78142)  CUES NEEDED   Weight shift attempt ~75% stance on LLE UE support   Gait with RW-cue for TKE, leyla/glute activ and upright posture in stancel  Cue for inc'd hip/knee flex    [place] 10:30 L VMO/rectus   Quad iso  SAQ  SLR  LAQ 15' total (set-up)   amb with crutch  SPC-SBA  Sized new cane         Side-stepping at long 1/2 wall  MW, retro MW  Red tband at feet    Tandem stance +Airex     SLS + Airex 5\"x10 Fingertip sup   Hip hike off edge of [facility]     Standing lunge forward with L  UE supp, cue for vert shin pos   SS, fwd, retro walk in CC x5 ea 20#, 40#, 40#   Therapeutic Activity (18802)                                         Therapeutic Exercise and NMR EXR  [x] (22092) Provided verbal/tactile cueing for activities related to strengthening, flexibility, endurance, ROM for improvements in LE, proximal hip, and core control with self care, mobility, lifting, ambulation.  [] (71153) Provided verbal/tactile cueing for activities related to improving balance, coordination, kinesthetic sense, posture, motor skill, proprioception  to assist with LE, proximal hip, and core control in self care, mobility, lifting, ambulation and eccentric single leg control.      NMR and Therapeutic Activities:    [x] (12740 or 77960) Provided verbal/tactile cueing for activities related to improving balance, coordination, kinesthetic sense, posture, motor skill, proprioception and motor activation to allow for proper function of core, proximal hip and LE with self care and ADLs  [] (26644) Gait Re-education- Provided training and instruction to the patient for proper LE, core and proximal hip recruitment and positioning and eccentric body weight control with ambulation re-education including up and down stairs     Home Exercise Program:    [x] (54312) Reviewed/Progressed HEP activities related to strengthening, flexibility, endurance, ROM of core, proximal hip and LE for functional self-care, mobility, lifting and ambulation/stair navigation   [] (91474)Reviewed/Progressed HEP activities related to improving balance, coordination, kinesthetic sense, posture, motor skill, proprioception of core, proximal hip and LE for self care, mobility, lifting, and ambulation/stair navigation      Manual Treatments:  PROM / STM / Oscillations-Mobs:  G-I, II, III, IV (PA's, Inf., Post.)  [x] (51134) Provided manual therapy to mobilize LE, proximal hip and/or LS spine soft tissue/joints for the purpose of modulating pain, promoting relaxation,  increasing ROM, reducing/eliminating soft tissue swelling/inflammation/restriction, improving soft tissue extensibility and allowing for proper ROM for normal function with self care, mobility, lifting and ambulation. Modalities:   CP x15'   [] GAME READY (VASO)- for significant edema, swelling, pain control. -mod comp     Charges  Timed Code Treatment Minutes: 55   Total Treatment Minutes: 75 (CP, bike)        [] EVAL (LOW) 63293   [] EVAL (MOD) 46246  [] EVAL (HIGH) 81853   [] RE-EVAL      [x] TC(74503) x  3   [] IONTO  [] NMR (58804) x     [] VASO  [x] Manual (09538) x  1    [] Other: Gait   [] TA x      [] Mech Traction (75304)  [] ES(attended) (77337)      [] ES (un) (55339):       GOALS:   Patient stated goal: able to walk without AD  [x]? Progressing: []? Met: []? Not Met: []? Adjusted     Therapist goals for Patient:   Short Term Goals: To be achieved in: 2 weeks  1. Independent in HEP and progression per patient tolerance, in order to prevent re-injury. []? Progressing: [x]? Met: []? Not Met: []? Adjusted  2. Patient will have a decrease in pain to facilitate improvement in movement, function, and ADLs as indicated by Functional Deficits. []? Progressing: [x]? Met: []? Not Met: []? Adjusted     Long Term Goals: To be achieved in: 12 weeks  1.  Disability index score of 40% or less for the LEFS to assist with reaching prior level of function. [x]? Progressing: []? Met: []? Not Met: []? Adjusted  2. Patient will demonstrate increased AROM to 0-120 to allow for proper joint functioning as indicated by patients Functional Deficits. [x]? Progressing: []? Met: []? Not Met: [x]? Adjusted  3. Patient will demonstrate an increase in Strength to good proximal hip strength and control, within 5lb HHD in LE to allow for proper functional mobility as indicated by patients Functional Deficits. [x]? Progressing: []? Met: []? Not Met: []? Adjusted  4. Patient will return to reciprocal stairs w/ handrail or single crutch use without increased symptoms or restriction. [x]? Progressing: []? Met: []? Not Met: []? Adjusted  5. Indep with community ambulation >/=30' in order to go grocery shopping. [x]? Progressing: []? Met: []? Not Met: []? Adjusted       Progression Towards Functional goals:  [] Patient is progressing as expected towards functional goals listed. [x] Progression is slowed due to complexities listed. [] Progression has been slowed due to co-morbidities. [] Plan just implemented, too soon to assess goals progression  [] Other:     Overall Progression Towards Functional goals/ Treatment Progress Update:  [] Patient is progressing as expected towards functional goals listed. [x] Progression is slowed due to complexities/Impairments listed. [] Progression has been slowed due to co-morbidities.   [] Plan just implemented, too soon to assess goals progression <30days   [] Goals require adjustment due to lack of progress  [] Patient is not progressing as expected and requires additional follow up with physician  [] Other    Prognosis for POC: [x] Good [] Fair  [] Poor      Patient requires continued skilled intervention: [x] Yes  [] No    Treatment/Activity Tolerance:  [x] Patient able to complete treatment  [] Patient limited by fatigue  [] Patient limited by pain    [] Patient limited by other medical complications  [] Other:      ASSESSMENT: Pt was able to perform all exercise today with minimal complaints of increased pain. Pt needed increased breaks during standing exercises due to fatigue and LE weakness. Pt requested CP at end of therapy. Return to Play: (if applicable)   []  Stage 1: Intro to Strength   []  Stage 2: Return to Run and Strength   []  Stage 3: Return to Jump and Strength   []  Stage 4: Dynamic Strength and Agility   []  Stage 5: Sport Specific Training     []  Ready to Return to Play, Meets All Above Stages   []  Not Ready for Return to Sports   Comments:                         PLAN: Maximize function prior to R TKA 10/26/2021. [x] Continue per plan of care [] Alter current plan (see comments above)  [] Plan of care initiated [] Hold pending MD visit [] Discharge      Electronically signed by:  Charly Baez PT , DPT  Verita Aase, SPT  Physical therapist was present and provided direct supervision and collaboration/assessment while treatment was provided by student physical therapist.  Note: If patient does not return for scheduled/ recommended follow up visits, this note will serve as a discharge from care along with most recent update on progress. Access Code: [email or web address]  URL: [email or web address]  Date: 08/03/2021  Prepared by: Magdaleno Astorga    Exercises  Supine Knee Extension Stretch on Towel Roll - 2-3 x daily - 7 x weekly - 3 sets - 60 seconds hold  Long Sitting Calf Stretch with Strap - 2-3 x daily - 7 x weekly - 3 sets - 30 seconds hold  seated hamstring stretch with stool - 2-3 x daily - 7 x weekly - 3 sets - 30 seconds hold

## 2021-10-19 ENCOUNTER — PREP FOR PROCEDURE (OUTPATIENT)
Dept: ORTHOPEDICS UNIT | Age: 61
End: 2021-10-19

## 2021-10-19 ENCOUNTER — HOSPITAL ENCOUNTER (OUTPATIENT)
Dept: PHYSICAL THERAPY | Age: 61
Setting detail: THERAPIES SERIES
Discharge: HOME | End: 2021-10-19
Payer: COMMERCIAL

## 2021-10-19 ENCOUNTER — TELEPHONE (OUTPATIENT)
Dept: ORTHOPEDIC SURGERY | Age: 61
End: 2021-10-19

## 2021-10-19 PROCEDURE — 97140 MANUAL THERAPY 1/> REGIONS: CPT

## 2021-10-19 PROCEDURE — 97110 THERAPEUTIC EXERCISES: CPT

## 2021-10-19 NOTE — FLOWSHEET NOTE
 Test measurements:  AROM 0-118 supine after manual tx/stretching quad    RESTRICTIONS/PRECAUTIONS: DM, hx leg ulcers, R knee  Severe OA (sx scheduled 10/13/21)    Exercises/Interventions:     Therapeutic Ex (68944) Sets/sec/reps Notes/CUES   Orthovitals re-assess     Knee ext prop-bone foam  HEP    standing incline board gastroc S, HS S foot on table-LS   Seated quad stretch table-side 4x30\" ea HEP    Cue for femur alignment   Seated glider knee flex  Cue to shift weight onto L hip   Hip/knee flex on SB-supine   On mat with strap   5\" x10 PT OP at end   Knee flex rocking on step    Quad set     SAQ  LAQ  Seated Marches 2x10 R/L2x10 R/L   Progress NV   SLR AAROM     Bridge with Hip ABD iso  SL bridge CL LE flat    Mini squat UE supp   L slightly behind 2x10      Standing TKE LATOYA  Retro TKE to give end range ext   60#   Calf raises B  TKE at CC     Step-up 3\" [facility] platform  4\"  6\" FSU, LSU  back Steps x4 trips    6\" step up and over  8\",              height, x15  R, L 1 UE support         B rails, L leads up/R down  1 UE sup  B UE sup   SLS with standing glider abd, 45 deg ext diag, ext  Pain in R stance now  OVL at feet   bike 5' I w/u after set-up full revolution   Standing hip abd, ext x10 R, L Cues to keep foot neutral and avoid leaning   HSC seated EOB Black   LATOYA abd  Ext  Flex to 90 deg hip 15 ea75#     Leg press  ecc  SL  PF 2x10  2x10  x10  140#, cues to avoid valgus  130#  110#  120# cue for st knees   Pt ed:    Yuniel Aragon. RW still for gait d/t quad control    Manual Intervention (63858) x10' total      Pat mobs sup/inf  PROM knee flex/ext: supine and seated   Oscillations for pain         HG sweeps sup/inf convex 45 deg bevel to quad with AROM flex/ext  light XFM to pat tendon and incision x10'                            NMR re-education (57547)  CUES NEEDED   Weight shift attempt ~75% stance on LLE UE support   Gait without SPC   Cue for inc'd hip/knee flex 50'x2   Russian 10:30 L VMO/rectus   [employer] iso  SAQ  SLR  LAQ 15' total (set-up)   amb with crutch  SPC-SBA  Sized new cane         Side-stepping at long 1/2 wall  MW, retro MW  Red tband at feet    Tandem stance +Airex     SLS + Airex  Fingertip sup   Hip hike off edge of [facility] [place]     Standing lunge forward with L  UE supp, cue for vert shin pos   SS, fwd, retro walk in CC x5 ea 20#, 40#, 40#   Therapeutic Activity (41145)                                         Therapeutic Exercise and NMR EXR  [x] (07434) Provided verbal/tactile cueing for activities related to strengthening, flexibility, endurance, ROM for improvements in LE, proximal hip, and core control with self care, mobility, lifting, ambulation.  [] (07393) Provided verbal/tactile cueing for activities related to improving balance, coordination, kinesthetic sense, posture, motor skill, proprioception  to assist with LE, proximal hip, and core control in self care, mobility, lifting, ambulation and eccentric single leg control.      NMR and Therapeutic Activities:    [x] (04926 or 93029) Provided verbal/tactile cueing for activities related to improving balance, coordination, kinesthetic sense, posture, motor skill, proprioception and motor activation to allow for proper function of core, proximal hip and LE with self care and ADLs  [] (66271) Gait Re-education- Provided training and instruction to the patient for proper LE, core and proximal hip recruitment and positioning and eccentric body weight control with ambulation re-education including up and down stairs     Home Exercise Program:    [x] (17929) Reviewed/Progressed HEP activities related to strengthening, flexibility, endurance, ROM of core, proximal hip and LE for functional self-care, mobility, lifting and ambulation/stair navigation   [] (77662)Reviewed/Progressed HEP activities related to improving balance, coordination, kinesthetic sense, posture, motor skill, proprioception of core, proximal hip and LE for self care, mobility, lifting, and ambulation/stair navigation      Manual Treatments:  PROM / STM / Oscillations-Mobs:  G-I, II, III, IV (PA's, Inf., Post.)  [x] (97659) Provided manual therapy to mobilize LE, proximal hip and/or LS spine soft tissue/joints for the purpose of modulating pain, promoting relaxation,  increasing ROM, reducing/eliminating soft tissue swelling/inflammation/restriction, improving soft tissue extensibility and allowing for proper ROM for normal function with self care, mobility, lifting and ambulation. Modalities:   CP x15'   [] GAME READY (VASO)- for significant edema, swelling, pain control. -mod comp     Charges  Timed Code Treatment Minutes: 44   Total Treatment Minutes: 64 (CP, bike)        [] EVAL (LOW) 72961   [] EVAL (MOD) 81717  [] EVAL (HIGH) 62153   [] RE-EVAL      [x] DO(83800) x  2   [] IONTO  [] NMR (67305) x     [] VASO  [x] Manual (20195) x  1    [] Other: Gait   [] TA x      [] Mech Traction (11254)  [] ES(attended) (56501)      [] ES (un) (34558):       GOALS:   Patient stated goal: able to walk without AD  [x]? Progressing: []? Met: []? Not Met: []? Adjusted     Therapist goals for Patient:   Short Term Goals: To be achieved in: 2 weeks  1. Independent in HEP and progression per patient tolerance, in order to prevent re-injury. []? Progressing: [x]? Met: []? Not Met: []? Adjusted  2. Patient will have a decrease in pain to facilitate improvement in movement, function, and ADLs as indicated by Functional Deficits. []? Progressing: [x]? Met: []? Not Met: []? Adjusted     Long Term Goals: To be achieved in: 12 weeks  1. Disability index score of 40% or less for the LEFS to assist with reaching prior level of function. [x]? Progressing: []? Met: []? Not Met: []? Adjusted  2. Patient will demonstrate increased AROM to 0-120 to allow for proper joint functioning as indicated by patients Functional Deficits. [x]? Progressing: []? Met: []? Not Met: [x]? Adjusted  3.  Patient will demonstrate an increase in Strength to good proximal hip strength and control, within 5lb HHD in LE to allow for proper functional mobility as indicated by patients Functional Deficits. []? Progressing: [x]? Met: []? Not Met: []? Adjusted  4. Patient will return to reciprocal stairs w/ handrail or single crutch use without increased symptoms or restriction. []? Progressing: [x]? Met: []? Not Met: []? Adjusted  5. Indep with community ambulation >/=30' in order to go grocery shopping. [x]? Progressing: []? Met: []? Not Met: []? Adjusted       Progression Towards Functional goals:  [] Patient is progressing as expected towards functional goals listed. [x] Progression is slowed due to complexities listed. [] Progression has been slowed due to co-morbidities. [] Plan just implemented, too soon to assess goals progression  [] Other:     Overall Progression Towards Functional goals/ Treatment Progress Update:  [] Patient is progressing as expected towards functional goals listed. [x] Progression is slowed due to complexities/Impairments listed. [] Progression has been slowed due to co-morbidities. [] Plan just implemented, too soon to assess goals progression <30days   [] Goals require adjustment due to lack of progress  [] Patient is not progressing as expected and requires additional follow up with physician  [] Other    Prognosis for POC: [x] Good [] Fair  [] Poor      Patient requires continued skilled intervention: [x] Yes  [] No    Treatment/Activity Tolerance:  [x] Patient able to complete treatment  [] Patient limited by fatigue  [] Patient limited by pain    [] Patient limited by other medical complications  [] Other:      ASSESSMENT: Pt worked on amb with SPC-still has dec'd L knee flex without SPC in part d/t confidence in R knee in stance. His flexion ROM was improved today.     Return to Play: (if applicable)   []  Stage 1: Intro to Strength   []  Stage 2: Return to Run and Strength   []  Stage 3: Return to Jump and Strength   []  Stage 4: Dynamic Strength and Agility   []  Stage 5: Sport Specific Training     []  Ready to Return to Play, Meets All Above Stages   []  Not Ready for Return to Sports   Comments:                         PLAN: Maximize function prior to R TKA 10/26/2021. [x] Continue per plan of care [] Alter current plan (see comments above)  [] Plan of care initiated [] Hold pending MD visit [] Discharge      Electronically signed by:  Bethany De La Fuente PT , DPT  FIDELINA [place] Valle  Physical therapist was present and provided direct supervision and collaboration/assessment while treatment was provided by student physical therapist.  Note: If patient does not return for scheduled/ recommended follow up visits, this note will serve as a discharge from care along with most recent update on progress. Access Code: [email or web address]  URL: [email or web address]  Date: 08/03/2021  Prepared by: Rui Astorga    Exercises  Supine Knee Extension Stretch on Towel Roll - 2-3 x daily - 7 x weekly - 3 sets - 60 seconds hold  Long Sitting Calf Stretch with Strap - 2-3 x daily - 7 x weekly - 3 sets - 30 seconds hold  seated hamstring stretch with stool - 2-3 x daily - 7 x weekly - 3 sets - 30 seconds hold

## 2021-10-20 ENCOUNTER — HOSPITAL ENCOUNTER (OUTPATIENT)
Age: 61
Discharge: HOME | End: 2021-10-20
Payer: COMMERCIAL

## 2021-10-20 PROCEDURE — U0003 INFECTIOUS AGENT DETECTION BY NUCLEIC ACID (DNA OR RNA); SEVERE ACUTE RESPIRATORY SYNDROME CORONAVIRUS 2 (SARS-COV-2) (CORONAVIRUS DISEASE [COVID-19]), AMPLIFIED PROBE TECHNIQUE, MAKING USE OF HIGH THROUGHPUT TECHNOLOGIES AS DESCRIBED BY CMS-2020-01-R: HCPCS

## 2021-10-20 PROCEDURE — U0005 INFEC AGEN DETEC AMPLI PROBE: HCPCS

## 2021-10-20 NOTE — PROGRESS NOTES
C-Difficile admission screening and protocol:     * Admitted with diarrhea?n     *Prior history of C-Diff. In last 3 months?n     *Antibiotic use in the past 6-8 weeks? yes     *Prior hospitalization or nursing home in the last month?  n    [facility]. .call before you fall  [address] time__700 per pt per William's office__        Surgery time____________    Take the following medications with a sip of water:    Do not eat or drink anything after 12:00 midnight prior to your surgery. This includes water chewing gum, mints and ice chips. You may brush your teeth and gargle the morning of your surgery, but do not swallow the water     Please see your family doctor/pediatrician for a history and physical and/or concerning medications. Bring any test results/reports from your physicians office. If you are under the care of a heart doctor or specialist doctor, please be aware that you may be asked to them for clearance    You may be asked to stop blood thinners such as Coumadin, Plavix, Fragmin, Lovenox, etc., or any anti-inflammatories such as:  Aspirin, Ibuprofen, Advil, Naproxen prior to your surgery. We also ask that you stop any OTC medications such as fish oil, vitamin E, glucosamine, garlic, Multivitamins, COQ 10, etc.    We ask that you do not smoke 24 hours prior to surgery  We ask that you do not  drink any alcoholic beverages 24 hours prior to surgery     You must make arrangements for a responsible adult to take you home after your surgery. For your safety you will not be allowed to leave alone or drive yourself home. Your surgery will be cancelled if you do not have a ride home. Also for your safety, it is strongly suggested that someone stay with you the first 24 hours after your surgery. A parent or legal guardian must accompany a child scheduled for surgery and plan to stay at the hospital until the child is discharged.     Please do not bring other children with you. For your comfort, please wear simple loose fitting clothing to the hospital.  Please do not bring valuables. Do not wear any make-up or nail polish on your fingers or toes      For your safety, please do not wear any jewelry or body piercing's on the day of surgery. All jewelry must be removed. If you have dentures, they will be removed before going to operating room. For your convenience, we will provide you with a container. If you wear contact lenses or glasses, they will be removed, please bring a case for them. If you have a living will and a durable power of  for healthcare, please bring in a copy. As part of our patient safety program to minimize surgical site infections, we ask you to do the following:    · Please notify your surgeon if you develop any illness between         now and the  day of your surgery. · This includes a cough, cold, fever, sore throat, nausea,         or vomiting, and diarrhea, etc.  ·  Please notify your surgeon if you experience dizziness, shortness         of breath or blurred vision between now and the time of your surgery. Do not shave your operative site 96 hours prior to surgery. For face and neck surgery, men may use an electric razor 48 hours   prior to surgery. You may shower the night before surgery or the morning of   your surgery with an antibacterial soap. You will need to bring a photo ID and insurance card    [facility] has an onsite pharmacy, would you like to utilize our pharmacy     If you will be staying overnight and use a C-pap machine, please bring   your C-pap to hospital     Our goal is to provide you with excellent care, therefore, visitors will be limited to two(2) in the room at a time so that we may focus on providing this care for you. Please contact pre-admission testing if you have any further questions.                  [facility] phone number:  [phone] [facility]

## 2021-10-20 NOTE — PROGRESS NOTES
Per Nancy Perez pt coming in for UAR, pre/ albumin, Tand S and any other necessary labs needed per Freddie Lentz.   Office will review results    PAT interview complete

## 2021-10-21 ENCOUNTER — HOSPITAL ENCOUNTER (OUTPATIENT)
Age: 61
Discharge: HOME | End: 2021-10-21
Payer: COMMERCIAL

## 2021-10-21 DIAGNOSIS — M17.11 PRIMARY OSTEOARTHRITIS OF RIGHT KNEE: ICD-10-CM

## 2021-10-21 LAB
ALBUMIN SERPL-MCNC: 4.1 G/DL (ref 3.4–5)
BILIRUBIN URINE: NEGATIVE
BLOOD, URINE: NEGATIVE
CLARITY: CLEAR
COLOR: YELLOW
GLUCOSE URINE: NEGATIVE MG/DL
KETONES, URINE: NEGATIVE MG/DL
LEUKOCYTE ESTERASE, URINE: NEGATIVE
MICROSCOPIC EXAMINATION: NORMAL
NITRITE, URINE: NEGATIVE
PH UA: 7.5 (ref 5–8)
PREALBUMIN: 26.5 MG/DL (ref 20–40)
PROTEIN UA: NEGATIVE MG/DL
SARS-COV-2: NOT DETECTED
SPECIFIC GRAVITY UA: 1.02 (ref 1–1.03)
URINE REFLEX TO CULTURE: NORMAL
URINE TYPE: NORMAL
UROBILINOGEN, URINE: 1 E.U./DL

## 2021-10-21 PROCEDURE — 36415 COLL VENOUS BLD VENIPUNCTURE: CPT

## 2021-10-21 PROCEDURE — 82040 ASSAY OF SERUM ALBUMIN: CPT

## 2021-10-21 PROCEDURE — 84134 ASSAY OF PREALBUMIN: CPT

## 2021-10-21 PROCEDURE — 81003 URINALYSIS AUTO W/O SCOPE: CPT

## 2021-10-22 ENCOUNTER — HOSPITAL ENCOUNTER (OUTPATIENT)
Dept: PHYSICAL THERAPY | Age: 61
Setting detail: THERAPIES SERIES
Discharge: HOME | End: 2021-10-22
Payer: COMMERCIAL

## 2021-10-22 PROCEDURE — 97140 MANUAL THERAPY 1/> REGIONS: CPT

## 2021-10-22 RX ORDER — OXYCODONE HYDROCHLORIDE 10 MG/1
10 TABLET ORAL ONCE
Status: CANCELLED | OUTPATIENT
Start: 2021-10-22 | End: 2021-10-22

## 2021-10-22 RX ORDER — TRANEXAMIC ACID 650 1/1
1950 TABLET ORAL ONCE
Status: CANCELLED | OUTPATIENT
Start: 2021-10-22 | End: 2021-10-22

## 2021-10-22 NOTE — DISCHARGE SUMMARY
[facility],  [address]  Phone: [phone]  Fax [phone]    Physical Therapy Treatment Note/ Progress Report:           Date:  10/22/2021    Patient Name:  Bert Leone   \"Bo\" :  1960  MRN: [id number]  Restrictions/Precautions:    Medical/Treatment Diagnosis Information:  · Diagnosis: Z96.652 (ICD-10-CM) - Status post left knee replacement DOS 21  · Treatment Diagnosis: L knee pain M25.562, difficulty walking P61.3  Insurance/Certification information:  PT Insurance Information: Med mutual BMN, no auth 90/10 co-ins  Physician Information:  Referring Practitioner: Dr Fernandez Members  Has the plan of care been signed (Y/N):        []  Yes  [x]  No     Date of Patient follow up with Physician: 21      Is this a Progress Report:     []  Yes  [x]  No        If Yes:  Date Range for reporting period:  Beginnin/3/21  Ending: 10/22/21    Progress report will be due (10 Rx or 30 days whichever is less): 10/28/21 -D/C 1st if having R TKA      Recertification will be due (POC Duration  / 90 days whichever is less): 12 weeks      Visit # Insurance Allowable Auth Required   In-person 22-D/C BMN []  Yes []  No    Telehealth   []  Yes []  No    Total          Functional Scale: LEFS 89%    Date assessed:  8/3/21   Functional Scale: LEFS 78%    Date assessed:  21  Functional Scale: LEFS 63%    Date assessed:  21   Functional Scale: LEFS 61%    Date assessed: 10/22/21     Therapy Diagnosis/Practice Pattern:H, surgical       Number of Comorbidities:  []0     []1-2    [x]3+    Latex Allergy:  [x]NO      []YES  Preferred Language for Healthcare:   [x]English       []other:      Pain level:  eval 2-8/10         SUBJECTIVE:  Pt reports to have an increase in pain and soreness in his knees, hips and calves.  Pt notes that the pain has been pretty severe the past couple days, where it has been difficult for him to get out of a chair. Pt will be discharged today to prepare for R TKA on 10/26/21.      OBJECTIVE:    Observation: incision healed with fascial restrictions in quad; amb with SPC vs 1 crutch improved step-through pattern but needs cueing for knee flex    Test measurements:  L knee AROM 0-118 supine after manual tx/stretching quad; R knee 0-122   Dynamometer: Quad R 37.1#, L 32.4#; hip abd R 17#, 21.9#     RESTRICTIONS/PRECAUTIONS: DM, hx leg ulcers, R knee  Severe OA (sx scheduled 10/26/21)    Exercises/Interventions:     Therapeutic Ex (84224) Sets/sec/reps Notes/CUES   Orthovitals re-assess     Knee ext prop-bone foam  HEP    standing incline board gastroc S, HS S foot on table-LS   Seated quad stretch table-side HEP    Cue for femur alignment   Seated glider knee flex Cue to shift weight onto L hip   Hip/knee flex on SB-supine   On mat with strap PT OP at end   Knee flex rocking on step    Quad set    SAQ  LAQ  Seated Marches   Progress NV   SLR AAROM     Bridge with Hip ABD iso  SL bridge CL LE flat    Mini squat UE supp   L slightly behind 2x10      Standing TKE LATOYA  Retro TKE to give end range ext   60#   Calf raises B  TKE at CC     Step-up 3\" [facility] platform  4\"  6\" [employer]  back Steps x4 trips    6\" step up and over  8\",    1 UE support         B rails, L leads up/R down  1 UE sup  B UE sup   SLS with standing glider abd, 45 deg ext diag, ext Pain in R stance now  OVL at feet   bike I w/u after set-up full revolution   Standing hip abd, ext Cues to keep foot neutral and avoid leaning   HSC seated EOB Black   LATOYA abd  Ext  Flex to 90 deg hip 15 ea75#     Leg press  ecc  SL  #, cues to avoid valgus  130#  110#  120# cue for st knees   Quad S EOB 30'' x 3       Pt ed:    ek. RW still for gait d/t quad control    Manual Intervention (30112) x20' total      Pat mobs sup/inf   PROM knee flex/ext: supine and seated   Oscillations for pain         STM to quad  HG sweeps sup/inf convex 45 deg bevel to quad self-care, mobility, lifting and ambulation/stair navigation   [] (07782)Reviewed/Progressed HEP activities related to improving balance, coordination, kinesthetic sense, posture, motor skill, proprioception of core, proximal hip and LE for self care, mobility, lifting, and ambulation/stair navigation      Manual Treatments:  PROM / STM / Oscillations-Mobs:  G-I, II, III, IV (PA's, Inf., Post.)  [x] (68178) Provided manual therapy to mobilize LE, proximal hip and/or LS spine soft tissue/joints for the purpose of modulating pain, promoting relaxation,  increasing ROM, reducing/eliminating soft tissue swelling/inflammation/restriction, improving soft tissue extensibility and allowing for proper ROM for normal function with self care, mobility, lifting and ambulation. Modalities:   CP x15'   [] GAME READY (VASO)- for significant edema, swelling, pain control. -mod comp     Charges  Timed Code Treatment Minutes: 35   Total Treatment Minutes: 50 (CP, bike)        [] EVAL (LOW) 42541   [] EVAL (MOD) 96874  [] EVAL (HIGH) 56190   [] RE-EVAL      [] QO(41880) x     [] IONTO  [] NMR (40169) x     [] VASO  [x] Manual (16123) x  2   [] Other: Gait   [] TA x      [] Mech Traction (19981)  [] ES(attended) (64777)     [] ES (un) (02825):       GOALS:   Patient stated goal: able to walk without AD  [x]? Progressing: []? Met: []? Not Met: []? Adjusted     Therapist goals for Patient:   Short Term Goals: To be achieved in: 2 weeks  1. Independent in HEP and progression per patient tolerance, in order to prevent re-injury. []? Progressing: [x]? Met: []? Not Met: []? Adjusted  2. Patient will have a decrease in pain to facilitate improvement in movement, function, and ADLs as indicated by Functional Deficits. []? Progressing: [x]? Met: []? Not Met: []? Adjusted     Long Term Goals: To be achieved in: 12 weeks  1. Disability index score of 40% or less for the LEFS to assist with reaching prior level of function. [x]? Progressin% limited more by R knee pain than L at this time (sx R TKA 10/26)  []? Met: []? Not Met: []? Adjusted  2. Patient will demonstrate increased AROM to 0-120 to allow for proper joint functioning as indicated by patients Functional Deficits. []? Progressing: [x]? Met:Able to get to 0-118 []? Not Met: []? Adjusted  3. Patient will demonstrate an increase in Strength to good proximal hip strength and control, within 5lb HHD in LE to allow for proper functional mobility as indicated by patients Functional Deficits. []? Progressing: [x]? Met: []? Not Met: []? Adjusted  4. Patient will return to reciprocal stairs w/ handrail or single crutch use without increased symptoms or restriction. []? Progressing: [x]? Met: []? Not Met: []? Adjusted  5. Indep with community ambulation >/=30' in order to go grocery shopping. []? Progressing: [x]? Met: []? Not Met: []? Adjusted       Progression Towards Functional goals:  [x] Patient is progressing as expected towards functional goals listed. [] Progression is slowed due to complexities listed. [] Progression has been slowed due to co-morbidities. [] Plan just implemented, too soon to assess goals progression  [] Other:     Overall Progression Towards Functional goals/ Treatment Progress Update:  [x] Patient is progressing as expected towards functional goals listed. [] Progression is slowed due to complexities/Impairments listed. [] Progression has been slowed due to co-morbidities.   [] Plan just implemented, too soon to assess goals progression <30days   [] Goals require adjustment due to lack of progress  [] Patient is not progressing as expected and requires additional follow up with physician  [] Other    Prognosis for POC: [x] Good [] Fair  [] Poor      Patient requires continued skilled intervention: [x] Yes  [] No    Treatment/Activity Tolerance:  [x] Patient able to complete treatment  [] Patient limited by fatigue  [] Patient limited by pain [] Patient limited by other medical complications  [] Other:      ASSESSMENT: Pt was discharged and prepared for R TKA on 10/26/21. Pt has made progress and met his goals for the L TKA. Pt was able to achieve 118 AROM knee flexion today with increased pain. Pt requested CP at the end of therapy. Pt will be back in PT following surgery for R TKA and maintenance of L knee. Return to Play: (if applicable)   []  Stage 1: Intro to Strength   []  Stage 2: Return to Run and Strength   []  Stage 3: Return to Jump and Strength   []  Stage 4: Dynamic Strength and Agility   []  Stage 5: Sport Specific Training     []  Ready to Return to Play, Meets All Above Stages   []  Not Ready for Return to Sports   Comments:                         PLAN: Discharge for R TKA on 10/26/2021. [] Continue per plan of care [] Alter current plan (see comments above)  [] Plan of care initiated [] Hold pending MD visit [x] Discharge      Electronically signed by:  Tiffanie Estrella PT , DPT  FIDELINA Berumen  Physical therapist was present and provided direct supervision and collaboration/assessment while treatment was provided by student physical therapist.  Note: If patient does not return for scheduled/ recommended follow up visits, this note will serve as a discharge from care along with most recent update on progress. Access Code: [email or web address]  URL: [email or web address]  Date: 08/03/2021  Prepared by: Janett Astorga    Exercises  Supine Knee Extension Stretch on Towel Roll - 2-3 x daily - 7 x weekly - 3 sets - 60 seconds hold  Long Sitting Calf Stretch with Strap - 2-3 x daily - 7 x weekly - 3 sets - 30 seconds hold  seated hamstring stretch with stool - 2-3 x daily - 7 x weekly - 3 sets - 30 seconds hold

## 2021-10-25 ENCOUNTER — ANESTHESIA EVENT (OUTPATIENT)
Dept: OPERATING ROOM | Age: 61
DRG: 470 | End: 2021-10-25
Payer: COMMERCIAL

## 2021-10-26 ENCOUNTER — HOSPITAL ENCOUNTER (INPATIENT)
Age: 61
LOS: 1 days | Discharge: HOME HEALTH | DRG: 470 | End: 2021-10-27
Attending: ORTHOPAEDIC SURGERY | Admitting: ORTHOPAEDIC SURGERY
Payer: COMMERCIAL

## 2021-10-26 ENCOUNTER — ANESTHESIA (OUTPATIENT)
Dept: OPERATING ROOM | Age: 61
DRG: 470 | End: 2021-10-26
Payer: COMMERCIAL

## 2021-10-26 ENCOUNTER — APPOINTMENT (OUTPATIENT)
Dept: GENERAL RADIOLOGY | Age: 61
DRG: 470 | End: 2021-10-26
Attending: ORTHOPAEDIC SURGERY
Payer: COMMERCIAL

## 2021-10-26 VITALS
DIASTOLIC BLOOD PRESSURE: 57 MMHG | SYSTOLIC BLOOD PRESSURE: 140 MMHG | TEMPERATURE: 97.5 F | RESPIRATION RATE: 13 BRPM | OXYGEN SATURATION: 100 %

## 2021-10-26 DIAGNOSIS — M17.11 ARTHRITIS OF RIGHT KNEE: ICD-10-CM

## 2021-10-26 DIAGNOSIS — I10 ESSENTIAL HYPERTENSION: ICD-10-CM

## 2021-10-26 LAB
ABO/RH: NORMAL
ANTIBODY SCREEN: NORMAL
GLUCOSE BLD-MCNC: 104 MG/DL (ref 70–99)
GLUCOSE BLD-MCNC: 124 MG/DL (ref 70–99)
GLUCOSE BLD-MCNC: 159 MG/DL (ref 70–99)
PERFORMED ON: ABNORMAL

## 2021-10-26 PROCEDURE — 3700000001 HC ADD 15 MINUTES (ANESTHESIA): Performed by: ORTHOPAEDIC SURGERY

## 2021-10-26 PROCEDURE — 6370000000 HC RX 637 (ALT 250 FOR IP): Performed by: ORTHOPAEDIC SURGERY

## 2021-10-26 PROCEDURE — 73560 X-RAY EXAM OF KNEE 1 OR 2: CPT

## 2021-10-26 PROCEDURE — C1713 ANCHOR/SCREW BN/BN,TIS/BN: HCPCS | Performed by: ORTHOPAEDIC SURGERY

## 2021-10-26 PROCEDURE — 2580000003 HC RX 258: Performed by: ORTHOPAEDIC SURGERY

## 2021-10-26 PROCEDURE — 88311 DECALCIFY TISSUE: CPT

## 2021-10-26 PROCEDURE — 94150 VITAL CAPACITY TEST: CPT

## 2021-10-26 PROCEDURE — 7100000000 HC PACU RECOVERY - FIRST 15 MIN: Performed by: ORTHOPAEDIC SURGERY

## 2021-10-26 PROCEDURE — 87641 MR-STAPH DNA AMP PROBE: CPT

## 2021-10-26 PROCEDURE — G0378 HOSPITAL OBSERVATION PER HR: HCPCS

## 2021-10-26 PROCEDURE — 27447 TOTAL KNEE ARTHROPLASTY: CPT | Performed by: ORTHOPAEDIC SURGERY

## 2021-10-26 PROCEDURE — 3700000000 HC ANESTHESIA ATTENDED CARE: Performed by: ORTHOPAEDIC SURGERY

## 2021-10-26 PROCEDURE — 8E0Y0CZ ROBOTIC ASSISTED PROCEDURE OF LOWER EXTREMITY, OPEN APPROACH: ICD-10-PCS | Performed by: ORTHOPAEDIC SURGERY

## 2021-10-26 PROCEDURE — 1200000000 HC SEMI PRIVATE

## 2021-10-26 PROCEDURE — 3600000005 HC SURGERY LEVEL 5 BASE: Performed by: ORTHOPAEDIC SURGERY

## 2021-10-26 PROCEDURE — 94761 N-INVAS EAR/PLS OXIMETRY MLT: CPT

## 2021-10-26 PROCEDURE — 97116 GAIT TRAINING THERAPY: CPT

## 2021-10-26 PROCEDURE — 97530 THERAPEUTIC ACTIVITIES: CPT

## 2021-10-26 PROCEDURE — 2720000010 HC SURG SUPPLY STERILE: Performed by: ORTHOPAEDIC SURGERY

## 2021-10-26 PROCEDURE — 2500000003 HC RX 250 WO HCPCS

## 2021-10-26 PROCEDURE — 2709999900 HC NON-CHARGEABLE SUPPLY: Performed by: ORTHOPAEDIC SURGERY

## 2021-10-26 PROCEDURE — 6360000002 HC RX W HCPCS: Performed by: ORTHOPAEDIC SURGERY

## 2021-10-26 PROCEDURE — C1776 JOINT DEVICE (IMPLANTABLE): HCPCS | Performed by: ORTHOPAEDIC SURGERY

## 2021-10-26 PROCEDURE — 86900 BLOOD TYPING SEROLOGIC ABO: CPT

## 2021-10-26 PROCEDURE — 27447 TOTAL KNEE ARTHROPLASTY: CPT | Performed by: PHYSICIAN ASSISTANT

## 2021-10-26 PROCEDURE — 64447 NJX AA&/STRD FEMORAL NRV IMG: CPT | Performed by: ANESTHESIOLOGY

## 2021-10-26 PROCEDURE — 6360000002 HC RX W HCPCS: Performed by: ANESTHESIOLOGY

## 2021-10-26 PROCEDURE — 97161 PT EVAL LOW COMPLEX 20 MIN: CPT

## 2021-10-26 PROCEDURE — 2700000000 HC OXYGEN THERAPY PER DAY

## 2021-10-26 PROCEDURE — 86901 BLOOD TYPING SEROLOGIC RH(D): CPT

## 2021-10-26 PROCEDURE — 86850 RBC ANTIBODY SCREEN: CPT

## 2021-10-26 PROCEDURE — 88305 TISSUE EXAM BY PATHOLOGIST: CPT

## 2021-10-26 PROCEDURE — 20985 CPTR-ASST DIR MS PX: CPT | Performed by: ORTHOPAEDIC SURGERY

## 2021-10-26 PROCEDURE — 3E0T3BZ INTRODUCTION OF ANESTHETIC AGENT INTO PERIPHERAL NERVES AND PLEXI, PERCUTANEOUS APPROACH: ICD-10-PCS | Performed by: ANESTHESIOLOGY

## 2021-10-26 PROCEDURE — 97535 SELF CARE MNGMENT TRAINING: CPT

## 2021-10-26 PROCEDURE — 3600000015 HC SURGERY LEVEL 5 ADDTL 15MIN: Performed by: ORTHOPAEDIC SURGERY

## 2021-10-26 PROCEDURE — 7100000001 HC PACU RECOVERY - ADDTL 15 MIN: Performed by: ORTHOPAEDIC SURGERY

## 2021-10-26 PROCEDURE — 2580000003 HC RX 258: Performed by: ANESTHESIOLOGY

## 2021-10-26 PROCEDURE — 2500000003 HC RX 250 WO HCPCS: Performed by: ANESTHESIOLOGY

## 2021-10-26 PROCEDURE — 0SRC0JA REPLACEMENT OF RIGHT KNEE JOINT WITH SYNTHETIC SUBSTITUTE, UNCEMENTED, OPEN APPROACH: ICD-10-PCS | Performed by: ORTHOPAEDIC SURGERY

## 2021-10-26 PROCEDURE — 6360000002 HC RX W HCPCS

## 2021-10-26 PROCEDURE — C9290 INJ, BUPIVACAINE LIPOSOME: HCPCS | Performed by: ORTHOPAEDIC SURGERY

## 2021-10-26 PROCEDURE — 97166 OT EVAL MOD COMPLEX 45 MIN: CPT

## 2021-10-26 DEVICE — IMPLANTABLE DEVICE: Type: IMPLANTABLE DEVICE | Site: KNEE | Status: FUNCTIONAL

## 2021-10-26 DEVICE — COMPONENT PAT DIA35MM THK10MM SUPERIOR/INFERIOR KNEE: Type: IMPLANTABLE DEVICE | Site: KNEE | Status: FUNCTIONAL

## 2021-10-26 DEVICE — BASEPLATE TIB SZ 5 AP49MM ML74MM KNEE TRITANIUM 4 CRUCFRM: Type: IMPLANTABLE DEVICE | Site: KNEE | Status: FUNCTIONAL

## 2021-10-26 DEVICE — INSERT TIB CNDYL STBL 5 9 MM KNEE 5/PK X3 TRIATHLON: Type: IMPLANTABLE DEVICE | Site: KNEE | Status: FUNCTIONAL

## 2021-10-26 RX ORDER — LIDOCAINE HYDROCHLORIDE 20 MG/ML
INJECTION, SOLUTION EPIDURAL; INFILTRATION; INTRACAUDAL; PERINEURAL PRN
Status: DISCONTINUED | OUTPATIENT
Start: 2021-10-26 | End: 2021-10-26 | Stop reason: SDUPTHER

## 2021-10-26 RX ORDER — ACETAMINOPHEN 325 MG/1
650 TABLET ORAL EVERY 6 HOURS
Status: DISCONTINUED | OUTPATIENT
Start: 2021-10-26 | End: 2021-10-27 | Stop reason: HOSPADM

## 2021-10-26 RX ORDER — CETIRIZINE HYDROCHLORIDE 10 MG/1
10 TABLET ORAL DAILY
Status: DISCONTINUED | OUTPATIENT
Start: 2021-10-27 | End: 2021-10-27 | Stop reason: HOSPADM

## 2021-10-26 RX ORDER — MORPHINE SULFATE 2 MG/ML
2 INJECTION, SOLUTION INTRAMUSCULAR; INTRAVENOUS EVERY 5 MIN PRN
Status: DISCONTINUED | OUTPATIENT
Start: 2021-10-26 | End: 2021-10-26 | Stop reason: HOSPADM

## 2021-10-26 RX ORDER — OXYCODONE HYDROCHLORIDE 10 MG/1
10 TABLET ORAL ONCE
Status: COMPLETED | OUTPATIENT
Start: 2021-10-26 | End: 2021-10-26

## 2021-10-26 RX ORDER — ROPIVACAINE HYDROCHLORIDE 5 MG/ML
INJECTION, SOLUTION EPIDURAL; INFILTRATION; PERINEURAL
Status: COMPLETED | OUTPATIENT
Start: 2021-10-26 | End: 2021-10-26

## 2021-10-26 RX ORDER — MEPERIDINE HYDROCHLORIDE 25 MG/ML
12.5 INJECTION INTRAMUSCULAR; INTRAVENOUS; SUBCUTANEOUS EVERY 5 MIN PRN
Status: DISCONTINUED | OUTPATIENT
Start: 2021-10-26 | End: 2021-10-26 | Stop reason: HOSPADM

## 2021-10-26 RX ORDER — MORPHINE SULFATE 4 MG/ML
4 INJECTION, SOLUTION INTRAMUSCULAR; INTRAVENOUS
Status: DISCONTINUED | OUTPATIENT
Start: 2021-10-26 | End: 2021-10-27 | Stop reason: HOSPADM

## 2021-10-26 RX ORDER — ONDANSETRON 4 MG/1
4 TABLET, ORALLY DISINTEGRATING ORAL EVERY 8 HOURS PRN
Status: DISCONTINUED | OUTPATIENT
Start: 2021-10-26 | End: 2021-10-27 | Stop reason: HOSPADM

## 2021-10-26 RX ORDER — OXYCODONE HYDROCHLORIDE 5 MG/1
5-10 TABLET ORAL EVERY 6 HOURS PRN
Qty: 40 TABLET | Refills: 0 | Status: SHIPPED | OUTPATIENT
Start: 2021-10-26 | End: 2021-10-31

## 2021-10-26 RX ORDER — MIDAZOLAM HYDROCHLORIDE 1 MG/ML
INJECTION INTRAMUSCULAR; INTRAVENOUS PRN
Status: DISCONTINUED | OUTPATIENT
Start: 2021-10-26 | End: 2021-10-26 | Stop reason: SDUPTHER

## 2021-10-26 RX ORDER — SODIUM CHLORIDE 0.9 % (FLUSH) 0.9 %
10 SYRINGE (ML) INJECTION PRN
Status: DISCONTINUED | OUTPATIENT
Start: 2021-10-26 | End: 2021-10-27 | Stop reason: HOSPADM

## 2021-10-26 RX ORDER — SODIUM CHLORIDE 0.9 % (FLUSH) 0.9 %
10 SYRINGE (ML) INJECTION EVERY 12 HOURS SCHEDULED
Status: DISCONTINUED | OUTPATIENT
Start: 2021-10-26 | End: 2021-10-26 | Stop reason: HOSPADM

## 2021-10-26 RX ORDER — ROCURONIUM BROMIDE 10 MG/ML
INJECTION, SOLUTION INTRAVENOUS PRN
Status: DISCONTINUED | OUTPATIENT
Start: 2021-10-26 | End: 2021-10-26 | Stop reason: SDUPTHER

## 2021-10-26 RX ORDER — LISINOPRIL 10 MG/1
10 TABLET ORAL DAILY
Status: DISCONTINUED | OUTPATIENT
Start: 2021-10-27 | End: 2021-10-27 | Stop reason: HOSPADM

## 2021-10-26 RX ORDER — SODIUM CHLORIDE 9 MG/ML
25 INJECTION, SOLUTION INTRAVENOUS PRN
Status: DISCONTINUED | OUTPATIENT
Start: 2021-10-26 | End: 2021-10-27 | Stop reason: HOSPADM

## 2021-10-26 RX ORDER — LABETALOL HYDROCHLORIDE 5 MG/ML
5 INJECTION, SOLUTION INTRAVENOUS
Status: DISCONTINUED | OUTPATIENT
Start: 2021-10-26 | End: 2021-10-26 | Stop reason: HOSPADM

## 2021-10-26 RX ORDER — FENTANYL CITRATE 50 UG/ML
50 INJECTION, SOLUTION INTRAMUSCULAR; INTRAVENOUS EVERY 5 MIN PRN
Status: DISCONTINUED | OUTPATIENT
Start: 2021-10-26 | End: 2021-10-26 | Stop reason: HOSPADM

## 2021-10-26 RX ORDER — FENTANYL CITRATE 50 UG/ML
25 INJECTION, SOLUTION INTRAMUSCULAR; INTRAVENOUS EVERY 5 MIN PRN
Status: DISCONTINUED | OUTPATIENT
Start: 2021-10-26 | End: 2021-10-26 | Stop reason: HOSPADM

## 2021-10-26 RX ORDER — MORPHINE SULFATE 2 MG/ML
1 INJECTION, SOLUTION INTRAMUSCULAR; INTRAVENOUS EVERY 5 MIN PRN
Status: DISCONTINUED | OUTPATIENT
Start: 2021-10-26 | End: 2021-10-26 | Stop reason: HOSPADM

## 2021-10-26 RX ORDER — PROPOFOL 10 MG/ML
INJECTION, EMULSION INTRAVENOUS PRN
Status: DISCONTINUED | OUTPATIENT
Start: 2021-10-26 | End: 2021-10-26 | Stop reason: SDUPTHER

## 2021-10-26 RX ORDER — SODIUM CHLORIDE 450 MG/100ML
INJECTION, SOLUTION INTRAVENOUS CONTINUOUS
Status: DISCONTINUED | OUTPATIENT
Start: 2021-10-26 | End: 2021-10-27

## 2021-10-26 RX ORDER — OXYCODONE HYDROCHLORIDE 10 MG/1
10 TABLET ORAL EVERY 4 HOURS PRN
Status: DISCONTINUED | OUTPATIENT
Start: 2021-10-26 | End: 2021-10-27 | Stop reason: HOSPADM

## 2021-10-26 RX ORDER — SODIUM CHLORIDE 9 MG/ML
INJECTION, SOLUTION INTRAVENOUS CONTINUOUS
Status: DISCONTINUED | OUTPATIENT
Start: 2021-10-26 | End: 2021-10-26

## 2021-10-26 RX ORDER — PREGABALIN 75 MG/1
75 CAPSULE ORAL 3 TIMES DAILY
Status: DISCONTINUED | OUTPATIENT
Start: 2021-10-26 | End: 2021-10-27 | Stop reason: HOSPADM

## 2021-10-26 RX ORDER — ONDANSETRON 2 MG/ML
4 INJECTION INTRAMUSCULAR; INTRAVENOUS
Status: DISCONTINUED | OUTPATIENT
Start: 2021-10-26 | End: 2021-10-26 | Stop reason: HOSPADM

## 2021-10-26 RX ORDER — FENTANYL CITRATE 50 UG/ML
INJECTION, SOLUTION INTRAMUSCULAR; INTRAVENOUS PRN
Status: DISCONTINUED | OUTPATIENT
Start: 2021-10-26 | End: 2021-10-26 | Stop reason: SDUPTHER

## 2021-10-26 RX ORDER — INSULIN GLARGINE 100 [IU]/ML
0.25 INJECTION, SOLUTION SUBCUTANEOUS NIGHTLY
Status: DISCONTINUED | OUTPATIENT
Start: 2021-10-26 | End: 2021-10-27

## 2021-10-26 RX ORDER — SODIUM CHLORIDE 9 MG/ML
25 INJECTION, SOLUTION INTRAVENOUS PRN
Status: DISCONTINUED | OUTPATIENT
Start: 2021-10-26 | End: 2021-10-26 | Stop reason: HOSPADM

## 2021-10-26 RX ORDER — PANTOPRAZOLE SODIUM 40 MG/1
40 TABLET, DELAYED RELEASE ORAL
Status: DISCONTINUED | OUTPATIENT
Start: 2021-10-27 | End: 2021-10-27 | Stop reason: HOSPADM

## 2021-10-26 RX ORDER — ATORVASTATIN CALCIUM 40 MG/1
40 TABLET, FILM COATED ORAL DAILY
Status: DISCONTINUED | OUTPATIENT
Start: 2021-10-27 | End: 2021-10-27 | Stop reason: HOSPADM

## 2021-10-26 RX ORDER — CEFADROXIL 500 MG/1
500 CAPSULE ORAL 2 TIMES DAILY
Qty: 14 CAPSULE | Refills: 0 | Status: SHIPPED | OUTPATIENT
Start: 2021-10-26 | End: 2021-11-02

## 2021-10-26 RX ORDER — OXYCODONE HYDROCHLORIDE 5 MG/1
5 TABLET ORAL EVERY 4 HOURS PRN
Status: DISCONTINUED | OUTPATIENT
Start: 2021-10-26 | End: 2021-10-27 | Stop reason: HOSPADM

## 2021-10-26 RX ORDER — OXYCODONE HYDROCHLORIDE 5 MG/1
5 TABLET ORAL PRN
Status: DISCONTINUED | OUTPATIENT
Start: 2021-10-26 | End: 2021-10-26 | Stop reason: HOSPADM

## 2021-10-26 RX ORDER — TRANEXAMIC ACID 650 1/1
1950 TABLET ORAL ONCE
Status: COMPLETED | OUTPATIENT
Start: 2021-10-26 | End: 2021-10-26

## 2021-10-26 RX ORDER — SODIUM CHLORIDE 0.9 % (FLUSH) 0.9 %
10 SYRINGE (ML) INJECTION EVERY 12 HOURS SCHEDULED
Status: DISCONTINUED | OUTPATIENT
Start: 2021-10-26 | End: 2021-10-27 | Stop reason: HOSPADM

## 2021-10-26 RX ORDER — SODIUM CHLORIDE 0.9 % (FLUSH) 0.9 %
10 SYRINGE (ML) INJECTION PRN
Status: DISCONTINUED | OUTPATIENT
Start: 2021-10-26 | End: 2021-10-26 | Stop reason: HOSPADM

## 2021-10-26 RX ORDER — NICOTINE POLACRILEX 4 MG
15 LOZENGE BUCCAL PRN
Status: DISCONTINUED | OUTPATIENT
Start: 2021-10-26 | End: 2021-10-27

## 2021-10-26 RX ORDER — DEXAMETHASONE SODIUM PHOSPHATE 4 MG/ML
INJECTION, SOLUTION INTRA-ARTICULAR; INTRALESIONAL; INTRAMUSCULAR; INTRAVENOUS; SOFT TISSUE PRN
Status: DISCONTINUED | OUTPATIENT
Start: 2021-10-26 | End: 2021-10-26 | Stop reason: SDUPTHER

## 2021-10-26 RX ORDER — DEXTROSE MONOHYDRATE 50 MG/ML
100 INJECTION, SOLUTION INTRAVENOUS PRN
Status: DISCONTINUED | OUTPATIENT
Start: 2021-10-26 | End: 2021-10-27

## 2021-10-26 RX ORDER — OXYCODONE HYDROCHLORIDE 10 MG/1
10 TABLET ORAL PRN
Status: DISCONTINUED | OUTPATIENT
Start: 2021-10-26 | End: 2021-10-26 | Stop reason: HOSPADM

## 2021-10-26 RX ORDER — DEXTROSE MONOHYDRATE 25 G/50ML
12.5 INJECTION, SOLUTION INTRAVENOUS PRN
Status: DISCONTINUED | OUTPATIENT
Start: 2021-10-26 | End: 2021-10-27

## 2021-10-26 RX ORDER — ROPINIROLE 1 MG/1
3 TABLET, FILM COATED ORAL 3 TIMES DAILY
Status: DISCONTINUED | OUTPATIENT
Start: 2021-10-26 | End: 2021-10-27 | Stop reason: HOSPADM

## 2021-10-26 RX ORDER — ONDANSETRON 2 MG/ML
4 INJECTION INTRAMUSCULAR; INTRAVENOUS EVERY 6 HOURS PRN
Status: DISCONTINUED | OUTPATIENT
Start: 2021-10-26 | End: 2021-10-27 | Stop reason: HOSPADM

## 2021-10-26 RX ORDER — FUROSEMIDE 40 MG/1
40 TABLET ORAL DAILY
Status: DISCONTINUED | OUTPATIENT
Start: 2021-10-27 | End: 2021-10-27 | Stop reason: HOSPADM

## 2021-10-26 RX ORDER — ONDANSETRON 2 MG/ML
INJECTION INTRAMUSCULAR; INTRAVENOUS PRN
Status: DISCONTINUED | OUTPATIENT
Start: 2021-10-26 | End: 2021-10-26 | Stop reason: SDUPTHER

## 2021-10-26 RX ORDER — MAGNESIUM HYDROXIDE 1200 MG/15ML
LIQUID ORAL CONTINUOUS PRN
Status: COMPLETED | OUTPATIENT
Start: 2021-10-26 | End: 2021-10-26

## 2021-10-26 RX ORDER — SUCCINYLCHOLINE/SOD CL,ISO/PF 200MG/10ML
SYRINGE (ML) INTRAVENOUS PRN
Status: DISCONTINUED | OUTPATIENT
Start: 2021-10-26 | End: 2021-10-26 | Stop reason: SDUPTHER

## 2021-10-26 RX ORDER — MORPHINE SULFATE 2 MG/ML
2 INJECTION, SOLUTION INTRAMUSCULAR; INTRAVENOUS
Status: DISCONTINUED | OUTPATIENT
Start: 2021-10-26 | End: 2021-10-27 | Stop reason: HOSPADM

## 2021-10-26 RX ADMIN — CEFAZOLIN SODIUM 3000 MG: 10 INJECTION, POWDER, FOR SOLUTION INTRAVENOUS at 18:27

## 2021-10-26 RX ADMIN — Medication 3000 MG: at 09:37

## 2021-10-26 RX ADMIN — TRANEXAMIC ACID 1950 MG: 650 TABLET ORAL at 07:13

## 2021-10-26 RX ADMIN — OXYCODONE HYDROCHLORIDE 10 MG: 10 TABLET ORAL at 07:13

## 2021-10-26 RX ADMIN — Medication 180 MG: at 09:31

## 2021-10-26 RX ADMIN — FENTANYL CITRATE 50 MCG: 50 INJECTION INTRAMUSCULAR; INTRAVENOUS at 11:11

## 2021-10-26 RX ADMIN — HYDROMORPHONE HYDROCHLORIDE 0.5 MG: 1 INJECTION, SOLUTION INTRAMUSCULAR; INTRAVENOUS; SUBCUTANEOUS at 10:51

## 2021-10-26 RX ADMIN — MEPERIDINE HYDROCHLORIDE 12.5 MG: 25 INJECTION INTRAMUSCULAR; INTRAVENOUS; SUBCUTANEOUS at 10:58

## 2021-10-26 RX ADMIN — OXYCODONE HYDROCHLORIDE 10 MG: 10 TABLET ORAL at 12:58

## 2021-10-26 RX ADMIN — ACETAMINOPHEN 650 MG: 325 TABLET ORAL at 18:26

## 2021-10-26 RX ADMIN — ROPIVACAINE HYDROCHLORIDE 20 ML: 5 INJECTION, SOLUTION EPIDURAL; INFILTRATION; PERINEURAL at 08:54

## 2021-10-26 RX ADMIN — DEXAMETHASONE SODIUM PHOSPHATE 8 MG: 4 INJECTION, SOLUTION INTRAMUSCULAR; INTRAVENOUS at 09:38

## 2021-10-26 RX ADMIN — SUGAMMADEX 100 MG: 100 INJECTION, SOLUTION INTRAVENOUS at 10:39

## 2021-10-26 RX ADMIN — ROCURONIUM BROMIDE 50 MG: 10 INJECTION INTRAVENOUS at 09:36

## 2021-10-26 RX ADMIN — ACETAMINOPHEN 650 MG: 325 TABLET ORAL at 22:58

## 2021-10-26 RX ADMIN — MORPHINE SULFATE 4 MG: 4 INJECTION, SOLUTION INTRAMUSCULAR; INTRAVENOUS at 15:14

## 2021-10-26 RX ADMIN — MORPHINE SULFATE 4 MG: 4 INJECTION, SOLUTION INTRAMUSCULAR; INTRAVENOUS at 22:58

## 2021-10-26 RX ADMIN — OXYCODONE HYDROCHLORIDE 10 MG: 10 TABLET ORAL at 21:15

## 2021-10-26 RX ADMIN — HYDROMORPHONE HYDROCHLORIDE 0.5 MG: 1 INJECTION, SOLUTION INTRAMUSCULAR; INTRAVENOUS; SUBCUTANEOUS at 09:50

## 2021-10-26 RX ADMIN — LIDOCAINE HYDROCHLORIDE 60 MG: 20 INJECTION, SOLUTION EPIDURAL; INFILTRATION; INTRACAUDAL; PERINEURAL at 09:31

## 2021-10-26 RX ADMIN — SUGAMMADEX 200 MG: 100 INJECTION, SOLUTION INTRAVENOUS at 10:33

## 2021-10-26 RX ADMIN — MIDAZOLAM 2 MG: 1 INJECTION INTRAMUSCULAR; INTRAVENOUS at 09:22

## 2021-10-26 RX ADMIN — HYDROMORPHONE HYDROCHLORIDE 0.5 MG: 1 INJECTION, SOLUTION INTRAMUSCULAR; INTRAVENOUS; SUBCUTANEOUS at 10:43

## 2021-10-26 RX ADMIN — ONDANSETRON 4 MG: 2 INJECTION INTRAMUSCULAR; INTRAVENOUS at 09:38

## 2021-10-26 RX ADMIN — PROPOFOL 200 MG: 10 INJECTION, EMULSION INTRAVENOUS at 09:31

## 2021-10-26 RX ADMIN — SODIUM CHLORIDE: 9 INJECTION, SOLUTION INTRAVENOUS at 09:00

## 2021-10-26 RX ADMIN — HYDROMORPHONE HYDROCHLORIDE 0.5 MG: 1 INJECTION, SOLUTION INTRAMUSCULAR; INTRAVENOUS; SUBCUTANEOUS at 10:38

## 2021-10-26 RX ADMIN — PREGABALIN 75 MG: 75 CAPSULE ORAL at 21:15

## 2021-10-26 RX ADMIN — FENTANYL CITRATE 50 MCG: 50 INJECTION INTRAMUSCULAR; INTRAVENOUS at 11:33

## 2021-10-26 RX ADMIN — SODIUM CHLORIDE: 4.5 INJECTION, SOLUTION INTRAVENOUS at 13:00

## 2021-10-26 RX ADMIN — LABETALOL HYDROCHLORIDE 5 MG: 5 INJECTION INTRAVENOUS at 11:42

## 2021-10-26 RX ADMIN — VANCOMYCIN HYDROCHLORIDE 2000 MG: 1 INJECTION, POWDER, LYOPHILIZED, FOR SOLUTION INTRAVENOUS at 07:29

## 2021-10-26 RX ADMIN — ONDANSETRON 4 MG: 2 INJECTION INTRAMUSCULAR; INTRAVENOUS at 12:58

## 2021-10-26 RX ADMIN — FENTANYL CITRATE 100 MCG: 50 INJECTION INTRAMUSCULAR; INTRAVENOUS at 09:22

## 2021-10-26 RX ADMIN — SODIUM CHLORIDE, PRESERVATIVE FREE 10 ML: 5 INJECTION INTRAVENOUS at 21:18

## 2021-10-26 RX ADMIN — ROPINIROLE HYDROCHLORIDE 3 MG: 1 TABLET, FILM COATED ORAL at 21:15

## 2021-10-26 RX ADMIN — ROPIVACAINE HYDROCHLORIDE 20 ML: 5 INJECTION, SOLUTION EPIDURAL; INFILTRATION; PERINEURAL at 08:55

## 2021-10-26 RX ADMIN — FENTANYL CITRATE 50 MCG: 50 INJECTION INTRAMUSCULAR; INTRAVENOUS at 11:19

## 2021-10-26 ASSESSMENT — PULMONARY FUNCTION TESTS
PIF_VALUE: 31
PIF_VALUE: 24
PIF_VALUE: 24
PIF_VALUE: 3
PIF_VALUE: 22
PIF_VALUE: 23
PIF_VALUE: 22
PIF_VALUE: 2
PIF_VALUE: 32
PIF_VALUE: 18
PIF_VALUE: 22
PIF_VALUE: 26
PIF_VALUE: 5
PIF_VALUE: 23
PIF_VALUE: 24
PIF_VALUE: 23
PIF_VALUE: 1
PIF_VALUE: 24
PIF_VALUE: 9
PIF_VALUE: 0
PIF_VALUE: 21
PIF_VALUE: 0
PIF_VALUE: 8
PIF_VALUE: 1
PIF_VALUE: 20
PIF_VALUE: 27
PIF_VALUE: 13
PIF_VALUE: 25
PIF_VALUE: 23
PIF_VALUE: 17
PIF_VALUE: 5
PIF_VALUE: 6
PIF_VALUE: 6
PIF_VALUE: 23
PIF_VALUE: 24
PIF_VALUE: 22
PIF_VALUE: 22
PIF_VALUE: 20
PIF_VALUE: 1
PIF_VALUE: 19
PIF_VALUE: 23
PIF_VALUE: 4
PIF_VALUE: 23
PIF_VALUE: 19
PIF_VALUE: 22
PIF_VALUE: 1
PIF_VALUE: 22
PIF_VALUE: 24
PIF_VALUE: 24
PIF_VALUE: 3
PIF_VALUE: 1
PIF_VALUE: 23
PIF_VALUE: 23
PIF_VALUE: 24
PIF_VALUE: 3
PIF_VALUE: 5
PIF_VALUE: 12
PIF_VALUE: 3
PIF_VALUE: 24
PIF_VALUE: 22
PIF_VALUE: 23
PIF_VALUE: 18
PIF_VALUE: 24
PIF_VALUE: 8
PIF_VALUE: 24
PIF_VALUE: 19
PIF_VALUE: 22
PIF_VALUE: 2
PIF_VALUE: 13
PIF_VALUE: 24
PIF_VALUE: 5
PIF_VALUE: 23
PIF_VALUE: 24
PIF_VALUE: 22
PIF_VALUE: 23
PIF_VALUE: 24
PIF_VALUE: 23
PIF_VALUE: 24
PIF_VALUE: 23
PIF_VALUE: 24
PIF_VALUE: 24
PIF_VALUE: 21
PIF_VALUE: 0
PIF_VALUE: 13
PIF_VALUE: 23
PIF_VALUE: 24
PIF_VALUE: 23
PIF_VALUE: 17
PIF_VALUE: 23

## 2021-10-26 ASSESSMENT — PAIN DESCRIPTION - ORIENTATION
ORIENTATION: RIGHT

## 2021-10-26 ASSESSMENT — PAIN DESCRIPTION - PROGRESSION
CLINICAL_PROGRESSION: NOT CHANGED
CLINICAL_PROGRESSION: GRADUALLY IMPROVING
CLINICAL_PROGRESSION: NOT CHANGED
CLINICAL_PROGRESSION: GRADUALLY IMPROVING
CLINICAL_PROGRESSION: NOT CHANGED
CLINICAL_PROGRESSION: GRADUALLY IMPROVING
CLINICAL_PROGRESSION: NOT CHANGED
CLINICAL_PROGRESSION: NOT CHANGED
CLINICAL_PROGRESSION: GRADUALLY IMPROVING
CLINICAL_PROGRESSION: GRADUALLY WORSENING
CLINICAL_PROGRESSION: NOT CHANGED
CLINICAL_PROGRESSION: GRADUALLY IMPROVING
CLINICAL_PROGRESSION: NOT CHANGED
CLINICAL_PROGRESSION: GRADUALLY IMPROVING
CLINICAL_PROGRESSION: NOT CHANGED
CLINICAL_PROGRESSION: NOT CHANGED

## 2021-10-26 ASSESSMENT — PAIN - FUNCTIONAL ASSESSMENT
PAIN_FUNCTIONAL_ASSESSMENT: PREVENTS OR INTERFERES SOME ACTIVE ACTIVITIES AND ADLS
PAIN_FUNCTIONAL_ASSESSMENT: PREVENTS OR INTERFERES WITH MANY ACTIVE NOT PASSIVE ACTIVITIES
PAIN_FUNCTIONAL_ASSESSMENT: 0-10
PAIN_FUNCTIONAL_ASSESSMENT: PREVENTS OR INTERFERES SOME ACTIVE ACTIVITIES AND ADLS
PAIN_FUNCTIONAL_ASSESSMENT: PREVENTS OR INTERFERES WITH MANY ACTIVE NOT PASSIVE ACTIVITIES
PAIN_FUNCTIONAL_ASSESSMENT: PREVENTS OR INTERFERES WITH MANY ACTIVE NOT PASSIVE ACTIVITIES
PAIN_FUNCTIONAL_ASSESSMENT: PREVENTS OR INTERFERES SOME ACTIVE ACTIVITIES AND ADLS
PAIN_FUNCTIONAL_ASSESSMENT: PREVENTS OR INTERFERES WITH MANY ACTIVE NOT PASSIVE ACTIVITIES

## 2021-10-26 ASSESSMENT — PAIN DESCRIPTION - ONSET
ONSET: ON-GOING

## 2021-10-26 ASSESSMENT — PAIN DESCRIPTION - LOCATION
LOCATION: KNEE

## 2021-10-26 ASSESSMENT — PAIN SCALES - GENERAL
PAINLEVEL_OUTOF10: 8
PAINLEVEL_OUTOF10: 7
PAINLEVEL_OUTOF10: 7
PAINLEVEL_OUTOF10: 0
PAINLEVEL_OUTOF10: 6
PAINLEVEL_OUTOF10: 8
PAINLEVEL_OUTOF10: 9
PAINLEVEL_OUTOF10: 9
PAINLEVEL_OUTOF10: 10
PAINLEVEL_OUTOF10: 0
PAINLEVEL_OUTOF10: 7
PAINLEVEL_OUTOF10: 5
PAINLEVEL_OUTOF10: 4
PAINLEVEL_OUTOF10: 10
PAINLEVEL_OUTOF10: 9
PAINLEVEL_OUTOF10: 7

## 2021-10-26 ASSESSMENT — PAIN DESCRIPTION - FREQUENCY
FREQUENCY: CONTINUOUS

## 2021-10-26 ASSESSMENT — PAIN SCALES - WONG BAKER
WONGBAKER_NUMERICALRESPONSE: 4
WONGBAKER_NUMERICALRESPONSE: 4
WONGBAKER_NUMERICALRESPONSE: 6

## 2021-10-26 ASSESSMENT — PAIN DESCRIPTION - PAIN TYPE
TYPE: ACUTE PAIN;SURGICAL PAIN
TYPE: SURGICAL PAIN
TYPE: SURGICAL PAIN
TYPE: ACUTE PAIN;SURGICAL PAIN
TYPE: SURGICAL PAIN
TYPE: ACUTE PAIN;SURGICAL PAIN
TYPE: SURGICAL PAIN
TYPE: SURGICAL PAIN
TYPE: ACUTE PAIN;SURGICAL PAIN

## 2021-10-26 ASSESSMENT — PAIN DESCRIPTION - DESCRIPTORS
DESCRIPTORS: ACHING
DESCRIPTORS: SHARP;THROBBING
DESCRIPTORS: SHARP;THROBBING
DESCRIPTORS: ACHING;CONSTANT
DESCRIPTORS: SHARP;THROBBING
DESCRIPTORS: SHARP;THROBBING
DESCRIPTORS: ACHING;CONSTANT
DESCRIPTORS: ACHING
DESCRIPTORS: ACHING;CONSTANT
DESCRIPTORS: ACHING
DESCRIPTORS: SHARP;THROBBING
DESCRIPTORS: ACHING;CONSTANT
DESCRIPTORS: ACHING

## 2021-10-26 NOTE — PROGRESS NOTES
Pt awake, alert, and oriented. VSS. Odonnell Mont[place]. Dressing intact. Pt states pain is 7/10. Continue to treat.

## 2021-10-26 NOTE — ANESTHESIA PRE PROCEDURE
[facility] Department of Anesthesiology  Pre-Anesthesia Evaluation/Consultation       Name:  Whit Vaughn  : 1960  Age:  63 y.o. MRN:  [id number]  Date: 10/26/2021           Surgeon: Surgeon(s):  Luisana Constantino MD    Procedure: Procedure(s):  RIGHT TOTAL KNEE REPLACEMENT ROBOTIC ASSISTED     Allergies   Allergen Reactions    Celebrex [Celecoxib] Other (See Comments)     Unable to take due to bariatric patient    Mobic [Meloxicam]      Unable to take due to bariatric patient    Nsaids      Bariatric patient    Bactrim [Sulfamethoxazole-Trimethoprim] Rash     POSSIBLE fixed drug eruption on his cheeks, but diagnosis is not certain (only happened once).       Patient Active Problem List   Diagnosis    Class 3 severe obesity due to excess calories with serious comorbidity and body mass index (BMI) of 45.0 to 49.9 in adult [facility])    Mixed hypertriglyceridemia    Nonalcoholic fatty liver disease    RLS (restless legs syndrome)    Essential hypertension, benign    Morbid obesity with BMI of 50.0-59.9, adult ([address]    Chronic pain of left knee    Osteoarthritis of left knee    Chondromalacia of left knee    Pes planus    Lymphedema    Peripheral venous insufficiency    Allergic rhinitis    Preop exam for internal medicine    Type 2 diabetes mellitus without complication, without long-term current use of insulin ([address]    TOM treated with BiPAP    Arthritis of left knee     Past Medical History:   Diagnosis Date    Arthritis     Bilateral venous leg ulcers 2020    Cellulitis of lower extremity 2019    Diabetes mellitus [facility]     GERD (gastroesophageal reflux disease)     Hyperlipidemia     Hypertension     Impaired fasting glucose 2013    MRSA carrier     MRSA nasal colonization 2021    Obesity     TOM treated with BiPAP 2020    Preoperative clearance 10/26/2020    Restless leg syndrome     Screening PSA (prostate specific antigen) 12/30/2015;5/1/17    Nml:0.53(12/30/2015);5/1/17=nml.     Seasonal allergies     Sleep apnea     uses CPAP    Stasis dermatitis of both legs 08/28/2020    Tobacco use     Tubular adenoma of colon 09/14/2017     Past Surgical History:   Procedure Laterality Date    CIRCUMCISION      COLONOSCOPY  09/14/2017    Colonoscopy with polypectomy (cold biopsy):Dr. Walker:next in 3yrs=9/14/2020    KNEE ARTHROSCOPY Left 8/3/2020    VIDEO ARTHROSCOPY LEFT KNEE, PARTIAL MEDIAL MENISCECTOMY, CHONDROPLASTY, SYNOVIAL BIOPSY -TOM=4- performed by Romel Field MD at [facility] PAIN MANAGEMENT PROCEDURE Left 12/9/2020    COOLIEF RADIOFREQUENCY ABLATION - LEFT performed by Samreen Elias MD at [address] N/A 3/2/2021    ROBOTIC ASSISTED LAPAROSCOPIC SLEEV[facility] 7/14/2021    LEFT TOTAL KNEE REPLACEMENT ROBOTIC ASSISTED performed by Lord Tiereny MD at [address] [facility]  05/28/2011    UPPER GASTROINTESTINAL ENDOSCOPY N/A 1/11/2021    EGD BIOPSY performed by Jose Eduardo Martin DO at [address] EXTRACTION       Social History     Tobacco Use    Smoking status: Former Smoker     Packs/day: 2.00     Years: 25.00     Pack years: 50.00     Types: Cigarettes     Quit date: 5/1/2006     Years since quitting: 15.4    Smokeless tobacco: Never Used   Vaping Use    Vaping Use: Never used   Substance Use Topics    Alcohol use: Not Currently     Comment: ocas    Drug use: No     Medications  Current Facility-Administered Medications on File Prior to Visit   Medication Dose Route Frequency Provider Last Rate Last Admin    0.9 % sodium chloride infusion   IntraVENous Continuous Robert White MD        sodium chloride flush 0.9 % injection 10 mL  10 mL IntraVENous 2 times per day Robert Wihte MD        sodium chloride flush 0.9 % injection 10 mL  10 mL IntraVENous PRN Karen Patel MD        0.9 % sodium chloride infusion  25 mL IntraVENous PRN Karen Patel MD        oxyCODONE HCl (OXY-IR) immediate release tablet 10 mg  10 mg Oral Once Radha Guallpa MD        tranexamic acid (LYSTEDA) tablet 1,950 mg  1,950 mg Oral Once Radha Guallpa MD        vancomycin (VANCOCIN) 2,000 mg in dextrose 5 % 500 mL IVPB  15 mg/kg IntraVENous On Call to Lisa Haider MD        ceFAZolin (ANCEF) 3000 mg in dextrose 5 % 100 mL IVPB  3,000 mg IntraVENous Once Radha Guallpa MD         Current Outpatient Medications on File Prior to Visit   Medication Sig Dispense Refill    potassium chloride (KLOR-CON M) 20 MEQ TBCR extended release tablet Take 1 tablet by mouth 2 times daily 120 tablet 2    mupirocin (BACTROBAN) 2 % ointment Apply pea size amount to each nostril 2 times daily. 22 g 0    hydroCHLOROthiazide (HYDRODIURIL) 25 MG tablet Hold until Monday 90 tablet 0    loratadine (CLARITIN) 10 MG tablet Take 1 tablet by mouth daily 90 tablet 0    lisinopril (PRINIVIL;ZESTRIL) 10 MG tablet TAKE 1 TABLET BY MOUTH ONCE A DAY 90 tablet 0    omeprazole (PRILOSEC) 20 MG delayed release capsule Take 1 capsule by mouth Daily 30 capsule 3    rOPINIRole (REQUIP) 3 MG tablet Take 1 tablet by mouth 3 times daily 270 tablet 0    atorvastatin (LIPITOR) 40 MG tablet Take 1 tablet by mouth daily 90 tablet 3    Prodigy Twist Top Lancets 28G MISC TEST TWO TIMES A  each 0    blood glucose test strips (PRODIGY NO CODING BLOOD GLUC) strip TEST TWO TIMES A  each 0    amoxicillin (AMOXIL) 500 MG capsule 4 tablets by mouth 1 hour before dental appointment 4 capsule 1    pregabalin (LYRICA) 75 MG capsule Take 1 capsule by mouth 3 times daily for 90 days.  270 capsule 0    metFORMIN (GLUCOPHAGE) 500 MG tablet Take 1 tablet by mouth 2 times daily (with meals) 180 tablet 1    ondansetron (ZOFRAN-ODT) 4 MG disintegrating tablet Take 1 tablet by mouth 3 times daily as needed for Nausea or Vomiting 21 tablet 0    furosemide (LASIX) 40 MG tablet Take 1 tablet by mouth 2 times daily (Patient taking differently: Take 40 mg by mouth daily ) 60 tablet 3    Multiple Vitamin (MULTIVITAMIN, BARIATRIC FUSION COMPLETE, CHEW TAB) Take 4 tablets by mouth daily      glucose 5 g chewable tablet Take 3 tablets by mouth as needed for Low blood sugar 30 tablet 0    blood glucose monitor kit and supplies Check BG twice daily 1 kit 0     No current facility-administered medications for this visit. No current outpatient medications on file. Facility-Administered Medications Ordered in Other Visits   Medication Dose Route Frequency Provider Last Rate Last Admin    0.9 % sodium chloride infusion   IntraVENous Continuous Sims Galeazzi, MD        sodium chloride flush 0.9 % injection 10 mL  10 mL IntraVENous 2 times per day Sims Galeazzi, MD        sodium chloride flush 0.9 % injection 10 mL  10 mL IntraVENous PRN Sims Galeazzi, MD        0.9 % sodium chloride infusion  25 mL IntraVENous PRN Sims Galeazzi, MD        oxyCODONE HCl (OXY-IR) immediate release tablet 10 mg  10 mg Oral Once Katie Wong MD        tranexamic acid (LYSTEDA) tablet 1,950 mg  1,950 mg Oral Once Katie Wong MD        vancomycin (VANCOCIN) 2,000 mg in dextrose 5 % 500 mL IVPB  15 mg/kg IntraVENous On Call to Lisa Haider MD        ceFAZolin (ANCEF) 3000 mg in dextrose 5 % 100 mL IVPB  3,000 mg IntraVENous Once Katie Wong MD         Vital Signs (Current)   There were no vitals filed for this visit.                                          Vital Signs Statistics (for past 48 hrs)     Temp  Av.3 °F (36.3 °C)  Min: 97.3 °F (36.3 °C)   Min taken time: 10/26/21 0658  Max: 97.3 °F (36.3 °C)   Max taken time: 10/26/21 0658  Pulse  Av  Min: 81   Min taken time: 10/26/21 1244  Max: 80   Max taken time: 10/26/21 0658  Resp  Av  Min: 16   Min taken time: 10/26/21 0658  Max: 17   Max taken time: 10/26/21 0658  BP  Min: 151/78   Min taken time: 10/26/21 0658  Max: 151/78   Max taken time: 10/26/21 0658  SpO2  Av %  Min: 96 %   Min taken time: 10/26/21 0658  Max: 96 %   Max taken time: 10/26/21 0658  BP Readings from Last 3 Encounters:   10/26/21 (!) 151/78   21 128/74   07/15/21 (!) 157/78       BMI  There is no height or weight on file to calculate BMI. Estimated body mass index is 45.61 kg/m² as calculated from the following:    Height as of an earlier encounter on 10/26/21: 5' 8\" (1.727 m). Weight as of an earlier encounter on 10/26/21: 300 lb (136.1 kg). CBC   Lab Results   Component Value Date    WBC 4.8 2021    RBC 4.60 2021    HGB 14.9 2021    HCT 45.6 2021    MCV 99.1 2021    RDW 14.2 2021     2021     CMP    Lab Results   Component Value Date     2021    K 4.8 2021    K 4.0 10/08/2020     2021    CO2 25 2021    BUN 17 2021    CREATININE 0.9 2021    GFRAA >60 2021    GFRAA >60 2013    AGRATIO 1.7 2021    LABGLOM >60 2021    GLUCOSE 89 2021    PROT 7.0 2021    PROT 6.9 2012    CALCIUM 9.4 2021    BILITOT 0.4 2021    ALKPHOS 111 2021    AST 15 2021    ALT 19 2021     BMP    Lab Results   Component Value Date     2021    K 4.8 2021    K 4.0 10/08/2020     2021    CO2 25 2021    BUN 17 2021    CREATININE 0.9 2021    CALCIUM 9.4 2021    GFRAA >60 2021    GFRAA >60 2013    LABGLOM >60 2021    GLUCOSE 89 2021     POCGlucose  No results for input(s): GLUCOSE in the last 72 hours.    [facility]    Lab Results   Component Value Date    PROTIME 10.7 2021    INR 0.95 2021    APTT 34.2      HCG (If Applicable) No results found for: PREGTESTUR, PREGSERUM, HCG, HCGQUANT   ABGs No results found for: PHART, PO2ART, AWB7QGR, RWX8UVZ, BEART, D9XBEROE   Type & Screen (If Applicable)  No results found for: LABABO, LABRH                         BMI: Wt Readings from Last 3 Encounters:       NPO Status:  >8h                        Anesthesia Evaluation  Patient summary reviewed no history of anesthetic complications:   Airway: Mallampati: II     Neck ROM: full  Mouth opening: > = 3 FB Dental:      Comment: No loose teeth    Pulmonary: breath sounds clear to auscultation  (+) sleep apnea: on CPAP,      (-) COPD and asthma                           Cardiovascular:    (+) hypertension:, hyperlipidemia    (-) past MI, CABG/stent and  angina        Rate: normal                    Neuro/Psych:      (-) seizures, TIA and CVA           GI/Hepatic/Renal:   (+) GERD:, liver disease:, morbid obesity          Endo/Other:    (+) DiabetesType II DM, well controlled, , : arthritis:., .                 Abdominal:             Vascular:   + PVD, aortic or cerebral, .  - DVT and PE. Other Findings:               Anesthesia Plan      general and regional     ASA 3       Induction: intravenous. MIPS: Postoperative opioids intended and Prophylactic antiemetics administered. Anesthetic plan and risks discussed with patient and spouse. Plan discussed with CRNA. This pre-anesthesia assessment may be used as a history and physical.    DOS STAFF ADDENDUM:    Pt seen and examined, chart reviewed (including anesthesia, drug and allergy history). No interval changes to history and physical examination. Anesthetic plan, risks, benefits, alternatives, and personnel involved discussed with patient. Patient verbalized an understanding and agrees to proceed.       Chris Drake MD  October 26, 2021  7:09 AM

## 2021-10-26 NOTE — OP NOTE
Patient: Chioma Gaming  YOB: 1960  MRN: [id number]    DATE OF PROCEDURE: 10/26/2021      PREOPERATIVE DIAGNOSIS:  Tricompartmental degenerative osteoarthritis of the right knee. POSTOPERATIVE DIAGNOSIS:  Tricompartmental degenerative osteoarthritis of the right knee. OPERATION PERFORMED:  Robotic-assisted right total knee arthroplasty. SURGEON:  Jany Cota MD     ASSISTANT:  FRANNY Ryan    EBL: 100 mL     IMPLANTS:  [place] Tri[employer] CR cementless femur size 5  [place] [employer] cementless tibia size 5  9mm CS polyethylene  35mm cementless asymmetric patella     INDICATIONS:  The patient is a 63 y.o. male who presents for right knee replacement. He had been treated conservatively with anti-inflammatories, physical therapy and intra-articular cortisone injections; none of these gave any significant relief. We discussed total knee arthroplasty. The operative procedure, alternatives, and risks were discussed in detail with the patient. The risks include but are not limited to: Infection, vessel injury, nerve injury, DVT, pulmonary embolism, implant loosening, need for revision surgery, loss of motion, continued pain. Informed consent for surgery was signed by the patient. OPERATIVE PROCEDURE:  The patient was seen in the preoperative holding area where the site of surgery was marked and informed consent was confirmed. The patient was brought back to the operating room by OR personnel. General anesthesia was administered. The patient was positioned supine on the operating table. The right lower extremity was then prepped and draped in a standard and sterile fashion. A final and formal timeout was then performed which confirmed the correct patient, correct position, and correct site of surgery. IV antibiotics were administered within 1 hour of the skin incision. An anterior midline incision was made over the knee.  Skin and subcutaneous tissue were divided down to the extensor mechanism. A medial parapatellar arthrotomy was performed. The knee was fully exposed and then two distal femoral pins and two proximal tibial pins were placed with robotic aerials. Using a third robotic aerial, the knee was registered with the robot. The implant was virtually manipulated to balance the flexion and extension gaps. Once this was done, the robotic cutting arm was brought in and in the order of posterior femoral condyles, anterior femoral condylar cut, anterior femoral chamfer cuts, tibia cut, posterior femoral chamfer cuts, and then distal femoral condylar cuts were made. All bony fragments were removed. The knee was reconstructed to accept a #5 tibial baseplate, a #5 femoral cruciate-retaining femur, and a 9-mm polyethylene liner. The knee came to full extension, excellent flexion, and good overall stability. The patella tracked within the trochlea of the femur. All trial implants were then removed. The ends of the bones were irrigated. The #5 tibial tray and the #5 [place] [employer] cruciate-retaining femoral component were then placed. A trial reduction with the 9-mm poly was performed. Then the real 9-mm polyethylene liner was placed. The knee came to full extension, excellent flexion, and good overall stability. The patella was flipped, measured, and cut to accept a 35-mm asymmetric patella. Once this was done then, the 35-mm asymmetric cementless patella was placed. The patella tracked well. The wound was irrigated out. Hemostasis was obtained. The wound was injected with Exparel. It was also bathed with aqueous iodine. The wound then was closed in layers. The patient tolerated the procedure well. A dressing was applied, and the patient was brought to the recovery room in good condition. FRANNY Todd was essential in patient positioning, surgical assistance during the arthroplasty, and in wound closure.     Due to the patient's morbid obesity (BMI 45), this case took additional time for patient positioning, performance of the arthroplasty, and wound closure.     Blanca Zuñiga MD  10/26/2021

## 2021-10-26 NOTE — CARE COORDINATION
Referral per clinical path. Met with patient and confirmed plans to return to home with spouse. Verified demographics and that no DME is needed. Patient denies any other needs or concerns. List provided home care and pt chose [facility]. Referred to [facility] who will follow up. The Plan for Transition of Care is related to the following treatment goals: [facility].    The Patient was provided with a choice of provider and agrees   with the discharge plan. [x] Yes [] No    Freedom of choice list was provided with basic dialogue that supports the patient's individualized plan of care/goals, treatment preferences and shares the quality data associated with the providers.  [x] Yes [] No    Electronically signed by [id number] PRANEETH Delgado on 10/26/2021 at 3:03 PM

## 2021-10-26 NOTE — PROGRESS NOTES
Handoff report given to Epifania Osgood, RN for [id number]. Pt ready for transfer to floor. VSS. Everlena Chain. Dressing intact. Pt states pain is decreasing. Utilizing incentive spirometer. Tolerating ice chips.

## 2021-10-26 NOTE — PROGRESS NOTES
Diagnosis: impaired ADLs  Prognosis: Good  Decision Making: Medium Complexity  History: obesity, gastric sleeve 3/2021, L TKR 3/2021  Exam: impaired ADLs, t/f, fxl mob, toileting  Assistance / Modification: min A of 1-2 w/ toileting, CG/min A of 1-2 using RW for t/f & fxl mob  OT Education: OT Role;Plan of Care;Equipment  Patient Education: educated pt & wife on purpose of OT services, reviewed DME & A/E (had L TKR in July 2021)  REQUIRES OT FOLLOW UP: Yes  Activity Tolerance  Activity Tolerance: Patient limited by pain; Patient limited by fatigue  Activity Tolerance: pt on 3L O2, sweating and SOB--pulse ox 96%   Safety Devices  Safety Devices in place: Yes  Type of devices: Call light within reach;Gait belt; Chair alarm in place; Left in chair;Nurse notified           Patient Diagnosis(es): The encounter diagnosis was Arthritis of right knee. has a past medical history of Arthritis, Bilateral venous leg ulcers, Cellulitis of lower extremity, Diabetes mellitus ([facility] [address] GERD (gastroesophageal reflux disease), Hyperlipidemia, Hypertension, Impaired fasting glucose, MRSA carrier, MRSA nasal colonization, Obesity, TOM treated with BiPAP, Preoperative clearance, Restless leg syndrome, Screening PSA (prostate specific antigen), Seasonal allergies, Sleep apnea, Stasis dermatitis of both legs, Tobacco use, and Tubular adenoma of colon. has a past surgical history that includes Mouth surgery; [place] tooth extraction; Circumcision; Vasectomy; Colonoscopy (09/14/2017); Umbilical hernia repair (05/28/2011); Knee arthroscopy (Left, 8/3/2020); Pain management procedure (Left, 12/9/2020); Upper gastrointestinal endoscopy (N/A, 1/11/2021); Sleeve Gastrectomy (N/A, 3/2/2021); and Total knee arthroplasty (Left, 7/14/2021).     Treatment Diagnosis: impaired ADLs      Restrictions  Restrictions/Precautions  Restrictions/Precautions: Fall Risk  Position Activity Restriction  Other position/activity restrictions: WBAT, on 3L O2, IV    Subjective   General  Chart Reviewed: Yes  Patient assessed for rehabilitation services?: Yes  Additional Pertinent Hx: pt is a 64 y/o male who underwent elective R TKR by Dr Anoop Bonner on 10/26/21. PMhx: includes obesity, OA, L TKR, TOM, RLS  Family / Caregiver Present: Yes (wife present)  Referring Practitioner: Dr Anoop Bonner  Diagnosis: R TKR  Subjective  Subjective: met in pt's room w/ PT, his wife is present to assist as needed, asking to use toilet  General Comment  Comments: per RN ok to evaluate  Pain Assessment  Pain Assessment: 0-10  Pain Level: 7  Patient's Stated Pain Goal: No pain  Pain Type: Surgical pain  Pain Location: Knee  Pain Orientation: Right  Pain Descriptors: Elfrieda Min; Throbbing  Pain Frequency: Continuous  Pain Onset: On-going  Clinical Progression: Gradually improving  Functional Pain Assessment: Prevents or interferes some active activities and ADLs  Non-Pharmaceutical Pain Intervention(s): Cold applied;Distraction;Elevation; Emotional support; Rest  Response to Pain Intervention: Patient Satisfied  Social/Functional History  Social/Functional History  Lives With: Spouse (will be home 1st week or so)  Type of Home: House  Home Layout: Multi-level (tri level)  Home Access: Stairs to enter without rails  Entrance Stairs - Number of Steps: 8 + 8  Bathroom Shower/Tub: Tub/Shower unit  Bathroom Toilet: Standard  Bathroom Equipment: Shower chair, Hand-held shower  Bathroom Accessibility: Walker accessible  Home Equipment: Crutches, Cane, Rolling walker, Reacher (CPAP)  ADL Assistance: Independent  Ambulation Assistance: Independent  Transfer Assistance: Independent  Active : Yes       Objective   Vision: Within Functional Limits  Vision Exceptions: Wears glasses at all times  Hearing: Within functional limits    Orientation  Overall Orientation Status: Within Normal Limits     Balance  Sitting Balance: Stand by assistance (feels nauseated, begins to sweat while seated on toilet, cues to keep eyes open)  Standing Balance: Contact guard assistance  Standing Balance  Time: @ 2 minutes  Activity: during toileting, fxl mob  Comment: used support of GB or RW during toileting & fxl mob  Functional Mobility  Functional - Mobility Device: Rolling Walker (heavy duty)  Activity: To/from bathroom  Assist Level: Contact guard assistance  Functional Mobility Comments: used RW from bed<>bathrm w/ CG/min A of 2 to manage IV pole & O2 cord; wife present to observe; cues for smaller steps & stepping R LE forward prior to sitting down, sweating noted  Toilet Transfers  Toilet - Technique: Ambulating  Equipment Used: Grab bars  Toilet Transfer: Minimal assistance;2 Person assistance;Contact guard assistance  Toilet Transfers Comments: CG/min A of 1-2, cues for use of GB, PT stabilized w/c  Wheelchair Bed Transfers  Wheelchair/Bed - Technique: Ambulating  Equipment Used: Bed;Other  Level of Asssistance: Contact guard assistance;Minimal assistance  Wheelchair Transfers Comments: using RW w/ CG/min A of 1-2  ADL  Feeding: Independent  Toileting: Minimal assistance (pt unable to urinate in toilet or in standing w/ urinal; required A to manage clothing & steady pt)  Additional Comments: anticipate he would require up to set up w/ UB ADLs, mod A w/ LB ADLs d/t R LE weakness, limited ROM & pain; is familiar w/ A/E from previous TKR  Tone RUE  RUE Tone: Normotonic  Tone LUE  LUE Tone: Normotonic  Coordination  Movements Are Fluid And Coordinated: Yes     Bed mobility  Supine to Sit: Minimal assistance  Comment: PT guided pt's R LE while instructing on use of  gait bed throughout lifting process  Transfers  Sit to stand: Minimal assistance;Contact guard assistance  Stand to sit: Minimal assistance;Contact guard assistance;2 Person assistance  Transfer Comments: CG/min A of 1-2 for sit<>stand depending on height of surfaces; cues to step R LE forward prior to sitting down     Cognition  Overall Cognitive Status: WNL  Cognition Comment: pt is cooperative, able to follow commands        Sensation  Overall Sensation Status:  (Reports numbness plantar aspect of R foot, and \"a little numbness\" plantar aspect of L forefoot)        LUE AROM (degrees)  LUE AROM : WNL  Left Hand AROM (degrees)  Left Hand AROM: WNL  RUE AROM (degrees)  RUE AROM : WNL  Right Hand AROM (degrees)  Right Hand AROM: WNL  LUE Strength  Gross LUE Strength: WFL  L Hand General: 5/5  RUE Strength  Gross RUE Strength: WFL  R Hand General: 5/5                   Plan   Plan  Times per week: 2-3  Plan weeks: DC home tomorrow w/ wife's assistance  Specific instructions for Next Treatment: ADLs, sponge bathing, dressing  Current Treatment Recommendations: Endurance Training, Patient/Caregiver Education & Training, Balance Training, Gait Training, Safety Education & Training, Functional Mobility Training, Self-Care / ADL    AM-[facility] Score        [facility] Inpatient Daily Activity Raw Score: 17 (10/26/21 1458)  AM-[facility] Inpatient ADL T-Scale Score : 37.26 (10/26/21 1458)  ADL Inpatient CMS 0-100% Score: 50.11 (10/26/21 1458)  ADL Inpatient CMS G-Code Modifier : CK (10/26/21 1458)    Goals  Short term goals  Time Frame for Short term goals: x 1-2 sessions pt will complete  Short term goal 1: Grooming IND'ly  Short term goal 2: UB dressing w/ set up  Short term goal 3: LB dressing w/ min A using A/E (review ADL tech w/ pt & wife)  Short term goal 4: toilet tasks w/ SBA  Short term goal 5: household transfers w/ SBA using RW  Patient Goals   Patient goals : \"be able to walk out of here\" to return home w/ wife's support       Therapy Time   Individual Concurrent Group Co-treatment   Time In 1415         Time Out 1458         Minutes 43         Timed Code Treatment Minutes: Maryjane Castillo OTR/L #[id number]

## 2021-10-26 NOTE — PROGRESS NOTES
[facility] Orthopedic Surgery   Progress Note      S/P :  SUBJECTIVE  In bed  Alert and oriented. Pain is   described in right knee and with the intensity of moderate. Pain is described as aching, throbbing. Complaint of nausea. Just had pain med and nausea med he says need to urinate. OBJECTIVE              Physical                      VITALS:  BP (!) 164/92   Pulse 93   Temp 97.5 °F (36.4 °C) (Oral)   Resp 14   Ht 5' 8\" (1.727 m)   Wt 300 lb (136.1 kg)   SpO2 95%   BMI 45.61 kg/m²                     MUSCULOSKELETAL:  right foot NVI. Wiggles toes to command. Pedal pulses are palpable. NEUROLOGIC:                                  Sensory:  Touch:  Right Lower Extremity:  normal                                                 Surgical wound appears clean and dry right knee with ACE and ice pad on.      Data       CBC:   Lab Results   Component Value Date    WBC 4.8 09/28/2021    RBC 4.60 09/28/2021    HGB 14.9 09/28/2021    HCT 45.6 09/28/2021    MCV 99.1 09/28/2021    MCH 32.3 09/28/2021    MCHC 32.6 09/28/2021    RDW 14.2 09/28/2021     09/28/2021    MPV 8.3 09/28/2021        WBC:    Lab Results   Component Value Date    WBC 4.8 09/28/2021        Hemoglobin/Hematocrit:    Lab Results   Component Value Date    HGB 14.9 09/28/2021    HCT 45.6 09/28/2021        PT/INR:    Lab Results   Component Value Date    PROTIME 10.7 09/28/2021    INR 0.95 09/28/2021              Current Inpatient Medications             Current Facility-Administered Medications: [START ON 10/27/2021] atorvastatin (LIPITOR) tablet 40 mg, 40 mg, Oral, Daily  [START ON 10/27/2021] furosemide (LASIX) tablet 40 mg, 40 mg, Oral, Daily  [START ON 10/27/2021] lisinopril (PRINIVIL;ZESTRIL) tablet 10 mg, 10 mg, Oral, Daily  [START ON 10/27/2021] cetirizine (ZYRTEC) tablet 10 mg, 10 mg, Oral, Daily  [START ON 10/27/2021] pantoprazole (PROTONIX) tablet 40 mg, 40 mg, Oral, QAM AC  pregabalin (LYRICA) capsule 75 mg, 75 mg, Oral, TID  rOPINIRole (REQUIP) tablet 3 mg, 3 mg, Oral, TID  insulin glargine (LANTUS) injection vial 34 Units, 0.25 Units/kg, SubCUTAneous, Nightly  insulin lispro (HUMALOG) injection vial 11 Units, 0.08 Units/kg, SubCUTAneous, TID WC  insulin lispro (HUMALOG) injection vial 0-6 Units, 0-6 Units, SubCUTAneous, TID WC  insulin lispro (HUMALOG) injection vial 0-3 Units, 0-3 Units, SubCUTAneous, Nightly  glucose (GLUTOSE) 40 % oral gel 15 g, 15 g, Oral, PRN  dextrose 50 % IV solution, 12.5 g, IntraVENous, PRN  glucagon (rDNA) injection 1 mg, 1 mg, IntraMUSCular, PRN  dextrose 5 % solution, 100 mL/hr, IntraVENous, PRN  0.45 % sodium chloride infusion, , IntraVENous, Continuous  sodium chloride flush 0.9 % injection 10 mL, 10 mL, IntraVENous, 2 times per day  sodium chloride flush 0.9 % injection 10 mL, 10 mL, IntraVENous, PRN  0.9 % sodium chloride infusion, 25 mL, IntraVENous, PRN  acetaminophen (TYLENOL) tablet 650 mg, 650 mg, Oral, Q6H  oxyCODONE (ROXICODONE) immediate release tablet 5 mg, 5 mg, Oral, Q4H PRN **OR** oxyCODONE HCl (OXY-IR) immediate release tablet 10 mg, 10 mg, Oral, Q4H PRN  morphine (PF) injection 2 mg, 2 mg, IntraVENous, Q3H PRN **OR** morphine (PF) injection 4 mg, 4 mg, IntraVENous, Q3H PRN  ceFAZolin (ANCEF) 3000 mg in dextrose 5 % 100 mL IVPB, 3,000 mg, IntraVENous, Q8H  ondansetron (ZOFRAN-ODT) disintegrating tablet 4 mg, 4 mg, Oral, Q8H PRN **OR** ondansetron (ZOFRAN) injection 4 mg, 4 mg, IntraVENous, Q6H PRN  [START ON 10/27/2021] apixaban (ELIQUIS) tablet 2.5 mg, 2.5 mg, Oral, BID    ASSESSMENT AND PLAN      Post right TKA, stable exam  DVT prophylaxis ordered, Eliquis bid for 14 total days after discharge from hospital for DVT prophylaxis.  Pt is \"high\" risk with BMI>40  PT OT for ADL's and ambulation as tolerated  SS for DC planning, home with home care tomorrow  IV or PO pain med as ordered  Cefadroxil x1 week at home per  [facility] PERDOMO protocol    JACKLYN Mayo -

## 2021-10-26 NOTE — PROGRESS NOTES
Pt drowsy but easy to arouse. States pain remains 7/10 when awake. Pt unable to maintain oxygen saturation levels above 92% on 3 L oxygen nasal canula. Pt understands no more pain medication can be given at this time. Continue to monitor.

## 2021-10-26 NOTE — ANESTHESIA POSTPROCEDURE EVALUATION
Department of Anesthesiology  Postprocedure Note    Patient: Sera Oquendo  MRN: [id number]  YOB: 1960  Date of evaluation: 10/26/2021  Time:  5:05 PM     Procedure Summary     Date: 10/26/21 Room / Location:  [facility] [address] [place] [address]    Anesthesia Start: 3405 Anesthesia Stop: 0897    Procedure: RIGHT TOTAL KNEE REPLACEMENT ROBOTIC ASSISTED (Right Knee) Diagnosis:       Arthritis of right knee      (RIGHT ARTHRITIS KNEE)    Surgeons: Cora Keita MD Responsible Provider: Ruiz Desai MD    Anesthesia Type: general, regional ASA Status: 3          Anesthesia Type: general, regional    Ramon Phase I: Ramon Score: 8    Ramon Phase II:      Last vitals: Reviewed and per EMR flowsheets.        Anesthesia Post Evaluation    Patient location during evaluation: PACU  Level of consciousness: awake and alert  Airway patency: patent  Nausea & Vomiting: no nausea and no vomiting  Complications: no  Cardiovascular status: blood pressure returned to baseline  Respiratory status: acceptable  Hydration status: euvolemic  Comments: Postoperative Anesthesia Note    Name:    Sera Oquendo  MRN:      [id number]    Patient Vitals in the past 12 hrs:  10/26/21 1657, BP:(!) 148/68, Temp:98.1 °F (36.7 °C), Temp src:Oral, Pulse:104, Resp:16, SpO2:94 %  10/26/21 1531, SpO2:95 %  10/26/21 1248, BP:(!) 164/92, Temp:97.5 °F (36.4 °C), Temp src:Oral, Pulse:93, Resp:14, SpO2:95 %  10/26/21 1217, BP:(!) 157/80, Pulse:91, Resp:14, SpO2:90 %  10/26/21 1203, BP:(!) 167/88, Pulse:81, Resp:12, SpO2:92 %  10/26/21 1201, BP:(!) 171/85, Pulse:74, Resp:13, SpO2:93 %  10/26/21 1148, BP:(!) 169/97, Pulse:65, Resp:14, SpO2:94 %  10/26/21 1138, BP:(!) 183/115, Pulse:94, Resp:15, SpO2:95 %  10/26/21 1131, BP:(!) 201/116, Pulse:101, Resp:18, SpO2:97 %  10/26/21 1127, BP:(!) 197/106, Pulse:101, Resp:11, SpO2:97 %  10/26/21 1124, BP:(!) 134/112, Temp:97 °F (36.1 °C), Temp src:Temporal, Pulse:102, Resp:16, SpO2:94 %  10/26/21 1115, BP:(!) 134/112, Pulse:101, Resp:14, SpO2:100 %  10/26/21 1111, BP:(!) 199/104, Pulse:100, Resp:17, SpO2:94 %  10/26/21 1109, BP:(!) 206/115, Pulse:93, Resp:15, SpO2:97 %  10/26/21 1107, BP:(!) 205/135, Pulse:101, Resp:19, SpO2:97 %  10/26/21 1101, BP:(!) 166/108, Pulse:90, Resp:17, SpO2:99 %  10/26/21 1057, BP:(!) 148/120, Pulse:107, Resp:25, SpO2:98 %  10/26/21 1053, Temp:97.2 °F (36.2 °C), Temp src:Temporal, Pulse:105, Resp:13, SpO2:100 %  10/26/21 0658, BP:(!) 151/78, Temp:97.3 °F (36.3 °C), Temp src:Temporal, Pulse:81, Resp:17, SpO2:96 %, Height:5' 8\" (1.727 m), Weight:300 lb (136.1 kg)     LABS:    CBC  Lab Results       Component                Value               Date/Time                  WBC                      4.8                 09/28/2021 08:00 AM        HGB                      14.9                09/28/2021 08:00 AM        HCT                      45.6                09/28/2021 08:00 AM        PLT                      150                 09/28/2021 08:00 AM   RENAL  Lab Results       Component                Value               Date/Time                  NA                       144                 09/28/2021 08:00 AM        K                        4.8                 09/28/2021 08:00 AM        K                        4.0                 10/08/2020 02:41 PM        CL                       104                 09/28/2021 08:00 AM        CO2                      25                  09/28/2021 08:00 AM        BUN                      17                  09/28/2021 08:00 AM        CREATININE               0.9                 09/28/2021 08:00 AM        GLUCOSE                  89                  09/28/2021 08:00 AM   COAGS  Lab Results       Component                Value               Date/Time                  PROTIME                  10.7                09/28/2021 08:00 AM        INR                      0.95                09/28/2021 08:00 AM        APTT                     34.2 09/28/2021 08:00 AM     Intake & Output:  @11QOZO@    Nausea & Vomiting:  No    Level of Consciousness:  Awake    Pain Assessment:  Adequate analgesia    Anesthesia Complications:  No apparent anesthetic complications    SUMMARY      Vital signs stable  OK to discharge from Stage I post anesthesia care.   Care transferred from Anesthesiology department on discharge from perioperative area

## 2021-10-26 NOTE — H&P
Update History & Physical    The patient's History and Physical of September 27, 2021 was reviewed with the patient and I examined the patient. There was no change. The surgical site was confirmed by the patient and me. Plan: The risks, benefits, expected outcome, and alternative to the recommended procedure have been discussed with the patient. Patient understands and wants to proceed with the procedure.      Electronically signed by Mandeep Schmitz MD on 10/26/2021 at 7:25 AM

## 2021-10-26 NOTE — ANESTHESIA PROCEDURE NOTES
Peripheral Block    Patient location during procedure: pre-op  Staffing  Performed: anesthesiologist   Anesthesiologist: Remedios Ga MD  Preanesthetic Checklist  Completed: patient identified, IV checked, site marked, risks and benefits discussed, surgical consent, monitors and equipment checked, pre-op evaluation, timeout performed, anesthesia consent given, oxygen available and patient being monitored  Peripheral Block  Patient position: supine  Prep: ChloraPrep  Patient monitoring: cardiac monitor, continuous pulse ox, frequent blood pressure checks and IV access  Block type: iPacks  Laterality: right  Injection technique: single-shot  Guidance: ultrasound guided  Local infiltration: lidocaine  Infiltration strength: 1 %  Dose: 3 mL  Provider prep: mask and sterile gloves  Local infiltration: lidocaine  Needle  Needle gauge: 21 G  Needle length: 10 cm  Needle localization: ultrasound guidance  Needle insertion depth: 4 cm  Assessment  Injection assessment: negative aspiration for heme, no paresthesia on injection and local visualized surrounding nerve on ultrasound  Paresthesia pain: none  Slow fractionated injection: yes  Hemodynamics: stable  Additional Notes  Immediately prior to procedure a \"time out\" was called to verify the correct patient, allergies, laterality, procedure and equipment. Time out performed with Effie Dubose RN    Local Anesthetic: 0.125 %  Bupivacaine   Amount: 20 ml  in 5 ml increments after negative aspiration each time.         Medications Administered  Ropivacaine (NAROPIN) injection 0.5%, 20 mL  Reason for block: post-op pain management and at surgeon's request

## 2021-10-26 NOTE — CARE COORDINATION
[facility]    Referral received from  to follow for home care services. I will follow for needs, and speak with patient to verify demos.     Bobby Caldera RN, BSN CTN  [facility] [phone]

## 2021-10-26 NOTE — ANESTHESIA PROCEDURE NOTES
Peripheral Block    Patient location during procedure: PACU  Staffing  Performed: anesthesiologist   Anesthesiologist: Stacey Burroughs MD  Preanesthetic Checklist  Completed: patient identified, IV checked, site marked, risks and benefits discussed, surgical consent, monitors and equipment checked, pre-op evaluation, timeout performed, anesthesia consent given, oxygen available and patient being monitored  Peripheral Block  Patient position: supine  Prep: ChloraPrep  Patient monitoring: cardiac monitor, continuous pulse ox, frequent blood pressure checks and IV access  Block type: Femoral  Laterality: right  Injection technique: single-shot  Guidance: ultrasound guided  Local infiltration: lidocaine  Infiltration strength: 1 %  Adductor canal  Provider prep: mask and sterile gloves  Local infiltration: lidocaine  Needle  Needle type: combined needle/nerve stimulator   Needle gauge: 22 G  Needle length: 5 cm  Needle localization: ultrasound guidance  Needle insertion depth: 4 cm  Assessment  Injection assessment: negative aspiration for heme, no paresthesia on injection and local visualized surrounding nerve on ultrasound  Slow fractionated injection: yes  Hemodynamics: stable  Additional Notes  Sartorius and Vastus Medialis Muscle, Femoral artery and Saphenous nerve are identified; the tip of the needle and the spread of the local anesthetic around the Saphenous nerve are visualized. The Saphenous nerve appeared to be anatomically normal and there were no abnormal pathologically findings seen.    Medications Administered  Ropivacaine (NAROPIN) injection 0.5%, 20 mL  Reason for block: post-op pain management

## 2021-10-26 NOTE — PROGRESS NOTES
Physical Therapy    Facility/Department: [address] ORTHOPEDICS  Initial Assessment  This note serves as patient discharge summary if pt discharges prior to next PT visit      NAME: Juan Cassidy  : 1960  MRN: [id number]    Date of Service: 10/26/2021    Discharge Recommendations:  S Level 1, 24 hour supervision or assist   Juan Cassidy scored a 16/24 on the AM-PAC short mobility form. Current research shows that an AM-PAC score of 18 or greater is typically associated with a discharge to the patient's home setting. Based on the patient's AM-PAC score and their current functional mobility deficits, it is recommended that the patient have 2-3 sessions per week of Physical Therapy at d/c to increase the patient's independence. At this time, this patient demonstrates the endurance and safety to discharge home with home therapy services and a follow up treatment frequency of 2-3x/wk. Please see assessment section for further patient specific details. PT Equipment Recommendations  Equipment Needed: No    Assessment   Body structures, Functions, Activity limitations: Decreased functional mobility ; Decreased endurance;Decreased safe awareness; Increased pain;Decreased ROM  Assessment: 64 yo male to hospital 10- for elective R TKR robot assisted via Dr. Garrick Claudio. PMHx as noted, including L TKR robot assisted (Dr. Garrick Claudio) at this facility 2021. Prior status: Lives in [facility] with wife (still works). Independent in cane ambulation, driving, and ADLs. Status 10-: Bed Mobility Min A 1. Transfers cues and Min A.  Gait RW, up to 24' CGA with Fair+ quality and Fair tolerance--cites high R knee pain during mobility. Patient and wife familiar with TKR recovery from 2021 knee replacement. Anticipate Patient will be appropriate for DC to home with home therapy and home PT level 1. He has a RW for home.   Treatment Diagnosis: Impaired functional mobility  Prognosis: Good  Decision Making: Medium Complexity  History: as noted  Clinical Presentation: Evolving  PT Education: Goals;Plan of Care;Transfer Training;Gait Training;General Safety;Weight-bearing Education;Disease Specific Education  Patient Education: importance of positioning of R LE to maximize ROM return. REQUIRES PT FOLLOW UP: Yes  Activity Tolerance  Activity Tolerance: Patient limited by pain  Activity Tolerance: 3 L O2 throughout. 96%  after 21' ambulation. Patient Diagnosis(es): The encounter diagnosis was Arthritis of right knee. has a past medical history of Arthritis, Bilateral venous leg ulcers, Cellulitis of lower extremity, Diabetes mellitus ([address] GERD (gastroesophageal reflux disease), Hyperlipidemia, Hypertension, Impaired fasting glucose, MRSA carrier, MRSA nasal colonization, Obesity, TOM treated with BiPAP, Preoperative clearance, Restless leg syndrome, Screening PSA (prostate specific antigen), Seasonal allergies, Sleep apnea, Stasis dermatitis of both legs, Tobacco use, and Tubular adenoma of colon. has a past surgical history that includes Mouth surgery; [place] tooth extraction; Circumcision; Vasectomy; Colonoscopy (09/14/2017); Umbilical hernia repair (05/28/2011); Knee arthroscopy (Left, 8/3/2020); Pain management procedure (Left, 12/9/2020); Upper gastrointestinal endoscopy (N/A, 1/11/2021); Sleeve Gastrectomy (N/A, 3/2/2021); and Total knee arthroplasty (Left, 7/14/2021). Restrictions  Restrictions/Precautions  Restrictions/Precautions: Fall Risk  Position Activity Restriction  Other position/activity restrictions: WBAT, on 3L O2, IV     Vision/Hearing  Vision: Impaired  Vision Exceptions: Wears glasses at all times  Hearing: Within functional limits       Subjective  General  Chart Reviewed: Yes  Patient assessed for rehabilitation services?: Yes  Additional Pertinent Hx: 64 yo male to hospital 10- for elective R TKR robot assisted via Dr. Brian Grewal.  PMHx as noted, including L TKR robot assisted (Dr. Deidre Colbert) at this facility 7-  Response To Previous Treatment: Not applicable  Family / Caregiver Present: Yes  Referring Practitioner: Deidre Colbert MD  Referral Date : 10/26/21  Subjective  Subjective: Agreeable to therapy. Reports pain at B Knees during mobility. Does not rate. Appears at least moderate. Pain Screening  Patient Currently in Pain: Yes    Orientation  Orientation  Overall Orientation Status: Within Normal Limits     Social/Functional History  Social/Functional History  Lives With: Spouse (will be home 1st week or so)  Type of Home: House  Home Layout: Multi-level (tri level)  Home Access: Stairs to enter without rails  Entrance Stairs - Number of Steps: 8 + 8  Bathroom Shower/Tub: Tub/Shower unit  Bathroom Toilet: Standard  Bathroom Equipment: Shower chair, Hand-held shower  Bathroom Accessibility: Walker accessible  Home Equipment: Crutches, Cane, Rolling walker, Reacher (CPAP)  ADL Assistance: Independent  Ambulation Assistance: Independent  Transfer Assistance: Independent  Active : Yes     Cognition   Cognition  Overall Cognitive Status: WNL  Cognition Comment: pt is cooperative, able to follow commands    Objective  AROM RLE (degrees)  RLE AROM: WFL  RLE General AROM: except knee grossly 5-50  AROM LLE (degrees)  LLE AROM : WFL  LLE General AROM: knee grossly 0-100  Strength RLE  Comment: functionallly 3+/5  Strength LLE  Strength LLE: WFL  Sensation  Overall Sensation Status:  (Reports numbness plantar aspect of R foot, and \"a little numbness\" plantar aspect of L forefoot)  Bed mobility  Supine to Sit: Minimal assistance (HOB up, 1 rail. Exiting to L side of bed. Use of looped belt as a leg .)  Sit to Supine: Unable to assess (in BS chair at end of session)  Transfers  Sit to Stand: Minimal Assistance (cues for UE and LE positioning.  Effortful.)  Stand to sit: Minimal Assistance (Cues for UEs and B LEs.)  Ambulation  Ambulation?: Yes  Ambulation 1  Surface: level tile  Device: Rolling Walker  Assistance: Contact guard assistance (and management of IV and O2 line)  Quality of Gait: Discontinuous, step through pattern. Antalgic with R LE weightbearing,  Gait Deviations: Slow Torrie;Decreased step height;Decreased step length  Distance: 10',  21'  Comments: Patient sits on commode and attempts to urinate, without success. Then stands and holds urinal and unable to void. RN informed. Stairs/Curb  Stairs?: No  Balance  Posture: Good  Sitting - Static: Good  Sitting - Dynamic: Good  Standing - Static: Good  Standing - Dynamic: Good (limited tolerance, at RW)  Exercises  Ankle Pumps: x 3 B     Plan   Plan  Times per week: 1-3 visits as needed  Current Treatment Recommendations: Functional Mobility Training, Gait Training, ROM, Safety Education & Training, Home Exercise Program, Patient/Caregiver Education & Training, Equipment Evaluation, Education, & procurement  Safety Devices  Type of devices: Call light within reach, Gait belt, Patient at risk for falls, Left in chair, Nurse notified (RN Kunal Diaz informed)    AM-PAC Score  AM-PAC Inpatient Mobility Raw Score : 16 (10/26/21 1507)  AM-PAC Inpatient T-Scale Score : 40.78 (10/26/21 1507)  Mobility Inpatient CMS 0-100% Score: 54.16 (10/26/21 1507)  Mobility Inpatient CMS G-Code Modifier : CK (10/26/21 1507)     Goals  Short term goals  Time Frame for Short term goals: By Acute DC  Short term goal 1: Transfers SBA  Short term goal 2: Gait RW 50' SBA  Short term goal 3: 4 stairs B crutches, CGA  Short term goal 4: Tolerates initial TKR HEP exs x 5-10 each. Patient Goals   Patient goals : \"To recover and walk without pain. \"       Therapy Time   Individual Concurrent Group Co-treatment   Time In 4717         Time Out 1458         Minutes [address] [facility], [address], [address] [facility],   Electronically signed by Fany Chawla, [address] (#[phone])

## 2021-10-27 VITALS
OXYGEN SATURATION: 95 % | SYSTOLIC BLOOD PRESSURE: 102 MMHG | RESPIRATION RATE: 16 BRPM | HEART RATE: 89 BPM | DIASTOLIC BLOOD PRESSURE: 71 MMHG | WEIGHT: 313.49 LBS | BODY MASS INDEX: 47.51 KG/M2 | TEMPERATURE: 98.5 F | HEIGHT: 68 IN

## 2021-10-27 PROBLEM — Z01.818 PREOP EXAM FOR INTERNAL MEDICINE: Status: RESOLVED | Noted: 2020-10-26 | Resolved: 2021-10-27

## 2021-10-27 LAB
ANION GAP SERPL CALCULATED.3IONS-SCNC: 14 MMOL/L (ref 3–16)
BUN BLDV-MCNC: 16 MG/DL (ref 7–20)
CALCIUM SERPL-MCNC: 9.1 MG/DL (ref 8.3–10.6)
CHLORIDE BLD-SCNC: 98 MMOL/L (ref 99–110)
CO2: 25 MMOL/L (ref 21–32)
CREAT SERPL-MCNC: 0.8 MG/DL (ref 0.9–1.3)
GFR AFRICAN AMERICAN: >60
GFR NON-AFRICAN AMERICAN: >60
GLUCOSE BLD-MCNC: 123 MG/DL (ref 70–99)
GLUCOSE BLD-MCNC: 130 MG/DL (ref 70–99)
MRSA SCREEN RT-PCR: NORMAL
PERFORMED ON: ABNORMAL
POTASSIUM SERPL-SCNC: 5.1 MMOL/L (ref 3.5–5.1)
SODIUM BLD-SCNC: 137 MMOL/L (ref 136–145)

## 2021-10-27 PROCEDURE — 94760 N-INVAS EAR/PLS OXIMETRY 1: CPT

## 2021-10-27 PROCEDURE — 2580000003 HC RX 258: Performed by: ORTHOPAEDIC SURGERY

## 2021-10-27 PROCEDURE — 6360000002 HC RX W HCPCS: Performed by: ORTHOPAEDIC SURGERY

## 2021-10-27 PROCEDURE — 6370000000 HC RX 637 (ALT 250 FOR IP): Performed by: ORTHOPAEDIC SURGERY

## 2021-10-27 PROCEDURE — 36415 COLL VENOUS BLD VENIPUNCTURE: CPT

## 2021-10-27 PROCEDURE — G0378 HOSPITAL OBSERVATION PER HR: HCPCS

## 2021-10-27 PROCEDURE — 97110 THERAPEUTIC EXERCISES: CPT

## 2021-10-27 PROCEDURE — 80048 BASIC METABOLIC PNL TOTAL CA: CPT

## 2021-10-27 PROCEDURE — 97530 THERAPEUTIC ACTIVITIES: CPT

## 2021-10-27 PROCEDURE — 97535 SELF CARE MNGMENT TRAINING: CPT

## 2021-10-27 PROCEDURE — 97116 GAIT TRAINING THERAPY: CPT

## 2021-10-27 RX ORDER — CYCLOBENZAPRINE HCL 10 MG
10 TABLET ORAL 3 TIMES DAILY PRN
Status: ON HOLD | COMMUNITY
End: 2021-10-27 | Stop reason: HOSPADM

## 2021-10-27 RX ORDER — CYCLOBENZAPRINE HCL 5 MG
5 TABLET ORAL 3 TIMES DAILY PRN
Qty: 20 TABLET | Refills: 0 | Status: SHIPPED | OUTPATIENT
Start: 2021-10-27 | End: 2021-11-03

## 2021-10-27 RX ORDER — CYCLOBENZAPRINE HCL 10 MG
5 TABLET ORAL 3 TIMES DAILY PRN
Status: DISCONTINUED | OUTPATIENT
Start: 2021-10-27 | End: 2021-10-27 | Stop reason: HOSPADM

## 2021-10-27 RX ADMIN — OXYCODONE HYDROCHLORIDE 10 MG: 10 TABLET ORAL at 08:43

## 2021-10-27 RX ADMIN — ATORVASTATIN CALCIUM 40 MG: 40 TABLET, FILM COATED ORAL at 08:43

## 2021-10-27 RX ADMIN — CEFAZOLIN SODIUM 3000 MG: 10 INJECTION, POWDER, FOR SOLUTION INTRAVENOUS at 02:57

## 2021-10-27 RX ADMIN — PANTOPRAZOLE SODIUM 40 MG: 40 TABLET, DELAYED RELEASE ORAL at 05:30

## 2021-10-27 RX ADMIN — SODIUM CHLORIDE, PRESERVATIVE FREE 10 ML: 5 INJECTION INTRAVENOUS at 08:44

## 2021-10-27 RX ADMIN — LISINOPRIL 10 MG: 10 TABLET ORAL at 08:43

## 2021-10-27 RX ADMIN — ACETAMINOPHEN 650 MG: 325 TABLET ORAL at 05:30

## 2021-10-27 RX ADMIN — APIXABAN 2.5 MG: 2.5 TABLET, FILM COATED ORAL at 08:43

## 2021-10-27 RX ADMIN — OXYCODONE HYDROCHLORIDE 10 MG: 10 TABLET ORAL at 04:53

## 2021-10-27 RX ADMIN — FUROSEMIDE 40 MG: 40 TABLET ORAL at 08:43

## 2021-10-27 RX ADMIN — ROPINIROLE HYDROCHLORIDE 3 MG: 1 TABLET, FILM COATED ORAL at 08:43

## 2021-10-27 RX ADMIN — CETIRIZINE HYDROCHLORIDE 10 MG: 10 TABLET, FILM COATED ORAL at 08:43

## 2021-10-27 RX ADMIN — PREGABALIN 75 MG: 75 CAPSULE ORAL at 08:46

## 2021-10-27 ASSESSMENT — PAIN DESCRIPTION - ONSET
ONSET: ON-GOING
ONSET: ON-GOING

## 2021-10-27 ASSESSMENT — PAIN DESCRIPTION - PAIN TYPE
TYPE: ACUTE PAIN;SURGICAL PAIN
TYPE: ACUTE PAIN;SURGICAL PAIN

## 2021-10-27 ASSESSMENT — PAIN - FUNCTIONAL ASSESSMENT
PAIN_FUNCTIONAL_ASSESSMENT: PREVENTS OR INTERFERES SOME ACTIVE ACTIVITIES AND ADLS
PAIN_FUNCTIONAL_ASSESSMENT: PREVENTS OR INTERFERES SOME ACTIVE ACTIVITIES AND ADLS

## 2021-10-27 ASSESSMENT — PAIN DESCRIPTION - PROGRESSION
CLINICAL_PROGRESSION: NOT CHANGED
CLINICAL_PROGRESSION: GRADUALLY IMPROVING

## 2021-10-27 ASSESSMENT — PAIN DESCRIPTION - ORIENTATION
ORIENTATION: RIGHT
ORIENTATION: RIGHT

## 2021-10-27 ASSESSMENT — PAIN DESCRIPTION - FREQUENCY
FREQUENCY: CONTINUOUS
FREQUENCY: CONTINUOUS

## 2021-10-27 ASSESSMENT — PAIN DESCRIPTION - DESCRIPTORS
DESCRIPTORS: ACHING;CONSTANT
DESCRIPTORS: ACHING;CONSTANT

## 2021-10-27 ASSESSMENT — PAIN DESCRIPTION - LOCATION
LOCATION: KNEE
LOCATION: KNEE

## 2021-10-27 ASSESSMENT — PAIN SCALES - GENERAL
PAINLEVEL_OUTOF10: 8
PAINLEVEL_OUTOF10: 8
PAINLEVEL_OUTOF10: 10
PAINLEVEL_OUTOF10: 8

## 2021-10-27 NOTE — PROGRESS NOTES
Pt rounded on this evening Q2h, whiteboard updated, pt given  ice water and needs assessed. Pt up in chair, tolerating ambulation x1 assist with front rolling walker. Pt c/o severe pain in R knee, given PRN analgesics (see eMAR). Pt denies nausea, IVF completed and disconnected. Pt wants to sleep up in chair, personal CPAP in place, pt has no further needs or concerns at this time. Call light within reach, pt verbalized understanding to call prior to ambulating. Will continue to monitor and reassess.    Electronically signed by Juan Ocasio RN on 10/27/2021 at 12:59 AM

## 2021-10-27 NOTE — PROGRESS NOTES
CLINICAL PHARMACY NOTE: MEDS TO BEDS    Total # of Prescriptions Filled: 4   The following medications were delivered to the patient:  Current Discharge Medication List      START taking these medications    Details   oxyCODONE (ROXICODONE) 5 MG immediate release tablet Take 1-2 tablets by mouth every 6 hours as needed for Pain for up to 5 days.   Qty: 40 tablet, Refills: 0    Comments: Reduce doses taken as pain becomes manageable  Associated Diagnoses: Arthritis of right knee      apixaban (ELIQUIS) 2.5 MG TABS tablet Take 1 tablet by mouth 2 times daily for 14 days  Qty: 28 tablet, Refills: 0      cefadroxil (DURICEF) 500 MG capsule Take 1 capsule by mouth 2 times daily for 7 days  Qty: 14 capsule, Refills: 0         · Cyclobenzaprine 5mg tabs  ·     Additional Documentation:

## 2021-10-27 NOTE — PROGRESS NOTES
Physical Therapy    Facility/Department: [address] ORTHOPEDICS  Daily Treatment / Discharge      NAME: Azalia Morales  : 1960  MRN: [id number]    Date of Service: 10/27/2021    Discharge Recommendations:  S Level 1, 24 hour supervision or assist   Azalia Morales scored a  on the AM-PAC short mobility form. Current research shows that an AM-PAC score of 18 or greater is typically associated with a discharge to the patient's home setting. Based on the patient's AM-PAC score and their current functional mobility deficits, it is recommended that the patient have 2-3 sessions per week of Physical Therapy at d/c to increase the patient's independence. At this time, this patient demonstrates the endurance and safety to discharge home with home therapy services and a follow up treatment frequency of 2-3x/wk. Please see assessment section for further patient specific details. PT Equipment Recommendations  Equipment Needed: No    Assessment   Body structures, Functions, Activity limitations: Decreased functional mobility ; Decreased endurance;Decreased safe awareness; Increased pain;Decreased ROM  Assessment: 64 yo male to hospital 10- for elective R TKR robot assisted via Dr. Lamar Gross. PMHx as noted, including L TKR robot assisted (Dr. Lamar Gross) at this facility 2021. Prior status: Lives in [place] home with wife (still works). Independent in cane ambulation, driving, and ADLs. Status 10-: Transfers cues and CGA-Min A, extra time. Gait RW, up to 15' SBA with Fair quality and Fair tolerance--cites high R knee pain during mobility. Patient and wife familiar with TKR recovery from 2021 knee replacement. Patient displays a level of anxiety regarding his recovery; wife very calm and realistic, and is approriately supportive. Wife expresses confidence in being able to properly assist patient at home. Patient appropriate for DC to home with home therapy and home PT level 1. Refer to HPI He has a RW for home. Treatment Diagnosis: Impaired functional mobility  Prognosis: Good  History: as noted  PT Education: Goals;Plan of Care;Transfer Training;Gait Training;General Safety;Weight-bearing Education;Disease Specific Education  Patient Education: importance of positioning of R LE to maximize ROM return. REQUIRES PT FOLLOW UP: No (transition to next level of care.)  Activity Tolerance  Activity Tolerance: Patient limited by pain       Patient Diagnosis(es): Diagnoses of Arthritis of right knee and Essential hypertension were pertinent to this visit. has a past medical history of Arthritis, Bilateral venous leg ulcers, Cellulitis of lower extremity, Diabetes mellitus ([address] GERD (gastroesophageal reflux disease), Hyperlipidemia, Hypertension, Impaired fasting glucose, MRSA carrier, MRSA nasal colonization, Obesity, TOM treated with BiPAP, Preoperative clearance, Restless leg syndrome, Screening PSA (prostate specific antigen), Seasonal allergies, Sleep apnea, Stasis dermatitis of both legs, Tobacco use, and Tubular adenoma of colon. has a past surgical history that includes Mouth surgery; [place] tooth extraction; Circumcision; Vasectomy; Colonoscopy (09/14/2017); Umbilical hernia repair (05/28/2011); Knee arthroscopy (Left, 8/3/2020); Pain management procedure (Left, 12/9/2020); Upper gastrointestinal endoscopy (N/A, 1/11/2021); Sleeve Gastrectomy (N/A, 3/2/2021); Total knee arthroplasty (Left, 7/14/2021); and Total knee arthroplasty (Right, 10/26/2021).     Restrictions  Restrictions/Precautions  Restrictions/Precautions: Fall Risk, Weight Bearing  Lower Extremity Weight Bearing Restrictions  Right Lower Extremity Weight Bearing: Weight Bearing As Tolerated  Position Activity Restriction  Other position/activity restrictions: WBAT     Vision/Hearing  Vision: Within Functional Limits  Vision Exceptions: Wears glasses at all times  Hearing: Within functional limits       Subjective  General  Chart Reviewed: Yes  Additional Pertinent Hx: 64 yo male to hospital 10- for elective R TKR robot assisted via Dr. Jany Cota. PMHx as noted, including L TKR robot assisted (Dr. Jany Cota) at this facility 7-  Response To Previous Treatment: Patient with no complaints from previous session. Family / Caregiver Present: Yes (wife)  Referring Practitioner: Jany Cota MD  Referral Date : 10/26/21  Subjective  Subjective: Agreeable to therapy. Reports R knee pain at 10/10. Agreeable to therapy, but displays some anxiety due to amount of pain. Pain Screening  Patient Currently in Pain: Yes     Orientation  Orientation  Overall Orientation Status: Within Normal Limits     Social/Functional History  Social/Functional History  Lives With: Spouse (will be home 1st week or so)  Type of Home: House  Home Layout: Multi-level (tri level)  Home Access: Stairs to enter without rails  Entrance Stairs - Number of Steps: 8 + 8  Bathroom Shower/Tub: Tub/Shower unit  Bathroom Toilet: Standard  Bathroom Equipment: Shower chair, Hand-held shower  Bathroom Accessibility: Walker accessible  Home Equipment: Crutches, Cane, Rolling walker, Reacher (CPAP)  ADL Assistance: Independent  Ambulation Assistance: Independent  Transfer Assistance: Independent  Active : Yes     Cognition   Cognition  Overall Cognitive Status: WNL    Objective  Transfers  Sit to Stand: Minimal Assistance;Contact guard assistance (cues for UE and LE positioning. Effortful.)  Stand to sit: Minimal Assistance;Contact guard assistance (Cues for UEs and B LEs.)  Comment: slow and guarded with all transfers. Therapist demonstrates correct car transfer technique. Pt/wife verb understanding. Ambulation  Ambulation?: Yes  Ambulation 1  Surface: level tile  Device: Rolling Walker  Assistance: Contact guard assistance (and management of IV and O2 line)  Quality of Gait: Discontinuous, step through pattern.  Antalgic with R LE weightbearing, Cues to stand erect and remain within wh walker base throughout. Appears and reports fatigue and high pain with gait. Gait Deviations: Slow Torrie;Decreased step height;Decreased step length  Distance: 10' x 4  Comments: Patient sits on commode and urinates small amount. Wife assists patient in restroom, with therapist peripheral supervision. Gait distance limited due to level of pain, and patient need to trial stairs for DC. Stairs/Curb  Stairs?: Yes  Stairs  # Steps : 4  Stairs Height: 6\"  Rails: None  Device: Crutches (B)  Assistance: Contact guard assistance  Comment: cues for sequencing. Patient states stairs at home with walls on both sides. His typical method to do stairs is to place crutches next to him, but not to bear through upper support in armpits. Rather, he places hands into middle of crutch and bears through hand support of crutch while bracing crutches against the side walls. This method is unusual, but patient performs safely for therapist, 4 steps ascending, extra time, CGA. He descends 4 stairs using B rails. Balance  Posture: Good  Sitting - Static: Good  Sitting - Dynamic: Good  Standing - Static: Good  Standing - Dynamic: Good (limited tolerance, at RW)  Exercises  Quad Sets: x 5  Knee Active Range of Motion: 5-58 with moderate push  Ankle Pumps: x 10  Comments: AROM-AAROM R knee flexion in sitting, with foot on glide, x 5.      Plan   Plan  Times per week: 1-3 visits as needed  Current Treatment Recommendations: Functional Mobility Training, Gait Training, ROM, Safety Education & Training, Home Exercise Program, Patient/Caregiver Education & Training, Equipment Evaluation, Education, & procurement  Safety Devices  Type of devices: Gait belt, Left in chair, Nurse notified, Call light within reach (ANISHA Neal informed)    AM-PAC Score  AM-PAC Inpatient Mobility Raw Score : 16 (10/27/21 1220)  AM-PAC Inpatient T-Scale Score : 40.78 (10/27/21 1220)  Mobility Inpatient CMS 0-100% Score: 54.16 (10/27/21 1220)  Mobility Inpatient CMS G-Code Modifier : CK (10/27/21 1220)     Goals  Short term goals  Time Frame for Short term goals: By Acute DC. 10-: all goals met except gait distance. Short term goal 1: Transfers SBA  Short term goal 2: Gait RW 50' SBA  Short term goal 3: 4 stairs B crutches, CGA  Short term goal 4: Tolerates initial TKR HEP exs x 5-10 each. Patient Goals   Patient goals : \"To recover and walk without pain. \"       Therapy Time   Individual Concurrent Group Co-treatment   Time In 9206         Time Out 1155         Minutes 75            Gt 25; TA 40;  So. Romina Thompson  Electronically signed by Shola Luz, [address] [place] [address] Drive (#[id number])  on 10/27/2021 at 5:35 PM

## 2021-10-27 NOTE — PROGRESS NOTES
Clinical Pharmacy Note  Medication Counseling    Reviewed new medications started during hospital admission: Eliquis 2.5 mg twice daily for 14 days, Duricef 500 mg twice daily for 7 days, oxycodone 5-10 mg every 6 hours as needed for pain, cyclobenzaprine three times daily prn muscle spasms. Indications and side effects were emphasized during counseling. All medication-related questions addressed. Patient verbalized understanding of education. Should the patient express any additional questions or concerns regarding their medications, please do not hesitate to contact the pharmacy department. Patient/caregiver aware they may refuse medications during hospital stay. 10 minutes spent educating patient regarding medications.     Malaika Shea PharmD, BCPS  10/27/2021  10:23 AM

## 2021-10-27 NOTE — PROGRESS NOTES
Occupational Therapy  Facility/Department: [address] ORTHOPEDICS  Daily Treatment Note  NAME: Monique Kelsey  : 1960  MRN: [id number]    Date of Service: 10/27/2021    Discharge Recommendations:  24 hour supervision or assist, Home with Home health OT, S Level 3       Monique Kelsey scored a 20/24 on the  [address] form. Current research shows that an AM-PAC score of 18 or greater is typically associated with a discharge to the patient's home setting. Based on the patient's AM-PAC score, and their current ADL deficits, it is recommended that the patient have 2-3 sessions per week of Occupational Therapy at d/c to increase the patient's independence. At this time, this patient demonstrates the endurance and safety to discharge home with home (home vs OP services) and a follow up treatment frequency of 2-3x/wk. Please see assessment section for further patient specific details. If patient discharges prior to next session this note will serve as a discharge summary. Please see below for the latest assessment towards goals. HOME HEALTH CARE: LEVEL 3 SAFETY        -Initial home health evaluation to occur within 24-48 hours, in patient home    -Home health agency to establish plan of care for patient over 60 day period    -Medication Reconciliation    -PT/OT/Speech evaluations in home within 24-48 hours of discharge; including  -DME and home safety    -Frontload therapy 5 days, then 3x a week    -OT to evaluate if patient has [address] needs for personal care    - evaluation within 24-48 hours, includes evaluation of resources   and insurance to determine [place], [place], LTC, and Medicaid options    -PCP Visit scheduled within three to seven days of discharge         Assessment: Discussed with OTR am pac score is 20. Anticipiate that patient will be safe to retrurn home with wife to provide 24/7 assist/supervision and home OT.  Patient able to complete functional mobiliy with RW with SBA, slow steady gait, no overt LOB noted, painful gait. SBA for sit<>stand from recliner chair to RW to commode to recliner chair with mild cues for hand placement. Stood with SBA for grooming tasks. Plan is for discharge to home today. Patient Diagnosis(es): Diagnoses of Arthritis of right knee and Essential hypertension were pertinent to this visit. has a past medical history of Arthritis, Bilateral venous leg ulcers, Cellulitis of lower extremity, Diabetes mellitus ([address] GERD (gastroesophageal reflux disease), Hyperlipidemia, Hypertension, Impaired fasting glucose, MRSA carrier, MRSA nasal colonization, Obesity, TOM treated with BiPAP, Preoperative clearance, Restless leg syndrome, Screening PSA (prostate specific antigen), Seasonal allergies, Sleep apnea, Stasis dermatitis of both legs, Tobacco use, and Tubular adenoma of colon. has a past surgical history that includes Mouth surgery; [place] tooth extraction; Circumcision; Vasectomy; Colonoscopy (09/14/2017); Umbilical hernia repair (05/28/2011); Knee arthroscopy (Left, 8/3/2020); Pain management procedure (Left, 12/9/2020); Upper gastrointestinal endoscopy (N/A, 1/11/2021); Sleeve Gastrectomy (N/A, 3/2/2021); and Total knee arthroplasty (Left, 7/14/2021). Restrictions  Restrictions/Precautions  Restrictions/Precautions: Fall Risk  Position Activity Restriction  Other position/activity restrictions: WBAT  Subjective   General  Chart Reviewed: Yes  Patient assessed for rehabilitation services?: Yes  Additional Pertinent Hx: pt is a 64 y/o male who underwent elective R TKR by Dr Colonel Gant on 10/26/21. PMhx: includes obesity, OA, L TKR, TOM, RLS  Response to previous treatment: Patient with no complaints from previous session  Family / Caregiver Present: Yes (wife arrives at end of session)  Referring Practitioner: Dr Colonel Gant  Diagnosis: R TKR  Subjective  Subjective: Patient seated in recliner chair upon arrival to room. Patient agreeable to therapy. Reports pain 9/10 in right knee, ice placed at end of session, RN brings pain medicine to room  General Comment  Comments: per RN ok for therapy      Orientation  Orientation  Overall Orientation Status: Within Normal Limits  Objective    ADL  Grooming: Setup;Stand by assistance (stood in front of sink to wash hands)  UE Dressing: Setup; Independent  LE Dressing: Minimal assistance; Moderate assistance (Min/Mod A to thread clothing over feet, SBA for over hips)  Toileting: Stand by assistance        Balance  Sitting Balance: Supervision  Standing Balance: Stand by assistance  Standing Balance  Activity: Stood for ADL tasks  Functional Mobility  Functional - Mobility Device: Rolling Walker  Activity: To/from bathroom  Assist Level: Stand by assistance  Functional Mobility Comments: Functional mobility with RW with SBA, slow steady gait, no overt LOB noted. Patient with noted painful gait, cues to take standing rest breaks as needed. Toilet Transfers  Toilet - Technique: Ambulating  Equipment Used: Grab bars  Toilet Transfer: Stand by assistance  Toilet Transfers Comments: mild cues for hand placement  Bed mobility  Supine to Sit: Unable to assess  Sit to Supine: Unable to assess  Comment: up in chair at start and end of session  Transfers  Sit to stand: Stand by assistance  Stand to sit: Stand by assistance  Transfer Comments: SBA for sit<>stand from recliner chair to RW to recliner chair with mild cues for hand placement        Cognition  Overall Cognitive Status: WNL     Assessment   Performance deficits / Impairments: Decreased functional mobility ; Decreased safe awareness;Decreased balance;Decreased ADL status; Decreased endurance  Assessment: Discussed with OTR am pac score is 20. Anticipiate that patient will be safe to retrurn home with wife to provide 24/7 assist/supervision and home OT. Patient able to complete functional mobiliy with RW with SBA, slow steady gait, no overt LOB noted, painful gait.  SBA for sit<>stand from recliner chair to RW to commode to recliner chair with mild cues for hand placement. Stood with SBA for grooming tasks. Plan is for discharge to home today. OT Education: OT Role;Plan of Care;Equipment;ADL Adaptive Strategies;Transfer Training  Patient Education: mirella sun for 2 weeks, ice therapy and importance of mobility  REQUIRES OT FOLLOW UP: Yes  Activity Tolerance  Activity Tolerance: Patient Tolerated treatment well  Safety Devices  Safety Devices in place: Yes  Type of devices: Call light within reach; Left in chair;Nurse notified       Plan   Plan  Times per week: Plan is for discharge to home today  Plan weeks: DC home tomorrow w/ wife's assistance  Specific instructions for Next Treatment: ADLs, sponge bathing, dressing  Current Treatment Recommendations: Endurance Training, Patient/Caregiver Education & Training, Balance Training, Gait Training, Safety Education & Training, Functional Mobility Training, Self-Care / ADL    AM-PAC Score        AM-[facility] Inpatient Daily Activity Raw Score: 20 (10/27/21 1030)  AM-PAC Inpatient ADL T-Scale Score : 42.03 (10/27/21 1030)  ADL Inpatient CMS 0-100% Score: 38.32 (10/27/21 1030)  ADL Inpatient CMS G-Code Modifier : Creta Flood (10/27/21 1030)    Goals  Short term goals  Time Frame for Short term goals: x 1-2 sessions pt will complete: all goals ongoing  Short term goal 1: Grooming IND'ly  Short term goal 2: UB dressing w/ set up  Short term goal 3: LB dressing w/ min A using A/E (review ADL tech w/ pt & wife)  Short term goal 4: toilet tasks w/ SBA  Short term goal 5: household transfers w/ SBA using RW  Patient Goals   Patient goals : \"be able to walk out of here\" to return home w/ wife's support       Therapy Time   Individual Concurrent Group Co-treatment   Time In 0810         Time Out 0910         Minutes 60                 Electronically signed by Mariola Majano [id number] on 10/27/2021 at 10:44 AM

## 2021-10-27 NOTE — PROGRESS NOTES
[facility] Orthopedic Surgery   Progress Note      S/P :  SUBJECTIVE  In recliner. Alert and oriented. . Pain is   described in right knee just above kneecap he says and intermittently in spasm and with the intensity of moderate. Pain is described as aching. OBJECTIVE              Physical                      VITALS:  /71   Pulse 89   Temp 98.5 °F (36.9 °C) (Oral)   Resp 16   Ht 5' 8\" (1.727 m)   Wt (!) 313 lb 7.9 oz (142.2 kg)   SpO2 95%   BMI 47.67 kg/m²                     MUSCULOSKELETAL:  right foot NVI. Wiggles toes to command. Pedal pulses are palpable. NEUROLOGIC:                                  Sensory:  Touch:  Right Lower Extremity:  normal                                                 Surgical wound appears clean and dry right knee with Prineo dreissing. Ice pad on.  SONI hose on.,    Data       CBC:   Lab Results   Component Value Date    WBC 4.8 09/28/2021    RBC 4.60 09/28/2021    HGB 14.9 09/28/2021    HCT 45.6 09/28/2021    MCV 99.1 09/28/2021    MCH 32.3 09/28/2021    MCHC 32.6 09/28/2021    RDW 14.2 09/28/2021     09/28/2021    MPV 8.3 09/28/2021        WBC:    Lab Results   Component Value Date    WBC 4.8 09/28/2021        Hemoglobin/Hematocrit:    Lab Results   Component Value Date    HGB 14.9 09/28/2021    HCT 45.6 09/28/2021        PT/INR:    Lab Results   Component Value Date    PROTIME 10.7 09/28/2021    INR 0.95 09/28/2021              Current Inpatient Medications             Current Facility-Administered Medications: cyclobenzaprine (FLEXERIL) tablet 5 mg, 5 mg, Oral, TID PRN  atorvastatin (LIPITOR) tablet 40 mg, 40 mg, Oral, Daily  furosemide (LASIX) tablet 40 mg, 40 mg, Oral, Daily  lisinopril (PRINIVIL;ZESTRIL) tablet 10 mg, 10 mg, Oral, Daily  cetirizine (ZYRTEC) tablet 10 mg, 10 mg, Oral, Daily  pantoprazole (PROTONIX) tablet 40 mg, 40 mg, Oral, QAM AC  pregabalin (LYRICA) capsule 75 mg, 75 mg, Oral, TID  rOPINIRole (REQUIP) tablet 3 mg, 3 mg, Oral, TID  sodium chloride flush 0.9 % injection 10 mL, 10 mL, IntraVENous, 2 times per day  sodium chloride flush 0.9 % injection 10 mL, 10 mL, IntraVENous, PRN  0.9 % sodium chloride infusion, 25 mL, IntraVENous, PRN  acetaminophen (TYLENOL) tablet 650 mg, 650 mg, Oral, Q6H  oxyCODONE (ROXICODONE) immediate release tablet 5 mg, 5 mg, Oral, Q4H PRN **OR** oxyCODONE HCl (OXY-IR) immediate release tablet 10 mg, 10 mg, Oral, Q4H PRN  morphine (PF) injection 2 mg, 2 mg, IntraVENous, Q3H PRN **OR** morphine (PF) injection 4 mg, 4 mg, IntraVENous, Q3H PRN  ondansetron (ZOFRAN-ODT) disintegrating tablet 4 mg, 4 mg, Oral, Q8H PRN **OR** ondansetron (ZOFRAN) injection 4 mg, 4 mg, IntraVENous, Q6H PRN  apixaban (ELIQUIS) tablet 2.5 mg, 2.5 mg, Oral, BID    ASSESSMENT AND PLAN      Post right TKA, stable exam  DVT prophylaxis ordered, Eliquis bid for 14 total days after discharge from hospital for DVT prophylaxis  PT OT for ADL's and ambulation as tolerated  SS for DC planning, home with home care today  IV or PO pain med as ordered, added Flexeril for spasms  Cefadroxil x1 week at home per  [facility]EL[facility] PERDOMO protocol    Bernadette Collins, JACKLYN - CNP  10/27/2021  9:31 AM

## 2021-10-27 NOTE — PLAN OF CARE
Problem: Discharge Planning:  Goal: Discharged to appropriate level of care  Description: Discharged to appropriate level of care  Outcome: Ongoing   Pt planning to discharge to home with home care and wife. SW following for d/c needs. Will continue to monitor. Electronically signed by Roman Barthel, RN on 10/27/2021 at 8:42 AM    Problem: Serum Glucose Level - Abnormal:  Goal: Ability to maintain appropriate glucose levels has stabilized  Description: Ability to maintain appropriate glucose levels has stabilized  Outcome: Ongoing   BG's monitored q4h, pt did not trigger the bg protocol in PACU or through the night. No need for hypoglycemics. Will monitor. Electronically signed by Roman Barthel, RN on 10/27/2021 at 8:45 AM    Problem: Mobility - Impaired:  Goal: Mobility will improve  Description: Mobility will improve  Outcome: Ongoing   Pt mobility is increasing. Pt continues with therapy. Pt is SBA x1 with walker. Will monitor. Electronically signed by Roman Barthel, RN on 10/27/2021 at 8:45 AM    Problem: Infection - Surgical Site:  Goal: Will show no infection signs and symptoms  Description: Will show no infection signs and symptoms  Outcome: Ongoing   Pt is free of signs and symptoms of infection. Incision and dressing are clean, dry and intact. Vital signs stable. Will monitor. Electronically signed by Roman Barthel, RN on 10/27/2021 at 8:45 AM    Problem: Pain - Acute:  Goal: Pain level will decrease  Description: Pain level will decrease  Outcome: Ongoing   Pt assessed for pain. Pt in pain and assessed with 0-10 pain rating scale. Pt given prescribed analgesic for pain. (See eMar) Pt satisfied with pain relief thus far. Will reassess and continue to monitor. Electronically signed by Roman Barthel, RN on 10/27/2021 at 8:45 AM    Problem: Falls - Risk of:  Goal: Will remain free from falls  Description: Will remain free from falls  Outcome: Ongoing   Fall risk assessment completed. Fall precautions in place. Call light within reach. Pt educated on calling for assistance before getting up. Walkway free of clutter. Will continue to monitor. Electronically signed by Nai Yoder RN on 10/27/2021 at 8:45 AM    Problem: Falls - Risk of:  Goal: Absence of physical injury  Description: Absence of physical injury  Outcome: Ongoing   Pt is free of injury. No injury noted. Fall precautions in place. Call light within reach. Will monitor. Electronically signed by Nai Yoder RN on 10/27/2021 at 8:45 AM    Problem: Pain:  Goal: Pain level will decrease  Description: Pain level will decrease  Outcome: Ongoing   Pt assessed for pain. Pt in pain and assessed with 0-10 pain rating scale. Pt given prescribed analgesic for pain. (See eMar) Pt satisfied with pain relief thus far. Will reassess and continue to monitor.    Electronically signed by Nai Yoder RN on 10/27/2021 at 8:45 AM

## 2021-10-27 NOTE — PROGRESS NOTES
Data- discharge order received, pt verbalized agreement to discharge, disposition to previous residence, needs for HHC/DME. Action- discharge instructions prepared and given to patient, pt verbalized understanding. Medication information packet given r/t NEW and/or CHANGED prescriptions emphasizing name/purpose/side effects, pt verbalized understanding. Discharge instruction summary: Diet- general, Activity- as tolerated with walker, Primary Care Physician as follows: Darrin Chapman, JACKLYN - [phone] f/u appointment scheduled by patient, follow up with Dr. Jo Isidro November 8th at 0800, immunizations reviewed and up to date, prescription medications filled retail pharmacy. Response- Pt belongings gathered, IV removed. Disposition is home (HHC/DME needs), transported with belongings, taken to lobby via w/c w/ this RN, no complications. Wife transporting home.

## 2021-10-27 NOTE — PLAN OF CARE
Problem: Infection - Methicillin-Resistant Staphylococcus Aureus Infection:  Goal: Absence of methicillin-resistant Staphylococcus aureus infection  Description: Absence of methicillin-resistant Staphylococcus aureus infection  10/27/2021 1233 by Nicole Elaine RN  Outcome: Ongoing  Note: Bactroban given twice a day. Will continue to monitor. Electronically signed by Nicole Elaine RN on 10/27/2021 at 12:33 PM      Problem: Serum Glucose Level - Abnormal:  Goal: Ability to maintain appropriate glucose levels has stabilized  Description: Ability to maintain appropriate glucose levels has stabilized  10/27/2021 1233 by Nicole Elaine RN  Outcome: Ongoing  Note: Blood glucose stable. Will continue to monitor. Electronically signed by Nicole Elaine RN on 10/27/2021 at 12:33 PM      Problem: Discharge Planning:  Goal: Discharged to appropriate level of care  Description: Discharged to appropriate level of care  10/27/2021 1233 by Nicole Elaine RN  Outcome: Ongoing  Note: Assessed patients knowledge of discharge. Will continue to work with patient on discharge planning and answer patient questions. Will consult case management and  as necessary. Electronically signed by Nicole Elaine RN on 10/27/2021 at 12:33 PM      Problem: Mobility - Impaired:  Goal: Mobility will improve  Description: Mobility will improve  10/27/2021 1233 by Nicole Elaine RN  Outcome: Ongoing  Note: Early and frequent ambulation encouraged. Educated patient on importance of early ambulation. Patient assisted with ambulation. Will continue to monitor. Electronically signed by Nicole Elaine RN on 10/27/2021 at 12:34 PM      Problem: Infection - Surgical Site:  Goal: Will show no infection signs and symptoms  Description: Will show no infection signs and symptoms  10/27/2021 1233 by Nicole Elaine RN  Outcome: Ongoing  Note: No increased swelling, redness, or warmth noted in patient's surgical site.  Educated patient on signs and symptoms of infection. Will continue to monitor. Electronically signed by Veronica Beck RN on 10/27/2021 at 12:34 PM      Problem: Pain - Acute:  Goal: Pain level will decrease  Description: Pain level will decrease  10/27/2021 1233 by Veronica Beck RN  Outcome: Ongoing  Note: Educated patient on pain management. Will assess patients pain level per unit protocol, and provide pain management measures as needed. Electronically signed by Veronica Beck RN on 10/27/2021 at 12:34 PM      Problem: Falls - Risk of:  Goal: Will remain free from falls  Description: Will remain free from falls  10/27/2021 1233 by Veronica Beck RN  Outcome: Ongoing  Note: Fall risk assessment completed. Fall precautions in place. Call light within reach. Pt educated on calling for assistance before getting up. Walkway free of clutter. Will continue to monitor. Electronically signed by Veronica Beck RN on 10/27/2021 at 12:34 PM      Problem: Pain:  Goal: Pain level will decrease  Description: Pain level will decrease  10/27/2021 1233 by Veronica Beck RN  Outcome: Ongoing  Note: Educated patient on pain management. Will assess patients pain level per unit protocol, and provide pain management measures as needed.    Electronically signed by Veronica Beck RN on 10/27/2021 at 12:34 PM

## 2021-10-27 NOTE — PROGRESS NOTES
Huddle performed this morning including Physical therapist Scotty England, and Occupational therapist Bryanna Soni. Discussed plan of care, discharge plan, and dme needs if applicable for orthopedic total joint patient.

## 2021-10-27 NOTE — PROGRESS NOTES
Patient working in room with PT/OT. Having 10/10 pain in right knee. Due for Oxycodone at 0850. Prineo dressing dry and clean. Tolerating ambulation with walker. Morning medications given without difficulty. VSS. Patient able to make needs known. Will continue to monitor.      Electronically signed by Awa Chapa RN on 10/27/2021 at 12:36 PM

## 2021-10-28 ENCOUNTER — CARE COORDINATION (OUTPATIENT)
Dept: OTHER | Facility: CLINIC | Age: 61
End: 2021-10-28

## 2021-10-28 NOTE — DISCHARGE SUMMARY
Physician Discharge Summary     Patient ID:  Susan Samano  [id number]  26 y.o.  1960    Admit date: 10/26/2021    Discharge date and time: 10/27/2021 12:20 PM     Admitting Physician: Lizzie Garzon MD     Discharge Physician: Zbigniew Hill    Admission Diagnoses: Arthritis of right knee [M17.11]    Discharge Diagnoses: right knee OA    Admission Condition: good    Discharged Condition: good    Indication for Admission: Failed conservative treatment as outpatient for joint pain including PT and pain meds. This patient was then electively scheduled for total joint replacement surgery    Surgical procedure: right TKA    Consults: PT OT SS    This patient had no postoperative complications. They has PT and OT for ADL's . IV and PO pain med for pain control and was eventually DC in stable condition    Treatments: analgesia,  therapies: PT OT,  and surgery      Disposition: home    Patient Instructions:   [unfilled]  Activity: activity as tolerated  Diet: regular diet  Wound Care: keep wound clean and dry    Follow-up with TAMIR Thomas in 2 weeks.     Signed:  JACKLYN Real CNP  10/28/2021  1:52 PM

## 2021-10-29 ENCOUNTER — CARE COORDINATION (OUTPATIENT)
Dept: OTHER | Facility: CLINIC | Age: 61
End: 2021-10-29

## 2021-10-29 NOTE — CARE COORDINATION
Estephanie [facility] Transitions Initial Follow Up Call    Call within 2 business days of discharge: Yes    Patient: Whit Vaughn Patient : 1960   MRN: [id number]  Reason for Admission: Right TKA  Discharge Date: 10/27/21 RARS: Readmission Risk Score: 13.1      Last Discharge [facility]       Complaint Diagnosis Description Type Department Provider    10/26/21  Arthritis of right knee . .. Admission (Discharged) MD Mike           Transitions of Care Initial Call    Was this an external facility discharge? No Discharge Facility: [address]    Challenges to be reviewed by the provider   Additional needs identified to be addressed with provider: No  none             Method of communication with provider : none      Advance Care Planning:   Does patient have an Advance Directive: not on file. Was this a readmission? No  Patient stated reason for admission: Right knee replacement  Patients top risk factors for readmission: functional physical ability and medical condition-Right knee replacement    Care Transition Nurse (CTN) contacted the patient by telephone to perform post hospital discharge assessment. Verified name and  with patient as identifiers. Provided introduction to self, and explanation of the CTN role. CTN reviewed discharge instructions, medical action plan and red flags with patient who verbalized understanding. Patient given an opportunity to ask questions and does not have any further questions or concerns at this time. Were discharge instructions available to patient? Yes. Reviewed appropriate site of care based on symptoms and resources available to patient including: PCP, Specialist, Benefits related nurse triage line, When to call 911 and [address]. The patient agrees to contact the PCP office for questions related to their healthcare. Pt reports that he is feeling very tired and has more pain this time than with last knee replacement. Pt states his pain level is at a 9 on a scale of 0-10; has been only taking 1 Oxycodone every 6 hours. CTN educated pt that he could take 2 every 6 hours to control his pain, at least for the next day or two; pt expressed that he didn't want to run out of the pain med. CTN educated that pain med can cause constipation and to drink plenty of fluids to help with this. Pt reports that his wound dressing is intact; denies any drainage, increased redness or swelling at incision site. CTN educated on s/sx of infection and to contact PCP/surgeon if any problems; pt verbalized understanding. Pt states that his appetite is okay and is drinking fluids. Blood sugar before lunch today was 135. CTN educated on importance of eating, drinking enough fluids. Pt states that he is urinating okay and has only had a small amount of bowel movement. CTN educated on drinking fluids and to check with PCP about taking colace. Activity - pt states he is getting up and walking laps about 3x per day; uses rolling walker. Is wearing mirella hose during the day. Pt. States that home care nurse did visit today; PT and OT have not yet been in home. Medication reconciliation was performed with patient, who verbalizes understanding of administration of home medications. Advised obtaining a 90-day supply of all daily and as-needed medications. Covid Risk Education     Educated patient about risk for severe COVID-19 due to risk factors according to CDC guidelines. CTN reviewed discharge instructions, medical action plan and red flag symptoms with the patient who verbalized understanding. Discussed COVID vaccination status: Yes and pt has been vaccinated. Education provided on COVID-19 vaccination as appropriate. Discussed exposure protocols and quarantine with CDC Guidelines.  Patient was given an opportunity to verbalize any questions and concerns and agrees to contact CTN or health care provider for questions related to their healthcare. Reviewed and educated patient on any new and changed medications related to discharge diagnosis. Discussed follow-up appointments. If no appointment was previously scheduled, appointment scheduling offered: Yes and CTN made 7 day follow up appt with PCP. Goals      Conditions and Symptoms      I will schedule office visits, as directed by my provider. I will keep my appointment or reschedule if I have to cancel. I will notify my provider of any barriers to my plan of care. I will notify my provider of any symptoms that indicate a worsening of my condition. Barriers: none  Plan for overcoming my barriers: work with ACM  Confidence: 9/10  Anticipated Goal Completion Date: 11/29/21    10/29/21 - Pt requested that ACM schedule the 7 day follow up visit with PCP and ACM scheduled this.  LDL CALC < 130      Patient received counseling on the following healthy behaviors:diet & exercise. Patient has educational materials on cholesterol management along with details of goal TC/HDL/LDL/TG. I have instructed patient to complete a self tracking handout on cholesterol & diet. food consumption and instructed them to bring it with them to the next appointment. Discussed use, benefit, and side effects of prescribed treatments or medications. Barriers to medication &/or diet  compliance addressed. All patient questions answered. Pt voiced understanding. Patient Self-Management Goal for high cholesterol(abnormal lipids)  Goal:improve cholesterol by addressing diet changes such as eating less fast food type products. Following diet & exercise plan as my doctor has explained. Barriers to success:diet compliance. Plan for overcoming any potential barriers:be more compliant with recommendations. Confidence:10/10  Date goal set: 5/1/17  Date goal attained:pending.                     [facility] follow up appointment(s):   Future Appointments   Date Time Provider Ernie Duron 11/3/2021  1:30 PM Yeni Correia, JACKLYN - CNP [address] [place] - DYD   11/8/2021  8:00 AM JACKLYN Dowling CNP FF SLEEP MED [facility]   11/8/2021  8:00 AM MD TUSHAR Hill ORTHO [facility]   11/10/2021  8:15 AM DO SOPHIE Wilson WT [facility]   11/15/2021  7:45 AM Donnal Sleigh, PT [facility] AND PT Mathew Rosa   11/19/2021  2:30 PM [place] [address] AND PT Mathew Rosa   11/22/2021  7:45 AM Donnal Sleigh, PT [facility] AND PT [facility]   11/26/2021  2:30 PM [place] [address] AND PT Mathew Rosa   11/29/2021  7:45 [place] [address] AND PT Mathew Rosa   12/3/2021  2:30 [place] [address] AND PT Radha Lute [facility]   12/27/2021 11:15 AM [place] Correia, JACKLYN - CNP [address] [facility] [place] - DYD     Non-[facility] follow up appointment(s): none known    CTN provided contact information. Plan for follow-up call in 5-7 days based on severity of symptoms and risk factors.   Plan for next call: symptom management-pain, insicion, s/sx infection, self management-activity levels, diet, follow up appointment-11/3/21 with PCP and community resources-home care therapy services      Care Transitions 24 Hour Call    Schedule Follow Up Appointment with PCP: Completed  Do you have any ongoing symptoms?: No  Do you have a copy of your discharge instructions?: Yes  Do you have all of your prescriptions and are they filled?: Yes  Have you been contacted by a [id number] [employer] Pharmacist?: No  Have you scheduled your follow up appointment?: No (Comment: CTN assisted in scheduling Follow up appt with PCP)  Were you discharged with any Home Care or Post Acute Services: Yes  Post Acute Services: [address] you have support at home?: Partner/Spouse/SO  Do you feel like you have everything you need to keep you well at home?: Yes  Are you an active caregiver in your home?: No  Care Transitions Interventions         Follow Up  Future Appointments   Date Time Provider Ernie Duron   11/3/2021  1:30 PM Yeni Correia APRN - CNP [facility] [address] [facility] [place] - DYD   11/8/2021  8:00 AM Tiffanie R Kehrt, APRN - CNP FF SLEEP MED [facility]   11/8/2021  8:00 AM Natalia Damon MD W ORTHO [facility]   11/10/2021  8:15 AM DO SOPHIE Grider WT [facility]   11/15/2021  7:45 AM Pramod Bjornstad, PT [facility] AND PT Mignon Na   11/19/2021  2:30 PM Amelie Valdez, PT [facility] HOSPITAL [facility] AND PT Mignon Na   11/22/2021  7:45 AM [place] Bjornstfred, PT [facility] AND PT Mignon Na   11/26/2021  2:30 PM Amelie Valdez, PT [facility] AND PT Mignon Na   11/29/2021  7:45 AM Pramod Danielornstfred, PT [facility] AND PT Mignon Na   12/3/2021  2:30 PM Amelie Valdez, PT [facility] AND PT Mignon Na   12/27/2021 11:15 AM Juanita Collins, APRN - CNP [address] [facility] [place] - [address] Wayne Beauchamp MSN, RN   Ambulatory Care Manager  Associate Care Management  Cell [phone]  [email or web address]

## 2021-11-01 ENCOUNTER — APPOINTMENT (OUTPATIENT)
Dept: PHYSICAL THERAPY | Age: 61
End: 2021-11-01
Payer: COMMERCIAL

## 2021-11-01 ENCOUNTER — PATIENT MESSAGE (OUTPATIENT)
Dept: ORTHOPEDIC SURGERY | Age: 61
End: 2021-11-01

## 2021-11-01 DIAGNOSIS — M17.12 PRIMARY OSTEOARTHRITIS OF LEFT KNEE: ICD-10-CM

## 2021-11-01 DIAGNOSIS — M22.41 CHONDROMALACIA PATELLAE OF RIGHT KNEE: ICD-10-CM

## 2021-11-01 DIAGNOSIS — M94.262 CHONDROMALACIA OF LEFT KNEE: ICD-10-CM

## 2021-11-01 DIAGNOSIS — M25.561 ACUTE PAIN OF RIGHT KNEE: ICD-10-CM

## 2021-11-01 DIAGNOSIS — M17.11 PRIMARY OSTEOARTHRITIS OF RIGHT KNEE: ICD-10-CM

## 2021-11-01 DIAGNOSIS — Z96.651 HISTORY OF TOTAL KNEE ARTHROPLASTY, RIGHT: Primary | ICD-10-CM

## 2021-11-01 DIAGNOSIS — G89.29 CHRONIC PAIN OF LEFT KNEE: ICD-10-CM

## 2021-11-01 DIAGNOSIS — M25.562 CHRONIC PAIN OF LEFT KNEE: ICD-10-CM

## 2021-11-01 RX ORDER — PREGABALIN 75 MG/1
75 CAPSULE ORAL 3 TIMES DAILY
Qty: 270 CAPSULE | Refills: 0 | Status: SHIPPED | OUTPATIENT
Start: 2021-11-01 | End: 2021-11-02 | Stop reason: SDUPTHER

## 2021-11-01 NOTE — TELEPHONE ENCOUNTER
From: Jorge Galindo  To: Sabino Shipman MD  Sent: 11/1/2021 8:01 AM EDT  Subject: Prescription Question    Smiley,    Two more things for ya:     #1- Going to be out of the Cyclonenzarine Wednesday morning. I found an old empty bottle from my first surgery which was actually written for for 10mg instead of 5mg and wondered if the new script would be more helpful fighting the pain if it was 10mg? Also , the old bottle had 90 pills instead of 20 which would be helpful. #2- Also going to need more Oxycodone Wednesday/Thursday this week. I know I need to cut back . That's it for now I hope. TY.  José Miguel Alston

## 2021-11-02 DIAGNOSIS — M94.262 CHONDROMALACIA OF LEFT KNEE: ICD-10-CM

## 2021-11-02 DIAGNOSIS — M17.11 PRIMARY OSTEOARTHRITIS OF RIGHT KNEE: ICD-10-CM

## 2021-11-02 DIAGNOSIS — M25.561 ACUTE PAIN OF RIGHT KNEE: ICD-10-CM

## 2021-11-02 DIAGNOSIS — M22.41 CHONDROMALACIA PATELLAE OF RIGHT KNEE: ICD-10-CM

## 2021-11-02 DIAGNOSIS — M17.12 PRIMARY OSTEOARTHRITIS OF LEFT KNEE: ICD-10-CM

## 2021-11-02 DIAGNOSIS — G89.29 CHRONIC PAIN OF LEFT KNEE: ICD-10-CM

## 2021-11-02 DIAGNOSIS — M25.562 CHRONIC PAIN OF LEFT KNEE: ICD-10-CM

## 2021-11-02 RX ORDER — CYCLOBENZAPRINE HCL 10 MG
10 TABLET ORAL 3 TIMES DAILY PRN
Qty: 90 TABLET | Refills: 0 | Status: SHIPPED | OUTPATIENT
Start: 2021-11-02 | End: 2021-11-30 | Stop reason: SDUPTHER

## 2021-11-02 RX ORDER — OXYCODONE HYDROCHLORIDE 5 MG/1
5 TABLET ORAL EVERY 6 HOURS PRN
Qty: 40 TABLET | Refills: 0 | Status: SHIPPED | OUTPATIENT
Start: 2021-11-02 | End: 2021-11-11 | Stop reason: SDUPTHER

## 2021-11-02 RX ORDER — PREGABALIN 75 MG/1
75 CAPSULE ORAL 3 TIMES DAILY
Qty: 270 CAPSULE | Refills: 0 | Status: SHIPPED | OUTPATIENT
Start: 2021-11-02 | End: 2022-02-03 | Stop reason: SDUPTHER

## 2021-11-03 ENCOUNTER — VIRTUAL VISIT (OUTPATIENT)
Dept: INTERNAL MEDICINE CLINIC | Age: 61
End: 2021-11-03
Payer: COMMERCIAL

## 2021-11-03 DIAGNOSIS — M17.12 PRIMARY OSTEOARTHRITIS OF LEFT KNEE: ICD-10-CM

## 2021-11-03 DIAGNOSIS — E11.9 TYPE 2 DIABETES MELLITUS WITHOUT COMPLICATION, WITHOUT LONG-TERM CURRENT USE OF INSULIN (HCC): Chronic | ICD-10-CM

## 2021-11-03 PROCEDURE — 99214 OFFICE O/P EST MOD 30 MIN: CPT | Performed by: NURSE PRACTITIONER

## 2021-11-03 ASSESSMENT — ENCOUNTER SYMPTOMS
WHEEZING: 0
ABDOMINAL PAIN: 0
SINUS PRESSURE: 0
COLOR CHANGE: 0
BACK PAIN: 0
CONSTIPATION: 0
SHORTNESS OF BREATH: 0
COUGH: 0
DIARRHEA: 0
SINUS PAIN: 0

## 2021-11-04 ENCOUNTER — CARE COORDINATION (OUTPATIENT)
Dept: OTHER | Facility: CLINIC | Age: 61
End: 2021-11-04

## 2021-11-04 ENCOUNTER — TELEPHONE (OUTPATIENT)
Dept: INTERNAL MEDICINE CLINIC | Age: 61
End: 2021-11-04

## 2021-11-04 DIAGNOSIS — R19.7 DIARRHEA OF PRESUMED INFECTIOUS ORIGIN: Primary | ICD-10-CM

## 2021-11-04 NOTE — CARE COORDINATION
Estephanie 45 Transitions Follow Up Call    2021    Patient: Hemant Jackson  Patient : 1960   MRN: [id number]  Reason for Admission: Right TKA  Discharge Date: 10/27/21 RARS: Readmission Risk Score: 13.1    ACM attempted to reach patient for follow up call regarding Care Transitions follow up call, for right total knee replacement. HIPAA compliant message left requesting a return phone call at patients convenience. Will continue to follow. Follow Up  Future Appointments   Date Time Provider Ernie Duron   2021  8:00 AM David Landry MD W ORTHO [facility]   11/10/2021  8:15 AM DO SOPHIE Aiken WT [facility]   2021  2:20 PM JACKLYN Lara - CNP FF SLEEP MED [facility]   11/15/2021  7:45 AM Ayla Scistevie, PT [facility] AND PT Jose Martinfrancisca Dan   2021  2:30 PM Antony Hodges, PT [facility] [place] AND PT Jose Martin Dan   2021  7:45 AM Ayla Scistevie, PT [facility] AND PT Jose Martinfrancisca Dan   2021  2:30 PM Antony Hodges, PT [facility] AND PT Jose Martin Dan   2021  7:45 AM Ayla Scistevie, PT [facility] AND PT Jose Martinfrancisca Dan   12/3/2021  2:30 PM Antony Hodges, PT [facility] AND PT Jose Martin Dan   2021 11:15 AM Sergey Padilla APRN - CNP [address] [address] [facility] [place] - [address] [facility] Quynh MSN, RN   Ambulatory Care Manager  Associate Care Management  Cell [phone]  [email or web address]

## 2021-11-04 NOTE — PROGRESS NOTES
Brian Rudolph (:  1960) is a 63 y.o. male,Established patient, here for evaluation of the following chief complaint(s):  No chief complaint on file. ASSESSMENT/PLAN:  1. Type 2 diabetes mellitus without complication, without long-term current use of insulin ([facility])  Assessment & Plan:   Stable, controlled  Blood sugars running 110-130. Continue current regimen  2. Primary osteoarthritis of left knee  Assessment & Plan:  Doing well postop  Working with PT OT  Pain is well controlled  Has follow-up with Dr. Jorge Luis Salas scheduled next week      No follow-ups on file. SUBJECTIVE/OBJECTIVE:  HPI  Patient is here for follow-up following his knee surgery. He had surgery on 10/26 with Dr. Jorge Luis Salas. He is doing well. Nurse well controlled. Is taking oxycodone, Lyrica, Flexeril as needed  He is home working with home PT and OT. Had lots of pain initially and trouble with walking, however this is improved over the last week. He has follow-up with Dr. Jorge Luis Salas scheduled for next week. Planning to transition to outpatient physical therapy. He denies shortness of breath, chest pain. Current Outpatient Medications   Medication Sig Dispense Refill    pregabalin (LYRICA) 75 MG capsule Take 1 capsule by mouth 3 times daily for 90 days. 270 capsule 0    oxyCODONE (ROXICODONE) 5 MG immediate release tablet Take 1 tablet by mouth every 6 hours as needed for Pain for up to 10 days.  ITake lowest dose possible to manage pain 40 tablet 0    cyclobenzaprine (FLEXERIL) 10 MG tablet Take 1 tablet by mouth 3 times daily as needed for Muscle spasms 90 tablet 0    metFORMIN (GLUCOPHAGE) 500 MG tablet Take 1 tablet by mouth 2 times daily (with meals) 180 tablet 1    cyclobenzaprine (FLEXERIL) 5 MG tablet Take 1 tablet by mouth 3 times daily as needed for Muscle spasms 20 tablet 0    apixaban (ELIQUIS) 2.5 MG TABS tablet Take 1 tablet by mouth 2 times daily for 14 days 28 tablet 0    potassium chloride (KLOR-CON M) 20 MEQ TBCR extended release tablet Take 1 tablet by mouth 2 times daily 120 tablet 2    mupirocin (BACTROBAN) 2 % ointment Apply pea size amount to each nostril 2 times daily. (Patient not taking: Reported on 10/29/2021) 22 g 0    hydroCHLOROthiazide (HYDRODIURIL) 25 MG tablet Hold until Monday 90 tablet 0    loratadine (CLARITIN) 10 MG tablet Take 1 tablet by mouth daily 90 tablet 0    lisinopril (PRINIVIL;ZESTRIL) 10 MG tablet TAKE 1 TABLET BY MOUTH ONCE A DAY 90 tablet 0    omeprazole (PRILOSEC) 20 MG delayed release capsule Take 1 capsule by mouth Daily 30 capsule 3    rOPINIRole (REQUIP) 3 MG tablet Take 1 tablet by mouth 3 times daily 270 tablet 0    atorvastatin (LIPITOR) 40 MG tablet Take 1 tablet by mouth daily 90 tablet 3    Prodigy Twist Top Lancets 28G MISC TEST TWO TIMES A  each 0    blood glucose test strips (PRODIGY NO CODING BLOOD GLUC) strip TEST TWO TIMES A  each 0    ondansetron (ZOFRAN-ODT) 4 MG disintegrating tablet Take 1 tablet by mouth 3 times daily as needed for Nausea or Vomiting 21 tablet 0    furosemide (LASIX) 40 MG tablet Take 1 tablet by mouth 2 times daily (Patient taking differently: Take 40 mg by mouth daily ) 60 tablet 3    Multiple Vitamin (MULTIVITAMIN, BARIATRIC FUSION COMPLETE, CHEW TAB) Take 4 tablets by mouth daily      glucose 5 g chewable tablet Take 3 tablets by mouth as needed for Low blood sugar 30 tablet 0    blood glucose monitor kit and supplies Check BG twice daily 1 kit 0     No current facility-administered medications for this visit. Review of Systems   Constitutional: Negative for chills, fatigue and fever. HENT: Negative for congestion, sinus pressure and sinus pain. Respiratory: Negative for cough, shortness of breath and wheezing. Cardiovascular: Negative for chest pain and palpitations. Gastrointestinal: Negative for abdominal pain, constipation and diarrhea. Musculoskeletal: Positive for arthralgias (knee).  Negative for back pain and myalgias. Skin: Negative for color change, pallor and rash. Neurological: Negative for dizziness, syncope, weakness, light-headedness and headaches. Psychiatric/Behavioral: Negative for behavioral problems, confusion and sleep disturbance. The patient is not nervous/anxious. There were no vitals filed for this visit. Physical Exam  Constitutional:       Appearance: Normal appearance. HENT:      Head: Normocephalic and atraumatic. Mouth/Throat:      Mouth: Mucous membranes are moist.   Eyes:      Extraocular Movements: Extraocular movements intact. Conjunctiva/sclera: Conjunctivae normal.   Pulmonary:      Effort: Pulmonary effort is normal.   Musculoskeletal:      Cervical back: Normal range of motion. Skin:     Coloration: Skin is not jaundiced or pale. Neurological:      General: No focal deficit present. Mental Status: He is alert and oriented to person, place, and time. Psychiatric:         Mood and Affect: Mood normal.         Behavior: Behavior normal.         Thought Content: Thought content normal.           An electronic signature was used to authenticate this note.     --JACKLYN Garcias - CNP

## 2021-11-04 NOTE — ASSESSMENT & PLAN NOTE
Doing well postop  Working with PT OT  Pain is well controlled  Has follow-up with Dr. Guanakito Phillips scheduled next week

## 2021-11-05 ENCOUNTER — TELEPHONE (OUTPATIENT)
Dept: INTERNAL MEDICINE CLINIC | Age: 61
End: 2021-11-05

## 2021-11-05 ENCOUNTER — APPOINTMENT (OUTPATIENT)
Dept: PHYSICAL THERAPY | Age: 61
End: 2021-11-05
Payer: COMMERCIAL

## 2021-11-05 NOTE — TELEPHONE ENCOUNTER
Pt has 3 vials for his stool samples  His stools are so watery he doesn't even know how to fill them     They have to use a portable toilet in the living room  Could this be spreading c diff around the house?     Ok to leave vm

## 2021-11-05 NOTE — TELEPHONE ENCOUNTER
Pt's wife called  Told to do the best she can and fill the vials with the \"mucous water\"  Wash hands with soap and water, clean everything with bleach

## 2021-11-05 NOTE — TELEPHONE ENCOUNTER
Patient's wife advised. Liquid/ watery stool is actually what is needed for c-diff test. Recommend bleach wipes and handwashing with soap and water.

## 2021-11-06 DIAGNOSIS — R19.7 DIARRHEA OF PRESUMED INFECTIOUS ORIGIN: ICD-10-CM

## 2021-11-06 LAB — C DIFF TOXIN/ANTIGEN: NORMAL

## 2021-11-08 ENCOUNTER — APPOINTMENT (OUTPATIENT)
Dept: PHYSICAL THERAPY | Age: 61
End: 2021-11-08
Payer: COMMERCIAL

## 2021-11-11 ENCOUNTER — VIRTUAL VISIT (OUTPATIENT)
Dept: PULMONOLOGY | Age: 61
End: 2021-11-11
Payer: COMMERCIAL

## 2021-11-11 ENCOUNTER — OFFICE VISIT (OUTPATIENT)
Dept: ORTHOPEDIC SURGERY | Age: 61
End: 2021-11-11

## 2021-11-11 VITALS — HEIGHT: 68 IN | RESPIRATION RATE: 16 BRPM | BODY MASS INDEX: 47.44 KG/M2 | WEIGHT: 313 LBS

## 2021-11-11 DIAGNOSIS — I10 ESSENTIAL HYPERTENSION, BENIGN: Chronic | ICD-10-CM

## 2021-11-11 DIAGNOSIS — G47.33 OSA TREATED WITH BIPAP: Primary | ICD-10-CM

## 2021-11-11 DIAGNOSIS — J30.1 ALLERGIC RHINITIS DUE TO POLLEN, UNSPECIFIED SEASONALITY: Chronic | ICD-10-CM

## 2021-11-11 DIAGNOSIS — E66.01 MORBID OBESITY WITH BMI OF 50.0-59.9, ADULT (HCC): ICD-10-CM

## 2021-11-11 DIAGNOSIS — E11.9 TYPE 2 DIABETES MELLITUS WITHOUT COMPLICATION, WITHOUT LONG-TERM CURRENT USE OF INSULIN (HCC): Chronic | ICD-10-CM

## 2021-11-11 DIAGNOSIS — Z96.651 HISTORY OF TOTAL KNEE ARTHROPLASTY, RIGHT: Primary | ICD-10-CM

## 2021-11-11 DIAGNOSIS — G25.81 RLS (RESTLESS LEGS SYNDROME): ICD-10-CM

## 2021-11-11 PROCEDURE — 99024 POSTOP FOLLOW-UP VISIT: CPT | Performed by: ORTHOPAEDIC SURGERY

## 2021-11-11 PROCEDURE — 99214 OFFICE O/P EST MOD 30 MIN: CPT | Performed by: NURSE PRACTITIONER

## 2021-11-11 RX ORDER — OXYCODONE HYDROCHLORIDE 5 MG/1
5 TABLET ORAL EVERY 6 HOURS PRN
Qty: 40 TABLET | Refills: 0 | Status: SHIPPED | OUTPATIENT
Start: 2021-11-11 | End: 2021-11-19 | Stop reason: SDUPTHER

## 2021-11-11 ASSESSMENT — SLEEP AND FATIGUE QUESTIONNAIRES
HOW LIKELY ARE YOU TO NOD OFF OR FALL ASLEEP WHILE WATCHING TV: 0
ESS TOTAL SCORE: 0
HOW LIKELY ARE YOU TO NOD OFF OR FALL ASLEEP WHEN YOU ARE A PASSENGER IN A CAR FOR AN HOUR WITHOUT A BREAK: 0
HOW LIKELY ARE YOU TO NOD OFF OR FALL ASLEEP WHILE SITTING INACTIVE IN A PUBLIC PLACE: 0
HOW LIKELY ARE YOU TO NOD OFF OR FALL ASLEEP WHILE SITTING AND READING: 0
HOW LIKELY ARE YOU TO NOD OFF OR FALL ASLEEP WHILE SITTING AND TALKING TO SOMEONE: 0
HOW LIKELY ARE YOU TO NOD OFF OR FALL ASLEEP WHILE SITTING QUIETLY AFTER LUNCH WITHOUT ALCOHOL: 0
HOW LIKELY ARE YOU TO NOD OFF OR FALL ASLEEP IN A CAR, WHILE STOPPED FOR A FEW MINUTES IN TRAFFIC: 0
HOW LIKELY ARE YOU TO NOD OFF OR FALL ASLEEP WHILE LYING DOWN TO REST IN THE AFTERNOON WHEN CIRCUMSTANCES PERMIT: 0

## 2021-11-11 NOTE — PROGRESS NOTES
[facility]  Office Visit    Chief Complaint: Follow-up s/p right total knee arthroplasty    HPI:  Pernell Roe is a 63 y.o. who is here in follow-up of right total knee arthroplasty performed on 10/26/2021. He is here in a walker today and with his wife. He is struggling with pain and is taking oxycodone as needed for this. He is working with physical therapy. He denies any other issues today. Patient Active Problem List   Diagnosis    Class 3 severe obesity due to excess calories with serious comorbidity and body mass index (BMI) of 45.0 to 49.9 in adult [facility])    Mixed hypertriglyceridemia    Nonalcoholic fatty liver disease    RLS (restless legs syndrome)    Essential hypertension, benign    Morbid obesity with BMI of 50.0-59.9, adult ([facility] [address]    Chronic pain of left knee    Osteoarthritis of left knee    Chondromalacia of left knee    Pes planus    Lymphedema    Peripheral venous insufficiency    Allergic rhinitis    Type 2 diabetes mellitus without complication, without long-term current use of insulin ([facility] [address]    TOM treated with BiPAP    Arthritis of left knee    Arthritis of right knee       ROS:  Constitutional: denies fever, chills, weight loss  MSK: denies pain in other joints, muscle aches  Neurological: denies numbness, tingling, weakness    Exam:  Resp. rate 16, height 5' 8\" (1.727 m), weight (!) 313 lb (142 kg). Appearance: sitting in exam room chair, appears to be in no acute distress, awake and alert  Resp: unlabored breathing on room air  Skin: warm, dry and intact with out erythema or significant increased temperature  Neuro: grossly intact both lower extremities. Intact sensation to light touch. Motor exam 4+ to 5/5 in all major motor groups. Right knee: Incision is healing as expected. Changes due to venous stasis distally. Range of motion is 0 to 90 degrees. Sensation is intact light touch. There is brisk capillary refill.   There is 5/5 muscle strength in all muscle groups. Imaging:  3 views of the right knee were performed and reviewed today. Significant for total knee arthroplasty prosthesis in place with no signs of osteolysis, loosening, fracture, or dislocation. Assessment:  S/p right total knee arthroplasty    Plan:  He is recovering from right total knee arthroplasty. He will continue working with physical therapy. Oxycodone was refilled today. He will wean off of this as tolerated. He will follow up in 3 to 4 weeks for repeat assessment and range of motion check. This dictation was done with valuklik dictation and may contain mechanical errors related to translation.

## 2021-11-11 NOTE — PROGRESS NOTES
Nancy Huffman         : 1960    Diagnosis: [x] TOM (G47.33) [] CSA (G47.31) [] Apnea (G47.30)   Length of Need: [x] 12 Months [] 99 Months [] Other:    Machine (MICHELLE!): [] Respironics Dream Station   2   Auto [] ResMed AirSense     Auto [] Other:     []  CPAP () [] Bilevel ()   Mode: [] Auto [] Spontaneous    Mode: [] Auto [] Spontaneous            Comfort Settings:   - Ramp Pressure:  cmH2O                                        - Ramp time: 15 min                                     -  Flex/EPR - 3 full time                                    - For ResMed Bilevel (TiMax-4 sec   TiMin- 0.2 sec)     Humidifier: [x] Heated ()        [x] Water chamber replacement ()/ 1 per 6 months        Mask:   [] Nasal () /1 per 3 months [x] Full Face () /1 per 3 months   [] Patient choice -Size and fit mask [x] Patient Choice - Size and fit mask   [] Dispense:  [] Dispense:    [] Headgear () / 1 per 3 months [x] Headgear () / 1 per 3 months   [] Replacement Nasal Cushion ()/2 per month [x] Interface Replacement ()/1 per month   [] Replacement Nasal Pillows ()/2 per month         Tubing: [x] Heated ()/1 per 3 months    [] Standard ()/1 per 3 months [] Other:           Filters: [x] Non-disposable ()/1 per 6 months     [x] Ultra-Fine, Disposable ()/2 per month        Miscellaneous: [] Chin Strap ()/ 1 per 6 months [] O2 bleed-in:       LPM   [] Oximetry on CPAP/Bilevel []  Other:    [x] Modem: ()         Start Order Date: 21    MEDICAL JUSTIFICATION:  I, the undersigned, certify that the above prescribed supplies are medically necessary for this patients wellbeing. In my opinion, the supplies are both reasonable and necessary in reference to accepted standards of medicalpractice in treatment of this patients condition.     JACKLYN Parsons - CNP      NPI: [id number]       Order Signed Date: 21    Electronically signed by Eulogio Harris, JACKLYN - CNP on 2021 at 2:33 PM    Juan Cassidy  1960  [place] [address]  [phone] (home)   [phone] (mobile)      Insurance Info (confirm with patient if correct):  Payor/Plan Subscr  Sex Relation Sub.  Ins. ID Effective Group Num

## 2021-11-11 NOTE — PROGRESS NOTES
Dionne Zaabla MD, FAASM, [facility]  Willow Alegria, MSN, RN, [address] [facility] [phone]  Dept: [phone]  Dept Fax: [phone]  Loc: [phone]    Subjective:     Patient ID: Sophie Hernandez is a 63 y.o. male. Chief Complaint   Patient presents with    Sleep Apnea       HPI:      Sleep Medicine Video Visit    Pursuant to the emergency declaration under the [address] [place] [address] waiver authority and the [employer] Act this Telephone Visit was insisted, with patient's consent, to reduce the patient's risk of exposure to COVID-19 and provide continuity of care for an established patient. Services were provided through a synchronous discussion over a telephone and/or Video chat to substitute for in-person clinic visit, and coded as such. While patient is at home.     Machine Modem/Download Info:  Compliance (hours/night): 4.5 hrs/night  Download AHI (/hour): 2 /HR     Average IPAP Pressure: 16 cmH2O  Average EPAP Pressure: 13.8 cmH2O         AUTO BIPAP - Settings (Ginny)  IPAP Max: 25 cmH2O  EPAP Min: 8 cmH2O  Pressure Support Min: 2  Pressure Support Max: 8             Comfort Settings  Humidity Level (0-8): 1  Flex/EPR (0-3): 3 PAP Mask  Mask Type: Full Face mask  Clinically Relevant Leak: No     [place] - Total score: 0    Follow-up :     Last Visit : May 2021      Subjective Health Changes: L knee surgery 7/2021 R knee 10/2021     Over Night Oximetry: [] Yes  [] No  [x] NA [] WNL   Using O2: [] Yes  [] No  [x] NA   Patient is compliant with the machine  [x] Yes  [] No [] Per patient   Feeling rested when using the machine   [x] Yes  [] No     Pressure is comfortable with inspiration and expiration  [x] Yes  [] No   ([x] NA   [] Feeling of suffocation  [] Feeling like not enough air    [] To much pressure)     Noticed changes in pressure  [x] NA  [] Yes    [] No     Mask is fitting well  [x] Yes  [] No   Noting Mask Air Leak  [] Yes  [x] No   Having painful Aerophagia  [] Yes  [x] No   Nocturia   0-1  per night. Having  HA upon waking  [] Yes  [x] No   Dry mouth upon waking   Dry Nose  Dry Eyes  [x] Yes  [] No   Congestion upon waking   [] Yes  [x] No    Nose Bleeds  [] Yes  [x] No   Using Sleep Aides  Mirapex   [] NA  [] OTC  [] Per our office   [x] Per another provider   Understands how to change humidification and/or tubing temperature for comfort while at home  [x] Yes  [] No     Difficulties falling asleep  [] Yes  [x] No   Difficulties staying asleep  [] Yes  [x] No   Approximate time to bed  10-11pm   Approximate wake time  2-4am   Taking Naps  no   If taking naps usual length  [x] NA   If taking naps using the machine [x] NA  [] Yes    [] No    [] With and With out    Drowsy when driving  [] Yes  [x] No     Does patient carry a DOT/CDL  [x] Yes  [] No     Does patient carry FAA/P[employer] License   [] Yes  [x] No      Any concerns noted with the machine at this time  [] Yes  [x] No       Assessment/Plan:   1. TOM treated with BiPAP  Assessment & Plan:  After downloading data and reviewing  Reviewed compliance download with pt. Supplies and parts as needed for his machine. These are medically necessary. Continue medications per his PCP and other physicians. Limit caffeine use after 3pm.    The chronic medical conditions listed are directly related to the primary diagnosis listed above. The management of the primary diagnosis affects the secondary diagnosis and vice versa    Patient is complaint encouraged to maintain compliance to aide on controlling other stated healthcare concerns. 2. Type 2 diabetes mellitus without complication, without long-term current use of insulin (HCC)  Assessment & Plan:  Chronic- stable.       After speaking with patient:    Agree with current plan, and would agree to continue this plan per prescribing and managing physician. 3. RLS (restless legs syndrome)  Assessment & Plan:  Chronic- stable. After speaking with patient:    Agree with current plan, and would agree to continue this plan per prescribing and managing physician. 4. Morbid obesity with BMI of 50.0-59.9, adult [facility])  Assessment & Plan:  Chronic- stable. After speaking with patient:    Agree with current plan, and would agree to continue this plan per prescribing and managing physician. 5. Essential hypertension, benign  Assessment & Plan:  Chronic- stable. After speaking with patient:    Agree with current plan, and would agree to continue this plan per prescribing and managing physician. 6. Allergic rhinitis due to pollen, unspecified seasonality  Assessment & Plan:  Chronic- stable. After speaking with patient:    Agree with current plan, and would agree to continue this plan per prescribing and managing physician. - After pulling data and reviewing it   - Reviewed compliance download with patient    -Medically necessary supplies and parts as needed for his machine.   - Continue medications per his primary care provider and other physicians.   - Encouraged to limit caffeine use after 3pm.    - Encouraged him to work on weight loss through diet and exercise  - Educated not to drive when feeling sleepy   - Patient using [place] with the machine  When to trial off the machine with weight loss (Average pressure 5-6cmH20)  [employer]  Office locations  [facility] Obstructive Sleep Apnea  Weight loss and AHI  Compliance  Follow up  The risk of undercontrolling Obstructive sleep apnea  After speaking to the patient, patient is currently stable. We will continue with the current machine settings    The chronic medical conditions listed are directly related to the primary diagnosis listed above.     The management of the primary diagnosis affects the secondary diagnosis and vice versa.     - Will follow up in off in 6 months    Electronically signed by  Amanda Flores MSN, RN, CNP on 11/11/2021 at 2:29 PM

## 2021-11-12 ENCOUNTER — CARE COORDINATION (OUTPATIENT)
Dept: OTHER | Facility: CLINIC | Age: 61
End: 2021-11-12

## 2021-11-12 ENCOUNTER — APPOINTMENT (OUTPATIENT)
Dept: PHYSICAL THERAPY | Age: 61
End: 2021-11-12
Payer: COMMERCIAL

## 2021-11-12 NOTE — CARE COORDINATION
[facility] Transitions Follow Up Call    2021    Patient: Modesta Cisse  Patient : 1960   MRN: [id number]  Reason for Admission: Right TKA  Discharge Date: 10/27/21 RARS: Readmission Risk Score: 13.1    ACM attempted to reach patient for follow up call regarding care transitions, right knee replacement. HIPAA compliant message left requesting a return phone call at patients convenience. Unable to reach letter sent via My Chart. Will continue to follow. Follow Up  Future Appointments   Date Time Provider Ernie Duron   11/15/2021  7:45 [place] [address] AND PT Lyndee Hammers   2021  2:30 [place] [address] AND PT Lyndee Hammers   2021  7:45 [place] [address] AND PT Lyndee Hammers   2021  2:30 [place] [address] AND PT Lyndee Hammers   2021  7:45 [place] [address] AND PT Lyndee Hammers   12/3/2021  2:30 [place] [address] AND PT Travis Deep HOD   2021  7:30 AM Noe Ridleyus, DO January Rebolledo [facility]   2021  9:00 AM Antonina Shearer MD W ORTHO [facility]   2021 11:15 AM JACKLYN Rashid - CNP [facility] [address] [facility] [place] - DYD   2022  9:20 AM JACKLYN Hill CNP FF SLEEP MED [facility]       Almas Williamson MSN, RN   Ambulatory Care Manager  Associate Care Management  Cell [phone]  [email or web address]

## 2021-11-15 ENCOUNTER — HOSPITAL ENCOUNTER (OUTPATIENT)
Dept: PHYSICAL THERAPY | Age: 61
Setting detail: THERAPIES SERIES
Discharge: HOME | End: 2021-11-15
Payer: COMMERCIAL

## 2021-11-15 PROCEDURE — 97110 THERAPEUTIC EXERCISES: CPT | Performed by: PHYSICAL THERAPIST

## 2021-11-15 PROCEDURE — 97161 PT EVAL LOW COMPLEX 20 MIN: CPT | Performed by: PHYSICAL THERAPIST

## 2021-11-15 NOTE — PLAN OF CARE
[facility] [facility], [address], [address] [place] [address]  Phone: [phone]  Fax [phone]     Physical Therapy Certification    Dear Referring Practitioner: Tom Pete MD,    We had the pleasure of evaluating the following patient for physical therapy services at [address] [place] [address]. A summary of our findings can be found in the initial assessment below. This includes our plan of care. If you have any questions or concerns regarding these findings, please do not hesitate to contact me at the office phone number checked above. Thank you for the referral.       Physician Signature:_______________________________Date:__________________  By signing above (or electronic signature), therapists plan is approved by physician    Patient: Ian Melton   : 1960   MRN: [id number]  Referring Physician: Referring Practitioner: Tom Pete MD      Evaluation Date: 11/15/2021      Medical Diagnosis Information:  Diagnosis: V20.342 (ICD-10-CM) - Status post right knee replacement   Treatment Diagnosis: M25.561 R knee pain, R26.2 Difficulty walking                                         Insurance information: PT Insurance Information: Med mutual BMN, no auth 90/10 co-ins       Precautions/ Contra-indications: DM, HTN, L TKR in 2021    C-SSRS Triggered by Intake questionnaire (Past 2 wk assessment):   [x] No, Questionnaire did not trigger screening.   [] Yes, Patient intake triggered further evaluation      [] C-SSRS Screening completed  [] PCP notified via Plan of Care  [] Emergency services notified     Latex Allergy:  [x]NO      []YES  Preferred Language for Healthcare:   [x]English       []other:    SUBJECTIVE: Patient stated complaint: Pt is s/p R TKR on 10/26/21. Pt reports that his R knee has had \"a lot more\" pain since surgery when compared to his L side, which was done a few months ago.  Pt notes he is now able to navigate his home, including stairs. With his improved mobility the past few days, he is able to reach his bathroom independently. Pt was given exercises to perform at home and has been doing them consistently. Pt uses CP at home regularly to deal with pain and inflammation. Relevant Medical History: DM, HTN, L TKR in July 2021  Functional Disability Index: LEFS 83% disability, assessed on 11/15/21    Height: 5'8 Weight: 313  Pain Scale: 2-7/10  Easing factors: resting, lying supine and taking weight off knee, CP  Provocative factors: end of the day, movement, knee flexion     Type: [x]Constant   []Intermittent  []Radiating []Localized []other:     Numbness/Tingling: None    Occupation/School:     Living Status/Prior Level of Function: Independent with ADLs and IADLs, stairs in the home    OBJECTIVE:     ROM LEFT RIGHT   HIP Flex     HIP Abd     HIP Ext     HIP IR     HIP ER     Knee ext Lacking 5 deg Lacking 12 deg   Knee Flex 110 deg 86 deg   Strength  LEFT RIGHT   HIP Flexors     HIP Abductors NT NT   HIP Ext NT NT   Hip ER     Knee EXT (quad) 4+ NT   Knee Flex (HS) 4+ NT        Circumference  Mid apex  7 cm prox             Reflexes/Sensation: NT   []Dermatomes/Myotomes intact    []Reflexes equal and normal bilaterally   []Other:    Joint mobility: NT   []Normal    []Hypo   []Hyper    Palpation: Pt is not tender to palpation in surrounding musculature. Functional Mobility/Transfers: Pt able to perform bed mobility and functional transfers with increased time and increased pain. Posture: Pt displayed increased hip flexion when ambulating with wheeled walker. Bandages/Dressings/Incisions: Pt has incision on R knee with mesh glue still covering it. Pt informed that he could start to take glue off next week, pending healing process. Incision site displays no signs of infection, small rash on medial aspect of incision, which pt reports that it was due to scratching.      Gait: (include devices/WB status) Pt ambulates with wheeled walker and demonstrates antalgic gait. Pt has decreased stance time on R side, as well as decreased knee flexion during swing. Orthopedic Special Tests: None performed. [x] Patient history, allergies, meds reviewed. Medical chart reviewed. See intake form. Review Of Systems (ROS):  [x]Performed Review of systems (Integumentary, CardioPulmonary, Neurological) by intake and observation. Intake form has been scanned into medical record. Patient has been instructed to contact their primary care physician regarding ROS issues if not already being addressed at this time.       Co-morbidities/Complexities (which will affect course of rehabilitation):  []None           Arthritic conditions   []Rheumatoid arthritis (M05.9)  []Osteoarthritis (M19.91)   Cardiovascular conditions   [x]Hypertension (I10)  []Hyperlipidemia (E78.5)  []Angina pectoris (I20)  []Atherosclerosis (I70)   Musculoskeletal conditions   []Disc pathology   []Congenital spine pathologies   [x]Prior surgical intervention  []Osteoporosis (M81.8)  []Osteopenia (M85.8)   Endocrine conditions   []Hypothyroid (E03.9)  []Hyperthyroid Gastrointestinal conditions   []Constipation (B78.45)   Metabolic conditions   [x]Morbid obesity (E66.01)  [x]Diabetes 2 (E11.65)   []Neuropathy (G60.9)     Pulmonary conditions   []Asthma (J45)  []Coughing   []COPD (J44.9)   Psychological Disorders  []Anxiety (F41.9)  []Depression (F32.9)   []Other:   []Other:          Barriers to/and or personal factors that will affect rehab potential:              []Age  []Sex              []Motivation/Lack of Motivation                        [x]Co-Morbidities              []Cognitive Function, education/learning barriers              []Environmental, home barriers              []profession/work barriers  []past PT/medical experience  []other:  Justification: Pt had L TKR in July 2021, DM and obesity, which could pose a barrier to rehab recovery time. Falls Risk Assessment (30 days):  [x] Falls Risk assessed and no intervention required. [] Falls Risk assessed and Patient requires intervention due to being higher risk   TUG score (>12s at risk):     [] Falls education provided, including           ASSESSMENT:  Functional Impairments:     [x]Noted lumbar/proximal hip/LE joint hypomobility   [x]Decreased LE functional ROM   [x]Decreased core/proximal hip strength and neuromuscular control   [x]Decreased LE functional strength   [x]Reduced balance/proprioceptive control   []other:      Functional Activity Limitations (from functional questionnaire and intake)   [x]Reduced ability to tolerate prolonged functional positions   [x]Reduced ability or difficulty with changes of positions or transfers between positions   [x]Reduced ability to maintain good posture and demonstrate good body mechanics with sitting, bending, and lifting   [x]Reduced ability to sleep   [x] Reduced ability or tolerance with driving and/or computer work   [x]Reduced ability to perform lifting, carrying tasks   [x]Reduced ability to squat   [x]Reduced ability to forward bend   [x]Reduced ability to ambulate prolonged functional periods/distances/surfaces   [x]Reduced ability to ascend/descend stairs   [x]Reduced ability to run, hop, cut or jump   []other:    Participation Restrictions   [x]Reduced participation in self care activities   [x]Reduced participation in home management activities   [x]Reduced participation in work activities   [x]Reduced participation in social activities. [x]Reduced participation in sport/recreation activities. Classification :    [x]Signs/symptoms consistent with post-surgical status including decreased ROM, strength and function.    []Signs/symptoms consistent with joint sprain/strain   []Signs/symptoms consistent with patella-femoral syndrome   []Signs/symptoms consistent with knee OA/hip OA   []Signs/symptoms Needling/IASTM, STM, PROM, Gr I-IV mobilizations, manipulation. [x] Modalities as needed that may include: thermal agents, E-stim, Biofeedback, US, iontophoresis as indicated  [x] Patient education on joint protection, postural re-education, activity modification, progression of HEP. HEP instruction: see bottom of form       GOALS:  Patient stated goal: Pt would like to get back to functioning without pain. [] Progressing: [] Met: [] Not Met: [] Adjusted    Therapist goals for Patient:   Short Term Goals: To be achieved in: 2 weeks  1. Independent in HEP and progression per patient tolerance, in order to prevent re-injury. [] Progressing: [] Met: [] Not Met: [] Adjusted  2. Patient will have a decrease in pain to facilitate improvement in movement, function, and ADLs as indicated by Functional Deficits. [] Progressing: [] Met: [] Not Met: [] Adjusted    Long Term Goals: To be achieved in: 12 weeks  1. Disability index score of 50% or less for the LEFS to assist with reaching prior level of function. [] Progressing: [] Met: [] Not Met: [] Adjusted  2. Patient will demonstrate increased AROM with knee flexion to at least 110 deg and lacking 2 degrees of knee ext in order to be able to navigate his home, get into and out of his car and don/doff clothing without pain or restriction. [] Progressing: [] Met: [] Not Met: [] Adjusted  3. Patient will demonstrate an increase in Strength with hip abd, hip flexion and knee ext to at least 4+/5 in order to be able to ascend/descend stairs and walk for longer periods of time without pain or restriction. [] Progressing: [] Met: [] Not Met: [] Adjusted  4. Patient will be able to perform sit to stand from chair height without airex indep in order to be able to perform functional transfers and use the restroom indep without pain or restriction.     [] Progressing: [] Met: [] Not Met: [] Adjusted     Electronically signed by:  Gurwinder Sport Kate Johnson, SPT  Therapist was present, directed the patient's care, made skilled judgement, and was responsible for assessment and treatment of the patient. Access Code: [email or web address]  URL: [email or web address]  Date: 11/15/2021  Prepared by: Destin Mullins    Exercises  Supine Quad Set - 1 x daily - 7 x weekly - 2 sets - 10 reps - 5 sec hold  Supine Ankle Pumps - 1 x daily - 7 x weekly - 2 sets - 10 reps  Supine Knee Extension Stretch on Towel Roll - 1 x daily - 7 x weekly - 2 sets - 1 min each hold  Seated Knee Extension AROM - 1 x daily - 7 x weekly - 2 sets - 10 reps  Supine Heel Slide with Strap - 1 x daily - 7 x weekly - 1 sets - 10 reps - 5 sec hold

## 2021-11-15 NOTE — FLOWSHEET NOTE
[facility], [address]  Phone: [phone]  Fax [phone]    Physical Therapy Treatment Note/ Progress Report:           Date:  11/15/2021    Patient Name:  Hans Crowder    :  1960  MRN: [id number]  Restrictions/Precautions:    Medical/Treatment Diagnosis Information:  Diagnosis: D16.352 (ICD-10-CM) - Status post right knee replacement  Treatment Diagnosis: M25.561 R knee pain, R26.2 Difficulty walking  Insurance/Certification information:  PT Insurance Information: Med mutual BMN, no auth 90/10 co-ins  Physician Information:  Referring Practitioner: Whitney Crane MD  Has the plan of care been signed (Y/N):        []  Yes  [x]  No     Date of Patient follow up with Physician: 21      Is this a Progress Report:     []  Yes  [x]  No        If Yes:  Date Range for reporting period:  Beginnin/15/21  Ending    Progress report will be due (10 Rx or 30 days whichever is less):        Recertification will be due (POC Duration  / 90 days whichever is less): 22       Visit # Insurance Allowable Auth Required   In-person 1 BMN []  Yes []  No    Telehealth   []  Yes []  No    Total          Functional Scale: LEFS:  83% disability   Date assessed: 11/15/21      Therapy Diagnosis/Practice Pattern: H      Number of Comorbidities:  []0     [x]1-2    []3+    Latex Allergy:  [x]NO      []YES  Preferred Language for Healthcare:   [x]English       []other:      Pain level:  eval 7/10     SUBJECTIVE:  See eval    OBJECTIVE: See eval   Observation:    Test measurements:      RESTRICTIONS/PRECAUTIONS: DM, HTN, L TKR in 2021    Exercises/Interventions:     Therapeutic Ex (40440) Sets/sec/reps Notes/CUES   Quad Sets w/ stim 8', 10 sec/10 sec [place], HEP   Heel Prop 2' HEP   Ankle Pumps 2 x 10 HEP   Heel Slides 10x HEP   LAQ 1 x 10 HEP                            Pt ed: eval findings, HEP, CP usage at home, ankle movement to avoid DVT, pain experiences, incision care 12'         Manual Intervention (52841)     STM to R calf, HS, quad 5'                             NMR re-education (43969)  CUES NEEDED                                                      Therapeutic Exercise and NMR EXR  [x] (13551) Provided verbal/tactile cueing for activities related to strengthening, flexibility, endurance, ROM for improvements in LE, proximal hip, and core control with self care, mobility, lifting, ambulation.  [] (57645) Provided verbal/tactile cueing for activities related to improving balance, coordination, kinesthetic sense, posture, motor skill, proprioception  to assist with LE, proximal hip, and core control in self care, mobility, lifting, ambulation and eccentric single leg control.      NMR and Therapeutic Activities:    [] (19774 or 01658) Provided verbal/tactile cueing for activities related to improving balance, coordination, kinesthetic sense, posture, motor skill, proprioception and motor activation to allow for proper function of core, proximal hip and LE with self care and ADLs  [] (73557) Gait Re-education- Provided training and instruction to the patient for proper LE, core and proximal hip recruitment and positioning and eccentric body weight control with ambulation re-education including up and down stairs     Home Exercise Program:    [x] (02237) Reviewed/Progressed HEP activities related to strengthening, flexibility, endurance, ROM of core, proximal hip and LE for functional self-care, mobility, lifting and ambulation/stair navigation   [] (99742)Reviewed/Progressed HEP activities related to improving balance, coordination, kinesthetic sense, posture, motor skill, proprioception of core, proximal hip and LE for self care, mobility, lifting, and ambulation/stair navigation      Manual Treatments:  PROM / STM / Oscillations-Mobs:  G-I, II, III, IV (PA's, Inf., Post.)  [x] (89857) Provided manual therapy to mobilize LE, proximal hip and/or LS spine soft tissue/joints for the purpose of modulating pain, promoting relaxation,  increasing ROM, reducing/eliminating soft tissue swelling/inflammation/restriction, improving soft tissue extensibility and allowing for proper ROM for normal function with self care, mobility, lifting and ambulation. Modalities:     [x] GAME READY (VASO)- for significant edema, swelling, pain control. CP x 10 min, bilat knees      Charges  Timed Code Treatment Minutes: 30   Total Treatment Minutes: 55, eval, CP, rest     [x] EVAL (LOW) 85374   [] EVAL (MOD) 09956  [] EVAL (HIGH) 89280   [] RE-EVAL     [x] YP(37698) x2     [] IONTO  [] NMR (48140) x     [] VASO  [] Manual (02178) x      [] Other:  [] TA x      [] Mech Traction (53604)  [] ES(attended) (05586)      [] ES (un) (02258):       GOALS:   Patient stated goal: Pt would like to get back to functioning without pain. [] Progressing: [] Met: [] Not Met: [] Adjusted    Therapist goals for Patient:   Short Term Goals: To be achieved in: 2 weeks  1. Independent in HEP and progression per patient tolerance, in order to prevent re-injury. [] Progressing: [] Met: [] Not Met: [] Adjusted  2. Patient will have a decrease in pain to facilitate improvement in movement, function, and ADLs as indicated by Functional Deficits. [] Progressing: [] Met: [] Not Met: [] Adjusted    Long Term Goals: To be achieved in: 12 weeks  1. Disability index score of 50% or less for the LEFS to assist with reaching prior level of function. [] Progressing: [] Met: [] Not Met: [] Adjusted  2. Patient will demonstrate increased AROM with knee flexion to at least 110 deg and lacking 2 degrees of knee ext in order to be able to navigate his home, get into and out of his car and don/doff clothing without pain or restriction. [] Progressing: [] Met: [] Not Met: [] Adjusted  3.  Patient will demonstrate an increase in Strength with hip abd, hip flexion and knee ext to at least 4+/5 in order to be able to ascend/descend stairs and walk for longer periods of time without pain or restriction. [] Progressing: [] Met: [] Not Met: [] Adjusted  4. Patient will be able to perform sit to stand from chair height without airex indep in order to be able to perform functional transfers and use the restroom indep without pain or restriction. Progression Towards Functional goals:  [] Patient is progressing as expected towards functional goals listed. [] Progression is slowed due to complexities listed. [] Progression has been slowed due to co-morbidities. [x] Plan just implemented, too soon to assess goals progression  [] Other:     Overall Progression Towards Functional goals/ Treatment Progress Update:  [] Patient is progressing as expected towards functional goals listed. [] Progression is slowed due to complexities/Impairments listed. [] Progression has been slowed due to co-morbidities.   [x] Plan just implemented, too soon to assess goals progression <30days   [] Goals require adjustment due to lack of progress  [] Patient is not progressing as expected and requires additional follow up with physician  [] Other    Prognosis for POC: [x] Good [] Fair  [] Poor      Patient requires continued skilled intervention: [x] Yes  [] No    Treatment/Activity Tolerance:  [x] Patient able to complete treatment  [] Patient limited by fatigue  [] Patient limited by pain    [] Patient limited by other medical complications  [] Other:     ASSESSMENT: see anna    Return to Play: (if applicable)   []  Stage 1: Intro to Strength   []  Stage 2: Return to Run and Strength   []  Stage 3: Return to Jump and Strength   []  Stage 4: Dynamic Strength and Agility   []  Stage 5: Sport Specific Training     []  Ready to Return to Play, Meets All Above Stages   []  Not Ready for Return to Sports   Comments:                         PLAN: See anna  [] Continue per plan of care [] Alter current plan (see comments above)  [x] Plan of care initiated [] Hold pending MD visit [] Discharge      Electronically signed by:  Frannie Rai, PT   FIDELINA Salcedo  Therapist was present, directed the patient's care, made skilled judgement, and was responsible for assessment and treatment of the patient. Note: If patient does not return for scheduled/ recommended follow up visits, this note will serve as a discharge from care along with most recent update on progress. Access Code: [email or web address]  URL: [email or web address]  Date: 11/15/2021  Prepared by: Frannie Rai    Exercises  Supine Quad Set - 1 x daily - 7 x weekly - 2 sets - 10 reps - 5 sec hold  Supine Ankle Pumps - 1 x daily - 7 x weekly - 2 sets - 10 reps  Supine Knee Extension Stretch on Towel Roll - 1 x daily - 7 x weekly - 2 sets - 1 min each hold  Seated Knee Extension AROM - 1 x daily - 7 x weekly - 2 sets - 10 reps  Supine Heel Slide with Strap - 1 x daily - 7 x weekly - 1 sets - 10 reps - 5 sec hold

## 2021-11-18 ENCOUNTER — PATIENT MESSAGE (OUTPATIENT)
Dept: ORTHOPEDIC SURGERY | Age: 61
End: 2021-11-18

## 2021-11-18 DIAGNOSIS — Z96.651 HISTORY OF TOTAL KNEE ARTHROPLASTY, RIGHT: ICD-10-CM

## 2021-11-18 DIAGNOSIS — E11.9 TYPE 2 DIABETES MELLITUS WITHOUT COMPLICATION, WITHOUT LONG-TERM CURRENT USE OF INSULIN (HCC): ICD-10-CM

## 2021-11-18 RX ORDER — ROPINIROLE 3 MG/1
3 TABLET, FILM COATED ORAL 3 TIMES DAILY
Qty: 270 TABLET | Refills: 0 | Status: SHIPPED | OUTPATIENT
Start: 2021-11-18 | End: 2022-02-15 | Stop reason: SDUPTHER

## 2021-11-18 RX ORDER — ATORVASTATIN CALCIUM 40 MG/1
40 TABLET, FILM COATED ORAL DAILY
Qty: 90 TABLET | Refills: 3 | Status: SHIPPED | OUTPATIENT
Start: 2021-11-18 | End: 2022-02-28 | Stop reason: SDUPTHER

## 2021-11-19 ENCOUNTER — HOSPITAL ENCOUNTER (OUTPATIENT)
Dept: PHYSICAL THERAPY | Age: 61
Setting detail: THERAPIES SERIES
Discharge: HOME | End: 2021-11-19
Payer: COMMERCIAL

## 2021-11-19 PROCEDURE — 97110 THERAPEUTIC EXERCISES: CPT

## 2021-11-19 RX ORDER — OXYCODONE HYDROCHLORIDE 5 MG/1
5 TABLET ORAL EVERY 6 HOURS PRN
Qty: 40 TABLET | Refills: 0 | Status: SHIPPED | OUTPATIENT
Start: 2021-11-19 | End: 2021-12-02 | Stop reason: SDUPTHER

## 2021-11-19 NOTE — FLOWSHEET NOTE
[facility],  [address]  Phone: [phone]  Fax [phone]    Physical Therapy Treatment Note/ Progress Report:           Date:  2021    Patient Name:  Juan Cassidy    :  1960  MRN: [id number]  Restrictions/Precautions:    Medical/Treatment Diagnosis Information:  Diagnosis: J28.663 (ICD-10-CM) - Status post right knee replacement  Treatment Diagnosis: M25.561 R knee pain, R26.2 Difficulty walking  Insurance/Certification information:  PT Insurance Information: Med mutual BMN, no auth 90/10 co-ins  Physician Information:  Referring Practitioner: Garrick Claudio MD  Has the plan of care been signed (Y/N):        []  Yes  [x]  No     Date of Patient follow up with Physician: 21      Is this a Progress Report:     []  Yes  [x]  No        If Yes:  Date Range for reporting period:  Beginnin/15/21  Ending    Progress report will be due (10 Rx or 30 days whichever is less):        Recertification will be due (POC Duration  / 90 days whichever is less): 22       Visit # Insurance Allowable Auth Required   In-person 2 BMN []  Yes []  No    Telehealth   []  Yes []  No    Total          Functional Scale: LEFS:  83% disability   Date assessed: 11/15/21      Therapy Diagnosis/Practice Pattern: H      Number of Comorbidities:  []0     [x]1-2    []3+    Latex Allergy:  [x]NO      []YES  Preferred Language for Healthcare:   [x]English       []other:      Pain level:       SUBJECTIVE:  Pt reports that he has been in increased pain for the entire morning. Pt notes his exercises have been going well at home and he is going to take off the bandage over his incision soon.     OBJECTIVE:    Observation:    Test measurements:  NV     RESTRICTIONS/PRECAUTIONS: DM, HTN, L TKR in 2021    Exercises/Interventions:     Therapeutic Ex (18288) Sets/sec/reps Notes/CUES   Quad Sets w/ stim 8', 10 sec/10 sec [place], HEP, around 57 mA   Heel Prop 2', w/ blue foam HEP   Ankle Pumps 3 x 10 HEP   Heel Slides 10'' x 15, w/ yellow SB HEP   LAQ 2 x 10 HEP   Supine Calf S 30'' x 3, L w/ strap    SL hip abd 15x, mod assist x 1    TKE YTB 3'' x 20 UE support w/ walker        recumbent bike 4' Rocking back and forth, 7 seat height   Pt ed:  , CP usage at home, ankle movement to avoid DVT, pain experiences, incision care 6'         Manual Intervention (91540)     STM to R calf, HS, quad 5'                             NMR re-education (43239)  CUES NEEDED                                                      Therapeutic Exercise and NMR EXR  [x] (13244) Provided verbal/tactile cueing for activities related to strengthening, flexibility, endurance, ROM for improvements in LE, proximal hip, and core control with self care, mobility, lifting, ambulation.  [] (32046) Provided verbal/tactile cueing for activities related to improving balance, coordination, kinesthetic sense, posture, motor skill, proprioception  to assist with LE, proximal hip, and core control in self care, mobility, lifting, ambulation and eccentric single leg control.      NMR and Therapeutic Activities:    [] (69302 or 62212) Provided verbal/tactile cueing for activities related to improving balance, coordination, kinesthetic sense, posture, motor skill, proprioception and motor activation to allow for proper function of core, proximal hip and LE with self care and ADLs  [] (63125) Gait Re-education- Provided training and instruction to the patient for proper LE, core and proximal hip recruitment and positioning and eccentric body weight control with ambulation re-education including up and down stairs     Home Exercise Program:    [x] (87861) Reviewed/Progressed HEP activities related to strengthening, flexibility, endurance, ROM of core, proximal hip and LE for functional self-care, mobility, lifting and ambulation/stair navigation   [] (48764)Reviewed/Progressed HEP activities related to improving balance, coordination, kinesthetic sense, posture, motor skill, proprioception of core, proximal hip and LE for self care, mobility, lifting, and ambulation/stair navigation      Manual Treatments:  PROM / STM / Oscillations-Mobs:  G-I, II, III, IV (PA's, Inf., Post.)  [x] (93634) Provided manual therapy to mobilize LE, proximal hip and/or LS spine soft tissue/joints for the purpose of modulating pain, promoting relaxation,  increasing ROM, reducing/eliminating soft tissue swelling/inflammation/restriction, improving soft tissue extensibility and allowing for proper ROM for normal function with self care, mobility, lifting and ambulation. Modalities:     [] GAME READY (VASO)- for significant edema, swelling, pain control. CP x 10 min, bilat knees      Charges  Timed Code Treatment Minutes: 40   Total Treatment Minutes: 55, CP, rest     [] EVAL (LOW) 57528   [] EVAL (MOD) 25437  [] EVAL (HIGH) 64786   [] RE-EVAL     [x] FD(86607) x3     [] IONTO  [] NMR (84354) x     [] VASO  [] Manual (94387) x      [] Other:  [] TA x      [] Mech Traction (68453)  [] ES(attended) (08632)      [] ES (un) (49805):       GOALS:   Patient stated goal: Pt would like to get back to functioning without pain. [] Progressing: [] Met: [] Not Met: [] Adjusted    Therapist goals for Patient:   Short Term Goals: To be achieved in: 2 weeks  1. Independent in HEP and progression per patient tolerance, in order to prevent re-injury. [] Progressing: [] Met: [] Not Met: [] Adjusted  2. Patient will have a decrease in pain to facilitate improvement in movement, function, and ADLs as indicated by Functional Deficits. [] Progressing: [] Met: [] Not Met: [] Adjusted    Long Term Goals: To be achieved in: 12 weeks  1. Disability index score of 50% or less for the LEFS to assist with reaching prior level of function. [] Progressing: [] Met: [] Not Met: [] Adjusted  2.  Patient will demonstrate increased AROM with knee flexion to at least 110 deg and lacking 2 degrees of knee ext in order to be able to navigate his home, get into and out of his car and don/doff clothing without pain or restriction. [] Progressing: [] Met: [] Not Met: [] Adjusted  3. Patient will demonstrate an increase in Strength with hip abd, hip flexion and knee ext to at least 4+/5 in order to be able to ascend/descend stairs and walk for longer periods of time without pain or restriction. [] Progressing: [] Met: [] Not Met: [] Adjusted  4. Patient will be able to perform sit to stand from chair height without airex indep in order to be able to perform functional transfers and use the restroom indep without pain or restriction. Progression Towards Functional goals:  [] Patient is progressing as expected towards functional goals listed. [] Progression is slowed due to complexities listed. [] Progression has been slowed due to co-morbidities. [x] Plan just implemented, too soon to assess goals progression  [] Other:     Overall Progression Towards Functional goals/ Treatment Progress Update:  [] Patient is progressing as expected towards functional goals listed. [] Progression is slowed due to complexities/Impairments listed. [] Progression has been slowed due to co-morbidities.   [x] Plan just implemented, too soon to assess goals progression <30days   [] Goals require adjustment due to lack of progress  [] Patient is not progressing as expected and requires additional follow up with physician  [] Other    Prognosis for POC: [x] Good [] Fair  [] Poor      Patient requires continued skilled intervention: [x] Yes  [] No    Treatment/Activity Tolerance:  [x] Patient able to complete treatment  [] Patient limited by fatigue  [] Patient limited by pain    [] Patient limited by other medical complications  [] Other:     ASSESSMENT: Pt was able to increase his repetitions of previous exercises and a few more new exercises today. Pt did have multiple complaints of discomfort throughout therapy. Knee flexion and extension is painful for him, but he is able to get throughout the exercises. Pt looks visibly less swollen in his R knee and was able to perform more heel slides on a SB this visit. Pt performed bike for 4 minutes at end of therapy, where he was able to rock back and forth with moderate complaints of pain. Pt requested CP on bilat at the end of therapy. Return to Play: (if applicable)   []  Stage 1: Intro to Strength   []  Stage 2: Return to Run and Strength   []  Stage 3: Return to Jump and Strength   []  Stage 4: Dynamic Strength and Agility   []  Stage 5: Sport Specific Training     []  Ready to Return to Play, Meets All Above Stages   []  Not Ready for Return to Sports   Comments:                         PLAN: S  [x] Continue per plan of care [] Alter current plan (see comments above)  [] Plan of care initiated [] Hold pending MD visit [] Discharge      Electronically signed by:  Eulice Kussmaul, PT , DPT  FIDELINA Gimenez  Therapist was present, directed the patient's care, made skilled judgement, and was responsible for assessment and treatment of the patient. Note: If patient does not return for scheduled/ recommended follow up visits, this note will serve as a discharge from care along with most recent update on progress. Access Code: [email or web address]  URL: [email or web address]  Date: 11/15/2021  Prepared by: Ann Marie Haas    Exercises  Supine Quad Set - 1 x daily - 7 x weekly - 2 sets - 10 reps - 5 sec hold  Supine Ankle Pumps - 1 x daily - 7 x weekly - 2 sets - 10 reps  Supine Knee Extension Stretch on Towel Roll - 1 x daily - 7 x weekly - 2 sets - 1 min each hold  Seated Knee Extension AROM - 1 x daily - 7 x weekly - 2 sets - 10 reps  Supine Heel Slide with Strap - 1 x daily - 7 x weekly - 1 sets - 10 reps - 5 sec hold

## 2021-11-19 NOTE — TELEPHONE ENCOUNTER
From: Juan Cassidy  To: Dr. Hoffman Arm: 11/18/2021 9:07 AM EST  Subject: Hima Chang going to be out of my Oxycodone this coming Monday morning.     Lauren Brooks

## 2021-11-22 ENCOUNTER — HOSPITAL ENCOUNTER (OUTPATIENT)
Dept: PHYSICAL THERAPY | Age: 61
Setting detail: THERAPIES SERIES
Discharge: HOME | End: 2021-11-22
Payer: COMMERCIAL

## 2021-11-22 ENCOUNTER — TELEPHONE (OUTPATIENT)
Dept: PULMONOLOGY | Age: 61
End: 2021-11-22

## 2021-11-22 PROCEDURE — 97110 THERAPEUTIC EXERCISES: CPT | Performed by: PHYSICAL THERAPIST

## 2021-11-22 NOTE — TELEPHONE ENCOUNTER
Spoke with pt to let him know the modem is sending information that the unit is on the correct pressure. Pt states the ramp is starting at 4 and should be at 6. Explained to that how he can change the ramp time setting. Pressures were re-sent to modem of new unit to update. Pt will need to bring unit in to manually change if the pressure does not change.

## 2021-11-22 NOTE — TELEPHONE ENCOUNTER
Patient said he received his new machine from the recall. He said the settings that are on there are not correct. Please set to correct settings. Please call patient at [phone].

## 2021-11-22 NOTE — FLOWSHEET NOTE
[facility],  [address]  Phone: [phone]  Fax [phone]    Physical Therapy Treatment Note/ Progress Report:           Date:  2021    Patient Name:  Whit Vaughn  \"Bo\"  :  1960  MRN: [id number]  Restrictions/Precautions:    Medical/Treatment Diagnosis Information:  Diagnosis: Q33.155 (ICD-10-CM) - Status post right knee replacement  Treatment Diagnosis: M25.561 R knee pain, R26.2 Difficulty walking  Insurance/Certification information:  PT Insurance Information: Med mutual BMN, no auth 90/10 co-ins  Physician Information:  Referring Practitioner: Luisana Constantino MD  Has the plan of care been signed (Y/N):        [x]  Yes  []  No     Date of Patient follow up with Physician: 21      Is this a Progress Report:     []  Yes  [x]  No        If Yes:  Date Range for reporting period:  Beginnin/15/21  Ending    Progress report will be due (10 Rx or 30 days whichever is less):        Recertification will be due (POC Duration  / 90 days whichever is less): 22       Visit # Insurance Allowable Auth Required   In-person 3 BMN []  Yes []  No    Telehealth   []  Yes []  No    Total          Functional Scale: LEFS:  83% disability   Date assessed: 11/15/21      Therapy Diagnosis/Practice Pattern: H      Number of Comorbidities:  []0     [x]1-2    []3+    Latex Allergy:  [x]NO      []YES  Preferred Language for Healthcare:   [x]English       []other:      Pain level:       SUBJECTIVE:  Pt notes that Saturday was very sore for him almost all day. The soreness got better on , so he is attributing this to exercise increases made on Friday's session. Pt notes his exercises have been going well at home and reports that he feels like he can contract his quad slightly better.     OBJECTIVE:    Observation:    Test measurements:  NV     RESTRICTIONS/PRECAUTIONS: DM, HTN, L TKR in July 2021    Exercises/Interventions:     Therapeutic Ex (10852) Sets/sec/reps Notes/CUES   Quad Sets w/ stim  SAQ w/ stim 10'' x 10, 10' total [place], HEP, around 57 mA   Heel Prop 2', w/ blue foam HEP   Ankle Pumps 30x HEP   Heel Slides 10'' x 15, w/ yellow SB HEP   LAQ  Seated Marches 2 x 10  2 x 10 HEP   Supine Calf S     SL hip abd 15x, mod assist x 1    TKE YTB 3'' x 20 UE support w/ walker        recumbent bike 5' Rocking back and forth, 9 seat height   Pt ed:  , CP usage at home,  pain experiences, incision care 4'         Manual Intervention (24744)     STM to R calf, HS, quad 3'                             NMR re-education (41546)  CUES NEEDED                                                      Therapeutic Exercise and NMR EXR  [x] (49291) Provided verbal/tactile cueing for activities related to strengthening, flexibility, endurance, ROM for improvements in LE, proximal hip, and core control with self care, mobility, lifting, ambulation.  [] (48255) Provided verbal/tactile cueing for activities related to improving balance, coordination, kinesthetic sense, posture, motor skill, proprioception  to assist with LE, proximal hip, and core control in self care, mobility, lifting, ambulation and eccentric single leg control.      NMR and Therapeutic Activities:    [] (07372 or 92921) Provided verbal/tactile cueing for activities related to improving balance, coordination, kinesthetic sense, posture, motor skill, proprioception and motor activation to allow for proper function of core, proximal hip and LE with self care and ADLs  [] (83429) Gait Re-education- Provided training and instruction to the patient for proper LE, core and proximal hip recruitment and positioning and eccentric body weight control with ambulation re-education including up and down stairs     Home Exercise Program:    [x] (78687) Reviewed/Progressed HEP activities related to strengthening, flexibility, endurance, ROM of core, proximal hip and LE for functional self-care, mobility, lifting and ambulation/stair navigation   [] (07551)Reviewed/Progressed HEP activities related to improving balance, coordination, kinesthetic sense, posture, motor skill, proprioception of core, proximal hip and LE for self care, mobility, lifting, and ambulation/stair navigation      Manual Treatments:  PROM / STM / Oscillations-Mobs:  G-I, II, III, IV (PA's, Inf., Post.)  [x] (59724) Provided manual therapy to mobilize LE, proximal hip and/or LS spine soft tissue/joints for the purpose of modulating pain, promoting relaxation,  increasing ROM, reducing/eliminating soft tissue swelling/inflammation/restriction, improving soft tissue extensibility and allowing for proper ROM for normal function with self care, mobility, lifting and ambulation. Modalities:     [] GAME READY (VASO)- for significant edema, swelling, pain control. CP x 10 min, bilat knees      Charges  Timed Code Treatment Minutes: 40   Total Treatment Minutes: 55, CP, rest     [] EVAL (LOW) 97660   [] EVAL (MOD) 39452  [] EVAL (HIGH) 49269   [] RE-EVAL     [x] NE(36695) x3     [] IONTO  [] NMR (39151) x     [] VASO  [] Manual (13946) x      [] Other:  [] TA x      [] Mech Traction (92543)  [] ES(attended) (46433)      [] ES (un) (32567):       GOALS:   Patient stated goal: Pt would like to get back to functioning without pain. [] Progressing: [] Met: [] Not Met: [] Adjusted    Therapist goals for Patient:   Short Term Goals: To be achieved in: 2 weeks  1. Independent in HEP and progression per patient tolerance, in order to prevent re-injury. [] Progressing: [] Met: [] Not Met: [] Adjusted  2. Patient will have a decrease in pain to facilitate improvement in movement, function, and ADLs as indicated by Functional Deficits. [] Progressing: [] Met: [] Not Met: [] Adjusted    Long Term Goals: To be achieved in: 12 weeks  1.  Disability index score of 50% or less for the LEFS to assist with reaching prior level of function. [] Progressing: [] Met: [] Not Met: [] Adjusted  2. Patient will demonstrate increased AROM with knee flexion to at least 110 deg and lacking 2 degrees of knee ext in order to be able to navigate his home, get into and out of his car and don/doff clothing without pain or restriction. [] Progressing: [] Met: [] Not Met: [] Adjusted  3. Patient will demonstrate an increase in Strength with hip abd, hip flexion and knee ext to at least 4+/5 in order to be able to ascend/descend stairs and walk for longer periods of time without pain or restriction. [] Progressing: [] Met: [] Not Met: [] Adjusted  4. Patient will be able to perform sit to stand from chair height without airex indep in order to be able to perform functional transfers and use the restroom indep without pain or restriction. Progression Towards Functional goals:  [] Patient is progressing as expected towards functional goals listed. [] Progression is slowed due to complexities listed. [] Progression has been slowed due to co-morbidities. [x] Plan just implemented, too soon to assess goals progression  [] Other:     Overall Progression Towards Functional goals/ Treatment Progress Update:  [] Patient is progressing as expected towards functional goals listed. [] Progression is slowed due to complexities/Impairments listed. [] Progression has been slowed due to co-morbidities.   [x] Plan just implemented, too soon to assess goals progression <30days   [] Goals require adjustment due to lack of progress  [] Patient is not progressing as expected and requires additional follow up with physician  [] Other    Prognosis for POC: [x] Good [] Fair  [] Poor      Patient requires continued skilled intervention: [x] Yes  [] No    Treatment/Activity Tolerance:  [x] Patient able to complete treatment  [] Patient limited by fatigue  [] Patient limited by pain    [] Patient limited by other medical complications  [] Other: ASSESSMENT: Pt was able to perform SAQ with NMES today with increased time as well. Pt did have multiple complaints of pain throughout the program, but that was expected and never enough to not allow him to continue with exercise. Pt made improvements on the bike today and was able to rock back and forth for 5 minutes. Pt requested ice at the end of therapy. Return to Play: (if applicable)   []  Stage 1: Intro to Strength   []  Stage 2: Return to Run and Strength   []  Stage 3: Return to Jump and Strength   []  Stage 4: Dynamic Strength and Agility   []  Stage 5: Sport Specific Training     []  Ready to Return to Play, Meets All Above Stages   []  Not Ready for Return to Sports   Comments:                         PLAN:   [x] Continue per plan of care [] Alter current plan (see comments above)  [] Plan of care initiated [] Hold pending MD visit [] Discharge      Electronically signed by:  Marc Morrow, PT   Shellie Perez, SPT  Therapist was present, directed the patient's care, made skilled judgement, and was responsible for assessment and treatment of the patient. Note: If patient does not return for scheduled/ recommended follow up visits, this note will serve as a discharge from care along with most recent update on progress. Access Code: [email or web address]  URL: [email or web address]  Date: 11/15/2021  Prepared by: Marc Morrow    Exercises  Supine Quad Set - 1 x daily - 7 x weekly - 2 sets - 10 reps - 5 sec hold  Supine Ankle Pumps - 1 x daily - 7 x weekly - 2 sets - 10 reps  Supine Knee Extension Stretch on Towel Roll - 1 x daily - 7 x weekly - 2 sets - 1 min each hold  Seated Knee Extension AROM - 1 x daily - 7 x weekly - 2 sets - 10 reps  Supine Heel Slide with Strap - 1 x daily - 7 x weekly - 1 sets - 10 reps - 5 sec hold

## 2021-11-23 ENCOUNTER — TELEPHONE (OUTPATIENT)
Dept: PULMONOLOGY | Age: 61
End: 2021-11-23

## 2021-11-23 ENCOUNTER — CARE COORDINATION (OUTPATIENT)
Dept: OTHER | Facility: CLINIC | Age: 61
End: 2021-11-23

## 2021-11-23 NOTE — TELEPHONE ENCOUNTER
Pt calling to let us know he can come to [facility] office between 9 and noon on 11/24/2021 to have unit checked.

## 2021-11-23 NOTE — CARE COORDINATION
[facility] Transitions Follow Up Call    2021    Patient: Fransiscol Geronimo  Patient : 1960   MRN: [id number]  Reason for Admission: Right TKA  Discharge Date: 10/27/21 RARS: Readmission Risk Score: 13.1       Care Transitions Follow Up Call    Needs to be reviewed by the provider   Additional needs identified to be addressed with provider: No  none             Method of communication with provider : none      Care Transition Nurse (CTN) contacted the patient by telephone to follow up after admission on 10/26/21 for TKA. Verified name and  with patient as identifiers. Addressed changes since last contact: Patient states he is doing good, but Pt continues to have pain. Pt reports that he discussed this with his orthopedic surgeon at appt 21 and he gave him another prescription for oxycodone. Pt states he is trying to decrease the frequency that he is taking it to 1-2 tabs every 6-8 hours, stating he does take 2 tabs prior to PT sessions, which are twice a week. Pt reports that pain level is currently 3 on 0-10 scale; last does of oxycodone was 10am this morning. Pt reports he is sleeping good, usually 5-6 hours per night; is using his Bipap at nighttime. Pt states his incision site is healing well; dressing came off on 21; denies any redness, swelling, drainage, warm to touch at incision site. Pt states appetite is good; denies any recent nausea, vomiting or diarrhea. Pt reports he had diarrhea for 1-2 weeks after surgery, but tested negative for c-diff. CTN discussed possible constipation from prolonged use of oxycodone and to increase fluids if this happens. Pt denies any issues with urination. Pt reports that blood sugar this morning was 112; averages have been 100-110. Discussed follow-up appointments. If no appointment was previously scheduled, appointment scheduling offered: Yes. Is follow up appointment scheduled within 7 days of discharge? Yes.     Advance Care Planning:   Does patient have an Advance Directive: on file. CTN reviewed discharge instructions, medical action plan and red flags with patient and discussed any barriers to care and/or understanding of plan of care after discharge. Discussed appropriate site of care based on symptoms and resources available to patient including: PCP, Specialist, Benefits related nurse triage line, When to call 911 and [address]. The patient agrees to contact the PCP office for questions related to their healthcare. Patients top risk factors for readmission: functional physical ability and medical condition-right TKA  Interventions to address risk factors: Obtained and reviewed discharge summary and/or continuity of care documents, Education of patient/family/caregiver/guardian to support self-management-educated pt on pain control, use of oxycodone and pt verbalized understanding and Assessment and support for treatment adherence and medication management-reviewed meds with pt and pt had no questions      Non-[facility] follow up appointment(s): none known    CTN provided contact information for future needs. No further follow-up call indicated based on severity of symptoms and risk factors and goals met. No further outreach scheduled with this AC, ACM will sign off care team at this time. Patient has been provided with this ACM's contact information.        Care Transitions Subsequent and Final Call    Subsequent and Final Calls  Do you have any ongoing symptoms?: Yes  Patient-reported symptoms: Pain  Have your medications changed?: No  Do you have any questions related to your medications?: No  Are you currently active with any services?: Home Health  Do you have any needs or concerns that I can assist you with?: No  Identified Barriers: None  Care Transitions Interventions  No Identified Needs  Other Interventions:         Goals      Conditions and Symptoms      I will schedule office visits, as directed by my provider. I will keep my appointment or reschedule if I have to cancel. I will notify my provider of any barriers to my plan of care. I will notify my provider of any symptoms that indicate a worsening of my condition. Barriers: none  Plan for overcoming my barriers: work with ACM  Confidence: 9/10  Anticipated Goal Completion Date: 11/29/21    10/29/21 - Pt requested that ACM schedule the 7 day follow up visit with PCP and ACM scheduled this. 11/23/21 - Pt attending and scheduling all follow up appts.  LDL CALC < 130      Patient received counseling on the following healthy behaviors:diet & exercise. Patient has educational materials on cholesterol management along with details of goal TC/HDL/LDL/TG. I have instructed patient to complete a self tracking handout on cholesterol & diet. food consumption and instructed them to bring it with them to the next appointment. Discussed use, benefit, and side effects of prescribed treatments or medications. Barriers to medication &/or diet  compliance addressed. All patient questions answered. Pt voiced understanding. Patient Self-Management Goal for high cholesterol(abnormal lipids)  Goal:improve cholesterol by addressing diet changes such as eating less fast food type products. Following diet & exercise plan as my doctor has explained. Barriers to success:diet compliance. Plan for overcoming any potential barriers:be more compliant with recommendations. Confidence:10/10  Date goal set: 5/1/17  Date goal attained:pending.                   Follow Up  Future Appointments   Date Time Provider Ernie Duron   11/26/2021  1:45 PM Jorge Luis Macario PT [address] AND PT Sunil Jarrett   11/29/2021  7:45 AM Gilles Sharpe PT [address] AND PT Sunil Jarrett   12/3/2021  2:30 PM Jorge Luis Macario PT [address] AND PT Sunil Jarrett   12/6/2021  7:45 AM Gilles Sharpe PT [address] AND PT Sunil Jarrett   12/8/2021  7:30 AM DO SOPHIE Lorenzo 12/10/2021  2:30 PM Eulice Kussmaul, PT [facility] AND PT [facility]ar Bengali   12/13/2021  7:45 AM Ann Marie Samina, PT [facility] AND PT [place]   12/16/2021  8:00 AM Bhakti Sanches MD [facility]   12/16/2021  9:15 AM Eulice Kussmaul, PT [facility] AND PT [facility]   12/20/2021  8:30 AM Ann Marie Samina, PT [facility] AND PT [facility]   12/23/2021  9:15 AM Eulice Kussmaul, PT [facility] AND PT [facility]   12/27/2021  7:45 AM Ann Marie Samina, PT [facility] AND PT [facility]   12/27/2021 11:15 AM Katya Rincon, APRN - CNP [facility] [place] - [facility]   12/30/2021  9:15 AM Eulice Rojasmaarsalan, PT [facility] AND PT [facility]   5/9/2022  9:20 AM Hailee De La Rosa, APRN - CNP [facility]       Olivia Mattson MSN, RN   Ambulatory Care Manager  Associate Care Management  Cell [phone]  [email or web address]

## 2021-11-26 ENCOUNTER — HOSPITAL ENCOUNTER (OUTPATIENT)
Dept: PHYSICAL THERAPY | Age: 61
Setting detail: THERAPIES SERIES
Discharge: HOME | End: 2021-11-26
Payer: COMMERCIAL

## 2021-11-26 PROCEDURE — 97110 THERAPEUTIC EXERCISES: CPT

## 2021-11-26 NOTE — FLOWSHEET NOTE
[facility],  [address] [place]  Phone: [phone]  Fax [phone]    Physical Therapy Treatment Note/ Progress Report:           Date:  2021    Patient Name:  Ian Melton  \"Bo\"  :  1960  MRN: [id number]  Restrictions/Precautions:    Medical/Treatment Diagnosis Information:  Diagnosis: K17.096 (ICD-10-CM) - Status post right knee replacement  Treatment Diagnosis: M25.561 R knee pain, R26.2 Difficulty walking  Insurance/Certification information:  PT Insurance Information: Med mutual BMN, no auth 90/10 co-ins  Physician Information:  Referring Practitioner: Tom Pete MD  Has the plan of care been signed (Y/N):        [x]  Yes  []  No     Date of Patient follow up with Physician: 21      Is this a Progress Report:     []  Yes  [x]  No        If Yes:  Date Range for reporting period:  Beginnin/15/21  Ending    Progress report will be due (10 Rx or 30 days whichever is less):        Recertification will be due (POC Duration  / 90 days whichever is less): 22       Visit # Insurance Allowable Auth Required   In-person 4 BMN []  Yes []  No    Telehealth   []  Yes []  No    Total          Functional Scale: LEFS:  83% disability   Date assessed: 11/15/21      Therapy Diagnosis/Practice Pattern: H      Number of Comorbidities:  []0     [x]1-2    []3+    Latex Allergy:  [x]NO      []YES  Preferred Language for Healthcare:   [x]English       []other:      Pain level:       SUBJECTIVE:  Pt reports that last night was pretty painful for him and he did not get much sleep. He reports he used CP to deal with pain, which helped slightly. Pt reported he tried to use cane around the house a few days ago, but it was too difficult this early.     OBJECTIVE:    Observation:    Test measurements: Scour Test (modified with pressure from mid thigh) on R: negative    RESTRICTIONS/PRECAUTIONS: DM, HTN, L TKR in July 2021    Exercises/Interventions:     Therapeutic Ex (94540) Sets/sec/reps Notes/CUES   Hip Flexor S on table 20'' x 2    Quad Sets w/ stim  SAQ w/ stim  SAQ w/out stim 10'' x 10, 10' total  10x [place], HEP, around 66 mA   Heel Prop 2', w/ blue foam HEP   Ankle Pumps 20x HEP   Heel Slides 10'' x 15, w/ yellow SB and strap HEP   LAQ  Seated Marches 2 x 10  2 x 10 HEP   Supine Calf S  Standing Incline S 30'' x 2    SL hip abd  Held due to groin pain   SL Clam Shells 2 x 10    TKE RTB 3'' x 20 UE support w/ walker        recumbent bike 5' Rocking back and forth, 10 seat height   Pt ed:  , CP usage at home,  pain experiences, incision care 4'         Manual Intervention ([phone])     STM to R calf, HS, quad                              NMR re-education (20133)  CUES NEEDED                                                      Therapeutic Exercise and NMR EXR  [x] (61400) Provided verbal/tactile cueing for activities related to strengthening, flexibility, endurance, ROM for improvements in LE, proximal hip, and core control with self care, mobility, lifting, ambulation.  [] (98857) Provided verbal/tactile cueing for activities related to improving balance, coordination, kinesthetic sense, posture, motor skill, proprioception  to assist with LE, proximal hip, and core control in self care, mobility, lifting, ambulation and eccentric single leg control.      NMR and Therapeutic Activities:    [] (07513 or 86723) Provided verbal/tactile cueing for activities related to improving balance, coordination, kinesthetic sense, posture, motor skill, proprioception and motor activation to allow for proper function of core, proximal hip and LE with self care and ADLs  [] (83480) Gait Re-education- Provided training and instruction to the patient for proper LE, core and proximal hip recruitment and positioning and eccentric body weight control with ambulation re-education including up and down stairs     Home Exercise Adjusted    Long Term Goals: To be achieved in: 12 weeks  1. Disability index score of 50% or less for the LEFS to assist with reaching prior level of function. [] Progressing: [] Met: [] Not Met: [] Adjusted  2. Patient will demonstrate increased AROM with knee flexion to at least 110 deg and lacking 2 degrees of knee ext in order to be able to navigate his home, get into and out of his car and don/doff clothing without pain or restriction. [] Progressing: [] Met: [] Not Met: [] Adjusted  3. Patient will demonstrate an increase in Strength with hip abd, hip flexion and knee ext to at least 4+/5 in order to be able to ascend/descend stairs and walk for longer periods of time without pain or restriction. [] Progressing: [] Met: [] Not Met: [] Adjusted  4. Patient will be able to perform sit to stand from chair height without airex indep in order to be able to perform functional transfers and use the restroom indep without pain or restriction. Progression Towards Functional goals:  [] Patient is progressing as expected towards functional goals listed. [] Progression is slowed due to complexities listed. [] Progression has been slowed due to co-morbidities. [x] Plan just implemented, too soon to assess goals progression  [] Other:     Overall Progression Towards Functional goals/ Treatment Progress Update:  [] Patient is progressing as expected towards functional goals listed. [] Progression is slowed due to complexities/Impairments listed. [] Progression has been slowed due to co-morbidities.   [x] Plan just implemented, too soon to assess goals progression <30days   [] Goals require adjustment due to lack of progress  [] Patient is not progressing as expected and requires additional follow up with physician  [] Other    Prognosis for POC: [x] Good [] Fair  [] Poor      Patient requires continued skilled intervention: [x] Yes  [] No    Treatment/Activity Tolerance:  [x] Patient able to complete treatment  [] Patient limited by fatigue  [] Patient limited by pain    [] Patient limited by other medical complications  [] Other:     ASSESSMENT: Pt was able to perform a full revolution on the bike today with the seat height at level 10. Pt had complaints of pain in his R groin/hip throughout some exercise today, specifically side lying hip abduction. A modified Scour test was performed and negative and hip flexor stretch on the table seemed to provide some relief. Pt requested ice at the end of therapy. Return to Play: (if applicable)   []  Stage 1: Intro to Strength   []  Stage 2: Return to Run and Strength   []  Stage 3: Return to Jump and Strength   []  Stage 4: Dynamic Strength and Agility   []  Stage 5: Sport Specific Training     []  Ready to Return to Play, Meets All Above Stages   []  Not Ready for Return to Sports   Comments:                         PLAN:   [x] Continue per plan of care [] Alter current plan (see comments above)  [] Plan of care initiated [] Hold pending MD visit [] Discharge      Electronically signed by:  Rey Kussmaul, PT, DPT  Shellie Perez, SPT  Therapist was present, directed the patient's care, made skilled judgement, and was responsible for assessment and treatment of the patient. Note: If patient does not return for scheduled/ recommended follow up visits, this note will serve as a discharge from care along with most recent update on progress. Access Code: [email or web address]  URL: [email or web address]  Date: 11/15/2021  Prepared by: Marc Morrow    Exercises  Supine Quad Set - 1 x daily - 7 x weekly - 2 sets - 10 reps - 5 sec hold  Supine Ankle Pumps - 1 x daily - 7 x weekly - 2 sets - 10 reps  Supine Knee Extension Stretch on Towel Roll - 1 x daily - 7 x weekly - 2 sets - 1 min each hold  Seated Knee Extension AROM - 1 x daily - 7 x weekly - 2 sets - 10 reps  Supine Heel Slide with Strap - 1 x daily - 7 x weekly - 1 sets - 10 reps - 5 sec hold

## 2021-11-29 ENCOUNTER — HOSPITAL ENCOUNTER (OUTPATIENT)
Dept: PHYSICAL THERAPY | Age: 61
Setting detail: THERAPIES SERIES
Discharge: HOME | End: 2021-11-29
Payer: COMMERCIAL

## 2021-11-29 PROCEDURE — 97140 MANUAL THERAPY 1/> REGIONS: CPT | Performed by: PHYSICAL THERAPIST

## 2021-11-29 PROCEDURE — 97110 THERAPEUTIC EXERCISES: CPT | Performed by: PHYSICAL THERAPIST

## 2021-11-29 NOTE — FLOWSHEET NOTE
[facility],  [address] [place]  Phone: [phone]  Fax [phone]    Physical Therapy Treatment Note/ Progress Report:           Date:  2021    Patient Name:  Esvin Silver  \"Bo\"  :  1960  MRN: [id number]  Restrictions/Precautions:    Medical/Treatment Diagnosis Information:  Diagnosis: F14.992 (ICD-10-CM) - Status post right knee replacement  Treatment Diagnosis: M25.561 R knee pain, R26.2 Difficulty walking  Insurance/Certification information:  PT Insurance Information: Med mutual BMN, no auth 90/10 co-ins  Physician Information:  Referring Practitioner: Juni Pachceo MD  Has the plan of care been signed (Y/N):        [x]  Yes  []  No     Date of Patient follow up with Physician: 21      Is this a Progress Report:     []  Yes  [x]  No        If Yes:  Date Range for reporting period:  Beginnin/15/21  Ending    Progress report will be due (10 Rx or 30 days whichever is less):        Recertification will be due (POC Duration  / 90 days whichever is less): 22       Visit # Insurance Allowable Auth Required   In-person 5 BMN []  Yes []  No    Telehealth   []  Yes []  No    Total          Functional Scale: LEFS:  83% disability   Date assessed: 11/15/21      Therapy Diagnosis/Practice Pattern: H      Number of Comorbidities:  []0     [x]1-2    []3+    Latex Allergy:  [x]NO      []YES  Preferred Language for Healthcare:   [x]English       []other:      Pain level:    current pain: 6/10 currently, 3-8/10 best to worst    SUBJECTIVE:  Pt reports he has been trying to practice a bit the the [address], but feels awkward and shaky in his L UE.    OBJECTIVE:    Observation:    Test measurements: AAROM knee flexion: 90* on table, 92* on SB    RESTRICTIONS/PRECAUTIONS: DM, HTN, L TKR in 2021    Exercises/Interventions:     Therapeutic Ex (68579) Sets/sec/reps Notes/CUES   Hip Flexor S on positioning and eccentric body weight control with ambulation re-education including up and down stairs     Home Exercise Program:    [x] (02315) Reviewed/Progressed HEP activities related to strengthening, flexibility, endurance, ROM of core, proximal hip and LE for functional self-care, mobility, lifting and ambulation/stair navigation   [] (49557)Reviewed/Progressed HEP activities related to improving balance, coordination, kinesthetic sense, posture, motor skill, proprioception of core, proximal hip and LE for self care, mobility, lifting, and ambulation/stair navigation      Manual Treatments:  PROM / STM / Oscillations-Mobs:  G-I, II, III, IV (PA's, Inf., Post.)  [x] (18917) Provided manual therapy to mobilize LE, proximal hip and/or LS spine soft tissue/joints for the purpose of modulating pain, promoting relaxation,  increasing ROM, reducing/eliminating soft tissue swelling/inflammation/restriction, improving soft tissue extensibility and allowing for proper ROM for normal function with self care, mobility, lifting and ambulation. Modalities:     [] GAME READY (VASO)- for significant edema, swelling, pain control. CP x 15 min, bilat knees, R hip       Charges  Timed Code Treatment Minutes: 45   Total Treatment Minutes: 60, CP, rest     [] EVAL (LOW) 40412   [] EVAL (MOD) 70069  [] EVAL (HIGH) 31181   [] RE-EVAL     [x] UT(96432) x2     [] IONTO  [] NMR (67762) x     [] VASO  [x] Manual (43984) x  1    [] Other:  [] TA x      [] Mech Traction (14973)  [] ES(attended) (34049)      [] ES (un) (66905):       GOALS:   Patient stated goal: Pt would like to get back to functioning without pain. [] Progressing: [] Met: [] Not Met: [] Adjusted    Therapist goals for Patient:   Short Term Goals: To be achieved in: 2 weeks  1. Independent in HEP and progression per patient tolerance, in order to prevent re-injury. [] Progressing: [x] Met: [] Not Met: [] Adjusted  2.  Patient will have a decrease in pain to Poor      Patient requires continued skilled intervention: [x] Yes  [] No    Treatment/Activity Tolerance:  [x] Patient able to complete treatment  [] Patient limited by fatigue  [] Patient limited by pain    [] Patient limited by other medical complications  [] Other:     ASSESSMENT: increased time spent with knee flexion this visit due to slower progression of ROM noted. Pt able to progress standing strengthening with cues for posture and fatigue noted. Pt ed regarding increasing stretching at home, also ok to add heat to quad/hip (not over knee) and self massage to help with pain and mobility. Return to Play: (if applicable)   []  Stage 1: Intro to Strength   []  Stage 2: Return to Run and Strength   []  Stage 3: Return to Jump and Strength   []  Stage 4: Dynamic Strength and Agility   []  Stage 5: Sport Specific Training     []  Ready to Return to Play, Meets All Above Stages   []  Not Ready for Return to Sports   Comments:                         PLAN: Pt would like to try to finish PT in this calendar year due to high deductible reset in Jan 2022. [x] Continue per plan of care [] Alter current plan (see comments above)  [] Plan of care initiated [] Hold pending MD visit [] Discharge      Electronically signed by:  Shital Archer PT,  Note: If patient does not return for scheduled/ recommended follow up visits, this note will serve as a discharge from care along with most recent update on progress. Access Code: [email or web address]  URL: [email or web address]  Date: 11/15/2021  Prepared by: Shital Archer    Exercises  Supine Quad Set - 1 x daily - 7 x weekly - 2 sets - 10 reps - 5 sec hold  Supine Ankle Pumps - 1 x daily - 7 x weekly - 2 sets - 10 reps  Supine Knee Extension Stretch on Towel Roll - 1 x daily - 7 x weekly - 2 sets - 1 min each hold  Seated Knee Extension AROM - 1 x daily - 7 x weekly - 2 sets - 10 reps  Supine Heel Slide with Strap - 1 x daily - 7 x weekly - 1 sets - 10 reps - 5 sec hold

## 2021-11-30 ENCOUNTER — PATIENT MESSAGE (OUTPATIENT)
Dept: ORTHOPEDIC SURGERY | Age: 61
End: 2021-11-30

## 2021-11-30 DIAGNOSIS — Z96.651 HISTORY OF TOTAL KNEE ARTHROPLASTY, RIGHT: ICD-10-CM

## 2021-11-30 RX ORDER — FUROSEMIDE 40 MG/1
40 TABLET ORAL 2 TIMES DAILY
Qty: 60 TABLET | Refills: 3 | Status: SHIPPED | OUTPATIENT
Start: 2021-11-30 | End: 2022-03-09 | Stop reason: SDUPTHER

## 2021-11-30 RX ORDER — CYCLOBENZAPRINE HCL 10 MG
10 TABLET ORAL 3 TIMES DAILY PRN
Qty: 90 TABLET | Refills: 0 | Status: SHIPPED | OUTPATIENT
Start: 2021-11-30 | End: 2021-12-29 | Stop reason: SDUPTHER

## 2021-12-02 RX ORDER — OXYCODONE HYDROCHLORIDE 5 MG/1
5 TABLET ORAL EVERY 6 HOURS PRN
Qty: 40 TABLET | Refills: 0 | Status: SHIPPED | OUTPATIENT
Start: 2021-12-02 | End: 2021-12-12

## 2021-12-02 NOTE — TELEPHONE ENCOUNTER
From: Monique Kelsey  Sent: 12/2/2021 1:53 PM EST  To: [facility]  Subject: Refill     Smiley,     Will need that refill on Oxycodone. I will most likely be out Saturday night or Sunday morning. Hopefully this will be the last refill request as I am slowly backing off but its been very hard this time with the pain level.     Lulú Noriega

## 2021-12-03 ENCOUNTER — HOSPITAL ENCOUNTER (OUTPATIENT)
Dept: PHYSICAL THERAPY | Age: 61
Setting detail: THERAPIES SERIES
Discharge: HOME | End: 2021-12-03
Payer: COMMERCIAL

## 2021-12-03 PROCEDURE — 97140 MANUAL THERAPY 1/> REGIONS: CPT

## 2021-12-03 PROCEDURE — 97110 THERAPEUTIC EXERCISES: CPT

## 2021-12-03 NOTE — FLOWSHEET NOTE
Notes/CUES   Hip Flexor and quad S table-side (sits on edge of hi-low mat) 20-30'' x 5    SAQ    1# 3\" 2x10 1#   Heel Prop  HEP   Ankle Pumps  HEP   Heel Slides 10'' x 10, w/ yellow SB and strap  5x10\" towel on table  HEP  SB more comfortable   LAQ  Seated Marches 2x10 1# HEP   Supine Calf S  Standing Incline S 30'' x 3    SL hip abd  Held due to groin pain   SL Clam Shells  NV   TKE LATOYA 3'' x 20 45# UE support w/ walker   Supine bridge w/ incr flex 5\"X10         Standing hip abd, ext   Standing HSC x10 ea R/L  x10 R only UE supp, posture cues   recumbent bike 5' Rocking back and forth, 10 seat height; able to revolve after ~1 min heavy leaning   Pt ed: HEP--increase stretching throughout the day, CP usage at home/heat ok to quad/hip,            Manual Intervention (71441) 12'    STM to R calf, , quad 8'    Pat mobs sup/inf  Knee flex mobs gr ll-lll w/ towel  PROM knee flex  Oscillations for pain x1'  3x10\"  10\"x5  x1'                        NMR re-education (75399)  CUES NEEDED   Gait with 'x2 Cue for quad activ in stance, inc' d hip/knee flex in swing                                                 Therapeutic Exercise and NMR EXR  [x] (93322) Provided verbal/tactile cueing for activities related to strengthening, flexibility, endurance, ROM for improvements in LE, proximal hip, and core control with self care, mobility, lifting, ambulation.  [] (78854) Provided verbal/tactile cueing for activities related to improving balance, coordination, kinesthetic sense, posture, motor skill, proprioception  to assist with LE, proximal hip, and core control in self care, mobility, lifting, ambulation and eccentric single leg control.      NMR and Therapeutic Activities:    [x] (54433 or 81082) Provided verbal/tactile cueing for activities related to improving balance, coordination, kinesthetic sense, posture, motor skill, proprioception and motor activation to allow for proper function of core, proximal hip and LE with self care and ADLs  [] (43408) Gait Re-education- Provided training and instruction to the patient for proper LE, core and proximal hip recruitment and positioning and eccentric body weight control with ambulation re-education including up and down stairs     Home Exercise Program:    [x] (20985) Reviewed/Progressed HEP activities related to strengthening, flexibility, endurance, ROM of core, proximal hip and LE for functional self-care, mobility, lifting and ambulation/stair navigation   [] (82409)Reviewed/Progressed HEP activities related to improving balance, coordination, kinesthetic sense, posture, motor skill, proprioception of core, proximal hip and LE for self care, mobility, lifting, and ambulation/stair navigation      Manual Treatments:  PROM / STM / Oscillations-Mobs:  G-I, II, III, IV (PA's, Inf., Post.)  [x] (21674) Provided manual therapy to mobilize LE, proximal hip and/or LS spine soft tissue/joints for the purpose of modulating pain, promoting relaxation,  increasing ROM, reducing/eliminating soft tissue swelling/inflammation/restriction, improving soft tissue extensibility and allowing for proper ROM for normal function with self care, mobility, lifting and ambulation. Modalities:     [] GAME READY (VASO)- for significant edema, swelling, pain control. CP x 15 min, bilat knees, R hip       Charges  Timed Code Treatment Minutes: 55   Total Treatment Minutes: 75, CP,bike     [] EVAL (LOW) 85973   [] EVAL (MOD) 20378  [] EVAL (HIGH) 94886   [] RE-EVAL     [x] SY(24264) x3     [] IONTO  [] NMR (86859) x     [] VASO  [x] Manual (80047) x  1    [] Other:  [] TA x      [] Mech Traction (49881)  [] ES(attended) (91659)      [] ES (un) (52108):       GOALS:   Patient stated goal: Pt would like to get back to functioning without pain. [] Progressing: [] Met: [] Not Met: [] Adjusted    Therapist goals for Patient:   Short Term Goals: To be achieved in: 2 weeks  1.  Independent in HEP and progression per patient tolerance, in order to prevent re-injury. [] Progressing: [x] Met: [] Not Met: [] Adjusted  2. Patient will have a decrease in pain to facilitate improvement in movement, function, and ADLs as indicated by Functional Deficits. [] Progressing: [] Met: [x] Not Met: [] Adjusted    Long Term Goals: To be achieved in: 12 weeks  1. Disability index score of 50% or less for the LEFS to assist with reaching prior level of function. [] Progressing: [] Met: [] Not Met: [] Adjusted  2. Patient will demonstrate increased AROM with knee flexion to at least 110 deg and lacking 2 degrees of knee ext in order to be able to navigate his home, get into and out of his car and don/doff clothing without pain or restriction. [] Progressing: [] Met: [] Not Met: [] Adjusted  3. Patient will demonstrate an increase in Strength with hip abd, hip flexion and knee ext to at least 4+/5 in order to be able to ascend/descend stairs and walk for longer periods of time without pain or restriction. [] Progressing: [] Met: [] Not Met: [] Adjusted  4. Patient will be able to perform sit to stand from chair height without airex indep in order to be able to perform functional transfers and use the restroom indep without pain or restriction. Progression Towards Functional goals:  [] Patient is progressing as expected towards functional goals listed. [x] Progression is slowed due to complexities listed. [] Progression has been slowed due to co-morbidities. [] Plan just implemented, too soon to assess goals progression  [] Other:     Overall Progression Towards Functional goals/ Treatment Progress Update:  [] Patient is progressing as expected towards functional goals listed. [x] Progression is slowed due to complexities/Impairments listed. [] Progression has been slowed due to co-morbidities.   [] Plan just implemented, too soon to assess goals progression <30days   [] Goals require adjustment due to lack of progress  [] Patient is not progressing as expected and requires additional follow up with physician  [] Other    Prognosis for POC: [x] Good [] Fair  [] Poor      Patient requires continued skilled intervention: [x] Yes  [] No    Treatment/Activity Tolerance:  [x] Patient able to complete treatment  [] Patient limited by fatigue  [] Patient limited by pain    [] Patient limited by other medical complications  [] Other:     ASSESSMENT: Pt able to make knee flex gains this date. He entered clinic with SPC, quad/glute activ and balance in R stance still isn't good so rec still use RW but can practice short distances at home with [facility]. Return to Play: (if applicable)   []  Stage 1: Intro to Strength   []  Stage 2: Return to Run and Strength   []  Stage 3: Return to Jump and Strength   []  Stage 4: Dynamic Strength and Agility   []  Stage 5: Sport Specific Training     []  Ready to Return to Play, Meets All Above Stages   []  Not Ready for Return to Sports   Comments:                         PLAN: Pt would like to try to finish PT in this calendar year due to high deductible reset in Jan 2022. [x] Continue per plan of care [] Alter current plan (see comments above)  [] Plan of care initiated [] Hold pending MD visit [] Discharge      Electronically signed by:  Desmond Hazel PT, DPT  Note: If patient does not return for scheduled/ recommended follow up visits, this note will serve as a discharge from care along with most recent update on progress. Access Code: [email or web address]  URL: [email or web address]  Date: 11/15/2021  Prepared by: Shital Archer    Exercises  Supine Quad Set - 1 x daily - 7 x weekly - 2 sets - 10 reps - 5 sec hold  Supine Ankle Pumps - 1 x daily - 7 x weekly - 2 sets - 10 reps  Supine Knee Extension Stretch on Towel Roll - 1 x daily - 7 x weekly - 2 sets - 1 min each hold  Seated Knee Extension AROM - 1 x daily - 7 x weekly - 2 sets - 10 reps  Supine Heel Slide with Strap - 1 x daily - 7 x weekly - 1 sets - 10 reps - 5 sec hold

## 2021-12-06 ENCOUNTER — HOSPITAL ENCOUNTER (OUTPATIENT)
Dept: PHYSICAL THERAPY | Age: 61
Setting detail: THERAPIES SERIES
Discharge: HOME | End: 2021-12-06
Payer: COMMERCIAL

## 2021-12-06 PROCEDURE — 97112 NEUROMUSCULAR REEDUCATION: CPT | Performed by: PHYSICAL THERAPIST

## 2021-12-06 PROCEDURE — 97110 THERAPEUTIC EXERCISES: CPT | Performed by: PHYSICAL THERAPIST

## 2021-12-06 PROCEDURE — 97140 MANUAL THERAPY 1/> REGIONS: CPT | Performed by: PHYSICAL THERAPIST

## 2021-12-06 NOTE — FLOWSHEET NOTE
[facility],  [address] [place]  Phone: [phone]  Fax [phone]    Physical Therapy Treatment Note/ Progress Report:           Date:  2021    Patient Name:  Joy Hines  \"Bo\"  :  1960  MRN: [id number]  Restrictions/Precautions:    Medical/Treatment Diagnosis Information:  Diagnosis: X86.736 (ICD-10-CM) - Status post right knee replacement  Treatment Diagnosis: M25.561 R knee pain, R26.2 Difficulty walking  Insurance/Certification information:  PT Insurance Information: Med mutual BMN, no auth 90/10 co-ins  Physician Information:  Referring Practitioner: Matt Smith MD  Has the plan of care been signed (Y/N):        [x]  Yes  []  No     Date of Patient follow up with Physician: 21      Is this a Progress Report:     []  Yes  [x]  No        If Yes:  Date Range for reporting period:  Beginnin/15/21  Ending    Progress report will be due (10 Rx or 30 days whichever is less):        Recertification will be due (POC Duration  / 90 days whichever is less): 22       Visit # Insurance Allowable Auth Required   In-person 7 BMN []  Yes []  No    Telehealth   []  Yes []  No    Total          Functional Scale: LEFS:  83% disability   Date assessed: 11/15/21      Therapy Diagnosis/Practice Pattern: H      Number of Comorbidities:  []0     [x]1-2    []3+    Latex Allergy:  [x]NO      []YES  Preferred Language for Healthcare:   [x]English       []other:      Pain level:    current pain: 6/10 currently, 3-8/10 best to worst    SUBJECTIVE:  Pt reports he has been using his crutches for community ambulation. He continues to note ongoing pain in his R>L knee and R hip.     OBJECTIVE:    Observation:    Test measurements: ; PROM 0-100 foot on SB    RESTRICTIONS/PRECAUTIONS: DM, HTN, L TKR in 2021    Exercises/Interventions:     Therapeutic Ex (39407) Sets/sec/reps Notes/CUES   Hip Flexor and quad S table-side (sits on edge of hi-low mat) 20-30'' x 5 W/ PT quad STW w/ roller stick   SAQ     1#   Heel Prop  HEP   Ankle Pumps  HEP   Heel Slides 10'' x 10, w/ yellow SB and strap   HEP  SB more comfortable   LAQ  Seated Marches 2x10 1# HEP   Supine Calf S  Standing Incline S 30'' x 3    SL hip abd  Held due to groin pain   SL Clam Shells  NV   LATOYA: TKE  Hip abd LATOYA 3'' x 20  45# x15 R/30# x10 L 60# UE support w/ walker  Cue to decr HER B   Supine bridge w/ incr flex 5\"2X8         Standing hip abd, ext   Standing HSC  UE supp, posture cues   recumbent bike 5' Rocking back and forth, 10 seat height; able to revolve after ~1 min heavy leaning   Pt ed: HEP--increase stretching throughout the day, CP usage at home/heat ok to quad/hip,            Manual Intervention (09105) 15'    STM to R calf, , quad 8'    Pat mobs sup/inf  Knee flex mobs gr ll-lll w/ towel  PROM knee flex  Oscillations for pain x1'  5x10\"  10\"x5  x1'   Pain along shin, towel used                       NMR re-education (68478)  CUES NEEDED   Gait with SPC  Cue for quad activ in stance, inc' d hip/knee flex in swing   FSU  LSU 4\" x15  4\"x15 B UE support, quad/glute cues  B UE support, quad/glute cues   Mini squats x15 Cue even WBing w/ fatigue, B UE support                                       Therapeutic Exercise and NMR EXR  [x] (99315) Provided verbal/tactile cueing for activities related to strengthening, flexibility, endurance, ROM for improvements in LE, proximal hip, and core control with self care, mobility, lifting, ambulation. [x] (04814) Provided verbal/tactile cueing for activities related to improving balance, coordination, kinesthetic sense, posture, motor skill, proprioception  to assist with LE, proximal hip, and core control in self care, mobility, lifting, ambulation and eccentric single leg control.      NMR and Therapeutic Activities:    [x] (49495 or 42340) Provided verbal/tactile cueing for activities related to improving balance, coordination, kinesthetic sense, posture, motor skill, proprioception and motor activation to allow for proper function of core, proximal hip and LE with self care and ADLs  [] (27172) Gait Re-education- Provided training and instruction to the patient for proper LE, core and proximal hip recruitment and positioning and eccentric body weight control with ambulation re-education including up and down stairs     Home Exercise Program:    [x] (68899) Reviewed/Progressed HEP activities related to strengthening, flexibility, endurance, ROM of core, proximal hip and LE for functional self-care, mobility, lifting and ambulation/stair navigation   [] (19567)Reviewed/Progressed HEP activities related to improving balance, coordination, kinesthetic sense, posture, motor skill, proprioception of core, proximal hip and LE for self care, mobility, lifting, and ambulation/stair navigation      Manual Treatments:  PROM / STM / Oscillations-Mobs:  G-I, II, III, IV (PA's, Inf., Post.)  [x] (62298) Provided manual therapy to mobilize LE, proximal hip and/or LS spine soft tissue/joints for the purpose of modulating pain, promoting relaxation,  increasing ROM, reducing/eliminating soft tissue swelling/inflammation/restriction, improving soft tissue extensibility and allowing for proper ROM for normal function with self care, mobility, lifting and ambulation. Modalities:     [] GAME READY (VASO)- for significant edema, swelling, pain control.    CP x 15 min, bilat knees, R hip       Charges  Timed Code Treatment Minutes: 45   Total Treatment Minutes: 65, CP,bike     [] EVAL (LOW) 17521   [] EVAL (MOD) 30835  [] EVAL (HIGH) 86015   [] RE-EVAL     [x] AI(82544) x1     [] IONTO  [x] NMR (28291) x 1    [] VASO  [x] Manual (05863) x  1    [] Other:  [] TA x      [] Mech Traction (51298)  [] ES(attended) (19603)      [] ES (un) (49573):       GOALS:   Patient stated goal: Pt would like to get back to functioning without pain.   [] Progressing: [] Met: [] Not Met: [] Adjusted    Therapist goals for Patient:   Short Term Goals: To be achieved in: 2 weeks  1. Independent in HEP and progression per patient tolerance, in order to prevent re-injury. [] Progressing: [x] Met: [] Not Met: [] Adjusted  2. Patient will have a decrease in pain to facilitate improvement in movement, function, and ADLs as indicated by Functional Deficits. [] Progressing: [] Met: [x] Not Met: [] Adjusted    Long Term Goals: To be achieved in: 12 weeks  1. Disability index score of 50% or less for the LEFS to assist with reaching prior level of function. [] Progressing: [] Met: [] Not Met: [] Adjusted  2. Patient will demonstrate increased AROM with knee flexion to at least 110 deg and lacking 2 degrees of knee ext in order to be able to navigate his home, get into and out of his car and don/doff clothing without pain or restriction. [] Progressing: [] Met: [] Not Met: [] Adjusted  3. Patient will demonstrate an increase in Strength with hip abd, hip flexion and knee ext to at least 4+/5 in order to be able to ascend/descend stairs and walk for longer periods of time without pain or restriction. [] Progressing: [] Met: [] Not Met: [] Adjusted  4. Patient will be able to perform sit to stand from chair height without airex indep in order to be able to perform functional transfers and use the restroom indep without pain or restriction. Progression Towards Functional goals:  [] Patient is progressing as expected towards functional goals listed. [x] Progression is slowed due to complexities listed. [] Progression has been slowed due to co-morbidities. [] Plan just implemented, too soon to assess goals progression  [] Other:     Overall Progression Towards Functional goals/ Treatment Progress Update:  [] Patient is progressing as expected towards functional goals listed. [x] Progression is slowed due to complexities/Impairments listed.   [] Progression has been slowed due to co-morbidities. [] Plan just implemented, too soon to assess goals progression <30days   [] Goals require adjustment due to lack of progress  [] Patient is not progressing as expected and requires additional follow up with physician  [] Other    Prognosis for POC: [x] Good [] Fair  [] Poor      Patient requires continued skilled intervention: [x] Yes  [] No    Treatment/Activity Tolerance:  [x] Patient able to complete treatment  [] Patient limited by fatigue  [] Patient limited by pain    [] Patient limited by other medical complications  [] Other:     ASSESSMENT: Pt able to achieve 100* w/ PROM with increased pain reported. Progression of standing functional strength this visit with quad and hip fatigue noted and some ant knee soreness following LSU. Able to get full revolutions both fwd and bwd on the bike w/ trunk lean still within the first minute. Return to Play: (if applicable)   []  Stage 1: Intro to Strength   []  Stage 2: Return to Run and Strength   []  Stage 3: Return to Jump and Strength   []  Stage 4: Dynamic Strength and Agility   []  Stage 5: Sport Specific Training     []  Ready to Return to Play, Meets All Above Stages   []  Not Ready for Return to Sports   Comments:                         PLAN: Pt would like to try to finish PT in this calendar year due to high deductible reset in Jan 2022. [x] Continue per plan of care [] Alter current plan (see comments above)  [] Plan of care initiated [] Hold pending MD visit [] Discharge      Electronically signed by:  Pat Lee PT,   Note: If patient does not return for scheduled/ recommended follow up visits, this note will serve as a discharge from care along with most recent update on progress. Access Code: [email or web address]  URL: [email or web address]  Date: 11/15/2021  Prepared by: Declan Astorga    Exercises  Supine Quad Set - 1 x daily - 7 x weekly - 2 sets - 10 reps - 5 sec hold  Supine Ankle Pumps - 1 x daily - 7 x weekly - 2 sets - 10 reps  Supine Knee Extension Stretch on Towel Roll - 1 x daily - 7 x weekly - 2 sets - 1 min each hold  Seated Knee Extension AROM - 1 x daily - 7 x weekly - 2 sets - 10 reps  Supine Heel Slide with Strap - 1 x daily - 7 x weekly - 1 sets - 10 reps - 5 sec hold

## 2021-12-08 ENCOUNTER — TELEPHONE (OUTPATIENT)
Dept: ORTHOPEDIC SURGERY | Age: 61
End: 2021-12-08

## 2021-12-08 ENCOUNTER — OFFICE VISIT (OUTPATIENT)
Dept: BARIATRICS/WEIGHT MGMT | Age: 61
End: 2021-12-08
Payer: COMMERCIAL

## 2021-12-08 VITALS — WEIGHT: 299.4 LBS | BODY MASS INDEX: 45.38 KG/M2 | HEIGHT: 68 IN

## 2021-12-08 DIAGNOSIS — G47.33 OSA TREATED WITH BIPAP: ICD-10-CM

## 2021-12-08 DIAGNOSIS — Z98.84 STATUS POST LAPAROSCOPIC SLEEVE GASTRECTOMY: ICD-10-CM

## 2021-12-08 DIAGNOSIS — E66.01 MORBID OBESITY WITH BMI OF 45.0-49.9, ADULT (HCC): Primary | ICD-10-CM

## 2021-12-08 PROCEDURE — 99214 OFFICE O/P EST MOD 30 MIN: CPT | Performed by: SURGERY

## 2021-12-08 NOTE — PROGRESS NOTES
Dietary Assessment Note      Vitals:   Vitals:    21 0731   Weight: 299 lb 6.4 oz (135.8 kg)   Height: 5' 8\" (1.727 m)    Patient lost 7 lbs over 4 months    Total Weight Loss: 105.6 lbs    Labs reviewed: labs are reviewed, up to date and normal, no lab studies available for review at time of visit    Protein intake: Patient not tracking sometimes adds unflavored protein 4 times per week with low salt boullion. Fluid intake: 48-64 oz/day     Multivitamin/mineral intake: yes 4 fusion    Calcium intake: no    Other: no    Exercise: yes PT 2 x per week for s/p second knee surgery was 6 weeks ago     Nutrition Assessment: pt cooks a big batch of chili and eats leftovers, focusing on cottage cheese. He is getting protein every time he eats a meal 4 times per day and 2-3 snacks per day. He is eating a lot of canned soups    Breakfast: 2 HB eggs & string cheese, or a leftover pork chop or turkey chili    Snack: pickles     Lunch: turkey & bean chili with shredded cheese    Snack:  3 oz pork chop    Dinner: bean soup with ham and onions    Snack:   sf popsicle       Amount able to eat per sittin-6 oz    Following /30/30 rule: yes but still struggles with slowing pace at mealtimes - usually takes 10-15 minutes    Food Intolerances/issues: none    Client Concerns: pt concern slow weight loss    Goals: advance physical activity as tolerated, ensure 60g protein per day and focus on protein and produce.      Lizz Sal, RD, LD

## 2021-12-08 NOTE — PROGRESS NOTES
[facility]) Physicians   General & Laparoscopic Surgery  [facility] [employer]       HPI:     Bolling Dancer is a very pleasant 63 y.o. obese male , Body mass index is 45.52 kg/m². Cloteal Quezdaa And multiple medical problems who is presenting for bariatric follow up care. Bolling Dancer is s/p laparoscopic sleeve gastrectomy by me   Comes today to the clinic without any complaints. Patient denies any nausea, vomiting, fevers, chills, shortness of breath, chest pain, constipation or urinary symptoms. Denies any heartburn nor dysphagia. Patient is feeling very well, and is very active. Patient is very pleased with the weight loss and resolution of co-morbid conditions. Past Medical History:   Diagnosis Date    Arthritis     Bilateral venous leg ulcers 09/2020    Cellulitis of lower extremity 09/25/2019    Diabetes mellitus ([address]     GERD (gastroesophageal reflux disease)     Hyperlipidemia     Hypertension     Impaired fasting glucose 05/2013    MRSA carrier     MRSA nasal colonization 07/14/2021    Obesity     TOM treated with BiPAP 11/30/2020    Preoperative clearance 10/26/2020    Restless leg syndrome     Screening PSA (prostate specific antigen) 12/30/2015;5/1/17    Nml:0.53(12/30/2015);5/1/17=nml.     Seasonal allergies     Sleep apnea     uses CPAP    Stasis dermatitis of both legs 08/28/2020    Tobacco use     Tubular adenoma of colon 09/14/2017     Past Surgical History:   Procedure Laterality Date    CIRCUMCISION      COLONOSCOPY  09/14/2017    Colonoscopy with polypectomy (cold biopsy):Dr. Walker:next in 3yrs=9/14/2020    KNEE ARTHROSCOPY Left 8/3/2020    VIDEO ARTHROSCOPY LEFT KNEE, PARTIAL MEDIAL MENISCECTOMY, CHONDROPLASTY, SYNOVIAL BIOPSY -TOM=4- performed by Kwesi Wang MD at [address] [place] [address]      PAIN MANAGEMENT PROCEDURE Left 12/9/2020    COOLIEF RADIOFREQUENCY ABLATION - LEFT performed by Shannon Kelly MD at [facility] ENDOSCOPY    SLEEVE GASTRECTOMY N/A 3/2/2021    ROBOTIC ASSISTED LAPAROSCOPIC SLEEVE GASTRECTOMY performed by Janna Teran DO at [facility] Left 2021    LEFT TOTAL KNEE REPLACEMENT ROBOTIC ASSISTED performed by Lamar Gross MD at [facility] Right 10/26/2021    RIGHT TOTAL KNEE REPLACEMENT ROBOTIC ASSISTED performed by Lamar Gross MD at [address]  2011    UPPER GASTROINTESTINAL ENDOSCOPY N/A 2021    EGD BIOPSY performed by Janna Teran DO at [address] EXTRACTION       Family History   Problem Relation Age of Onset    High Blood Pressure Mother     Heart Disease Mother         MI    AT 66    Diabetes Brother      Social History     Tobacco Use    Smoking status: Former Smoker     Packs/day: 2.00     Years: 25.00     Pack years: 50.00     Types: Cigarettes     Quit date: 2006     Years since quitting: 15.6    Smokeless tobacco: Never Used   Substance Use Topics    Alcohol use: Not Currently     Comment: ocas     I counseled the patient on the importance of not smoking and risks of ETOH. Allergies   Allergen Reactions    Celebrex [Celecoxib] Other (See Comments)     Unable to take due to bariatric patient    Mobic [Meloxicam]      Unable to take due to bariatric patient    Nsaids      Bariatric patient    Bactrim [Sulfamethoxazole-Trimethoprim] Rash     POSSIBLE fixed drug eruption on his cheeks, but diagnosis is not certain (only happened once). Vitals:    21 0731   Weight: 299 lb 6.4 oz (135.8 kg)   Height: 5' 8\" (1.727 m)       Body mass index is 45.52 kg/m². Current Outpatient Medications:     oxyCODONE (ROXICODONE) 5 MG immediate release tablet, Take 1 tablet by mouth every 6 hours as needed for Pain for up to 10 days.  ITake lowest dose possible to manage pain, Disp: 40 tablet, Rfl: 0    furosemide (LASIX) 40 MG tablet, Take 1 tablet by mouth 2 times daily, Disp: 60 tablet, Rfl: 3    cyclobenzaprine (FLEXERIL) 10 MG tablet, Take 1 tablet by mouth 3 times daily as needed for Muscle spasms, Disp: 90 tablet, Rfl: 0    atorvastatin (LIPITOR) 40 MG tablet, Take 1 tablet by mouth daily, Disp: 90 tablet, Rfl: 3    rOPINIRole (REQUIP) 3 MG tablet, Take 1 tablet by mouth 3 times daily, Disp: 270 tablet, Rfl: 0    pregabalin (LYRICA) 75 MG capsule, Take 1 capsule by mouth 3 times daily for 90 days. , Disp: 270 capsule, Rfl: 0    metFORMIN (GLUCOPHAGE) 500 MG tablet, Take 1 tablet by mouth 2 times daily (with meals), Disp: 180 tablet, Rfl: 1    potassium chloride (KLOR-CON M) 20 MEQ TBCR extended release tablet, Take 1 tablet by mouth 2 times daily, Disp: 120 tablet, Rfl: 2    mupirocin (BACTROBAN) 2 % ointment, Apply pea size amount to each nostril 2 times daily. , Disp: 22 g, Rfl: 0    hydroCHLOROthiazide (HYDRODIURIL) 25 MG tablet, Hold until Monday, Disp: 90 tablet, Rfl: 0    loratadine (CLARITIN) 10 MG tablet, Take 1 tablet by mouth daily, Disp: 90 tablet, Rfl: 0    lisinopril (PRINIVIL;ZESTRIL) 10 MG tablet, TAKE 1 TABLET BY MOUTH ONCE A DAY, Disp: 90 tablet, Rfl: 0    omeprazole (PRILOSEC) 20 MG delayed release capsule, Take 1 capsule by mouth Daily, Disp: 30 capsule, Rfl: 3    Prodigy Twist Top Lancets 28G MISC, TEST TWO TIMES A DAY, Disp: 100 each, Rfl: 0    blood glucose test strips (PRODIGY NO CODING BLOOD GLUC) strip, TEST TWO TIMES A DAY, Disp: 100 each, Rfl: 0    ondansetron (ZOFRAN-ODT) 4 MG disintegrating tablet, Take 1 tablet by mouth 3 times daily as needed for Nausea or Vomiting, Disp: 21 tablet, Rfl: 0    Multiple Vitamin (MULTIVITAMIN, BARIATRIC FUSION COMPLETE, CHEW TAB), Take 4 tablets by mouth daily, Disp: , Rfl:     glucose 5 g chewable tablet, Take 3 tablets by mouth as needed for Low blood sugar, Disp: 30 tablet, Rfl: 0    blood glucose monitor kit and supplies, Check BG twice daily, Disp: 1 kit, Rfl: 0      Review of Systems - History obtained from the patient  General ROS: negative  Psychological ROS: negative  Hematological and Lymphatic ROS: negative  Endocrine ROS: negative  Respiratory ROS: negative  Cardiovascular ROS: negative  Gastrointestinal ROS:negative  Genito-Urinary ROS: negative  Musculoskeletal ROS: negative   Skin ROS: negative    Physical Exam   Vitals Reviewed   Constitutional: Patient is oriented to person, place, and time. Patient appears well-developed and well-nourished. Patient is active and cooperative. Non-toxic appearance. No distress. Neck: Trachea normal and normal range of motion. No JVD present. Pulmonary/Chest: Effort normal. No accessory muscle usage or stridor. No apnea. No respiratory distress. Cardiovascular: Normal rate and no JVD. Abdominal: Normal appearance. Patient exhibits no distension. Abdomen is soft, obese, non tender. Musculoskeletal: Normal range of motion. Patient exhibits no edema. Neurological: Patient is alert and oriented to person, place, and time. Patient has normal strength. GCS eye subscore is 4. GCS verbal subscore is 5. GCS motor subscore is 6. Skin: Skin is warm and dry. No abrasion and no rash noted. Patient is not diaphoretic. No cyanosis or erythema. Psychiatric: Patient has a normal mood and affect. Speech is normal and behavior is normal. Cognition and memory are normal.         A/P     Whit Vaughn is 63 y.o. male , now with Body mass index is 45.52 kg/m². s/p Sleeve gastrectomy, has lost 7 lbs since last visit, total of 106 lbs weight loss. The patient underwent dietary counseling with registered dietician. I have reviewed, discussed and agree with the dietary plan. Patient is trying hard to keep good dietary and behavior modifications. Patient is monitoring portion sizes, food choices and liquid calories. Patient is trying to exercise regularly. Patient pleased with the surgery outcomes.   We discussed how his weight affects his overall health including:  Cayla Torres was seen today for bariatrics post op follow up. Diagnoses and all orders for this visit:    Morbid obesity with BMI of 45.0-49.9, adult ([address]    TOM treated with BiPAP    Status post laparoscopic sleeve gastrectomy       and importance of weight loss to alleviate those co morbid conditions. I encouraged the patient to continue exercise and keeping healthy eating habits. Also counseled the patient extensively on post surgery care. Total encounter time:  30 minutes including any number of the following: review of labs, imaging, provider notes, outside hospital records; performing examination/evaluation; counseling patient and family; ordering medications/tests; placing referrals and communication with referring physicians; coordination of care, and documentation in the EHR. RTC in 3 months  Diet and Exercise      Patient advised that its their responsibility to follow up for studies and/or labs ordered today.

## 2021-12-10 ENCOUNTER — HOSPITAL ENCOUNTER (OUTPATIENT)
Dept: PHYSICAL THERAPY | Age: 61
Setting detail: THERAPIES SERIES
Discharge: HOME | End: 2021-12-10
Payer: COMMERCIAL

## 2021-12-10 PROCEDURE — 97110 THERAPEUTIC EXERCISES: CPT

## 2021-12-10 PROCEDURE — 97140 MANUAL THERAPY 1/> REGIONS: CPT

## 2021-12-10 NOTE — FLOWSHEET NOTE
2021    Exercises/Interventions:     Therapeutic Ex (51120) Sets/sec/reps Notes/CUES   Hip Flexor and quad S table-side (sits on edge of hi-low mat) 20-30'' x 5 W/ PT quad STW w/ roller stick   SAQ     1#   Heel Prop  HEP   Ankle Pumps  HEP   Heel Slides 10'' x 10, w/ yellow SB and strap   HEP  SB more comfortable   LAQ  Seated Marches  1# HEP   Supine Calf S  Standing Incline S 30'' x 3    SL hip abd  Held due to groin pain   SL Clam Shells     LATOYA: TKE  Hip abd LATOYA 3'' x 20  45# x15 R/30# x15 L 60#   Cue to decr HER B   Supine bridge w/ incr flex 5\" x10         Standing hip abd, ext   Standing HSC  UE supp, posture cues   LP  Calf raises 3x10  2x10 60#   recumbent bike 5' Rocking back and forth, 10 seat height; able to revolve after ~1 min heavy leaning   Pt ed: HEP--increase stretching throughout the day, CP usage at home/heat ok to quad/hip,            Manual Intervention (49130) 15'    STM to R calf, , quad 8'    Pat mobs sup/inf  Knee flex mobs gr ll-lll w/ towel  PROM knee flex  Oscillations for pain x1'  5x10\"  10\"x5  x1'   Pain along shin, towel used                       NMR re-education (77031)  CUES NEEDED   Gait with SPC  Cue for quad activ in stance, inc' d hip/knee flex in swing   FSU  LSU B UE support, quad/glute cues  B UE support, quad/glute cues   Mini squats Cue even WBing w/ fatigue, B UE support                                       Therapeutic Exercise and NMR EXR  [x] (65709) Provided verbal/tactile cueing for activities related to strengthening, flexibility, endurance, ROM for improvements in LE, proximal hip, and core control with self care, mobility, lifting, ambulation. [x] (04822) Provided verbal/tactile cueing for activities related to improving balance, coordination, kinesthetic sense, posture, motor skill, proprioception  to assist with LE, proximal hip, and core control in self care, mobility, lifting, ambulation and eccentric single leg control.      NMR and Therapeutic Activities: [x] (35011 or 41205) Provided verbal/tactile cueing for activities related to improving balance, coordination, kinesthetic sense, posture, motor skill, proprioception and motor activation to allow for proper function of core, proximal hip and LE with self care and ADLs  [] (39099) Gait Re-education- Provided training and instruction to the patient for proper LE, core and proximal hip recruitment and positioning and eccentric body weight control with ambulation re-education including up and down stairs     Home Exercise Program:    [x] (71846) Reviewed/Progressed HEP activities related to strengthening, flexibility, endurance, ROM of core, proximal hip and LE for functional self-care, mobility, lifting and ambulation/stair navigation   [] (88252)Reviewed/Progressed HEP activities related to improving balance, coordination, kinesthetic sense, posture, motor skill, proprioception of core, proximal hip and LE for self care, mobility, lifting, and ambulation/stair navigation      Manual Treatments:  PROM / STM / Oscillations-Mobs:  G-I, II, III, IV (PA's, Inf., Post.)  [x] (94328) Provided manual therapy to mobilize LE, proximal hip and/or LS spine soft tissue/joints for the purpose of modulating pain, promoting relaxation,  increasing ROM, reducing/eliminating soft tissue swelling/inflammation/restriction, improving soft tissue extensibility and allowing for proper ROM for normal function with self care, mobility, lifting and ambulation. Modalities:     [] GAME READY (VASO)- for significant edema, swelling, pain control.    CP x 15 min, bilat knees, R hip       Charges  Timed Code Treatment Minutes: 45   Total Treatment Minutes: 65, CP,bike     [] EVAL (LOW) 38079   [] EVAL (MOD) 32643  [] EVAL (HIGH) 16260   [] RE-EVAL     [x] DV(75830) x2     [] IONTO  [] NMR (91432) x 1    [] VASO  [x] Manual (90509) x  1    [] Other:  [] TA x      [] Mech Traction (67174)  [] ES(attended) (36860)      [] ES (un) (20161): listed. [x] Progression is slowed due to complexities/Impairments listed. [] Progression has been slowed due to co-morbidities. [] Plan just implemented, too soon to assess goals progression <30days   [] Goals require adjustment due to lack of progress  [] Patient is not progressing as expected and requires additional follow up with physician  [] Other    Prognosis for POC: [x] Good [] Fair  [] Poor      Patient requires continued skilled intervention: [x] Yes  [] No    Treatment/Activity Tolerance:  [x] Patient able to complete treatment  [] Patient limited by fatigue  [] Patient limited by pain    [] Patient limited by other medical complications  [] Other:     ASSESSMENT: Pt having more R hip pain this date, so held step-ups, squats but no inc'd pain with leg press. He is still stiff into flexion but pain limits motion more than actual joint stiffness. Return to Play: (if applicable)   []  Stage 1: Intro to Strength   []  Stage 2: Return to Run and Strength   []  Stage 3: Return to Jump and Strength   []  Stage 4: Dynamic Strength and Agility   []  Stage 5: Sport Specific Training     []  Ready to Return to Play, Meets All Above Stages   []  Not Ready for Return to Sports   Comments:                         PLAN: Pt would like to try to finish PT in this calendar year due to high deductible reset in Jan 2022. [x] Continue per plan of care [] Alter current plan (see comments above)  [] Plan of care initiated [] Hold pending MD visit [] Discharge      Electronically signed by:  Kushal Lemus, PT, DPT  Note: If patient does not return for scheduled/ recommended follow up visits, this note will serve as a discharge from care along with most recent update on progress. Access Code: [email or web address]  URL: [email or web address]  Date: 11/15/2021  Prepared by: Santos Astorga    Exercises  Supine Quad Set - 1 x daily - 7 x weekly - 2 sets - 10 reps - 5 sec hold  Supine Ankle Pumps - 1 x daily - 7 x

## 2021-12-13 ENCOUNTER — APPOINTMENT (OUTPATIENT)
Dept: PHYSICAL THERAPY | Age: 61
End: 2021-12-13
Payer: COMMERCIAL

## 2021-12-14 ENCOUNTER — HOSPITAL ENCOUNTER (OUTPATIENT)
Dept: PHYSICAL THERAPY | Age: 61
Setting detail: THERAPIES SERIES
Discharge: HOME | End: 2021-12-14
Payer: COMMERCIAL

## 2021-12-14 PROCEDURE — 97140 MANUAL THERAPY 1/> REGIONS: CPT

## 2021-12-14 PROCEDURE — 97110 THERAPEUTIC EXERCISES: CPT

## 2021-12-14 PROCEDURE — 97112 NEUROMUSCULAR REEDUCATION: CPT

## 2021-12-14 NOTE — FLOWSHEET NOTE
[facility],  [address]  Phone: [phone]  Fax [phone]    Physical Therapy Treatment Note/ Progress Report:           Date:  2021    Patient Name:  Modesta Cisse  \"Bo\"  :  1960  MRN: [id number]  Restrictions/Precautions:    Medical/Treatment Diagnosis Information:  Diagnosis: G44.024 (ICD-10-CM) - Status post right knee replacement  Treatment Diagnosis: M25.561 R knee pain, R26.2 Difficulty walking  Insurance/Certification information:  PT Insurance Information: Med mutual BMN, no auth 90/10 co-ins  Physician Information:  Referring Practitioner: Antonina Shearer MD  Has the plan of care been signed (Y/N):        [x]  Yes  []  No     Date of Patient follow up with Physician: 21      Is this a Progress Report:     []  Yes  [x]  No        If Yes:  Date Range for reporting period:  Beginnin/15/21  Ending    Progress report will be due (10 Rx or 30 days whichever is less):        Recertification will be due (POC Duration  / 90 days whichever is less): 22       Visit # Insurance Allowable Auth Required   In-person 9-PN [place] BMN []  Yes []  No    Telehealth   []  Yes []  No    Total          Functional Scale: LEFS:  83% disability   Date assessed: 11/15/21      Therapy Diagnosis/Practice Pattern: H      Number of Comorbidities:  []0     [x]1-2    []3+    Latex Allergy:  [x]NO      []YES  Preferred Language for Healthcare:   [x]English       []other:      Pain level:    current pain: 4-5 /10    SUBJECTIVE:  Pt feels calf and quad are very tight today. sees MD Thursday and plans to ask about his R hip pain also.   OBJECTIVE:     Observation:    Test measurements: ; PROM 0-104 foot on table    RESTRICTIONS/PRECAUTIONS: DM, HTN, L TKR in 2021    Exercises/Interventions:     Therapeutic Ex () Sets/sec/reps Notes/CUES   Hip Flexor and quad S table-side (sits on edge of hi-low mat) 20-30'' x 5 W/ PT quad STW w/ roller stick   SAQ     1#   Heel Prop  HEP   Ankle Pumps  HEP   Heel Slides 10'' x 10, w/ yellow SB and strap   HEP  SB more comfortable   LAQ  Seated Marches  1# HEP   Supine Calf S  Standing Incline S 30'' x 3    SL hip abd  Held due to groin pain   SL Clam Shells     LATOYA: TKE  Hip abd, ext LATOYA 3'' x 20  x15 30# abd, 45# ext  60#   Cue to decr HER B   Supine bridge w/ incr flex 5\" x10         Standing HSC x10 R only UE supp, posture cues   LP  SL ecc  Calf raises 2x10  x10 R, L  2x10 90#  90# L,    recumbent bike 5' I exc after set-up 0 seat height; able to revolve after ~1 min heavy leaning   Pt ed: HEP--increase stretching throughout the day, CP usage at home/heat ok to quad/hip,            Manual Intervention (40946) 18'    STM to R calf, , quad  IASTM sweeps to quad above incision lat>med  8'  4'    Pat mobs sup/inf  Knee flex mobs gr ll-lll w/ towel  PROM knee flex  Oscillations for pain x1'  5x10\"  10\"x5  x1'   Pain along shin, towel used                       NMR re-education (91383)  CUES NEEDED   Gait with SPC  Cue for quad activ in stance, inc' d hip/knee flex in swing   FSU  LSU 6\" x15  4\"2x5B UE support, quad/glute cues  B UE support, quad/glute cues   Mini squats Cue even WBing w/ fatigue, B UE support                                       Therapeutic Exercise and NMR EXR  [x] (30993) Provided verbal/tactile cueing for activities related to strengthening, flexibility, endurance, ROM for improvements in LE, proximal hip, and core control with self care, mobility, lifting, ambulation. [x] (71908) Provided verbal/tactile cueing for activities related to improving balance, coordination, kinesthetic sense, posture, motor skill, proprioception  to assist with LE, proximal hip, and core control in self care, mobility, lifting, ambulation and eccentric single leg control.      NMR and Therapeutic Activities:    [x] (86721 or 75225) Provided verbal/tactile cueing for to functioning without pain. [] Progressing: [] Met: [] Not Met: [] Adjusted    Therapist goals for Patient:   Short Term Goals: To be achieved in: 2 weeks  1. Independent in HEP and progression per patient tolerance, in order to prevent re-injury. [] Progressing: [x] Met: [] Not Met: [] Adjusted  2. Patient will have a decrease in pain to facilitate improvement in movement, function, and ADLs as indicated by Functional Deficits. [] Progressing: [] Met: [x] Not Met: [] Adjusted    Long Term Goals: To be achieved in: 12 weeks  1. Disability index score of 50% or less for the LEFS to assist with reaching prior level of function. [] Progressing: [] Met: [] Not Met: [] Adjusted  2. Patient will demonstrate increased AROM with knee flexion to at least 110 deg and lacking 2 degrees of knee ext in order to be able to navigate his home, get into and out of his car and don/doff clothing without pain or restriction. [] Progressing: [] Met: [] Not Met: [] Adjusted  3. Patient will demonstrate an increase in Strength with hip abd, hip flexion and knee ext to at least 4+/5 in order to be able to ascend/descend stairs and walk for longer periods of time without pain or restriction. [] Progressing: [] Met: [] Not Met: [] Adjusted  4. Patient will be able to perform sit to stand from chair height without airex indep in order to be able to perform functional transfers and use the restroom indep without pain or restriction. Progression Towards Functional goals:  [] Patient is progressing as expected towards functional goals listed. [x] Progression is slowed due to complexities listed. [] Progression has been slowed due to co-morbidities. [] Plan just implemented, too soon to assess goals progression  [] Other:     Overall Progression Towards Functional goals/ Treatment Progress Update:  [] Patient is progressing as expected towards functional goals listed.     [x] Progression is slowed due to complexities/Impairments listed. [] Progression has been slowed due to co-morbidities. [] Plan just implemented, too soon to assess goals progression <30days   [] Goals require adjustment due to lack of progress  [] Patient is not progressing as expected and requires additional follow up with physician  [] Other    Prognosis for POC: [x] Good [] Fair  [] Poor      Patient requires continued skilled intervention: [x] Yes  [] No    Treatment/Activity Tolerance:  [x] Patient able to complete treatment  [] Patient limited by fatigue  [] Patient limited by pain    [] Patient limited by other medical complications  [] Other:     ASSESSMENT: Pt continues to have c/o groin and buttock pain, and also soreness at proximal rectus with hip flex/abd. He will discuss this with MD as he's had prior x-rays that showed some OA. R knee was more sore this date however pt was able to progress quad strength exc's on steps and on LP/LATOYA in SLS. Return to Play: (if applicable)   []  Stage 1: Intro to Strength   []  Stage 2: Return to Run and Strength   []  Stage 3: Return to Jump and Strength   []  Stage 4: Dynamic Strength and Agility   []  Stage 5: Sport Specific Training     []  Ready to Return to Play, Meets All Above Stages   []  Not Ready for Return to Sports   Comments:                         PLAN: Pt would like to try to finish PT in this calendar year due to high deductible reset in Jan 2022. [x] Continue per plan of care [] Alter current plan (see comments above)  [] Plan of care initiated [] Hold pending MD visit [] Discharge      Electronically signed by:  Eulice Kussmaul, PT, DPT  Note: If patient does not return for scheduled/ recommended follow up visits, this note will serve as a discharge from care along with most recent update on progress. Access Code: [email or web address]  URL: [email or web address]  Date: 11/15/2021  Prepared by: Melania Astorga    Exercises  Supine Quad Set - 1 x daily - 7 x weekly - 2

## 2021-12-16 ENCOUNTER — OFFICE VISIT (OUTPATIENT)
Dept: ORTHOPEDIC SURGERY | Age: 61
End: 2021-12-16

## 2021-12-16 ENCOUNTER — HOSPITAL ENCOUNTER (OUTPATIENT)
Dept: PHYSICAL THERAPY | Age: 61
Setting detail: THERAPIES SERIES
Discharge: HOME | End: 2021-12-16
Payer: COMMERCIAL

## 2021-12-16 VITALS — WEIGHT: 299 LBS | HEIGHT: 68 IN | BODY MASS INDEX: 45.31 KG/M2

## 2021-12-16 DIAGNOSIS — M25.551 RIGHT HIP PAIN: ICD-10-CM

## 2021-12-16 DIAGNOSIS — Z96.651 HISTORY OF TOTAL KNEE ARTHROPLASTY, RIGHT: Primary | ICD-10-CM

## 2021-12-16 PROCEDURE — 99024 POSTOP FOLLOW-UP VISIT: CPT | Performed by: ORTHOPAEDIC SURGERY

## 2021-12-16 PROCEDURE — 97112 NEUROMUSCULAR REEDUCATION: CPT

## 2021-12-16 PROCEDURE — 97110 THERAPEUTIC EXERCISES: CPT

## 2021-12-16 PROCEDURE — 97140 MANUAL THERAPY 1/> REGIONS: CPT

## 2021-12-16 NOTE — PROGRESS NOTES
[facility],  [address]  Phone: [phone]  Fax [phone]    Physical Therapy Treatment Note/ Progress Report:           Date:  2021    Patient Name:  Chioma Gaming  \"Bo\"  :  1960  MRN: [id number]  Restrictions/Precautions:    Medical/Treatment Diagnosis Information:  Diagnosis: E07.048 (ICD-10-CM) - Status post right knee replacement  Treatment Diagnosis: M25.561 R knee pain, R26.2 Difficulty walking  Insurance/Certification information:  PT Insurance Information: Med mutual BMN, no auth 90/10 co-ins  Physician Information:  Referring Practitioner: Jany Cota MD  Has the plan of care been signed (Y/N):        [x]  Yes  []  No     Date of Patient follow up with Physician: 21      Is this a Progress Report:     [x]  Yes  []  No        If Yes:  Date Range for reporting period:  Beginnin/15/21  Ending 21    Progress report will be due (10 Rx or 30 days whichever is less):        Recertification will be due (POC Duration  / 90 days whichever is less): 22       Visit # Insurance Allowable Auth Required   In-person 10-PN written BMN []  Yes []  No    Telehealth   []  Yes []  No    Total          Functional Scale: LEFS:  83% disability   Date assessed: 11/15/21  Functional Scale: LEFS:  64% disability   Date assessed: 21       Therapy Diagnosis/Practice Pattern: H      Number of Comorbidities:  []0     [x]1-2    []3+    Latex Allergy:  [x]NO      []YES  Preferred Language for Healthcare:   [x]English       []other:      Pain level:    current pain: 3-810    SUBJECTIVE:  Pt had f/u with MD, pleased with knee progress and discussed known hip OA.    OBJECTIVE:     Observation: Gait with SPC: dec'd R hip/knee flex and mild circumduction; steps with reciprocal pattern up but step-to pattern down d/t quad control  Test measurements: AAROM knee flexion: 95* on SB; PROM 0-107 foot on table  MMT hip flex 4-5 (limited by pain at prox rectus and groin)  abd 3+/5    Knee ext 4/5  Knee flex 4-/5     Ankle DF 4+    Ankle PF 5/5 seated (B x20 standing; unable to perform SL standing    RESTRICTIONS/PRECAUTIONS: DM, HTN, L TKR in July 2021    Exercises/Interventions:     Therapeutic Ex (68770) Sets/sec/reps Notes/CUES   Hip Flexor and quad S table-side (sits on edge of hi-low mat) 20-30'' x 5 W/ PT quad STW w/ roller stick   SAQ     1#   Heel Prop  HEP   Ankle Pumps  HEP   Heel Slides 10'' x 10, w/ yellow SB and strap   HEP  SB more comfortable   LAQ  Seated Marches  1# HEP   Supine Calf S  Standing Incline S 30'' x 3    SL hip abd  Held due to groin pain   SL Clam Shells     LATOYA: TKE  Hip abd, ext LATOYA 3'' x 20  x15 L stance 30# abd, 45# ext L stance 60#   Cue to decr HER B  Too sore to do in R stance today   Supine bridge w/ incr flex 5\" x10    Seated HSC 2x10 blue   Standing HSC UE supp, posture cues   LP  SL ecc  Calf raises 90#  90# L,    recumbent bike  0 seat height; able to revolve after ~1 min heavy leaning   Pt ed:   HEP--increase stretching throughout the day, CP usage at home/heat ok to quad/hip,            Manual Intervention (20076) 18'    STM to R calf, , quad  IASTM sweeps to quad above incision lat>med  8'  4'    Pat mobs sup/inf  Knee flex mobs gr ll-lll w/ towel  PROM knee flex  Oscillations for pain x1'  5x10\"  10\"x5  x1'   Pain along shin, towel used                       NMR re-education (39589)  CUES NEEDED   Gait with SPC  Cue for quad activ in stance, inc' d hip/knee flex in swing   FSU  LSU 6\" x15  4\"2x5B UE support, quad/glute cues  B UE support, quad/glute cues   Mini squats Cue even WBing w/ fatigue, B UE support   Sit-->stand from table 2x5 Cue for LE alignment                                  Therapeutic Exercise and NMR EXR  [x] (21539) Provided verbal/tactile cueing for activities related to strengthening, flexibility, endurance, ROM for improvements in LE, proximal hip, and core control with self care, mobility, lifting, ambulation. [x] (77061) Provided verbal/tactile cueing for activities related to improving balance, coordination, kinesthetic sense, posture, motor skill, proprioception  to assist with LE, proximal hip, and core control in self care, mobility, lifting, ambulation and eccentric single leg control. NMR and Therapeutic Activities:    [x] (88970 or 63347) Provided verbal/tactile cueing for activities related to improving balance, coordination, kinesthetic sense, posture, motor skill, proprioception and motor activation to allow for proper function of core, proximal hip and LE with self care and ADLs  [] (70504) Gait Re-education- Provided training and instruction to the patient for proper LE, core and proximal hip recruitment and positioning and eccentric body weight control with ambulation re-education including up and down stairs     Home Exercise Program:    [x] (13040) Reviewed/Progressed HEP activities related to strengthening, flexibility, endurance, ROM of core, proximal hip and LE for functional self-care, mobility, lifting and ambulation/stair navigation   [] (58813)Reviewed/Progressed HEP activities related to improving balance, coordination, kinesthetic sense, posture, motor skill, proprioception of core, proximal hip and LE for self care, mobility, lifting, and ambulation/stair navigation      Manual Treatments:  PROM / STM / Oscillations-Mobs:  G-I, II, III, IV (PA's, Inf., Post.)  [x] (90613) Provided manual therapy to mobilize LE, proximal hip and/or LS spine soft tissue/joints for the purpose of modulating pain, promoting relaxation,  increasing ROM, reducing/eliminating soft tissue swelling/inflammation/restriction, improving soft tissue extensibility and allowing for proper ROM for normal function with self care, mobility, lifting and ambulation.      Modalities:     [] GAME READY (VASO)- for significant edema, swelling, pain control. CP x 15 min, bilat knees, R hip       Charges  Timed Code Treatment Minutes: 40   Total Treatment Minutes: 60, rest breaks, CP     [] EVAL (LOW) 82438   [] EVAL (MOD) 89894  [] EVAL (HIGH) 31367   [] RE-EVAL     [x] PRADIP(67445) x1     [] IONTO  [x] NMR (44702) x 1    [] VASO  [x] Manual (88496) x  1    [] Other:  [] TA x      [] Mech Traction (47652)  [] ES(attended) (74725)      [] ES (un) (24500):       GOALS:   Patient stated goal: Pt would like to get back to functioning without pain. [] Progressing: [] Met: [] Not Met: [] Adjusted    Therapist goals for Patient:   Short Term Goals: To be achieved in: 2 weeks  1. Independent in HEP and progression per patient tolerance, in order to prevent re-injury. [] Progressing: [x] Met: [] Not Met: [] Adjusted  2. Patient will have a decrease in pain to facilitate improvement in movement, function, and ADLs as indicated by Functional Deficits. [] Progressing: [] Met: [x] Not Met: [] Adjusted    Long Term Goals: To be achieved in: 12 weeks  1. Disability index score of 50% or less for the LEFS to assist with reaching prior level of function. [x] Progressing: [] Met: [] Not Met: [] Adjusted  2. Patient will demonstrate increased AROM with knee flexion to at least 110 deg and lacking 2 degrees of knee ext in order to be able to navigate his home, get into and out of his car and don/doff clothing without pain or restriction. [x] Progressing: [] Met: [] Not Met: [] Adjusted  3. Patient will demonstrate an increase in Strength with hip abd, hip flexion and knee ext to at least 4+/5 in order to be able to ascend/descend stairs and walk for longer periods of time without pain or restriction. [x] Progressing: [] Met: [] Not Met: [] Adjusted  4. Patient will be able to perform sit to stand from chair height without airex indep in order to be able to perform functional transfers and use the restroom indep without pain or restriction.      [x] Progressing: [] Met: [] Not Met: [] Adjusted    Progression Towards Functional goals:  [] Patient is progressing as expected towards functional goals listed. [x] Progression is slowed due to complexities listed. [] Progression has been slowed due to co-morbidities. [] Plan just implemented, too soon to assess goals progression  [] Other:     Overall Progression Towards Functional goals/ Treatment Progress Update:  [] Patient is progressing as expected towards functional goals listed. [x] Progression is slowed due to complexities/Impairments listed. [] Progression has been slowed due to co-morbidities. [] Plan just implemented, too soon to assess goals progression <30days   [] Goals require adjustment due to lack of progress  [] Patient is not progressing as expected and requires additional follow up with physician  [] Other    Prognosis for POC: [x] Good [] Fair  [] Poor      Patient requires continued skilled intervention: [x] Yes  [] No    Treatment/Activity Tolerance:  [x] Patient able to complete treatment  [] Patient limited by fatigue  [] Patient limited by pain    [] Patient limited by other medical complications  [] Other:     ASSESSMENT: Pt's knee sore with CKC exc this date, did not get to do all exc's on machine and declined bike. He continues to have c/o groin and buttock pain, and also soreness at proximal rectus with hip flex/abd. He does have mild to mod OA in R hip but he was encouraged by MD to see how his symptoms change as his gait/ROM and strength improve over the next month. He will continue to benefit from skilled PTto restore full AROM, strength, normalize gait to wean from AD, and to normalize function for ability to perform ADLs and eventually return to work duties.      Return to Play: (if applicable)   []  Stage 1: Intro to Strength   []  Stage 2: Return to Run and Strength   []  Stage 3: Return to Jump and Strength   []  Stage 4: Dynamic Strength and Agility   []  Stage 5: Sport Specific Training     []  Ready to Return to Play, Meets All Above Stages   []  Not Ready for Return to Sports   Comments:                         PLAN: Pt would like to try to finish PT in this calendar year due to high deductible reset in Jan 2022. [x] Continue per plan of care [] Alter current plan (see comments above)  [] Plan of care initiated [] Hold pending MD visit [] Discharge      Electronically signed by:  Bethany De La Fuente, PT, DPT  Note: If patient does not return for scheduled/ recommended follow up visits, this note will serve as a discharge from care along with most recent update on progress. Access Code: [email or web address]  URL: [email or web address]  Date: 11/15/2021  Prepared by: Andrea Malik    Exercises  Supine Quad Set - 1 x daily - 7 x weekly - 2 sets - 10 reps - 5 sec hold  Supine Ankle Pumps - 1 x daily - 7 x weekly - 2 sets - 10 reps  Supine Knee Extension Stretch on Towel Roll - 1 x daily - 7 x weekly - 2 sets - 1 min each hold  Seated Knee Extension AROM - 1 x daily - 7 x weekly - 2 sets - 10 reps  Supine Heel Slide with Strap - 1 x daily - 7 x weekly - 1 sets - 10 reps - 5 sec hold

## 2021-12-17 DIAGNOSIS — J30.1 SEASONAL ALLERGIC RHINITIS DUE TO POLLEN: ICD-10-CM

## 2021-12-17 DIAGNOSIS — I10 ESSENTIAL HYPERTENSION: ICD-10-CM

## 2021-12-17 NOTE — PROGRESS NOTES
[facility]  Office Visit    Chief Complaint: Follow-up s/p right total knee arthroplasty    HPI:  Hans Crowder is a 63 y.o. who is here in follow-up of right total knee arthroplasty performed on 10/26/2021. He continues to work with physical therapy. He still has pain and occasional swelling in the leg but this is gradually improving over time. He does also report some right hip pain groin pain that he would like to have further evaluated today. This has been more painful during his therapy for recovering from right total knee arthroplasty. Patient Active Problem List   Diagnosis    Class 3 severe obesity due to excess calories with serious comorbidity and body mass index (BMI) of 45.0 to 49.9 in adult [facility])    Mixed hypertriglyceridemia    Nonalcoholic fatty liver disease    RLS (restless legs syndrome)    Essential hypertension, benign    Morbid obesity with BMI of 50.0-59.9, adult ([facility] [address]    Chronic pain of left knee    Osteoarthritis of left knee    Chondromalacia of left knee    Pes planus    Lymphedema    Peripheral venous insufficiency    Allergic rhinitis    Type 2 diabetes mellitus without complication, without long-term current use of insulin ([facility] [address]    TOM treated with BiPAP    Arthritis of left knee    Arthritis of right knee       ROS:  Constitutional: denies fever, chills, weight loss  MSK: denies pain in other joints, muscle aches  Neurological: denies numbness, tingling, weakness    Exam:  Height 5' 8\" (1.727 m), weight 299 lb (135.6 kg). Appearance: sitting in exam room chair, appears to be in no acute distress, awake and alert  Resp: unlabored breathing on room air  Skin: warm, dry and intact with out erythema or significant increased temperature  Neuro: grossly intact both lower extremities. Intact sensation to light touch. Motor exam 4+ to 5/5 in all major motor groups. Right knee: Incision is healed.   Changes due to venous stasis distally. Range of motion is 3-105 degrees. Sensation is intact light touch. There is brisk capillary refill. There is 5/5 muscle strength in all muscle groups. Mild pain with Stinchfield. Imaging:  3 views of the right hip were performed and reviewed today. He has maintained joint spaces bilaterally. There is a small area of sclerotic change in the superolateral acetabular rim of the right hip which may be due to chronic impingement. Assessment:  S/p right total knee arthroplasty  Possible mild right hip osteoarthritis    Plan:  He is recovering from right total knee arthroplasty. He will continue working with physical therapy. He does seem to be having mild pain in his hip he did do to hip impingement versus low back issues. Regardless, it is not so severe that he needs much further treatment at this time. He will follow up in 6 weeks for repeat assessment and radiographs. This dictation was done with [employer] dictation and may contain mechanical errors related to translation.

## 2021-12-20 ENCOUNTER — HOSPITAL ENCOUNTER (OUTPATIENT)
Dept: PHYSICAL THERAPY | Age: 61
Setting detail: THERAPIES SERIES
Discharge: HOME | End: 2021-12-20
Payer: COMMERCIAL

## 2021-12-20 PROCEDURE — 97140 MANUAL THERAPY 1/> REGIONS: CPT | Performed by: PHYSICAL THERAPIST

## 2021-12-20 PROCEDURE — 97112 NEUROMUSCULAR REEDUCATION: CPT | Performed by: PHYSICAL THERAPIST

## 2021-12-20 PROCEDURE — 97110 THERAPEUTIC EXERCISES: CPT | Performed by: PHYSICAL THERAPIST

## 2021-12-20 RX ORDER — POTASSIUM CHLORIDE 1500 MG/1
20 TABLET, FILM COATED, EXTENDED RELEASE ORAL 2 TIMES DAILY
Qty: 120 TABLET | Refills: 2 | Status: SHIPPED | OUTPATIENT
Start: 2021-12-20 | End: 2022-03-09 | Stop reason: SDUPTHER

## 2021-12-20 RX ORDER — OMEPRAZOLE 20 MG/1
20 CAPSULE, DELAYED RELEASE ORAL DAILY
Qty: 30 CAPSULE | Refills: 3 | Status: SHIPPED | OUTPATIENT
Start: 2021-12-20 | End: 2022-03-21 | Stop reason: SDUPTHER

## 2021-12-20 RX ORDER — HYDROCHLOROTHIAZIDE 25 MG/1
TABLET ORAL
Qty: 90 TABLET | Refills: 0 | Status: SHIPPED | OUTPATIENT
Start: 2021-12-20 | End: 2022-03-14 | Stop reason: SDUPTHER

## 2021-12-20 RX ORDER — LORATADINE 10 MG/1
10 TABLET ORAL DAILY
Qty: 90 TABLET | Refills: 0 | Status: SHIPPED | OUTPATIENT
Start: 2021-12-20 | End: 2022-03-11 | Stop reason: SDUPTHER

## 2021-12-20 RX ORDER — LISINOPRIL 10 MG/1
TABLET ORAL
Qty: 90 TABLET | Refills: 0 | Status: SHIPPED | OUTPATIENT
Start: 2021-12-20 | End: 2022-03-14 | Stop reason: SDUPTHER

## 2021-12-20 NOTE — FLOWSHEET NOTE
[facility],  [address]  Phone: [phone]  Fax [phone]    Physical Therapy Treatment Note/ Progress Report:           Date:  2021    Patient Name:  Bert Leone  \"Bo\"  :  1960  MRN: [id number]  Restrictions/Precautions:    Medical/Treatment Diagnosis Information:  Diagnosis: Z97.834 (ICD-10-CM) - Status post right knee replacement  Treatment Diagnosis: M25.561 R knee pain, R26.2 Difficulty walking  Insurance/Certification information:  PT Insurance Information: Med mutual BMN, no auth 90/10 co-ins  Physician Information:  Referring Practitioner: Radha Guallpa MD  Has the plan of care been signed (Y/N):        [x]  Yes  []  No     Date of Patient follow up with Physician: 21      Is this a Progress Report:     [x]  Yes  [x]  No        If Yes:  Date Range for reporting period:  Beginnin/15/21  Ending 21     Progress report will be due (10 Rx or 30 days whichever is less):        Recertification will be due (POC Duration  / 90 days whichever is less): 22       Visit # Insurance Allowable Auth Required   In-person 11 BMN []  Yes []  No    Telehealth   []  Yes []  No    Total          Functional Scale: LEFS:  83% disability   Date assessed: 11/15/21  Functional Scale: LEFS:  64% disability   Date assessed: 21       Therapy Diagnosis/Practice Pattern: H      Number of Comorbidities:  []0     [x]1-2    []3+    Latex Allergy:  [x]NO      []YES  Preferred Language for Healthcare:   [x]English       []other:      Pain level:    current pain: 3-8/10    SUBJECTIVE:  Pt reports he is feeling stiff today. NO c/o's following last session. Still hurts ant knee and R hip.     OBJECTIVE:     Observation: Gait with SPC: dec'd R hip/knee flex and mild circumduction; steps with reciprocal pattern up but heavy UE support for descending  Test measurements: ; PROM 0-104 foot on sense, posture, motor skill, proprioception  to assist with LE, proximal hip, and core control in self care, mobility, lifting, ambulation and eccentric single leg control. NMR and Therapeutic Activities:    [x] (50742 or 12315) Provided verbal/tactile cueing for activities related to improving balance, coordination, kinesthetic sense, posture, motor skill, proprioception and motor activation to allow for proper function of core, proximal hip and LE with self care and ADLs  [] (91539) Gait Re-education- Provided training and instruction to the patient for proper LE, core and proximal hip recruitment and positioning and eccentric body weight control with ambulation re-education including up and down stairs     Home Exercise Program:    [x] (89430) Reviewed/Progressed HEP activities related to strengthening, flexibility, endurance, ROM of core, proximal hip and LE for functional self-care, mobility, lifting and ambulation/stair navigation   [] (26793)Reviewed/Progressed HEP activities related to improving balance, coordination, kinesthetic sense, posture, motor skill, proprioception of core, proximal hip and LE for self care, mobility, lifting, and ambulation/stair navigation      Manual Treatments:  PROM / STM / Oscillations-Mobs:  G-I, II, III, IV (PA's, Inf., Post.)  [x] (32599) Provided manual therapy to mobilize LE, proximal hip and/or LS spine soft tissue/joints for the purpose of modulating pain, promoting relaxation,  increasing ROM, reducing/eliminating soft tissue swelling/inflammation/restriction, improving soft tissue extensibility and allowing for proper ROM for normal function with self care, mobility, lifting and ambulation. Modalities:     [] GAME READY (VASO)- for significant edema, swelling, pain control.    CP x 15 min, bilat knees, R hip       Charges  Timed Code Treatment Minutes: 40   Total Treatment Minutes: 60, rest breaks, bike, CP     [] EVAL (LOW) 89644   [] EVAL (MOD) 98130  [] EVAL (HIGH) 23678   [] RE-EVAL     [x] VC(62289) x1     [] IONTO  [x] NMR (35744) x 1    [] VASO  [x] Manual (85602) x  1    [] Other:  [] TA x      [] Mech Traction (31310)  [] ES(attended) (09874)      [] ES (un) (57105):       GOALS:   Patient stated goal: Pt would like to get back to functioning without pain. [] Progressing: [] Met: [] Not Met: [] Adjusted    Therapist goals for Patient:   Short Term Goals: To be achieved in: 2 weeks  1. Independent in HEP and progression per patient tolerance, in order to prevent re-injury. [] Progressing: [x] Met: [] Not Met: [] Adjusted  2. Patient will have a decrease in pain to facilitate improvement in movement, function, and ADLs as indicated by Functional Deficits. [] Progressing: [] Met: [x] Not Met: [] Adjusted    Long Term Goals: To be achieved in: 12 weeks  1. Disability index score of 50% or less for the LEFS to assist with reaching prior level of function. [x] Progressing: [] Met: [] Not Met: [] Adjusted  2. Patient will demonstrate increased AROM with knee flexion to at least 110 deg and lacking 2 degrees of knee ext in order to be able to navigate his home, get into and out of his car and don/doff clothing without pain or restriction. [x] Progressing: [] Met: [] Not Met: [] Adjusted  3. Patient will demonstrate an increase in Strength with hip abd, hip flexion and knee ext to at least 4+/5 in order to be able to ascend/descend stairs and walk for longer periods of time without pain or restriction. [x] Progressing: [] Met: [] Not Met: [] Adjusted  4. Patient will be able to perform sit to stand from chair height without airex indep in order to be able to perform functional transfers and use the restroom indep without pain or restriction. [x] Progressing: [] Met: [] Not Met: [] Adjusted    Progression Towards Functional goals:  [] Patient is progressing as expected towards functional goals listed.     [x] Progression is slowed due to complexities recommended follow up visits, this note will serve as a discharge from care along with most recent update on progress. Access Code: [email or web address]  URL: [email or web address]  Date: 11/15/2021  Prepared by: Marc Morrow    Exercises  Supine Quad Set - 1 x daily - 7 x weekly - 2 sets - 10 reps - 5 sec hold  Supine Ankle Pumps - 1 x daily - 7 x weekly - 2 sets - 10 reps  Supine Knee Extension Stretch on Towel Roll - 1 x daily - 7 x weekly - 2 sets - 1 min each hold  Seated Knee Extension AROM - 1 x daily - 7 x weekly - 2 sets - 10 reps  Supine Heel Slide with Strap - 1 x daily - 7 x weekly - 1 sets - 10 reps - 5 sec hold

## 2021-12-23 ENCOUNTER — HOSPITAL ENCOUNTER (OUTPATIENT)
Dept: PHYSICAL THERAPY | Age: 61
Setting detail: THERAPIES SERIES
Discharge: HOME | End: 2021-12-23
Payer: COMMERCIAL

## 2021-12-23 PROCEDURE — 97140 MANUAL THERAPY 1/> REGIONS: CPT | Performed by: PHYSICAL THERAPIST

## 2021-12-23 PROCEDURE — 97110 THERAPEUTIC EXERCISES: CPT | Performed by: PHYSICAL THERAPIST

## 2021-12-23 NOTE — FLOWSHEET NOTE
[facility],  [address]  Phone: [phone]  Fax [phone]    Physical Therapy Treatment Note/ Progress Report:           Date:  2021    Patient Name:  Aakash Drake  \"Bo\"  :  1960  MRN: [id number]  Restrictions/Precautions:    Medical/Treatment Diagnosis Information:  Diagnosis: G05.525 (ICD-10-CM) - Status post right knee replacement  Treatment Diagnosis: M25.561 R knee pain, R26.2 Difficulty walking  Insurance/Certification information:  PT Insurance Information: Med mutual BMN, no auth 90/10 co-ins  Physician Information:  Referring Practitioner: Butch Campbell MD  Has the plan of care been signed (Y/N):        [x]  Yes  []  No     Date of Patient follow up with Physician: 21      Is this a Progress Report:     []  Yes  [x]  No        If Yes:  Date Range for reporting period:  Beginnin/15/21  Ending    Progress report will be due (10 Rx or 30 days whichever is less): 50       Recertification will be due (POC Duration  / 90 days whichever is less): 22       Visit # Insurance Allowable Auth Required   In-person 7 BMN []  Yes []  No    Telehealth   []  Yes []  No    Total          Functional Scale: LEFS:  83% disability   Date assessed: 11/15/21      Therapy Diagnosis/Practice Pattern: H      Number of Comorbidities:  []0     [x]1-2    []3+    Latex Allergy:  [x]NO      []YES  Preferred Language for Healthcare:   [x]English       []other:      Pain level:    current pain: 6/10 currently, 3-8/10 best to worst    SUBJECTIVE:  Pt felt ok after last session. He notes he gets more sore as the day goes on.       OBJECTIVE:    Observation:    Test measurements: AAROM knee flexion: 90* on table, 95* on SB; PROM 0-102    RESTRICTIONS/PRECAUTIONS: DM, HTN, L TKR in 2021    Exercises/Interventions:     Therapeutic Ex (93787) Sets/sec/reps Notes/CUES   Hip Flexor and quad S table-side (sits on edge of hi-low mat) 20-30'' x 5    SAQ    1# 3\" 2x10 1#   Heel Prop  HEP   Ankle Pumps  HEP   HEP  SB more comfortable   LAQ   1# 2x10 1# HEP   Supine Calf S  Standing Incline S 30'' x 3     Held due to groin pain    NV   TKE LATOYA 10'' x 20 45# UE support    Standing marches 5x each leg holding on to [facility]    Supine bridge w/ incr flex 5\"X10         Standing hip abd, ext   Standing HSC x10 ea R/L  x10 R only UE supp, posture cues   EZ stretch 3x1 min 74, 77   Rocking back and forth, 10 seat height; able to revolve after ~1 min heavy leaning   Pt ed: HEP--increase stretching throughout the day, CP usage at home/heat ok to quad/hip,            Manual Intervention (99736) 10'    STM to R calf, , quad 8'    Pat mobs sup/inf  Knee flex mobs gr ll-lll w/ towel  PROM knee flex  Oscillations for pain x1'  3x10\"  10\"x5  x1'                        NMR re-education (94510)  CUES NEEDED   Gait with 'x2 Cue for quad activ in stance, inc' d hip/knee flex in swing                                                 Therapeutic Exercise and NMR EXR  [x] (32566) Provided verbal/tactile cueing for activities related to strengthening, flexibility, endurance, ROM for improvements in LE, proximal hip, and core control with self care, mobility, lifting, ambulation.  [] (51739) Provided verbal/tactile cueing for activities related to improving balance, coordination, kinesthetic sense, posture, motor skill, proprioception  to assist with LE, proximal hip, and core control in self care, mobility, lifting, ambulation and eccentric single leg control.      NMR and Therapeutic Activities:    [x] (29941 or 88860) Provided verbal/tactile cueing for activities related to improving balance, coordination, kinesthetic sense, posture, motor skill, proprioception and motor activation to allow for proper function of core, proximal hip and LE with self care and ADLs  [] (13110) Gait Re-education- Provided training and instruction to the patient for proper LE, core and proximal hip recruitment and positioning and eccentric body weight control with ambulation re-education including up and down stairs     Home Exercise Program:    [x] (94888) Reviewed/Progressed HEP activities related to strengthening, flexibility, endurance, ROM of core, proximal hip and LE for functional self-care, mobility, lifting and ambulation/stair navigation   [] (23846)Reviewed/Progressed HEP activities related to improving balance, coordination, kinesthetic sense, posture, motor skill, proprioception of core, proximal hip and LE for self care, mobility, lifting, and ambulation/stair navigation      Manual Treatments:  PROM / STM / Oscillations-Mobs:  G-I, II, III, IV (PA's, Inf., Post.)  [x] (19640) Provided manual therapy to mobilize LE, proximal hip and/or LS spine soft tissue/joints for the purpose of modulating pain, promoting relaxation,  increasing ROM, reducing/eliminating soft tissue swelling/inflammation/restriction, improving soft tissue extensibility and allowing for proper ROM for normal function with self care, mobility, lifting and ambulation. Modalities:     [] GAME READY (VASO)- for significant edema, swelling, pain control. CP x 15 min, bilat knees, R hip       Charges  Timed Code Treatment Minutes: 55   Total Treatment Minutes: 55     [] EVAL (LOW) 43941   [] EVAL (MOD) 54692  [] EVAL (HIGH) 98006   [] RE-EVAL     [x] BZ(32094) x3    [] IONTO  [] NMR (38581) x     [] VASO  [x] Manual (29997) x  1    [] Other:  [] TA x      [] Mech Traction (09181)  [] ES(attended) (73904)      [] ES (un) (09275):       GOALS:   Patient stated goal: Pt would like to get back to functioning without pain. [] Progressing: [] Met: [] Not Met: [] Adjusted    Therapist goals for Patient:   Short Term Goals: To be achieved in: 2 weeks  1. Independent in HEP and progression per patient tolerance, in order to prevent re-injury.    [] Progressing: [x] Met: [] Not Met: [] Adjusted  2. Patient will have a decrease in pain to facilitate improvement in movement, function, and ADLs as indicated by Functional Deficits. [] Progressing: [] Met: [x] Not Met: [] Adjusted    Long Term Goals: To be achieved in: 12 weeks  1. Disability index score of 50% or less for the LEFS to assist with reaching prior level of function. [] Progressing: [] Met: [] Not Met: [] Adjusted  2. Patient will demonstrate increased AROM with knee flexion to at least 110 deg and lacking 2 degrees of knee ext in order to be able to navigate his home, get into and out of his car and don/doff clothing without pain or restriction. [] Progressing: [] Met: [] Not Met: [] Adjusted  3. Patient will demonstrate an increase in Strength with hip abd, hip flexion and knee ext to at least 4+/5 in order to be able to ascend/descend stairs and walk for longer periods of time without pain or restriction. [] Progressing: [] Met: [] Not Met: [] Adjusted  4. Patient will be able to perform sit to stand from chair height without airex indep in order to be able to perform functional transfers and use the restroom indep without pain or restriction. Progression Towards Functional goals:  [] Patient is progressing as expected towards functional goals listed. [x] Progression is slowed due to complexities listed. [] Progression has been slowed due to co-morbidities. [] Plan just implemented, too soon to assess goals progression  [] Other:     Overall Progression Towards Functional goals/ Treatment Progress Update:  [] Patient is progressing as expected towards functional goals listed. [x] Progression is slowed due to complexities/Impairments listed. [] Progression has been slowed due to co-morbidities.   [] Plan just implemented, too soon to assess goals progression <30days   [] Goals require adjustment due to lack of progress  [] Patient is not progressing as expected and requires additional follow up with physician  [] Other    Prognosis for POC: [x] Good [] Fair  [] Poor      Patient requires continued skilled intervention: [x] Yes  [] No    Treatment/Activity Tolerance:  [x] Patient able to complete treatment  [] Patient limited by fatigue  [] Patient limited by pain    [] Patient limited by other medical complications  [] Other:     ASSESSMENT: Pt able to tolerate most activity fairly well. He demonstrates decreased knee flexion during gait and bilat toe out. He was able to improve gait with verbal cues. Return to Play: (if applicable)   []  Stage 1: Intro to Strength   []  Stage 2: Return to Run and Strength   []  Stage 3: Return to Jump and Strength   []  Stage 4: Dynamic Strength and Agility   []  Stage 5: Sport Specific Training     []  Ready to Return to Play, Meets All Above Stages   []  Not Ready for Return to Sports   Comments:                         PLAN: Pt would like to try to finish PT in this calendar year due to high deductible reset in Jan 2022. [x] Continue per plan of care [] Alter current plan (see comments above)  [] Plan of care initiated [] Hold pending MD visit [] Discharge      Electronically signed by:  Eric Roberson PT  Note: If patient does not return for scheduled/ recommended follow up visits, this note will serve as a discharge from care along with most recent update on progress. Access Code: [email or web address]  URL: [email or web address]  Date: 11/15/2021  Prepared by: Frannie Rai    Exercises  Supine Quad Set - 1 x daily - 7 x weekly - 2 sets - 10 reps - 5 sec hold  Supine Ankle Pumps - 1 x daily - 7 x weekly - 2 sets - 10 reps  Supine Knee Extension Stretch on Towel Roll - 1 x daily - 7 x weekly - 2 sets - 1 min each hold  Seated Knee Extension AROM - 1 x daily - 7 x weekly - 2 sets - 10 reps  Supine Heel Slide with Strap - 1 x daily - 7 x weekly - 1 sets - 10 reps - 5 sec hold

## 2021-12-27 ENCOUNTER — HOSPITAL ENCOUNTER (OUTPATIENT)
Dept: PHYSICAL THERAPY | Age: 61
Setting detail: THERAPIES SERIES
Discharge: HOME | End: 2021-12-27
Payer: COMMERCIAL

## 2021-12-27 PROCEDURE — 97140 MANUAL THERAPY 1/> REGIONS: CPT | Performed by: PHYSICAL THERAPIST

## 2021-12-27 PROCEDURE — 97110 THERAPEUTIC EXERCISES: CPT | Performed by: PHYSICAL THERAPIST

## 2021-12-27 NOTE — FLOWSHEET NOTE
[facility],  [address]  Phone: [phone]  Fax [phone]    Physical Therapy Treatment Note/ Progress Report:           Date:  2021    Patient Name:  Valerie Chi  \"Bo\"  :  1960  MRN: [id number]  Restrictions/Precautions:    Medical/Treatment Diagnosis Information:  Diagnosis: C57.574 (ICD-10-CM) - Status post right knee replacement  Treatment Diagnosis: M25.561 R knee pain, R26.2 Difficulty walking  Insurance/Certification information:  PT Insurance Information: Med mutual BMN, no auth 90/10 co-ins  Physician Information:  Referring Practitioner: Xochilt Jesus MD  Has the plan of care been signed (Y/N):        [x]  Yes  []  No     Date of Patient follow up with Physician: ~22      Is this a Progress Report:     []  Yes  [x]  No        If Yes:  Date Range for reporting period:  Beginnin/15/21  Endin21    Progress report will be due (10 Rx or 30 days whichever is less):        Recertification will be due (POC Duration  / 90 days whichever is less): 22       Visit # Insurance Allowable Auth Required   In-person 13 BMN []  Yes []  No    Telehealth   []  Yes []  No    Total          Functional Scale: LEFS:  83% disability   Date assessed: 11/15/21      Therapy Diagnosis/Practice Pattern: H      Number of Comorbidities:  []0     [x]1-2    []3+    Latex Allergy:  [x]NO      []YES  Preferred Language for Healthcare:   [x]English       []other:      Pain level:    current pain: 6/10 currently, 3-8/10 best to worst    SUBJECTIVE:  Pt reports he has been really focusing on bending his knee while walking.       OBJECTIVE:    Observation:   Test measurements: ; PROM 0-100 foot on stairs       RESTRICTIONS/PRECAUTIONS: DM, HTN, L TKR in 2021    Exercises/Interventions:     Therapeutic Ex (23268) Sets/sec/reps Notes/CUES   Hip Flexor and quad S table-side (sits on edge of hi-low mat) 20-30'' x 5    SAQ    Heel Prop  HEP   Ankle Pumps  HEP   HEP     LAQ   1# 2x10 1# HEP   Supine Calf S  Standing Incline S 30'' x 3     Held due to groin pain       TKE  See below   Standing marches    Supine bridge w/ incr flex 5\"X15         Knee flexion on stairs 10x10\" R, 2x10\" L 100* R/110* L, HEP   Standing hip abd 45#, ext to TKE 60#  Standing HSC 2x10 ea R/L   UE supp, posture cues   EZ stretch 3x1 min 97* measured w/ goni, 85 w/ EZ S   recumbent bike 5'  10 seat height; able to revolve after ~1 min heavy leaning   Pt ed: HEP--increase stretching throughout the day, CP usage at home/heat ok to quad/hip,            Manual Intervention (58660) 14'    STM to R , , quad  IASTM sweeps to quad above incision lat>med  5'  5'     Pat mobs sup/inf  Knee flex mobs gr ll-lll w/ towel  PROM knee flex--seated EOB  Oscillations for pain x1'  5x10\"  10\"x5  x1'                        NMR re-education (70093)  CUES NEEDED   Gait with SPC Cue for quad activ in stance, inc' d hip/knee flex in swing   FSU  LSU  Reciprocal stairs   6\"x5, 4\" x10  4x 2HR B UE support, quad/glute cues  B UE support, quad/glute cues  Heavy B UE support                                            Therapeutic Exercise and NMR EXR  [x] (56822) Provided verbal/tactile cueing for activities related to strengthening, flexibility, endurance, ROM for improvements in LE, proximal hip, and core control with self care, mobility, lifting, ambulation.  [] (37865) Provided verbal/tactile cueing for activities related to improving balance, coordination, kinesthetic sense, posture, motor skill, proprioception  to assist with LE, proximal hip, and core control in self care, mobility, lifting, ambulation and eccentric single leg control.      NMR and Therapeutic Activities:    [x] (31183 or 40737) Provided verbal/tactile cueing for activities related to improving balance, coordination, kinesthetic sense, posture, motor skill, proprioception and motor activation to allow for proper function of core, proximal hip and LE with self care and ADLs  [] (93745) Gait Re-education- Provided training and instruction to the patient for proper LE, core and proximal hip recruitment and positioning and eccentric body weight control with ambulation re-education including up and down stairs     Home Exercise Program:    [x] (03767) Reviewed/Progressed HEP activities related to strengthening, flexibility, endurance, ROM of core, proximal hip and LE for functional self-care, mobility, lifting and ambulation/stair navigation   [] (05877)Reviewed/Progressed HEP activities related to improving balance, coordination, kinesthetic sense, posture, motor skill, proprioception of core, proximal hip and LE for self care, mobility, lifting, and ambulation/stair navigation      Manual Treatments:  PROM / STM / Oscillations-Mobs:  G-I, II, III, IV (PA's, Inf., Post.)  [x] (82488) Provided manual therapy to mobilize LE, proximal hip and/or LS spine soft tissue/joints for the purpose of modulating pain, promoting relaxation,  increasing ROM, reducing/eliminating soft tissue swelling/inflammation/restriction, improving soft tissue extensibility and allowing for proper ROM for normal function with self care, mobility, lifting and ambulation. Modalities:     [] GAME READY (VASO)- for significant edema, swelling, pain control. CP x 15 min, bilat knees, R hip       Charges  Timed Code Treatment Minutes: 55   Total Treatment Minutes: 70 (CP)     [] EVAL (LOW) 22248   [] EVAL (MOD) 40441  [] EVAL (HIGH) 82906   [] RE-EVAL     [x] NE(09476) x3    [] IONTO  [] NMR (68985) x     [] VASO  [x] Manual (50755) x  1    [] Other:  [] TA x      [] Mech Traction (90311)  [] ES(attended) (76018)      [] ES (un) (53669):       GOALS:   Patient stated goal: Pt would like to get back to functioning without pain.    [] Progressing: [] Met: [] Not Met: [] Adjusted    Therapist goals for Patient:   Short Term Goals: To be achieved in: 2 weeks  1. Independent in HEP and progression per patient tolerance, in order to prevent re-injury. [] Progressing: [x] Met: [] Not Met: [] Adjusted  2. Patient will have a decrease in pain to facilitate improvement in movement, function, and ADLs as indicated by Functional Deficits. [] Progressing: [] Met: [x] Not Met: [] Adjusted    Long Term Goals: To be achieved in: 12 weeks  1. Disability index score of 50% or less for the LEFS to assist with reaching prior level of function. [] Progressing: [] Met: [] Not Met: [] Adjusted  2. Patient will demonstrate increased AROM with knee flexion to at least 110 deg and lacking 2 degrees of knee ext in order to be able to navigate his home, get into and out of his car and don/doff clothing without pain or restriction. [] Progressing: [] Met: [] Not Met: [] Adjusted  3. Patient will demonstrate an increase in Strength with hip abd, hip flexion and knee ext to at least 4+/5 in order to be able to ascend/descend stairs and walk for longer periods of time without pain or restriction. [] Progressing: [] Met: [] Not Met: [] Adjusted  4. Patient will be able to perform sit to stand from chair height without airex indep in order to be able to perform functional transfers and use the restroom indep without pain or restriction. Progression Towards Functional goals:  [] Patient is progressing as expected towards functional goals listed. [x] Progression is slowed due to complexities listed. [] Progression has been slowed due to co-morbidities. [] Plan just implemented, too soon to assess goals progression  [] Other:     Overall Progression Towards Functional goals/ Treatment Progress Update:  [] Patient is progressing as expected towards functional goals listed. [x] Progression is slowed due to complexities/Impairments listed. [] Progression has been slowed due to co-morbidities.   [] Plan just implemented, too soon to assess goals progression <30days   [] Goals require adjustment due to lack of progress  [] Patient is not progressing as expected and requires additional follow up with physician  [] Other    Prognosis for POC: [x] Good [] Fair  [] Poor      Patient requires continued skilled intervention: [x] Yes  [] No    Treatment/Activity Tolerance:  [x] Patient able to complete treatment  [] Patient limited by fatigue  [] Patient limited by pain    [] Patient limited by other medical complications  [] Other:     ASSESSMENT: gait mechanics were noted to have improved this visit. Pt continues to struggle with quad and hip activation, mayra with stairs activities. Pt fatigued following session. Knee flexion ROM staying around 100*    Return to Play: (if applicable)   []  Stage 1: Intro to Strength   []  Stage 2: Return to Run and Strength   []  Stage 3: Return to Jump and Strength   []  Stage 4: Dynamic Strength and Agility   []  Stage 5: Sport Specific Training     []  Ready to Return to Play, Meets All Above Stages   []  Not Ready for Return to Sports   Comments:                         PLAN: high deductible reset in Jan 2022, pt says he will talk it over with his wife re: continuing, but may choose to wait to see what MD says at next appt  [x] Continue per plan of care [] Alter current plan (see comments above)  [] Plan of care initiated [] Hold pending MD visit [] Discharge      Electronically signed by:  Adelita Suárez PT  Note: If patient does not return for scheduled/ recommended follow up visits, this note will serve as a discharge from care along with most recent update on progress. Access Code: [email or web address]  URL: [email or web address]  Date: 11/15/2021  Prepared by: Adelita Suárez    Exercises  Supine Quad Set - 1 x daily - 7 x weekly - 2 sets - 10 reps - 5 sec hold  Supine Ankle Pumps - 1 x daily - 7 x weekly - 2 sets - 10 reps  Supine Knee Extension Stretch on Towel Roll - 1 x daily - 7 x weekly - 2 sets - 1 min each hold  Seated Knee Extension AROM - 1 x daily - 7 x weekly - 2 sets - 10 reps  Supine Heel Slide with Strap - 1 x daily - 7 x weekly - 1 sets - 10 reps - 5 sec hold

## 2021-12-29 RX ORDER — CYCLOBENZAPRINE HCL 10 MG
10 TABLET ORAL 3 TIMES DAILY PRN
Qty: 90 TABLET | Refills: 0 | Status: SHIPPED | OUTPATIENT
Start: 2021-12-29 | End: 2022-01-28

## 2021-12-30 ENCOUNTER — HOSPITAL ENCOUNTER (OUTPATIENT)
Dept: PHYSICAL THERAPY | Age: 61
Setting detail: THERAPIES SERIES
Discharge: HOME | End: 2021-12-30
Payer: COMMERCIAL

## 2021-12-30 PROCEDURE — 97140 MANUAL THERAPY 1/> REGIONS: CPT

## 2021-12-30 PROCEDURE — 97110 THERAPEUTIC EXERCISES: CPT

## 2021-12-30 NOTE — FLOWSHEET NOTE
[facility],  [address]  Phone: [phone]  Fax [phone]    Physical Therapy Treatment Note/ Progress Report:           Date:  2021    Patient Name:  Bert Leone  \"Bo\"  :  1960  MRN: [id number]  Restrictions/Precautions:    Medical/Treatment Diagnosis Information:  Diagnosis: I66.117 (ICD-10-CM) - Status post right knee replacement  Treatment Diagnosis: M25.561 R knee pain, R26.2 Difficulty walking  Insurance/Certification information:  PT Insurance Information: Med mutual BMN, no auth 90/10 co-ins  Physician Information:  Referring Practitioner: Radha Guallpa MD  Has the plan of care been signed (Y/N):        [x]  Yes  []  No     Date of Patient follow up with Physician: ~22      Is this a Progress Report:     []  Yes  [x]  No        If Yes:  Date Range for reporting period:  Beginnin/15/21  Endin21    Progress report will be due (10 Rx or 30 days whichever is less):        Recertification will be due (POC Duration  / 90 days whichever is less): 22       Visit # Insurance Allowable Auth Required   In-person 14 BMN []  Yes []  No    Telehealth   []  Yes []  No    Total          Functional Scale: LEFS:  83% disability   Date assessed: 11/15/21      Therapy Diagnosis/Practice Pattern: H      Number of Comorbidities:  []0     [x]1-2    []3+    Latex Allergy:  [x]NO      []YES  Preferred Language for Healthcare:   [x]English       []other:      Pain level:    current pain: 6/10 currently, 3-8/10 best to worst    SUBJECTIVE:  Pt reports he feels himself limping still, but he can improve this with thinking of bending his knee more. He still has fluctuating pain, mayra after inc'd flexion in PT sessions.        OBJECTIVE:    Observation:   Test measurements: ; PROM 0-107 supine foot on table        RESTRICTIONS/PRECAUTIONS: DM, HTN, L TKR in July 2021    Exercises/Interventions:     Therapeutic Ex (19328) Sets/sec/reps Notes/CUES   Hip Flexor and quad S table-side (sits on edge of hi-low mat) 20-30'' x 5    SAQ    Heel Prop  HEP   Ankle Pumps  HEP   HEP     LAQ   1# 2x10 1# HEP   Supine Calf S  Standing Incline S 30'' x 3     Held due to groin pain       TKE  See below   Standing marches    Supine bridge w/ incr flex 5\"X15    Standing HSC     Knee flexion on stairs 10x10\" R, 2x10\" L 100* R/110* L, HEP   Standing hip abd 45#, ext to TKE 60#  Flex 30# R, 45 # L   2x10 ea R/L  2x10 R, L ea UE supp, posture cues   LP B  ecc R  SL R  Calf raises x15  x10  x10  2x10 80#  80#  60#   EZ stretch 3x1 min 90* w/ EZ S   recumbent bike 5'  10 seat height; able to revolve after ~1 min heavy leaning   Pt ed: HEP--increase stretching throughout the day, CP usage at home/heat ok to quad/hip,            Manual Intervention (68751) 20'    STM to R, quad  IASTM sweeps to quad above incision lat>med  8'  5'     Pat mobs sup/inf  Knee flex mobs gr ll-lll w/ towel  PROM knee flex--seated EOB  Oscillations for pain x1'  5x10\"  10\"x5  x1'                        NMR re-education (02531)  CUES NEEDED   Gait with SPC Cue for quad activ in stance, inc' d hip/knee flex in swing   FSU  LSU  Reciprocal stairs    Resume NV B UE support, quad/glute cues  B UE support, quad/glute cues  Heavy B UE support                                            Therapeutic Exercise and NMR EXR  [x] (16582) Provided verbal/tactile cueing for activities related to strengthening, flexibility, endurance, ROM for improvements in LE, proximal hip, and core control with self care, mobility, lifting, ambulation.  [] (67698) Provided verbal/tactile cueing for activities related to improving balance, coordination, kinesthetic sense, posture, motor skill, proprioception  to assist with LE, proximal hip, and core control in self care, mobility, lifting, ambulation and eccentric single leg control.      NMR and Therapeutic Activities:    [x] (20135 or 79548) Provided verbal/tactile cueing for activities related to improving balance, coordination, kinesthetic sense, posture, motor skill, proprioception and motor activation to allow for proper function of core, proximal hip and LE with self care and ADLs  [] (44681) Gait Re-education- Provided training and instruction to the patient for proper LE, core and proximal hip recruitment and positioning and eccentric body weight control with ambulation re-education including up and down stairs     Home Exercise Program:    [x] (36411) Reviewed/Progressed HEP activities related to strengthening, flexibility, endurance, ROM of core, proximal hip and LE for functional self-care, mobility, lifting and ambulation/stair navigation   [] (14280)Reviewed/Progressed HEP activities related to improving balance, coordination, kinesthetic sense, posture, motor skill, proprioception of core, proximal hip and LE for self care, mobility, lifting, and ambulation/stair navigation      Manual Treatments:  PROM / STM / Oscillations-Mobs:  G-I, II, III, IV (PA's, Inf., Post.)  [x] (77548) Provided manual therapy to mobilize LE, proximal hip and/or LS spine soft tissue/joints for the purpose of modulating pain, promoting relaxation,  increasing ROM, reducing/eliminating soft tissue swelling/inflammation/restriction, improving soft tissue extensibility and allowing for proper ROM for normal function with self care, mobility, lifting and ambulation. Modalities:     [] GAME READY (VASO)- for significant edema, swelling, pain control.    CP x 15 min, bilat knees, R hip-pt will ice at home today       Charges  Timed Code Treatment Minutes: 65   Total Treatment Minutes: 70 rest breaks     [] EVAL (LOW) 46673   [] EVAL (MOD) 79817  [] EVAL (HIGH) 91598   [] RE-EVAL     [x] VA(11042) x3    [] IONTO  [] NMR (35940) x     [] VASO  [x] Manual (67681) x  1    [] Other:  [] TA x      [] Mech Traction (33431)  [] ES(attended) (83360)      [] ES (un) (31377):       GOALS:   Patient stated goal: Pt would like to get back to functioning without pain. [] Progressing: [] Met: [] Not Met: [] Adjusted    Therapist goals for Patient:   Short Term Goals: To be achieved in: 2 weeks  1. Independent in HEP and progression per patient tolerance, in order to prevent re-injury. [] Progressing: [x] Met: [] Not Met: [] Adjusted  2. Patient will have a decrease in pain to facilitate improvement in movement, function, and ADLs as indicated by Functional Deficits. [] Progressing: [] Met: [x] Not Met: [] Adjusted    Long Term Goals: To be achieved in: 12 weeks  1. Disability index score of 50% or less for the LEFS to assist with reaching prior level of function. [] Progressing: [] Met: [] Not Met: [] Adjusted  2. Patient will demonstrate increased AROM with knee flexion to at least 110 deg and lacking 2 degrees of knee ext in order to be able to navigate his home, get into and out of his car and don/doff clothing without pain or restriction. [] Progressing: [] Met: [] Not Met: [] Adjusted  3. Patient will demonstrate an increase in Strength with hip abd, hip flexion and knee ext to at least 4+/5 in order to be able to ascend/descend stairs and walk for longer periods of time without pain or restriction. [] Progressing: [] Met: [] Not Met: [] Adjusted  4. Patient will be able to perform sit to stand from chair height without airex indep in order to be able to perform functional transfers and use the restroom indep without pain or restriction. Progression Towards Functional goals:  [] Patient is progressing as expected towards functional goals listed. [x] Progression is slowed due to complexities listed. [] Progression has been slowed due to co-morbidities.   [] Plan just implemented, too soon to assess goals progression  [] Other:     Overall Progression Towards Functional goals/ Treatment Progress Update:  [] Patient is progressing as expected towards functional goals listed. [x] Progression is slowed due to complexities/Impairments listed. [] Progression has been slowed due to co-morbidities. [] Plan just implemented, too soon to assess goals progression <30days   [] Goals require adjustment due to lack of progress  [] Patient is not progressing as expected and requires additional follow up with physician  [] Other    Prognosis for POC: [x] Good [] Fair  [] Poor      Patient requires continued skilled intervention: [x] Yes  [] No    Treatment/Activity Tolerance:  [x] Patient able to complete treatment  [] Patient limited by fatigue  [] Patient limited by pain    [] Patient limited by other medical complications  [] Other:     ASSESSMENT: Pt remains still into flexion, but he does loosen up with manual tx, quad stretching and reps of AAROM. He still lacks good quad control in SLS to d/c use of SPC, also R hip pain is limiting comfort in [address]. Discussed 1 vs 2 visits/week d/t high deductible starting in Jan. Can give [facility] gym pass as well. Return to Play: (if applicable)   []  Stage 1: Intro to Strength   []  Stage 2: Return to Run and Strength   []  Stage 3: Return to Jump and Strength   []  Stage 4: Dynamic Strength and Agility   []  Stage 5: Sport Specific Training     []  Ready to Return to Play, Meets All Above Stages   []  Not Ready for Return to Sports   Comments:                         PLAN: high deductible reset in Jan 2022, pt says he will talk it over with his wife re: continuing, but may choose to wait to see what MD says at next appt  [x] Continue per plan of care [] Alter current plan (see comments above)  [] Plan of care initiated [] Hold pending MD visit [] Discharge      Electronically signed by:  Chiquita Gould, PT , DPT  Note: If patient does not return for scheduled/ recommended follow up visits, this note will serve as a discharge from care along with most recent update on progress. Access Code: [email or web address]  URL: [email or web address]  Date: 11/15/2021  Prepared by: Brandon Weldon    Exercises  Supine Quad Set - 1 x daily - 7 x weekly - 2 sets - 10 reps - 5 sec hold  Supine Ankle Pumps - 1 x daily - 7 x weekly - 2 sets - 10 reps  Supine Knee Extension Stretch on Towel Roll - 1 x daily - 7 x weekly - 2 sets - 1 min each hold  Seated Knee Extension AROM - 1 x daily - 7 x weekly - 2 sets - 10 reps  Supine Heel Slide with Strap - 1 x daily - 7 x weekly - 1 sets - 10 reps - 5 sec hold

## 2022-01-03 ENCOUNTER — APPOINTMENT (OUTPATIENT)
Dept: PHYSICAL THERAPY | Age: 62
End: 2022-01-03
Payer: COMMERCIAL

## 2022-01-07 ENCOUNTER — HOSPITAL ENCOUNTER (OUTPATIENT)
Dept: PHYSICAL THERAPY | Age: 62
Setting detail: THERAPIES SERIES
Discharge: HOME | End: 2022-01-07
Payer: COMMERCIAL

## 2022-01-07 PROCEDURE — 97140 MANUAL THERAPY 1/> REGIONS: CPT

## 2022-01-07 PROCEDURE — 97110 THERAPEUTIC EXERCISES: CPT

## 2022-01-07 NOTE — FLOWSHEET NOTE
[facility],  [address] [place]  Phone: [phone]  Fax [phone]    Physical Therapy Treatment Note/ Progress Report:           Date:  2022    Patient Name:  Ruslan Guajardo  \"Bo\"  :  1960  MRN: [id number]  Restrictions/Precautions:    Medical/Treatment Diagnosis Information:  Diagnosis: Z33.387 (ICD-10-CM) - Status post right knee replacement  Treatment Diagnosis: M25.561 R knee pain, R26.2 Difficulty walking  Insurance/Certification information:  PT Insurance Information: Med mutual BMN, no auth 90/10 co-ins  Physician Information:  Referring Practitioner: Carmen Alvarado MD  Has the plan of care been signed (Y/N):        [x]  Yes  []  No     Date of Patient follow up with Physician: ~22      Is this a Progress Report:     []  Yes  [x]  No        If Yes:  Date Range for reporting period:  Beginnin/15/21  Endin21    Progress report will be due (10 Rx or 30 days whichever is less):        Recertification will be due (POC Duration  / 90 days whichever is less): 22       Visit # Insurance Allowable Auth Required   In-person 15 BMN []  Yes []  No    Telehealth   []  Yes []  No    Total          Functional Scale: LEFS:  83% disability   Date assessed: 11/15/21      Therapy Diagnosis/Practice Pattern: H      Number of Comorbidities:  []0     [x]1-2    []3+    Latex Allergy:  [x]NO      []YES  Preferred Language for Healthcare:   [x]English       []other:      Pain level:    current pain: 6/10 currently, 3-8/10 best to worst    SUBJECTIVE:  Pt reports he may notice small improvements in his knee pain. Hip pain feels about the same, limits his ability to forward bend or rotate for ADLs.        OBJECTIVE:    Observation:   Test measurements: AAROM knee flexion: 105* on SB; PROM 0-110 supine foot on table        RESTRICTIONS/PRECAUTIONS: DM, HTN, L TKR in July 2021    Exercises/Interventions:     Therapeutic Ex (94999) Sets/sec/reps Notes/CUES   Hip Flexor and quad S table-side (sits on edge of hi-low mat) 20-30'' x 5    SAQ    Heel Prop  HEP   Ankle Pumps  HEP   HEP     LAQ   1# 2x10 1# HEP   Supine Calf S  Standing Incline S 30'' x 3     Held due to groin pain       TKE  See below   Standing marches    Supine bridge w/ incr flex 5\"X15    Standing HSC     Knee flexion on stairs 10x10\" R, 2x10\" L 100* R/110* L, HEP   Standing hip abd 45#, ext to TKE 60#  Flex 30# R, 45 # L   2x10 ea R/L  2x10 R, L ea I exc ater set-up; UE supp, posture cues   LP B  ecc R  SL R  Calf raises x15  x10  x10  2x10 80#  80#  60#   EZ stretch 3x1 min 90* w/ EZ S   recumbent bike 5'  10 seat height; able to revolve after ~1 min heavy leaning   Pt ed: HEP--increase stretching throughout the day, CP usage at home/heat ok to quad/hip,            Manual Intervention (83085) 20'    STM to R, quad  IASTM sweeps to quad above incision lat>med  8'  5'     Pat mobs sup/inf  Knee flex mobs gr ll-lll w/ towel  PROM knee flex--seated EOB  Oscillations for pain x1'  5x10\"  10\"x5  x1'                        NMR re-education (56896)  CUES NEEDED   Gait with SPC Cue for quad activ in stance, inc' d hip/knee flex in swing   FSU  LSU  Reciprocal stairs    Resume NV B UE support, quad/glute cues  B UE support, quad/glute cues  Heavy B UE support                                            Therapeutic Exercise and NMR EXR  [x] (84467) Provided verbal/tactile cueing for activities related to strengthening, flexibility, endurance, ROM for improvements in LE, proximal hip, and core control with self care, mobility, lifting, ambulation.  [] (67638) Provided verbal/tactile cueing for activities related to improving balance, coordination, kinesthetic sense, posture, motor skill, proprioception  to assist with LE, proximal hip, and core control in self care, mobility, lifting, ambulation and eccentric single leg control. NMR and Therapeutic Activities:    [x] (25979 or 09233) Provided verbal/tactile cueing for activities related to improving balance, coordination, kinesthetic sense, posture, motor skill, proprioception and motor activation to allow for proper function of core, proximal hip and LE with self care and ADLs  [] (88800) Gait Re-education- Provided training and instruction to the patient for proper LE, core and proximal hip recruitment and positioning and eccentric body weight control with ambulation re-education including up and down stairs     Home Exercise Program:    [x] (44389) Reviewed/Progressed HEP activities related to strengthening, flexibility, endurance, ROM of core, proximal hip and LE for functional self-care, mobility, lifting and ambulation/stair navigation   [] (62861)Reviewed/Progressed HEP activities related to improving balance, coordination, kinesthetic sense, posture, motor skill, proprioception of core, proximal hip and LE for self care, mobility, lifting, and ambulation/stair navigation      Manual Treatments:  PROM / STM / Oscillations-Mobs:  G-I, II, III, IV (PA's, Inf., Post.)  [x] (13843) Provided manual therapy to mobilize LE, proximal hip and/or LS spine soft tissue/joints for the purpose of modulating pain, promoting relaxation,  increasing ROM, reducing/eliminating soft tissue swelling/inflammation/restriction, improving soft tissue extensibility and allowing for proper ROM for normal function with self care, mobility, lifting and ambulation. Modalities:     [] GAME READY (VASO)- for significant edema, swelling, pain control.    CP x 15 min, bilat knees       Charges  Timed Code Treatment Minutes: 40   Total Treatment Minutes: 70 I exc, rest breaks     [] EVAL (LOW) 48732   [] EVAL (MOD) 77855  [] EVAL (HIGH) 74175   [] RE-EVAL     [x] QY(81380) x2    [] EIXKP  [] NMR (34020) x     [] VASO  [x] Manual (00754) x  1    [] Other:  [] TA x      [] Mech Traction (37651)  [] ES(attended) (91449)      [] ES (un) (11346):       GOALS:   Patient stated goal: Pt would like to get back to functioning without pain. [] Progressing: [] Met: [] Not Met: [] Adjusted    Therapist goals for Patient:   Short Term Goals: To be achieved in: 2 weeks  1. Independent in HEP and progression per patient tolerance, in order to prevent re-injury. [] Progressing: [x] Met: [] Not Met: [] Adjusted  2. Patient will have a decrease in pain to facilitate improvement in movement, function, and ADLs as indicated by Functional Deficits. [] Progressing: [] Met: [x] Not Met: [] Adjusted    Long Term Goals: To be achieved in: 12 weeks  1. Disability index score of 50% or less for the LEFS to assist with reaching prior level of function. [] Progressing: [] Met: [] Not Met: [] Adjusted  2. Patient will demonstrate increased AROM with knee flexion to at least 110 deg and lacking 2 degrees of knee ext in order to be able to navigate his home, get into and out of his car and don/doff clothing without pain or restriction. [] Progressing: [] Met: [] Not Met: [] Adjusted  3. Patient will demonstrate an increase in Strength with hip abd, hip flexion and knee ext to at least 4+/5 in order to be able to ascend/descend stairs and walk for longer periods of time without pain or restriction. [] Progressing: [] Met: [] Not Met: [] Adjusted  4. Patient will be able to perform sit to stand from chair height without airex indep in order to be able to perform functional transfers and use the restroom indep without pain or restriction. Progression Towards Functional goals:  [] Patient is progressing as expected towards functional goals listed. [x] Progression is slowed due to complexities listed. [] Progression has been slowed due to co-morbidities.   [] Plan just implemented, too soon to assess goals progression  [] Other:     Overall Progression Towards Functional goals/ Treatment Progress Update:  [] Patient is progressing as expected towards functional goals listed. [x] Progression is slowed due to complexities/Impairments listed. [] Progression has been slowed due to co-morbidities. [] Plan just implemented, too soon to assess goals progression <30days   [] Goals require adjustment due to lack of progress  [] Patient is not progressing as expected and requires additional follow up with physician  [] Other    Prognosis for POC: [x] Good [] Fair  [] Poor      Patient requires continued skilled intervention: [x] Yes  [] No    Treatment/Activity Tolerance:  [x] Patient able to complete treatment  [] Patient limited by fatigue  [] Patient limited by pain    [] Patient limited by other medical complications  [] Other:     ASSESSMENT: Pt remains stiff still into flexion, but can achieve 0-110 with PROM. He was given full gym program to start I workouts at [employer] and will utilize PT sessions still for manual tx as needed or to update HEP. Return to Play: (if applicable)   []  Stage 1: Intro to Strength   []  Stage 2: Return to Run and Strength   []  Stage 3: Return to Jump and Strength   []  Stage 4: Dynamic Strength and Agility   []  Stage 5: Sport Specific Training     []  Ready to Return to Play, Meets All Above Stages   []  Not Ready for Return to Sports   Comments:                         PLAN: high deductible reset in Jan 2022, pt says he will talk it over with his wife re: continuing, but may choose to wait to see what MD says at next appt  [x] Continue per plan of care [] Alter current plan (see comments above)  [] Plan of care initiated [] Hold pending MD visit [] Discharge      Electronically signed by:  Carina Garay, PT , DPT  Note: If patient does not return for scheduled/ recommended follow up visits, this note will serve as a discharge from care along with most recent update on progress. Access Code: [email or web address]  URL: [email or web address]  Date: 01/07/2022  Prepared by: [facility]    Exercises  Standing Repeated Hip Extension with Ankle Weight - 1 x daily - 2 x weekly - 1 sets - 15 reps  Standing Repeated Hip Flexion with Ankle Weight - 1 x daily - 2 x weekly - 1 sets - 15 reps  Standing Repeated Hip Abduction with Ankle Weight - 1 x daily - 2 x weekly - 1 sets - 15 reps  Full Leg Press - 1 x daily - 2 x weekly - 2 sets - 10 reps  Eccentric Single Leg Press 2 Up, 1 Down - 1 x daily - 2 x weekly - 1 sets - 10 reps  Single Leg Press - 1 x daily - 2 x weekly - 1 sets - 10 reps  Heel Raises with Leg Press - 1 x daily - 2 x weekly - 2 sets - 10 reps  Eccentric Knee Extension with Weight Machine - 1 x daily - 2 x weekly - 2 sets - 10 reps  Eccentric Hamstring Curl with Weight Machine - 1 x daily - 2 x weekly - 2 sets - 10 reps  Forward Step Up with Counter Support - 1 x daily - 2 x weekly - 1 sets - 10 reps  Lateral Step Up with Unilateral Counter Support - 1 x daily - 2 x weekly - 1 sets - 10 reps  Forward Step Down with Counter Support at Side - 1 x daily - 2 x weekly - 1 sets - 10 reps  Mini Squat with Counter Support - 1 x daily - 2 x weekly - 2 sets - 10 reps  Standing Tandem Balance with Counter Support - 1 x daily - 2 x weekly - 1 sets - 2 reps - 30 hold  Single Leg Stance with Support - 1 x daily - 2 x weekly - 1 sets - 10 reps - 10 hold

## 2022-01-10 ENCOUNTER — HOSPITAL ENCOUNTER (OUTPATIENT)
Dept: PHYSICAL THERAPY | Age: 62
Setting detail: THERAPIES SERIES
End: 2022-01-10
Payer: COMMERCIAL

## 2022-01-13 ENCOUNTER — HOSPITAL ENCOUNTER (OUTPATIENT)
Dept: PHYSICAL THERAPY | Age: 62
Setting detail: THERAPIES SERIES
Discharge: HOME | End: 2022-01-13
Payer: COMMERCIAL

## 2022-01-13 ENCOUNTER — TELEPHONE (OUTPATIENT)
Dept: ORTHOPEDIC SURGERY | Age: 62
End: 2022-01-13

## 2022-01-13 ENCOUNTER — OFFICE VISIT (OUTPATIENT)
Dept: ORTHOPEDIC SURGERY | Age: 62
End: 2022-01-13
Payer: COMMERCIAL

## 2022-01-13 VITALS — BODY MASS INDEX: 46.38 KG/M2 | HEIGHT: 68 IN | WEIGHT: 306 LBS

## 2022-01-13 DIAGNOSIS — Z96.652 HISTORY OF TOTAL KNEE ARTHROPLASTY, LEFT: ICD-10-CM

## 2022-01-13 DIAGNOSIS — Z96.651 HISTORY OF TOTAL KNEE ARTHROPLASTY, RIGHT: Primary | ICD-10-CM

## 2022-01-13 PROCEDURE — 97110 THERAPEUTIC EXERCISES: CPT

## 2022-01-13 PROCEDURE — 99213 OFFICE O/P EST LOW 20 MIN: CPT | Performed by: ORTHOPAEDIC SURGERY

## 2022-01-13 NOTE — PROGRESS NOTES
[facility], [address] [place]  Phone: [phone]  Fax [phone]    Physical Therapy Treatment Note/ Progress Report:           Date:  2022    Patient Name:  Maryann Coppola  \"Bo\"  :  1960  MRN: [id number]  Restrictions/Precautions:    Medical/Treatment Diagnosis Information:  Diagnosis: O95.966 (ICD-10-CM) - Status post right knee replacement  Treatment Diagnosis: M25.561 R knee pain, R26.2 Difficulty walking  Insurance/Certification information:  PT Insurance Information: Med mutual BMN, no auth 90/10 co-ins  Physician Information:  Referring Practitioner: Juma Cuevas MD  Has the plan of care been signed (Y/N):        [x]  Yes  []  No     Date of Patient follow up with Physician: ~22      Is this a Progress Report:     [x]  Yes  []  No        If Yes:  Date Range for reporting period:  Beginnin/15/21  Endin21    Progress report will be due (10 Rx or 30 days whichever is less):        Recertification will be due (POC Duration  / 90 days whichever is less): 22       Visit # Insurance Allowable Auth Required   In-person 16-PN written BMN []  Yes []  No    Telehealth   []  Yes []  No    Total          Functional Scale: LEFS:  83% disability   Date assessed: 11/15/21   Functional Scale: LEFS:  27/80=66% disability   Date assessed: 21     Therapy Diagnosis/Practice Pattern: H      Number of Comorbidities:  []0     [x]1-2    []3+    Latex Allergy:  [x]NO      []YES  Preferred Language for Healthcare:   [x]English       []other:      Pain level:    current pain: 6/10 currently, 3-8/10 best to worst    SUBJECTIVE:  Pt had f/u with Dr. Swain Height this am. xrays look great R and L knee and he was encouraged to resume more I gym workouts and can still utilize PT sessions prn to progress gym program/check ROM and strength. He was given 1 more month of time off work.    Pt voices that his biggest concern is knowing how his ROM is progressing. He wants to work on getting less stiff with initial sit-->stand, progressing quality of gait and distance/time walking as he feels 15-20 min is about his limit. Pain still fluctuates depending on how much activity he does. He has not gotten the chance to look at updated gym program given to him at last PT session. OBJECTIVE:    Observation:    Test measurements: AROM pre-tx R knee 0-107 L 0-110 (no change post-tx)    MMT hip flex 4/5 (limited by pain at prox rectus and groin)  abd 3+/5    Knee ext 4+/5  Knee flex 4/5     Ankle DF 5/5    Ankle PF 5/5 seated (B x20 standing; unable to perform SL standing); hip abd 4/5, hip ext 3+/5 (SL bridge)    RESTRICTIONS/PRECAUTIONS: DM, HTN, L TKR in July 2021    Exercises/Interventions:     Therapeutic Ex (49697) Sets/sec/reps Notes/CUES   Hip Flexor and quad S table-side (sits on edge of hi-low mat) 20-30'' x 5    SAQ    Heel Prop  HEP   Ankle Pumps  HEP   HEP     LAQ   1# 2x10 1# HEP   Supine Calf S  Standing Incline S 30'' x 3     Held due to groin pain       TKE  See below   Standing marches    Supine bridge w/ incr flex 5\"X15    Standing HSC     Knee flexion on stairs 10x10\" R, 2x10\" L 100* R/110* L, HEP   Standing hip abd 45#, ext to TKE 60#  Flex 30# R, 45 # L   2x10 ea R/L  2x10 R, L ea I exc ater set-up; UE supp, posture cues   LP B  ecc R  SL R  Calf raises x15  x10  x10  2x10 80#  80#  60#   EZ stretch 3x1 min 90* w/ EZ S   recumbent bike 5'  10 seat height; able to revolve after ~1 min heavy leaning   Pt ed:   HEP--increase stretching throughout the day, CP usage at home/heat ok to quad/hip,            Manual Intervention (51598)     STM to R, quad  IASTM sweeps to quad above incision lat>med  8'  5'     Pat mobs sup/inf  Knee flex mobs gr ll-lll w/ towel  PROM knee flex--seated EOB  Oscillations for pain x1'  5x10\"  10\"x5  x1'                        NMR re-education (26160)  CUES NEEDED   Gait with SPC Cue for quad activ in stance, inc' d hip/knee flex in swing   FSU  LSU  Reciprocal stairs    Resume NV B UE support, quad/glute cues  B UE support, quad/glute cues  Heavy B UE support                                            Therapeutic Exercise and NMR EXR  [x] (05952) Provided verbal/tactile cueing for activities related to strengthening, flexibility, endurance, ROM for improvements in LE, proximal hip, and core control with self care, mobility, lifting, ambulation.  [] (68592) Provided verbal/tactile cueing for activities related to improving balance, coordination, kinesthetic sense, posture, motor skill, proprioception  to assist with LE, proximal hip, and core control in self care, mobility, lifting, ambulation and eccentric single leg control.      NMR and Therapeutic Activities:    [x] (56844 or 32708) Provided verbal/tactile cueing for activities related to improving balance, coordination, kinesthetic sense, posture, motor skill, proprioception and motor activation to allow for proper function of core, proximal hip and LE with self care and ADLs  [] (10999) Gait Re-education- Provided training and instruction to the patient for proper LE, core and proximal hip recruitment and positioning and eccentric body weight control with ambulation re-education including up and down stairs     Home Exercise Program:    [x] (56738) Reviewed/Progressed HEP activities related to strengthening, flexibility, endurance, ROM of core, proximal hip and LE for functional self-care, mobility, lifting and ambulation/stair navigation   [] (21601)Reviewed/Progressed HEP activities related to improving balance, coordination, kinesthetic sense, posture, motor skill, proprioception of core, proximal hip and LE for self care, mobility, lifting, and ambulation/stair navigation      Manual Treatments:  PROM / STM / Oscillations-Mobs:  G-I, II, III, IV (PA's, Inf., Post.)  [x] (44952) Provided manual therapy to mobilize LE, proximal hip and/or LS spine soft tissue/joints for the purpose of modulating pain, promoting relaxation,  increasing ROM, reducing/eliminating soft tissue swelling/inflammation/restriction, improving soft tissue extensibility and allowing for proper ROM for normal function with self care, mobility, lifting and ambulation. Modalities:     [] GAME READY (VASO)- for significant edema, swelling, pain control. CP x 15 min, bilat knees       Charges  Timed Code Treatment Minutes: 30   Total Treatment Minutes: 45 I exc, rest breaks     [] EVAL (LOW) 16517   [] EVAL (MOD) 86559  [] EVAL (HIGH) 33366    [] RE-EVAL     [x] LG(99048) x2    [] VCBLH  [] NMR (65008) x     [] VASO  [] Manual (89993) x      [] Other:  [] TA x      [] Mech Traction (45298)  [] ES(attended) (18053)      [] ES (un) (47327):       GOALS:   Patient stated goal: Pt would like to get back to functioning without pain. [] Progressing: [] Met: [] Not Met: [] Adjusted    Therapist goals for Patient:   Short Term Goals: To be achieved in: 2 weeks  1. Independent in HEP and progression per patient tolerance, in order to prevent re-injury. [] Progressing: [x] Met: [] Not Met: [] Adjusted  2. Patient will have a decrease in pain to facilitate improvement in movement, function, and ADLs as indicated by Functional Deficits. [] Progressing: [x] Met: [] Not Met: [] Adjusted    Long Term Goals: To be achieved in: 12 weeks  1. Disability index score of 50% or less for the LEFS to assist with reaching prior level of function. [x] Progressing: [] Met: [] Not Met: [] Adjusted  2. Patient will demonstrate increased AROM with knee flexion to at least 110 deg and lacking 2 degrees of knee ext in order to be able to navigate his home, get into and out of his car and don/doff clothing without pain or restriction. [x] Progressing: [] Met: [] Not Met: [] Adjusted  3.  Patient will demonstrate an increase in Strength with hip abd, hip flexion and knee ext to at least 4+/5 in order to be able to ascend/descend stairs and walk for longer periods of time without pain or restriction. [x] Progressing: can ascend/descend steps with reciprocal pattern intermittently (dependent on pain) and can ambulate short ocmm distances (in/out of MD appts, in/out stores) [] Met: [] Not Met: [] Adjusted  4. Patient will be able to perform sit to stand from chair height without airex indep in order to be able to perform functional transfers and use the restroom indep without pain or restriction. [x] Progressing: still sits in elevated chairs, recliner from home, and uses comfort height commode [] Met: [] Not Met: [] Adjusted    Progression Towards Functional goals:  [] Patient is progressing as expected towards functional goals listed. [x] Progression is slowed due to complexities listed. [] Progression has been slowed due to co-morbidities. [] Plan just implemented, too soon to assess goals progression  [] Other:     Overall Progression Towards Functional goals/ Treatment Progress Update:  [] Patient is progressing as expected towards functional goals listed. [x] Progression is slowed due to complexities/Impairments listed. [] Progression has been slowed due to co-morbidities. [] Plan just implemented, too soon to assess goals progression <30days   [] Goals require adjustment due to lack of progress  [] Patient is not progressing as expected and requires additional follow up with physician  [] Other    Prognosis for POC: [x] Good [] Fair  [] Poor      Patient requires continued skilled intervention: [x] Yes  [] No    Treatment/Activity Tolerance:  [x] Patient able to complete treatment  [] Patient limited by fatigue  [] Patient limited by pain    [] Patient limited by other medical complications  [] Other:     ASSESSMENT: Pt will f/u as needed next 30 days of POC to utilize PT for manual tx as needed or to update HEP.  He still has functional deficits, specifically gait without AD, and steps/prolonged walking distance. ROM is still stiff into flexion. Return to Play: (if applicable)   []  Stage 1: Intro to Strength   []  Stage 2: Return to Run and Strength   []  Stage 3: Return to Jump and Strength   []  Stage 4: Dynamic Strength and Agility   []  Stage 5: Sport Specific Training     []  Ready to Return to Play, Meets All Above Stages   []  Not Ready for Return to Sports   Comments:                         PLAN: high deductible reset in Jan 2022, pt plans to work on I HEP/gym program and f/u in PT next month. [] Continue per plan of care [x] Alter current plan (see comments above)  [] Plan of care initiated [] Hold pending MD visit [] Discharge      Electronically signed by:  Kemar Henriquez, PT , DPT  Note: If patient does not return for scheduled/ recommended follow up visits, this note will serve as a discharge from care along with most recent update on progress. Access Code: [email or web address]  URL: [email or web address]  Date: 01/07/2022  Prepared by: Miguel Angel Chawla    Exercises  Standing Repeated Hip Extension with Ankle Weight - 1 x daily - 2 x weekly - 1 sets - 15 reps  Standing Repeated Hip Flexion with Ankle Weight - 1 x daily - 2 x weekly - 1 sets - 15 reps  Standing Repeated Hip Abduction with Ankle Weight - 1 x daily - 2 x weekly - 1 sets - 15 reps  Full Leg Press - 1 x daily - 2 x weekly - 2 sets - 10 reps  Eccentric Single Leg Press 2 Up, 1 Down - 1 x daily - 2 x weekly - 1 sets - 10 reps  Single Leg Press - 1 x daily - 2 x weekly - 1 sets - 10 reps  Heel Raises with Leg Press - 1 x daily - 2 x weekly - 2 sets - 10 reps  Eccentric Knee Extension with Weight Machine - 1 x daily - 2 x weekly - 2 sets - 10 reps  Eccentric Hamstring Curl with Weight Machine - 1 x daily - 2 x weekly - 2 sets - 10 reps  Forward Step Up with Counter Support - 1 x daily - 2 x weekly - 1 sets - 10 reps  Lateral Step Up with Unilateral Counter Support - 1 x daily - 2 x weekly - 1 sets - 10 reps  Forward Step Down with Counter Support at Side - 1 x daily - 2 x weekly - 1 sets - 10 reps  Mini Squat with Counter Support - 1 x daily - 2 x weekly - 2 sets - 10 reps  Standing Tandem Balance with Counter Support - 1 x daily - 2 x weekly - 1 sets - 2 reps - 30 hold  Single Leg Stance with Support - 1 x daily - 2 x weekly - 1 sets - 10 reps - 10 hold

## 2022-01-13 NOTE — PROGRESS NOTES
[facility]  Office Visit    Chief Complaint: Follow-up s/p bilateral total knee arthroplasty    HPI:  Kavon Dunham is a 63 y.o. who is here in follow-up of right total knee arthroplasty performed on 10/26/2021 and left total knee arthroplasty performed on 4/3/2021. He continues to work with physical therapy. He still has pain and occasional swelling in the leg but this is gradually improving over time. He is walking with the use of a cane. Patient Active Problem List   Diagnosis    Class 3 severe obesity due to excess calories with serious comorbidity and body mass index (BMI) of 45.0 to 49.9 in adult [facility])    Mixed hypertriglyceridemia    Nonalcoholic fatty liver disease    RLS (restless legs syndrome)    Essential hypertension, benign    Morbid obesity with BMI of 50.0-59.9, adult ([facility]    Chronic pain of left knee    Osteoarthritis of left knee    Chondromalacia of left knee    Pes planus    Lymphedema    Peripheral venous insufficiency    Allergic rhinitis    Type 2 diabetes mellitus without complication, without long-term current use of insulin ([facility]    TOM treated with BiPAP    Arthritis of left knee    Arthritis of right knee       ROS:  Constitutional: denies fever, chills, weight loss  MSK: denies pain in other joints, muscle aches  Neurological: denies numbness, tingling, weakness    Exam:  Height 5' 8\" (1.727 m), weight (!) 306 lb (138.8 kg). Appearance: sitting in exam room chair, appears to be in no acute distress, awake and alert  Resp: unlabored breathing on room air  Skin: warm, dry and intact with out erythema or significant increased temperature  Neuro: grossly intact both lower extremities. Intact sensation to light touch. Motor exam 4+ to 5/5 in all major motor groups. Right knee: Incision is healed. Changes due to venous stasis distally. Range of motion is 5-105 degrees. Sensation is intact light touch.   There is brisk capillary refill. There is 5/5 muscle strength in all muscle groups. Left knee: Incision is healed. Changes due to venous stasis distally. Range of motion is 0-105 degrees. Sensation is intact light touch. There is brisk capillary refill. There is 5/5 muscle strength in all muscle groups. Imaging:  3 views of bilateral knees were performed and interpreted today. He has bilateral total knee arthroplasty prosthesis in place. There is no signs of osteolysis, loosening, fracture, dislocation. Assessment:  S/p bilateral total knee arthroplasty    Plan:  He is recovering from bilateral total knee arthroplasty. He will continue working with physical therapy and transition to a home exercise program.  He was provided with a note to remain off of work until February 21, 2022. He will be back for reassessment before that time. Total time spent on today's encounter was at least 23 minutes. This time included reviewing prior notes, radiographs, and lab results when available, reviewing history obtained by medical assistant, performing history and physical exam, reviewing tests/radiographs with the patient, counseling the patient, ordering medications or tests, documentation in the electronic health record, and coordination of care. This dictation was done with [employer] dictation and may contain mechanical errors related to translation.

## 2022-01-14 ENCOUNTER — APPOINTMENT (OUTPATIENT)
Dept: PHYSICAL THERAPY | Age: 62
End: 2022-01-14
Payer: COMMERCIAL

## 2022-01-31 RX ORDER — BLOOD SUGAR DIAGNOSTIC
STRIP MISCELLANEOUS
Qty: 100 EACH | Refills: 0 | Status: SHIPPED | OUTPATIENT
Start: 2022-01-31 | End: 2022-05-18 | Stop reason: SDUPTHER

## 2022-01-31 RX ORDER — LANCETS 28 GAUGE
EACH MISCELLANEOUS
Qty: 100 EACH | Refills: 0 | Status: SHIPPED | OUTPATIENT
Start: 2022-01-31 | End: 2022-05-18 | Stop reason: SDUPTHER

## 2022-02-02 ENCOUNTER — OFFICE VISIT (OUTPATIENT)
Dept: ORTHOPEDIC SURGERY | Age: 62
End: 2022-02-02
Payer: COMMERCIAL

## 2022-02-02 VITALS — RESPIRATION RATE: 18 BRPM | WEIGHT: 306 LBS | HEIGHT: 68 IN | BODY MASS INDEX: 46.38 KG/M2

## 2022-02-02 DIAGNOSIS — Z96.652 HISTORY OF TOTAL KNEE ARTHROPLASTY, LEFT: ICD-10-CM

## 2022-02-02 DIAGNOSIS — I83.028 VENOUS STASIS ULCER OF OTHER PART OF LEFT LOWER LEG LIMITED TO BREAKDOWN OF SKIN, UNSPECIFIED WHETHER VARICOSE VEINS PRESENT (HCC): ICD-10-CM

## 2022-02-02 DIAGNOSIS — Z96.651 HISTORY OF TOTAL KNEE ARTHROPLASTY, RIGHT: Primary | ICD-10-CM

## 2022-02-02 DIAGNOSIS — L97.821 VENOUS STASIS ULCER OF OTHER PART OF LEFT LOWER LEG LIMITED TO BREAKDOWN OF SKIN, UNSPECIFIED WHETHER VARICOSE VEINS PRESENT (HCC): ICD-10-CM

## 2022-02-02 PROBLEM — L97.901 VENOUS ULCER, LIMITED TO BREAKDOWN OF SKIN (HCC): Status: ACTIVE | Noted: 2022-02-02

## 2022-02-02 PROBLEM — I83.009 VENOUS ULCER, LIMITED TO BREAKDOWN OF SKIN (HCC): Status: ACTIVE | Noted: 2022-02-02

## 2022-02-02 PROCEDURE — 99212 OFFICE O/P EST SF 10 MIN: CPT | Performed by: PHYSICIAN ASSISTANT

## 2022-02-02 NOTE — PROGRESS NOTES
Subjective:      Patient ID: Eugene Anguiano is a 63 y.o. male who is here for follow up evaluation of knee arthroplasty performed 4/3/2021. He has been swimming at the [facility]. He is concerned about venous stasis ulcer on his left lower leg. This has reoccurred over the last 24 to 48 hours. He states he thought it was open yesterday but today is looking much better/. He states his left and right knee arthroplasties are doing well. Review Of Systems:   Negative for fever or chills. Negative for numbness or tingling in the affected extremity. Past Medical History:   Diagnosis Date    Arthritis     Bilateral venous leg ulcers 2020    Cellulitis of lower extremity 2019    Diabetes mellitus ([address]     GERD (gastroesophageal reflux disease)     Hyperlipidemia     Hypertension     Impaired fasting glucose 2013    MRSA carrier     MRSA nasal colonization 2021    Obesity     TOM treated with BiPAP 2020    Preoperative clearance 10/26/2020    Restless leg syndrome     Screening PSA (prostate specific antigen) 2015;17    Nml:0.53(2015);17=nml.     Seasonal allergies     Sleep apnea     uses CPAP    Stasis dermatitis of both legs 2020    Tobacco use     Tubular adenoma of colon 2017       Family History   Problem Relation Age of Onset    High Blood Pressure Mother     Heart Disease Mother         MI    AT 66    Diabetes Brother        Past Surgical History:   Procedure Laterality Date    CIRCUMCISION      COLONOSCOPY  2017    Colonoscopy with polypectomy (cold biopsy):Dr. Walker:next in 3yrs=2020    KNEE ARTHROSCOPY Left 8/3/2020    VIDEO ARTHROSCOPY LEFT KNEE, PARTIAL MEDIAL MENISCECTOMY, CHONDROPLASTY, SYNOVIAL BIOPSY -TOM=4- performed by Maisha Stockton MD at [address]      PAIN MANAGEMENT PROCEDURE Left 2020    COOLIEF RADIOFREQUENCY ABLATION - LEFT performed by Lucian Figueroa MD [address] N/A 3/2/2021    ROBOTIC ASSISTED LAPAROSCOPIC SLEEVE GASTRECTOMY performed by Valerie Camacho DO at [facility] Left 7/14/2021    LEFT TOTAL KNEE REPLACEMENT ROBOTIC ASSISTED performed by Reji Potter MD at [facility] Right 10/26/2021    RIGHT TOTAL KNEE REPLACEMENT ROBOTIC ASSISTED performed by Reji Potter MD at [facility]  05/28/2011    UPPER GASTROINTESTINAL ENDOSCOPY N/A 1/11/2021    EGD BIOPSY performed by Valerie Camacho DO at [address] EXTRACTION         Social History     Occupational History    Occupation:  security:   Tobacco Use    Smoking status: Former Smoker     Packs/day: 2.00     Years: 25.00     Pack years: 50.00     Types: Cigarettes     Quit date: 5/1/2006     Years since quitting: 15.7    Smokeless tobacco: Never Used   Vaping Use    Vaping Use: Never used   Substance and Sexual Activity    Alcohol use: Not Currently     Comment: ocas    Drug use: No    Sexual activity: Yes     Partners: Female       Current Outpatient Medications   Medication Sig Dispense Refill    Prodigy Twist Top Lancets 28G MISC TEST TWO TIMES A  each 0    blood glucose test strips (PRODIGY NO CODING BLOOD GLUC) strip TEST TWO TIMES A  each 0    metFORMIN (GLUCOPHAGE) 500 MG tablet Take 1 tablet by mouth 2 times daily (with meals) 180 tablet 1    hydroCHLOROthiazide (HYDRODIURIL) 25 MG tablet Hold until Monday 90 tablet 0    loratadine (CLARITIN) 10 MG tablet Take 1 tablet by mouth daily 90 tablet 0    lisinopril (PRINIVIL;ZESTRIL) 10 MG tablet TAKE 1 TABLET BY MOUTH ONCE A DAY 90 tablet 0    potassium chloride (KLOR-CON M) 20 MEQ TBCR extended release tablet Take 1 tablet by mouth 2 times daily 120 tablet 2    omeprazole (PRILOSEC) 20 MG delayed release capsule Take 1 capsule by mouth Daily 30 capsule 3    furosemide (LASIX) 40 MG tablet Take 1 tablet by mouth 2 times daily 60 tablet 3    atorvastatin (LIPITOR) 40 MG tablet Take 1 tablet by mouth daily 90 tablet 3    rOPINIRole (REQUIP) 3 MG tablet Take 1 tablet by mouth 3 times daily 270 tablet 0    pregabalin (LYRICA) 75 MG capsule Take 1 capsule by mouth 3 times daily for 90 days. 270 capsule 0    mupirocin (BACTROBAN) 2 % ointment Apply pea size amount to each nostril 2 times daily. 22 g 0    ondansetron (ZOFRAN-ODT) 4 MG disintegrating tablet Take 1 tablet by mouth 3 times daily as needed for Nausea or Vomiting 21 tablet 0    Multiple Vitamin (MULTIVITAMIN, BARIATRIC FUSION COMPLETE, CHEW TAB) Take 4 tablets by mouth daily      glucose 5 g chewable tablet Take 3 tablets by mouth as needed for Low blood sugar 30 tablet 0    blood glucose monitor kit and supplies Check BG twice daily 1 kit 0     No current facility-administered medications for this visit. Objective:     He is alert, oriented x 3, pleasant, well nourished, developed and in no   acute distress. Resp 18   Ht 5' 8\" (1.727 m)   Wt (!) 306 lb (138.8 kg)   BMI 46.53 kg/m²        Right knee: Incision is healed. Changes due to venous stasis distally. Range of motion is 5-105 degrees. Sensation is intact light touch. There is brisk capillary refill. There is 5/5 muscle strength in all muscle groups. Left knee: Incision is healed. Changes due to venous stasis distally. At this time, I do not see any open areas or drainage. Range of motion is 0-105 degrees. Sensation is intact light touch. There is brisk capillary refill. There is 5/5 muscle strength in all muscle groups. X Rays: not performed in the office today:       Diagnosis:       ICD-10-CM    1. History of total knee arthroplasty, right  Z96.651    2. History of total knee arthroplasty, left  Z96.652    3.  Venous stasis ulcer of other part of left lower leg limited to breakdown of skin, unspecified whether varicose veins present ([facility]  I83.028     L97.822         Assessment and Plan:       Assessment:  Clinically doing well status post bilateral knee arthroplasties. Venous stasis skin color changes left lower posterior leg. No concern for infection at this time. I had an extensive discussion with Mr. Kamran Williamson regarding the natural history, etiology, and long term consequences of his condition. I have presented reasonable alternatives to the patient's proposed care, treatment, and services. Risks and benefits of the treatment options also reviewed in detail. I have outlined a treatment plan with them. He has had full opportunity to ask his questions. I have answered them all to his satisfaction. I feel that Mr. Kamran Williamson understands our discussion today. Plan:  I have instructed him to remain out of the pool for the next 7 to 10 days. Elevation. Follow up-as previously scheduled with Dr. Moy Angelo. Call or return to clinic if these symptoms worsen or fail to improve as anticipated. Mary Gutierrez PA-C   Senior Physician Assistant   [facility]                                         Disclaimer: This note was generated with use of a verbal recognition program (DRAGON) and an attempt was made to check for errors. It is possible that there are still dictated errors within this office note. If so, please bring any significant errors to my attention for an addendum. All efforts were made to ensure that this office note is accurate.

## 2022-02-03 DIAGNOSIS — M25.562 CHRONIC PAIN OF LEFT KNEE: ICD-10-CM

## 2022-02-03 DIAGNOSIS — M17.12 PRIMARY OSTEOARTHRITIS OF LEFT KNEE: ICD-10-CM

## 2022-02-03 DIAGNOSIS — G89.29 CHRONIC PAIN OF LEFT KNEE: ICD-10-CM

## 2022-02-03 DIAGNOSIS — M94.262 CHONDROMALACIA OF LEFT KNEE: ICD-10-CM

## 2022-02-03 DIAGNOSIS — M17.11 PRIMARY OSTEOARTHRITIS OF RIGHT KNEE: ICD-10-CM

## 2022-02-03 DIAGNOSIS — M25.561 ACUTE PAIN OF RIGHT KNEE: ICD-10-CM

## 2022-02-03 DIAGNOSIS — M22.41 CHONDROMALACIA PATELLAE OF RIGHT KNEE: ICD-10-CM

## 2022-02-04 NOTE — TELEPHONE ENCOUNTER
Refill request for lyrica 75mg medication.      Name of [id number] [facility] outpatient      Last visit - 2/1/21  With you but seen Flako Magallon 2/2/22     Pending visit - Nothing scheduled    Last refill -[phone]/#[phone]    Additional Comments Med pended

## 2022-02-08 RX ORDER — PREGABALIN 75 MG/1
75 CAPSULE ORAL 3 TIMES DAILY
Qty: 270 CAPSULE | Refills: 0 | Status: SHIPPED | OUTPATIENT
Start: 2022-02-08 | End: 2022-04-29 | Stop reason: SDUPTHER

## 2022-02-15 RX ORDER — ROPINIROLE 3 MG/1
3 TABLET, FILM COATED ORAL 3 TIMES DAILY
Qty: 270 TABLET | Refills: 0 | Status: SHIPPED | OUTPATIENT
Start: 2022-02-15 | End: 2022-05-18 | Stop reason: SDUPTHER

## 2022-02-28 DIAGNOSIS — E11.9 TYPE 2 DIABETES MELLITUS WITHOUT COMPLICATION, WITHOUT LONG-TERM CURRENT USE OF INSULIN (HCC): ICD-10-CM

## 2022-03-01 RX ORDER — ATORVASTATIN CALCIUM 40 MG/1
40 TABLET, FILM COATED ORAL DAILY
Qty: 90 TABLET | Refills: 3 | Status: SHIPPED | OUTPATIENT
Start: 2022-03-01 | End: 2022-06-01 | Stop reason: SDUPTHER

## 2022-03-09 ENCOUNTER — OFFICE VISIT (OUTPATIENT)
Dept: FAMILY MEDICINE CLINIC | Age: 62
End: 2022-03-09
Payer: COMMERCIAL

## 2022-03-09 ENCOUNTER — OFFICE VISIT (OUTPATIENT)
Dept: BARIATRICS/WEIGHT MGMT | Age: 62
End: 2022-03-09
Payer: COMMERCIAL

## 2022-03-09 VITALS
OXYGEN SATURATION: 98 % | HEIGHT: 68 IN | BODY MASS INDEX: 46.23 KG/M2 | DIASTOLIC BLOOD PRESSURE: 70 MMHG | WEIGHT: 305 LBS | SYSTOLIC BLOOD PRESSURE: 108 MMHG | HEART RATE: 90 BPM | TEMPERATURE: 96.8 F

## 2022-03-09 VITALS
WEIGHT: 305 LBS | SYSTOLIC BLOOD PRESSURE: 123 MMHG | BODY MASS INDEX: 46.23 KG/M2 | DIASTOLIC BLOOD PRESSURE: 83 MMHG | HEART RATE: 89 BPM | HEIGHT: 68 IN

## 2022-03-09 DIAGNOSIS — G25.81 RLS (RESTLESS LEGS SYNDROME): ICD-10-CM

## 2022-03-09 DIAGNOSIS — E11.9 TYPE 2 DIABETES MELLITUS WITHOUT COMPLICATION, WITHOUT LONG-TERM CURRENT USE OF INSULIN (HCC): Primary | ICD-10-CM

## 2022-03-09 DIAGNOSIS — E66.01 CLASS 3 SEVERE OBESITY DUE TO EXCESS CALORIES WITH SERIOUS COMORBIDITY AND BODY MASS INDEX (BMI) OF 45.0 TO 49.9 IN ADULT (HCC): ICD-10-CM

## 2022-03-09 DIAGNOSIS — Z98.84 STATUS POST LAPAROSCOPIC SLEEVE GASTRECTOMY: ICD-10-CM

## 2022-03-09 DIAGNOSIS — E66.01 MORBID OBESITY WITH BMI OF 45.0-49.9, ADULT (HCC): Primary | ICD-10-CM

## 2022-03-09 DIAGNOSIS — I10 ESSENTIAL HYPERTENSION, BENIGN: Chronic | ICD-10-CM

## 2022-03-09 DIAGNOSIS — I10 ESSENTIAL HYPERTENSION: ICD-10-CM

## 2022-03-09 DIAGNOSIS — J30.1 SEASONAL ALLERGIC RHINITIS DUE TO POLLEN: ICD-10-CM

## 2022-03-09 PROCEDURE — 83036 HEMOGLOBIN GLYCOSYLATED A1C: CPT | Performed by: NURSE PRACTITIONER

## 2022-03-09 PROCEDURE — 99214 OFFICE O/P EST MOD 30 MIN: CPT | Performed by: NURSE PRACTITIONER

## 2022-03-09 PROCEDURE — 99213 OFFICE O/P EST LOW 20 MIN: CPT | Performed by: SURGERY

## 2022-03-09 RX ORDER — LISINOPRIL 10 MG/1
TABLET ORAL
Qty: 90 TABLET | Refills: 0 | Status: CANCELLED | OUTPATIENT
Start: 2022-03-09

## 2022-03-09 RX ORDER — HYDROCHLOROTHIAZIDE 25 MG/1
TABLET ORAL
Qty: 90 TABLET | Refills: 0 | Status: CANCELLED | OUTPATIENT
Start: 2022-03-09

## 2022-03-09 RX ORDER — PEN NEEDLE, DIABETIC 31 G X1/4"
1 NEEDLE, DISPOSABLE MISCELLANEOUS DAILY
Qty: 100 EACH | Refills: 3 | Status: SHIPPED | OUTPATIENT
Start: 2022-03-09

## 2022-03-09 RX ORDER — LORATADINE 10 MG/1
10 TABLET ORAL DAILY
Qty: 90 TABLET | Refills: 0 | Status: CANCELLED | OUTPATIENT
Start: 2022-03-09

## 2022-03-09 RX ORDER — FUROSEMIDE 40 MG/1
40 TABLET ORAL 2 TIMES DAILY
Qty: 60 TABLET | Refills: 3 | Status: SHIPPED | OUTPATIENT
Start: 2022-03-09 | End: 2022-07-20 | Stop reason: SDUPTHER

## 2022-03-09 ASSESSMENT — ENCOUNTER SYMPTOMS
EYE REDNESS: 0
SINUS PRESSURE: 0
CONSTIPATION: 0
BACK PAIN: 0
DIARRHEA: 0
NAUSEA: 0
ABDOMINAL PAIN: 0
BLOOD IN STOOL: 0
EYE ITCHING: 0
WHEEZING: 0
COLOR CHANGE: 0
VOMITING: 0
RHINORRHEA: 0
SORE THROAT: 0
SHORTNESS OF BREATH: 0
CHEST TIGHTNESS: 0
COUGH: 0

## 2022-03-09 NOTE — PROGRESS NOTES
Dietary Assessment Note      Vitals:   Vitals:    22 0711   BP: 123/83   Pulse: 89   Weight: (!) 305 lb (138.3 kg)   Height: 5' 8\" (1.727 m)    Patient gained 7 lbs over 3 mo. Total Weight Loss: 100 lbs    Labs reviewed: no lab studies available for review at time of visit    Protein intake: 60-80 grams/day     Fluid intake: ave 60oz, sometimes up to 100oz    Multivitamin/mineral intake: Yes - 4 Fusion    Calcium intake: no    Other: none    Exercise: s/p knee sx, completed PT, completed HP but no structured activity currently    Nutrition Assessment: 1 yr post-op visit. Pt frustrated with wt plateau. Stated gained over holidays Not tracking as much, when does getting 1200-1500kcal daily. Continues to weigh & portion foods.  Goal wt 200-250#  B - 4oz pork chop / leftovers  L - 5oz portion chili with cheese & onion  S - kalyan + cheese stick  D - bouillon with green pepper & onion / baked chix + squash & zucchini  S - same as above + sometimes SF popsicles    Amount able to eat per sittin-3oz + 1/4c sides    Following  rule: Not eating & drinking together - eating fast especially with family, does better when eating alone & pace    Food Intolerances/issues: none    Client Concerns: wt plateau    Goals:   - Get back to tracking consistently to 1200-1300kcal   - Increase movement - track steps and move more on non-work days  - Continue to work to pace at meals, follow     Plan: F/U per Provider    Dulce Becker RD, LD

## 2022-03-09 NOTE — ASSESSMENT & PLAN NOTE
A1c today controlled at 5.5. It is up a little bit from last check though. He is interested in starting medication to help with his overall diabetes management and weight loss. Discussed with Dr. Evaristo Nazario and we are in agreement to proceed with Ozempic. We will start him on 0.25 mg weekly. First dose given in office today. Patient was educated on the use of pen and how to inject the medication. He will continue at 0.25 mg for 4 weeks and then increase to 0.5 mg as long as he is tolerating with minimal side effects. He will follow-up in a total of 6 weeks to see how he is doing and if there is any signs of weight loss or improvement.

## 2022-03-09 NOTE — ASSESSMENT & PLAN NOTE
Blood pressure is excellently controlled he is tolerating his medications well no need for changes or adjustments at this time.

## 2022-03-09 NOTE — ASSESSMENT & PLAN NOTE
Requip is working well he is more having any further issues continued medication at this time as he is stable and controlled.

## 2022-03-09 NOTE — PROGRESS NOTES
[facility]) Physicians   General & Laparoscopic Surgery  [employer]       HPI:     Kendra Bumpers is a very pleasant 63 y.o. obese male , Body mass index is 46.38 kg/m². rEnie Brookske And multiple medical problems who is presenting for bariatric follow up care. Kendra Bumpers is s/p laparoscopic sleeve gastrectomy by me   Comes today to the clinic without any complaints. Patient denies any nausea, vomiting, fevers, chills, shortness of breath, chest pain, constipation or urinary symptoms. Denies any heartburn nor dysphagia. Patient is feeling very well, and is very active. Patient is very pleased with the weight loss and resolution of co-morbid conditions. Past Medical History:   Diagnosis Date    Arthritis     Bilateral venous leg ulcers 09/2020    Cellulitis of lower extremity 09/25/2019    Diabetes mellitus ([address]     GERD (gastroesophageal reflux disease)     Hyperlipidemia     Hypertension     Impaired fasting glucose 05/2013    MRSA carrier     MRSA nasal colonization 07/14/2021    Obesity     TOM treated with BiPAP 11/30/2020    Preoperative clearance 10/26/2020    Restless leg syndrome     Screening PSA (prostate specific antigen) 12/30/2015;5/1/17    Nml:0.53(12/30/2015);5/1/17=nml.     Seasonal allergies     Sleep apnea     uses CPAP    Stasis dermatitis of both legs 08/28/2020    Tobacco use     Tubular adenoma of colon 09/14/2017     Past Surgical History:   Procedure Laterality Date    CIRCUMCISION      COLONOSCOPY  09/14/2017    Colonoscopy with polypectomy (cold biopsy):Dr. Walker:next in 3yrs=9/14/2020    KNEE ARTHROSCOPY Left 8/3/2020    VIDEO ARTHROSCOPY LEFT KNEE, PARTIAL MEDIAL MENISCECTOMY, CHONDROPLASTY, SYNOVIAL BIOPSY -TOM=4- performed by Julia Barreto MD at [address] [place] [address]      PAIN MANAGEMENT PROCEDURE Left 12/9/2020    COOLIEF RADIOFREQUENCY ABLATION - LEFT performed by Toni Blackburn MD at [facility] ENDOSCOPY    SLEEVE GASTRECTOMY N/A 3/2/2021    ROBOTIC ASSISTED LAPAROSCOPIC SLEEVE GASTRECTOMY performed by Milad Montaño DO at [facility] 22 Left 2021    LEFT TOTAL KNEE REPLACEMENT ROBOTIC ASSISTED performed by Micaela Ansari MD at [facility] 22 Right 10/26/2021    RIGHT TOTAL KNEE REPLACEMENT ROBOTIC ASSISTED performed by Micaela Ansari MD at [address]  2011    UPPER GASTROINTESTINAL ENDOSCOPY N/A 2021    EGD BIOPSY performed by Milad Montaño DO at [address] EXTRACTION       Family History   Problem Relation Age of Onset    High Blood Pressure Mother     Heart Disease Mother         MI    AT 66    Diabetes Brother      Social History     Tobacco Use    Smoking status: Former Smoker     Packs/day: 2.00     Years: 25.00     Pack years: 50.00     Types: Cigarettes     Quit date: 2006     Years since quitting: 15.8    Smokeless tobacco: Never Used   Substance Use Topics    Alcohol use: Not Currently     Comment: ocas     I counseled the patient on the importance of not smoking and risks of ETOH. Allergies   Allergen Reactions    Celebrex [Celecoxib] Other (See Comments)     Unable to take due to bariatric patient    Mobic [Meloxicam]      Unable to take due to bariatric patient    Nsaids      Bariatric patient    Bactrim [Sulfamethoxazole-Trimethoprim] Rash     POSSIBLE fixed drug eruption on his cheeks, but diagnosis is not certain (only happened once). Vitals:    22 0711   BP: 123/83   Pulse: 89   Weight: (!) 305 lb (138.3 kg)   Height: 5' 8\" (1.727 m)       Body mass index is 46.38 kg/m².       Current Outpatient Medications:     atorvastatin (LIPITOR) 40 MG tablet, Take 1 tablet by mouth daily, Disp: 90 tablet, Rfl: 3    rOPINIRole (REQUIP) 3 MG tablet, Take 1 tablet by mouth 3 times daily, Disp: 270 tablet, Rfl: 0    pregabalin (LYRICA) 75 MG capsule, Take 1 capsule by mouth 3 times daily for 90 days. , Disp: 270 capsule, Rfl: 0    Prodigy Twist Top Lancets 28G MISC, TEST TWO TIMES A DAY, Disp: 100 each, Rfl: 0    blood glucose test strips (PRODIGY NO CODING BLOOD GLUC) strip, TEST TWO TIMES A DAY, Disp: 100 each, Rfl: 0    metFORMIN (GLUCOPHAGE) 500 MG tablet, Take 1 tablet by mouth 2 times daily (with meals), Disp: 180 tablet, Rfl: 1    hydroCHLOROthiazide (HYDRODIURIL) 25 MG tablet, Hold until Monday, Disp: 90 tablet, Rfl: 0    loratadine (CLARITIN) 10 MG tablet, Take 1 tablet by mouth daily, Disp: 90 tablet, Rfl: 0    lisinopril (PRINIVIL;ZESTRIL) 10 MG tablet, TAKE 1 TABLET BY MOUTH ONCE A DAY, Disp: 90 tablet, Rfl: 0    potassium chloride (KLOR-CON M) 20 MEQ TBCR extended release tablet, Take 1 tablet by mouth 2 times daily, Disp: 120 tablet, Rfl: 2    omeprazole (PRILOSEC) 20 MG delayed release capsule, Take 1 capsule by mouth Daily, Disp: 30 capsule, Rfl: 3    furosemide (LASIX) 40 MG tablet, Take 1 tablet by mouth 2 times daily, Disp: 60 tablet, Rfl: 3    mupirocin (BACTROBAN) 2 % ointment, Apply pea size amount to each nostril 2 times daily. , Disp: 22 g, Rfl: 0    ondansetron (ZOFRAN-ODT) 4 MG disintegrating tablet, Take 1 tablet by mouth 3 times daily as needed for Nausea or Vomiting, Disp: 21 tablet, Rfl: 0    Multiple Vitamin (MULTIVITAMIN, BARIATRIC FUSION COMPLETE, CHEW TAB), Take 4 tablets by mouth daily, Disp: , Rfl:     blood glucose monitor kit and supplies, Check BG twice daily, Disp: 1 kit, Rfl: 0    glucose 5 g chewable tablet, Take 3 tablets by mouth as needed for Low blood sugar, Disp: 30 tablet, Rfl: 0      Review of Systems - History obtained from the patient  General ROS: negative  Psychological ROS: negative  Hematological and Lymphatic ROS: negative  Endocrine ROS: negative  Respiratory ROS: negative  Cardiovascular ROS: negative  Gastrointestinal ROS:negative  Genito-Urinary ROS: negative  Musculoskeletal ROS: negative   Skin ROS: negative    Physical Exam   Vitals Reviewed   Constitutional: Patient is oriented to person, place, and time. Patient appears well-developed and well-nourished. Patient is active and cooperative. Non-toxic appearance. No distress. Neck: Trachea normal and normal range of motion. No JVD present. Pulmonary/Chest: Effort normal. No accessory muscle usage or stridor. No apnea. No respiratory distress. Cardiovascular: Normal rate and no JVD. Abdominal: Normal appearance. Patient exhibits no distension. Abdomen is soft, obese, non tender. Musculoskeletal: Normal range of motion. Patient exhibits no edema. Neurological: Patient is alert and oriented to person, place, and time. Patient has normal strength. GCS eye subscore is 4. GCS verbal subscore is 5. GCS motor subscore is 6. Skin: Skin is warm and dry. No abrasion and no rash noted. Patient is not diaphoretic. No cyanosis or erythema. Psychiatric: Patient has a normal mood and affect. Speech is normal and behavior is normal. Cognition and memory are normal.         A/P     Jo  is 63 y.o. male , now with Body mass index is 46.38 kg/m². s/p Sleeve gastrectomy, has lost gained 7 lbs since last visit, total of 100 lbs weight loss. The patient underwent dietary counseling with registered dietician. I have reviewed, discussed and agree with the dietary plan. Patient is trying hard to keep good dietary and behavior modifications. Patient is monitoring portion sizes, food choices and liquid calories. Patient is trying to exercise regularly. Patient pleased with the surgery outcomes. We discussed how his weight affects his overall health including:  Al Smith was seen today for bariatrics post op follow up. Diagnoses and all orders for this visit:    Morbid obesity with BMI of 45.0-49.9, adult ([facility] [address] [address]    Status post laparoscopic sleeve gastrectomy       and importance of weight loss to alleviate those co morbid conditions.    I encouraged the patient to continue exercise and keeping healthy eating habits. Also counseled the patient extensively on post surgery care. Total encounter time:  20 minutes including any number of the following: review of labs, imaging, provider notes, outside hospital records; performing examination/evaluation; counseling patient and family; ordering medications/tests; placing referrals and communication with referring physicians; coordination of care, and documentation in the EHR. RTC in 12 months  Diet and Exercise   Referred to [facility]     Patient advised that its their responsibility to follow up for studies and/or labs ordered today.

## 2022-03-10 DIAGNOSIS — I10 ESSENTIAL HYPERTENSION: ICD-10-CM

## 2022-03-10 RX ORDER — LISINOPRIL 10 MG/1
TABLET ORAL
Qty: 90 TABLET | Refills: 0 | OUTPATIENT
Start: 2022-03-10

## 2022-03-10 RX ORDER — AMOXICILLIN 500 MG/1
CAPSULE ORAL
Qty: 4 CAPSULE | Refills: 1 | Status: SHIPPED | OUTPATIENT
Start: 2022-03-10

## 2022-03-10 RX ORDER — HYDROCHLOROTHIAZIDE 25 MG/1
TABLET ORAL
Qty: 90 TABLET | Refills: 0 | OUTPATIENT
Start: 2022-03-10

## 2022-03-11 DIAGNOSIS — J30.1 SEASONAL ALLERGIC RHINITIS DUE TO POLLEN: ICD-10-CM

## 2022-03-11 RX ORDER — LORATADINE 10 MG/1
10 TABLET ORAL DAILY
Qty: 90 TABLET | Refills: 1 | Status: SHIPPED | OUTPATIENT
Start: 2022-03-11 | End: 2022-06-11 | Stop reason: SDUPTHER

## 2022-03-14 DIAGNOSIS — I10 ESSENTIAL HYPERTENSION: ICD-10-CM

## 2022-03-14 RX ORDER — LISINOPRIL 10 MG/1
TABLET ORAL
Qty: 90 TABLET | Refills: 0 | Status: SHIPPED | OUTPATIENT
Start: 2022-03-14 | End: 2022-06-11 | Stop reason: SDUPTHER

## 2022-03-14 RX ORDER — HYDROCHLOROTHIAZIDE 25 MG/1
TABLET ORAL
Qty: 90 TABLET | Refills: 0 | Status: SHIPPED | OUTPATIENT
Start: 2022-03-14 | End: 2022-06-11 | Stop reason: SDUPTHER

## 2022-03-14 RX ORDER — POTASSIUM CHLORIDE 1500 MG/1
20 TABLET, FILM COATED, EXTENDED RELEASE ORAL 2 TIMES DAILY
Qty: 120 TABLET | Refills: 2 | Status: SHIPPED | OUTPATIENT
Start: 2022-03-14 | End: 2022-06-06 | Stop reason: SDUPTHER

## 2022-03-23 RX ORDER — OMEPRAZOLE 20 MG/1
20 CAPSULE, DELAYED RELEASE ORAL DAILY
Qty: 30 CAPSULE | Refills: 3 | Status: SHIPPED | OUTPATIENT
Start: 2022-03-23 | End: 2022-06-28 | Stop reason: SDUPTHER

## 2022-04-20 ENCOUNTER — OFFICE VISIT (OUTPATIENT)
Dept: FAMILY MEDICINE CLINIC | Age: 62
End: 2022-04-20
Payer: COMMERCIAL

## 2022-04-20 VITALS
HEIGHT: 68 IN | DIASTOLIC BLOOD PRESSURE: 80 MMHG | WEIGHT: 305 LBS | OXYGEN SATURATION: 100 % | BODY MASS INDEX: 46.23 KG/M2 | SYSTOLIC BLOOD PRESSURE: 110 MMHG | HEART RATE: 94 BPM | TEMPERATURE: 96 F

## 2022-04-20 DIAGNOSIS — E11.9 TYPE 2 DIABETES MELLITUS WITHOUT COMPLICATION, WITHOUT LONG-TERM CURRENT USE OF INSULIN (HCC): Chronic | ICD-10-CM

## 2022-04-20 DIAGNOSIS — I10 ESSENTIAL HYPERTENSION, BENIGN: Chronic | ICD-10-CM

## 2022-04-20 DIAGNOSIS — E66.01 CLASS 3 SEVERE OBESITY DUE TO EXCESS CALORIES WITH SERIOUS COMORBIDITY AND BODY MASS INDEX (BMI) OF 45.0 TO 49.9 IN ADULT (HCC): ICD-10-CM

## 2022-04-20 DIAGNOSIS — J01.90 ACUTE NON-RECURRENT SINUSITIS, UNSPECIFIED LOCATION: Primary | ICD-10-CM

## 2022-04-20 DIAGNOSIS — J30.1 ALLERGIC RHINITIS DUE TO POLLEN, UNSPECIFIED SEASONALITY: Chronic | ICD-10-CM

## 2022-04-20 PROCEDURE — 99214 OFFICE O/P EST MOD 30 MIN: CPT | Performed by: NURSE PRACTITIONER

## 2022-04-20 RX ORDER — AMOXICILLIN AND CLAVULANATE POTASSIUM 875; 125 MG/1; MG/1
1 TABLET, FILM COATED ORAL 2 TIMES DAILY
Qty: 14 TABLET | Refills: 0 | Status: SHIPPED | OUTPATIENT
Start: 2022-04-20 | End: 2022-04-27

## 2022-04-20 ASSESSMENT — ENCOUNTER SYMPTOMS
SORE THROAT: 1
ABDOMINAL PAIN: 0
BLOOD IN STOOL: 0
SHORTNESS OF BREATH: 0
CHEST TIGHTNESS: 0
COUGH: 1
EYE REDNESS: 0
NAUSEA: 0
RHINORRHEA: 0
EYE ITCHING: 0
VOMITING: 0
BACK PAIN: 0
CONSTIPATION: 0
DIARRHEA: 0
WHEEZING: 0
COLOR CHANGE: 0
SINUS PRESSURE: 1

## 2022-04-20 NOTE — PROGRESS NOTES
William Butcher (:  1960) is a 63 y.o. male,Established patient, here for evaluation of the following chief complaint(s):  Follow-up (6 week, sinus infection)      ASSESSMENT/PLAN:  1. Acute non-recurrent sinusitis, unspecified location  Assessment & Plan:   Antibiotic sent to pharmacy. Take twice daily for 1 week. Contact if symptoms do not improve. 2. Allergic rhinitis due to pollen, unspecified seasonality  Assessment & Plan:   Borderline controlled, continue current medications   Add Flonase to daily Claritin to help with allergies    3. Type 2 diabetes mellitus without complication, without long-term current use of insulin ([facility])  Assessment & Plan:  Stable, controlled  Increase Ozempic from . 5 to 1 mg weekly for increased efficacy for weight loss  Follow up 3 months  4. Essential hypertension, benign  Assessment & Plan:   Well-controlled, continue current medications  5. Class 3 severe obesity due to excess calories with serious comorbidity and body mass index (BMI) of 45.0 to 49.9 in adult [facility])  Assessment & Plan: Tolerating ozempic, increase to 1 mg   Follow up in 3 months      No follow-ups on file. SUBJECTIVE/OBJECTIVE:  Started Ozempic  Noticed mild nausea, but isn't very bothersome. Unsure if it is related to the medication  Has zofran, but has not used. Sinus pressure and headaches started about 2 weeks ago. As of 3 days ago noticed more congestion with coughing and discoloration of drainage as well as sore teeth.              Current Outpatient Medications   Medication Sig Dispense Refill    amoxicillin-clavulanate (AUGMENTIN) 875-125 MG per tablet Take 1 tablet by mouth 2 times daily for 7 days 14 tablet 0    Semaglutide, 2 MG/DOSE, 8 MG/3ML SOPN Inject 1 mg into the skin once a week 3 mL 5    amoxicillin-clavulanate (AUGMENTIN) 875-125 MG per tablet Take 1 tablet by mouth 2 times daily for 7 days 14 tablet 0    omeprazole (PRILOSEC) 20 MG delayed release capsule Take 1 capsule by mouth Daily 30 capsule 3    potassium chloride (KLOR-CON M) 20 MEQ TBCR extended release tablet Take 1 tablet by mouth 2 times daily 120 tablet 2    lisinopril (PRINIVIL;ZESTRIL) 10 MG tablet TAKE 1 TABLET BY MOUTH ONCE A DAY 90 tablet 0    hydroCHLOROthiazide (HYDRODIURIL) 25 MG tablet Hold until Monday 90 tablet 0    loratadine (CLARITIN) 10 MG tablet Take 1 tablet by mouth daily 90 tablet 1    amoxicillin (AMOXIL) 500 MG capsule 4 tablets by mouth 1 hour before dental appointment 4 capsule 1    Insulin Pen Needle (MEIJER PEN NEEDLES) 31G X 6 MM MISC 1 each by Does not apply route daily 100 each 3    furosemide (LASIX) 40 MG tablet Take 1 tablet by mouth 2 times daily 60 tablet 3    atorvastatin (LIPITOR) 40 MG tablet Take 1 tablet by mouth daily 90 tablet 3    rOPINIRole (REQUIP) 3 MG tablet Take 1 tablet by mouth 3 times daily 270 tablet 0    pregabalin (LYRICA) 75 MG capsule Take 1 capsule by mouth 3 times daily for 90 days. 270 capsule 0    Prodigy Twist Top Lancets 28G MISC TEST TWO TIMES A  each 0    blood glucose test strips (PRODIGY NO CODING BLOOD GLUC) strip TEST TWO TIMES A  each 0    metFORMIN (GLUCOPHAGE) 500 MG tablet Take 1 tablet by mouth 2 times daily (with meals) 180 tablet 1    mupirocin (BACTROBAN) 2 % ointment Apply pea size amount to each nostril 2 times daily. 22 g 0    ondansetron (ZOFRAN-ODT) 4 MG disintegrating tablet Take 1 tablet by mouth 3 times daily as needed for Nausea or Vomiting 21 tablet 0    Multiple Vitamin (MULTIVITAMIN, BARIATRIC FUSION COMPLETE, CHEW TAB) Take 4 tablets by mouth daily      blood glucose monitor kit and supplies Check BG twice daily 1 kit 0    glucose 5 g chewable tablet Take 3 tablets by mouth as needed for Low blood sugar 30 tablet 0     No current facility-administered medications for this visit. Review of Systems   Constitutional: Negative for chills, fatigue and fever.    HENT: Positive for congestion, postnasal drip, sinus pressure, sneezing and sore throat. Negative for ear pain and rhinorrhea. Eyes: Negative for redness and itching. Respiratory: Positive for cough. Negative for chest tightness, shortness of breath and wheezing. Cardiovascular: Negative for chest pain and palpitations. Gastrointestinal: Negative for abdominal pain, blood in stool, constipation, diarrhea, nausea and vomiting. Endocrine: Negative for cold intolerance and heat intolerance. Genitourinary: Negative for difficulty urinating, dysuria, flank pain, frequency, hematuria and urgency. Musculoskeletal: Negative for arthralgias, back pain, joint swelling and myalgias. Skin: Negative for color change, pallor, rash and wound. Allergic/Immunologic: Positive for environmental allergies. Negative for food allergies. Neurological: Positive for headaches. Negative for dizziness, seizures, syncope, weakness, light-headedness and numbness. Hematological: Negative for adenopathy. Does not bruise/bleed easily. Psychiatric/Behavioral: Negative for confusion, sleep disturbance and suicidal ideas. The patient is not nervous/anxious and is not hyperactive. Vitals:    04/20/22 0823   BP: 110/80   Site: Left Upper Arm   Position: Sitting   Cuff Size: Large Adult   Pulse: 94   Temp: 96 °F (35.6 °C)   SpO2: 100%   Weight: (!) 305 lb (138.3 kg)   Height: 5' 8\" (1.727 m)       Physical Exam  Constitutional:       Appearance: Normal appearance. He is well-developed. HENT:      Head: Normocephalic and atraumatic. Right Ear: Hearing, tympanic membrane and ear canal normal.      Left Ear: Hearing, tympanic membrane and ear canal normal.      Nose: Congestion present. No mucosal edema. Right Sinus: No maxillary sinus tenderness or frontal sinus tenderness. Left Sinus: No maxillary sinus tenderness or frontal sinus tenderness. Mouth/Throat:      Pharynx: Posterior oropharyngeal erythema present.       Tonsils: No tonsillar abscesses. Eyes:      Extraocular Movements: Extraocular movements intact. Pupils: Pupils are equal, round, and reactive to light. Cardiovascular:      Rate and Rhythm: Normal rate and regular rhythm. Pulses: Normal pulses. Heart sounds: Normal heart sounds. Pulmonary:      Effort: Pulmonary effort is normal.      Breath sounds: Normal breath sounds. Lymphadenopathy:      Head:      Right side of head: No submental, submandibular, tonsillar, preauricular, posterior auricular or occipital adenopathy. Left side of head: No submental, submandibular, tonsillar, preauricular, posterior auricular or occipital adenopathy. Skin:     General: Skin is warm and dry. Neurological:      Mental Status: He is alert. Psychiatric:         Mood and Affect: Mood normal.         Behavior: Behavior normal.           On this date 4/20/2022 I have spent 30 minutes reviewing previous notes, test results and face to face with the patient discussing the diagnosis and importance of compliance with the treatment plan as well as documenting on the day of the visit. An electronic signature was used to authenticate this note.     --JACKLYN Cobos - CNP

## 2022-04-20 NOTE — ASSESSMENT & PLAN NOTE
Borderline controlled, continue current medications   Add Flonase to daily Claritin to help with allergies

## 2022-04-20 NOTE — ASSESSMENT & PLAN NOTE
Stable, controlled  Increase Ozempic from . 5 to 1 mg weekly for increased efficacy for weight loss  Follow up 3 months

## 2022-04-29 DIAGNOSIS — M17.11 PRIMARY OSTEOARTHRITIS OF RIGHT KNEE: ICD-10-CM

## 2022-04-29 DIAGNOSIS — G89.29 CHRONIC PAIN OF LEFT KNEE: ICD-10-CM

## 2022-04-29 DIAGNOSIS — M25.561 ACUTE PAIN OF RIGHT KNEE: ICD-10-CM

## 2022-04-29 DIAGNOSIS — M25.562 CHRONIC PAIN OF LEFT KNEE: ICD-10-CM

## 2022-04-29 DIAGNOSIS — M17.12 PRIMARY OSTEOARTHRITIS OF LEFT KNEE: ICD-10-CM

## 2022-04-29 DIAGNOSIS — M94.262 CHONDROMALACIA OF LEFT KNEE: ICD-10-CM

## 2022-04-29 DIAGNOSIS — M22.41 CHONDROMALACIA PATELLAE OF RIGHT KNEE: ICD-10-CM

## 2022-04-29 RX ORDER — PREGABALIN 75 MG/1
75 CAPSULE ORAL 3 TIMES DAILY
Qty: 270 CAPSULE | Refills: 0 | Status: SHIPPED | OUTPATIENT
Start: 2022-04-29 | End: 2022-07-28

## 2022-05-05 ENCOUNTER — TELEPHONE (OUTPATIENT)
Dept: PULMONOLOGY | Age: 62
End: 2022-05-05

## 2022-05-05 NOTE — TELEPHONE ENCOUNTER
Spoke with patient and he is going to contact [address] [place] [address] to see if he can get modem working or change his appointment to an in-person appointment if they can't fix the modem.

## 2022-05-05 NOTE — TELEPHONE ENCOUNTER
Patient scheduled for virtual on 5/9. Appears that his modem stopped transmitting on 11/17. I left a voicemail with the patient asking him to call us back to bring the machine to us for download OR call his DME and let them know he needs one and to see if they can help get his modem reconnected. Sent "[employer] message also.

## 2022-05-09 ENCOUNTER — OFFICE VISIT (OUTPATIENT)
Dept: PULMONOLOGY | Age: 62
End: 2022-05-09
Payer: COMMERCIAL

## 2022-05-09 VITALS
DIASTOLIC BLOOD PRESSURE: 81 MMHG | SYSTOLIC BLOOD PRESSURE: 140 MMHG | HEART RATE: 82 BPM | WEIGHT: 315 LBS | BODY MASS INDEX: 48.35 KG/M2

## 2022-05-09 DIAGNOSIS — E66.01 CLASS 3 SEVERE OBESITY DUE TO EXCESS CALORIES WITH SERIOUS COMORBIDITY AND BODY MASS INDEX (BMI) OF 45.0 TO 49.9 IN ADULT (HCC): ICD-10-CM

## 2022-05-09 DIAGNOSIS — E11.9 TYPE 2 DIABETES MELLITUS WITHOUT COMPLICATION, WITHOUT LONG-TERM CURRENT USE OF INSULIN (HCC): Chronic | ICD-10-CM

## 2022-05-09 DIAGNOSIS — J30.1 ALLERGIC RHINITIS DUE TO POLLEN, UNSPECIFIED SEASONALITY: Chronic | ICD-10-CM

## 2022-05-09 DIAGNOSIS — G25.81 RLS (RESTLESS LEGS SYNDROME): ICD-10-CM

## 2022-05-09 DIAGNOSIS — G47.33 OSA TREATED WITH BIPAP: Primary | ICD-10-CM

## 2022-05-09 DIAGNOSIS — I10 ESSENTIAL HYPERTENSION, BENIGN: Chronic | ICD-10-CM

## 2022-05-09 PROCEDURE — 99214 OFFICE O/P EST MOD 30 MIN: CPT | Performed by: NURSE PRACTITIONER

## 2022-05-09 ASSESSMENT — SLEEP AND FATIGUE QUESTIONNAIRES
HOW LIKELY ARE YOU TO NOD OFF OR FALL ASLEEP WHILE WATCHING TV: 2
HOW LIKELY ARE YOU TO NOD OFF OR FALL ASLEEP WHILE SITTING INACTIVE IN A PUBLIC PLACE: 1
HOW LIKELY ARE YOU TO NOD OFF OR FALL ASLEEP WHILE SITTING QUIETLY AFTER LUNCH WITHOUT ALCOHOL: 1
HOW LIKELY ARE YOU TO NOD OFF OR FALL ASLEEP IN A CAR, WHILE STOPPED FOR A FEW MINUTES IN TRAFFIC: 0
HOW LIKELY ARE YOU TO NOD OFF OR FALL ASLEEP WHEN YOU ARE A PASSENGER IN A CAR FOR AN HOUR WITHOUT A BREAK: 1
HOW LIKELY ARE YOU TO NOD OFF OR FALL ASLEEP WHILE LYING DOWN TO REST IN THE AFTERNOON WHEN CIRCUMSTANCES PERMIT: 2
ESS TOTAL SCORE: 10
HOW LIKELY ARE YOU TO NOD OFF OR FALL ASLEEP WHILE SITTING AND TALKING TO SOMEONE: 0
HOW LIKELY ARE YOU TO NOD OFF OR FALL ASLEEP WHILE SITTING AND READING: 3
NECK CIRCUMFERENCE (INCHES): 17.25

## 2022-05-09 NOTE — ASSESSMENT & PLAN NOTE
Chronic-Stable: Reviewed and analyzed results of physiologic download from patient's machine and reviewed with patient. Supplies and parts as needed for his machine. These are medically necessary. Limit caffeine use after 3pm. Based on the analyzed data will continue with current settings. Encouraged consistent use of his machine each night, all night. 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 Encouraged him to work with his DME on mask fit and comfort. Provided resources for him to view different types of masks. Discussed adjusting the moisture settings on his machine and showed him how to do so in the office today. Verbal and written instruction on PAP equipment cleaning and disinfection schedule provided. Will see him/her back in a 6 month f/u for DOT/FAA compliance check (he/she understands that he he needs to be seen every 6 months) unless there are issues, then he/she is to call for an earlier appointment.

## 2022-05-09 NOTE — PROGRESS NOTES
Diagnosis: [x] TOM (G47.33) [] CSA (G47.31) [] Apnea (G47.30)   Length of Need: [x] 15 Months [] 99 Months [] Other:   Machine (MICHELLE!): [] Respironics Dream Station      Auto [] ResMed AirSense     Auto [] Other:     []  CPAP () [] Bilevel ()   Mode: [] Auto [] Spontaneous    Mode: [] Auto [] Spontaneous             Comfort Settings:      Humidifier: [] Heated ()        [x] Water chamber replacement ()/ 1 per 6 months        Mask:   [] Nasal () /1 per 3 months [x] Full Face () /1 per 3 months   [] Patient choice -Size and fit mask [x] Patient Choice - Size and fit mask   [] Dispense: [] Dispense:   [] Headgear () / 1 per 3 months [x] Headgear () / 1 per 3 months   [] Replacement Nasal Cushion ()/2 per month [x] Interface Replacement ()/1 per month   [] Replacement Nasal Pillows ()/2 per month         Tubing: [x] Heated ()/1 per 3 months    [] Standard ()/1 per 3 months [] Other:           Filters: [x] Non-disposable ()/1 per 6 months     [x] Ultra-Fine, Disposable ()/2 per month        Miscellaneous: [] Chin Strap ()/ 1 per 6 months [] O2 bleed-in:        LPM   [] Oxymetry on CPAP/Bilevel []  Other:         Start Order Date: 05/09/22    MEDICAL JUSTIFICATION:  I, the undersigned, certify that the above prescribed supplies are medically necessary for this patients wellbeing. In my opinion, the supplies are both reasonable and necessary in reference to accepted standards of medicalpractice in treatment of this patients condition. Antwon Beckman NP    NPI: [id number]       Order Signed Date: 05/09/22  [facility]  Pulmonary, Sleep, and Critical Care    Pulmonary, Sleep, and Critical Care  [facility] [address] [place] [address].  [address] [facility], [facility] , [facility]  Phone: [phone]    Fax: [facility] Odin Yane  1960  Murtaza Machado [address]  [phone] (home)   [phone] (mobile)      Insurance Info (confirm with patient if correct):  Payor/Plan Subscr  Sex Relation Sub.  Ins. ID Effective Group Num

## 2022-05-09 NOTE — ASSESSMENT & PLAN NOTE
Chronic- Stable. Discussed the importance of treating obstructive sleep apnea as part of the management of this disorder. Encouraged more use of his machine each night, which could improve RLS symptoms. Currently taking Requip 3 mg TID. Medication is currently being managed by his PCP. His symptoms are controlled at the current dose and he denies side effects.

## 2022-05-09 NOTE — LETTER
[facility]  [id number] [facility]  [address] [address]  Phone: [phone]  Fax: [phone]    May 9, 2022       Patient: Anca Gusman   MR Number: [id number]   YOB: 1960   Date of Visit: 5/9/2022       Maci Michael was seen for a follow up visit today. Here is my assessment and plan as well as an attached copy of his visit today:    Essential hypertension, benign   Chronic- Stable. Discussed the importance of treating obstructive sleep apnea as part of the management of this disorder. Cont any meds per PCP and other physicians. Blood pressure elevated in the office today. Recommend he follow up with his PCP for further recommendations and guidance. Type 2 diabetes mellitus without complication, without long-term current use of insulin (HCC)   Chronic- Stable. Discussed the importance of treating obstructive sleep apnea as part of the management of this disorder. Cont any meds per PCP and other physicians. Allergic rhinitis   Chronic- Stable. Discussed the importance of treating obstructive sleep apnea as part of the management of this disorder. Cont any meds per PCP and other physicians. RLS (restless legs syndrome)   Chronic- Stable. Discussed the importance of treating obstructive sleep apnea as part of the management of this disorder. Encouraged more use of his machine each night, which could improve RLS symptoms. Currently taking Requip 3 mg TID. Medication is currently being managed by his PCP. His symptoms are controlled at the current dose and he denies side effects. Class 3 severe obesity due to excess calories with serious comorbidity and body mass index (BMI) of 45.0 to 49.9 in adult [facility])   Chronic-not stable:  Discussed importance of treating obstructive sleep apnea and getting sufficient sleep to assist with weight control. Encouraged him to work on weight loss through diet and exercise.  Recommended DASH or Mediterranean diets.      TOM (obstructive sleep apnea)  Chronic-Stable: Reviewed and analyzed results of physiologic download from patient's machine and reviewed with patient. Supplies and parts as needed for his machine. These are medically necessary. Limit caffeine use after 3pm. Based on the analyzed data will continue with current settings. Encouraged consistent use of his machine each night, all night. 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 Encouraged him to work with his DME on mask fit and comfort. Provided resources for him to view different types of masks. Discussed adjusting the moisture settings on his machine and showed him how to do so in the office today. Verbal and written instruction on PAP equipment cleaning and disinfection schedule provided. Will see him/her back in a 6 month f/u for DOT/FAA compliance check (he/she understands that he he needs to be seen every 6 months) unless there are issues, then he/she is to call for an earlier appointment. If you have questions or concerns, please do not hesitate to call me. I look forward to following Theodore Blankenship along with you.     Sincerely,    JACKLYN Pineda    CC providers:  JACKLYN Santana - CNP  [address] [address]  [place]

## 2022-05-09 NOTE — ASSESSMENT & PLAN NOTE
Chronic- Stable. Discussed the importance of treating obstructive sleep apnea as part of the management of this disorder. Cont any meds per PCP and other physicians. Blood pressure elevated in the office today. Recommend he follow up with his PCP for further recommendations and guidance.

## 2022-05-09 NOTE — PROGRESS NOTES
Tommie Graham [facility]  [address] [facility], [address] [facility]- [phone]   [facility] Juan Choe. [address] [place] [address]. [address] [phone] [address] [facility]  [address] [place], [place] [id number] [phone]      Assessment/Plan:      1. TOM treated with BiPAP  Assessment & Plan:  Chronic-Stable: Reviewed and analyzed results of physiologic download from patient's machine and reviewed with patient. Supplies and parts as needed for his machine. These are medically necessary. Limit caffeine use after 3pm. Based on the analyzed data will continue with current settings. Encouraged consistent use of his machine each night, all night. 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 Encouraged him to work with his DME on mask fit and comfort. Provided resources for him to view different types of masks. Discussed adjusting the moisture settings on his machine and showed him how to do so in the office today. Verbal and written instruction on PAP equipment cleaning and disinfection schedule provided. Will see him/her back in a 6 month f/u for DOT/FAA compliance check (he/she understands that he he needs to be seen every 6 months) unless there are issues, then he/she is to call for an earlier appointment. 2. Essential hypertension, benign  Assessment & Plan:   Chronic- Stable. Discussed the importance of treating obstructive sleep apnea as part of the management of this disorder. Cont any meds per PCP and other physicians. Blood pressure elevated in the office today. Recommend he follow up with his PCP for further recommendations and guidance. 3. Type 2 diabetes mellitus without complication, without long-term current use of insulin (HCC)  Assessment & Plan:   Chronic- Stable.   Discussed the importance of treating obstructive sleep apnea as part of the management of this disorder. Cont any meds per PCP and other physicians. 4. Allergic rhinitis due to pollen, unspecified seasonality  Assessment & Plan:   Chronic- Stable. Discussed the importance of treating obstructive sleep apnea as part of the management of this disorder. Cont any meds per PCP and other physicians. 5. RLS (restless legs syndrome)  Assessment & Plan:   Chronic- Stable. Discussed the importance of treating obstructive sleep apnea as part of the management of this disorder. Encouraged more use of his machine each night, which could improve RLS symptoms. Currently taking Requip 3 mg TID. Medication is currently being managed by his PCP. His symptoms are controlled at the current dose and he denies side effects. 6. Class 3 severe obesity due to excess calories with serious comorbidity and body mass index (BMI) of 45.0 to 49.9 in [facility])  Assessment & Plan:   Chronic-not stable:  Discussed importance of treating obstructive sleep apnea and getting sufficient sleep to assist with weight control. Encouraged him to work on weight loss through diet and exercise. Recommended DASH or Mediterranean diets. Reviewed, analyzed, and documented physiologic data from patient's PAP machine. This information was analyzed to assess complexity and medical decision making in regards to further testing and management. The primary encounter diagnosis was TOM treated with BiPAP. Diagnoses of Essential hypertension, benign, Type 2 diabetes mellitus without complication, without long-term current use of insulin ([address] Allergic rhinitis due to pollen, unspecified seasonality, RLS (restless legs syndrome), and Class 3 severe obesity due to excess calories with serious comorbidity and body mass index (BMI) of 45.0 to 49.9 in [facility]) were also pertinent to this visit. The chronic medical conditions listed are directly related to the primary diagnosis listed above.   The management of the primary diagnosis affects the secondary diagnosis and vice versa. Subjective:   Subjective   Patient ID: Monik Pan is a 63 y.o. male. Chief Complaint   Patient presents with    Follow-up       HPI:  Machine Modem/Download Info:  Compliance (hours/night): 4.16 hrs/night  % of nights >= 4 hrs: 90 %  Download AHI (/hour): 1.1 /HR   Average IPAP Pressure: 13.8 cmH2O  Average EPAP Pressure: 11 cmH2O         AUTO BIPAP - Settings (Ginny)  IPAP Max: 25 cmH2O  EPAP Min: 8 cmH2O  Pressure Support Min: 2  Pressure Support Max: 8             Comfort Settings  Humidity Level (0-8): 0  Flex/EPR (0-3): 2 PAP Mask  Mask Type: Full Face mask     Monik Pan reports he is doing well with his machine. He received his replacement machine for the  recall. He does not like using the machine or having something on his face when sleeping and his mask is not very comfortable. Had a sinus infection recently and was unable to use his machine for a few nights. He is unsure how to adjust the moisture settings on his machine. The pressure on his machine is comfortable and he is waking rested for the most part. He uses the ramp feature on his machine each night. He has knee pain that will sometimes interrupt his sleep. He will sleep for 4 hours in bed and then wake and go to his recliner where it is more comfortable for his knees. he denies headaches, congestion, nosebleeds, dryness, aerophagia, or drowsiness while driving. RLS: Currently taking Requip 3 mg TID. 5a, 12p, 7p. Medication is currently being managed by his PCP. His symptoms are controlled at the current dose and he denies side effects. 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 Changed classification so he doesn't need physical portion right now. 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     [address]    [place] - Total score: 10    Social History     Socioeconomic History    Marital status:      Spouse name: Not on file    Number of children: 2    Years of education: Not on file    Highest education level: Not on file   Occupational History    Occupation: [employer]   Tobacco Use    Smoking status: Former Smoker     Packs/day: 2.00     Years: 25.00     Pack years: 50.00     Types: Cigarettes     Quit date: 2006     Years since quittin.0    Smokeless tobacco: Never Used   Vaping Use    Vaping Use: Never used   Substance and Sexual Activity    Alcohol use: Not Currently     Comment: ocas    Drug use: No    Sexual activity: Yes     Partners: Female   Other Topics Concern    Not on file   Social History Narrative    Not on file     Social Determinants of Health     Financial Resource Strain: Low Risk     Difficulty of Paying Living Expenses: Not hard at all   Food Insecurity: No Food Insecurity    Worried About [address] in the Last Year: Never true    [address] [facility] [address] in the Last Year: Never true   Transportation Needs:     Lack of Transportation (Medical): Not on file    Lack of Transportation (Non-Medical):  Not on file   Physical Activity:     Days of Exercise per Week: Not on file    Minutes of Exercise per Session: Not on file   Stress:     Feeling of Stress : Not on file   Social Connections:     Frequency of Communication with Friends and Family: Not on file    Frequency of Social Gatherings with Friends and Family: Not on file    Attends Sikh Services: Not on file    Active Member of Clubs or Organizations: Not on file    Attends Club or Organization Meetings: Not on file    Marital Status: Not on file   Intimate Partner Violence:     Fear of Current or Ex-Partner: Not on file    Emotionally Abused: Not on file    Physically Abused: Not on file    Sexually Abused: Not on file   Housing Stability:     Unable to Pay for Housing in the Last Year: Not on file    Number of [place] in the Last Year: Not on file  Unstable Housing in the Last Year: Not on file       Current Outpatient Medications   Medication Instructions    amoxicillin (AMOXIL) 500 MG capsule 4 tablets by mouth 1 hour before dental appointment    atorvastatin (LIPITOR) 40 mg, Oral, DAILY    blood glucose monitor kit and supplies Check BG twice daily    blood glucose test strips (PRODIGY NO CODING BLOOD GLUC) strip TEST TWO TIMES A DAY    furosemide (LASIX) 40 mg, Oral, 2 TIMES DAILY    glucose 15 g, Oral, PRN    hydroCHLOROthiazide (HYDRODIURIL) 25 MG tablet Hold until Monday    Insulin Pen Needle (MEIJER PEN NEEDLES) 31G X 6 MM MISC 1 each, Does not apply, DAILY    lisinopril (PRINIVIL;ZESTRIL) 10 MG tablet TAKE 1 TABLET BY MOUTH ONCE A DAY    loratadine (CLARITIN) 10 mg, Oral, DAILY    metFORMIN (GLUCOPHAGE) 500 mg, Oral, 2 TIMES DAILY WITH MEALS    Multiple Vitamin (MULTIVITAMIN, BARIATRIC FUSION COMPLETE, CHEW TAB) 4 tablets, Oral, DAILY    mupirocin (BACTROBAN) 2 % ointment Apply pea size amount to each nostril 2 times daily.  omeprazole (PRILOSEC) 20 mg, Oral, DAILY    ondansetron (ZOFRAN-ODT) 4 mg, Oral, 3 TIMES DAILY PRN    potassium chloride (KLOR-CON M) 20 MEQ TBCR extended release tablet 20 mEq, Oral, 2 TIMES DAILY    pregabalin (LYRICA) 75 mg, Oral, 3 TIMES DAILY    Prodigy Twist Top Lancets 28G MISC TEST TWO TIMES A DAY    rOPINIRole (REQUIP) 3 mg, Oral, 3 TIMES DAILY    Semaglutide (2 MG/DOSE) 1 mg, SubCUTAneous, WEEKLY          Vitals:  Weight BMI   Wt Readings from Last 3 Encounters:   05/09/22 (!) 318 lb (144.2 kg)   04/20/22 (!) 305 lb (138.3 kg)   03/09/22 (!) 305 lb (138.3 kg)    Body mass index is 48.35 kg/m².      BP HR SaO2   BP Readings from Last 3 Encounters:   05/09/22 (!) 140/81   04/20/22 110/80   03/09/22 108/70    Pulse Readings from Last 3 Encounters:   05/09/22 82   04/20/22 94   03/09/22 90    SpO2 Readings from Last 3 Encounters:   04/20/22 100%   03/09/22 98%   10/27/21 95%        Electronically signed by JACKLYN Coreas on 5/9/2022 at 10:13 AM

## 2022-05-18 RX ORDER — ROPINIROLE 3 MG/1
3 TABLET, FILM COATED ORAL 3 TIMES DAILY
Qty: 270 TABLET | Refills: 0 | Status: SHIPPED | OUTPATIENT
Start: 2022-05-18 | End: 2022-08-16 | Stop reason: SDUPTHER

## 2022-05-18 RX ORDER — BLOOD SUGAR DIAGNOSTIC
STRIP MISCELLANEOUS
Qty: 100 EACH | Refills: 0 | Status: SHIPPED | OUTPATIENT
Start: 2022-05-18

## 2022-05-18 RX ORDER — LANCETS 28 GAUGE
EACH MISCELLANEOUS
Qty: 100 EACH | Refills: 0 | Status: SHIPPED | OUTPATIENT
Start: 2022-05-18

## 2022-05-31 ENCOUNTER — PATIENT MESSAGE (OUTPATIENT)
Dept: FAMILY MEDICINE CLINIC | Age: 62
End: 2022-05-31

## 2022-05-31 DIAGNOSIS — E11.9 TYPE 2 DIABETES MELLITUS WITHOUT COMPLICATION, WITHOUT LONG-TERM CURRENT USE OF INSULIN (HCC): ICD-10-CM

## 2022-06-01 RX ORDER — ATORVASTATIN CALCIUM 40 MG/1
40 TABLET, FILM COATED ORAL DAILY
Qty: 90 TABLET | Refills: 3 | Status: SHIPPED | OUTPATIENT
Start: 2022-06-01

## 2022-06-01 NOTE — TELEPHONE ENCOUNTER
From: Marco A Held  To: Sean Garcia  Sent: 5/31/2022 6:40 PM EDT  Subject: Refill needed    Peyman Ramos,    Per your previous instructions here is another refill needed.     Atrovastatin Calcium 40 mg tabs  99 day supply    Kermit Garza

## 2022-06-06 RX ORDER — POTASSIUM CHLORIDE 1500 MG/1
20 TABLET, FILM COATED, EXTENDED RELEASE ORAL 2 TIMES DAILY
Qty: 120 TABLET | Refills: 2 | Status: SHIPPED | OUTPATIENT
Start: 2022-06-06 | End: 2022-06-07 | Stop reason: SDUPTHER

## 2022-06-07 RX ORDER — POTASSIUM CHLORIDE 1500 MG/1
20 TABLET, FILM COATED, EXTENDED RELEASE ORAL 2 TIMES DAILY
Qty: 120 TABLET | Refills: 2 | Status: SHIPPED | OUTPATIENT
Start: 2022-06-07 | End: 2022-08-03 | Stop reason: SDUPTHER

## 2022-06-11 DIAGNOSIS — I10 ESSENTIAL HYPERTENSION: ICD-10-CM

## 2022-06-11 DIAGNOSIS — J30.1 SEASONAL ALLERGIC RHINITIS DUE TO POLLEN: ICD-10-CM

## 2022-06-13 RX ORDER — LISINOPRIL 10 MG/1
TABLET ORAL
Qty: 90 TABLET | Refills: 0 | Status: SHIPPED | OUTPATIENT
Start: 2022-06-13 | End: 2022-09-13 | Stop reason: SDUPTHER

## 2022-06-13 RX ORDER — HYDROCHLOROTHIAZIDE 25 MG/1
TABLET ORAL
Qty: 90 TABLET | Refills: 0 | Status: SHIPPED | OUTPATIENT
Start: 2022-06-13 | End: 2022-09-13 | Stop reason: SDUPTHER

## 2022-06-13 RX ORDER — LORATADINE 10 MG/1
10 TABLET ORAL DAILY
Qty: 90 TABLET | Refills: 1 | Status: SHIPPED | OUTPATIENT
Start: 2022-06-13 | End: 2022-09-13 | Stop reason: SDUPTHER

## 2022-06-28 RX ORDER — OMEPRAZOLE 20 MG/1
20 CAPSULE, DELAYED RELEASE ORAL DAILY
Qty: 90 CAPSULE | Refills: 3 | Status: SHIPPED | OUTPATIENT
Start: 2022-06-28 | End: 2022-07-20 | Stop reason: SDUPTHER

## 2022-07-20 ENCOUNTER — PATIENT MESSAGE (OUTPATIENT)
Dept: FAMILY MEDICINE CLINIC | Age: 62
End: 2022-07-20

## 2022-07-20 ENCOUNTER — OFFICE VISIT (OUTPATIENT)
Dept: FAMILY MEDICINE CLINIC | Age: 62
End: 2022-07-20
Payer: COMMERCIAL

## 2022-07-20 VITALS
HEIGHT: 68 IN | BODY MASS INDEX: 47.13 KG/M2 | TEMPERATURE: 93.9 F | DIASTOLIC BLOOD PRESSURE: 82 MMHG | OXYGEN SATURATION: 95 % | WEIGHT: 311 LBS | HEART RATE: 93 BPM | SYSTOLIC BLOOD PRESSURE: 126 MMHG

## 2022-07-20 DIAGNOSIS — I10 ESSENTIAL HYPERTENSION, BENIGN: Chronic | ICD-10-CM

## 2022-07-20 DIAGNOSIS — E11.9 TYPE 2 DIABETES MELLITUS WITHOUT COMPLICATION, WITHOUT LONG-TERM CURRENT USE OF INSULIN (HCC): Chronic | ICD-10-CM

## 2022-07-20 DIAGNOSIS — L30.4 INTERTRIGO: ICD-10-CM

## 2022-07-20 DIAGNOSIS — E66.01 CLASS 3 SEVERE OBESITY DUE TO EXCESS CALORIES WITH SERIOUS COMORBIDITY AND BODY MASS INDEX (BMI) OF 45.0 TO 49.9 IN ADULT (HCC): ICD-10-CM

## 2022-07-20 PROBLEM — J01.90 ACUTE NON-RECURRENT SINUSITIS: Status: RESOLVED | Noted: 2022-04-20 | Resolved: 2022-07-20

## 2022-07-20 PROCEDURE — 99214 OFFICE O/P EST MOD 30 MIN: CPT | Performed by: NURSE PRACTITIONER

## 2022-07-20 RX ORDER — NYSTATIN 100000 [USP'U]/G
POWDER TOPICAL
Qty: 60 G | Refills: 5 | Status: SHIPPED | OUTPATIENT
Start: 2022-07-20

## 2022-07-20 RX ORDER — FUROSEMIDE 40 MG/1
40 TABLET ORAL 2 TIMES DAILY
Qty: 60 TABLET | Refills: 3 | Status: SHIPPED | OUTPATIENT
Start: 2022-07-20 | End: 2022-07-21 | Stop reason: SDUPTHER

## 2022-07-20 RX ORDER — FUROSEMIDE 40 MG/1
40 TABLET ORAL 2 TIMES DAILY
Qty: 60 TABLET | Refills: 3 | Status: CANCELLED | OUTPATIENT
Start: 2022-07-20

## 2022-07-20 RX ORDER — OMEPRAZOLE 20 MG/1
20 CAPSULE, DELAYED RELEASE ORAL DAILY
Qty: 90 CAPSULE | Refills: 3 | Status: SHIPPED | OUTPATIENT
Start: 2022-07-20 | End: 2022-09-13 | Stop reason: SDUPTHER

## 2022-07-20 ASSESSMENT — ENCOUNTER SYMPTOMS
COLOR CHANGE: 0
BACK PAIN: 0
CHEST TIGHTNESS: 0
CONSTIPATION: 0
VOMITING: 0
ABDOMINAL PAIN: 0
SHORTNESS OF BREATH: 0
DIARRHEA: 0
RHINORRHEA: 0
EYE ITCHING: 0
SORE THROAT: 0
SINUS PRESSURE: 0
COUGH: 0
WHEEZING: 0
NAUSEA: 0
EYE REDNESS: 0
BLOOD IN STOOL: 0

## 2022-07-20 ASSESSMENT — PATIENT HEALTH QUESTIONNAIRE - PHQ9
SUM OF ALL RESPONSES TO PHQ9 QUESTIONS 1 & 2: 0
SUM OF ALL RESPONSES TO PHQ QUESTIONS 1-9: 0
1. LITTLE INTEREST OR PLEASURE IN DOING THINGS: 0
2. FEELING DOWN, DEPRESSED OR HOPELESS: 0
SUM OF ALL RESPONSES TO PHQ QUESTIONS 1-9: 0

## 2022-07-20 NOTE — ASSESSMENT & PLAN NOTE
He continues to struggle with his weight, continue to monitor diet, consider aquatic exercise, easier on his knees.

## 2022-07-20 NOTE — PROGRESS NOTES
Sera Oquendo (:  1960) is a 63 y.o. male,Established patient, here for evaluation of the following chief complaint(s):  Follow-up (3 months)      ASSESSMENT/PLAN:  1. Intertrigo  Assessment & Plan:  Shower with selsin blue type wash, dry completely, use nystatin powder, then place interdry sheets in folds. Stop use of neosporin. 2. Essential hypertension, benign  Assessment & Plan:   Stable, controlled  No changes  Continue current treatment plan     3. Type 2 diabetes mellitus without complication, without long-term current use of insulin (HCC)  Assessment & Plan:   Stable, controlled  No changes  Continue current treatment plan     4. Class 3 severe obesity due to excess calories with serious comorbidity and body mass index (BMI) of 45.0 to 49.9 in adult [facility])  Assessment & Plan:   He continues to struggle with his weight, continue to monitor diet, consider aquatic exercise, easier on his knees. No follow-ups on file. SUBJECTIVE/OBJECTIVE:  DIANE Hernández is in the office today for routine follow-up of chronic medical conditions. He states overall he has been doing okay. He is upset that he has not had more weight loss since his bariatric surgery. He states that he feels hungry all the time even though he is trying to eat 3-4 meals a day as recommended after surgery. He states he tries to avoid bread with sandwiches and use healthy snacks but does also admit he had a recent birthday and ate cake and ice cream.  His knees continue to give him trouble but they are significantly improved than they have been in the past.  He is still walking with a cane for comfort and safety purposes. He reports that he does have some redness and discomfort to his abdominal fold and left side of his groin. He knows that it is from the skin. He has been using Neosporin without much improvement.   He has been taking his medications as prescribed and is working with pain management to titrate back down off his Lyrica. His blood pressure has been controlled. His most recent A1c was controlled and stable. Current Outpatient Medications   Medication Sig Dispense Refill    omeprazole (PRILOSEC) 20 MG delayed release capsule Take 1 capsule by mouth in the morning. 90 capsule 3    furosemide (LASIX) 40 MG tablet Take 1 tablet by mouth in the morning and 1 tablet in the evening. 60 tablet 3    Skin Protectants, Misc. (INTERDRY AG TEXTILE 60\"O559\") SHEE Place sheet of interdry to abdominal folds daily 10 each 5    nystatin (MYCOSTATIN) 434093 UNIT/GM powder Apply 3 times daily. 60 g 5    loratadine (CLARITIN) 10 MG tablet Take 1 tablet by mouth daily 90 tablet 1    lisinopril (PRINIVIL;ZESTRIL) 10 MG tablet TAKE 1 TABLET BY MOUTH ONCE A DAY 90 tablet 0    hydroCHLOROthiazide (HYDRODIURIL) 25 MG tablet Hold until Monday 90 tablet 0    potassium chloride (KLOR-CON M) 20 MEQ TBCR extended release tablet Take 1 tablet by mouth 2 times daily 120 tablet 2    atorvastatin (LIPITOR) 40 MG tablet Take 1 tablet by mouth daily 90 tablet 3    Prodigy Twist Top Lancets 28G MISC TEST TWO TIMES A  each 0    blood glucose test strips (PRODIGY NO CODING BLOOD GLUC) strip TEST TWO TIMES A  each 0    rOPINIRole (REQUIP) 3 MG tablet Take 1 tablet by mouth 3 times daily 270 tablet 0    metFORMIN (GLUCOPHAGE) 500 MG tablet Take 1 tablet by mouth 2 times daily (with meals) 180 tablet 1    pregabalin (LYRICA) 75 MG capsule Take 1 capsule by mouth 3 times daily for 90 days.  270 capsule 0    Semaglutide, 2 MG/DOSE, 8 MG/3ML SOPN Inject 1 mg into the skin once a week 3 mL 5    amoxicillin (AMOXIL) 500 MG capsule 4 tablets by mouth 1 hour before dental appointment 4 capsule 1    Insulin Pen Needle (MEIJER PEN NEEDLES) 31G X 6 MM MISC 1 each by Does not apply route daily 100 each 3    ondansetron (ZOFRAN-ODT) 4 MG disintegrating tablet Take 1 tablet by mouth 3 times daily as needed for Nausea or Vomiting 21 tablet 0    Multiple Vitamin (MULTIVITAMIN, BARIATRIC FUSION COMPLETE, CHEW TAB) Take 4 tablets by mouth daily      glucose 5 g chewable tablet Take 3 tablets by mouth as needed for Low blood sugar 30 tablet 0    blood glucose monitor kit and supplies Check BG twice daily 1 kit 0     No current facility-administered medications for this visit. Review of Systems   Constitutional:  Negative for chills, fatigue and fever. HENT:  Negative for congestion, ear pain, postnasal drip, rhinorrhea, sinus pressure, sneezing and sore throat. Eyes:  Negative for redness and itching. Respiratory:  Negative for cough, chest tightness, shortness of breath and wheezing. Cardiovascular:  Negative for chest pain and palpitations. Gastrointestinal:  Negative for abdominal pain, blood in stool, constipation, diarrhea, nausea and vomiting. Endocrine: Negative for cold intolerance and heat intolerance. Genitourinary:  Negative for difficulty urinating, dysuria, flank pain, frequency, hematuria and urgency. Musculoskeletal:  Negative for arthralgias, back pain, joint swelling and myalgias. Skin:  Negative for color change, pallor, rash and wound. Allergic/Immunologic: Negative for environmental allergies and food allergies. Neurological:  Negative for dizziness, seizures, syncope, weakness, light-headedness, numbness and headaches. Hematological:  Negative for adenopathy. Does not bruise/bleed easily. Psychiatric/Behavioral:  Negative for confusion, sleep disturbance and suicidal ideas. The patient is not nervous/anxious and is not hyperactive. Vitals:    07/20/22 0824   BP: 126/82   Site: Left Upper Arm   Position: Sitting   Cuff Size: Large Adult   Pulse: 93   Temp: (!) 93.9 °F (34.4 °C)   SpO2: 95%   Weight: (!) 311 lb (141.1 kg)   Height: 5' 8\" (1.727 m)       Physical Exam  Constitutional:       Appearance: Normal appearance. He is well-developed. HENT:      Head: Normocephalic and atraumatic.       Right Ear: Hearing normal.      Left Ear: Hearing normal.      Nose: No mucosal edema. Right Sinus: No maxillary sinus tenderness or frontal sinus tenderness. Left Sinus: No maxillary sinus tenderness or frontal sinus tenderness. Mouth/Throat: Tonsils: No tonsillar abscesses. Eyes:      Extraocular Movements: Extraocular movements intact. Pupils: Pupils are equal, round, and reactive to light. Cardiovascular:      Rate and Rhythm: Normal rate and regular rhythm. Pulses: Normal pulses. Heart sounds: Normal heart sounds. Pulmonary:      Effort: Pulmonary effort is normal.      Breath sounds: Normal breath sounds. Lymphadenopathy:      Head:      Right side of head: No submental, submandibular, tonsillar, preauricular, posterior auricular or occipital adenopathy. Left side of head: No submental, submandibular, tonsillar, preauricular, posterior auricular or occipital adenopathy. Skin:     General: Skin is warm and dry. Comments: Redness to abdominal fold and left groin fold     Neurological:      Mental Status: He is alert. Psychiatric:         Mood and Affect: Mood normal.         Behavior: Behavior normal.         On this date 7/20/2022 I have spent 30 minutes reviewing previous notes, test results and face to face with the patient discussing the diagnosis and importance of compliance with the treatment plan as well as documenting on the day of the visit. An electronic signature was used to authenticate this note.     --JACKLYN Abad - CNP

## 2022-07-20 NOTE — ASSESSMENT & PLAN NOTE
Shower with selsin blue type wash, dry completely, use nystatin powder, then place interdry sheets in folds. Stop use of neosporin.

## 2022-07-21 RX ORDER — FUROSEMIDE 40 MG/1
40 TABLET ORAL 2 TIMES DAILY
Qty: 180 TABLET | Refills: 0 | Status: SHIPPED | OUTPATIENT
Start: 2022-07-21 | End: 2022-10-19

## 2022-08-03 RX ORDER — ONDANSETRON 4 MG/1
4 TABLET, ORALLY DISINTEGRATING ORAL 3 TIMES DAILY PRN
Qty: 21 TABLET | Refills: 0 | Status: SHIPPED | OUTPATIENT
Start: 2022-08-03

## 2022-08-03 RX ORDER — POTASSIUM CHLORIDE 1500 MG/1
20 TABLET, FILM COATED, EXTENDED RELEASE ORAL 2 TIMES DAILY
Qty: 120 TABLET | Refills: 2 | Status: SHIPPED | OUTPATIENT
Start: 2022-08-03

## 2022-08-16 RX ORDER — ROPINIROLE 3 MG/1
3 TABLET, FILM COATED ORAL 3 TIMES DAILY
Qty: 270 TABLET | Refills: 0 | Status: SHIPPED | OUTPATIENT
Start: 2022-08-16 | End: 2022-11-14

## 2022-09-13 DIAGNOSIS — I10 ESSENTIAL HYPERTENSION: ICD-10-CM

## 2022-09-13 DIAGNOSIS — J30.1 SEASONAL ALLERGIC RHINITIS DUE TO POLLEN: ICD-10-CM

## 2022-09-13 RX ORDER — LISINOPRIL 10 MG/1
TABLET ORAL
Qty: 90 TABLET | Refills: 0 | Status: SHIPPED | OUTPATIENT
Start: 2022-09-13

## 2022-09-13 RX ORDER — HYDROCHLOROTHIAZIDE 25 MG/1
TABLET ORAL
Qty: 90 TABLET | Refills: 0 | Status: SHIPPED | OUTPATIENT
Start: 2022-09-13

## 2022-09-13 RX ORDER — LORATADINE 10 MG/1
10 TABLET ORAL DAILY
Qty: 90 TABLET | Refills: 1 | Status: SHIPPED | OUTPATIENT
Start: 2022-09-13

## 2022-09-13 RX ORDER — OMEPRAZOLE 20 MG/1
20 CAPSULE, DELAYED RELEASE ORAL DAILY
Qty: 90 CAPSULE | Refills: 3 | Status: SHIPPED | OUTPATIENT
Start: 2022-09-13 | End: 2022-12-12

## 2022-10-06 ENCOUNTER — PATIENT MESSAGE (OUTPATIENT)
Dept: FAMILY MEDICINE CLINIC | Age: 62
End: 2022-10-06

## 2022-10-06 RX ORDER — SEMAGLUTIDE 1.34 MG/ML
1 INJECTION, SOLUTION SUBCUTANEOUS WEEKLY
Qty: 1.5 ML | Refills: 5 | Status: SHIPPED | OUTPATIENT
Start: 2022-10-06

## 2022-10-06 NOTE — TELEPHONE ENCOUNTER
From: Kvng Che  To: Aj Johnson  Sent: 10/6/2022 2:38 PM EDT  Subject: Refill    Hi Rachel, I didnt see it on the medication list but I need a refill on my Ozempic 1 Mg/Dose as I will run out before my next appointment.     Please send to :Dene[facility] [address]    Thanks, Raimundo Elizabeth

## 2022-11-14 ENCOUNTER — TELEMEDICINE (OUTPATIENT)
Dept: PULMONOLOGY | Age: 62
End: 2022-11-14
Payer: COMMERCIAL

## 2022-11-14 DIAGNOSIS — G47.33 OSA (OBSTRUCTIVE SLEEP APNEA): Primary | ICD-10-CM

## 2022-11-14 DIAGNOSIS — G25.81 RLS (RESTLESS LEGS SYNDROME): ICD-10-CM

## 2022-11-14 DIAGNOSIS — E66.01 CLASS 3 SEVERE OBESITY DUE TO EXCESS CALORIES WITH SERIOUS COMORBIDITY AND BODY MASS INDEX (BMI) OF 45.0 TO 49.9 IN ADULT (HCC): ICD-10-CM

## 2022-11-14 DIAGNOSIS — I10 ESSENTIAL HYPERTENSION, BENIGN: Chronic | ICD-10-CM

## 2022-11-14 DIAGNOSIS — E11.9 TYPE 2 DIABETES MELLITUS WITHOUT COMPLICATION, WITHOUT LONG-TERM CURRENT USE OF INSULIN (HCC): Chronic | ICD-10-CM

## 2022-11-14 PROCEDURE — 99214 OFFICE O/P EST MOD 30 MIN: CPT | Performed by: NURSE PRACTITIONER

## 2022-11-14 ASSESSMENT — SLEEP AND FATIGUE QUESTIONNAIRES
HOW LIKELY ARE YOU TO NOD OFF OR FALL ASLEEP WHILE SITTING QUIETLY AFTER LUNCH WITHOUT ALCOHOL: 1
HOW LIKELY ARE YOU TO NOD OFF OR FALL ASLEEP WHILE WATCHING TV: 1
ESS TOTAL SCORE: 7
HOW LIKELY ARE YOU TO NOD OFF OR FALL ASLEEP WHILE SITTING AND READING: 2
HOW LIKELY ARE YOU TO NOD OFF OR FALL ASLEEP WHILE LYING DOWN TO REST IN THE AFTERNOON WHEN CIRCUMSTANCES PERMIT: 3
HOW LIKELY ARE YOU TO NOD OFF OR FALL ASLEEP IN A CAR, WHILE STOPPED FOR A FEW MINUTES IN TRAFFIC: 0
HOW LIKELY ARE YOU TO NOD OFF OR FALL ASLEEP WHEN YOU ARE A PASSENGER IN A CAR FOR AN HOUR WITHOUT A BREAK: 0
HOW LIKELY ARE YOU TO NOD OFF OR FALL ASLEEP WHILE SITTING INACTIVE IN A PUBLIC PLACE: 0
HOW LIKELY ARE YOU TO NOD OFF OR FALL ASLEEP WHILE SITTING AND TALKING TO SOMEONE: 0

## 2022-11-14 NOTE — ASSESSMENT & PLAN NOTE
Chronic-Stable: Reviewed and analyzed results of physiologic download from patient's machine and reviewed with patient. Supplies and parts as needed for his machine. These are medically necessary. Limit caffeine use after 3pm. Based on the analyzed data will change following settings: EPAP min increased to 10, PS min increased to 3, Ramp increased to 8. Pressure changes sent via machine modem. Will trial pressure increased to see if this will help with sleep maintenance. Pressure recommendations from split-night study from 2020 recommended higher pressures but at that time patient was about 100 pounds heavier. May need to consider in lab titration. Encouraged him to work with his equipment company on mask fit and comfort. Provided resources for review different styles of masks. Will see him back in 3 months. Encouraged him to contact the office any questions or concerns.

## 2022-11-14 NOTE — LETTER
[facility] Sleep Medicine  [address] [facility]  [address] [address]  Phone: [phone]  Fax: [phone]    November 14, 2022       Patient: Reema Sy   MR Number: [id number]   YOB: 1960   Date of Visit: 11/14/2022       Sanjeev Gutierrez was seen for a follow up visit today. Here is my assessment and plan as well as an attached copy of his visit today:    Essential hypertension, benign   Chronic- Stable. Discussed the importance of treating obstructive sleep apnea as part of the management of this disorder. Cont any meds per PCP and other physicians. Type 2 diabetes mellitus without complication, without long-term current use of insulin (HCC)   Chronic- Stable. Discussed the importance of treating obstructive sleep apnea as part of the management of this disorder. Cont any meds per PCP and other physicians. RLS (restless legs syndrome)   Chronic- Stable. Discussed the importance of treating obstructive sleep apnea as part of the management of this disorder. Encouraged more use of his machine each night, which could improve RLS symptoms. Currently taking Requip 3 mg TID. Medication is currently being managed by his PCP. His symptoms are controlled at the current dose and he denies side effects. Class 3 severe obesity due to excess calories with serious comorbidity and body mass index (BMI) of 45.0 to 49.9 in adult [facility])  Chronic-not stable:  Discussed importance of treating obstructive sleep apnea and getting sufficient sleep to assist with weight control. Encouraged him to work on weight loss through diet and exercise. Recommended DASH or Mediterranean diets. TOM (obstructive sleep apnea)  Chronic-Stable: Reviewed and analyzed results of physiologic download from patient's machine and reviewed with patient. Supplies and parts as needed for his machine. These are medically necessary.   Limit caffeine use after 3pm. Based on the analyzed data will change following settings: EPAP min increased to 10, PS min increased to 3, Ramp increased to 8. Pressure changes sent via machine modem. Will trial pressure increased to see if this will help with sleep maintenance. Pressure recommendations from split-night study from 2020 recommended higher pressures but at that time patient was about 100 pounds heavier. May need to consider in lab titration. Encouraged him to work with his equipment company on mask fit and comfort. Provided resources for review different styles of masks. Will see him back in 3 months. Encouraged him to contact the office any questions or concerns. If you have questions or concerns, please do not hesitate to call me. I look forward to following Gilford Kleine along with you.     Sincerely,    JACKLYN Lane providers:  JACKLYN Wilks - CNP  [address] [address]  Via In [facility] [address]

## 2022-11-14 NOTE — PROGRESS NOTES
[facility]  [address] [facility], [address] [facility] [phone]   [facility] [address] Johnny X[address]. [address] [place] [address]. [address] [phone]     Video Visit- Follow up    Remea Sy, was evaluated through a synchronous (real-time) audio-video  encounter. The patient (or guardian if applicable) is aware that this is a billable  service, which includes applicable co-pays. This Virtual Visit was conducted with  patient's (and/or legal guardian's) consent. The visit was conducted pursuant to  the emergency declaration under the [address] [place] [address] authority and the [employer] and  [employer] General Act. Patient identification was verified,  and a caregiver was present when appropriate. The patient was located in a  state where the provider was licensed to provide care. Assessment/Plan:      1. TOM (obstructive sleep apnea)  Assessment & Plan:  Chronic-Stable: Reviewed and analyzed results of physiologic download from patient's machine and reviewed with patient. Supplies and parts as needed for his machine. These are medically necessary. Limit caffeine use after 3pm. Based on the analyzed data will change following settings: EPAP min increased to 10, PS min increased to 3, Ramp increased to 8. Pressure changes sent via machine modem. Will trial pressure increased to see if this will help with sleep maintenance. Pressure recommendations from split-night study from 2020 recommended higher pressures but at that time patient was about 100 pounds heavier. May need to consider in lab titration. Encouraged him to work with his equipment company on mask fit and comfort. Provided resources for review different styles of masks. Will see him back in 3 months. Encouraged him to contact the office any questions or concerns. 2. Essential hypertension, benign  Assessment & Plan:   Chronic- Stable. Discussed the importance of treating obstructive sleep apnea as part of the management of this disorder. Cont any meds per PCP and other physicians. 3. Type 2 diabetes mellitus without complication, without long-term current use of insulin (HCC)  Assessment & Plan:   Chronic- Stable. Discussed the importance of treating obstructive sleep apnea as part of the management of this disorder. Cont any meds per PCP and other physicians. 4. RLS (restless legs syndrome)  Assessment & Plan:   Chronic- Stable. Discussed the importance of treating obstructive sleep apnea as part of the management of this disorder. Encouraged more use of his machine each night, which could improve RLS symptoms. Currently taking Requip 3 mg TID. Medication is currently being managed by his PCP. His symptoms are controlled at the current dose and he denies side effects. 5. Class 3 severe obesity due to excess calories with serious comorbidity and body mass index (BMI) of 45.0 to 49.9 in [facility])  Assessment & Plan:  Chronic-not stable:  Discussed importance of treating obstructive sleep apnea and getting sufficient sleep to assist with weight control. Encouraged him to work on weight loss through diet and exercise. Recommended DASH or Mediterranean diets. Reviewed, analyzed, and documented physiologic data from patient's PAP machine. This information was analyzed to assess complexity and medical decision making in regards to further testing and management. The primary encounter diagnosis was TOM (obstructive sleep apnea). Diagnoses of Essential hypertension, benign, Type 2 diabetes mellitus without complication, without long-term current use of insulin (HCC), RLS (restless legs syndrome), and Class 3 severe obesity due to excess calories with serious comorbidity and body mass index (BMI) of 45.0 to 49.9 in [facility]) were also pertinent to this visit.  The chronic medical conditions listed are directly related to the primary diagnosis listed above. The management of the primary diagnosis affects the secondary diagnosis and vice versa. Subjective:     Patient ID: Rafy Diaz is a 63 y.o. male. Chief Complaint   Patient presents with    Sleep Apnea     Subjective   HPI:    Machine Modem/Download Info:  Compliance (hours/night): 4.4 hrs/night  % of nights >= 4 hrs: 84.4 %  Download AHI (/hour): 0.8 /HR   Average IPAP Pressure: 13.5 cmH2O  Average EPAP Pressure: 10.8 cmH2O         AUTO BIPAP - Settings (Ginny)  IPAP Max: 25 cmH2O  EPAP Min: 8 cmH2O  Pressure Support Min: 2  Pressure Support Max: 8             Comfort Settings  Humidity Level (0-8): 2  Flex/EPR (0-3): 3 PAP Mask  Mask Type: Full Face mask     Rafy Diaz reports he is doing well with his machine. He has received his replacement machine for the  recall. He will sleep 4 hours and then get up and go to his recliner and sleep a few more hours. Trouble with knee pain from surgeries and getting comfortable and will toss and turn. The pressure on his machine is comfortable and he is waking rested for the most part. he denies headaches, congestion, nosebleeds, dryness, aerophagia, or drowsiness while driving. His mask is comfortable and is fitting well. RLS: Currently taking Requip 3 mg TID. 5a, 12p, 7p. Medication is currently being managed by his PCP. His symptoms are controlled at the current dose and he denies side effects. 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 Changed classification so he doesn't need physical portion right now. 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     [address]    [place] Sleepiness Score: 7    Current Outpatient Medications   Medication Instructions    amoxicillin (AMOXIL) 500 MG capsule 4 tablets by mouth 1 hour before dental appointment    atorvastatin (LIPITOR) 40 mg, Oral, DAILY    blood glucose monitor kit and supplies Check BG twice daily    blood glucose test strips (PRODIGY NO CODING BLOOD GLUC) strip TEST TWO TIMES A DAY    furosemide (LASIX) 40 mg, Oral, 2 TIMES DAILY    glucose 15 g, Oral, PRN    hydroCHLOROthiazide (HYDRODIURIL) 25 MG tablet Hold until Monday    Insulin Pen Needle (MEIJER PEN NEEDLES) 31G X 6 MM MISC 1 each, Does not apply, DAILY    lisinopril (PRINIVIL;ZESTRIL) 10 MG tablet TAKE 1 TABLET BY MOUTH ONCE A DAY    loratadine (CLARITIN) 10 mg, Oral, DAILY    metFORMIN (GLUCOPHAGE) 500 mg, Oral, 2 TIMES DAILY WITH MEALS    Multiple Vitamin (MULTIVITAMIN, BARIATRIC FUSION COMPLETE, CHEW TAB) 4 tablets, Oral, DAILY    nystatin (MYCOSTATIN) 937015 UNIT/GM powder Apply 3 times daily. omeprazole (PRILOSEC) 20 mg, Oral, DAILY    ondansetron (ZOFRAN-ODT) 4 mg, Oral, 3 TIMES DAILY PRN    Ozempic (1 MG/DOSE) 1 mg, SubCUTAneous, WEEKLY    potassium chloride (KLOR-CON M) 20 MEQ TBCR extended release tablet 20 mEq, Oral, 2 TIMES DAILY    pregabalin (LYRICA) 75 mg, Oral, 3 TIMES DAILY    Prodigy Twist Top Lancets 28G MISC TEST TWO TIMES A DAY    rOPINIRole (REQUIP) 3 mg, Oral, 3 TIMES DAILY    Semaglutide (2 MG/DOSE) 1 mg, SubCUTAneous, WEEKLY    Skin Protectants, Misc.  (INTERDRY AG TEXTILE 12\"H280\") SHEE Place sheet of interdry to abdominal folds daily        Electronically signed by Geofm Favre, APRN on 11/14/2022 at 11:01 AM

## 2022-11-21 RX ORDER — ROPINIROLE 3 MG/1
3 TABLET, FILM COATED ORAL 3 TIMES DAILY
Qty: 270 TABLET | Refills: 0 | Status: SHIPPED | OUTPATIENT
Start: 2022-11-21 | End: 2023-02-19

## 2022-11-22 DIAGNOSIS — E11.9 TYPE 2 DIABETES MELLITUS WITHOUT COMPLICATION, WITHOUT LONG-TERM CURRENT USE OF INSULIN (HCC): ICD-10-CM

## 2022-11-22 DIAGNOSIS — I10 ESSENTIAL HYPERTENSION: Primary | ICD-10-CM

## 2022-11-23 DIAGNOSIS — E11.9 TYPE 2 DIABETES MELLITUS WITHOUT COMPLICATION, WITHOUT LONG-TERM CURRENT USE OF INSULIN (HCC): ICD-10-CM

## 2022-11-23 DIAGNOSIS — I10 ESSENTIAL HYPERTENSION: ICD-10-CM

## 2022-11-23 LAB
A/G RATIO: 1.8 (ref 1.1–2.2)
ALBUMIN SERPL-MCNC: 4.6 G/DL (ref 3.4–5)
ALP BLD-CCNC: 90 U/L (ref 40–129)
ALT SERPL-CCNC: 24 U/L (ref 10–40)
ANION GAP SERPL CALCULATED.3IONS-SCNC: 16 MMOL/L (ref 3–16)
AST SERPL-CCNC: 17 U/L (ref 15–37)
BILIRUB SERPL-MCNC: 0.5 MG/DL (ref 0–1)
BUN BLDV-MCNC: 20 MG/DL (ref 7–20)
CALCIUM SERPL-MCNC: 9.7 MG/DL (ref 8.3–10.6)
CHLORIDE BLD-SCNC: 99 MMOL/L (ref 99–110)
CHOLESTEROL, FASTING: 121 MG/DL (ref 0–199)
CO2: 27 MMOL/L (ref 21–32)
CREAT SERPL-MCNC: 0.8 MG/DL (ref 0.8–1.3)
GFR SERPL CREATININE-BSD FRML MDRD: >60 ML/MIN/{1.73_M2}
GLUCOSE BLD-MCNC: 104 MG/DL (ref 70–99)
HCT VFR BLD CALC: 44 % (ref 40.5–52.5)
HDLC SERPL-MCNC: 48 MG/DL (ref 40–60)
HEMOGLOBIN: 14.7 G/DL (ref 13.5–17.5)
LDL CHOLESTEROL CALCULATED: 49 MG/DL
MCH RBC QN AUTO: 32.9 PG (ref 26–34)
MCHC RBC AUTO-ENTMCNC: 33.3 G/DL (ref 31–36)
MCV RBC AUTO: 98.9 FL (ref 80–100)
PDW BLD-RTO: 13.8 % (ref 12.4–15.4)
PLATELET # BLD: 199 K/UL (ref 135–450)
PMV BLD AUTO: 8.5 FL (ref 5–10.5)
POTASSIUM SERPL-SCNC: 4.5 MMOL/L (ref 3.5–5.1)
RBC # BLD: 4.45 M/UL (ref 4.2–5.9)
SODIUM BLD-SCNC: 142 MMOL/L (ref 136–145)
TOTAL PROTEIN: 7.2 G/DL (ref 6.4–8.2)
TRIGLYCERIDE, FASTING: 120 MG/DL (ref 0–150)
VLDLC SERPL CALC-MCNC: 24 MG/DL
WBC # BLD: 6.8 K/UL (ref 4–11)

## 2022-11-24 LAB
ESTIMATED AVERAGE GLUCOSE: 111.2 MG/DL
HBA1C MFR BLD: 5.5 %

## 2022-12-06 DIAGNOSIS — I10 ESSENTIAL HYPERTENSION: ICD-10-CM

## 2022-12-06 RX ORDER — LISINOPRIL 10 MG/1
TABLET ORAL
Qty: 90 TABLET | Refills: 0 | Status: SHIPPED | OUTPATIENT
Start: 2022-12-06

## 2022-12-06 RX ORDER — HYDROCHLOROTHIAZIDE 25 MG/1
TABLET ORAL
Qty: 90 TABLET | Refills: 0 | Status: SHIPPED | OUTPATIENT
Start: 2022-12-06

## 2023-01-25 ENCOUNTER — PATIENT MESSAGE (OUTPATIENT)
Dept: FAMILY MEDICINE CLINIC | Age: 63
End: 2023-01-25

## 2023-01-25 RX ORDER — POTASSIUM CHLORIDE 1500 MG/1
20 TABLET, FILM COATED, EXTENDED RELEASE ORAL 2 TIMES DAILY
Qty: 120 TABLET | Refills: 2 | Status: SHIPPED | OUTPATIENT
Start: 2023-01-25 | End: 2023-01-26 | Stop reason: SDUPTHER

## 2023-01-26 RX ORDER — POTASSIUM CHLORIDE 1500 MG/1
20 TABLET, FILM COATED, EXTENDED RELEASE ORAL 2 TIMES DAILY
Qty: 180 TABLET | Refills: 1 | Status: SHIPPED | OUTPATIENT
Start: 2023-01-26

## 2023-01-26 NOTE — TELEPHONE ENCOUNTER
From: Yulissa Ramirez  To: Montez Lo  Sent: 1/25/2023 6:30 PM EST  Subject: Potassium refill    Can I not get a 3 month supply instead of the 2 month supply? I normally get 3 months on everything though there was a time a 2 month supply was given for reasons unknown.     Rosemarie Packer

## 2023-01-27 ENCOUNTER — OFFICE VISIT (OUTPATIENT)
Dept: ORTHOPEDIC SURGERY | Age: 63
End: 2023-01-27

## 2023-01-27 VITALS — BODY MASS INDEX: 47.13 KG/M2 | WEIGHT: 311 LBS | HEIGHT: 68 IN

## 2023-01-27 DIAGNOSIS — M54.50 LUMBAR PAIN: Primary | ICD-10-CM

## 2023-01-27 DIAGNOSIS — M54.16 LUMBAR RADICULOPATHY: ICD-10-CM

## 2023-01-27 RX ORDER — PREDNISONE 20 MG/1
TABLET ORAL
Qty: 20 TABLET | Refills: 0 | Status: SHIPPED | OUTPATIENT
Start: 2023-01-27

## 2023-01-27 RX ORDER — SEMAGLUTIDE 1.34 MG/ML
INJECTION, SOLUTION SUBCUTANEOUS
COMMUNITY
Start: 2023-01-23

## 2023-01-27 SDOH — HEALTH STABILITY: PHYSICAL HEALTH: ON AVERAGE, HOW MANY DAYS PER WEEK DO YOU ENGAGE IN MODERATE TO STRENUOUS EXERCISE (LIKE A BRISK WALK)?: 0 DAYS

## 2023-01-27 NOTE — PROGRESS NOTES
New Patient: LUMBAR SPINE    Referring Provider:  No ref. provider found    CHIEF COMPLAINT:    Chief Complaint   Patient presents with    Back Pain     lumbar       HISTORY OF PRESENT ILLNESS:       Mr. Alexander Vergara  is a pleasant 63 y.o. male here for consultation regarding his LBP and right  leg pain. He states his pain began insidiously about 1 to 2 months ago. His pain has steadily increased possibly after shoveling snow last week. He rates his current back pain 5/10, right buttock pain 8/10, and right posterior thigh pain 8/10. Pain is increased with prolonged standing, walking, and lying down and somewhat improved with sitting, rising from a seated position, and walking. He has numbness and tingling that radiates down the posterior aspect of his right leg to his knee. He denies any bowel or bladder dysfunction, saddle anesthesia, or progressive lower extremity weakness. The pain at times disrupts his sleep. Pain Assessment  Location of Pain: Back  Severity of Pain: 8  Duration of Pain: Other (Comment)  Frequency of Pain: Other (Comment)  Aggravating Factors: Other (Comment)]  Current/Past Treatment:   Physical Therapy: None   Chiropractic: None  Injection: None  Medications: None    Past Medical History:   Past Medical History:   Diagnosis Date    Arthritis     Bilateral venous leg ulcers 09/2020    Cellulitis of lower extremity 09/25/2019    Diabetes mellitus ([facility] [address]     GERD (gastroesophageal reflux disease)     Hyperlipidemia     Hypertension     Impaired fasting glucose 05/2013    MRSA carrier     MRSA nasal colonization 07/14/2021    Obesity     TOM treated with BiPAP 11/30/2020    Preoperative clearance 10/26/2020    Restless leg syndrome     Screening PSA (prostate specific antigen) 12/30/2015;5/1/17    Nml:0.53(12/30/2015);5/1/17=nml.     Seasonal allergies     Sleep apnea     uses CPAP    Stasis dermatitis of both legs 08/28/2020    Tobacco use     Tubular adenoma of colon 09/14/2017 Past Surgical History:     Past Surgical History:   Procedure Laterality Date    CIRCUMCISION      COLONOSCOPY  09/14/2017    Colonoscopy with polypectomy (cold biopsy):Dr. Walker:next in 3yrs=9/14/2020    KNEE ARTHROSCOPY Left 8/3/2020    VIDEO ARTHROSCOPY LEFT KNEE, PARTIAL MEDIAL MENISCECTOMY, CHONDROPLASTY, SYNOVIAL BIOPSY -TOM=4- performed by Catie Acosta MD at [place] [address]      PAIN MANAGEMENT PROCEDURE Left 12/9/2020    COOLIEF RADIOFREQUENCY ABLATION - LEFT performed by Syeda Henning MD at [facility] N/A 3/2/2021    ROBOTIC ASSISTED LAPAROSCOPIC SLEEVE GASTRECTOMY performed by Nohemy Rock DO at [place] [address] Left 7/14/2021    LEFT TOTAL KNEE REPLACEMENT ROBOTIC ASSISTED performed by Gabriel Hansen MD at [place] [address] Right 10/26/2021    RIGHT TOTAL KNEE REPLACEMENT ROBOTIC ASSISTED performed by Gabriel Hansen MD at [address] [place] [address]  05/28/2011    UPPER GASTROINTESTINAL ENDOSCOPY N/A 1/11/2021    EGD BIOPSY performed by Nohemy Rock DO at [facility]         Current Medications:     Current Outpatient Medications:     predniSONE (DELTASONE) 20 MG tablet, Take 3 p.o. daily for 3 days, then 2 p.o. daily for 3 days, then 1 p.o. daily for 3 days, then one half p.o. daily for 4 days, Disp: 20 tablet, Rfl: 0    OZEMPIC, 1 MG/DOSE, 4 MG/3ML SOPN, , Disp: , Rfl:     potassium chloride (KLOR-CON M) 20 MEQ TBCR extended release tablet, Take 1 tablet by mouth 2 times daily, Disp: 180 tablet, Rfl: 1    metFORMIN (GLUCOPHAGE) 500 MG tablet, Take 1 tablet by mouth 2 times daily (with meals), Disp: 180 tablet, Rfl: 1    lisinopril (PRINIVIL;ZESTRIL) 10 MG tablet, TAKE 1 TABLET BY MOUTH ONCE A DAY, Disp: 90 tablet, Rfl: 0    hydroCHLOROthiazide (HYDRODIURIL) 25 MG tablet, Hold until Monday, Disp: 90 tablet, Rfl: 0    rOPINIRole (REQUIP) 3 MG tablet, Take 1 tablet by mouth 3 times daily, Disp: 270 tablet, Rfl: 0    Semaglutide, 1 MG/DOSE, (OZEMPIC, 1 MG/DOSE,) 2 MG/1.5ML SOPN, Inject 1 mg into the skin once a week, Disp: 1.5 mL, Rfl: 5    loratadine (CLARITIN) 10 MG tablet, Take 1 tablet by mouth daily, Disp: 90 tablet, Rfl: 1    omeprazole (PRILOSEC) 20 MG delayed release capsule, Take 1 capsule by mouth Daily, Disp: 90 capsule, Rfl: 3    ondansetron (ZOFRAN-ODT) 4 MG disintegrating tablet, Take 1 tablet by mouth 3 times daily as needed for Nausea or Vomiting, Disp: 21 tablet, Rfl: 0    furosemide (LASIX) 40 MG tablet, Take 1 tablet by mouth in the morning and 1 tablet in the evening., Disp: 180 tablet, Rfl: 0    Skin Protectants, Misc. (INTERDRY AG TEXTILE 44\"S563\") THEO, Place sheet of interdry to abdominal folds daily, Disp: 10 each, Rfl: 5    nystatin (MYCOSTATIN) 131796 UNIT/GM powder, Apply 3 times daily. , Disp: 60 g, Rfl: 5    atorvastatin (LIPITOR) 40 MG tablet, Take 1 tablet by mouth daily, Disp: 90 tablet, Rfl: 3    Prodigy Twist Top Lancets 28G MISC, TEST TWO TIMES A DAY, Disp: 100 each, Rfl: 0    blood glucose test strips (PRODIGY NO CODING BLOOD GLUC) strip, TEST TWO TIMES A DAY, Disp: 100 each, Rfl: 0    pregabalin (LYRICA) 75 MG capsule, Take 1 capsule by mouth 3 times daily for 90 days. , Disp: 270 capsule, Rfl: 0    Semaglutide, 2 MG/DOSE, 8 MG/3ML SOPN, Inject 1 mg into the skin once a week, Disp: 3 mL, Rfl: 5    amoxicillin (AMOXIL) 500 MG capsule, 4 tablets by mouth 1 hour before dental appointment, Disp: 4 capsule, Rfl: 1    Insulin Pen Needle (MEIJER PEN NEEDLES) 31G X 6 MM MISC, 1 each by Does not apply route daily, Disp: 100 each, Rfl: 3    Multiple Vitamin (MULTIVITAMIN, BARIATRIC FUSION COMPLETE, CHEW TAB), Take 4 tablets by mouth daily, Disp: , Rfl:     glucose 5 g chewable tablet, Take 3 tablets by mouth as needed for Low blood sugar, Disp: 30 tablet, Rfl: 0    blood glucose monitor kit and supplies, Check BG twice daily, Disp: 1 kit, Rfl: 0    Allergies:  Celebrex [celecoxib], Mobic [meloxicam], Nsaids, and Bactrim [sulfamethoxazole-trimethoprim]    Social History:    reports that he quit smoking about 16 years ago. His smoking use included cigarettes. He has a 50.00 pack-year smoking history. He has never used smokeless tobacco. He reports that he does not currently use alcohol. He reports that he does not use drugs. Family History:   Family History   Problem Relation Age of Onset    High Blood Pressure Mother     Heart Disease Mother         MI    AT 66    Diabetes Brother        REVIEW OF SYSTEMS: Full ROS noted & scanned   CONSTITUTIONAL: Denies unexplained weight loss, fevers, chills or fatigue  NEUROLOGICAL: Denies unsteady gait or progressive weakness  MUSCULOSKELETAL: Denies joint swelling or redness  PSYCHOLOGICAL: Denies anxiety, depression   SKIN: Denies skin changes, delayed healing, rash, itching   HEMATOLOGIC: Denies easy bleeding or bruising  ENDOCRINE: Denies excessive thirst, urination, heat/cold  RESPIRATORY: Denies current dyspnea, cough  GI: Denies nausea, vomiting, diarrhea   : Denies bowel or bladder issues      PHYSICAL EXAM:    Vitals: Height 5' 7.99\" (1.727 m), weight (!) 311 lb (141.1 kg). GENERAL EXAM:  General Apparence: Patient is adequately groomed with no evidence of malnutrition. Orientation: The patient is oriented to time, place and person. Mood & Affect:The patient's mood and affect are appropriate. Vascular: Examination reveals no swelling tenderness in upper or lower extremities. Good capillary refill. Lymphatic: The lymphatic examination bilaterally reveals all areas to be without enlargement or induration  Sensation: Sensation is intact without deficit  Coordination/Balance: Good coordination. .     LUMBAR/SACRAL EXAMINATION:  Inspection: Local inspection shows no step-off or bruising.   Lumbar alignment is normal.  Sagittal and Coronal balance is neutral.      Palpation:   No evidence of tenderness at the midline. No tenderness bilaterally at the paraspinal or trochanters. There is no step-off or paraspinal spasm. Range of Motion: Lumbar flexion, extension and rotation are mildly limited due to pain. Strength:   Strength testing is 5/5 in all muscle groups tested. Special Tests:   Straight leg raise and crossed SLR negative. Leg length and pelvis level. Skin: There are no rashes, ulcerations or lesions. Reflexes: Reflexes are symmetrically 2+ at the patellar and ankle tendons. Clonus absent bilaterally at the feet. Gait & station: normal, patient ambulates without assistance    Additional Examinations:   RIGHT LOWER EXTREMITY: Inspection/examination of the right lower extremity does not show any tenderness, deformity or injury. Range of motion is unremarkable. There is no gross instability. There are no rashes, ulcerations or lesions. Strength and tone are normal.  LEFT LOWER EXTREMITY:  Inspection/examination of the left lower extremity does not show any tenderness, deformity or injury. Range of motion is unremarkable. There is no gross instability. There are no rashes, ulcerations or lesions. Strength and tone are normal.    Diagnostic Testing:    X-rays: 2 views of the lumbar spine include AP and lateral were obtained today in the office and independently reviewed with the patient which shows well-maintained lumbar lordosis with well-maintained vertebral heights. There is only mild degenerative changes noted between L5 and S1. Impression:     1. Lumbar pain    2. Lumbar radiculopathy        Plan:      We discussed the diagnosis and treatment options including observation, additional oral steroids, physical therapy, epidural injections and additional imaging. He wishes to proceed with prescription for a prednisone taper that he is to take as prescribed.   He may be candidate for outpatient physical therapy if he has had no durable benefit with the prednisone. .    Follow up -as needed    Old records were reviewed.     Total evaluation time 31 minutes    Amanda Mcrae PA-C  Board certified by the [address]

## 2023-02-11 DIAGNOSIS — J30.1 SEASONAL ALLERGIC RHINITIS DUE TO POLLEN: ICD-10-CM

## 2023-02-13 RX ORDER — ROPINIROLE 3 MG/1
3 TABLET, FILM COATED ORAL 3 TIMES DAILY
Qty: 270 TABLET | Refills: 0 | Status: SHIPPED | OUTPATIENT
Start: 2023-02-13 | End: 2023-05-14

## 2023-02-13 RX ORDER — LORATADINE 10 MG/1
10 TABLET ORAL DAILY
Qty: 90 TABLET | Refills: 1 | Status: SHIPPED | OUTPATIENT
Start: 2023-02-13

## 2023-02-14 ENCOUNTER — TELEMEDICINE (OUTPATIENT)
Dept: PULMONOLOGY | Age: 63
End: 2023-02-14
Payer: COMMERCIAL

## 2023-02-14 DIAGNOSIS — E66.01 CLASS 3 SEVERE OBESITY DUE TO EXCESS CALORIES WITH SERIOUS COMORBIDITY AND BODY MASS INDEX (BMI) OF 45.0 TO 49.9 IN ADULT (HCC): ICD-10-CM

## 2023-02-14 DIAGNOSIS — G25.81 RLS (RESTLESS LEGS SYNDROME): ICD-10-CM

## 2023-02-14 DIAGNOSIS — I10 ESSENTIAL HYPERTENSION, BENIGN: Chronic | ICD-10-CM

## 2023-02-14 DIAGNOSIS — E11.9 TYPE 2 DIABETES MELLITUS WITHOUT COMPLICATION, WITHOUT LONG-TERM CURRENT USE OF INSULIN (HCC): Chronic | ICD-10-CM

## 2023-02-14 DIAGNOSIS — G47.33 OSA (OBSTRUCTIVE SLEEP APNEA): Primary | ICD-10-CM

## 2023-02-14 PROCEDURE — 99214 OFFICE O/P EST MOD 30 MIN: CPT | Performed by: NURSE PRACTITIONER

## 2023-02-14 RX ORDER — POTASSIUM CHLORIDE 20 MEQ/1
TABLET, EXTENDED RELEASE ORAL
COMMUNITY
Start: 2023-01-25

## 2023-02-14 ASSESSMENT — SLEEP AND FATIGUE QUESTIONNAIRES
HOW LIKELY ARE YOU TO NOD OFF OR FALL ASLEEP WHILE LYING DOWN TO REST IN THE AFTERNOON WHEN CIRCUMSTANCES PERMIT: 3
HOW LIKELY ARE YOU TO NOD OFF OR FALL ASLEEP WHILE SITTING QUIETLY AFTER LUNCH WITHOUT ALCOHOL: SLIGHT CHANCE OF DOZING
HOW LIKELY ARE YOU TO NOD OFF OR FALL ASLEEP IN A CAR, WHILE STOPPED FOR A FEW MINUTES IN TRAFFIC: WOULD NEVER DOZE
HOW LIKELY ARE YOU TO NOD OFF OR FALL ASLEEP WHILE SITTING AND READING: 1
ESS TOTAL SCORE: 8
HOW LIKELY ARE YOU TO NOD OFF OR FALL ASLEEP WHEN YOU ARE A PASSENGER IN A CAR FOR AN HOUR WITHOUT A BREAK: SLIGHT CHANCE OF DOZING
HOW LIKELY ARE YOU TO NOD OFF OR FALL ASLEEP WHILE SITTING AND READING: SLIGHT CHANCE OF DOZING
HOW LIKELY ARE YOU TO NOD OFF OR FALL ASLEEP WHEN YOU ARE A PASSENGER IN A CAR FOR AN HOUR WITHOUT A BREAK: 1
HOW LIKELY ARE YOU TO NOD OFF OR FALL ASLEEP WHEN YOU ARE A PASSENGER IN A CAR FOR AN HOUR WITHOUT A BREAK: 1
HOW LIKELY ARE YOU TO NOD OFF OR FALL ASLEEP WHILE SITTING QUIETLY AFTER LUNCH WITHOUT ALCOHOL: 1
HOW LIKELY ARE YOU TO NOD OFF OR FALL ASLEEP IN A CAR, WHILE STOPPED FOR A FEW MINUTES IN TRAFFIC: 0
HOW LIKELY ARE YOU TO NOD OFF OR FALL ASLEEP WHILE SITTING INACTIVE IN A PUBLIC PLACE: 1
HOW LIKELY ARE YOU TO NOD OFF OR FALL ASLEEP WHILE WATCHING TV: SLIGHT CHANCE OF DOZING
HOW LIKELY ARE YOU TO NOD OFF OR FALL ASLEEP WHILE SITTING INACTIVE IN A PUBLIC PLACE: 1
HOW LIKELY ARE YOU TO NOD OFF OR FALL ASLEEP WHILE SITTING QUIETLY AFTER LUNCH WITHOUT ALCOHOL: 1
HOW LIKELY ARE YOU TO NOD OFF OR FALL ASLEEP WHILE SITTING AND TALKING TO SOMEONE: WOULD NEVER DOZE
HOW LIKELY ARE YOU TO NOD OFF OR FALL ASLEEP WHILE LYING DOWN TO REST IN THE AFTERNOON WHEN CIRCUMSTANCES PERMIT: HIGH CHANCE OF DOZING
HOW LIKELY ARE YOU TO NOD OFF OR FALL ASLEEP WHILE WATCHING TV: 1
ESS TOTAL SCORE: 8
HOW LIKELY ARE YOU TO NOD OFF OR FALL ASLEEP WHILE SITTING AND READING: 1
HOW LIKELY ARE YOU TO NOD OFF OR FALL ASLEEP WHILE SITTING INACTIVE IN A PUBLIC PLACE: SLIGHT CHANCE OF DOZING
HOW LIKELY ARE YOU TO NOD OFF OR FALL ASLEEP WHILE SITTING AND TALKING TO SOMEONE: 0
HOW LIKELY ARE YOU TO NOD OFF OR FALL ASLEEP WHILE SITTING AND TALKING TO SOMEONE: 0
HOW LIKELY ARE YOU TO NOD OFF OR FALL ASLEEP WHILE LYING DOWN TO REST IN THE AFTERNOON WHEN CIRCUMSTANCES PERMIT: 3
HOW LIKELY ARE YOU TO NOD OFF OR FALL ASLEEP WHILE WATCHING TV: 1
HOW LIKELY ARE YOU TO NOD OFF OR FALL ASLEEP IN A CAR, WHILE STOPPED FOR A FEW MINUTES IN TRAFFIC: 0

## 2023-02-14 NOTE — PROGRESS NOTES
Diagnosis: [x] TOM (G47.33) [] CSA (G47.31) [] Apnea (G47.30)   Length of Need: [x] 15 Months [] 99 Months [] Other:   Machine (MICHELLE!): [] Respironics Dream Station      Auto [] ResMed AirSense     Auto [] Other:     []  CPAP () [] Bilevel ()   Mode: [] Auto [] Spontaneous    Mode: [] Auto [] Spontaneous             Comfort Settings:      Humidifier: [] Heated ()        [x] Water chamber replacement ()/ 1 per 6 months        Mask:   [] Nasal () /1 per 3 months [x] Full Face () /1 per 3 months   [] Patient choice -Size and fit mask [x] Patient Choice - Size and fit mask   [] Dispense: [] Dispense:   [] Headgear () / 1 per 3 months [x] Headgear () / 1 per 3 months   [] Replacement Nasal Cushion ()/2 per month [x] Interface Replacement ()/1 per month   [] Replacement Nasal Pillows ()/2 per month         Tubing: [x] Heated ()/1 per 3 months    [] Standard ()/1 per 3 months [] Other:           Filters: [x] Non-disposable ()/1 per 6 months     [x] Ultra-Fine, Disposable ()/2 per month        Miscellaneous: [] Chin Strap ()/ 1 per 6 months [] O2 bleed-in:        LPM   [] Oxymetry on CPAP/Bilevel []  Other:         Start Order Date: 02/14/23    MEDICAL JUSTIFICATION:  I, the undersigned, certify that the above prescribed supplies are medically necessary for this patients wellbeing. In my opinion, the supplies are both reasonable and necessary in reference to accepted standards of medicalpractice in treatment of this patients condition. Lucio Meza NP    NPI: [id number]       Order Signed Date: 02/14/23  [facility]  Pulmonary, Sleep, and Critical Care    Pulmonary, Sleep, and Critical Care  [address] [place] [address].  [facility], [address] [facility] , [address] [facility]  Phone: [phone]    Fax: [phone]    Dannie Dan  1960  [address]  [facility] [address]  [phone] (home)   [phone] (mobile)      Insurance Info (confirm with patient if correct):  Payer/Plan Subscr  Sex Relation Sub. Ins. ID Effective Group Num

## 2023-02-14 NOTE — LETTER
[facility]  [address] [facility]  [address] [address]  Phone: [phone]  Fax: [phone]    February 14, 2023       Patient: Juan Cassidy   MR Number: [id number]   YOB: 1960   Date of Visit: 2/14/2023       Delio Butt was seen for a follow up visit today. Here is my assessment and plan as well as an attached copy of his visit today:    Essential hypertension, benign   Chronic- Stable. Discussed the importance of treating obstructive sleep apnea as part of the management of this disorder. Cont any meds per PCP and other physicians. Type 2 diabetes mellitus without complication, without long-term current use of insulin (HCC)   Chronic- Stable. Discussed the importance of treating obstructive sleep apnea as part of the management of this disorder. Cont any meds per PCP and other physicians. RLS (restless legs syndrome)  Chronic- Stable. Discussed the importance of treating obstructive sleep apnea as part of the management of this disorder. Encouraged more use of his machine each night, which could improve RLS symptoms. Currently taking Requip 3 mg TID. Medication is currently being managed by his PCP. His symptoms are controlled at the current dose and he denies side effects. Class 3 severe obesity due to excess calories with serious comorbidity and body mass index (BMI) of 45.0 to 49.9 in adult [facility])   Chronic-not stable:  Discussed importance of treating obstructive sleep apnea and getting sufficient sleep to assist with weight control. Encouraged him to work on weight loss through diet and exercise. Recommended DASH or Mediterranean diets. TOM (obstructive sleep apnea)  Chronic-with progression/exacerbation: Reviewed and analyzed results of physiologic download from patient's machine and reviewed with patient. Supplies and parts as needed for his machine. These are medically necessary.   Limit caffeine use after 3pm. Based on the analyzed data will change following settings: EPAP min increased to 12, Humidity increased to 4, Tube temp increased to 3. Changes sent via machine modem. Discussed how to adjust the moisture settings on his machine to help with congestion. Encouraged him to use his machine as much as possible. 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 Will see him back in 6 months. Encouraged him to contact the office with any questions or concerns. Encouraged consistent use of his machine each night, all night. Discussed the importance of treating Obstructive sleep apnea from a physiological standpoint. Instructed not to drive unless had 4 hrs of effective therapy for his TOM the night before. No driving when drowsy. Did review the risks of under or untreated TOM including, but not limited to, higher risks of motor vehicle accidents, stroke, heart attacks, and death. He understands and accepts all these risks. If you have questions or concerns, please do not hesitate to call me. I look forward to following Kehinde Jameson along with you.     Sincerely,    JACKLYN Shepherd    CC providers:  JACKLYN Silva - CNP  [address]  [place] [phone]  [place]

## 2023-02-14 NOTE — PROGRESS NOTES
Marychuy Rees MD  Gio Sidney CNP  Arlin Bee SILVIA [address] [address] [facility], [address] [phone]   Emani Silva [place] [address] [phone]     Video Visit- Follow up    Susan Samano, was evaluated through a synchronous (real-time) audio-video  encounter. The patient (or guardian if applicable) is aware that this is a billable  service, which includes applicable co-pays. This Virtual Visit was conducted with  patient's (and/or legal guardian's) consent. The visit was conducted pursuant to  the emergency declaration under the [facility] [address] authority and the [employer] General Act. Patient identification was verified,  and a caregiver was present when appropriate. The patient was located in a  state where the provider was licensed to provide care. Assessment/Plan:      1. TOM (obstructive sleep apnea)  Assessment & Plan:  Chronic-with progression/exacerbation: Reviewed and analyzed results of physiologic download from patient's machine and reviewed with patient. Supplies and parts as needed for his machine. These are medically necessary. Limit caffeine use after 3pm. Based on the analyzed data will change following settings: EPAP min increased to 12, Humidity increased to 4, Tube temp increased to 3. Changes sent via machine modem. Discussed how to adjust the moisture settings on his machine to help with congestion. Encouraged him to use his machine as much as possible. 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 Will see him back in 6 months. Encouraged him to contact the office with any questions or concerns. Encouraged consistent use of his machine each night, all night. Discussed the importance of treating Obstructive sleep apnea from a physiological standpoint.  Instructed not to drive unless had 4 hrs of effective therapy for his TOM the night before. No driving when drowsy. Did review the risks of under or untreated TOM including, but not limited to, higher risks of motor vehicle accidents, stroke, heart attacks, and death. He understands and accepts all these risks. 2. Essential hypertension, benign  Assessment & Plan:   Chronic- Stable. Discussed the importance of treating obstructive sleep apnea as part of the management of this disorder. Cont any meds per PCP and other physicians. 3. Type 2 diabetes mellitus without complication, without long-term current use of insulin ([facility])  Assessment & Plan:   Chronic- Stable. Discussed the importance of treating obstructive sleep apnea as part of the management of this disorder. Cont any meds per PCP and other physicians. 4. RLS (restless legs syndrome)  Assessment & Plan:  Chronic- Stable. Discussed the importance of treating obstructive sleep apnea as part of the management of this disorder. Encouraged more use of his machine each night, which could improve RLS symptoms. Currently taking Requip 3 mg TID. Medication is currently being managed by his PCP. His symptoms are controlled at the current dose and he denies side effects. 5. Class 3 severe obesity due to excess calories with serious comorbidity and body mass index (BMI) of 45.0 to 49.9 in adult [facility])  Assessment & Plan:   Chronic-not stable:  Discussed importance of treating obstructive sleep apnea and getting sufficient sleep to assist with weight control. Encouraged him to work on weight loss through diet and exercise. Recommended DASH or Mediterranean diets. Reviewed, analyzed, and documented physiologic data from patient's PAP machine. This information was analyzed to assess complexity and medical decision making in regards to further testing and management. The primary encounter diagnosis was TOM (obstructive sleep apnea).  Diagnoses of Essential hypertension, benign, Type 2 diabetes mellitus without complication, without long-term current use of insulin (HCC), RLS (restless legs syndrome), and Class 3 severe obesity due to excess calories with serious comorbidity and body mass index (BMI) of 45.0 to 49.9 in [facility]) were also pertinent to this visit. The chronic medical conditions listed are directly related to the primary diagnosis listed above. The management of the primary diagnosis affects the secondary diagnosis and vice versa. Subjective:     Patient ID: Belen Sorenson is a 63 y.o. male. Chief Complaint   Patient presents with    Sleep Apnea     Subjective   HPI:    Machine Modem/Download Info:  Compliance (hours/night): 4.2 hrs/night  % of nights >= 4 hrs: 52.3 %  Download AHI (/hour): 0.6 /HR   Average IPAP Pressure: 16 cmH2O  Average EPAP Pressure: 12.5 cmH2O         AUTO BIPAP - Settings (Ginny)  IPAP Max: 25 cmH2O  EPAP Min: 10 cmH2O  Pressure Support Min: 3  Pressure Support Max: 8             Comfort Settings  Humidity Level (0-8): 2  Flex/EPR (0-3): 3 PAP Mask  Mask Type: Full Face mask     Belen Sorenson presents today for follow-up for sleep apnea. he reports he has not been as consistent with his machine over the past few months. He has had more colds this winter and has feels he has a hard time breathing with the machine when congested. He has not tried adjusting the moisture settings on his machine. He also had trouble with sciatica that would wake him and he would sleep in his recliner in a different room. The pressure on his machine is comfortable. He has gained about 20 pounds over the past few months. he denies headaches, congestion, nosebleeds, dryness, aerophagia, or drowsiness while driving. his mask is comfortable and is fitting well. RLS: Currently taking Requip 3 mg TID. 5a, 12p, 7p. Medication is currently being managed by his PCP. His symptoms are controlled at the current dose and he denies side effects. Active DOT/CDL: Yes.  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 Changed classification so he doesn't need physical portion right now. Next renewal for [facility]: 7/2023. [address]    [place] - [place] Sleepiness Score: 8    Current Outpatient Medications   Medication Instructions    amoxicillin (AMOXIL) 500 MG capsule 4 tablets by mouth 1 hour before dental appointment    atorvastatin (LIPITOR) 40 mg, Oral, DAILY    blood glucose monitor kit and supplies Check BG twice daily    blood glucose test strips (PRODIGY NO CODING BLOOD GLUC) strip TEST TWO TIMES A DAY    furosemide (LASIX) 40 mg, Oral, 2 TIMES DAILY    glucose 15 g, Oral, PRN    hydroCHLOROthiazide (HYDRODIURIL) 25 MG tablet Hold until Monday    Insulin Pen Needle (MEIJER PEN NEEDLES) 31G X 6 MM MISC 1 each, Does not apply, DAILY    lisinopril (PRINIVIL;ZESTRIL) 10 MG tablet TAKE 1 TABLET BY MOUTH ONCE A DAY    loratadine (CLARITIN) 10 mg, Oral, DAILY    metFORMIN (GLUCOPHAGE) 500 mg, Oral, 2 TIMES DAILY WITH MEALS    Multiple Vitamin (MULTIVITAMIN, BARIATRIC FUSION COMPLETE, CHEW TAB) 4 tablets, Oral, DAILY    nystatin (MYCOSTATIN) 546955 UNIT/GM powder Apply 3 times daily. omeprazole (PRILOSEC) 20 mg, Oral, DAILY    ondansetron (ZOFRAN-ODT) 4 mg, Oral, 3 TIMES DAILY PRN    Ozempic (1 MG/DOSE) 1 mg, SubCUTAneous, WEEKLY    OZEMPIC, 1 MG/DOSE, 4 MG/3ML SOPN No dose, route, or frequency recorded. potassium chloride (KLOR-CON M) 20 MEQ extended release tablet No dose, route, or frequency recorded.     potassium chloride (KLOR-CON M) 20 MEQ TBCR extended release tablet 20 mEq, Oral, 2 TIMES DAILY    predniSONE (DELTASONE) 20 MG tablet Take 3 p.o. daily for 3 days, then 2 p.o. daily for 3 days, then 1 p.o. daily for 3 days, then one half p.o. daily for 4 days    pregabalin (LYRICA) 75 mg, Oral, 3 TIMES DAILY    Prodigy Twist Top Lancets 28G MISC TEST TWO TIMES A DAY    rOPINIRole (REQUIP) 3 mg, Oral, 3 TIMES DAILY    Semaglutide (2 MG/DOSE) 1 mg, SubCUTAneous, WEEKLY    Skin Protectants, Misc. (INTERDRY AG TEXTILE 00\"G092\") SHEE Place sheet of interdry to abdominal folds daily        Monique Kelsey, was evaluated through a synchronous (real-time) audio-video encounter. The patient (or guardian if applicable) is aware that this is a billable service, which includes applicable co-pays. This Virtual Visit was conducted with patient's (and/or legal guardian's) consent. The visit was conducted pursuant to the emergency declaration under the [address] [place] [address], [address] [place] [address] authority and the [email or web address] General Act. Patient identification was verified, and a caregiver was present when appropriate.    The patient was located at [place]: [place], [place]  Provider was located at [facility], [place], [place]    Electronically signed by JACKLYN Iniguez on 2/14/2023 at 9:39 AM

## 2023-02-14 NOTE — ASSESSMENT & PLAN NOTE
Chronic-with progression/exacerbation: Reviewed and analyzed results of physiologic download from patient's machine and reviewed with patient. Supplies and parts as needed for his machine. These are medically necessary. Limit caffeine use after 3pm. Based on the analyzed data will change following settings: EPAP min increased to 12, Humidity increased to 4, Tube temp increased to 3. Changes sent via machine modem. Discussed how to adjust the moisture settings on his machine to help with congestion. Encouraged him to use his machine as much as possible. 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 Will see him back in 6 months. Encouraged him to contact the office with any questions or concerns. Encouraged consistent use of his machine each night, all night. Discussed the importance of treating Obstructive sleep apnea from a physiological standpoint. Instructed not to drive unless had 4 hrs of effective therapy for his TOM the night before. No driving when drowsy. Did review the risks of under or untreated TOM including, but not limited to, higher risks of motor vehicle accidents, stroke, heart attacks, and death. He understands and accepts all these risks.

## 2023-02-23 DIAGNOSIS — I10 ESSENTIAL HYPERTENSION: ICD-10-CM

## 2023-02-27 RX ORDER — HYDROCHLOROTHIAZIDE 25 MG/1
TABLET ORAL
Qty: 90 TABLET | Refills: 0 | Status: SHIPPED | OUTPATIENT
Start: 2023-02-27

## 2023-02-27 RX ORDER — LISINOPRIL 10 MG/1
TABLET ORAL
Qty: 90 TABLET | Refills: 0 | Status: SHIPPED | OUTPATIENT
Start: 2023-02-27

## 2023-03-20 RX ORDER — SEMAGLUTIDE 1.34 MG/ML
INJECTION, SOLUTION SUBCUTANEOUS
Qty: 3 ML | Refills: 5 | Status: SHIPPED | OUTPATIENT
Start: 2023-03-20

## 2023-04-24 RX ORDER — FUROSEMIDE 40 MG/1
TABLET ORAL
Qty: 60 TABLET | Refills: 3 | Status: SHIPPED | OUTPATIENT
Start: 2023-04-24

## 2023-05-15 RX ORDER — ROPINIROLE 3 MG/1
3 TABLET, FILM COATED ORAL 3 TIMES DAILY
Qty: 270 TABLET | Refills: 0 | Status: SHIPPED | OUTPATIENT
Start: 2023-05-15 | End: 2023-08-13

## 2023-06-06 DIAGNOSIS — I10 ESSENTIAL HYPERTENSION: ICD-10-CM

## 2023-06-07 RX ORDER — HYDROCHLOROTHIAZIDE 25 MG/1
TABLET ORAL
Qty: 90 TABLET | Refills: 0 | Status: SHIPPED | OUTPATIENT
Start: 2023-06-07

## 2023-07-31 RX ORDER — POTASSIUM CHLORIDE 1500 MG/1
20 TABLET, EXTENDED RELEASE ORAL 2 TIMES DAILY
Qty: 180 TABLET | Refills: 1 | Status: SHIPPED | OUTPATIENT
Start: 2023-07-31

## 2023-07-31 NOTE — TELEPHONE ENCOUNTER
Requested Prescriptions     Pending Prescriptions Disp Refills    potassium chloride (KLOR-CON M) 20 MEQ TBCR extended release tablet 180 tablet 1     Sig: Take 1 tablet by mouth 2 times daily         Last OV: 7/20/2022     Last labs: 11/23/2022     F/u: N/A

## 2023-08-14 ASSESSMENT — SLEEP AND FATIGUE QUESTIONNAIRES
HOW LIKELY ARE YOU TO NOD OFF OR FALL ASLEEP WHILE SITTING QUIETLY AFTER LUNCH WITHOUT ALCOHOL: SLIGHT CHANCE OF DOZING
HOW LIKELY ARE YOU TO NOD OFF OR FALL ASLEEP WHILE SITTING INACTIVE IN A PUBLIC PLACE: 1
HOW LIKELY ARE YOU TO NOD OFF OR FALL ASLEEP WHILE SITTING AND TALKING TO SOMEONE: 0
HOW LIKELY ARE YOU TO NOD OFF OR FALL ASLEEP WHILE SITTING INACTIVE IN A PUBLIC PLACE: SLIGHT CHANCE OF DOZING
HOW LIKELY ARE YOU TO NOD OFF OR FALL ASLEEP WHILE LYING DOWN TO REST IN THE AFTERNOON WHEN CIRCUMSTANCES PERMIT: HIGH CHANCE OF DOZING
HOW LIKELY ARE YOU TO NOD OFF OR FALL ASLEEP WHILE LYING DOWN TO REST IN THE AFTERNOON WHEN CIRCUMSTANCES PERMIT: 3
HOW LIKELY ARE YOU TO NOD OFF OR FALL ASLEEP WHILE SITTING AND TALKING TO SOMEONE: WOULD NEVER DOZE
HOW LIKELY ARE YOU TO NOD OFF OR FALL ASLEEP WHILE SITTING AND READING: 1
HOW LIKELY ARE YOU TO NOD OFF OR FALL ASLEEP WHILE WATCHING TV: 1
ESS TOTAL SCORE: 8
HOW LIKELY ARE YOU TO NOD OFF OR FALL ASLEEP WHILE SITTING QUIETLY AFTER LUNCH WITHOUT ALCOHOL: 1
HOW LIKELY ARE YOU TO NOD OFF OR FALL ASLEEP IN A CAR, WHILE STOPPED FOR A FEW MINUTES IN TRAFFIC: 0
HOW LIKELY ARE YOU TO NOD OFF OR FALL ASLEEP WHEN YOU ARE A PASSENGER IN A CAR FOR AN HOUR WITHOUT A BREAK: 1
HOW LIKELY ARE YOU TO NOD OFF OR FALL ASLEEP WHILE WATCHING TV: SLIGHT CHANCE OF DOZING
HOW LIKELY ARE YOU TO NOD OFF OR FALL ASLEEP WHEN YOU ARE A PASSENGER IN A CAR FOR AN HOUR WITHOUT A BREAK: SLIGHT CHANCE OF DOZING
HOW LIKELY ARE YOU TO NOD OFF OR FALL ASLEEP WHILE SITTING AND READING: SLIGHT CHANCE OF DOZING
HOW LIKELY ARE YOU TO NOD OFF OR FALL ASLEEP IN A CAR, WHILE STOPPED FOR A FEW MINUTES IN TRAFFIC: WOULD NEVER DOZE

## 2023-08-15 ENCOUNTER — TELEMEDICINE (OUTPATIENT)
Dept: PULMONOLOGY | Age: 63
End: 2023-08-15
Payer: COMMERCIAL

## 2023-08-15 DIAGNOSIS — I10 ESSENTIAL HYPERTENSION, BENIGN: Chronic | ICD-10-CM

## 2023-08-15 DIAGNOSIS — E11.9 TYPE 2 DIABETES MELLITUS WITHOUT COMPLICATION, WITHOUT LONG-TERM CURRENT USE OF INSULIN (HCC): Chronic | ICD-10-CM

## 2023-08-15 DIAGNOSIS — E66.01 CLASS 3 SEVERE OBESITY DUE TO EXCESS CALORIES WITH SERIOUS COMORBIDITY AND BODY MASS INDEX (BMI) OF 45.0 TO 49.9 IN ADULT (HCC): ICD-10-CM

## 2023-08-15 DIAGNOSIS — G47.33 OSA (OBSTRUCTIVE SLEEP APNEA): Primary | ICD-10-CM

## 2023-08-15 DIAGNOSIS — G25.81 RLS (RESTLESS LEGS SYNDROME): ICD-10-CM

## 2023-08-15 PROCEDURE — 99214 OFFICE O/P EST MOD 30 MIN: CPT | Performed by: NURSE PRACTITIONER

## 2023-08-15 ASSESSMENT — SLEEP AND FATIGUE QUESTIONNAIRES
HOW LIKELY ARE YOU TO NOD OFF OR FALL ASLEEP WHEN YOU ARE A PASSENGER IN A CAR FOR AN HOUR WITHOUT A BREAK: 1
HOW LIKELY ARE YOU TO NOD OFF OR FALL ASLEEP WHILE SITTING AND READING: 1
HOW LIKELY ARE YOU TO NOD OFF OR FALL ASLEEP WHILE SITTING AND TALKING TO SOMEONE: 0
HOW LIKELY ARE YOU TO NOD OFF OR FALL ASLEEP IN A CAR, WHILE STOPPED FOR A FEW MINUTES IN TRAFFIC: 0
ESS TOTAL SCORE: 8
HOW LIKELY ARE YOU TO NOD OFF OR FALL ASLEEP WHILE SITTING QUIETLY AFTER LUNCH WITHOUT ALCOHOL: 1
HOW LIKELY ARE YOU TO NOD OFF OR FALL ASLEEP WHILE SITTING INACTIVE IN A PUBLIC PLACE: 1
HOW LIKELY ARE YOU TO NOD OFF OR FALL ASLEEP WHILE WATCHING TV: 1
HOW LIKELY ARE YOU TO NOD OFF OR FALL ASLEEP WHILE LYING DOWN TO REST IN THE AFTERNOON WHEN CIRCUMSTANCES PERMIT: 3

## 2023-08-15 NOTE — PROGRESS NOTES
Kyle Bee [facility]  [facility]  [place], [address] [place] [address]  [address] [facility] [phone] [address], [address] [place] [address]- [phone]     Video Visit- Follow up      Assessment/Plan:      1. TOM (obstructive sleep apnea)  Assessment & Plan:  Chronic-with progression/exacerbation: Reviewed and analyzed results of physiologic download from patient's machine and reviewed with patient. Supplies and parts as needed for his machine. These are medically necessary. Limit caffeine use after 3pm. Based on the analyzed data will continue with current settings. Encouraged him to use his machine as much as possible. Discussed working with his DME on mask fit and comfort. Discussed DOT/CDL compliance requirements. He continues to do well with his machine. He is complaint and getting good symptom control. He is encouraged to keep up with his machine. Instructed not to drive unless had 4 hrs of effective therapy for his TOM the night before. No driving when drowsy. Will see him back in a 6 month f/u for DOT/FAA compliance check (he understands that he he needs to be seen every 6 months) unless there are issues, then he is to call for an earlier appointment. Encouraged consistent use of his machine each night, all night. Discussed the importance of treating TOM from a physiological standpoint. Instructed not to drive unless had 4 hrs of effective therapy for his TOM the night before. No driving when sleepy. Did review the risks of under or untreated TOM including, but not limited to, higher risks of motor vehicle accidents, stroke, heart attacks, and death. He understands and accepts all these risks. 2. Essential hypertension, benign  Assessment & Plan:   Chronic- Stable. Discussed the importance of treating obstructive sleep apnea as part of the management of this disorder. Cont any meds per PCP and other physicians.     3. Type 2

## 2023-08-15 NOTE — ASSESSMENT & PLAN NOTE
Chronic-with progression/exacerbation: Reviewed and analyzed results of physiologic download from patient's machine and reviewed with patient. Supplies and parts as needed for his machine. These are medically necessary. Limit caffeine use after 3pm. Based on the analyzed data will continue with current settings. Encouraged him to use his machine as much as possible. Discussed working with his DME on mask fit and comfort. Discussed DOT/CDL compliance requirements. He continues to do well with his machine. He is complaint and getting good symptom control. He is encouraged to keep up with his machine. Instructed not to drive unless had 4 hrs of effective therapy for his TOM the night before. No driving when drowsy. Will see him back in a 6 month f/u for DOT/FAA compliance check (he understands that he he needs to be seen every 6 months) unless there are issues, then he is to call for an earlier appointment. Encouraged consistent use of his machine each night, all night. Discussed the importance of treating TOM from a physiological standpoint. Instructed not to drive unless had 4 hrs of effective therapy for his TOM the night before. No driving when sleepy. Did review the risks of under or untreated TOM including, but not limited to, higher risks of motor vehicle accidents, stroke, heart attacks, and death. He understands and accepts all these risks.

## 2023-08-17 DIAGNOSIS — E11.9 TYPE 2 DIABETES MELLITUS WITHOUT COMPLICATION, WITHOUT LONG-TERM CURRENT USE OF INSULIN (HCC): ICD-10-CM

## 2023-08-21 RX ORDER — ROPINIROLE 3 MG/1
3 TABLET, FILM COATED ORAL 3 TIMES DAILY
Qty: 270 TABLET | Refills: 0 | Status: SHIPPED | OUTPATIENT
Start: 2023-08-21 | End: 2023-11-19

## 2023-08-21 RX ORDER — ATORVASTATIN CALCIUM 40 MG/1
40 TABLET, FILM COATED ORAL DAILY
Qty: 90 TABLET | Refills: 3 | Status: SHIPPED | OUTPATIENT
Start: 2023-08-21

## 2023-08-23 ENCOUNTER — PATIENT MESSAGE (OUTPATIENT)
Dept: BARIATRICS/WEIGHT MGMT | Age: 63
End: 2023-08-23

## 2023-09-07 DIAGNOSIS — Z00.00 ROUTINE GENERAL MEDICAL EXAMINATION AT A HEALTH CARE FACILITY: Primary | ICD-10-CM

## 2023-09-07 DIAGNOSIS — I10 ESSENTIAL HYPERTENSION: ICD-10-CM

## 2023-09-07 PROCEDURE — 82274 ASSAY TEST FOR BLOOD FECAL: CPT | Performed by: NURSE PRACTITIONER

## 2023-09-07 RX ORDER — HYDROCHLOROTHIAZIDE 25 MG/1
TABLET ORAL
Qty: 90 TABLET | Refills: 0 | OUTPATIENT
Start: 2023-09-07

## 2023-09-07 RX ORDER — LISINOPRIL 10 MG/1
TABLET ORAL
Qty: 90 TABLET | Refills: 0 | OUTPATIENT
Start: 2023-09-07

## 2023-09-11 SDOH — ECONOMIC STABILITY: FOOD INSECURITY: WITHIN THE PAST 12 MONTHS, THE FOOD YOU BOUGHT JUST DIDN'T LAST AND YOU DIDN'T HAVE MONEY TO GET MORE.: NEVER TRUE

## 2023-09-11 SDOH — ECONOMIC STABILITY: TRANSPORTATION INSECURITY
IN THE PAST 12 MONTHS, HAS LACK OF TRANSPORTATION KEPT YOU FROM MEETINGS, WORK, OR FROM GETTING THINGS NEEDED FOR DAILY LIVING?: NO

## 2023-09-11 SDOH — ECONOMIC STABILITY: INCOME INSECURITY: HOW HARD IS IT FOR YOU TO PAY FOR THE VERY BASICS LIKE FOOD, HOUSING, MEDICAL CARE, AND HEATING?: HARD

## 2023-09-11 SDOH — ECONOMIC STABILITY: HOUSING INSECURITY
IN THE LAST 12 MONTHS, WAS THERE A TIME WHEN YOU DID NOT HAVE A STEADY PLACE TO SLEEP OR SLEPT IN A SHELTER (INCLUDING NOW)?: NO

## 2023-09-11 SDOH — ECONOMIC STABILITY: FOOD INSECURITY: WITHIN THE PAST 12 MONTHS, YOU WORRIED THAT YOUR FOOD WOULD RUN OUT BEFORE YOU GOT MONEY TO BUY MORE.: NEVER TRUE

## 2023-09-11 ASSESSMENT — PATIENT HEALTH QUESTIONNAIRE - PHQ9
SUM OF ALL RESPONSES TO PHQ QUESTIONS 1-9: 11
6. FEELING BAD ABOUT YOURSELF - OR THAT YOU ARE A FAILURE OR HAVE LET YOURSELF OR YOUR FAMILY DOWN: MORE THAN HALF THE DAYS
SUM OF ALL RESPONSES TO PHQ QUESTIONS 1-9: 11
SUM OF ALL RESPONSES TO PHQ QUESTIONS 1-9: 11
3. TROUBLE FALLING OR STAYING ASLEEP: 2
8. MOVING OR SPEAKING SO SLOWLY THAT OTHER PEOPLE COULD HAVE NOTICED. OR THE OPPOSITE, BEING SO FIGETY OR RESTLESS THAT YOU HAVE BEEN MOVING AROUND A LOT MORE THAN USUAL: 0
4. FEELING TIRED OR HAVING LITTLE ENERGY: SEVERAL DAYS
5. POOR APPETITE OR OVEREATING: 2
7. TROUBLE CONCENTRATING ON THINGS, SUCH AS READING THE NEWSPAPER OR WATCHING TELEVISION: 1
1. LITTLE INTEREST OR PLEASURE IN DOING THINGS: 1
2. FEELING DOWN, DEPRESSED OR HOPELESS: 2
1. LITTLE INTEREST OR PLEASURE IN DOING THINGS: SEVERAL DAYS
10. IF YOU CHECKED OFF ANY PROBLEMS, HOW DIFFICULT HAVE THESE PROBLEMS MADE IT FOR YOU TO DO YOUR WORK, TAKE CARE OF THINGS AT HOME, OR GET ALONG WITH OTHER PEOPLE: 1
6. FEELING BAD ABOUT YOURSELF - OR THAT YOU ARE A FAILURE OR HAVE LET YOURSELF OR YOUR FAMILY DOWN: 2
9. THOUGHTS THAT YOU WOULD BE BETTER OFF DEAD, OR OF HURTING YOURSELF: NOT AT ALL
4. FEELING TIRED OR HAVING LITTLE ENERGY: 1
9. THOUGHTS THAT YOU WOULD BE BETTER OFF DEAD, OR OF HURTING YOURSELF: 0
2. FEELING DOWN, DEPRESSED OR HOPELESS: MORE THAN HALF THE DAYS
SUM OF ALL RESPONSES TO PHQ QUESTIONS 1-9: 11
3. TROUBLE FALLING OR STAYING ASLEEP: MORE THAN HALF THE DAYS
7. TROUBLE CONCENTRATING ON THINGS, SUCH AS READING THE NEWSPAPER OR WATCHING TELEVISION: SEVERAL DAYS
5. POOR APPETITE OR OVEREATING: MORE THAN HALF THE DAYS
8. MOVING OR SPEAKING SO SLOWLY THAT OTHER PEOPLE COULD HAVE NOTICED. OR THE OPPOSITE - BEING SO FIDGETY OR RESTLESS THAT YOU HAVE BEEN MOVING AROUND A LOT MORE THAN USUAL: NOT AT ALL
SUM OF ALL RESPONSES TO PHQ QUESTIONS 1-9: 11
SUM OF ALL RESPONSES TO PHQ9 QUESTIONS 1 & 2: 3
SUM OF ALL RESPONSES TO PHQ9 QUESTIONS 1 & 2: 3
10. IF YOU CHECKED OFF ANY PROBLEMS, HOW DIFFICULT HAVE THESE PROBLEMS MADE IT FOR YOU TO DO YOUR WORK, TAKE CARE OF THINGS AT HOME, OR GET ALONG WITH OTHER PEOPLE: SOMEWHAT DIFFICULT

## 2023-09-12 ENCOUNTER — OFFICE VISIT (OUTPATIENT)
Dept: FAMILY MEDICINE CLINIC | Age: 63
End: 2023-09-12
Payer: COMMERCIAL

## 2023-09-12 VITALS
TEMPERATURE: 96.9 F | SYSTOLIC BLOOD PRESSURE: 112 MMHG | DIASTOLIC BLOOD PRESSURE: 72 MMHG | BODY MASS INDEX: 53.99 KG/M2 | HEART RATE: 87 BPM | WEIGHT: 315 LBS | OXYGEN SATURATION: 97 %

## 2023-09-12 DIAGNOSIS — I83.028 VENOUS STASIS ULCER OF OTHER PART OF LEFT LOWER LEG LIMITED TO BREAKDOWN OF SKIN, UNSPECIFIED WHETHER VARICOSE VEINS PRESENT (HCC): ICD-10-CM

## 2023-09-12 DIAGNOSIS — E66.01 CLASS 3 SEVERE OBESITY DUE TO EXCESS CALORIES WITH SERIOUS COMORBIDITY AND BODY MASS INDEX (BMI) OF 45.0 TO 49.9 IN ADULT (HCC): ICD-10-CM

## 2023-09-12 DIAGNOSIS — L97.821 VENOUS STASIS ULCER OF OTHER PART OF LEFT LOWER LEG LIMITED TO BREAKDOWN OF SKIN, UNSPECIFIED WHETHER VARICOSE VEINS PRESENT (HCC): ICD-10-CM

## 2023-09-12 DIAGNOSIS — I10 ESSENTIAL HYPERTENSION, BENIGN: Chronic | ICD-10-CM

## 2023-09-12 DIAGNOSIS — I10 ESSENTIAL HYPERTENSION: ICD-10-CM

## 2023-09-12 DIAGNOSIS — Z23 NEED FOR INFLUENZA VACCINATION: ICD-10-CM

## 2023-09-12 DIAGNOSIS — J30.1 SEASONAL ALLERGIC RHINITIS DUE TO POLLEN: ICD-10-CM

## 2023-09-12 DIAGNOSIS — E11.9 TYPE 2 DIABETES MELLITUS WITHOUT COMPLICATION, WITHOUT LONG-TERM CURRENT USE OF INSULIN (HCC): Primary | ICD-10-CM

## 2023-09-12 PROBLEM — I83.009 VENOUS ULCER, LIMITED TO BREAKDOWN OF SKIN (HCC): Status: RESOLVED | Noted: 2022-02-02 | Resolved: 2023-09-12

## 2023-09-12 PROBLEM — L97.901 VENOUS ULCER, LIMITED TO BREAKDOWN OF SKIN (HCC): Status: RESOLVED | Noted: 2022-02-02 | Resolved: 2023-09-12

## 2023-09-12 LAB — HBA1C MFR BLD: 6 %

## 2023-09-12 PROCEDURE — 3074F SYST BP LT 130 MM HG: CPT | Performed by: NURSE PRACTITIONER

## 2023-09-12 PROCEDURE — 3044F HG A1C LEVEL LT 7.0%: CPT | Performed by: NURSE PRACTITIONER

## 2023-09-12 PROCEDURE — 3078F DIAST BP <80 MM HG: CPT | Performed by: NURSE PRACTITIONER

## 2023-09-12 PROCEDURE — 99214 OFFICE O/P EST MOD 30 MIN: CPT | Performed by: NURSE PRACTITIONER

## 2023-09-12 PROCEDURE — 90674 CCIIV4 VAC NO PRSV 0.5 ML IM: CPT | Performed by: NURSE PRACTITIONER

## 2023-09-12 PROCEDURE — 83036 HEMOGLOBIN GLYCOSYLATED A1C: CPT | Performed by: NURSE PRACTITIONER

## 2023-09-12 PROCEDURE — 90471 IMMUNIZATION ADMIN: CPT | Performed by: NURSE PRACTITIONER

## 2023-09-12 RX ORDER — SEMAGLUTIDE 2.68 MG/ML
2 INJECTION, SOLUTION SUBCUTANEOUS WEEKLY
Qty: 3 ML | Refills: 3 | Status: SHIPPED | OUTPATIENT
Start: 2023-09-12

## 2023-09-12 RX ORDER — HYDROCHLOROTHIAZIDE 25 MG/1
TABLET ORAL
Qty: 90 TABLET | Refills: 0 | Status: SHIPPED | OUTPATIENT
Start: 2023-09-12

## 2023-09-12 RX ORDER — OMEPRAZOLE 20 MG/1
20 CAPSULE, DELAYED RELEASE ORAL DAILY
Qty: 90 CAPSULE | Refills: 0 | Status: SHIPPED | OUTPATIENT
Start: 2023-09-12 | End: 2023-12-11

## 2023-09-12 RX ORDER — LORATADINE 10 MG/1
10 TABLET ORAL DAILY
Qty: 90 TABLET | Refills: 1 | Status: SHIPPED | OUTPATIENT
Start: 2023-09-12

## 2023-09-12 RX ORDER — AMOXICILLIN 500 MG/1
CAPSULE ORAL
Qty: 4 CAPSULE | Refills: 1 | Status: SHIPPED | OUTPATIENT
Start: 2023-09-12

## 2023-09-12 RX ORDER — LISINOPRIL 10 MG/1
TABLET ORAL
Qty: 90 TABLET | Refills: 0 | Status: SHIPPED | OUTPATIENT
Start: 2023-09-12

## 2023-09-12 SDOH — ECONOMIC STABILITY: FOOD INSECURITY: WITHIN THE PAST 12 MONTHS, YOU WORRIED THAT YOUR FOOD WOULD RUN OUT BEFORE YOU GOT MONEY TO BUY MORE.: NEVER TRUE

## 2023-09-12 SDOH — ECONOMIC STABILITY: INCOME INSECURITY: HOW HARD IS IT FOR YOU TO PAY FOR THE VERY BASICS LIKE FOOD, HOUSING, MEDICAL CARE, AND HEATING?: NOT HARD AT ALL

## 2023-09-12 SDOH — ECONOMIC STABILITY: FOOD INSECURITY: WITHIN THE PAST 12 MONTHS, THE FOOD YOU BOUGHT JUST DIDN'T LAST AND YOU DIDN'T HAVE MONEY TO GET MORE.: NEVER TRUE

## 2023-09-12 ASSESSMENT — ENCOUNTER SYMPTOMS
RHINORRHEA: 0
COLOR CHANGE: 0
SORE THROAT: 0
NAUSEA: 0
DIARRHEA: 0
BACK PAIN: 0
CHEST TIGHTNESS: 0
EYE REDNESS: 0
VOMITING: 0
SINUS PRESSURE: 0
WHEEZING: 0
EYE ITCHING: 0
ABDOMINAL PAIN: 0
SHORTNESS OF BREATH: 0
CONSTIPATION: 0
COUGH: 0
BLOOD IN STOOL: 0

## 2023-09-12 NOTE — ASSESSMENT & PLAN NOTE
Well controlled his A1c did increase a little bit. We will increase his Ozempic to 2 mg weekly. I advised that he needs to be more diligent about monitoring his blood sugars and following diabetic diet. He admits that he has fallen off the wagon a little bit and is aware that he needs to do be more compliant. We will have him follow-up in 3 months with a repeat A1c.

## 2023-09-12 NOTE — ASSESSMENT & PLAN NOTE
He has gained 40 pounds since his last visit in January. He has had bariatric surgery. Encouraged following control of diet and increasing physical activity.

## 2023-11-27 ENCOUNTER — OFFICE VISIT (OUTPATIENT)
Dept: FAMILY MEDICINE CLINIC | Age: 63
End: 2023-11-27
Payer: COMMERCIAL

## 2023-11-27 VITALS
SYSTOLIC BLOOD PRESSURE: 124 MMHG | HEART RATE: 90 BPM | WEIGHT: 315 LBS | TEMPERATURE: 97.5 F | DIASTOLIC BLOOD PRESSURE: 76 MMHG | OXYGEN SATURATION: 97 % | BODY MASS INDEX: 57.49 KG/M2

## 2023-11-27 DIAGNOSIS — L03.119 CELLULITIS OF MULTIPLE SITES OF LOWER EXTREMITY: ICD-10-CM

## 2023-11-27 DIAGNOSIS — E11.9 TYPE 2 DIABETES MELLITUS WITHOUT COMPLICATION, WITHOUT LONG-TERM CURRENT USE OF INSULIN (HCC): Primary | ICD-10-CM

## 2023-11-27 DIAGNOSIS — E11.9 TYPE 2 DIABETES MELLITUS WITHOUT COMPLICATION, WITHOUT LONG-TERM CURRENT USE OF INSULIN (HCC): ICD-10-CM

## 2023-11-27 PROCEDURE — 3044F HG A1C LEVEL LT 7.0%: CPT | Performed by: NURSE PRACTITIONER

## 2023-11-27 PROCEDURE — 3078F DIAST BP <80 MM HG: CPT | Performed by: NURSE PRACTITIONER

## 2023-11-27 PROCEDURE — 99214 OFFICE O/P EST MOD 30 MIN: CPT | Performed by: NURSE PRACTITIONER

## 2023-11-27 PROCEDURE — 3074F SYST BP LT 130 MM HG: CPT | Performed by: NURSE PRACTITIONER

## 2023-11-27 RX ORDER — SULFAMETHOXAZOLE AND TRIMETHOPRIM 800; 160 MG/1; MG/1
1 TABLET ORAL 2 TIMES DAILY
Qty: 20 TABLET | Refills: 0 | Status: SHIPPED | OUTPATIENT
Start: 2023-11-27 | End: 2023-11-27 | Stop reason: SDUPTHER

## 2023-11-27 RX ORDER — SULFAMETHOXAZOLE AND TRIMETHOPRIM 800; 160 MG/1; MG/1
1 TABLET ORAL 2 TIMES DAILY
Qty: 20 TABLET | Refills: 0 | Status: SHIPPED | OUTPATIENT
Start: 2023-11-27 | End: 2023-12-07

## 2023-11-27 ASSESSMENT — ENCOUNTER SYMPTOMS
EYE REDNESS: 0
SORE THROAT: 0
BLOOD IN STOOL: 0
NAUSEA: 0
RHINORRHEA: 0
CONSTIPATION: 0
WHEEZING: 0
COLOR CHANGE: 0
VOMITING: 0
SHORTNESS OF BREATH: 0
CHEST TIGHTNESS: 0
ABDOMINAL PAIN: 0
DIARRHEA: 0
SINUS PRESSURE: 0
COUGH: 0
BACK PAIN: 0
EYE ITCHING: 0

## 2023-11-27 NOTE — ASSESSMENT & PLAN NOTE
Cellulitis noted to bilateral lower extremities. Discussed allergy to Bactrim which was not a true allergy so we will restart this and send in for 1 week of antibiotic treatment. Dressings were applied with nonadherent dry dressing covered in Ace wrap. Advised to keep extremities elevated and to restart Lasix as prescribed.

## 2023-11-27 NOTE — TELEPHONE ENCOUNTER
Requested Prescriptions     Pending Prescriptions Disp Refills    rOPINIRole (REQUIP) 3 MG tablet 270 tablet 0     Sig: Take 1 tablet by mouth 3 times daily          Last Office Visit: 11/27/2023    Next Office Visit: visit date not found    Last Labs: 9/8/2023 I, Navarro Santana, performed the initial face to face bedside interview with this patient regarding history of present illness, review of symptoms and relevant past medical, social and family history.  I completed an independent physical examination.  I was the initial provider who evaluated this patient. I have signed out the follow up of any pending tests (i.e. labs, radiological studies) to the ACP.  I have communicated the patient’s plan of care and disposition with the ACP.

## 2023-11-27 NOTE — PROGRESS NOTES
Preethi Peralta (:  1960) is a 66 y.o. male,Established patient, here for evaluation of the following chief complaint(s): Wound Check (Right Calf )      ASSESSMENT/PLAN:  1. Type 2 diabetes mellitus without complication, without long-term current use of insulin (HCC)  -     Hemoglobin A1C; Future  2. Cellulitis of multiple sites of lower extremity  Assessment & Plan:   Cellulitis noted to bilateral lower extremities. Discussed allergy to Bactrim which was not a true allergy so we will restart this and send in for 1 week of antibiotic treatment. Dressings were applied with nonadherent dry dressing covered in Ace wrap. Advised to keep extremities elevated and to restart Lasix as prescribed. No follow-ups on file. SUBJECTIVE/OBJECTIVE:    Patient the office today with wife with concerns for wounds on bilateral lower extremities. Patient has history of significant bilateral lower extremity edema and has not been taking his Lasix as previously prescribed. He states that he does not like to avoid is much as he has to. In the past several days however he has noticed blistering on the anterior aspect of his right lower extremity posterior right lower extremity in the lateral aspect of his left lower extremity. He reports that there is blistering with clear drainage, with surrounding erythema. He has a history of cellulitis in the past.  He is a diabetic and states that his blood sugars have been better controlled lately however he has not been able to obtain 2 mg Ozempic due to supply and demand issues.   Current Outpatient Medications   Medication Sig Dispense Refill    sulfamethoxazole-trimethoprim (BACTRIM DS;SEPTRA DS) 800-160 MG per tablet Take 1 tablet by mouth 2 times daily for 10 days 20 tablet 0    metFORMIN (GLUCOPHAGE) 500 MG tablet Take 1 tablet by mouth 2 times daily (with meals) 180 tablet 1    Semaglutide, 2 MG/DOSE, (OZEMPIC, 2 MG/DOSE,) 8 MG/3ML SOPN Inject 2 mg into the skin once a

## 2023-11-28 ENCOUNTER — PATIENT MESSAGE (OUTPATIENT)
Dept: FAMILY MEDICINE CLINIC | Age: 63
End: 2023-11-28

## 2023-11-28 LAB
EST. AVERAGE GLUCOSE BLD GHB EST-MCNC: 128.4 MG/DL
HBA1C MFR BLD: 6.1 %

## 2023-11-28 RX ORDER — ROPINIROLE 3 MG/1
3 TABLET, FILM COATED ORAL 3 TIMES DAILY
Qty: 270 TABLET | Refills: 0 | Status: SHIPPED | OUTPATIENT
Start: 2023-11-28 | End: 2024-02-26

## 2023-11-28 RX ORDER — BLOOD SUGAR DIAGNOSTIC
STRIP MISCELLANEOUS
Qty: 100 EACH | Refills: 0 | Status: SHIPPED | OUTPATIENT
Start: 2023-11-28

## 2023-11-28 RX ORDER — SEMAGLUTIDE 1.34 MG/ML
1 INJECTION, SOLUTION SUBCUTANEOUS WEEKLY
Qty: 3 ML | Refills: 3 | Status: SHIPPED | OUTPATIENT
Start: 2023-11-28

## 2023-11-28 RX ORDER — LANCETS 28 GAUGE
EACH MISCELLANEOUS
Qty: 100 EACH | Refills: 0 | Status: SHIPPED | OUTPATIENT
Start: 2023-11-28

## 2023-11-28 NOTE — TELEPHONE ENCOUNTER
From: Ro Pereira  To: Emory Lala  Sent: 11/28/2023 9:34 AM EST  Subject: A1C    Hi Rachel, So with the new results on the A1C would you recommend I stay on the Ozempic or stop it? If I stay on it will I be taking 1mg or doubling up on 1 since they cannot get 2mg in?     Lakeshia Quiroz

## 2023-12-11 ENCOUNTER — TELEPHONE (OUTPATIENT)
Dept: FAMILY MEDICINE CLINIC | Age: 63
End: 2023-12-11

## 2023-12-11 NOTE — TELEPHONE ENCOUNTER
----- Message from Ky Johnson sent at 12/11/2023  8:06 AM EST -----  Subject: Message to Provider    QUESTIONS  Information for Provider? patient cancelled his appointment. His leg is   looking a lot better and wanted to let Amber Zelaya know.  ---------------------------------------------------------------------------  --------------  Mariana Richter ANDREAS  [phone]; OK to leave message on voicemail,OK to respond with electronic   message via [employer] portal (only for patients who have registered [employer]   account)  ---------------------------------------------------------------------------  --------------  SCRIPT ANSWERS  Relationship to Patient?  Self

## 2023-12-28 NOTE — DISCHARGE INSTRUCTIONS
for home use, including multiple products for a single wound if applicable, are medically necessary in order to achieve the best chance at timely wound healing. See provider documentation for details if needed.    Substituted dressings applied in the [facility] today, if applicable:    N/a    New orders for this week (labs, imaging, medications, etc.):    Elevate your legs as much as possible to help decrease the swelling in your legs.     Keep your dressings dry when showering.     Will plan to discuss velcro compression garments next week to use when healed for long-term compression.     Additional instructions for specific diagnoses:    General comments for venous / lymphedema ulcers:  *  Elevate your legs to the level of your heart for at least 30 minutes, several times daily.  *  Walk as much as you can tolerate. Avoid standing for long periods of time, but if you must stand, regularly do heel-raises and calf-pump exercises.  *  If you have compression wraps designed to remain in place all week, be sure to keep them from getting wet.  *  If you have compression garments that can be changed regularly, apply them first thing in the morning, and remove them when you go to bed. Moisturize your skin at bedtime, with Vaseline, Aquaphor, Aveeno, CeraVe, Cetaphil, Eucerin, Lubriderm, etc; but keep the skin between your toes dry.  *  If you smoke, your wound can not heal properly -- please talk with us when you're ready to quit.   *  Be sure to adhere to any recommendations from your PCP about diuretics (water pills), diet, exercise, and maintaining a healthy weight. If you have questions, please ask.  *  If you have a compression pump, aim for at least two hours of use daily.        F/U Appointment is with Dr. Brush in 1 week, on                                   at                       .     Your nurse  is ANISHA Kidd.     If we applied slip-resistant hospital socks today, be sure to remove them at least once a day

## 2024-01-03 ENCOUNTER — HOSPITAL ENCOUNTER (OUTPATIENT)
Dept: WOUND CARE | Age: 64
Discharge: HOME | End: 2024-01-03
Attending: INTERNAL MEDICINE
Payer: COMMERCIAL

## 2024-01-03 VITALS
SYSTOLIC BLOOD PRESSURE: 139 MMHG | HEIGHT: 68 IN | WEIGHT: 315 LBS | BODY MASS INDEX: 47.74 KG/M2 | HEART RATE: 72 BPM | RESPIRATION RATE: 20 BRPM | DIASTOLIC BLOOD PRESSURE: 74 MMHG | TEMPERATURE: 97.9 F

## 2024-01-03 DIAGNOSIS — L97.911 ULCERS OF BOTH LOWER LEGS, LIMITED TO BREAKDOWN OF SKIN (HCC): Primary | ICD-10-CM

## 2024-01-03 DIAGNOSIS — I89.0 LYMPHEDEMA: ICD-10-CM

## 2024-01-03 DIAGNOSIS — I87.2 PERIPHERAL VENOUS INSUFFICIENCY: ICD-10-CM

## 2024-01-03 DIAGNOSIS — L97.921 ULCERS OF BOTH LOWER LEGS, LIMITED TO BREAKDOWN OF SKIN (HCC): Primary | ICD-10-CM

## 2024-01-03 PROCEDURE — 99213 OFFICE O/P EST LOW 20 MIN: CPT

## 2024-01-03 PROCEDURE — 29581 APPL MULTLAYER CMPRN SYS LEG: CPT

## 2024-01-03 RX ORDER — LIDOCAINE HYDROCHLORIDE 40 MG/ML
SOLUTION TOPICAL ONCE
Status: DISCONTINUED | OUTPATIENT
Start: 2024-01-03 | End: 2024-01-04 | Stop reason: HOSPADM

## 2024-01-03 RX ORDER — LIDOCAINE 50 MG/G
OINTMENT TOPICAL ONCE
OUTPATIENT
Start: 2024-01-03 | End: 2024-01-03

## 2024-01-03 RX ORDER — TRIAMCINOLONE ACETONIDE 1 MG/G
OINTMENT TOPICAL ONCE
OUTPATIENT
Start: 2024-01-03 | End: 2024-01-03

## 2024-01-03 RX ORDER — LIDOCAINE 40 MG/G
CREAM TOPICAL ONCE
Status: DISCONTINUED | OUTPATIENT
Start: 2024-01-03 | End: 2024-01-04 | Stop reason: HOSPADM

## 2024-01-03 RX ORDER — LIDOCAINE 40 MG/G
CREAM TOPICAL ONCE
OUTPATIENT
Start: 2024-01-03 | End: 2024-01-03

## 2024-01-03 RX ORDER — LIDOCAINE HYDROCHLORIDE 40 MG/ML
SOLUTION TOPICAL ONCE
OUTPATIENT
Start: 2024-01-03 | End: 2024-01-03

## 2024-01-03 RX ORDER — LIDOCAINE 50 MG/G
OINTMENT TOPICAL ONCE
Status: DISCONTINUED | OUTPATIENT
Start: 2024-01-03 | End: 2024-01-04 | Stop reason: HOSPADM

## 2024-01-03 RX ORDER — BACITRACIN ZINC AND POLYMYXIN B SULFATE 500; 1000 [USP'U]/G; [USP'U]/G
OINTMENT TOPICAL ONCE
OUTPATIENT
Start: 2024-01-03 | End: 2024-01-03

## 2024-01-03 RX ORDER — TRIAMCINOLONE ACETONIDE 1 MG/G
OINTMENT TOPICAL ONCE
Status: DISCONTINUED | OUTPATIENT
Start: 2024-01-03 | End: 2024-01-04 | Stop reason: HOSPADM

## 2024-01-03 RX ORDER — BACITRACIN ZINC AND POLYMYXIN B SULFATE 500; 1000 [USP'U]/G; [USP'U]/G
OINTMENT TOPICAL ONCE
Status: DISCONTINUED | OUTPATIENT
Start: 2024-01-03 | End: 2024-01-04 | Stop reason: HOSPADM

## 2024-01-03 ASSESSMENT — PAIN DESCRIPTION - ORIENTATION: ORIENTATION: LEFT

## 2024-01-03 ASSESSMENT — PAIN DESCRIPTION - DESCRIPTORS: DESCRIPTORS: ACHING

## 2024-01-03 ASSESSMENT — PAIN DESCRIPTION - LOCATION: LOCATION: LEG

## 2024-01-03 ASSESSMENT — PAIN SCALES - GENERAL: PAINLEVEL_OUTOF10: 2

## 2024-01-03 ASSESSMENT — PAIN - FUNCTIONAL ASSESSMENT: PAIN_FUNCTIONAL_ASSESSMENT: ACTIVITIES ARE NOT PREVENTED

## 2024-01-03 ASSESSMENT — PAIN DESCRIPTION - ONSET: ONSET: ON-GOING

## 2024-01-03 ASSESSMENT — PAIN DESCRIPTION - FREQUENCY: FREQUENCY: INTERMITTENT

## 2024-01-03 NOTE — PLAN OF CARE
Pt. To [facility] today for increased edema in both legs & weeping of legs. Pt. States it started with blisters. No debridement today. No signs of infection or need for antibiotics at this time. Will apply dressings with compression wraps for edema control. Reinforced importance of exercise, elevation & compression to help with circulation, edema control & wound healing, pt. Verbalizes understanding. Dr Brush advised would discuss velcro compression garments further next week for long-term compression when healed, pt. Verbalizes understanding. F/u in [facility] in 1 week as ordered, pt. Aware to call sooner with any changes or questions/concerns. Discharge instructions reviewed with patient, all questions answered, copy given to patient. Dressings were applied to all wounds per M.D. Instructions at this visit.

## 2024-01-03 NOTE — FLOWSHEET NOTE
Bilateral Lower Legs - per MD order  Clobetasol/Aquaphor to anything itching or rashy  Xeroform to anything fragile or weeping  Foam to anterior ankle  CoFlex TLC Calamine compression wrap  Nylon

## 2024-01-04 NOTE — DISCHARGE INSTRUCTIONS
treatment of venous ulcers, venous insufficiency,and/or Lymphedema.   * Do not remove until your next scheduled visit in [facility]- do not get wet.    * If your wrap falls down, becomes painful or you have decreased sensation, and/or excessive drainage- please call the [facility] for RN visit to get your compression wrap changed   * You need to exercice as tolerated- walking is good   * Elevate your legs preferrably above level of your heart when sitting as much as possible   * The Goal of this therapy is to reduce Edema and get into long term compression garments to control venous insufficiency, lymphedema and reduce occurrence of venous ulcers         Please note, all wounds (unless stated otherwise here) were mechanically debrided at the time of cleansing here in the wound-care center today, so a small amount of pain, drainage or bleeding from that process might be expected, and is normal.      All products for home use, including multiple products for a single wound if applicable, are medically necessary in order to achieve the best chance at timely wound healing. See provider documentation for details if needed.     Substituted dressings applied in the [facility] today, if applicable:     N/a     New orders for this week (labs, imaging, medications, etc.):     Please provide with an additional medigrip for use at home  Medigrip may be washed & hang to dry    Dr Brush will order ReadyWrap Fusion Velcro compression garment through [facility].   Bring your garment to your next appt.    Elevate your legs as much as possible to help decrease the swelling in your legs.      Keep your dressings dry when showering.       Additional instructions for specific diagnoses:     General comments for venous / lymphedema ulcers:  *  Elevate your legs to the level of your heart for at least 30 minutes, several times daily.  *  Walk as much as you can tolerate. Avoid standing for long periods of time, but if you must stand, regularly do

## 2024-01-10 ENCOUNTER — HOSPITAL ENCOUNTER (OUTPATIENT)
Dept: WOUND CARE | Age: 64
Discharge: HOME | End: 2024-01-10
Attending: INTERNAL MEDICINE
Payer: COMMERCIAL

## 2024-01-10 VITALS
HEART RATE: 77 BPM | WEIGHT: 315 LBS | HEIGHT: 68 IN | DIASTOLIC BLOOD PRESSURE: 81 MMHG | SYSTOLIC BLOOD PRESSURE: 155 MMHG | BODY MASS INDEX: 47.74 KG/M2 | TEMPERATURE: 97.5 F | RESPIRATION RATE: 20 BRPM

## 2024-01-10 DIAGNOSIS — L97.921 ULCERS OF BOTH LOWER LEGS, LIMITED TO BREAKDOWN OF SKIN (HCC): ICD-10-CM

## 2024-01-10 DIAGNOSIS — L97.911 ULCERS OF BOTH LOWER LEGS, LIMITED TO BREAKDOWN OF SKIN (HCC): ICD-10-CM

## 2024-01-10 DIAGNOSIS — I89.0 LYMPHEDEMA: Primary | ICD-10-CM

## 2024-01-10 DIAGNOSIS — I87.2 PERIPHERAL VENOUS INSUFFICIENCY: ICD-10-CM

## 2024-01-10 PROCEDURE — 29581 APPL MULTLAYER CMPRN SYS LEG: CPT

## 2024-01-10 RX ORDER — BACITRACIN ZINC AND POLYMYXIN B SULFATE 500; 1000 [USP'U]/G; [USP'U]/G
OINTMENT TOPICAL ONCE
OUTPATIENT
Start: 2024-01-10 | End: 2024-01-10

## 2024-01-10 RX ORDER — BACITRACIN ZINC AND POLYMYXIN B SULFATE 500; 1000 [USP'U]/G; [USP'U]/G
OINTMENT TOPICAL ONCE
Status: DISCONTINUED | OUTPATIENT
Start: 2024-01-10 | End: 2024-01-11 | Stop reason: HOSPADM

## 2024-01-10 RX ORDER — LIDOCAINE HYDROCHLORIDE 40 MG/ML
SOLUTION TOPICAL ONCE
OUTPATIENT
Start: 2024-01-10 | End: 2024-01-10

## 2024-01-10 RX ORDER — TRIAMCINOLONE ACETONIDE 1 MG/G
OINTMENT TOPICAL ONCE
Status: DISCONTINUED | OUTPATIENT
Start: 2024-01-10 | End: 2024-01-11 | Stop reason: HOSPADM

## 2024-01-10 RX ORDER — LIDOCAINE HYDROCHLORIDE 40 MG/ML
SOLUTION TOPICAL ONCE
Status: DISCONTINUED | OUTPATIENT
Start: 2024-01-10 | End: 2024-01-11 | Stop reason: HOSPADM

## 2024-01-10 RX ORDER — LIDOCAINE 40 MG/G
CREAM TOPICAL ONCE
Status: DISCONTINUED | OUTPATIENT
Start: 2024-01-10 | End: 2024-01-11 | Stop reason: HOSPADM

## 2024-01-10 RX ORDER — LIDOCAINE 40 MG/G
CREAM TOPICAL ONCE
OUTPATIENT
Start: 2024-01-10 | End: 2024-01-10

## 2024-01-10 RX ORDER — LIDOCAINE 50 MG/G
OINTMENT TOPICAL ONCE
OUTPATIENT
Start: 2024-01-10 | End: 2024-01-10

## 2024-01-10 RX ORDER — TRIAMCINOLONE ACETONIDE 1 MG/G
OINTMENT TOPICAL ONCE
OUTPATIENT
Start: 2024-01-10 | End: 2024-01-10

## 2024-01-10 RX ORDER — LIDOCAINE 50 MG/G
OINTMENT TOPICAL ONCE
Status: DISCONTINUED | OUTPATIENT
Start: 2024-01-10 | End: 2024-01-11 | Stop reason: HOSPADM

## 2024-01-10 NOTE — PLAN OF CARE
Left leg healed, will apply Aquaphor & Double layer Medigrip for edema control. Right leg edema improved with compression wrap this past week, but fragile area noted. Will apply compression wrap on right for an additional week. Dr Brush discussed ordering velcro compression garments for long-term edema control. Patient agreeable for Dr Brush to order ReadyWrap Fusion velcro compression garments through [employer] for bilateral legs. Will plan to transition to velcro garments next week. Reinforced importance of exercise, elevation & compression to help with circulation, edema control & wound healing. F/u in [facility] in 1 week as ordered, pt. Aware to call sooner with any changes or questions/concerns. Discharge instructions reviewed with patient, all questions answered, copy given to patient. Dressings were applied to all wounds per M.D. Instructions at this visit.

## 2024-01-10 NOTE — FLOWSHEET NOTE
Right lower leg:  Clobetasol/Aquaphor to anything itching or rashy  ABD over pretib area below knee  Foam to anterior ankle  CoFlex TLC Calamine compression wrap  Nylon

## 2024-01-11 NOTE — DISCHARGE INSTRUCTIONS
Wound Care Center Physician Orders and Discharge Instructions  [facility]  [address], [address]  [place] [id number]  Telephone: [phone]      Fax: [phone]        Your home care company:   N/a .     Your wound-care supplies will be provided by:  We are ordering your wound-care supplies from [employer]. Please note, depending on your insurance coverage, you may have out-of-pocket expenses for these supplies. Someone from [employer] [facility] should call you to confirm your order and discuss those potential costs before they ship your products -- please anticipate that call. If your out-of-pocket cost could be substantial, [facility] [facility] has a financial hardship program for patients who qualify, so please ask about that if you might need a hand. If you have any questions about your supplies or your potential out-of-pocket costs, or if you need to place an order for a refill of supplies (typically monthly), please call [phone]. .     NAME:  Dannie Dan   YOB: 1960  PRIMARY DIAGNOSIS FOR WOUND CARE CENTER:  Venous Leg Ulcers .     Wound cleansing:   Do not scrub or use excessive force.  Wash hands with soap and water before and after dressing changes.  Prior to applying a clean dressing, cleanse wound with normal saline, wound cleanser, or mild soap and water. Ask your physician or nurse before getting the wound(s) wet in the shower.                Wound care for home:     Left lower leg:  Apply Aquaphor, Vaseline or good moisturizer to dry skin      Apply Double layer medium compression medigrip from toes to knee for edema control   Apply first thing in the morning & may remove at bedtime.      Right lower leg:  Clobetasol/Aquaphor to anything itching or rashy-proximal  ABD over pretib area below knee  Mepilex Transfer Ag over blistered area  Foam to anterior ankle  CoFlex TLC Calamine compression wrap  Medigrip toes to knee then Tensoplast at top of Medigrip  Leave

## 2024-01-16 PROBLEM — M17.11 ARTHRITIS OF RIGHT KNEE: Status: RESOLVED | Noted: 2021-10-26 | Resolved: 2024-01-16

## 2024-01-16 PROBLEM — M25.562 CHRONIC PAIN OF LEFT KNEE: Status: RESOLVED | Noted: 2020-03-23 | Resolved: 2024-01-16

## 2024-01-16 PROBLEM — G89.29 CHRONIC PAIN OF LEFT KNEE: Status: RESOLVED | Noted: 2020-03-23 | Resolved: 2024-01-16

## 2024-01-16 PROBLEM — M17.12 OSTEOARTHRITIS OF LEFT KNEE: Status: RESOLVED | Noted: 2020-03-23 | Resolved: 2024-01-16

## 2024-01-16 PROBLEM — L03.119 CELLULITIS OF MULTIPLE SITES OF LOWER EXTREMITY: Status: RESOLVED | Noted: 2019-09-25 | Resolved: 2024-01-16

## 2024-01-16 PROBLEM — J30.2 SEASONAL ALLERGIES: Status: ACTIVE | Noted: 2024-01-16

## 2024-01-17 ENCOUNTER — HOSPITAL ENCOUNTER (OUTPATIENT)
Dept: WOUND CARE | Age: 64
Discharge: HOME | End: 2024-01-17
Attending: INTERNAL MEDICINE
Payer: COMMERCIAL

## 2024-01-17 VITALS
WEIGHT: 315 LBS | HEART RATE: 89 BPM | TEMPERATURE: 98.4 F | HEIGHT: 68 IN | RESPIRATION RATE: 18 BRPM | BODY MASS INDEX: 47.74 KG/M2 | SYSTOLIC BLOOD PRESSURE: 148 MMHG | DIASTOLIC BLOOD PRESSURE: 82 MMHG

## 2024-01-17 DIAGNOSIS — L97.921 ULCERS OF BOTH LOWER LEGS, LIMITED TO BREAKDOWN OF SKIN (HCC): ICD-10-CM

## 2024-01-17 DIAGNOSIS — L97.911 ULCERS OF BOTH LOWER LEGS, LIMITED TO BREAKDOWN OF SKIN (HCC): ICD-10-CM

## 2024-01-17 DIAGNOSIS — I87.2 PERIPHERAL VENOUS INSUFFICIENCY: ICD-10-CM

## 2024-01-17 DIAGNOSIS — I89.0 LYMPHEDEMA: Primary | ICD-10-CM

## 2024-01-17 PROCEDURE — 29581 APPL MULTLAYER CMPRN SYS LEG: CPT

## 2024-01-17 RX ORDER — LIDOCAINE 50 MG/G
OINTMENT TOPICAL ONCE
Status: DISCONTINUED | OUTPATIENT
Start: 2024-01-17 | End: 2024-01-18 | Stop reason: HOSPADM

## 2024-01-17 RX ORDER — LIDOCAINE 40 MG/G
CREAM TOPICAL ONCE
OUTPATIENT
Start: 2024-01-17 | End: 2024-01-17

## 2024-01-17 RX ORDER — LIDOCAINE 40 MG/G
CREAM TOPICAL ONCE
Status: DISCONTINUED | OUTPATIENT
Start: 2024-01-17 | End: 2024-01-18 | Stop reason: HOSPADM

## 2024-01-17 RX ORDER — BACITRACIN ZINC AND POLYMYXIN B SULFATE 500; 1000 [USP'U]/G; [USP'U]/G
OINTMENT TOPICAL ONCE
OUTPATIENT
Start: 2024-01-17 | End: 2024-01-17

## 2024-01-17 RX ORDER — BACITRACIN ZINC AND POLYMYXIN B SULFATE 500; 1000 [USP'U]/G; [USP'U]/G
OINTMENT TOPICAL ONCE
Status: DISCONTINUED | OUTPATIENT
Start: 2024-01-17 | End: 2024-01-18 | Stop reason: HOSPADM

## 2024-01-17 RX ORDER — LIDOCAINE 50 MG/G
OINTMENT TOPICAL ONCE
OUTPATIENT
Start: 2024-01-17 | End: 2024-01-17

## 2024-01-17 RX ORDER — TRIAMCINOLONE ACETONIDE 1 MG/G
OINTMENT TOPICAL ONCE
OUTPATIENT
Start: 2024-01-17 | End: 2024-01-17

## 2024-01-17 RX ORDER — LIDOCAINE HYDROCHLORIDE 40 MG/ML
SOLUTION TOPICAL ONCE
Status: DISCONTINUED | OUTPATIENT
Start: 2024-01-17 | End: 2024-01-18 | Stop reason: HOSPADM

## 2024-01-17 RX ORDER — LIDOCAINE HYDROCHLORIDE 40 MG/ML
SOLUTION TOPICAL ONCE
OUTPATIENT
Start: 2024-01-17 | End: 2024-01-17

## 2024-01-17 RX ORDER — TRIAMCINOLONE ACETONIDE 1 MG/G
OINTMENT TOPICAL ONCE
Status: DISCONTINUED | OUTPATIENT
Start: 2024-01-17 | End: 2024-01-18 | Stop reason: HOSPADM

## 2024-01-17 ASSESSMENT — PAIN SCALES - GENERAL: PAINLEVEL_OUTOF10: 0

## 2024-01-17 NOTE — PLAN OF CARE
New blistered area on right leg noted d/t wrap sliding down. Will protect blister with Mepilex Transfer Ag. Otherwise, cont. With current wound care regime with dressing & compression wrap for edema control on right leg. Reinforced importance of exercise, elevation & compression to help with circulation, edema control & wound healing. Pt. Awaiting the delivery of his new velcro compression garments to use long-term. Will cont. With medigrip compression on left at this time. F/u in [facility] in 1 week as ordered, pt. Aware to call sooner with any changes or questions/concerns. Discharge instructions reviewed with patient, all questions answered, copy given to patient. Dressings were applied to all wounds per M.D. Instructions at this visit.

## 2024-01-17 NOTE — FLOWSHEET NOTE
RLE - Aquaphor/ Clobetasol to to reddness,itching, Mepilex Transfer AG to blister(pre-Tib) ABD, calamine coflex, spandigrip F  tensoplast

## 2024-01-18 NOTE — DISCHARGE INSTRUCTIONS
Wound Care Center Physician Orders and Discharge Instructions  [facility]  [address], [address]  [place] [id number]  Telephone: [phone]      Fax: [phone]        Your home care company:   N/a .     Your wound-care supplies will be provided by:  N/a     NAME:  Dannie Dan   YOB: 1960  PRIMARY DIAGNOSIS FOR WOUND CARE CENTER:  Venous Leg Ulcers .     Wound cleansing:   Do not scrub or use excessive force.  Wash hands with soap and water before and after dressing changes.  Prior to applying a clean dressing, cleanse wound with normal saline, wound cleanser, or mild soap and water. Ask your physician or nurse before getting the wound(s) wet in the shower.                Wound care for home:     Left lower leg:  Apply Aquaphor, Vaseline or good moisturizer to dry skin      Apply Double layer medium compression medigrip from toes to knee for edema control   Apply first thing in the morning & may remove at bedtime.      Right lower leg:  Clobetasol/Aquaphor to anything itching or rashy-proximal  ABD over pretib area below knee  Calmoseptine carmelita-wound  Sorbact to wound  Cover with ABD  Foam to anterior ankle  CoFlex TLC Calamine compression wrap  Medigrip toes to knee then Tensoplast at top of Medigrip  Leave in place for the week. Keep clean, dry & intact.        Your physician has ordered Compression for treatment of venous ulcers, venous insufficiency,and/or Lymphedema.   * Do not remove until your next scheduled visit in [facility]- do not get wet.    * If your wrap falls down, becomes painful or you have decreased sensation, and/or excessive drainage- please call the [facility] for RN visit to get your compression wrap changed   * You need to exercice as tolerated- walking is good   * Elevate your legs preferrably above level of your heart when sitting as much as possible   * The Goal of this therapy is to reduce Edema and get into long term compression garments to

## 2024-01-20 NOTE — PROGRESS NOTES
[facility] Wound Care Center Progress Note    Dannie Dan     : 1960    DATE OF VISIT:  2024    Subjective:     Dannie Dan is a 63 y.o. male who has a venous and  lymphedema ulcer located on the right lower leg. Significant symptoms or pertinent wound history since last visit: good news this week is that his pruritus was much better, so he didn't scratch at the right leg much at all. Left leg remained quite stable in just Medigrip. He doesn't have his Velcro garments yet, but this brand ([employer]) is shipped directly from the , not stocked at [facility], so sometimes there's a slight delay - we'll call them.     Bad news is that the right wrap slid down several inches, he never called us, it bunched up at the ankle and caused a pretty sizeable horizontal bullous lesion there (maybe 8 x 1 cm) -- not open, but looks like it could open soon, and without his Velcro compression, I feel that we should wrap that right side for one more week.     Additional ulcer(s) noted? no.  Left side remains healed, and while there's still a bit of dermatitis at the right upper shin, there's not clearly anything open there.    His current medication list consists of Insulin Pen Needle, [employer] Textile 10\"x144\", Multiple Vitamin, Prodigy Twist Top Lancets 28G, Semaglutide (1 MG/DOSE), atorvastatin, blood glucose monitor kit and supplies, blood glucose test strips, furosemide, glucose, hydroCHLOROthiazide, lisinopril, loratadine, metFORMIN, nystatin, omeprazole, ondansetron, potassium chloride, pregabalin, and rOPINIRole.    Allergies: Celebrex [celecoxib], Mobic [meloxicam], and Nsaids    Objective:     Vitals:    24 1015   BP: (!) 148/82   Pulse: 89   Resp: 18   Temp: 98.4 °F (36.9 °C)   TempSrc: Oral   Weight: (!) 163.3 kg (360 lb)   Height: 1.727 m (5' 8\")     Constitutional:  well-developed, well-nourished, overweight, NAD  Psychiatric:  oriented to person, place and time; mood and

## 2024-01-22 RX ORDER — POTASSIUM CHLORIDE 1500 MG/1
20 TABLET, EXTENDED RELEASE ORAL 2 TIMES DAILY
Qty: 180 TABLET | Refills: 1 | OUTPATIENT
Start: 2024-01-22

## 2024-01-22 RX ORDER — POTASSIUM CHLORIDE 1500 MG/1
20 TABLET, EXTENDED RELEASE ORAL 2 TIMES DAILY
Qty: 180 TABLET | Refills: 1 | Status: SHIPPED | OUTPATIENT
Start: 2024-01-22

## 2024-01-24 ENCOUNTER — HOSPITAL ENCOUNTER (OUTPATIENT)
Dept: WOUND CARE | Age: 64
Discharge: HOME | End: 2024-01-24
Attending: INTERNAL MEDICINE
Payer: COMMERCIAL

## 2024-01-24 VITALS
DIASTOLIC BLOOD PRESSURE: 81 MMHG | RESPIRATION RATE: 20 BRPM | WEIGHT: 315 LBS | SYSTOLIC BLOOD PRESSURE: 157 MMHG | HEART RATE: 81 BPM | HEIGHT: 68 IN | BODY MASS INDEX: 47.74 KG/M2 | TEMPERATURE: 97.9 F

## 2024-01-24 DIAGNOSIS — L92.9 HYPERGRANULATION: ICD-10-CM

## 2024-01-24 DIAGNOSIS — L97.911 ULCER OF RIGHT LEG, LIMITED TO BREAKDOWN OF SKIN (HCC): ICD-10-CM

## 2024-01-24 DIAGNOSIS — I87.2 PERIPHERAL VENOUS INSUFFICIENCY: ICD-10-CM

## 2024-01-24 DIAGNOSIS — I89.0 LYMPHEDEMA: Primary | ICD-10-CM

## 2024-01-24 PROCEDURE — 17250 CHEM CAUT OF GRANLTJ TISSUE: CPT

## 2024-01-24 PROCEDURE — 29581 APPL MULTLAYER CMPRN SYS LEG: CPT

## 2024-01-24 RX ORDER — LIDOCAINE HYDROCHLORIDE 40 MG/ML
SOLUTION TOPICAL ONCE
OUTPATIENT
Start: 2024-01-24 | End: 2024-01-24

## 2024-01-24 RX ORDER — LIDOCAINE HYDROCHLORIDE 40 MG/ML
SOLUTION TOPICAL ONCE
Status: DISCONTINUED | OUTPATIENT
Start: 2024-01-24 | End: 2024-01-25 | Stop reason: HOSPADM

## 2024-01-24 RX ORDER — LIDOCAINE 40 MG/G
CREAM TOPICAL ONCE
OUTPATIENT
Start: 2024-01-24 | End: 2024-01-24

## 2024-01-24 RX ORDER — TRIAMCINOLONE ACETONIDE 1 MG/G
OINTMENT TOPICAL ONCE
OUTPATIENT
Start: 2024-01-24 | End: 2024-01-24

## 2024-01-24 RX ORDER — BACITRACIN ZINC AND POLYMYXIN B SULFATE 500; 1000 [USP'U]/G; [USP'U]/G
OINTMENT TOPICAL ONCE
OUTPATIENT
Start: 2024-01-24 | End: 2024-01-24

## 2024-01-24 RX ORDER — BACITRACIN ZINC AND POLYMYXIN B SULFATE 500; 1000 [USP'U]/G; [USP'U]/G
OINTMENT TOPICAL ONCE
Status: DISCONTINUED | OUTPATIENT
Start: 2024-01-24 | End: 2024-01-25 | Stop reason: HOSPADM

## 2024-01-24 RX ORDER — TRIAMCINOLONE ACETONIDE 1 MG/G
OINTMENT TOPICAL ONCE
Status: DISCONTINUED | OUTPATIENT
Start: 2024-01-24 | End: 2024-01-25 | Stop reason: HOSPADM

## 2024-01-24 RX ORDER — LIDOCAINE 50 MG/G
OINTMENT TOPICAL ONCE
OUTPATIENT
Start: 2024-01-24 | End: 2024-01-24

## 2024-01-24 RX ORDER — LIDOCAINE 40 MG/G
CREAM TOPICAL ONCE
Status: DISCONTINUED | OUTPATIENT
Start: 2024-01-24 | End: 2024-01-25 | Stop reason: HOSPADM

## 2024-01-24 RX ORDER — LIDOCAINE 50 MG/G
OINTMENT TOPICAL ONCE
Status: DISCONTINUED | OUTPATIENT
Start: 2024-01-24 | End: 2024-01-25 | Stop reason: HOSPADM

## 2024-01-24 NOTE — PLAN OF CARE
Blistered area from last week now an open ulcer. Wound stable, no debridement, Ag Nitrate for hypergranulation per MD. Wound care dressing orders updated, cont. With compression wrap on right for edema control. Reinforced importance of exercise, elevation & compression to help with circulation, edema control & wound healing. Pt. States he is unable to afford to purchase the velcro compression garments at this time d/t not yet meeting his deductible. Will plan for patient to use medigrip when healed for edema control on right. Pt. Using medigrip on left leg for edema control & tolerating well. F/u in [facility] in 1 week as ordered, pt. Aware to call sooner with any changes or questions/concerns. Discharge instructions reviewed with patient, all questions answered, copy given to patient. Dressings were applied to all wounds per M.D. Instructions at this visit.

## 2024-01-25 NOTE — DISCHARGE INSTRUCTIONS
Wound Care Center Physician Orders and Discharge Instructions  [facility]  [address], [address]  [place] [id number]  Telephone: [phone]      Fax: [phone]        Your home care company:   N/a .     Your wound-care supplies will be provided by:  N/a     NAME:  Dannie Dan   YOB: 1960  PRIMARY DIAGNOSIS FOR WOUND CARE CENTER:  Venous Leg Ulcers .     Wound cleansing:   Do not scrub or use excessive force.  Wash hands with soap and water before and after dressing changes.  Prior to applying a clean dressing, cleanse wound with normal saline, wound cleanser, or mild soap and water. Ask your physician or nurse before getting the wound(s) wet in the shower.                Wound care for home:     Left lower leg:  Apply Aquaphor, Vaseline or good moisturizer to dry skin      Apply Double layer medium compression medigrip from toes to knee for edema control   Apply first thing in the morning & may remove at bedtime.      Right lower leg:  Clobetasol/Aquaphor to anything itching or rashy  ABD over pretib area below knee  Xeroform to wound  Cover with Mepilex Transfer Ag (be sure to reposition)  Foam to anterior ankle  CoFlex TLC Calamine compression wrap  Medigrip toes to knee then Tensoplast at top of Medigrip  Leave in place for the week. Keep clean, dry & intact.        Your physician has ordered Compression for treatment of venous ulcers, venous insufficiency,and/or Lymphedema.   * Do not remove until your next scheduled visit in [facility]- do not get wet.    * If your wrap falls down, becomes painful or you have decreased sensation, and/or excessive drainage- please call the [facility] for RN visit to get your compression wrap changed   * You need to exercice as tolerated- walking is good   * Elevate your legs preferrably above level of your heart when sitting as much as possible   * The Goal of this therapy is to reduce Edema and get into long term compression garments

## 2024-01-26 ENCOUNTER — HOSPITAL ENCOUNTER (OUTPATIENT)
Dept: WOUND CARE | Age: 64
Discharge: HOME | End: 2024-01-26
Attending: INTERNAL MEDICINE
Payer: COMMERCIAL

## 2024-01-26 VITALS
DIASTOLIC BLOOD PRESSURE: 79 MMHG | HEART RATE: 87 BPM | RESPIRATION RATE: 18 BRPM | TEMPERATURE: 97.6 F | WEIGHT: 315 LBS | HEIGHT: 68 IN | BODY MASS INDEX: 47.74 KG/M2 | SYSTOLIC BLOOD PRESSURE: 137 MMHG

## 2024-01-26 DIAGNOSIS — I87.2 PERIPHERAL VENOUS INSUFFICIENCY: ICD-10-CM

## 2024-01-26 DIAGNOSIS — L97.911 ULCER OF RIGHT LEG, LIMITED TO BREAKDOWN OF SKIN (HCC): ICD-10-CM

## 2024-01-26 DIAGNOSIS — I89.0 LYMPHEDEMA: Primary | ICD-10-CM

## 2024-01-26 PROCEDURE — 29581 APPL MULTLAYER CMPRN SYS LEG: CPT

## 2024-01-26 RX ORDER — LIDOCAINE HYDROCHLORIDE 40 MG/ML
SOLUTION TOPICAL ONCE
OUTPATIENT
Start: 2024-01-26 | End: 2024-01-26

## 2024-01-26 RX ORDER — TRIAMCINOLONE ACETONIDE 1 MG/G
OINTMENT TOPICAL ONCE
OUTPATIENT
Start: 2024-01-26 | End: 2024-01-26

## 2024-01-26 RX ORDER — BACITRACIN ZINC AND POLYMYXIN B SULFATE 500; 1000 [USP'U]/G; [USP'U]/G
OINTMENT TOPICAL ONCE
OUTPATIENT
Start: 2024-01-26 | End: 2024-01-26

## 2024-01-26 RX ORDER — LIDOCAINE 50 MG/G
OINTMENT TOPICAL ONCE
OUTPATIENT
Start: 2024-01-26 | End: 2024-01-26

## 2024-01-26 RX ORDER — LIDOCAINE 40 MG/G
CREAM TOPICAL ONCE
OUTPATIENT
Start: 2024-01-26 | End: 2024-01-26

## 2024-01-26 NOTE — DISCHARGE INSTRUCTIONS
Wound Care Center Physician Orders and Discharge Instructions  [facility]  [address], [address]  [place] [id number]  Telephone: [phone]      Fax: [phone]        Your home care company:   N/a .     Your wound-care supplies will be provided by:  N/a     NAME:  Dannie Dan   YOB: 1960  PRIMARY DIAGNOSIS FOR WOUND CARE CENTER:  Venous Leg Ulcers .     Wound cleansing:   Do not scrub or use excessive force.  Wash hands with soap and water before and after dressing changes.  Prior to applying a clean dressing, cleanse wound with normal saline, wound cleanser, or mild soap and water. Ask your physician or nurse before getting the wound(s) wet in the shower.                Wound care for home:     Left lower leg:  Apply Aquaphor, Vaseline or good moisturizer to dry skin      Apply Double layer medium compression medigrip from toes to knee for edema control   Apply first thing in the morning & may remove at bedtime.      Right lower leg:  Clobetasol/Aquaphor to anything itching or rashy-proximal  ABD over pretib area below knee  Calmoseptine carmelita-wound  Sorbact to wound  Cover with ABD  Foam to anterior ankle  CoFlex TLC Calamine compression wrap  Medigrip toes to knee then Tensoplast at top of Medigrip  Leave in place for the week. Keep clean, dry & intact.        Your physician has ordered Compression for treatment of venous ulcers, venous insufficiency,and/or Lymphedema.   * Do not remove until your next scheduled visit in [facility]- do not get wet.    * If your wrap falls down, becomes painful or you have decreased sensation, and/or excessive drainage- please call the [facility] for RN visit to get your compression wrap changed   * You need to exercice as tolerated- walking is good   * Elevate your legs preferrably above level of your heart when sitting as much as possible   * The Goal of this therapy is to reduce Edema and get into long term compression garments to

## 2024-01-26 NOTE — PLAN OF CARE
Pt was seen in [facility] today for wound reassessment. VSS 0/10 pain. Patient states that wrap was falling down. Wrap removed and wound assessed. Dressing placed per MD orders. Patient given d/c instructions denies any questions at this time. Voices understanding and will follow up in [facility] on 1/31/24.

## 2024-01-27 PROBLEM — L92.9 HYPERGRANULATION: Status: ACTIVE | Noted: 2024-01-27

## 2024-01-27 NOTE — PROGRESS NOTES
pulses palpable, feet warm, good cap refill; moderate BL lower extremity stage 2 lymphedema, proximal > distal and R>L  Lymphatic:  no inguinal or popliteal adenopathy, no angitis or cellulitis   Musculoskeletal:  no clubbing, cyanosis or petechiae; RLE and LLE with no gross effusions, joint misalignment or acute arthritis  Edwina-ulcer skin: santa, warm, dry and a bit of acute contact dermatitis  Ulcer(s): last week's bulla ruptured, revealing a sizeable but partial thickness ulcer, but very clean, red, granular to hypergranular, serous exudate, no signs of infection, no macroscopic biofilm even. Photos also saved in electronic chart.    Today's Wound Measurements, per RN documentation:  Wound 01/24/24 #6 Right pre-tib lower leg,venous, full thickness,   (onset 01,2024)-Wound Length (cm): 2.4 cm    Wound 01/24/24 #6 Right pre-tib lower leg,venous, full thickness,   (onset 01,2024)-Wound Width (cm): 8.5 cm    Wound 01/24/24 #6 Right pre-tib lower leg,venous, full thickness,   (onset 01,2024)-Wound Depth (cm): 0.1 cm    Assessment:     Patient Active Problem List   Diagnosis Code    Class 3 severe obesity due to excess calories with serious comorbidity and body mass index (BMI) of 50.0 to 59.9 in adult ([facility]) E66.01, Z68.43    Mixed hypertriglyceridemia E78.2    Nonalcoholic fatty liver disease K76.0    RLS (restless legs syndrome) G25.81    Essential hypertension, benign I10    Chondromalacia of left knee M94.262    Pes planus M21.40    Lymphedema I89.0    Peripheral venous insufficiency I87.2    Allergic rhinitis J30.9    Type 2 diabetes mellitus without complication, without long-term current use of insulin ([facility]) E11.9    TOM (obstructive sleep apnea) G47.33    History of total knee arthroplasty, left Z96.652    History of total knee arthroplasty, right Z96.651    Intertrigo L30.4    Ulcer of right leg, limited to breakdown of skin ([facility]) L97.911    Seasonal allergies J30.2    Hypergranulation L92.9       Assessment

## 2024-01-31 ENCOUNTER — HOSPITAL ENCOUNTER (OUTPATIENT)
Dept: WOUND CARE | Age: 64
Discharge: HOME | End: 2024-01-31
Attending: INTERNAL MEDICINE
Payer: COMMERCIAL

## 2024-01-31 VITALS
HEART RATE: 96 BPM | DIASTOLIC BLOOD PRESSURE: 73 MMHG | BODY MASS INDEX: 47.74 KG/M2 | WEIGHT: 315 LBS | RESPIRATION RATE: 18 BRPM | SYSTOLIC BLOOD PRESSURE: 146 MMHG | HEIGHT: 68 IN | TEMPERATURE: 98 F

## 2024-01-31 DIAGNOSIS — I87.2 PERIPHERAL VENOUS INSUFFICIENCY: ICD-10-CM

## 2024-01-31 DIAGNOSIS — I89.0 LYMPHEDEMA: Primary | ICD-10-CM

## 2024-01-31 DIAGNOSIS — L97.911 ULCER OF RIGHT LEG, LIMITED TO BREAKDOWN OF SKIN (HCC): ICD-10-CM

## 2024-01-31 PROCEDURE — 29581 APPL MULTLAYER CMPRN SYS LEG: CPT | Performed by: INTERNAL MEDICINE

## 2024-01-31 PROCEDURE — 29581 APPL MULTLAYER CMPRN SYS LEG: CPT

## 2024-01-31 RX ORDER — LIDOCAINE HYDROCHLORIDE 40 MG/ML
SOLUTION TOPICAL ONCE
Status: DISCONTINUED | OUTPATIENT
Start: 2024-01-31 | End: 2024-02-01 | Stop reason: HOSPADM

## 2024-01-31 RX ORDER — LIDOCAINE 50 MG/G
OINTMENT TOPICAL ONCE
OUTPATIENT
Start: 2024-01-31 | End: 2024-01-31

## 2024-01-31 RX ORDER — BACITRACIN ZINC AND POLYMYXIN B SULFATE 500; 1000 [USP'U]/G; [USP'U]/G
OINTMENT TOPICAL ONCE
Status: DISCONTINUED | OUTPATIENT
Start: 2024-01-31 | End: 2024-02-01 | Stop reason: HOSPADM

## 2024-01-31 RX ORDER — LIDOCAINE 50 MG/G
OINTMENT TOPICAL ONCE
Status: DISCONTINUED | OUTPATIENT
Start: 2024-01-31 | End: 2024-02-01 | Stop reason: HOSPADM

## 2024-01-31 RX ORDER — LIDOCAINE HYDROCHLORIDE 40 MG/ML
SOLUTION TOPICAL ONCE
OUTPATIENT
Start: 2024-01-31 | End: 2024-01-31

## 2024-01-31 RX ORDER — TRIAMCINOLONE ACETONIDE 1 MG/G
OINTMENT TOPICAL ONCE
OUTPATIENT
Start: 2024-01-31 | End: 2024-01-31

## 2024-01-31 RX ORDER — LIDOCAINE 40 MG/G
CREAM TOPICAL ONCE
OUTPATIENT
Start: 2024-01-31 | End: 2024-01-31

## 2024-01-31 RX ORDER — BACITRACIN ZINC AND POLYMYXIN B SULFATE 500; 1000 [USP'U]/G; [USP'U]/G
OINTMENT TOPICAL ONCE
OUTPATIENT
Start: 2024-01-31 | End: 2024-01-31

## 2024-01-31 RX ORDER — LIDOCAINE 40 MG/G
CREAM TOPICAL ONCE
Status: DISCONTINUED | OUTPATIENT
Start: 2024-01-31 | End: 2024-02-01 | Stop reason: HOSPADM

## 2024-01-31 RX ORDER — TRIAMCINOLONE ACETONIDE 1 MG/G
OINTMENT TOPICAL ONCE
Status: DISCONTINUED | OUTPATIENT
Start: 2024-01-31 | End: 2024-02-01 | Stop reason: HOSPADM

## 2024-01-31 ASSESSMENT — PAIN - FUNCTIONAL ASSESSMENT: PAIN_FUNCTIONAL_ASSESSMENT: ACTIVITIES ARE NOT PREVENTED

## 2024-01-31 ASSESSMENT — PAIN DESCRIPTION - ORIENTATION: ORIENTATION: LEFT

## 2024-01-31 ASSESSMENT — PAIN SCALES - GENERAL: PAINLEVEL_OUTOF10: 2

## 2024-01-31 ASSESSMENT — PAIN DESCRIPTION - LOCATION: LOCATION: LEG

## 2024-01-31 ASSESSMENT — PAIN DESCRIPTION - DESCRIPTORS: DESCRIPTORS: BURNING

## 2024-01-31 NOTE — PLAN OF CARE
Rash noted on right leg, will apply Clobetasol/Aquaphor as previously used. Wound care dressing orders updated per MD. Will cont. With compression wrap for edema control as ordered. Reinforced importance of exercise, elevation & compression to help with circulation, edema control & wound healing. Dr Brush provided ordering information if patient would like to purchases spandagrip d/t not being able to afford to purchase velcro compression garments to use for long-term edema control when healed. F/u in [facility] in 1 week as ordered, pt. Aware to call sooner with any changes or questions/concerns. Discharge instructions reviewed with patient, all questions answered, copy given to patient. Dressings were applied to all wounds per M.D. Instructions at this visit.

## 2024-02-01 NOTE — PROGRESS NOTES
[facility] Wound Care Center Progress Note    Dannie Dan     : 1960    DATE OF VISIT:  2024    Subjective:     Dannie Dan is a 63 y.o. male who has a venous and  lymphedema ulcer located on the right, anterior, distal lower leg. Significant symptoms or pertinent wound history since last visit: feeling well overall, some pruritus mid-shin this week, but wrap largely stayed in place, little pain, scant drainage, no fever. Using tubular compression on the left, edema up a bit, but nothing open.     Additional ulcer(s) noted? no.      His current medication list consists of Insulin Pen Needle, [employer] Textile 10\"x144\", Multiple Vitamin, Prodigy Twist Top Lancets 28G, Semaglutide (1 MG/DOSE), atorvastatin, blood glucose monitor kit and supplies, blood glucose test strips, furosemide, glucose, hydroCHLOROthiazide, lisinopril, loratadine, metFORMIN, nystatin, omeprazole, ondansetron, potassium chloride, pregabalin, and rOPINIRole.    Allergies: Celebrex [celecoxib], Mobic [meloxicam], and Nsaids    Objective:     Vitals:    24 0851   BP: (!) 146/73   Pulse: 96   Resp: 18   Temp: 98 °F (36.7 °C)   TempSrc: Oral   Weight: (!) 162.5 kg (358 lb 3.2 oz)   Height: 1.727 m (5' 8\")     Constitutional:  well-developed, well-nourished, overweight, NAD  Psychiatric:  oriented to person, place and time; mood and affect appropriate for the situation   Cardiovascular:  bilateral pedal pulses palpable, feet warm, good cap refill; moderate left and now mild right lower extremity stage 2 lymphedema, proximal > distal   Lymphatic:  no inguinal or popliteal adenopathy, no angitis or cellulitis   Musculoskeletal:  no clubbing, cyanosis or petechiae; RLE and LLE with no gross effusions, joint misalignment or acute arthritis  Edwina-ulcer skin: santa, warm, dry, and while the upper pretib rash is better, there's a mid-pretib contact dermatitis this week  Ulcer(s): no active bullae, just one very small

## 2024-02-01 NOTE — DISCHARGE INSTRUCTIONS
Wound Care Center Physician Orders and Discharge Instructions  [facility]  [address], [address]  [place] [address]  Telephone: [phone]      Fax: [phone]        Your home care company:   N/a .     Your wound-care supplies will be provided by:  N/a     NAME:  Dannie Dan   YOB: 1960  PRIMARY DIAGNOSIS FOR WOUND CARE CENTER:  Venous Leg Ulcers .     Wound cleansing:   Do not scrub or use excessive force.  Wash hands with soap and water before and after dressing changes.  Prior to applying a clean dressing, cleanse wound with normal saline, wound cleanser, or mild soap and water. Ask your physician or nurse before getting the wound(s) wet in the shower.                Wound care for home:     Left & Right lower leg:  Apply Aquaphor, Vaseline or good moisturizer to dry skin      Apply Double layer medium compression medigrip (Tubigrip or Spandagrip) from toes to knee for edema control   Apply first thing in the morning & may remove at bedtime.            New orders for this week (labs, imaging, medications, etc.):     If you have a superficial wound open then may apply:  Silver Alginate if moderate to large drainage  Antibiotic ointment if small drainage   Cover with dry dressing  Continue to wear the double layer medium compression Tubigrip or spandagrip for edema control    ELEVATE LEGS as much as possible to help control swelling in legs!!!          CONGRATULATIONS!   You have completed your treatment program!  We have created a list of general reminders to assist you in preventing future wounds:     1.   Check your skin daily for reddened areas, abrasions, or sores. If a new wound is noted, call the Wound Care Center at [phone].   2.   Clean your using mild soap and warm (not hot) water, daily.   3.   If your skin appears dry, apply lotion as needed, but not between the toes.   4.   Avoid pressure and trauma to recently healed wounds. The skin is

## 2024-02-07 ENCOUNTER — HOSPITAL ENCOUNTER (OUTPATIENT)
Dept: WOUND CARE | Age: 64
Discharge: HOME | End: 2024-02-07
Attending: INTERNAL MEDICINE
Payer: COMMERCIAL

## 2024-02-07 VITALS
SYSTOLIC BLOOD PRESSURE: 116 MMHG | HEART RATE: 92 BPM | WEIGHT: 315 LBS | DIASTOLIC BLOOD PRESSURE: 71 MMHG | TEMPERATURE: 98 F | HEIGHT: 68 IN | RESPIRATION RATE: 20 BRPM | BODY MASS INDEX: 47.74 KG/M2

## 2024-02-07 DIAGNOSIS — L97.911 ULCER OF RIGHT LEG, LIMITED TO BREAKDOWN OF SKIN (HCC): ICD-10-CM

## 2024-02-07 DIAGNOSIS — I89.0 LYMPHEDEMA: Primary | ICD-10-CM

## 2024-02-07 DIAGNOSIS — I87.2 PERIPHERAL VENOUS INSUFFICIENCY: ICD-10-CM

## 2024-02-07 PROCEDURE — 99212 OFFICE O/P EST SF 10 MIN: CPT

## 2024-02-07 PROCEDURE — 99212 OFFICE O/P EST SF 10 MIN: CPT | Performed by: INTERNAL MEDICINE

## 2024-02-07 RX ORDER — LIDOCAINE HYDROCHLORIDE 40 MG/ML
SOLUTION TOPICAL ONCE
Status: CANCELLED | OUTPATIENT
Start: 2024-02-07 | End: 2024-02-07

## 2024-02-07 RX ORDER — TRIAMCINOLONE ACETONIDE 1 MG/G
OINTMENT TOPICAL ONCE
Status: CANCELLED | OUTPATIENT
Start: 2024-02-07 | End: 2024-02-07

## 2024-02-07 RX ORDER — BACITRACIN ZINC AND POLYMYXIN B SULFATE 500; 1000 [USP'U]/G; [USP'U]/G
OINTMENT TOPICAL ONCE
Status: CANCELLED | OUTPATIENT
Start: 2024-02-07 | End: 2024-02-07

## 2024-02-07 RX ORDER — LIDOCAINE 40 MG/G
CREAM TOPICAL ONCE
Status: CANCELLED | OUTPATIENT
Start: 2024-02-07 | End: 2024-02-07

## 2024-02-07 RX ORDER — LIDOCAINE 40 MG/G
CREAM TOPICAL ONCE
Status: DISCONTINUED | OUTPATIENT
Start: 2024-02-07 | End: 2024-02-07

## 2024-02-07 RX ORDER — LIDOCAINE 50 MG/G
OINTMENT TOPICAL ONCE
Status: CANCELLED | OUTPATIENT
Start: 2024-02-07 | End: 2024-02-07

## 2024-02-07 RX ORDER — TRIAMCINOLONE ACETONIDE 1 MG/G
OINTMENT TOPICAL ONCE
Status: DISCONTINUED | OUTPATIENT
Start: 2024-02-07 | End: 2024-02-07

## 2024-02-07 RX ORDER — LIDOCAINE HYDROCHLORIDE 40 MG/ML
SOLUTION TOPICAL ONCE
Status: DISCONTINUED | OUTPATIENT
Start: 2024-02-07 | End: 2024-02-07

## 2024-02-07 RX ORDER — LIDOCAINE 50 MG/G
OINTMENT TOPICAL ONCE
Status: DISCONTINUED | OUTPATIENT
Start: 2024-02-07 | End: 2024-02-07

## 2024-02-07 RX ORDER — BACITRACIN ZINC AND POLYMYXIN B SULFATE 500; 1000 [USP'U]/G; [USP'U]/G
OINTMENT TOPICAL ONCE
Status: DISCONTINUED | OUTPATIENT
Start: 2024-02-07 | End: 2024-02-07

## 2024-02-07 NOTE — PLAN OF CARE
Wounds healed. Pt. Unable to purchase velcro compression garments for long-term edema control d/t cost. Pt. Using double layer spandagrip or tubigrip at this time for edema control. Reinforced importance of exercise, elevation & compression to help with circulation, edema control & prevention of new wounds, pt. Verbalizes understanding. No further f/u in [facility] needed at this time. Pt. Advised to call if new wound opens or with any further questions/concerns. Discharge instructions reviewed with patient, all questions answered, copy given to patient. Dressings were applied to all wounds per M.D. Instructions at this visit.

## 2024-02-10 PROBLEM — L97.911 ULCER OF RIGHT LEG, LIMITED TO BREAKDOWN OF SKIN (HCC): Status: RESOLVED | Noted: 2024-01-03 | Resolved: 2024-02-10

## 2024-02-10 PROBLEM — L92.9 HYPERGRANULATION: Status: RESOLVED | Noted: 2024-01-27 | Resolved: 2024-02-10

## 2024-02-10 NOTE — PROGRESS NOTES
Depth (cm): 0 cm  ______________________________    Lab Results   Component Value Date    LABALBU 4.7 09/08/2023     Lab Results   Component Value Date    CREATININE 0.9 09/08/2023     Lab Results   Component Value Date    HGB 15.1 09/08/2023     Lab Results   Component Value Date    LABA1C 6.1 11/27/2023     Assessment:     Patient Active Problem List   Diagnosis Code    Class 3 severe obesity due to excess calories with serious comorbidity and body mass index (BMI) of 50.0 to 59.9 in adult ([facility]) E66.01, Z68.43    Mixed hypertriglyceridemia E78.2    Nonalcoholic fatty liver disease K76.0    RLS (restless legs syndrome) G25.81    Essential hypertension, benign I10    Chondromalacia of left knee M94.262    Pes planus M21.40    Lymphedema I89.0    Peripheral venous insufficiency I87.2    Allergic rhinitis J30.9    Type 2 diabetes mellitus without complication, without long-term current use of insulin ([facility]) E11.9    TOM (obstructive sleep apnea) G47.33    History of total knee arthroplasty, left Z96.652    History of total knee arthroplasty, right Z96.651    Intertrigo L30.4    Seasonal allergies J30.2       Assessment of today's active condition(s): morbid obesity, decreased mobility, some chronic venous stasis, multifactorial LE edema, recurrent lower leg ulcers, healed for now, but high risk of recurrence. Factors contributing to occurrence and/or persistence of the chronic ulcer include venous stasis, lymphedema, diabetes, decreased mobility, and obesity. Medical necessity of today's visit is shown by the above documentation. Sharp debridement is not indicated today, based upon the exam findings in the wound(s) above.     Discharge plan:     Treatment in the wound care center today, per RN documentation: just double-layer medium compression Medigrip.    I reminded the patient of the importance of weight management and smoking cessation, if applicable; also encouraged ambulation as tolerated, additional lower  9883 Yes

## 2024-02-22 NOTE — DISCHARGE INSTRUCTIONS
questions, please ask.  *  If you have a compression pump, aim for at least two hours of use daily.          F/U Appointment is with Dr. Brush in 1 week, on                                   at                       .     Your nurse  is ANISHA Kidd.      If we applied slip-resistant hospital socks today, be sure to remove them at least once a day to inspect your toes or feet, even if you're not changing the wraps or dressings underneath. If you see anything concerning (redness, excess moisture, etc), please call and let us know right away.     Should you experience any significant changes in your wound(s) (including redness, increased warmth, increased pain, increased drainage, odor, or fever) or have questions about your wound care, please contact the [facility] at [phone] Monday-Thursday from 8:00 am - 4:30 pm, or Friday from 8:00 am - 2:30 pm.  If you need help with your wound outside these hours and cannot wait until we are again available, contact your home-care company (if applicable), your PCP, or go to the nearest emergency room.

## 2024-02-25 ENCOUNTER — PATIENT MESSAGE (OUTPATIENT)
Dept: FAMILY MEDICINE CLINIC | Age: 64
End: 2024-02-25

## 2024-02-25 DIAGNOSIS — E11.9 TYPE 2 DIABETES MELLITUS WITHOUT COMPLICATION, WITHOUT LONG-TERM CURRENT USE OF INSULIN (HCC): ICD-10-CM

## 2024-02-26 DIAGNOSIS — E11.9 TYPE 2 DIABETES MELLITUS WITHOUT COMPLICATION, WITHOUT LONG-TERM CURRENT USE OF INSULIN (HCC): ICD-10-CM

## 2024-02-26 RX ORDER — ROPINIROLE 3 MG/1
3 TABLET, FILM COATED ORAL 3 TIMES DAILY
Qty: 270 TABLET | Refills: 0 | Status: SHIPPED | OUTPATIENT
Start: 2024-02-26 | End: 2024-02-27 | Stop reason: SDUPTHER

## 2024-02-26 RX ORDER — ROPINIROLE 3 MG/1
3 TABLET, FILM COATED ORAL 3 TIMES DAILY
Qty: 270 TABLET | Refills: 0 | OUTPATIENT
Start: 2024-02-26 | End: 2024-05-26

## 2024-02-26 RX ORDER — ATORVASTATIN CALCIUM 40 MG/1
40 TABLET, FILM COATED ORAL DAILY
Qty: 90 TABLET | Refills: 3 | Status: SHIPPED | OUTPATIENT
Start: 2024-02-26 | End: 2024-02-27 | Stop reason: SDUPTHER

## 2024-02-26 RX ORDER — ATORVASTATIN CALCIUM 40 MG/1
40 TABLET, FILM COATED ORAL DAILY
Qty: 90 TABLET | Refills: 3 | OUTPATIENT
Start: 2024-02-26

## 2024-02-26 NOTE — TELEPHONE ENCOUNTER
From: Dannie Dan  To: Rachel Lynch  Sent: 2/25/2024 1:35 PM EST  Subject: NEW PHARMACY     Hi Rachel, please note that my refills are being sent to [facility] and not [facility].    Oh and by the way , I stopped taking the Ozempic last Monday February 19th. The nausea and vomiting just became too much    Bo

## 2024-02-27 RX ORDER — ROPINIROLE 3 MG/1
3 TABLET, FILM COATED ORAL 3 TIMES DAILY
Qty: 270 TABLET | Refills: 0 | Status: SHIPPED | OUTPATIENT
Start: 2024-02-27 | End: 2024-05-27

## 2024-02-27 RX ORDER — ATORVASTATIN CALCIUM 40 MG/1
40 TABLET, FILM COATED ORAL DAILY
Qty: 90 TABLET | Refills: 3 | Status: SHIPPED | OUTPATIENT
Start: 2024-02-27

## 2024-02-28 ENCOUNTER — HOSPITAL ENCOUNTER (OUTPATIENT)
Dept: WOUND CARE | Age: 64
Discharge: HOME | End: 2024-02-28

## 2024-03-01 NOTE — TELEPHONE ENCOUNTER
Attempt to contact for Bariatric Follow Up.  [employer] message sent and letter mailed to address on file in [facility] Detail Level: Detailed

## 2024-03-21 DIAGNOSIS — I10 ESSENTIAL HYPERTENSION: ICD-10-CM

## 2024-03-21 DIAGNOSIS — J30.1 SEASONAL ALLERGIC RHINITIS DUE TO POLLEN: ICD-10-CM

## 2024-03-21 RX ORDER — LISINOPRIL 10 MG/1
TABLET ORAL
Qty: 90 TABLET | Refills: 0 | Status: SHIPPED | OUTPATIENT
Start: 2024-03-21

## 2024-03-21 RX ORDER — LORATADINE 10 MG/1
10 TABLET ORAL DAILY
Qty: 90 TABLET | Refills: 1 | Status: SHIPPED | OUTPATIENT
Start: 2024-03-21

## 2024-03-21 RX ORDER — HYDROCHLOROTHIAZIDE 25 MG/1
TABLET ORAL
Qty: 90 TABLET | Refills: 0 | Status: SHIPPED | OUTPATIENT
Start: 2024-03-21

## 2024-04-02 ENCOUNTER — TELEPHONE (OUTPATIENT)
Dept: WOUND CARE | Age: 64
End: 2024-04-02

## 2024-04-02 DIAGNOSIS — I87.2 PERIPHERAL VENOUS INSUFFICIENCY: ICD-10-CM

## 2024-04-02 DIAGNOSIS — I89.0 LYMPHEDEMA: Primary | ICD-10-CM

## 2024-04-02 NOTE — TELEPHONE ENCOUNTER
Patient returned call to writer & is questioning the name & size of garments that Dr Brush had previously recommended for long-term compression. Writer advised patient that Dr Brush previously provided printed information with the garment & sizing information. Patient states he is unable to locate these papers. Writer advised patient he may look up compressionguru.com & search for ReadyWrap Fusion velcro compression garments. Patient advised he will need to look up the sizing chart, measure his legs & then will be able to determine the appropriate size to order, pt. Verbalizes understanding.

## 2024-05-25 NOTE — CARE COORDINATION
Ambulatory Care Coordination Note  2021  CM Risk Score: 2  Charlson 10 Year Mortality Risk Score: 23%     ACC: Amari Maher, ANISHA    Summary Note: 1015 [facility] ([facility]) contacted the patient by telephone to introduced the Associate Care Management Program r/t s/p left TKA. Verified name and  with patient as identifiers. Patient was not agreeable to enroll. Patient states he has home care set up with [facility], he has his outpatient PT appointments already scheduled to start in a few weeks, and he is doing well after surgery. Patient states he does not need care Management at this time. No further outreach scheduled with this ACM, ACM will sign off care team at this time. Patient has this ACM's contact information if future needs arise.       Future Appointments   Date Time Provider Ernie Duron   2021 10:30 AM Chen Duarte MD W ORTHO [facility]   8/3/2021 10:45 AM Cata Antis Felblinger, PT [facility] AND PT [place]   2021  9:15 AM Marcy Piper, PT [facility] [place] AND PT [place]   8/10/2021 10:45 AM Marcy Piper, PT [facility] AND PT [place]   2021  8:15 AM [facility] WT MMA   2021  9:15 AM Marcy Piper, PT [facility] AND PT [place]   2021 10:45 AM Marcy Piper, PT [facility] AND PT [place]   2021  9:15 AM Marcy Piper, PT [facility] [place] AND PT [place]   2021 10:45 AM Marcy Piper, PT [facility] AND PT [place]   2021  9:15 AM Marcy Piper, PT [facility] [place] AND PT [place]   2021 10:00 AM Marcy Piper, PT [facility] AND PT [place]   9/10/2021  9:15 AM Cata Antis Felblinger, PT [facility] AND PT [place]   2021  8:00 AM Hailee Contreras, APRN - CNP [facility] SLEEP MED MMA
4 (moderate pain)

## 2024-05-29 RX ORDER — ROPINIROLE 3 MG/1
3 TABLET, FILM COATED ORAL 3 TIMES DAILY
Qty: 270 TABLET | Refills: 0 | Status: SHIPPED | OUTPATIENT
Start: 2024-05-29 | End: 2024-08-27

## 2024-06-19 ENCOUNTER — TELEPHONE (OUTPATIENT)
Dept: FAMILY MEDICINE CLINIC | Age: 64
End: 2024-06-19

## 2024-06-19 DIAGNOSIS — I10 ESSENTIAL HYPERTENSION: ICD-10-CM

## 2024-06-19 RX ORDER — LISINOPRIL 10 MG/1
TABLET ORAL
Qty: 90 TABLET | Refills: 0 | Status: SHIPPED | OUTPATIENT
Start: 2024-06-19

## 2024-06-19 RX ORDER — HYDROCHLOROTHIAZIDE 25 MG/1
TABLET ORAL
Qty: 90 TABLET | Refills: 0 | Status: SHIPPED | OUTPATIENT
Start: 2024-06-19 | End: 2024-06-19

## 2024-06-19 RX ORDER — HYDROCHLOROTHIAZIDE 25 MG/1
TABLET ORAL
Qty: 90 TABLET | Refills: 0 | Status: SHIPPED | OUTPATIENT
Start: 2024-06-19

## 2024-06-19 NOTE — TELEPHONE ENCOUNTER
Pharmacy requesting clarification on hydroCHLOROthiazide (HYDRODIURIL) 25 MG tablet   Sig says Hold till Monday

## 2024-07-01 ENCOUNTER — PATIENT MESSAGE (OUTPATIENT)
Dept: BARIATRICS/WEIGHT MGMT | Age: 64
End: 2024-07-01

## 2024-07-01 NOTE — TELEPHONE ENCOUNTER
Attempt to contact for Bariatric Follow Up. [employer] message sent and letter mailed to address on file in [facility]

## 2024-08-21 ENCOUNTER — TELEMEDICINE (OUTPATIENT)
Dept: FAMILY MEDICINE CLINIC | Age: 64
End: 2024-08-21
Payer: COMMERCIAL

## 2024-08-21 DIAGNOSIS — E11.9 TYPE 2 DIABETES MELLITUS WITHOUT COMPLICATION, WITHOUT LONG-TERM CURRENT USE OF INSULIN (HCC): ICD-10-CM

## 2024-08-21 DIAGNOSIS — U07.1 COVID: Primary | ICD-10-CM

## 2024-08-21 PROCEDURE — G2211 COMPLEX E/M VISIT ADD ON: HCPCS | Performed by: NURSE PRACTITIONER

## 2024-08-21 PROCEDURE — 99214 OFFICE O/P EST MOD 30 MIN: CPT | Performed by: NURSE PRACTITIONER

## 2024-08-21 RX ORDER — PREDNISONE 20 MG/1
20 TABLET ORAL DAILY
Qty: 10 TABLET | Refills: 0 | Status: SHIPPED | OUTPATIENT
Start: 2024-08-21 | End: 2024-08-31

## 2024-08-21 RX ORDER — CODEINE PHOSPHATE/GUAIFENESIN 10-100MG/5
5 LIQUID (ML) ORAL 2 TIMES DAILY PRN
Qty: 180 ML | Refills: 0 | Status: SHIPPED | OUTPATIENT
Start: 2024-08-21 | End: 2024-08-24

## 2024-08-21 ASSESSMENT — ENCOUNTER SYMPTOMS
SINUS PRESSURE: 0
EYE ITCHING: 0
RHINORRHEA: 0
BLOOD IN STOOL: 0
COLOR CHANGE: 0
SHORTNESS OF BREATH: 0
BACK PAIN: 0
ABDOMINAL PAIN: 0
EYE REDNESS: 0
CHEST TIGHTNESS: 0
CONSTIPATION: 0
VOMITING: 0
NAUSEA: 0
SORE THROAT: 0
DIARRHEA: 0
COUGH: 1
WHEEZING: 0

## 2024-08-21 NOTE — PROGRESS NOTES
Dannie Dan, was evaluated through a synchronous (real-time) audio-video encounter. The patient (or guardian if applicable) is aware that this is a billable service, which includes applicable co-pays. This Virtual Visit was conducted with patient's (and/or legal guardian's) consent. Patient identification was verified, and a caregiver was present when appropriate.   The patient was located at Home: [address] [place] [address]   [place] [phone]  Provider was located at Facility (Appt Dept): [address] [place] [address]  [address]  [place] [phone]  Confirm you are appropriately licensed, registered, or certified to deliver care in the state where the patient is located as indicated above. If you are not or unsure, please re-schedule the visit: Yes, I confirm.     Dannie Dan (:  1960) is a Established patient, presenting virtually for evaluation of the following:      Below is the assessment and plan developed based on review of pertinent history, physical exam, labs, studies, and medications.     Assessment & Plan  COVID    This acute problem that is not chronic controlled or stable at this time however patient is outside of window for antiviral therapy.  Prednisone and Tessalon sent to the pharmacy educated on signs symptoms to be aware of no chest pain shortness of breath or difficulty breathing at this time.    Orders:  •  guaiFENesin-codeine (TUSSI-ORGANIDIN NR) 100-10 MG/5ML syrup; Take 5 mLs by mouth 2 times daily as needed for Cough for up to 3 days. Max Daily Amount: 10 mLs    Type 2 diabetes mellitus without complication, without long-term current use of insulin (HCC)    This is a chronic problem that is controlled and stable at this time.  No changes to medication advised patient that prednisone may heighten blood sugars however monitor and if unable to tolerate steroids he should stop.           No follow-ups on file.       Subjective   HPI  On vv today for covid. He tested positive Wednesday or

## 2024-08-21 NOTE — ASSESSMENT & PLAN NOTE
This acute problem that is not chronic controlled or stable at this time however patient is outside of window for antiviral therapy.  Prednisone and Tessalon sent to the pharmacy educated on signs symptoms to be aware of no chest pain shortness of breath or difficulty breathing at this time.    Orders:    guaiFENesin-codeine (TUSSI-ORGANIDIN NR) 100-10 MG/5ML syrup; Take 5 mLs by mouth 2 times daily as needed for Cough for up to 3 days. Max Daily Amount: 10 mLs

## 2024-08-21 NOTE — ASSESSMENT & PLAN NOTE
This is a chronic problem that is controlled and stable at this time.  No changes to medication advised patient that prednisone may heighten blood sugars however monitor and if unable to tolerate steroids he should stop.

## 2024-09-04 RX ORDER — ROPINIROLE 3 MG/1
3 TABLET, FILM COATED ORAL 3 TIMES DAILY
Qty: 270 TABLET | Refills: 0 | Status: SHIPPED | OUTPATIENT
Start: 2024-09-04 | End: 2024-12-03

## 2024-09-24 RX ORDER — MUPIROCIN 20 MG/G
OINTMENT TOPICAL
Qty: 22 G | Refills: 2 | Status: SHIPPED | OUTPATIENT
Start: 2024-09-24 | End: 2024-10-01

## 2024-10-19 ENCOUNTER — HOSPITAL ENCOUNTER (INPATIENT)
Age: 64
LOS: 3 days | Discharge: HOME | DRG: 291 | End: 2024-10-22
Attending: EMERGENCY MEDICINE | Admitting: INTERNAL MEDICINE
Payer: COMMERCIAL

## 2024-10-19 ENCOUNTER — APPOINTMENT (OUTPATIENT)
Dept: GENERAL RADIOLOGY | Age: 64
DRG: 291 | End: 2024-10-19
Payer: COMMERCIAL

## 2024-10-19 DIAGNOSIS — I50.31 ACUTE DIASTOLIC CHF (CONGESTIVE HEART FAILURE) (HCC): ICD-10-CM

## 2024-10-19 DIAGNOSIS — J81.0 ACUTE PULMONARY EDEMA: ICD-10-CM

## 2024-10-19 DIAGNOSIS — I50.9 ACUTE ON CHRONIC CONGESTIVE HEART FAILURE, UNSPECIFIED HEART FAILURE TYPE (HCC): Primary | ICD-10-CM

## 2024-10-19 DIAGNOSIS — I50.9 ACUTE CONGESTIVE HEART FAILURE, UNSPECIFIED HEART FAILURE TYPE (HCC): ICD-10-CM

## 2024-10-19 DIAGNOSIS — R06.02 SHORTNESS OF BREATH: ICD-10-CM

## 2024-10-19 LAB
ANION GAP SERPL CALCULATED.3IONS-SCNC: 11 MMOL/L (ref 3–16)
BASE EXCESS BLDV CALC-SCNC: 1.6 MMOL/L (ref -2–3)
BASOPHILS # BLD: 0 K/UL (ref 0–0.2)
BASOPHILS NFR BLD: 0.6 %
BUN SERPL-MCNC: 15 MG/DL (ref 7–20)
CALCIUM SERPL-MCNC: 8.5 MG/DL (ref 8.3–10.6)
CHLORIDE SERPL-SCNC: 104 MMOL/L (ref 99–110)
CO2 BLDV-SCNC: 31 MMOL/L
CO2 SERPL-SCNC: 24 MMOL/L (ref 21–32)
COHGB MFR BLDV: 1.3 % (ref 0–1.5)
CREAT SERPL-MCNC: 0.8 MG/DL (ref 0.8–1.3)
DEPRECATED RDW RBC AUTO: 14.8 % (ref 12.4–15.4)
DO-HGB MFR BLDV: 52.7 %
EOSINOPHIL # BLD: 0.1 K/UL (ref 0–0.6)
EOSINOPHIL NFR BLD: 1.3 %
FERRITIN SERPL IA-MCNC: 124.3 NG/ML (ref 30–400)
FLUAV RNA RESP QL NAA+PROBE: NOT DETECTED
FLUBV RNA RESP QL NAA+PROBE: NOT DETECTED
GFR SERPLBLD CREATININE-BSD FMLA CKD-EPI: >90 ML/MIN/{1.73_M2}
GLUCOSE BLD-MCNC: 130 MG/DL (ref 70–99)
GLUCOSE BLD-MCNC: 142 MG/DL (ref 70–99)
GLUCOSE SERPL-MCNC: 157 MG/DL (ref 70–99)
HCO3 BLDV-SCNC: 28.8 MMOL/L (ref 24–28)
HCT VFR BLD AUTO: 39.5 % (ref 40.5–52.5)
HGB BLD-MCNC: 12.6 G/DL (ref 13.5–17.5)
LYMPHOCYTES # BLD: 0.7 K/UL (ref 1–5.1)
LYMPHOCYTES NFR BLD: 10.9 %
MAGNESIUM SERPL-MCNC: 2.1 MG/DL (ref 1.8–2.4)
MCH RBC QN AUTO: 31.1 PG (ref 26–34)
MCHC RBC AUTO-ENTMCNC: 31.9 G/DL (ref 31–36)
MCV RBC AUTO: 97.4 FL (ref 80–100)
METHGB MFR BLDV: 0.6 % (ref 0–1.5)
MONOCYTES # BLD: 0.4 K/UL (ref 0–1.3)
MONOCYTES NFR BLD: 6.6 %
NEUTROPHILS # BLD: 5.1 K/UL (ref 1.7–7.7)
NEUTROPHILS NFR BLD: 80.6 %
NT-PROBNP SERPL-MCNC: 1121 PG/ML (ref 0–124)
PCO2 BLDV: 55.5 MMHG (ref 41–51)
PERFORMED ON: ABNORMAL
PERFORMED ON: ABNORMAL
PH BLDV: 7.32 [PH] (ref 7.35–7.45)
PLATELET # BLD AUTO: 243 K/UL (ref 135–450)
PMV BLD AUTO: 8.1 FL (ref 5–10.5)
PO2 BLDV: <30 MMHG (ref 25–40)
POTASSIUM SERPL-SCNC: 5 MMOL/L (ref 3.5–5.1)
RBC # BLD AUTO: 4.05 M/UL (ref 4.2–5.9)
SAO2 % BLDV: 46 %
SARS-COV-2 RNA RESP QL NAA+PROBE: NOT DETECTED
SODIUM SERPL-SCNC: 139 MMOL/L (ref 136–145)
TROPONIN, HIGH SENSITIVITY: 20 NG/L (ref 0–22)
TROPONIN, HIGH SENSITIVITY: 26 NG/L (ref 0–22)
TSH SERPL DL<=0.005 MIU/L-ACNC: 2.27 UIU/ML (ref 0.27–4.2)
WBC # BLD AUTO: 6.3 K/UL (ref 4–11)

## 2024-10-19 PROCEDURE — 82803 BLOOD GASES ANY COMBINATION: CPT

## 2024-10-19 PROCEDURE — 80048 BASIC METABOLIC PNL TOTAL CA: CPT

## 2024-10-19 PROCEDURE — 83735 ASSAY OF MAGNESIUM: CPT

## 2024-10-19 PROCEDURE — 82728 ASSAY OF FERRITIN: CPT

## 2024-10-19 PROCEDURE — 84443 ASSAY THYROID STIM HORMONE: CPT

## 2024-10-19 PROCEDURE — 85025 COMPLETE CBC W/AUTO DIFF WBC: CPT

## 2024-10-19 PROCEDURE — 83880 ASSAY OF NATRIURETIC PEPTIDE: CPT

## 2024-10-19 PROCEDURE — 93005 ELECTROCARDIOGRAM TRACING: CPT | Performed by: STUDENT IN AN ORGANIZED HEALTH CARE EDUCATION/TRAINING PROGRAM

## 2024-10-19 PROCEDURE — 96374 THER/PROPH/DIAG INJ IV PUSH: CPT

## 2024-10-19 PROCEDURE — 83540 ASSAY OF IRON: CPT

## 2024-10-19 PROCEDURE — 2580000003 HC RX 258: Performed by: INTERNAL MEDICINE

## 2024-10-19 PROCEDURE — 71045 X-RAY EXAM CHEST 1 VIEW: CPT

## 2024-10-19 PROCEDURE — 6360000002 HC RX W HCPCS: Performed by: STUDENT IN AN ORGANIZED HEALTH CARE EDUCATION/TRAINING PROGRAM

## 2024-10-19 PROCEDURE — 83550 IRON BINDING TEST: CPT

## 2024-10-19 PROCEDURE — 82607 VITAMIN B-12: CPT

## 2024-10-19 PROCEDURE — 1200000000 HC SEMI PRIVATE

## 2024-10-19 PROCEDURE — 6370000000 HC RX 637 (ALT 250 FOR IP): Performed by: INTERNAL MEDICINE

## 2024-10-19 PROCEDURE — 99285 EMERGENCY DEPT VISIT HI MDM: CPT

## 2024-10-19 PROCEDURE — 87636 SARSCOV2 & INF A&B AMP PRB: CPT

## 2024-10-19 PROCEDURE — 84484 ASSAY OF TROPONIN QUANT: CPT

## 2024-10-19 PROCEDURE — 6360000002 HC RX W HCPCS: Performed by: INTERNAL MEDICINE

## 2024-10-19 RX ORDER — POTASSIUM CHLORIDE 1500 MG/1
40 TABLET, EXTENDED RELEASE ORAL PRN
Status: ACTIVE | OUTPATIENT
Start: 2024-10-19 | End: 2024-10-21

## 2024-10-19 RX ORDER — FUROSEMIDE 10 MG/ML
40 INJECTION INTRAMUSCULAR; INTRAVENOUS ONCE
Status: COMPLETED | OUTPATIENT
Start: 2024-10-19 | End: 2024-10-19

## 2024-10-19 RX ORDER — ENOXAPARIN SODIUM 100 MG/ML
60 INJECTION SUBCUTANEOUS 2 TIMES DAILY
Status: DISCONTINUED | OUTPATIENT
Start: 2024-10-19 | End: 2024-10-22 | Stop reason: HOSPADM

## 2024-10-19 RX ORDER — ACETAMINOPHEN 325 MG/1
650 TABLET ORAL EVERY 6 HOURS PRN
Status: DISCONTINUED | OUTPATIENT
Start: 2024-10-19 | End: 2024-10-22 | Stop reason: HOSPADM

## 2024-10-19 RX ORDER — ONDANSETRON 2 MG/ML
4 INJECTION INTRAMUSCULAR; INTRAVENOUS EVERY 6 HOURS PRN
Status: DISCONTINUED | OUTPATIENT
Start: 2024-10-19 | End: 2024-10-22 | Stop reason: HOSPADM

## 2024-10-19 RX ORDER — GLUCAGON 1 MG/ML
1 KIT INJECTION PRN
Status: DISCONTINUED | OUTPATIENT
Start: 2024-10-19 | End: 2024-10-22 | Stop reason: HOSPADM

## 2024-10-19 RX ORDER — ACETAMINOPHEN 650 MG/1
650 SUPPOSITORY RECTAL EVERY 6 HOURS PRN
Status: DISCONTINUED | OUTPATIENT
Start: 2024-10-19 | End: 2024-10-22 | Stop reason: HOSPADM

## 2024-10-19 RX ORDER — POTASSIUM CHLORIDE 7.45 MG/ML
10 INJECTION INTRAVENOUS PRN
Status: ACTIVE | OUTPATIENT
Start: 2024-10-19 | End: 2024-10-21

## 2024-10-19 RX ORDER — CETIRIZINE HYDROCHLORIDE 10 MG/1
10 TABLET ORAL DAILY
Status: DISCONTINUED | OUTPATIENT
Start: 2024-10-19 | End: 2024-10-22 | Stop reason: HOSPADM

## 2024-10-19 RX ORDER — HYDRALAZINE HYDROCHLORIDE 20 MG/ML
10 INJECTION INTRAMUSCULAR; INTRAVENOUS EVERY 6 HOURS PRN
Status: DISCONTINUED | OUTPATIENT
Start: 2024-10-19 | End: 2024-10-22 | Stop reason: HOSPADM

## 2024-10-19 RX ORDER — MAGNESIUM HYDROXIDE/ALUMINUM HYDROXICE/SIMETHICONE 120; 1200; 1200 MG/30ML; MG/30ML; MG/30ML
30 SUSPENSION ORAL EVERY 6 HOURS PRN
Status: DISCONTINUED | OUTPATIENT
Start: 2024-10-19 | End: 2024-10-22 | Stop reason: HOSPADM

## 2024-10-19 RX ORDER — DEXTROSE MONOHYDRATE 100 MG/ML
INJECTION, SOLUTION INTRAVENOUS CONTINUOUS PRN
Status: DISCONTINUED | OUTPATIENT
Start: 2024-10-19 | End: 2024-10-22 | Stop reason: HOSPADM

## 2024-10-19 RX ORDER — FUROSEMIDE 10 MG/ML
40 INJECTION INTRAMUSCULAR; INTRAVENOUS 2 TIMES DAILY
Status: COMPLETED | OUTPATIENT
Start: 2024-10-19 | End: 2024-10-21

## 2024-10-19 RX ORDER — ATORVASTATIN CALCIUM 40 MG/1
40 TABLET, FILM COATED ORAL DAILY
Status: DISCONTINUED | OUTPATIENT
Start: 2024-10-19 | End: 2024-10-22 | Stop reason: HOSPADM

## 2024-10-19 RX ORDER — ONDANSETRON 4 MG/1
4 TABLET, ORALLY DISINTEGRATING ORAL EVERY 8 HOURS PRN
Status: DISCONTINUED | OUTPATIENT
Start: 2024-10-19 | End: 2024-10-22 | Stop reason: HOSPADM

## 2024-10-19 RX ORDER — MULTIVITAMIN WITH IRON
1 TABLET ORAL DAILY
Status: DISCONTINUED | OUTPATIENT
Start: 2024-10-19 | End: 2024-10-22 | Stop reason: HOSPADM

## 2024-10-19 RX ORDER — PREGABALIN 75 MG/1
75 CAPSULE ORAL 3 TIMES DAILY
Status: DISCONTINUED | OUTPATIENT
Start: 2024-10-19 | End: 2024-10-22 | Stop reason: HOSPADM

## 2024-10-19 RX ORDER — MAGNESIUM SULFATE IN WATER 40 MG/ML
2000 INJECTION, SOLUTION INTRAVENOUS PRN
Status: DISCONTINUED | OUTPATIENT
Start: 2024-10-19 | End: 2024-10-22 | Stop reason: HOSPADM

## 2024-10-19 RX ORDER — LISINOPRIL 10 MG/1
10 TABLET ORAL DAILY
Status: DISCONTINUED | OUTPATIENT
Start: 2024-10-19 | End: 2024-10-20

## 2024-10-19 RX ORDER — SODIUM CHLORIDE 0.9 % (FLUSH) 0.9 %
5-40 SYRINGE (ML) INJECTION EVERY 12 HOURS SCHEDULED
Status: DISCONTINUED | OUTPATIENT
Start: 2024-10-19 | End: 2024-10-22 | Stop reason: HOSPADM

## 2024-10-19 RX ORDER — SODIUM CHLORIDE 0.9 % (FLUSH) 0.9 %
5-40 SYRINGE (ML) INJECTION PRN
Status: DISCONTINUED | OUTPATIENT
Start: 2024-10-19 | End: 2024-10-22 | Stop reason: HOSPADM

## 2024-10-19 RX ORDER — SODIUM CHLORIDE 9 MG/ML
INJECTION, SOLUTION INTRAVENOUS PRN
Status: DISCONTINUED | OUTPATIENT
Start: 2024-10-19 | End: 2024-10-22 | Stop reason: HOSPADM

## 2024-10-19 RX ORDER — PANTOPRAZOLE SODIUM 40 MG/1
40 TABLET, DELAYED RELEASE ORAL
Status: DISCONTINUED | OUTPATIENT
Start: 2024-10-20 | End: 2024-10-22 | Stop reason: HOSPADM

## 2024-10-19 RX ORDER — INSULIN LISPRO 100 [IU]/ML
0-4 INJECTION, SOLUTION INTRAVENOUS; SUBCUTANEOUS
Status: DISCONTINUED | OUTPATIENT
Start: 2024-10-19 | End: 2024-10-22 | Stop reason: HOSPADM

## 2024-10-19 RX ORDER — POLYETHYLENE GLYCOL 3350 17 G/17G
17 POWDER, FOR SOLUTION ORAL DAILY PRN
Status: DISCONTINUED | OUTPATIENT
Start: 2024-10-19 | End: 2024-10-22 | Stop reason: HOSPADM

## 2024-10-19 RX ADMIN — ENOXAPARIN SODIUM 60 MG: 100 INJECTION SUBCUTANEOUS at 21:22

## 2024-10-19 RX ADMIN — ATORVASTATIN CALCIUM 40 MG: 40 TABLET, FILM COATED ORAL at 21:25

## 2024-10-19 RX ADMIN — ROPINIROLE HYDROCHLORIDE 3 MG: 2 TABLET, FILM COATED ORAL at 21:21

## 2024-10-19 RX ADMIN — PREGABALIN 75 MG: 75 CAPSULE ORAL at 17:58

## 2024-10-19 RX ADMIN — SODIUM CHLORIDE, PRESERVATIVE FREE 10 ML: 5 INJECTION INTRAVENOUS at 21:00

## 2024-10-19 RX ADMIN — FUROSEMIDE 40 MG: 10 INJECTION, SOLUTION INTRAMUSCULAR; INTRAVENOUS at 17:59

## 2024-10-19 RX ADMIN — CETIRIZINE HYDROCHLORIDE 10 MG: 10 TABLET, FILM COATED ORAL at 17:58

## 2024-10-19 RX ADMIN — FUROSEMIDE 40 MG: 10 INJECTION, SOLUTION INTRAMUSCULAR; INTRAVENOUS at 12:24

## 2024-10-19 RX ADMIN — LISINOPRIL 10 MG: 10 TABLET ORAL at 17:58

## 2024-10-19 RX ADMIN — PREGABALIN 75 MG: 75 CAPSULE ORAL at 21:21

## 2024-10-19 RX ADMIN — ROPINIROLE HYDROCHLORIDE 3 MG: 2 TABLET, FILM COATED ORAL at 17:58

## 2024-10-19 RX ADMIN — THERA TABS 1 TABLET: TAB at 17:58

## 2024-10-19 ASSESSMENT — PAIN - FUNCTIONAL ASSESSMENT: PAIN_FUNCTIONAL_ASSESSMENT: 0-10

## 2024-10-19 ASSESSMENT — PAIN SCALES - GENERAL
PAINLEVEL_OUTOF10: 0
PAINLEVEL_OUTOF10: 0

## 2024-10-19 NOTE — H&P
History and Physical    Admit Date: 10/19/2024    Patient's PCP: Dr. Lynch, Rachel MARTEL, APRN - CNP    Chief complaint: Dyspnea    HISTORY OF PRESENT ILLNESS:    This is a very pleasant 64 y.o. male with a history of hypertension, diabetes mellitus, hyperlipidemia, morbid obesity (s/p Sleeve Gastrectomy) with obstructive sleep apnea, as well as GERD and chronic bilateral leg ulcers, MRSA nasal colonization, noncompliant with medications, who presented to the ED with shortness of breath.     He has had progressive shortness of breath over the past several weeks.  He works as a , and has noticed that he gets increasingly shortness of breath especially with exertion, to the point where he cannot move several steps to his bathroom without getting significantly short of breath.  He describes associated orthopnea and PND.  He has had associated cough. He has had progressively worsening lower extremity edema as well, although hard to tell fully as he has bilateral venous stasis, with venous stasis ulcers, he has noted increased tightening around his thigh.     He denies chest pain, calf pain.  He has not had palpitations or syncope.     He was diagnosed with COVID around 11 weeks ago, and initially thought his symptoms may be related to this.  He denies fever or chills.    He has longstanding hypertension, but has not been compliant with his medications for several months, stating he was not able to afford most of them.  He has not been checking his blood pressures or his blood sugars, and has not been taking his antidiabetes medication, noncompliant with diabetic or hypertensive diet.    He has sleep apnea, but has not been compliant with CPAP, as he says he has not been able to tolerate it.    He carries no previous diagnosis of heart failure although he is on Lasix, and does not appear to have had any recent cardiac workup apart from a negative stress test in 2011 done as part of preoperative risk

## 2024-10-19 NOTE — ED TRIAGE NOTES
Pt arrives in ED c/o shortness of breath than has gotten increasingly worse for past 4 days. Pt states it is more difficult than usual to go upstairs.

## 2024-10-19 NOTE — ED PROVIDER NOTES
ED Attending Attestation Note    Date of service:  10/19/24    This patient was seen by the resident physician.  I have seen and examined the patient, agree with the workup, evaluation, management and diagnosis. The care plan has been discussed and I concur.  I have reviewed the ECG and concur with the resident's interpretation.    My assessment reveals a 64 y.o. male in ED with progressive dyspnea on exertion.  Patient has not been able to take antihypertensives and Lasix due to cost.  On my examination patient is obese.  He is tachypneic.  Heart is regular rate and rhythm.  Patient has 3+ lower extremity pitting edema.    Concern is for heart failure exacerbation in setting of medication noncompliance.  Will provide dose of diuretics here.  Currently pending imaging and labs.    Shamar Gardiner MD  Emergency Medicine  [facility]     Shamar Gardiner MD  10/19/24 4962    
patient will continue to be monitored here in the emergency department until which time they are moved to their new treatment location.    Samuel Olea MD   Emergency Medicine, PGY-4    This note was dictated using voice-recognition software, which occasionally leads to inadvertent typographic errors.       Samuel Olea MD  Resident  10/19/24 5539

## 2024-10-19 NOTE — ED NOTES
How does patient ambulate?   [x]Low Fall Risk (ambulates by themselves without support) *USES CANE*  []Stand by assist   []Contact Guard   []Front wheel walker  []Wheelchair   []Steady  []Bed bound  []History of Lower Extremity Amputation  []Unknown, did not assess in the emergency department   How does patient take pills?  []Whole with Water  []Crushed in applesauce  []Crushed in pudding  []Other  [x]Unknown no oral medications were given in the ED  Is patient alert?   [x]Alert  []Drowsy but responds to voice  []Doesn't respond to voice but responds to painful stimuli  []Unresponsive  Is patient oriented?   [x]To person  [x]To place  [x]To time  [x]To situation  []Confused  []Agitated  [x]Follows commands  If patient is disoriented or from a Skill Nursing Facility has family been notified of admission?   []Yes   [x]No N/A pt from home, family at bedside   Patient belongings?   []Cell phone  []Wallet   []Dentures  [x]Clothing  Any specific patient or family belongings/needs/dynamics?   N/A  Miscellaneous comments/pending orders?  2nd trop pending      If there are any additional questions please reach out to the Emergency Department.           Mata Sun RN  10/19/24 5714    
English

## 2024-10-20 LAB
ALBUMIN SERPL-MCNC: 3.7 G/DL (ref 3.4–5)
ALP SERPL-CCNC: 94 U/L (ref 40–129)
ALT SERPL-CCNC: 37 U/L (ref 10–40)
ANION GAP SERPL CALCULATED.3IONS-SCNC: 13 MMOL/L (ref 3–16)
AST SERPL-CCNC: 55 U/L (ref 15–37)
BILIRUB DIRECT SERPL-MCNC: 0.2 MG/DL (ref 0–0.3)
BILIRUB INDIRECT SERPL-MCNC: 0.4 MG/DL (ref 0–1)
BILIRUB SERPL-MCNC: 0.6 MG/DL (ref 0–1)
BUN SERPL-MCNC: 14 MG/DL (ref 7–20)
CALCIUM SERPL-MCNC: 9 MG/DL (ref 8.3–10.6)
CHLORIDE SERPL-SCNC: 100 MMOL/L (ref 99–110)
CHOLEST SERPL-MCNC: 151 MG/DL (ref 0–199)
CO2 SERPL-SCNC: 27 MMOL/L (ref 21–32)
CREAT SERPL-MCNC: 0.7 MG/DL (ref 0.8–1.3)
EKG ATRIAL RATE: 82 BPM
EKG DIAGNOSIS: NORMAL
EKG P AXIS: 25 DEGREES
EKG P-R INTERVAL: 152 MS
EKG Q-T INTERVAL: 392 MS
EKG QRS DURATION: 70 MS
EKG QTC CALCULATION (BAZETT): 457 MS
EKG R AXIS: 24 DEGREES
EKG T AXIS: 34 DEGREES
EKG VENTRICULAR RATE: 82 BPM
GFR SERPLBLD CREATININE-BSD FMLA CKD-EPI: >90 ML/MIN/{1.73_M2}
GLUCOSE BLD-MCNC: 133 MG/DL (ref 70–99)
GLUCOSE BLD-MCNC: 149 MG/DL (ref 70–99)
GLUCOSE BLD-MCNC: 158 MG/DL (ref 70–99)
GLUCOSE BLD-MCNC: 203 MG/DL (ref 70–99)
GLUCOSE SERPL-MCNC: 159 MG/DL (ref 70–99)
HDLC SERPL-MCNC: 38 MG/DL (ref 40–60)
IRON SATN MFR SERPL: 18 % (ref 20–50)
IRON SERPL-MCNC: 46 UG/DL (ref 59–158)
LDLC SERPL CALC-MCNC: 80 MG/DL
MAGNESIUM SERPL-MCNC: 2.1 MG/DL (ref 1.8–2.4)
PERFORMED ON: ABNORMAL
POTASSIUM SERPL-SCNC: 4.7 MMOL/L (ref 3.5–5.1)
PROT SERPL-MCNC: 7.5 G/DL (ref 6.4–8.2)
SODIUM SERPL-SCNC: 140 MMOL/L (ref 136–145)
TIBC SERPL-MCNC: 254 UG/DL (ref 260–445)
TRIGL SERPL-MCNC: 165 MG/DL (ref 0–150)
VIT B12 SERPL-MCNC: 544 PG/ML (ref 211–911)
VLDLC SERPL CALC-MCNC: 33 MG/DL

## 2024-10-20 PROCEDURE — 6360000002 HC RX W HCPCS: Performed by: INTERNAL MEDICINE

## 2024-10-20 PROCEDURE — 80076 HEPATIC FUNCTION PANEL: CPT

## 2024-10-20 PROCEDURE — 2580000003 HC RX 258: Performed by: STUDENT IN AN ORGANIZED HEALTH CARE EDUCATION/TRAINING PROGRAM

## 2024-10-20 PROCEDURE — 6370000000 HC RX 637 (ALT 250 FOR IP): Performed by: INTERNAL MEDICINE

## 2024-10-20 PROCEDURE — 99223 1ST HOSP IP/OBS HIGH 75: CPT | Performed by: INTERNAL MEDICINE

## 2024-10-20 PROCEDURE — 6370000000 HC RX 637 (ALT 250 FOR IP): Performed by: STUDENT IN AN ORGANIZED HEALTH CARE EDUCATION/TRAINING PROGRAM

## 2024-10-20 PROCEDURE — 83735 ASSAY OF MAGNESIUM: CPT

## 2024-10-20 PROCEDURE — 83036 HEMOGLOBIN GLYCOSYLATED A1C: CPT

## 2024-10-20 PROCEDURE — 2580000003 HC RX 258: Performed by: INTERNAL MEDICINE

## 2024-10-20 PROCEDURE — 6370000000 HC RX 637 (ALT 250 FOR IP): Performed by: NURSE PRACTITIONER

## 2024-10-20 PROCEDURE — 80061 LIPID PANEL: CPT

## 2024-10-20 PROCEDURE — 36415 COLL VENOUS BLD VENIPUNCTURE: CPT

## 2024-10-20 PROCEDURE — 6360000002 HC RX W HCPCS: Performed by: STUDENT IN AN ORGANIZED HEALTH CARE EDUCATION/TRAINING PROGRAM

## 2024-10-20 PROCEDURE — 80048 BASIC METABOLIC PNL TOTAL CA: CPT

## 2024-10-20 PROCEDURE — 1200000000 HC SEMI PRIVATE

## 2024-10-20 RX ORDER — LISINOPRIL 20 MG/1
20 TABLET ORAL DAILY
Status: DISCONTINUED | OUTPATIENT
Start: 2024-10-21 | End: 2024-10-22 | Stop reason: HOSPADM

## 2024-10-20 RX ORDER — LISINOPRIL 10 MG/1
10 TABLET ORAL ONCE
Status: COMPLETED | OUTPATIENT
Start: 2024-10-20 | End: 2024-10-20

## 2024-10-20 RX ORDER — GUAIFENESIN/DEXTROMETHORPHAN 100-10MG/5
5 SYRUP ORAL EVERY 4 HOURS PRN
Status: DISCONTINUED | OUTPATIENT
Start: 2024-10-20 | End: 2024-10-22 | Stop reason: HOSPADM

## 2024-10-20 RX ORDER — AMLODIPINE BESYLATE 5 MG/1
5 TABLET ORAL DAILY
Status: DISCONTINUED | OUTPATIENT
Start: 2024-10-20 | End: 2024-10-22 | Stop reason: HOSPADM

## 2024-10-20 RX ADMIN — SODIUM CHLORIDE, PRESERVATIVE FREE 10 ML: 5 INJECTION INTRAVENOUS at 09:29

## 2024-10-20 RX ADMIN — ACETAMINOPHEN 650 MG: 325 TABLET ORAL at 21:12

## 2024-10-20 RX ADMIN — ENOXAPARIN SODIUM 60 MG: 100 INJECTION SUBCUTANEOUS at 21:13

## 2024-10-20 RX ADMIN — THERA TABS 1 TABLET: TAB at 09:27

## 2024-10-20 RX ADMIN — LISINOPRIL 10 MG: 10 TABLET ORAL at 12:09

## 2024-10-20 RX ADMIN — PREGABALIN 75 MG: 75 CAPSULE ORAL at 21:12

## 2024-10-20 RX ADMIN — AMLODIPINE BESYLATE 5 MG: 5 TABLET ORAL at 11:11

## 2024-10-20 RX ADMIN — SODIUM CHLORIDE, PRESERVATIVE FREE 10 ML: 5 INJECTION INTRAVENOUS at 21:14

## 2024-10-20 RX ADMIN — GUAIFENESIN SYRUP AND DEXTROMETHORPHAN 5 ML: 100; 10 SYRUP ORAL at 23:44

## 2024-10-20 RX ADMIN — ATORVASTATIN CALCIUM 40 MG: 40 TABLET, FILM COATED ORAL at 21:12

## 2024-10-20 RX ADMIN — ACETAMINOPHEN 650 MG: 325 TABLET ORAL at 13:04

## 2024-10-20 RX ADMIN — SODIUM CHLORIDE: 9 INJECTION, SOLUTION INTRAVENOUS at 21:17

## 2024-10-20 RX ADMIN — FUROSEMIDE 40 MG: 10 INJECTION, SOLUTION INTRAMUSCULAR; INTRAVENOUS at 17:11

## 2024-10-20 RX ADMIN — LISINOPRIL 10 MG: 10 TABLET ORAL at 09:22

## 2024-10-20 RX ADMIN — FUROSEMIDE 40 MG: 10 INJECTION, SOLUTION INTRAMUSCULAR; INTRAVENOUS at 09:21

## 2024-10-20 RX ADMIN — ENOXAPARIN SODIUM 60 MG: 100 INJECTION SUBCUTANEOUS at 09:21

## 2024-10-20 RX ADMIN — PANTOPRAZOLE SODIUM 40 MG: 40 TABLET, DELAYED RELEASE ORAL at 05:51

## 2024-10-20 RX ADMIN — SODIUM CHLORIDE 100 MG: 9 INJECTION, SOLUTION INTRAVENOUS at 21:18

## 2024-10-20 RX ADMIN — PREGABALIN 75 MG: 75 CAPSULE ORAL at 09:22

## 2024-10-20 RX ADMIN — ROPINIROLE HYDROCHLORIDE 3 MG: 2 TABLET, FILM COATED ORAL at 09:21

## 2024-10-20 RX ADMIN — PREGABALIN 75 MG: 75 CAPSULE ORAL at 13:04

## 2024-10-20 RX ADMIN — ROPINIROLE HYDROCHLORIDE 3 MG: 2 TABLET, FILM COATED ORAL at 13:04

## 2024-10-20 RX ADMIN — CETIRIZINE HYDROCHLORIDE 10 MG: 10 TABLET, FILM COATED ORAL at 09:22

## 2024-10-20 RX ADMIN — ROPINIROLE HYDROCHLORIDE 3 MG: 2 TABLET, FILM COATED ORAL at 21:12

## 2024-10-20 ASSESSMENT — PAIN DESCRIPTION - FREQUENCY: FREQUENCY: INTERMITTENT

## 2024-10-20 ASSESSMENT — PAIN DESCRIPTION - LOCATION
LOCATION: LEG
LOCATION: LEG

## 2024-10-20 ASSESSMENT — PAIN DESCRIPTION - PAIN TYPE
TYPE: ACUTE PAIN
TYPE: ACUTE PAIN

## 2024-10-20 ASSESSMENT — PAIN SCALES - GENERAL
PAINLEVEL_OUTOF10: 8
PAINLEVEL_OUTOF10: 0
PAINLEVEL_OUTOF10: 10

## 2024-10-20 ASSESSMENT — PAIN DESCRIPTION - DESCRIPTORS
DESCRIPTORS: BURNING;THROBBING
DESCRIPTORS: BURNING

## 2024-10-20 ASSESSMENT — PAIN DESCRIPTION - ORIENTATION
ORIENTATION: RIGHT;LEFT
ORIENTATION: LEFT

## 2024-10-20 ASSESSMENT — PAIN - FUNCTIONAL ASSESSMENT
PAIN_FUNCTIONAL_ASSESSMENT: ACTIVITIES ARE NOT PREVENTED
PAIN_FUNCTIONAL_ASSESSMENT: ACTIVITIES ARE NOT PREVENTED

## 2024-10-20 ASSESSMENT — PAIN DESCRIPTION - ONSET: ONSET: GRADUAL

## 2024-10-20 NOTE — CONSULTS
Department of Podiatry Consult Note  Resident       Reason for Consult: Bilateral venous stasis ulcers  Requesting Physician:  Dr. Zan Lopez MD    CHIEF COMPLAINT:  lymphedema    HISTORY OF PRESENT ILLNESS:                The patient is a 64 y.o. male with significant past medical history as seen below who is consulted for bilateral venous stasis ulcers.  Patient has followed with wound care doctor, Dr. Fam Brush MD in the past most recently seen in February of this year.  Patient does follow with outpatient podiatrist Dr. Bhavana Hernandez for routine nail care however has not seen her recently.  Patient states that his wife normally takes care of his legs however due to an injury recently she has not been able to care for him as acutely and his wounds have gotten worse.  He states that his right leg is a bit more painful than the left.  Patient specifically denies nausea vomiting, fevers, chills, chest pain, shortness of breath.    Past Medical History:        Diagnosis Date    Bilateral venous leg ulcers 09/2020    Cellulitis of lower extremity 09/25/2019    GERD (gastroesophageal reflux disease)     Impaired fasting glucose 05/2013    MRSA carrier     MRSA nasal colonization 07/14/2021    Osteoarthritis of both knees     Screening PSA (prostate specific antigen) 12/30/2015;5/1/17    Nml:0.53(12/30/2015);5/1/17=nml.    Stasis dermatitis of both legs 08/28/2020    Tobacco use     Tubular adenoma of colon 09/14/2017       Past Surgical History:        Procedure Laterality Date    CIRCUMCISION      COLONOSCOPY  09/14/2017    polypectomy (cold biopsy) :Dr. Walker:next in 3yrs=9/14/2020    KNEE ARTHROSCOPY Left 08/03/2020    VIDEO ARTHROSCOPY LEFT KNEE, PARTIAL MEDIAL MENISCECTOMY, CHONDROPLASTY, SYNOVIAL BIOPSY -TOM=4- performed by Mina Florence MD at [facility] OR    MOUTH SURGERY      PAIN MANAGEMENT PROCEDURE Left 12/09/2020    COOLIEF RADIOFREQUENCY ABLATION - LEFT performed by Johan Harvey MD at

## 2024-10-20 NOTE — CONSULTS
[facility]   Cardiology Consultation   Date: 10/20/2024  Admit Date:  10/19/2024  Reason for Consultation: Congestive heart failure  Consult Requesting Physician: Marko Pelletier*     Chief Complaint   Patient presents with    Shortness of Breath     HPI: Dannie Dan is a 64 y.o. gentleman who is morbidly obese, with a past medical history significant for hypertension, type 2 diabetes mellitus, hyperlipidemia, sleep apnea, GERD, chronic bilateral edema, was admitted to the hospital secondary to progressively worsening shortness of breath for the past several weeks.     He works as an door Dash  and noted that he is slowly have worsening difficulty in breathing with exertion.  In the past, he was able to go to.  Without any difficulty in breathing from the parking lot.  However, recently he has to stop at least 3 times in between.  In addition to this, he also has paroxysmal nocturnal dyspnea and orthopnea.  In the month of August, he had COVID and afterwards, he noted that he is slowly getting worse.  Initially he thought it was secondary to COVID.  However, secondary to worsening shortness of breath, pedal edema and nocturnal dyspnea, he decided to come to the hospital.    Clinically, she had bilateral venous stasis.    He feels much better, to the hospital.    He endorses poor compliance with medications as they are cost prohibitive.    He had a negative nuclear stress test in 2020, that was reported as normal ejection fraction.  There is no echocardiogram in the system.    EKG shows normal sinus rhythm, no ischemic changes, no evidence of LVH.    Telemetry is within normal limits.    High sensitive troponin is stable, x 2.    NT proBNP is elevated to 1100.    Chest x-ray is consistent with increased pulmonary congestion    Impression:  Acute heart failure, probably diastolic, pending transthoracic echocardiogram  Hypertensive urgency  Morbid obesity  Sleep

## 2024-10-21 ENCOUNTER — HOSPITAL ENCOUNTER (INPATIENT)
Age: 64
Discharge: HOME | DRG: 291 | End: 2024-10-23
Attending: INTERNAL MEDICINE
Payer: COMMERCIAL

## 2024-10-21 VITALS
HEIGHT: 68 IN | WEIGHT: 315 LBS | DIASTOLIC BLOOD PRESSURE: 66 MMHG | BODY MASS INDEX: 47.74 KG/M2 | SYSTOLIC BLOOD PRESSURE: 152 MMHG

## 2024-10-21 LAB
ANION GAP SERPL CALCULATED.3IONS-SCNC: 12 MMOL/L (ref 3–16)
BASOPHILS # BLD: 0 K/UL (ref 0–0.2)
BASOPHILS NFR BLD: 0.5 %
BUN SERPL-MCNC: 18 MG/DL (ref 7–20)
CALCIUM SERPL-MCNC: 8.8 MG/DL (ref 8.3–10.6)
CHLORIDE SERPL-SCNC: 100 MMOL/L (ref 99–110)
CO2 SERPL-SCNC: 29 MMOL/L (ref 21–32)
CREAT SERPL-MCNC: 0.9 MG/DL (ref 0.8–1.3)
DEPRECATED RDW RBC AUTO: 14.6 % (ref 12.4–15.4)
ECHO AO ASC DIAM: 2.8 CM
ECHO AO ASCENDING AORTA INDEX: 1.03 CM/M2
ECHO AO ROOT DIAM: 2.8 CM
ECHO AO ROOT INDEX: 1.03 CM/M2
ECHO AV AREA PEAK VELOCITY: 2.7 CM2
ECHO AV AREA VTI: 2.6 CM2
ECHO AV AREA/BSA PEAK VELOCITY: 1 CM2/M2
ECHO AV AREA/BSA VTI: 1 CM2/M2
ECHO AV MEAN GRADIENT: 6 MMHG
ECHO AV MEAN VELOCITY: 1.1 M/S
ECHO AV PEAK GRADIENT: 11 MMHG
ECHO AV PEAK VELOCITY: 1.6 M/S
ECHO AV VELOCITY RATIO: 0.81
ECHO AV VTI: 29 CM
ECHO BSA: 2.91 M2
ECHO LV E' LATERAL VELOCITY: 9.1 CM/S
ECHO LV E' SEPTAL VELOCITY: 7.3 CM/S
ECHO LV EDV A2C: 152 ML
ECHO LV EDV A4C: 156 ML
ECHO LV EDV INDEX A4C: 58 ML/M2
ECHO LV EDV NDEX A2C: 56 ML/M2
ECHO LV EF PHYSICIAN: 55 %
ECHO LV EJECTION FRACTION A2C: 65 %
ECHO LV EJECTION FRACTION A4C: 56 %
ECHO LV EJECTION FRACTION BIPLANE: 62 % (ref 55–100)
ECHO LV ESV A2C: 53 ML
ECHO LV ESV A4C: 68 ML
ECHO LV ESV INDEX A2C: 20 ML/M2
ECHO LV ESV INDEX A4C: 25 ML/M2
ECHO LV FRACTIONAL SHORTENING: 26 % (ref 28–44)
ECHO LV INTERNAL DIMENSION DIASTOLE INDEX: 1.7 CM/M2
ECHO LV INTERNAL DIMENSION DIASTOLIC: 4.6 CM (ref 4.2–5.9)
ECHO LV INTERNAL DIMENSION SYSTOLIC INDEX: 1.25 CM/M2
ECHO LV INTERNAL DIMENSION SYSTOLIC: 3.4 CM
ECHO LV IVSD: 1.2 CM (ref 0.6–1)
ECHO LV MASS 2D: 192.9 G (ref 88–224)
ECHO LV MASS INDEX 2D: 71.2 G/M2 (ref 49–115)
ECHO LV POSTERIOR WALL DIASTOLIC: 1.1 CM (ref 0.6–1)
ECHO LV RELATIVE WALL THICKNESS RATIO: 0.48
ECHO LVOT AREA: 3.5 CM2
ECHO LVOT AV VTI INDEX: 0.74
ECHO LVOT DIAM: 2.1 CM
ECHO LVOT MEAN GRADIENT: 3 MMHG
ECHO LVOT PEAK GRADIENT: 6 MMHG
ECHO LVOT PEAK VELOCITY: 1.3 M/S
ECHO LVOT STROKE VOLUME INDEX: 27.5 ML/M2
ECHO LVOT SV: 74.4 ML
ECHO LVOT VTI: 21.5 CM
ECHO MV A VELOCITY: 0.78 M/S
ECHO MV AREA VTI: 2.1 CM2
ECHO MV E DECELERATION TIME (DT): 256 MS
ECHO MV E VELOCITY: 0.92 M/S
ECHO MV E/A RATIO: 1.18
ECHO MV E/E' LATERAL: 10.11
ECHO MV E/E' RATIO (AVERAGED): 11.36
ECHO MV E/E' SEPTAL: 12.6
ECHO MV LVOT VTI INDEX: 1.66
ECHO MV MAX VELOCITY: 1.3 M/S
ECHO MV MEAN GRADIENT: 3 MMHG
ECHO MV MEAN VELOCITY: 0.8 M/S
ECHO MV PEAK GRADIENT: 7 MMHG
ECHO MV VTI: 35.6 CM
ECHO RV FREE WALL PEAK S': 21.4 CM/S
ECHO RV TAPSE: 2.7 CM (ref 1.7–?)
ECHO TV REGURGITANT MAX VELOCITY: 1.31 M/S
ECHO TV REGURGITANT PEAK GRADIENT: 7 MMHG
EOSINOPHIL # BLD: 0.2 K/UL (ref 0–0.6)
EOSINOPHIL NFR BLD: 3.3 %
EST. AVERAGE GLUCOSE BLD GHB EST-MCNC: 185.8 MG/DL
GFR SERPLBLD CREATININE-BSD FMLA CKD-EPI: >90 ML/MIN/{1.73_M2}
GLUCOSE BLD-MCNC: 120 MG/DL (ref 70–99)
GLUCOSE BLD-MCNC: 150 MG/DL (ref 70–99)
GLUCOSE BLD-MCNC: 229 MG/DL (ref 70–99)
GLUCOSE BLD-MCNC: 241 MG/DL (ref 70–99)
GLUCOSE SERPL-MCNC: 132 MG/DL (ref 70–99)
HBA1C MFR BLD: 8.1 %
HCT VFR BLD AUTO: 39.9 % (ref 40.5–52.5)
HGB BLD-MCNC: 13.1 G/DL (ref 13.5–17.5)
LYMPHOCYTES # BLD: 1.2 K/UL (ref 1–5.1)
LYMPHOCYTES NFR BLD: 19.5 %
MAGNESIUM SERPL-MCNC: 2.1 MG/DL (ref 1.8–2.4)
MCH RBC QN AUTO: 31.7 PG (ref 26–34)
MCHC RBC AUTO-ENTMCNC: 32.8 G/DL (ref 31–36)
MCV RBC AUTO: 96.6 FL (ref 80–100)
MONOCYTES # BLD: 0.6 K/UL (ref 0–1.3)
MONOCYTES NFR BLD: 10 %
NEUTROPHILS # BLD: 3.9 K/UL (ref 1.7–7.7)
NEUTROPHILS NFR BLD: 66.7 %
NT-PROBNP SERPL-MCNC: 250 PG/ML (ref 0–124)
PERFORMED ON: ABNORMAL
PLATELET # BLD AUTO: 237 K/UL (ref 135–450)
PMV BLD AUTO: 7.7 FL (ref 5–10.5)
POTASSIUM SERPL-SCNC: 3.7 MMOL/L (ref 3.5–5.1)
RBC # BLD AUTO: 4.13 M/UL (ref 4.2–5.9)
SODIUM SERPL-SCNC: 141 MMOL/L (ref 136–145)
WBC # BLD AUTO: 5.9 K/UL (ref 4–11)

## 2024-10-21 PROCEDURE — 6370000000 HC RX 637 (ALT 250 FOR IP): Performed by: INTERNAL MEDICINE

## 2024-10-21 PROCEDURE — 6370000000 HC RX 637 (ALT 250 FOR IP): Performed by: STUDENT IN AN ORGANIZED HEALTH CARE EDUCATION/TRAINING PROGRAM

## 2024-10-21 PROCEDURE — C8929 TTE W OR WO FOL WCON,DOPPLER: HCPCS

## 2024-10-21 PROCEDURE — 80048 BASIC METABOLIC PNL TOTAL CA: CPT

## 2024-10-21 PROCEDURE — 6360000002 HC RX W HCPCS: Performed by: STUDENT IN AN ORGANIZED HEALTH CARE EDUCATION/TRAINING PROGRAM

## 2024-10-21 PROCEDURE — 2580000003 HC RX 258: Performed by: STUDENT IN AN ORGANIZED HEALTH CARE EDUCATION/TRAINING PROGRAM

## 2024-10-21 PROCEDURE — 85025 COMPLETE CBC W/AUTO DIFF WBC: CPT

## 2024-10-21 PROCEDURE — 6360000004 HC RX CONTRAST MEDICATION: Performed by: INTERNAL MEDICINE

## 2024-10-21 PROCEDURE — 83880 ASSAY OF NATRIURETIC PEPTIDE: CPT

## 2024-10-21 PROCEDURE — 1200000000 HC SEMI PRIVATE

## 2024-10-21 PROCEDURE — 2580000003 HC RX 258: Performed by: INTERNAL MEDICINE

## 2024-10-21 PROCEDURE — 6370000000 HC RX 637 (ALT 250 FOR IP): Performed by: NURSE PRACTITIONER

## 2024-10-21 PROCEDURE — 6360000002 HC RX W HCPCS: Performed by: INTERNAL MEDICINE

## 2024-10-21 PROCEDURE — 93306 TTE W/DOPPLER COMPLETE: CPT | Performed by: INTERNAL MEDICINE

## 2024-10-21 PROCEDURE — 83735 ASSAY OF MAGNESIUM: CPT

## 2024-10-21 PROCEDURE — 36415 COLL VENOUS BLD VENIPUNCTURE: CPT

## 2024-10-21 RX ORDER — TORSEMIDE 20 MG/1
40 TABLET ORAL DAILY
Status: DISCONTINUED | OUTPATIENT
Start: 2024-10-22 | End: 2024-10-22 | Stop reason: HOSPADM

## 2024-10-21 RX ORDER — SPIRONOLACTONE 25 MG/1
25 TABLET ORAL DAILY
Status: DISCONTINUED | OUTPATIENT
Start: 2024-10-21 | End: 2024-10-22 | Stop reason: HOSPADM

## 2024-10-21 RX ADMIN — SODIUM CHLORIDE 100 MG: 9 INJECTION, SOLUTION INTRAVENOUS at 19:50

## 2024-10-21 RX ADMIN — FUROSEMIDE 40 MG: 10 INJECTION, SOLUTION INTRAMUSCULAR; INTRAVENOUS at 17:01

## 2024-10-21 RX ADMIN — LISINOPRIL 20 MG: 20 TABLET ORAL at 07:52

## 2024-10-21 RX ADMIN — SPIRONOLACTONE 25 MG: 25 TABLET ORAL at 14:32

## 2024-10-21 RX ADMIN — ACETAMINOPHEN 650 MG: 325 TABLET ORAL at 04:42

## 2024-10-21 RX ADMIN — PREGABALIN 75 MG: 75 CAPSULE ORAL at 20:38

## 2024-10-21 RX ADMIN — ACETAMINOPHEN 650 MG: 325 TABLET ORAL at 17:01

## 2024-10-21 RX ADMIN — PREGABALIN 75 MG: 75 CAPSULE ORAL at 07:52

## 2024-10-21 RX ADMIN — THERA TABS 1 TABLET: TAB at 07:52

## 2024-10-21 RX ADMIN — SODIUM CHLORIDE, PRESERVATIVE FREE 10 ML: 5 INJECTION INTRAVENOUS at 07:52

## 2024-10-21 RX ADMIN — ENOXAPARIN SODIUM 60 MG: 100 INJECTION SUBCUTANEOUS at 20:37

## 2024-10-21 RX ADMIN — PREGABALIN 75 MG: 75 CAPSULE ORAL at 14:33

## 2024-10-21 RX ADMIN — AMLODIPINE BESYLATE 5 MG: 5 TABLET ORAL at 07:52

## 2024-10-21 RX ADMIN — PANTOPRAZOLE SODIUM 40 MG: 40 TABLET, DELAYED RELEASE ORAL at 07:52

## 2024-10-21 RX ADMIN — PERFLUTREN 1.5 ML: 6.52 INJECTION, SUSPENSION INTRAVENOUS at 10:18

## 2024-10-21 RX ADMIN — ENOXAPARIN SODIUM 60 MG: 100 INJECTION SUBCUTANEOUS at 07:51

## 2024-10-21 RX ADMIN — GUAIFENESIN SYRUP AND DEXTROMETHORPHAN 5 ML: 100; 10 SYRUP ORAL at 17:06

## 2024-10-21 RX ADMIN — ROPINIROLE HYDROCHLORIDE 3 MG: 2 TABLET, FILM COATED ORAL at 14:32

## 2024-10-21 RX ADMIN — FUROSEMIDE 40 MG: 10 INJECTION, SOLUTION INTRAMUSCULAR; INTRAVENOUS at 07:52

## 2024-10-21 RX ADMIN — ROPINIROLE HYDROCHLORIDE 3 MG: 2 TABLET, FILM COATED ORAL at 07:52

## 2024-10-21 RX ADMIN — CETIRIZINE HYDROCHLORIDE 10 MG: 10 TABLET, FILM COATED ORAL at 07:52

## 2024-10-21 RX ADMIN — ATORVASTATIN CALCIUM 40 MG: 40 TABLET, FILM COATED ORAL at 07:52

## 2024-10-21 RX ADMIN — GUAIFENESIN SYRUP AND DEXTROMETHORPHAN 5 ML: 100; 10 SYRUP ORAL at 04:43

## 2024-10-21 RX ADMIN — INSULIN LISPRO 1 UNITS: 100 INJECTION, SOLUTION INTRAVENOUS; SUBCUTANEOUS at 20:37

## 2024-10-21 RX ADMIN — ROPINIROLE HYDROCHLORIDE 3 MG: 2 TABLET, FILM COATED ORAL at 20:38

## 2024-10-21 RX ADMIN — SODIUM CHLORIDE, PRESERVATIVE FREE 10 ML: 5 INJECTION INTRAVENOUS at 19:46

## 2024-10-21 RX ADMIN — INSULIN LISPRO 1 UNITS: 100 INJECTION, SOLUTION INTRAVENOUS; SUBCUTANEOUS at 12:55

## 2024-10-21 ASSESSMENT — PAIN SCALES - GENERAL: PAINLEVEL_OUTOF10: 5

## 2024-10-21 ASSESSMENT — PAIN DESCRIPTION - ORIENTATION: ORIENTATION: RIGHT;LEFT

## 2024-10-21 ASSESSMENT — PAIN DESCRIPTION - LOCATION: LOCATION: LEG

## 2024-10-21 ASSESSMENT — PAIN DESCRIPTION - DESCRIPTORS: DESCRIPTORS: THROBBING;BURNING

## 2024-10-21 NOTE — DISCHARGE INSTR - COC
8\")   Wt (!) 177 kg (390 lb 3.2 oz)   SpO2 93%   BMI 59.33 kg/m²     Last documented pain score (0-10 scale): Pain Level: 5  Last Weight:   Wt Readings from Last 1 Encounters:   10/21/24 (!) 177 kg (390 lb 3.2 oz)     Mental Status:  {IP PT MENTAL STATUS:23748}    IV Access:  { TRACY IV ACCESS:407970821}    Nursing Mobility/ADLs:  Walking   {CHP DME ADLs:380728529}  Transfer  {CHP DME ADLs:740206368}  Bathing  {CHP DME ADLs:488200764}  Dressing  {CHP DME ADLs:738577165}  Toileting  {CHP DME ADLs:487113766}  Feeding  {CHP DME ADLs:510661150}  Med Admin  {CHP DME ADLs:805111438}  Med Delivery   { TRACY MED Delivery:520342427}    Wound Care Documentation and Therapy:  Incision 07/14/21 Knee Anterior;Left (Active)   Number of days: 1195       Incision 10/26/21 Knee Anterior;Right (Active)   Number of days: 1091        Elimination:  Continence:   Bowel: {YES / NO:19727}  Bladder: {YES / NO:19727}  Urinary Catheter: {Urinary Catheter:820314256}   Colostomy/Ileostomy/Ileal Conduit: {YES / NO:19727}       Date of Last BM: ***    Intake/Output Summary (Last 24 hours) at 10/21/2024 1051  Last data filed at 10/21/2024 1043  Gross per 24 hour   Intake 948 ml   Output 3350 ml   Net -2402 ml     I/O last 3 completed shifts:  In: 830 [P.O.:830]  Out: 5650 [Urine:5650]    Safety Concerns:     { TRACY Safety Concerns:553559912}    Impairments/Disabilities:      { TRACY Impairments/Disabilities:911725701}    Nutrition Therapy:  Current Nutrition Therapy:   { TRACY Diet List:022386362}    Routes of Feeding: {CHP DME Other Feedings:979298426}  Liquids: {Slp liquid thickness:28170}  Daily Fluid Restriction: {CHP DME Yes amt example:787209736}  Last Modified Barium Swallow with Video (Video Swallowing Test): {Done Not Done Date:327432007}    Treatments at the Time of Hospital Discharge:   Respiratory Treatments: ***  Oxygen Therapy:  {Therapy; copd oxygen:45463}  Ventilator:    { CC Vent List:023007294}    Rehab Therapies:

## 2024-10-21 NOTE — DISCHARGE INSTRUCTIONS
Podiatry Wound Care Discharge Instructions  Please perform every day dressing changes to bilateral lower extremity as follows:    -Apply betadine and/or super absorptive layer to the wound on the bilateral lower extremity  -Next apply gauze to the top of the bilateral foot, ankle, and leg. This step is critical as kerlix is abrasive and will rub wounds at the front of the ankle   -Next loosely wrap the bilateral lower extremity with Kerlix starting from just in front of the toes and ending just below knee. Kerlix should be rolled on with absolutely no compression  -Next gently wrap the bilateral foot with 4 inch Ace bandage starting from just in front of the toes and ending just above the ankle  -Next gently wrap the bilateral leg with 6 inch Ace bandage starting from just above the ankle and ending just below the right knee    Please follow-up with Dr. Bhavana Hernandez DPM for continued wound care          Extra Heart Failure Education/ Tools/ Resources:     https://Undesk.Entrenarme/publication/?u=571211   --- this is American Heart Association interactive Healthier Living with Heart Failure guidebook.  Please click hyperlink or copy / paste link into search bar. The QR Code is also available below. Use your mouse to scroll through the pages.  Lots of information about weight monitoring, diet tips, activity, meds, etc    Heart Failure Tools and Resources QR Code is below. It includes multiple resources to include symptom tracker, med tracker, further HF info, and access to a HF Support Network online Community    HF [place] [employer]  -- this is a free smart phone linus available for iPhone and Android download.  Use your phone to track sodium / fluid intake, zone tool symptom tracking, weights, medications, etc. Click on this hyperlink  HF [place] [employer]   for QR code for easy download or the link is also found in the below HF Tools and Resources.      DASH (Dietary Approach to Stop Hypertension) diet --

## 2024-10-22 VITALS
BODY MASS INDEX: 47.74 KG/M2 | RESPIRATION RATE: 18 BRPM | HEART RATE: 80 BPM | OXYGEN SATURATION: 94 % | HEIGHT: 68 IN | TEMPERATURE: 98 F | DIASTOLIC BLOOD PRESSURE: 79 MMHG | WEIGHT: 315 LBS | SYSTOLIC BLOOD PRESSURE: 157 MMHG

## 2024-10-22 DIAGNOSIS — E11.9 TYPE 2 DIABETES MELLITUS WITHOUT COMPLICATION, WITHOUT LONG-TERM CURRENT USE OF INSULIN (HCC): ICD-10-CM

## 2024-10-22 DIAGNOSIS — J30.1 SEASONAL ALLERGIC RHINITIS DUE TO POLLEN: ICD-10-CM

## 2024-10-22 LAB
ANION GAP SERPL CALCULATED.3IONS-SCNC: 11 MMOL/L (ref 3–16)
BASOPHILS # BLD: 0.1 K/UL (ref 0–0.2)
BASOPHILS NFR BLD: 1 %
BUN SERPL-MCNC: 21 MG/DL (ref 7–20)
CALCIUM SERPL-MCNC: 9 MG/DL (ref 8.3–10.6)
CHLORIDE SERPL-SCNC: 102 MMOL/L (ref 99–110)
CO2 SERPL-SCNC: 27 MMOL/L (ref 21–32)
CREAT SERPL-MCNC: 1 MG/DL (ref 0.8–1.3)
DEPRECATED RDW RBC AUTO: 15.2 % (ref 12.4–15.4)
EOSINOPHIL # BLD: 0.2 K/UL (ref 0–0.6)
EOSINOPHIL NFR BLD: 3.5 %
GFR SERPLBLD CREATININE-BSD FMLA CKD-EPI: 85 ML/MIN/{1.73_M2}
GLUCOSE BLD-MCNC: 135 MG/DL (ref 70–99)
GLUCOSE BLD-MCNC: 193 MG/DL (ref 70–99)
GLUCOSE SERPL-MCNC: 145 MG/DL (ref 70–99)
HCT VFR BLD AUTO: 38.2 % (ref 40.5–52.5)
HGB BLD-MCNC: 12.3 G/DL (ref 13.5–17.5)
LYMPHOCYTES # BLD: 1.1 K/UL (ref 1–5.1)
LYMPHOCYTES NFR BLD: 17.3 %
MAGNESIUM SERPL-MCNC: 2.1 MG/DL (ref 1.8–2.4)
MCH RBC QN AUTO: 31.7 PG (ref 26–34)
MCHC RBC AUTO-ENTMCNC: 32.3 G/DL (ref 31–36)
MCV RBC AUTO: 98.3 FL (ref 80–100)
MONOCYTES # BLD: 0.8 K/UL (ref 0–1.3)
MONOCYTES NFR BLD: 13.2 %
NEUTROPHILS # BLD: 4.2 K/UL (ref 1.7–7.7)
NEUTROPHILS NFR BLD: 65 %
PERFORMED ON: ABNORMAL
PERFORMED ON: ABNORMAL
PLATELET # BLD AUTO: 229 K/UL (ref 135–450)
PMV BLD AUTO: 7.9 FL (ref 5–10.5)
POTASSIUM SERPL-SCNC: 4.2 MMOL/L (ref 3.5–5.1)
RBC # BLD AUTO: 3.89 M/UL (ref 4.2–5.9)
SODIUM SERPL-SCNC: 140 MMOL/L (ref 136–145)
WBC # BLD AUTO: 6.4 K/UL (ref 4–11)

## 2024-10-22 PROCEDURE — 85025 COMPLETE CBC W/AUTO DIFF WBC: CPT

## 2024-10-22 PROCEDURE — 80048 BASIC METABOLIC PNL TOTAL CA: CPT

## 2024-10-22 PROCEDURE — 6370000000 HC RX 637 (ALT 250 FOR IP): Performed by: INTERNAL MEDICINE

## 2024-10-22 PROCEDURE — 36415 COLL VENOUS BLD VENIPUNCTURE: CPT

## 2024-10-22 PROCEDURE — 83735 ASSAY OF MAGNESIUM: CPT

## 2024-10-22 PROCEDURE — 6360000002 HC RX W HCPCS: Performed by: INTERNAL MEDICINE

## 2024-10-22 PROCEDURE — 2580000003 HC RX 258: Performed by: INTERNAL MEDICINE

## 2024-10-22 PROCEDURE — 6370000000 HC RX 637 (ALT 250 FOR IP): Performed by: STUDENT IN AN ORGANIZED HEALTH CARE EDUCATION/TRAINING PROGRAM

## 2024-10-22 RX ORDER — BENZONATATE 100 MG/1
200 CAPSULE ORAL 3 TIMES DAILY
Status: DISCONTINUED | OUTPATIENT
Start: 2024-10-22 | End: 2024-10-22 | Stop reason: HOSPADM

## 2024-10-22 RX ORDER — BENZONATATE 200 MG/1
200 CAPSULE ORAL 3 TIMES DAILY PRN
Qty: 21 CAPSULE | Refills: 0 | Status: SHIPPED | OUTPATIENT
Start: 2024-10-22 | End: 2024-10-29

## 2024-10-22 RX ORDER — AMLODIPINE BESYLATE 5 MG/1
5 TABLET ORAL DAILY
Qty: 30 TABLET | Refills: 3 | Status: SHIPPED | OUTPATIENT
Start: 2024-10-23

## 2024-10-22 RX ORDER — SPIRONOLACTONE 25 MG/1
25 TABLET ORAL DAILY
Qty: 30 TABLET | Refills: 3 | Status: SHIPPED | OUTPATIENT
Start: 2024-10-23

## 2024-10-22 RX ORDER — GUAIFENESIN/DEXTROMETHORPHAN 100-10MG/5
5 SYRUP ORAL EVERY 4 HOURS PRN
Qty: 120 ML | Refills: 0 | Status: SHIPPED | OUTPATIENT
Start: 2024-10-22 | End: 2024-11-01

## 2024-10-22 RX ORDER — LISINOPRIL 20 MG/1
20 TABLET ORAL DAILY
Qty: 30 TABLET | Refills: 3 | Status: SHIPPED | OUTPATIENT
Start: 2024-10-23

## 2024-10-22 RX ADMIN — TORSEMIDE 40 MG: 20 TABLET ORAL at 08:43

## 2024-10-22 RX ADMIN — SPIRONOLACTONE 25 MG: 25 TABLET ORAL at 08:43

## 2024-10-22 RX ADMIN — PREGABALIN 75 MG: 75 CAPSULE ORAL at 08:43

## 2024-10-22 RX ADMIN — PANTOPRAZOLE SODIUM 40 MG: 40 TABLET, DELAYED RELEASE ORAL at 06:27

## 2024-10-22 RX ADMIN — LISINOPRIL 20 MG: 20 TABLET ORAL at 08:43

## 2024-10-22 RX ADMIN — ENOXAPARIN SODIUM 60 MG: 100 INJECTION SUBCUTANEOUS at 08:44

## 2024-10-22 RX ADMIN — ATORVASTATIN CALCIUM 40 MG: 40 TABLET, FILM COATED ORAL at 08:43

## 2024-10-22 RX ADMIN — AMLODIPINE BESYLATE 5 MG: 5 TABLET ORAL at 08:44

## 2024-10-22 RX ADMIN — THERA TABS 1 TABLET: TAB at 08:43

## 2024-10-22 RX ADMIN — SODIUM CHLORIDE, PRESERVATIVE FREE 10 ML: 5 INJECTION INTRAVENOUS at 08:47

## 2024-10-22 RX ADMIN — ROPINIROLE HYDROCHLORIDE 3 MG: 2 TABLET, FILM COATED ORAL at 08:43

## 2024-10-22 RX ADMIN — CETIRIZINE HYDROCHLORIDE 10 MG: 10 TABLET, FILM COATED ORAL at 08:43

## 2024-10-22 NOTE — DISCHARGE SUMMARY
added Norvasc     Elevated troponin secondary to demand ischemia:  Optimize coronary disease risk factors, control blood pressure diabetes and lipids  Cardiology saw patient felt to be secondary to demand ischemia     Diabetes mellitus type 2 without long-term use of insulin complicated by peripheral neuropathy:  Continue semaglutide and metformin  Added Jardiance at diabetic dose 25 mg daily     Hyperlipidemia:  Mixed hypertriglyceridemia  Continue statin at home     GERD:  PPI     Nonalcoholic fatty liver disease:  Monitor LFTs     Restless leg syndrome:  Continue home Requip     Chronic bilateral venous stasis with leg ulcers:  Continue management outpatient     Medical noncompliance:  Patient anticipates being more compliant.     Morbid obesity, history of sleeve gastrectomy   TOM with noncompliance of CPAP  Patient has been noncompliant with his CPAP because he is just uncomfortable.  But he thinks he is going to do a better job going forward.  He  BMI 60.6  Counseled on weight loss      The patient expressed appropriate understanding of, and agreement with the discharge recommendations, medications, and plan.     Consults this admission:  IP CONSULT TO HEART FAILURE NURSE/COORDINATOR  IP CONSULT TO CARDIOLOGY  IP CONSULT TO PODIATRY    Discharge Diagnosis:   Acute diastolic CHF (congestive heart failure) (HCC)    Hypertension with hypertension emergency resolved  Diabetes mellitus type 2  Hyperlipidemia  GERD  Chronic bilateral venous stasis ulcers  Restless leg syndrome  Morbid obesity  TOM  History of medical noncompliance    Discharge Instruction:   Follow up appointments: Follow-up cardiology per them  Primary care physician: Rachel Lynch, APRN - CNP within 2 weeks  Diet: diabetic diet   Activity: activity as tolerated  Disposition: Discharged to:   Home  Condition on discharge: Stable  Labs and Tests to be Followed up as an outpatient by PCP or Specialist:     Discharge Medications:

## 2024-10-22 NOTE — CARE COORDINATION
Case Management Assessment            Discharge Note                    Date / Time of Note: 10/22/2024 11:05 AM                  Discharge Note Completed by: Herminia So RN    Patient Name: Dannie Dan   YOB: 1960  Diagnosis: Acute pulmonary edema [J81.0]  Acute diastolic CHF (congestive heart failure) (HCC) [I50.31]  Acute on chronic congestive heart failure, unspecified heart failure type (HCC) [I50.9]   Date / Time: 10/19/2024 11:11 AM    Current PCP: Rachel Lynch, APRN - CNP  Clinic patient: No    Hospitalization in the last 30 days: No       Advance Directives:  Code Status: Full Code  [place] DNR form completed and on chart: No    Financial:  Payor: MEDICARE / Plan: MEDICARE PART A / Product Type: *No Product type* /      Pharmacy:    [id number] - [place], [place] - [address] - P [phone] - F [phone] [address]  [facility] [address]  Phone: [phone] Fax: [phone]    [facility]  [facility] [address]  [facility] [address]        Assistance purchasing medications?: Potential Assistance Purchasing Medications: Yes  Assistance provided by Case Management: None at this time    Does patient want to participate in local refill/ meds to beds program?: Yes    Meds To Beds General Rules:  1. Can ONLY be done Monday- Friday between 8:30am-5pm  2. Prescription(s) must be in pharmacy by 3pm to be filled same day  3.Copy of patient's insurance/ prescription drug card and patient face sheet must be sent along with the prescription(s)  4. Cost of Rx cannot be added to hospital bill. If financial assistance is needed, please contact unit  or ;  or  CANNOT provide pharmacy voucher for patients co-pays  5. Patients can then  the prescription on their way out of the hospital at discharge, or pharmacy can deliver to the bedside if staff is available. 
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     CM  following for  assistance  with  dc planning needs .     Electronically signed by Herminia So RN on 10/21/2024 at 9:20 AM       Herminia So RN Case Manager  [facility] [address]  [place] [id number] [phone]  Fax [phone]     
RN  Case Management Department  Ph: [phone]

## 2024-10-22 NOTE — PLAN OF CARE
Problem: Cardiovascular - Adult  Goal: Maintains optimal cardiac output and hemodynamic stability  Outcome: Progressing    BP WNL, adequate urine output, pt on IV lasix. Will continue to monitor.     Problem: Metabolic/Fluid and Electrolytes - Adult  Goal: Electrolytes maintained within normal limits  Outcome: Progressing    Pt. On IV lasix, latest K is 3.7. Will monitor renal panel in the morning.      Problem: Metabolic/Fluid and Electrolytes - Adult  Goal: Glucose maintained within prescribed range  Outcome: Progressing     Lispro 1 unit SQ given as ordered, pt consumed 100% of his dinner. Will continue to monitor.     
  Problem: Chronic Conditions and Co-morbidities  Goal: Patient's chronic conditions and co-morbidity symptoms are monitored and maintained or improved  10/20/2024 0232 by Snoi Baltazar, RN  Outcome: Progressing     Problem: Discharge Planning  Goal: Discharge to home or other facility with appropriate resources  Outcome: Progressing  Flowsheets (Taken 10/20/2024 0232)  Discharge to home or other facility with appropriate resources:   Identify barriers to discharge with patient and caregiver   Identify discharge learning needs (meds, wound care, etc)     Problem: ABCDS Injury Assessment  Goal: Absence of physical injury  Outcome: Progressing     Problem: Safety - Adult  Goal: Free from fall injury  10/20/2024 0232 by Soni Baltazar, RN  Outcome: Progressing  Flowsheets (Taken 10/20/2024 0232)  Free From Fall Injury: Instruct family/caregiver on patient safety     Problem: Pain  Goal: Verbalizes/displays adequate comfort level or baseline comfort level  Flowsheets (Taken 10/20/2024 0232)  Verbalizes/displays adequate comfort level or baseline comfort level:   Encourage patient to monitor pain and request assistance   Assess pain using appropriate pain scale     
  Problem: Chronic Conditions and Co-morbidities  Goal: Patient's chronic conditions and co-morbidity symptoms are monitored and maintained or improved  10/21/2024 0251 by Homero Magaña, RN  Outcome: Progressing     Problem: Discharge Planning  Goal: Discharge to home or other facility with appropriate resources  Outcome: Progressing     Problem: Pain  Goal: Verbalizes/displays adequate comfort level or baseline comfort level  10/21/2024 0251 by Homero Magaña, RN  Outcome: Progressing     Problem: ABCDS Injury Assessment  Goal: Absence of physical injury  10/21/2024 0251 by Homero Magaña, RN  Outcome: Progressing     Problem: Safety - Adult  Goal: Free from fall injury  10/21/2024 0251 by Homero Magaña, RN  Outcome: Progressing     
  Problem: Chronic Conditions and Co-morbidities  Goal: Patient's chronic conditions and co-morbidity symptoms are monitored and maintained or improved  10/21/2024 1130 by Kimberly Finn RN  Outcome: Progressing     Problem: Discharge Planning  Goal: Discharge to home or other facility with appropriate resources  10/21/2024 1130 by Kimberly Finn RN  Outcome: Progressing     Problem: Pain  Goal: Verbalizes/displays adequate comfort level or baseline comfort level  10/21/2024 1130 by Kimberly Finn RN  Flowsheets (Taken 10/21/2024 1130)  Verbalizes/displays adequate comfort level or baseline comfort level:   Encourage patient to monitor pain and request assistance   Assess pain using appropriate pain scale   Administer analgesics based on type and severity of pain and evaluate response  Note: Pain has intermittent pain to Bilateral lower extremities.     Problem: ABCDS Injury Assessment  Goal: Absence of physical injury  10/21/2024 1130 by Kimberly Finn RN  Outcome: Progressing     Problem: Safety - Adult  Goal: Free from fall injury  10/21/2024 1130 by Kimberly Finn RN  Outcome: Progressing     
  Problem: Chronic Conditions and Co-morbidities  Goal: Patient's chronic conditions and co-morbidity symptoms are monitored and maintained or improved  Outcome: Progressing  Flowsheets (Taken 10/19/2024 1765)  Care Plan - Patient's Chronic Conditions and Co-Morbidity Symptoms are Monitored and Maintained or Improved:   Monitor and assess patient's chronic conditions and comorbid symptoms for stability, deterioration, or improvement   Collaborate with multidisciplinary team to address chronic and comorbid conditions and prevent exacerbation or deterioration  Note: Patient receiving IV lasix fo CHF. Other issues being addressed in house, podiatry consult for leg wounds.     Problem: Safety - Adult  Goal: Free from fall injury  Outcome: Progressing     
  Problem: Chronic Conditions and Co-morbidities  Goal: Patient's chronic conditions and co-morbidity symptoms are monitored and maintained or improved  Outcome: Progressing  Flowsheets (Taken 10/20/2024 1958)  Care Plan - Patient's Chronic Conditions and Co-Morbidity Symptoms are Monitored and Maintained or Improved: Monitor and assess patient's chronic conditions and comorbid symptoms for stability, deterioration, or improvement  Note: Patient receiving IV lasix for diuresis.     Problem: Pain  Goal: Verbalizes/displays adequate comfort level or baseline comfort level  Outcome: Progressing     Problem: ABCDS Injury Assessment  Goal: Absence of physical injury  Outcome: Progressing     Problem: Safety - Adult  Goal: Free from fall injury  Outcome: Progressing     
maintained:   Monitor labs and assess for signs and symptoms of volume excess or deficit   Monitor intake, output and patient weight   Monitor urine specific gravity, serum osmolarity and serum sodium as indicated or ordered  Goal: Glucose maintained within prescribed range  Outcome: Adequate for Discharge  Flowsheets (Taken 10/22/2024 0412 by Dez Pelletier RN)  Glucose maintained within prescribed range:   Monitor blood glucose as ordered   Assess for signs and symptoms of hyperglycemia and hypoglycemia   Administer ordered medications to maintain glucose within target range     Problem: Respiratory - Adult  Goal: Achieves optimal ventilation and oxygenation  Outcome: Adequate for Discharge     Problem: Skin/Tissue Integrity - Adult  Goal: Skin integrity remains intact  Outcome: Adequate for Discharge

## 2024-10-22 NOTE — PROGRESS NOTES
V2.0    [facility] [place]      Name:  Dannie Dan /Age/Sex: 1960  (64 y.o. male)   MRN & CSN:  [id number] & [id number] Encounter Date/Time: 10/21/2024 7:37 PM EDT   Location:  6304/6304-01 PCP: Rachel Lynch, APRN - CNP     Attending:Marko Pelletier*       Hospital Day: 3    -64-year-old male presenting with shortness of breath and admitted for presumptive heart failure exacerbation.  Symptomatically improving on IV Lasix.  Cardiology consulted.  Currently awaiting TTE read.  Anticipate 1 or 2 more days of IV diuretics before discharging home.  Unsure as to dry weight    Assessment and Recommendations     This is a very pleasant 64 y.o. male with a history of hypertension, diabetes mellitus, hyperlipidemia, morbid obesity (s/p Sleeve Gastrectomy) with obstructive sleep apnea, as well as GERD and chronic bilateral leg ulcers, MRSA nasal colonization, noncompliant with medications, who presented to the ED with shortness of breath.      He has had progressive shortness of breath over the past several weeks.  He works as a , and has noticed that he gets increasingly shortness of breath especially with exertion, to the point where he cannot move several steps to his bathroom without getting significantly short of breath.  He describes associated orthopnea and PND.  He has had associated cough. He has had progressively worsening lower extremity edema as well, although hard to tell fully as he has bilateral venous stasis, with venous stasis ulcers, he has noted increased tightening around his thigh.      He denies chest pain, calf pain.  He has not had palpitations or syncope.      He was diagnosed with COVID around 11 weeks ago, and initially thought his symptoms may be related to this.  He denies fever or chills.     He has longstanding hypertension, but has not been compliant with his medications for several months, stating he was not able to afford most of them.  He has 
4 Eyes Admission Assessment     I agree as the admission nurse that 2 RN's have performed a thorough Head to Toe Skin Assessment on the patient. ALL assessment sites listed below have been assessed on admission.       Areas assessed by both nurses: [x]   Head, Face, and Ears   [x]   Shoulders, Back, and Chest  [x]   Arms, Elbows, and Hands   [x]   Coccyx, Sacrum, and Ischium  [x]   Legs, Feet, and Heels        Does the Patient have Skin Breakdown?  Yes a wound was noted on the Admission Assessment and an LDA was Initiated documentation include the Edwina-wound, Wound Assessment, Measurements, Dressing Treatment, Drainage, and Color\",     Patient has wounds to bilateral lower extremities. Podiatry consult placed.    Jose Prevention initiated:  Yes   Wound Care Orders initiated:  Yes      [facility] nurse consulted for Pressure Injury (Stage 3,4, Unstageable, DTI, NWPT, and Complex wounds) or Jose score 18 or lower:  NA  Podiatry consult placed for BLE wounds.    Nurse 1 eSignature: Electronically signed by Kimberly Finn RN on 10/19/24 at 5:04 PM EDT    **SHARE this note so that the co-signing nurse is able to place an eSignature**    Nurse 2 eSignature: Electronically signed by Edgard Norris RN on 10/19/24 at 6:35 PM EDT     
[facility]  Cardiology Inpatient Consult Service  Daily Progress Note        Admit Date:  10/19/2024    Referring Physician: Marko Pelletier*      Assessment & Plan:     Shortness of breath-likely multifactorial with chronic diastolic heart failure, morbid obesity, sleep apnea.  Discussed salt and fluid restriction.  Echo shows normal LV function.  Importance of blood pressure control stressed.  Consider stopping IV Lasix and start Demadex 20 mg p.o. daily from tomorrow.  Check daily weights at discharge.  Patient on Lyrica which also can cause volume overload.    Essential hypertension-continue Norvasc and lisinopril    Chronic venous stasis, chronic leg wounds  Obstructive sleep apnea  Personally reviewed and interpreted echocardiogram.  LV does not appear to be dilated.  Preserved LV systolic function    Recommend using CPAP on a regular basis.  Salt and fluid restriction  Continue Norvasc and lisinopril  Stop IV Lasix after p.m. dose today  Demadex 40 mg daily  Aldactone 25 mg daily  BMP in a week  Add Jardiance if affordable.  May need diabetic dosing of Jardiance  BMP in a week  Follow-up in 5 to 7-days  Discharge planning per primary team.    Subjective:   Interval history:  Sitting up in chair.  Feeling better.  Shortness of breath is better.    Medications:   amLODIPine  5 mg Oral Daily    lisinopril  20 mg Oral Daily    iron sucrose  100 mg IntraVENous Q24H    atorvastatin  40 mg Oral Daily    cetirizine  10 mg Oral Daily    multivitamin  1 tablet Oral Daily    miconazole   Topical BID    pantoprazole  40 mg Oral QAM AC    pregabalin  75 mg Oral TID    rOPINIRole  3 mg Oral TID    sodium chloride flush  5-40 mL IntraVENous 2 times per day    enoxaparin  60 mg SubCUTAneous BID    furosemide  40 mg IntraVENous BID    insulin lispro  0-4 Units SubCUTAneous 4x Daily AC & HS       IV drips:   sodium chloride 25 mL/hr at 10/20/24 2117    dextrose   
Patient arrived to room 6304 from ER. A/Ox4. Ambulates independently with cane. Oriented to room. Call light education provided. Bed locked, non slip socks placed on, skin assessed. Has wounds to bilateral lower extremities, podiatry consult placed. Tele applied. Vitals are stable. Patient denies pain.  
Patient's IV and tele removed prior to discharge. AVS instructions discussed with patient and all questions answered. New medications picked up from pharmacy. Heart failure follow up appt scheduled and patient made aware. Patient aware to get follow up labs done as well as call wound care. Pt discharged with all belongings. Wife drove patient home.  
Podiatric Surgery Daily Progress Note  Dannie Dan      Subjective :   Patient seen and examined this am at the bedside. Patient denies any acute overnight events.  Patient states his legs are starting to feel better and appear to be less swollen.  Patient denies N/V/F/C/SOB. Patient denies calf pain, thigh pain, chest pain.     Review of Systems: A 12 point review of symptoms is unremarkable with the exception of the chief complaint. Patient specifically denies nausea, fever, vomiting, chills, shortness of breath, chest pain, abdominal pain, constipation or difficulty urinating.       Objective     /72   Pulse 80   Temp 98 °F (36.7 °C) (Oral)   Resp 18   Ht 1.727 m (5' 8\")   Wt (!) 174.6 kg (385 lb)   SpO2 94%   BMI 58.54 kg/m²      I/O:  Intake/Output Summary (Last 24 hours) at 10/22/2024 0825  Last data filed at 10/22/2024 0700  Gross per 24 hour   Intake 728 ml   Output 4425 ml   Net -3697 ml              Wt Readings from Last 3 Encounters:   10/22/24 (!) 174.6 kg (385 lb)   10/21/24 (!) 176.9 kg (390 lb)   02/07/24 (!) 160 kg (352 lb 12.8 oz)       LABS:    Recent Labs     10/21/24  0420 10/22/24  0344   WBC 5.9 6.4   HGB 13.1* 12.3*   HCT 39.9* 38.2*    229        Recent Labs     10/22/24  0344      K 4.2      CO2 27   BUN 21*   CREATININE 1.0      No results for input(s): \"INR\", \"APTT\" in the last 72 hours.    Invalid input(s): \"PROT\"        LOWER EXTREMITY EXAMINATION    Dressing to bl LE intact. No strikethrough noted to the external dressing. Minimal drainage noted to the internal layers of the dressing.     VASCULAR: DP and PT pulses are weakly palpable to/4 b/l. CFT is brisk to the digits of the foot b/l. Skin temperature is warm to cool from proximal to distal with no focal calor noted.  Significant nonpitting edema noted bilateral consistent with chronic lymphedema. No pain with calf compression b/l.     NEUROLOGIC: Gross and epicritic sensation is intact b/l. 
[facility]  Cardiology Inpatient Consult Service  Daily Progress Note        Admit Date:  10/19/2024    Referring Physician: Bennett Reardon MD      Assessment & Plan:     Shortness of breath-likely multifactorial with chronic diastolic heart failure, morbid obesity, sleep apnea.  Discussed salt and fluid restriction.  Echo shows normal LV function.  Importance of blood pressure control stressed.  Switch to p.o. diuretics.  Continue torsemide.  Currently on 40 mg daily.  Monitor volume status.  Advised to check daily weights.      Essential hypertension-continue Norvasc and lisinopril.  SBP was 108 mmHg last night.    Chronic venous stasis, chronic leg wounds  Obstructive sleep apnea  Personally reviewed and interpreted echocardiogram.  LV does not appear to be dilated.  Preserved LV systolic function    Recommend using CPAP on a regular basis.  Salt and fluid restriction  Start Jardiance 25 mg daily.  Discussed with hospitalist team  Follow-up in 5 to 7-days  Discharge planning per primary team.    Subjective:   Interval history:  Sitting up in chair.  Feeling better.  Shortness of breath is better.  Has leg wounds.    Medications:   torsemide  40 mg Oral Daily    spironolactone  25 mg Oral Daily    amLODIPine  5 mg Oral Daily    lisinopril  20 mg Oral Daily    iron sucrose  100 mg IntraVENous Q24H    atorvastatin  40 mg Oral Daily    cetirizine  10 mg Oral Daily    multivitamin  1 tablet Oral Daily    miconazole   Topical BID    pantoprazole  40 mg Oral QAM AC    pregabalin  75 mg Oral TID    rOPINIRole  3 mg Oral TID    sodium chloride flush  5-40 mL IntraVENous 2 times per day    enoxaparin  60 mg SubCUTAneous BID    insulin lispro  0-4 Units SubCUTAneous 4x Daily AC & HS       IV drips:   sodium chloride 25 mL/hr at 10/20/24 2117    dextrose         PRN:  guaiFENesin-dextromethorphan, sodium chloride flush, sodium chloride, ondansetron **OR** ondansetron, polyethylene 
  DERMATOLOGIC:   Patient provided verbal consent for photos to be taken today 10/21/24    Right lower extremity:    Macerated tissue noted along the lateral aspect of the lower extremity however do not believe it to be an acutely open wound.  No evidence of purulence, fluctuance, crepitations.  Circumferential venous stasis changes around the lower extremity.  No pedal abnormalities.  Otherwise, closed soft tissue envelope is appreciated without acute signs of infection.     Left lower extremity:    Area of macerated tissue along the lateral aspect of the leg with evidence of previously open wounds along the anterior shin.  Do not believe the area of maceration to be an open wound however no evidence of purulence, fluctuance, or crepitations.  Wound does not probe to bone, tunnel, or track.  Circumferential chronic venous stasis changes around the left lower extremity.  No pedal abnormalities at this time.  Otherwise, closed soft tissue envelope is appreciated without acute signs infection.        MUSCULOSKELETAL: Muscle strength is 5/5 for all pedal groups tested.  Pain with palpation manipulation of the right lower extremity. Ankle joint ROM is decreased in dorsiflexion with the knee extended. No obvious biomechanical abnormalities.      IMAGING:  None     ASSESSMENT/PLAN:   Chronic venous stasis, bilateral lower extremities  2.  Macerated lesions, bilateral lower extremities  3.  Leg pain, right     -Patient seen and examined at bedside this a.m.  -Hypertensive otherwise VSS, no leukocytosis noted (WBC 5.9)  -HbA1c pending (most recent 6.1 11/27/2023)  -Wound culture not obtained at this time 2/2 no active drainage.  -No antibiotics warranted from a podiatric standpoint 2/2 no acute signs of infection  -Bilateral lower extremities dressed with Betadine soaked gauze, dry gauze, ABD pads, Kerlix, and Ace  -No weightbearing restrictions from podiatric standpoint     DISPO: Chronic venous stasis with associated 
diagnosis of heart failure although he is on Lasix, and does not appear to have had any recent cardiac workup apart from a negative stress test in 2011 done as part of preoperative risk stratification for his bariatric surgery.  He had another negative nuclear stress test in 2020, done as part of restratification for bilateral knee replacement surgeries.     He has a previous tobacco abuse history, with a 50-pack-year smoking history, he quit about 18 years ago.     He has bilateral venous stasis, with associated venous stasis ulcers which have been managed by his wife with wound dressings at home, and they state they were doing okay until she could not do it anymore due to an injury/surgery she had.     In the ED, he was hypertensive with systolic blood pressure in the 160s to 200s.  Weight was 398 lbs.      Lab work showed normal BMP, with creatinine 0.8 potassium 5, blood sugar 157, NT proBNP 1121.  High-sensitivity troponin was 26, 20.  CBC was fairly normal as well with hemoglobin 12.6.     EKG showed normal sinus rhythm.      Chest x-ray showed cardiomegaly with vascular congestion.     Patient was given intravenous Lasix in the ED    Plan:     Continue IV Lasix 40mg BID daily.   Strict I/Os  Daily weigt  Provide Venofer  BMP in the am        Diet ADULT DIET; Regular; 4 carb choices (60 gm/meal); No Added Salt (3-4 gm)   DVT Prophylaxis [x] Lovenox, []  Heparin, [] SCDs, [] Ambulation,  [] Eliquis, [] Xarelto  [] Coumadin   Code Status Full Code   Disposition From: Home  Expected Disposition: Home  Estimated Date of Discharge: 1-2 days  Patient requires continued admission due to IV diuresis   Surrogate Decision Maker/ POA  On file     Personally reviewed Lab Studies and Imaging     Discussed management of the case with case management. I reviewed        Subjective:     Patient seen and evaluated at chairside. Shortness of breath greatly improved.       Review of Systems:      Pertinent positives and

## 2024-10-23 ENCOUNTER — TELEPHONE (OUTPATIENT)
Dept: FAMILY MEDICINE CLINIC | Age: 64
End: 2024-10-23

## 2024-10-23 ENCOUNTER — CARE COORDINATION (OUTPATIENT)
Dept: OTHER | Facility: CLINIC | Age: 64
End: 2024-10-23

## 2024-10-23 RX ORDER — LANCETS 28 GAUGE
EACH MISCELLANEOUS
Qty: 100 EACH | Refills: 0 | Status: SHIPPED | OUTPATIENT
Start: 2024-10-23

## 2024-10-23 RX ORDER — POTASSIUM CHLORIDE 1500 MG/1
20 TABLET, EXTENDED RELEASE ORAL 2 TIMES DAILY
Qty: 180 TABLET | Refills: 1 | Status: SHIPPED | OUTPATIENT
Start: 2024-10-23

## 2024-10-23 RX ORDER — PEN NEEDLE, DIABETIC 31 G X1/4"
1 NEEDLE, DISPOSABLE MISCELLANEOUS DAILY
Qty: 100 EACH | Refills: 3 | Status: SHIPPED | OUTPATIENT
Start: 2024-10-23

## 2024-10-23 RX ORDER — LORATADINE 10 MG/1
10 TABLET ORAL DAILY
Qty: 90 TABLET | Refills: 1 | Status: SHIPPED | OUTPATIENT
Start: 2024-10-23

## 2024-10-23 RX ORDER — ATORVASTATIN CALCIUM 40 MG/1
40 TABLET, FILM COATED ORAL DAILY
Qty: 90 TABLET | Refills: 3 | Status: SHIPPED | OUTPATIENT
Start: 2024-10-23

## 2024-10-23 NOTE — TELEPHONE ENCOUNTER
Requested Prescriptions     Pending Prescriptions Disp Refills    Insulin Pen Needle (MEIJER PEN NEEDLES) 31G X 6 MM MISC 100 each 3     Si each by Does not apply route daily    Prodigy Twist Top Lancets 28G MISC 100 each 0     Sig: TEST TWO TIMES A DAY          Last Office Visit: 2024    Next Office Visit: Visit date not found

## 2024-10-23 NOTE — TELEPHONE ENCOUNTER
Care Transitions Initial Follow Up Call    Outreach made within 2 business days of discharge: Yes    Patient: Dannie Dan Patient : 1960   MRN: [id number]  Reason for Admission:   Discharge Date: 10/22/24       Spoke with: Left message on recorder for patient to call back at  [phone]      Discharge department/facility:     [facility] Interactive Patient Contact:  Was patient able to fill all prescriptions:   Was patient instructed to bring all medications to the follow-up visit:   Is patient taking all medications as directed in the discharge summary?   Does patient understand their discharge instructions:   Does patient have questions or concerns that need addressed prior to 7-14 day follow up office visit:     Additional needs identified to be addressed with provider               Scheduled appointment with PCP within 7-14 days    Follow Up  Future Appointments   Date Time Provider Department Center   10/25/2024  2:30 PM Carmella Santos APRN - CNP [facility]   10/29/2024  1:00 PM Bhavana Hernandez DPM [facility]       Cathy Covarrubias MA

## 2024-10-23 NOTE — CARE COORDINATION
Care Transitions Note    Initial Call - Call within 2 business days of discharge: Yes    Attempted to reach patient for transitions of care follow up. Unable to reach patient.    Outreach Attempts:   HIPAA compliant voicemail left for patient.     Patient: Dannie Dan    Patient : 1960   MRN: [id number]    Reason for Admission: Shortness of breath, CHF  Discharge Date: 10/22/24  RURS: Readmission Risk Score: 9.5    Last Discharge Facility       Date Complaint Diagnosis Description Type Department Provider    10/19/24 Shortness of Breath Acute on chronic congestive heart failure, unspecified heart failure type (HCC) ... ED to Hosp-Admission (Discharged) (ADMITTED) [facility] Bennett Reardon MD; Abdifatah,...          Follow-up Information       Follow up With Specialties Details Why Contact Info    Carmella Santos APRN - CNP Cardiology Go on 10/25/2024 hospital follow up [address] [place] [address]  [phone]               Was this an external facility discharge? No    Follow Up Appointment:   Patient has hospital follow up appointment scheduled within 7 days of discharge.    Future Appointments         Provider Specialty Dept Phone    10/25/2024 2:30 PM Carmella Santos APRN - CNP Cardiology [phone]    10/29/2024 1:00 PM Bhavana Hernandez DPM Wound Ostomy [phone]            Plan for follow-up call in 2-5 days     Janeth Perkins RN

## 2024-10-24 ENCOUNTER — CARE COORDINATION (OUTPATIENT)
Dept: OTHER | Facility: CLINIC | Age: 64
End: 2024-10-24

## 2024-10-24 DIAGNOSIS — I50.31 ACUTE DIASTOLIC CHF (CONGESTIVE HEART FAILURE) (HCC): ICD-10-CM

## 2024-10-24 LAB
ANION GAP SERPL CALCULATED.3IONS-SCNC: 16 MMOL/L (ref 3–16)
BUN SERPL-MCNC: 30 MG/DL (ref 7–20)
CALCIUM SERPL-MCNC: 9.8 MG/DL (ref 8.3–10.6)
CHLORIDE SERPL-SCNC: 100 MMOL/L (ref 99–110)
CO2 SERPL-SCNC: 27 MMOL/L (ref 21–32)
CREAT SERPL-MCNC: 1.2 MG/DL (ref 0.8–1.3)
GFR SERPLBLD CREATININE-BSD FMLA CKD-EPI: 68 ML/MIN/{1.73_M2}
GLUCOSE SERPL-MCNC: 141 MG/DL (ref 70–99)
POTASSIUM SERPL-SCNC: 4.5 MMOL/L (ref 3.5–5.1)
SODIUM SERPL-SCNC: 143 MMOL/L (ref 136–145)

## 2024-10-24 NOTE — CARE COORDINATION
Care Transitions Note    Initial Call - Call within 2 business days of discharge: Yes    Patient Current Location:  Home: [address] [place] [address]   [facility] [address]    Care Transition Nurse contacted the patient by telephone to perform post hospital discharge assessment, verified name and  as identifiers. Provided introduction to self, and explanation of the Care Transition Nurse role.     Patient: Dannie Dan    Patient : 1960   MRN: [id number]    Reason for Admission: CHF   Discharge Date: 10/22/24  RURS: Readmission Risk Score: 9.5      Last Discharge Facility       Date Complaint Diagnosis Description Type Department Provider    10/19/24 Shortness of Breath Acute on chronic congestive heart failure, unspecified heart failure type (HCC) ... ED to Hosp-Admission (Discharged) (ADMITTED) [facility] Bennett Reardon MD; Abdifatah...            Was this an external facility discharge? No    Additional needs identified to be addressed with provider   No needs identified             Method of communication with provider: none.    Patients top risk factors for readmission: level of motivation, medical condition-acute on chronic CHF , medication management, and polypharmacy    Interventions to address risk factors:   Education: provided to adhere to medication orders, follow up appts, discussed his talking to his NP about a pulmonology referral to see inspire would be an option for him as he cannot tolerate wearing the CPAP     Care Summary Note: Pt said he is able to walk a little further without becoming short of breath now but he coughs all night which interrupts his sleep and therefore he is tired all the time. Pt is unable to wear the CPAP due to coughing, and nasal congestion he said. He has never really been able to tolerate it. We discussed the fact he doesn't have a pulmonologist and he agreed to discuss this with his NP on his next visit. Pt said he has gotten a lot of phone calls and said someone from

## 2024-10-25 ENCOUNTER — OFFICE VISIT (OUTPATIENT)
Dept: CARDIOLOGY CLINIC | Age: 64
End: 2024-10-25

## 2024-10-25 ENCOUNTER — TELEPHONE (OUTPATIENT)
Dept: CARDIOLOGY CLINIC | Age: 64
End: 2024-10-25

## 2024-10-25 VITALS
SYSTOLIC BLOOD PRESSURE: 112 MMHG | OXYGEN SATURATION: 95 % | DIASTOLIC BLOOD PRESSURE: 76 MMHG | HEART RATE: 88 BPM | BODY MASS INDEX: 56.62 KG/M2 | WEIGHT: 315 LBS

## 2024-10-25 DIAGNOSIS — L97.909 VENOUS STASIS ULCER, UNSPECIFIED SITE, UNSPECIFIED ULCER STAGE, UNSPECIFIED WHETHER VARICOSE VEINS PRESENT (HCC): ICD-10-CM

## 2024-10-25 DIAGNOSIS — I50.32 CHRONIC HEART FAILURE WITH PRESERVED EJECTION FRACTION (HCC): Primary | ICD-10-CM

## 2024-10-25 DIAGNOSIS — I83.009 VENOUS STASIS ULCER, UNSPECIFIED SITE, UNSPECIFIED ULCER STAGE, UNSPECIFIED WHETHER VARICOSE VEINS PRESENT (HCC): ICD-10-CM

## 2024-10-25 DIAGNOSIS — E78.2 MIXED HYPERLIPIDEMIA: ICD-10-CM

## 2024-10-25 DIAGNOSIS — I10 ESSENTIAL HYPERTENSION, BENIGN: Chronic | ICD-10-CM

## 2024-10-25 RX ORDER — TORSEMIDE 20 MG/1
40 TABLET ORAL DAILY
Qty: 60 TABLET | Refills: 5 | Status: SHIPPED | OUTPATIENT
Start: 2024-10-25

## 2024-10-25 NOTE — PROGRESS NOTES
[place] [address]  [phone]    Primary Cardiologist: Dr Gigi BARR HF hospital follow up    HPI:  64 y.o. with HFpEF, HTN, HLD, chronic venous statis with leg wounds, DM and TOM who was recently hospitalized with aHF, hypertensive emergency and elevated troponin (demand ischemia).     Weight is down and LE edema improved. Legs remained wrapped d/t chronic venous statis ulcers. He has occasional LH/dizziness. He has nasal congestion and cough. He has nausea after a lot coughing. He has loose stools.     He denies cp, sob, palpitations, syncope or falls. Weight gain, orthopnea, abdominal bloating or early satiety. No v/d, fever or GI/ bleeding.         Past Medical History:   Diagnosis Date    Bilateral venous leg ulcers 09/2020    Cellulitis of lower extremity 09/25/2019    GERD (gastroesophageal reflux disease)     Impaired fasting glucose 05/2013    MRSA carrier     MRSA nasal colonization 07/14/2021    Osteoarthritis of both knees     Screening PSA (prostate specific antigen) 12/30/2015;5/1/17    Nml:0.53(12/30/2015);5/1/17=nml.    Stasis dermatitis of both legs 08/28/2020    Tobacco use     Tubular adenoma of colon 09/14/2017     Past Surgical History:   Procedure Laterality Date    CIRCUMCISION      COLONOSCOPY  09/14/2017    polypectomy (cold biopsy) :Dr. Walker:next in 3yrs=9/14/2020    KNEE ARTHROSCOPY Left 08/03/2020    VIDEO ARTHROSCOPY LEFT KNEE, PARTIAL MEDIAL MENISCECTOMY, CHONDROPLASTY, SYNOVIAL BIOPSY -TOM=4- performed by Mina Florence MD at [facility] OR    MOUTH SURGERY      PAIN MANAGEMENT PROCEDURE Left 12/09/2020    COOLIEF RADIOFREQUENCY ABLATION - LEFT performed by Johan Harvey MD at [facility]    SLEEVE GASTRECTOMY N/A 03/02/2021    ROBOTIC ASSISTED LAPAROSCOPIC SLEEVE GASTRECTOMY performed by Mich Smith DO at [facility] OR    TOTAL KNEE ARTHROPLASTY Left 07/14/2021    LEFT TOTAL KNEE REPLACEMENT ROBOTIC ASSISTED

## 2024-10-25 NOTE — TELEPHONE ENCOUNTER
Pharmacy called stating that the 40MG dosage of      Torsemide 40 MG TABS [[id number]]    Order Details  Dose, Route, Frequency: As Directed   Dispense Quantity: 60 tablet Refills: 3          Sig: Take 40 mg by mouth daily. May also take 40 mg daily as needed (take additional 40 mg in the afternoon for 3 lb weight gain, increased shortness of breath or increased swelling.).         Start Date: 10/25/24       Only comes in brand name and is inquiring as to whether the prescription can be adjusted to 20MG but 2 tablets in order to fill the generic.

## 2024-10-29 ENCOUNTER — HOSPITAL ENCOUNTER (OUTPATIENT)
Dept: WOUND CARE | Age: 64
Discharge: HOME | End: 2024-10-29
Attending: PODIATRIST
Payer: MEDICARE

## 2024-10-29 VITALS
HEART RATE: 94 BPM | TEMPERATURE: 96.2 F | WEIGHT: 315 LBS | SYSTOLIC BLOOD PRESSURE: 129 MMHG | BODY MASS INDEX: 56.68 KG/M2 | RESPIRATION RATE: 18 BRPM | DIASTOLIC BLOOD PRESSURE: 71 MMHG

## 2024-10-29 DIAGNOSIS — I89.0 LYMPHEDEMA: Primary | ICD-10-CM

## 2024-10-29 DIAGNOSIS — I87.2 PERIPHERAL VENOUS INSUFFICIENCY: ICD-10-CM

## 2024-10-29 PROCEDURE — 99213 OFFICE O/P EST LOW 20 MIN: CPT

## 2024-10-29 PROCEDURE — 29580 STRAPPING UNNA BOOT: CPT

## 2024-10-29 RX ORDER — GENTAMICIN SULFATE 1 MG/G
OINTMENT TOPICAL ONCE
OUTPATIENT
Start: 2024-10-29 | End: 2024-10-29

## 2024-10-29 RX ORDER — LIDOCAINE 50 MG/G
OINTMENT TOPICAL ONCE
OUTPATIENT
Start: 2024-10-29 | End: 2024-10-29

## 2024-10-29 RX ORDER — SILVER SULFADIAZINE 10 MG/G
CREAM TOPICAL ONCE
OUTPATIENT
Start: 2024-10-29 | End: 2024-10-29

## 2024-10-29 RX ORDER — LIDOCAINE HYDROCHLORIDE 40 MG/ML
SOLUTION TOPICAL ONCE
OUTPATIENT
Start: 2024-10-29 | End: 2024-10-29

## 2024-10-29 RX ORDER — LIDOCAINE HYDROCHLORIDE 20 MG/ML
JELLY TOPICAL ONCE
OUTPATIENT
Start: 2024-10-29 | End: 2024-10-29

## 2024-10-29 RX ORDER — BACITRACIN ZINC AND POLYMYXIN B SULFATE 500; 1000 [USP'U]/G; [USP'U]/G
OINTMENT TOPICAL ONCE
OUTPATIENT
Start: 2024-10-29 | End: 2024-10-29

## 2024-10-29 RX ORDER — LIDOCAINE 40 MG/G
CREAM TOPICAL ONCE
OUTPATIENT
Start: 2024-10-29 | End: 2024-10-29

## 2024-10-29 RX ORDER — MUPIROCIN 20 MG/G
OINTMENT TOPICAL ONCE
OUTPATIENT
Start: 2024-10-29 | End: 2024-10-29

## 2024-10-29 RX ORDER — NEOMYCIN/BACITRACIN/POLYMYXINB 3.5-400-5K
OINTMENT (GRAM) TOPICAL ONCE
OUTPATIENT
Start: 2024-10-29 | End: 2024-10-29

## 2024-10-29 RX ORDER — GINSENG 100 MG
CAPSULE ORAL ONCE
OUTPATIENT
Start: 2024-10-29 | End: 2024-10-29

## 2024-10-29 RX ORDER — CLOBETASOL PROPIONATE 0.5 MG/G
OINTMENT TOPICAL ONCE
OUTPATIENT
Start: 2024-10-29 | End: 2024-10-29

## 2024-10-29 RX ORDER — BETAMETHASONE DIPROPIONATE 0.5 MG/G
CREAM TOPICAL ONCE
OUTPATIENT
Start: 2024-10-29 | End: 2024-10-29

## 2024-10-29 RX ORDER — TRIAMCINOLONE ACETONIDE 1 MG/G
OINTMENT TOPICAL ONCE
OUTPATIENT
Start: 2024-10-29 | End: 2024-10-29

## 2024-10-29 RX ORDER — SODIUM CHLOR/HYPOCHLOROUS ACID 0.033 %
SOLUTION, IRRIGATION IRRIGATION ONCE
OUTPATIENT
Start: 2024-10-29 | End: 2024-10-29

## 2024-10-29 NOTE — PROGRESS NOTES
Unna Boot Application   Below Knee    NAME:  Dannie Dan  YOB: 1960  MEDICAL RECORD NUMBER:  [id number]  DATE:  10/29/2024      Applied moisturizing agent to dry skin as needed.   Applied primary and secondary dressing as ordered.  Applied Unna Boot roll from base of toes to just below the knee overlapping each time.  Applied coban from base of toes to just below the knee.   Secured by pressing down on the coban layer and using paper tape as needed.   Instructed patient/caregiver to keep dressing dry and intact. DO NOT REMOVE DRESSING.   Instructed pt/family/caregiver to report excessive draining, loose bandage, wet dressing, severe pain or tingling in toes.  Unna Boot was applied to: [x] Left Lower Leg [x] Right Lower Leg

## 2024-10-29 NOTE — PATIENT INSTRUCTIONS
[facility]  Patient Instructions and Physician Orders  [address]  Telephone: [phone] FAX [phone]    NAME:  Dannie Dan  YOB: 1960  DATE: 10/29/2024       Return Appointment:  Return Appointment: With Bhavana Hernandez DPM  in  1 Week(s)  [] Return Appointment for a Wound Assessment with the nurse on:     Future Appointments   Date Time Provider Department Center   1/24/2025  1:00 PM Carmella Santos APRN - CNP [place]   4/11/2025 11:15 AM Pritesh Yu MD [place]         No orders of the defined types were placed in this encounter.      Home Care Company: n/a    Medically necessary services for evaluation and treatment:   []Skilled Nursing (using clean technique) []PT (Eval & Treat) []OT (Eval & Treat) []Social Work []Dietician []Other:      Wound care instructions:  If you smoke we ask that you refrain from smoking. Smoking inhibits wounds from healing.  When taking antibiotics take the entire prescription as ordered. Do not stop taking until medication is all gone unless otherwise instructed.   Exercise as tolerated.   Keep weight off wounds and reposition every 2 hours if applicable.  Do not get wounds wet in the bath or shower unless otherwise instructed by your physician. If your wound is on your foot or leg, you may purchase a cast bag/cast cover. This may be purchased at any pharmacy or online.  Wash hands with soap and water prior to and after every dressing change.    [x]Wash wounds with: No need to wash. Leave dressing in place.    [x]Edwina wound Topical Treatments: Do not apply lotions, creams, or ointments to the skin around the wound bed unless directed as followed:   Apply around the wound: Nothing           [x] Multilayer Compression Wrap:  Type: Applied on Bilateral lower leg(s)  Calamine Unna boot    Do not get leg(s) with wrap wet.  If wraps become too tight call the center or completely remove the wrap.

## 2024-10-29 NOTE — PROGRESS NOTES
[facility]   Progress Note and Procedure Note      Dannie Dan  MEDICAL RECORD NUMBER:  [id number]  AGE: 64 y.o.   GENDER: male  : 1960  EPISODE DATE:  10/29/2024    Subjective:     Chief Complaint   Patient presents with    Wound Check         HISTORY of PRESENT ILLNESS HPI     Dannie Dan is a 64 y.o. male who presents today for wound/ulcer evaluation.   History of Wound Context: Patient presents today for follow-up of bilateral venous stasis ulcerations.  Wound/Ulcer Pain Timing/Severity: constant  Quality of pain: burning  Severity:  3 / 10   Modifying Factors: None  Associated Signs/Symptoms: edema    Ulcer Identification:  Ulcer Type: venous    Contributing Factors: edema, venous stasis, lymphedema, diabetes, and obesity    Acute Wound: N/A    PAST MEDICAL HISTORY        Diagnosis Date    Bilateral venous leg ulcers 2020    Cellulitis of lower extremity 2019    GERD (gastroesophageal reflux disease)     Impaired fasting glucose 2013    MRSA carrier     MRSA nasal colonization 2021    Osteoarthritis of both knees     Screening PSA (prostate specific antigen) 2015;17    Nml:0.53(2015);17=nml.    Stasis dermatitis of both legs 2020    Tobacco use     Tubular adenoma of colon 2017       PAST SURGICAL HISTORY    Past Surgical History:   Procedure Laterality Date    CIRCUMCISION      COLONOSCOPY  2017    polypectomy (cold biopsy) :Dr. Walker:next in 3yrs=2020    KNEE ARTHROSCOPY Left 2020    VIDEO ARTHROSCOPY LEFT KNEE, PARTIAL MEDIAL MENISCECTOMY, CHONDROPLASTY, SYNOVIAL BIOPSY -TOM=4- performed by Mina Florence MD at [facility] OR    MOUTH SURGERY      PAIN MANAGEMENT PROCEDURE Left 2020    COOLIEF RADIOFREQUENCY ABLATION - LEFT performed by Johan Harvey MD at [facility] ENDOSCOPY    SLEEVE GASTRECTOMY N/A 2021    ROBOTIC ASSISTED LAPAROSCOPIC SLEEVE GASTRECTOMY performed by

## 2024-10-31 ENCOUNTER — TELEPHONE (OUTPATIENT)
Dept: WOUND CARE | Age: 64
End: 2024-10-31

## 2024-10-31 ENCOUNTER — CARE COORDINATION (OUTPATIENT)
Dept: OTHER | Facility: CLINIC | Age: 64
End: 2024-10-31

## 2024-10-31 NOTE — TELEPHONE ENCOUNTER
Returned pt's call. Doris Bocanegra [phone] located at [address] [place] [address]. Pt instructed to call back with any further issues.

## 2024-10-31 NOTE — CARE COORDINATION
Care Transitions Note    Follow Up Call     Patient Current Location:  [place]    Care Transition Nurse contacted the patient by telephone. Verified name and  as identifiers.    Additional needs identified to be addressed with provider   No needs identified                 Method of communication with provider: none.    Care Summary Note: Pt is doing ok, he attended wound care center a couple of days ago, he wears unna boots on both LE. He has an order for Compression Stockings however he tried [facility] and they do not take [employer]. Pt is going to clarify his lasix order with his NP. He did not weigh today but normally he does he had somewhere to be this morning and was in a rush. I provided patient the with phone number to [employer] on [facility] to see if they take [employer] insurance . Pt does have an intermittent cough Pt wife works with [employer]. Pt has no other questions today for me but I will continue to follow     Plan of care updates since last contact:  New order for compression stockings       Advance Care Planning:   Does patient have an Advance Directive: reviewed and current.    Medication Review:  Medications changed since last call, reviewed today.     Remote Patient Monitoring:  Offered patient enrollment in the Remote Patient Monitoring (RPM) program for in-home monitoring: Patient is not eligible for RPM program because: insurance coverage.    Assessments:  Care Transitions Subsequent and Final Call    Subsequent and Final Calls  Do you have any questions related to your medications?: Yes  Patient Reports: pt will clarify with his NP  Do you currently have any active services?: Yes  Are you currently active with any services?: Home Health  Identified Barriers: None  Care Transitions Interventions  Other Interventions:              Follow Up Appointment:   Discuss outcome from wound appt and his appt with pcp.   Future Appointments         Provider Specialty Dept Phone    2024 1:00 PM

## 2024-11-01 ENCOUNTER — TELEPHONE (OUTPATIENT)
Dept: WOUND CARE | Age: 64
End: 2024-11-01

## 2024-11-01 NOTE — TELEPHONE ENCOUNTER
Patient states wife called [employer] and he will need to discuss with her on how to proceed. He is not sure about the details of the conversation. Will discuss at wound care visit on Tuesday.

## 2024-11-05 ENCOUNTER — HOSPITAL ENCOUNTER (OUTPATIENT)
Dept: WOUND CARE | Age: 64
Discharge: HOME | End: 2024-11-05
Attending: PODIATRIST
Payer: COMMERCIAL

## 2024-11-05 VITALS
DIASTOLIC BLOOD PRESSURE: 80 MMHG | SYSTOLIC BLOOD PRESSURE: 129 MMHG | RESPIRATION RATE: 20 BRPM | TEMPERATURE: 97.3 F | HEART RATE: 99 BPM

## 2024-11-05 DIAGNOSIS — I87.2 PERIPHERAL VENOUS INSUFFICIENCY: ICD-10-CM

## 2024-11-05 DIAGNOSIS — I89.0 LYMPHEDEMA: Primary | ICD-10-CM

## 2024-11-05 PROCEDURE — 99212 OFFICE O/P EST SF 10 MIN: CPT

## 2024-11-05 RX ORDER — CLOBETASOL PROPIONATE 0.5 MG/G
OINTMENT TOPICAL ONCE
OUTPATIENT
Start: 2024-11-05 | End: 2024-11-05

## 2024-11-05 RX ORDER — BACITRACIN ZINC AND POLYMYXIN B SULFATE 500; 1000 [USP'U]/G; [USP'U]/G
OINTMENT TOPICAL ONCE
OUTPATIENT
Start: 2024-11-05 | End: 2024-11-05

## 2024-11-05 RX ORDER — TRIAMCINOLONE ACETONIDE 1 MG/G
OINTMENT TOPICAL ONCE
OUTPATIENT
Start: 2024-11-05 | End: 2024-11-05

## 2024-11-05 RX ORDER — LIDOCAINE 40 MG/G
CREAM TOPICAL ONCE
OUTPATIENT
Start: 2024-11-05 | End: 2024-11-05

## 2024-11-05 RX ORDER — SILVER SULFADIAZINE 10 MG/G
CREAM TOPICAL ONCE
OUTPATIENT
Start: 2024-11-05 | End: 2024-11-05

## 2024-11-05 RX ORDER — LIDOCAINE 50 MG/G
OINTMENT TOPICAL ONCE
OUTPATIENT
Start: 2024-11-05 | End: 2024-11-05

## 2024-11-05 RX ORDER — GINSENG 100 MG
CAPSULE ORAL ONCE
OUTPATIENT
Start: 2024-11-05 | End: 2024-11-05

## 2024-11-05 RX ORDER — NEOMYCIN/BACITRACIN/POLYMYXINB 3.5-400-5K
OINTMENT (GRAM) TOPICAL ONCE
OUTPATIENT
Start: 2024-11-05 | End: 2024-11-05

## 2024-11-05 RX ORDER — LIDOCAINE HYDROCHLORIDE 20 MG/ML
JELLY TOPICAL ONCE
OUTPATIENT
Start: 2024-11-05 | End: 2024-11-05

## 2024-11-05 RX ORDER — GENTAMICIN SULFATE 1 MG/G
OINTMENT TOPICAL ONCE
OUTPATIENT
Start: 2024-11-05 | End: 2024-11-05

## 2024-11-05 RX ORDER — LIDOCAINE HYDROCHLORIDE 40 MG/ML
SOLUTION TOPICAL ONCE
OUTPATIENT
Start: 2024-11-05 | End: 2024-11-05

## 2024-11-05 RX ORDER — SODIUM CHLOR/HYPOCHLOROUS ACID 0.033 %
SOLUTION, IRRIGATION IRRIGATION ONCE
OUTPATIENT
Start: 2024-11-05 | End: 2024-11-05

## 2024-11-05 RX ORDER — BETAMETHASONE DIPROPIONATE 0.5 MG/G
CREAM TOPICAL ONCE
OUTPATIENT
Start: 2024-11-05 | End: 2024-11-05

## 2024-11-05 RX ORDER — MUPIROCIN 20 MG/G
OINTMENT TOPICAL ONCE
OUTPATIENT
Start: 2024-11-05 | End: 2024-11-05

## 2024-11-05 ASSESSMENT — ENCOUNTER SYMPTOMS
DIARRHEA: 0
NAUSEA: 0

## 2024-11-05 NOTE — PATIENT INSTRUCTIONS
DISCHARGE INSTRUCTIONS  [facility] [place] [facility]   [address]  Telephone: [phone] FAX [phone]    NAME:  Dannie Dan  YOB: 1960  MEDICAL RECORD NUMBER:  [id number]  DATE:  11/5/2024      Congratulations! You have completed your treatment program!    To prevent Venous Wounds from recurring again:  Elevate your legs at least parallel to the ground while sitting.  Wear your prescription support stockings everyday and remove at night while you sleep.  Replace your stockings every 3-6 months so that your legs continue to get the support they need.  Inspect your legs and feet daily and report any increased swelling, unusual redness or new wounds to your physician.  Wash your legs and feet regularly with mild soap and water and moisturize daily.  Continue to use compression pumps if prescribed by your physician.  Avoid overeating. Extra weight adds pressure on the veins in your legs.  Limit salty foods as they promote swelling or fluid retention in your legs.      Additional instructions for healed wound/skin care: Apply High Spandagrips to bilateral lower legs. Wear until prescription stockings are obtained.    We can continue to assist in trying to obtain compression stockings. You may end up having to pay out of pocket if options run out.      Call the Wound Care Center if your wound reopens, if new wounds occur, or if you have any other questions about your follow up care [phone].     [] Patient unable to sign Discharge Instructions given to ECF/Transportation/POA    Electronically signed by Homero Glover RN on 11/5/2024 at 1:36 PM

## 2024-11-05 NOTE — CARE COORDINATION
Compression Garments    Supply Company for Compression Stockings:     [facility] [address] [address]  p: [phone] f: [phone]     Ordering Center:     [facility] [place] [address]  [facility] [address]  FAX NUMBER: [phone]    Patient Information:      [address]  [facility] [address]   [phone]   : 1960  AGE: 64 y.o.     GENDER: male   TODAYS DATE:  2024    Insurance:      PRIMARY INSURANCE:  Plan: MEDICARE PART A  Coverage: MEDICARE  Effective Date: 2022  Group Number: [unfilled]  Subscriber Number: [id number] - (Commercial)    SECONDARY INSURANCE:  Plan: [employer] EMPL[employer]YEES  Coverage: [facility]  Effective Date: 2024  [unfilled]    [unfilled]     [unfilled]   [unfilled]     Patient Information:      Problem List Items Addressed This Visit       Lymphedema - Primary    Relevant Orders    Initiate Outpatient Wound Care Protocol    Peripheral venous insufficiency    Relevant Orders    Initiate Outpatient Wound Care Protocol            Right Leg Measurements: (ALL measurements are in cm)  Heel to below knee: 35  Right Leg Edema Point of Measurement  Great toe to forefoot: 12 cm  Heel to ankle: 11 cm  Heel to calf: 32  Leg circumference: 51 cm  Ankle circumference: 26 cm  Foot circumference: 24 cm      Left Leg Measurements: (ALL measurements are in cm) Heel to below knee: 35  Left Leg Edema Point of Measurement  Great toe to forefoot: 12 cm  Heel to ankle: 11 cm  Heel to calf: 32  Leg circumference: 48 cm  Ankle circumference: 27 cm  Foot circumference: 24 cm  Compression Therapy: Compression ordered, Tubular elastic support bandage  Tubular Elastic Support Bandage Compression Pressure: High    Supplies Requested :     Medicare Requirements  Patient must have a qualifying Active Venus Ulcer if ordering Bilateral Compression Wounds MUST be

## 2024-11-05 NOTE — PROGRESS NOTES
Made call out to pt to inform that Rx for compression wraps were sent to [facility] [phone] or [facility]  No further needs at this time.  
or any other constitutional symptoms including but not limited to nausea, vomiting, chills, fever      Patient Instructions   DISCHARGE INSTRUCTIONS  [facility]   [address]  Telephone: [phone] FAX [phone]    NAME:  Dannie Dan  YOB: 1960  MEDICAL RECORD NUMBER:  [id number]  DATE:  11/5/2024      Congratulations! You have completed your treatment program!    To prevent Venous Wounds from recurring again:  Elevate your legs at least parallel to the ground while sitting.  Wear your prescription support stockings everyday and remove at night while you sleep.  Replace your stockings every 3-6 months so that your legs continue to get the support they need.  Inspect your legs and feet daily and report any increased swelling, unusual redness or new wounds to your physician.  Wash your legs and feet regularly with mild soap and water and moisturize daily.  Continue to use compression pumps if prescribed by your physician.  Avoid overeating. Extra weight adds pressure on the veins in your legs.  Limit salty foods as they promote swelling or fluid retention in your legs.      Additional instructions for healed wound/skin care: Apply High Spandagrips to bilateral lower legs. Wear until prescription stockings are obtained.    We can continue to assist in trying to obtain compression stockings. You may end up having to pay out of pocket if options run out.      Call the Wound Care Center if your wound reopens, if new wounds occur, or if you have any other questions about your follow up care [phone].     [] Patient unable to sign Discharge Instructions given to ECF/Transportation/POA    Electronically signed by Homero Glover RN on 11/5/2024 at 1:36 PM       Electronically signed by Hunter Elmore DPM on 11/5/2024 at 2:19 PM

## 2024-11-06 ENCOUNTER — CARE COORDINATION (OUTPATIENT)
Dept: OTHER | Facility: CLINIC | Age: 64
End: 2024-11-06

## 2024-11-06 NOTE — CARE COORDINATION
Care Transitions Note    Follow Up Call     Patient Current Location:  Home: [address] [place] [address] [place] [address]    Care Transition Nurse contacted the patient by telephone. Verified name and  as identifiers.    Additional needs identified to be addressed with provider   No needs identified                 Method of communication with provider: none.    Care Summary Note: Pt is doing ok, he is continuing to attend wound care for his lower extremity wounds and lymphedema. He does check his blood sugar fasting on most days, yesterday was 130. He is trying to do better with his diabetes. Last year his A1C was 6.1 currently is 8.1 and we discussed this. Pt is anxious to get off of some of his medications if possible. We discussed this at length and he is going to call his NP and set up an appt with her to discuss this. Pt weighs inconsistently but said he stays between 369-372. Pt has no questions today . Will follow     Plan of care updates since last contact:  Review of medications pt will discuss further with his NP        Advance Care Planning:   Does patient have an Advance Directive: deferred at this time, will discuss on future follow up. .    Medication Review:  Medication reconciliation was performed with patient,1111F entered: no.    Remote Patient Monitoring:  Offered patient enrollment in the Remote Patient Monitoring (RPM) program for in-home monitoring: Patient is not eligible for RPM program because: insurance coverage.    Assessments:  Care Transitions Subsequent and Final Call    Subsequent and Final Calls  Have your medications changed?: No  Do you have any questions related to your medications?: No  Do you currently have any active services?: No  Are you currently active with any services?: Home Health  Do you have any needs or concerns that I can assist you with?: No  Identified Barriers: None  Care Transitions Interventions  Other Interventions:              Follow Up Appointment:   Follow up appt

## 2024-11-07 ENCOUNTER — TELEPHONE (OUTPATIENT)
Dept: WOUND CARE | Age: 64
End: 2024-11-07

## 2024-11-07 NOTE — TELEPHONE ENCOUNTER
Blisters appeared after taking off tubigrip.  Does patient need to be seen?  
Yes please have pt return for an appt. Most mel needs to be placed in compression wraps again.  
Blindness of both eyes    CAD (coronary artery disease)    Diabetes mellitus due to underlying condition with complications    Essential hypertension    Neuropathy

## 2024-11-11 ENCOUNTER — TELEPHONE (OUTPATIENT)
Dept: WOUND CARE | Age: 64
End: 2024-11-11

## 2024-11-18 ENCOUNTER — CARE COORDINATION (OUTPATIENT)
Dept: OTHER | Facility: CLINIC | Age: 64
End: 2024-11-18

## 2024-11-18 NOTE — CARE COORDINATION
Care Transitions Note    Follow Up Call     Attempted to reach patient for transitions of care follow up.  Unable to reach patient.      Outreach Attempts:   HIPAA compliant voicemail left for patient.         Follow Up Appointment:   Future Appointments         Provider Specialty Dept Phone    1/24/2025 1:00 PM Carmella Santos APRN - CNP Cardiology [phone]    4/11/2025 11:15 AM Pritesh Yu MD Cardiology [phone]            Plan for follow-up call in 6-10 days based on severity of symptoms and risk factors. Plan for next call:  wound healed? Any further appts a wound care? Blood sugars     Janeth Perkins, RN    Janeth CHOU, RN- [facility]  Associate Care Manager  [phone]  [email or web address]

## 2024-11-21 ENCOUNTER — PATIENT MESSAGE (OUTPATIENT)
Dept: CARDIOLOGY CLINIC | Age: 64
End: 2024-11-21

## 2024-11-21 NOTE — TELEPHONE ENCOUNTER
Requested Prescriptions     Pending Prescriptions Disp Refills    empagliflozin (JARDIANCE) 25 MG tablet 30 tablet 5     Sig: Take 1 tablet by mouth daily            Checked Correct Pharmacy: Yes    Any changes since last refill? No     Number: 30    Refills: 5    Last Office Visit: 10/25/2024     Next Office Visit: 1/24/2025

## 2024-11-22 RX ORDER — ROPINIROLE 3 MG/1
3 TABLET, FILM COATED ORAL 3 TIMES DAILY
Qty: 270 TABLET | Refills: 0 | Status: SHIPPED | OUTPATIENT
Start: 2024-11-22 | End: 2025-02-20

## 2024-11-26 ENCOUNTER — CARE COORDINATION (OUTPATIENT)
Dept: OTHER | Facility: CLINIC | Age: 64
End: 2024-11-26

## 2024-11-26 NOTE — CARE COORDINATION
Care Transitions Note    Final Call     Patient Current Location:  [place]    Care Transition Nurse contacted the patient by telephone. Verified name and  as identifiers.    Patient graduated from the Care Transitions program on 24 .  Patient/family verbalizes confidence in the ability to self-manage at this time. has the ability to self manage at this time..      Advance Care Planning:   Does patient have an Advance Directive: not on file; education provided -   .    Handoff:   Patient was not referred to the ACM team due to no additional needs identified.       Care Summary Note: Pt returned call. He is doing well. Weight is stable states it may go up a pound or 2 then is back down.l Blood sugar this morning was 127. Last A1C 8.1 in t had ingrown toe nail removed by his podiatrist, he is having some issues with the compression socks pushing on his toes and being too tight he said. He is going to take them with him the next appt with the podiatrist and let her look at them and she what she thinks.His compression socks are from [facility] , that was the only place that took his insurance.  Pt drives and has transportation. His next follow up with cardiology is in 2025. The wound on his leg is healed and closed he said. No wound clinic visits right now . Pt checks his legs and feet daily he said. [facility] will graduate patient today     Assessments:  No changes since last call    Upcoming Appointments:    Future Appointments         Provider Specialty Dept Phone    2025 1:00 PM Carmella Santos APRN - CNP Cardiology [phone]    2025 11:15 AM Pritesh Yu MD Cardiology [phone]            Patient has agreed to contact primary care provider and/or specialist for any further questions, concerns, or needs.    Janeth Perkins RN

## 2024-11-26 NOTE — CARE COORDINATION
Care Transitions Note    Follow Up Call     Attempted to reach patient for transitions of care follow up.  Unable to reach patient.      Outreach Attempts:   HIPAA compliant voicemail left for patient.   [employer] message sent.           Follow Up Appointment:   Future Appointments         Provider Specialty Dept Phone    1/24/2025 1:00 PM Carmella Santos APRN - CNP Cardiology [phone]    4/11/2025 11:15 AM Pritesh Yu MD Cardiology [phone]            Plan for follow-up call in 6-10 days based on severity of symptoms and risk factors. Plan for next call:  review for any further care mtg needs     Janeth Perkins RN

## 2025-02-25 ENCOUNTER — PATIENT MESSAGE (OUTPATIENT)
Dept: FAMILY MEDICINE CLINIC | Age: 65
End: 2025-02-25

## 2025-02-25 RX ORDER — AMLODIPINE BESYLATE 5 MG/1
5 TABLET ORAL DAILY
Qty: 30 TABLET | Refills: 3 | Status: SHIPPED | OUTPATIENT
Start: 2025-02-25

## 2025-02-25 RX ORDER — LISINOPRIL 20 MG/1
20 TABLET ORAL DAILY
Qty: 30 TABLET | Refills: 3 | Status: SHIPPED | OUTPATIENT
Start: 2025-02-25

## 2025-02-25 RX ORDER — SPIRONOLACTONE 25 MG/1
25 TABLET ORAL DAILY
Qty: 30 TABLET | Refills: 3 | Status: SHIPPED | OUTPATIENT
Start: 2025-02-25

## 2025-03-07 RX ORDER — ROPINIROLE 3 MG/1
3 TABLET, FILM COATED ORAL 3 TIMES DAILY
Qty: 270 TABLET | Refills: 0 | Status: SHIPPED | OUTPATIENT
Start: 2025-03-07 | End: 2025-06-05

## 2025-04-16 DIAGNOSIS — J30.1 SEASONAL ALLERGIC RHINITIS DUE TO POLLEN: ICD-10-CM

## 2025-04-17 RX ORDER — POTASSIUM CHLORIDE 1500 MG/1
20 TABLET, EXTENDED RELEASE ORAL 2 TIMES DAILY
Qty: 180 TABLET | Refills: 0 | Status: SHIPPED | OUTPATIENT
Start: 2025-04-17

## 2025-04-17 RX ORDER — LORATADINE 10 MG/1
10 TABLET ORAL DAILY
Qty: 90 TABLET | Refills: 0 | Status: SHIPPED | OUTPATIENT
Start: 2025-04-17

## 2025-04-17 NOTE — TELEPHONE ENCOUNTER
Medication:   Requested Prescriptions     Pending Prescriptions Disp Refills    metFORMIN (GLUCOPHAGE) 500 MG tablet 180 tablet 1     Sig: Take 1 tablet by mouth 2 times daily (with meals)        Last Filled:      Patient Phone Number: [phone] (home)     Last appt: 8/21/2024   Next appt:    Last OARRS:       8/3/2020    11:46 AM   RX Monitoring   Periodic Controlled Substance Monitoring No signs of potential drug abuse or diversion identified.

## 2025-04-17 NOTE — TELEPHONE ENCOUNTER
Medication:   Requested Prescriptions     Pending Prescriptions Disp Refills    loratadine (CLARITIN) 10 MG tablet 90 tablet 1     Sig: Take 1 tablet by mouth daily        Last Filled:      Patient Phone Number: [phone] (home)     Last appt: 8/21/2024   Next appt: Visit date not found    Last OARRS:       8/3/2020    11:46 AM   RX Monitoring   Periodic Controlled Substance Monitoring No signs of potential drug abuse or diversion identified.

## 2025-04-17 NOTE — TELEPHONE ENCOUNTER
Medication:   Requested Prescriptions     Pending Prescriptions Disp Refills    potassium chloride (K-TAB) 20 MEQ TBCR extended release tablet 180 tablet 1     Sig: Take 1 tablet by mouth 2 times daily        Last Filled:      Patient Phone Number: [phone] (home)     Last appt: 8/21/2024   Next appt:      Last OARRS:       8/3/2020    11:46 AM   RX Monitoring   Periodic Controlled Substance Monitoring No signs of potential drug abuse or diversion identified.

## 2025-05-22 NOTE — TELEPHONE ENCOUNTER
Medication:   Requested Prescriptions     Pending Prescriptions Disp Refills    amLODIPine (NORVASC) 5 MG tablet 30 tablet 3     Sig: Take 1 tablet by mouth daily    spironolactone (ALDACTONE) 25 MG tablet 30 tablet 3     Sig: Take 1 tablet by mouth daily    lisinopril (PRINIVIL;ZESTRIL) 20 MG tablet 30 tablet 3     Sig: Take 1 tablet by mouth daily        Last Filled:      Patient Phone Number: [phone] (home)     Last appt: 8/21/2024   Next appt: Visit date not found    Last OARRS:       8/3/2020    11:46 AM   RX Monitoring   Periodic Controlled Substance Monitoring No signs of potential drug abuse or diversion identified.          [phone].

## 2025-05-26 RX ORDER — AMLODIPINE BESYLATE 5 MG/1
5 TABLET ORAL DAILY
Qty: 90 TABLET | Refills: 0 | Status: SHIPPED | OUTPATIENT
Start: 2025-05-26

## 2025-05-26 RX ORDER — SPIRONOLACTONE 25 MG/1
25 TABLET ORAL DAILY
Qty: 90 TABLET | Refills: 0 | Status: SHIPPED | OUTPATIENT
Start: 2025-05-26

## 2025-05-26 RX ORDER — AMLODIPINE BESYLATE 5 MG/1
5 TABLET ORAL DAILY
Qty: 90 TABLET | Refills: 0 | OUTPATIENT
Start: 2025-05-26

## 2025-05-26 RX ORDER — LISINOPRIL 20 MG/1
20 TABLET ORAL DAILY
Qty: 90 TABLET | Refills: 0 | OUTPATIENT
Start: 2025-05-26

## 2025-05-26 RX ORDER — LISINOPRIL 20 MG/1
20 TABLET ORAL DAILY
Qty: 90 TABLET | Refills: 0 | Status: SHIPPED | OUTPATIENT
Start: 2025-05-26

## 2025-05-26 RX ORDER — SPIRONOLACTONE 25 MG/1
25 TABLET ORAL DAILY
Qty: 90 TABLET | Refills: 0 | OUTPATIENT
Start: 2025-05-26

## 2025-06-03 ENCOUNTER — TELEMEDICINE (OUTPATIENT)
Dept: FAMILY MEDICINE CLINIC | Age: 65
End: 2025-06-03
Payer: COMMERCIAL

## 2025-06-03 DIAGNOSIS — I50.32 CHRONIC HEART FAILURE WITH PRESERVED EJECTION FRACTION (HCC): ICD-10-CM

## 2025-06-03 DIAGNOSIS — E11.9 TYPE 2 DIABETES MELLITUS WITHOUT COMPLICATION, WITHOUT LONG-TERM CURRENT USE OF INSULIN (HCC): Primary | ICD-10-CM

## 2025-06-03 DIAGNOSIS — I50.31 ACUTE DIASTOLIC CHF (CONGESTIVE HEART FAILURE) (HCC): ICD-10-CM

## 2025-06-03 DIAGNOSIS — L97.909 VENOUS STASIS ULCER, UNSPECIFIED SITE, UNSPECIFIED ULCER STAGE, UNSPECIFIED WHETHER VARICOSE VEINS PRESENT (HCC): ICD-10-CM

## 2025-06-03 DIAGNOSIS — J81.0 ACUTE PULMONARY EDEMA (HCC): ICD-10-CM

## 2025-06-03 DIAGNOSIS — I10 ESSENTIAL HYPERTENSION: ICD-10-CM

## 2025-06-03 DIAGNOSIS — G25.81 RLS (RESTLESS LEGS SYNDROME): ICD-10-CM

## 2025-06-03 DIAGNOSIS — E66.813 CLASS 3 SEVERE OBESITY DUE TO EXCESS CALORIES WITH SERIOUS COMORBIDITY AND BODY MASS INDEX (BMI) OF 50.0 TO 59.9 IN ADULT (HCC): ICD-10-CM

## 2025-06-03 DIAGNOSIS — I83.009 VENOUS STASIS ULCER, UNSPECIFIED SITE, UNSPECIFIED ULCER STAGE, UNSPECIFIED WHETHER VARICOSE VEINS PRESENT (HCC): ICD-10-CM

## 2025-06-03 DIAGNOSIS — E66.813 CLASS 3 SEVERE OBESITY DUE TO EXCESS CALORIES WITH SERIOUS COMORBIDITY AND BODY MASS INDEX (BMI) OF 45.0 TO 49.9 IN ADULT (HCC): ICD-10-CM

## 2025-06-03 PROCEDURE — 99214 OFFICE O/P EST MOD 30 MIN: CPT | Performed by: NURSE PRACTITIONER

## 2025-06-03 RX ORDER — ROPINIROLE 3 MG/1
3 TABLET, FILM COATED ORAL 3 TIMES DAILY
Qty: 270 TABLET | Refills: 1 | Status: SHIPPED | OUTPATIENT
Start: 2025-06-03 | End: 2025-09-01

## 2025-06-03 ASSESSMENT — ENCOUNTER SYMPTOMS
BLOOD IN STOOL: 0
BACK PAIN: 0
SORE THROAT: 0
RHINORRHEA: 0
ABDOMINAL PAIN: 0
NAUSEA: 0
DIARRHEA: 0
COUGH: 0
COLOR CHANGE: 0
CONSTIPATION: 0
VOMITING: 0
SHORTNESS OF BREATH: 0
WHEEZING: 0
EYE ITCHING: 0
SINUS PRESSURE: 0
EYE REDNESS: 0
CHEST TIGHTNESS: 0

## 2025-06-03 NOTE — PROGRESS NOTES
Dannie Dan, was evaluated through a synchronous (real-time) audio-video encounter. The patient (or guardian if applicable) is aware that this is a billable service, which includes applicable co-pays. This Virtual Visit was conducted with patient's (and/or legal guardian's) consent. Patient identification was verified, and a caregiver was present when appropriate.   The patient was located at Home: [address] [place] [address]   [place] [id number]   Provider was located at Facility (Appt Dept): [address] [place], [place] [id number]  Confirm you are appropriately licensed, registered, or certified to deliver care in the state where the patient is located as indicated above. If you are not or unsure, please re-schedule the visit: Yes, I confirm.     Dannie Dan (:  1960) is a 64 y.o. male, Established patient, here for evaluation of the following chief complaint(s):  Medication Check         Assessment & Plan  Type 2 diabetes mellitus without complication, without long-term current use of insulin (HCC)   - Blood sugar was 126 this morning.  - Routine labs and an A1c test will be conducted to evaluate the overall management of his diabetes.  - Blood work will be checked for any deficiencies that could be contributing to his symptoms.  - Results will be reviewed and discussed upon completion of the tests.    Orders:    CBC; Future    Comprehensive Metabolic Panel; Future    Lipid, Fasting; Future    TSH reflex to FT4; Future    Vitamin D 25 Hydroxy; Future    Hemoglobin A1C; Future    Albumin/Creatinine Ratio, Urine; Future    Essential hypertension       Orders:    CBC; Future    Comprehensive Metabolic Panel; Future    Lipid, Fasting; Future    TSH reflex to FT4; Future    Vitamin D 25 Hydroxy; Future    Hemoglobin A1C; Future    Albumin/Creatinine Ratio, Urine; Future    Class 3 severe obesity due to excess calories with serious comorbidity and body mass index (BMI) of 45.0 to 49.9 in adult

## 2025-06-03 NOTE — ASSESSMENT & PLAN NOTE
- Blood sugar was 126 this morning.  - Routine labs and an A1c test will be conducted to evaluate the overall management of his diabetes.  - Blood work will be checked for any deficiencies that could be contributing to his symptoms.  - Results will be reviewed and discussed upon completion of the tests.    Orders:    CBC; Future    Comprehensive Metabolic Panel; Future    Lipid, Fasting; Future    TSH reflex to FT4; Future    Vitamin D 25 Hydroxy; Future    Hemoglobin A1C; Future    Albumin/Creatinine Ratio, Urine; Future

## 2025-06-03 NOTE — ASSESSMENT & PLAN NOTE
Chronic, not at goal (unstable), patient fortunately has not had any improvement in weight discussed making changes and lifestyle modifications recommended

## 2025-06-03 NOTE — ASSESSMENT & PLAN NOTE
- Currently on the maximum dosage of ropinirole, which appears to be less effective recently.  - Gabapentin was discussed as an alternative treatment option.  - Will maintain consistent adherence to his ropinirole regimen for the next month.  - Fasting blood work will be ordered to assess for any potential electrolyte imbalances that could be contributing to his symptoms. If there is no improvement in his symptoms, a transition from ropinirole to gabapentin will be considered.

## 2025-06-05 DIAGNOSIS — E66.813 CLASS 3 SEVERE OBESITY DUE TO EXCESS CALORIES WITH SERIOUS COMORBIDITY AND BODY MASS INDEX (BMI) OF 45.0 TO 49.9 IN ADULT (HCC): ICD-10-CM

## 2025-06-05 DIAGNOSIS — E11.9 TYPE 2 DIABETES MELLITUS WITHOUT COMPLICATION, WITHOUT LONG-TERM CURRENT USE OF INSULIN (HCC): ICD-10-CM

## 2025-06-05 DIAGNOSIS — I10 ESSENTIAL HYPERTENSION: ICD-10-CM

## 2025-06-06 LAB
25(OH)D3 SERPL-MCNC: 14.2 NG/ML
ALBUMIN SERPL-MCNC: 4.2 G/DL (ref 3.4–5)
ALBUMIN/GLOB SERPL: 1.5 {RATIO} (ref 1.1–2.2)
ALP SERPL-CCNC: 108 U/L (ref 40–129)
ALT SERPL-CCNC: 35 U/L (ref 10–40)
ANION GAP SERPL CALCULATED.3IONS-SCNC: 11 MMOL/L (ref 3–16)
AST SERPL-CCNC: 28 U/L (ref 15–37)
BILIRUB SERPL-MCNC: 0.5 MG/DL (ref 0–1)
BUN SERPL-MCNC: 13 MG/DL (ref 7–20)
CALCIUM SERPL-MCNC: 9.6 MG/DL (ref 8.3–10.6)
CHLORIDE SERPL-SCNC: 103 MMOL/L (ref 99–110)
CHOLEST SERPL-MCNC: 126 MG/DL (ref 0–199)
CO2 SERPL-SCNC: 28 MMOL/L (ref 21–32)
CREAT SERPL-MCNC: 0.7 MG/DL (ref 0.8–1.3)
CREAT UR-MCNC: 116 MG/DL (ref 39–259)
DEPRECATED RDW RBC AUTO: 14.5 % (ref 12.4–15.4)
EST. AVERAGE GLUCOSE BLD GHB EST-MCNC: 182.9 MG/DL
GFR SERPLBLD CREATININE-BSD FMLA CKD-EPI: >90 ML/MIN/{1.73_M2}
GLUCOSE SERPL-MCNC: 159 MG/DL (ref 70–99)
HBA1C MFR BLD: 8 %
HCT VFR BLD AUTO: 41.6 % (ref 40.5–52.5)
HDLC SERPL-MCNC: 47 MG/DL (ref 40–60)
HGB BLD-MCNC: 14 G/DL (ref 13.5–17.5)
LDL CHOLESTEROL: 52 MG/DL
MCH RBC QN AUTO: 32.1 PG (ref 26–34)
MCHC RBC AUTO-ENTMCNC: 33.7 G/DL (ref 31–36)
MCV RBC AUTO: 95.1 FL (ref 80–100)
MICROALBUMIN UR DL<=1MG/L-MCNC: <1.2 MG/DL
MICROALBUMIN/CREAT UR: NORMAL MG/G (ref 0–30)
PLATELET # BLD AUTO: 172 K/UL (ref 135–450)
PMV BLD AUTO: 8.3 FL (ref 5–10.5)
POTASSIUM SERPL-SCNC: 5.5 MMOL/L (ref 3.5–5.1)
PROT SERPL-MCNC: 7 G/DL (ref 6.4–8.2)
RBC # BLD AUTO: 4.37 M/UL (ref 4.2–5.9)
SODIUM SERPL-SCNC: 142 MMOL/L (ref 136–145)
TRIGL SERPL-MCNC: 136 MG/DL (ref 0–150)
TSH SERPL DL<=0.005 MIU/L-ACNC: 2.45 UIU/ML (ref 0.27–4.2)
VLDLC SERPL CALC-MCNC: 27 MG/DL
WBC # BLD AUTO: 7.1 K/UL (ref 4–11)

## 2025-06-09 ENCOUNTER — RESULTS FOLLOW-UP (OUTPATIENT)
Dept: FAMILY MEDICINE CLINIC | Age: 65
End: 2025-06-09

## 2025-06-09 RX ORDER — ERGOCALCIFEROL 1.25 MG/1
50000 CAPSULE, LIQUID FILLED ORAL WEEKLY
Qty: 12 CAPSULE | Refills: 1 | Status: SHIPPED | OUTPATIENT
Start: 2025-06-09

## 2025-07-03 DIAGNOSIS — J30.1 SEASONAL ALLERGIC RHINITIS DUE TO POLLEN: ICD-10-CM

## 2025-07-07 RX ORDER — LORATADINE 10 MG/1
10 TABLET ORAL DAILY
Qty: 90 TABLET | Refills: 1 | Status: SHIPPED | OUTPATIENT
Start: 2025-07-07

## 2025-07-07 RX ORDER — POTASSIUM CHLORIDE 1500 MG/1
20 TABLET, EXTENDED RELEASE ORAL 2 TIMES DAILY
Qty: 180 TABLET | Refills: 1 | Status: SHIPPED | OUTPATIENT
Start: 2025-07-07

## 2025-07-07 NOTE — TELEPHONE ENCOUNTER
Requested Prescriptions     Pending Prescriptions Disp Refills    empagliflozin (JARDIANCE) 25 MG tablet 30 tablet 1     Sig: Take 1 tablet by mouth daily            Checked Correct Pharmacy: Yes    Any changes since last refill? No     Number: 30  Refills: 1    Last OV: 10/25/2024 Provider: [facility]    Next OV: Visit date not found Provider: not scheduled yet    Last Labs:   CBC:  Lab Results   Component Value Date    WBC 7.1 06/05/2025    HGB 14.0 06/05/2025    HCT 41.6 06/05/2025    MCV 95.1 06/05/2025     06/05/2025    LYMPHOPCT 17.3 10/22/2024    RBC 4.37 06/05/2025    MCH 32.1 06/05/2025    MCHC 33.7 06/05/2025    RDW 14.5 06/05/2025           CMP:  Lab Results   Component Value Date     06/05/2025    K 5.5 (H) 06/05/2025     06/05/2025    CO2 28 06/05/2025    BUN 13 06/05/2025    CREATININE 0.7 (L) 06/05/2025    GLUCOSE 159 (H) 06/05/2025    CALCIUM 9.6 06/05/2025    BILITOT 0.5 06/05/2025    ALKPHOS 108 06/05/2025    AST 28 06/05/2025    ALT 35 06/05/2025    LABGLOM >90 06/05/2025    GFRAA >60 10/27/2021    AGRATIO 1.5 06/05/2025    GLOB 2.6 09/28/2021

## 2025-07-07 NOTE — TELEPHONE ENCOUNTER
Medication:   Requested Prescriptions     Pending Prescriptions Disp Refills    potassium chloride (K-TAB) 20 MEQ TBCR extended release tablet 180 tablet 0     Sig: Take 1 tablet by mouth 2 times daily    metFORMIN (GLUCOPHAGE) 500 MG tablet 180 tablet 0     Sig: Take 1 tablet by mouth 2 times daily (with meals)    loratadine (CLARITIN) 10 MG tablet 90 tablet 0     Sig: Take 1 tablet by mouth daily        Last Filled:      Patient Phone Number: [phone] (home)     Last appt: 6/3/2025   Next appt: Visit date not found    Last OARRS:       8/3/2020    11:46 AM   RX Monitoring   Periodic Controlled Substance Monitoring No signs of potential drug abuse or diversion identified.

## (undated) DEVICE — CORD RETRCT SIL

## (undated) DEVICE — ZINACTIVE USE 2537982 PAD GRND FOR RF PAIN MGMT DISP

## (undated) DEVICE — SOLUTION INJ LR VISIV 1000ML BG

## (undated) DEVICE — STERILE TOTAL KNEE DRAPE PACK: Brand: CARDINAL HEALTH

## (undated) DEVICE — AGENT HEMSTAT W2XL4IN OXIDIZED REGENERATED CELOS ABSRB

## (undated) DEVICE — SYSTEM SMK EVAC LAP TBNG FILTER HSNG BENT STYL PNK SEE CLR

## (undated) DEVICE — SOLUTION IRRIG 5L LAC R BG

## (undated) DEVICE — PIN BNE FIX TEMP L140MM DIA4MM MAKO

## (undated) DEVICE — GOWN SIRUS NONREIN XL W/TWL: Brand: MEDLINE INDUSTRIES, INC.

## (undated) DEVICE — RELOAD STPL L60MM H2.3-4.2MM VERY THCK TISS BLK 6 ROW

## (undated) DEVICE — SUTURE ABSORBABLE MONOFILAMENT 1-0 OS8 14 IN STRATAFIX SPRL SXPD2B202

## (undated) DEVICE — BANDAGE COMPR W6INXL12FT SMOOTH FOR LIMB EXSANG ESMARCH

## (undated) DEVICE — FORCEPS BX L240CM JAW DIA2.4MM ORNG L CAP W/ NDL DISP RAD

## (undated) DEVICE — Z CONVERTED USE 2273232 BANDAGE COMPR W6INXL11YD E KNIT DBL SELF CLSR EZE-BAND

## (undated) DEVICE — APPLICATOR MEDICATED 10.5 CC SOLUTION CLR STRL CHLORAPREP

## (undated) DEVICE — ZIMMER® STERILE DISPOSABLE TOURNIQUET CUFF WITH PLC, DUAL PORT, SINGLE BLADDER, 30 IN. (76 CM)

## (undated) DEVICE — DUAL CUT SAGITTAL BLADE

## (undated) DEVICE — SOLUTION IV IRRIG POUR BRL 0.9% SODIUM CHL 2F7124

## (undated) DEVICE — ELECTROSURGICAL PENCIL ROCKER SWITCH NON COATED BLADE ELECTRODE 10 FT (3 M) CORD HOLSTER: Brand: MEGADYNE

## (undated) DEVICE — 3M™ STERI-DRAPE™ U-DRAPE, LONG 1019: Brand: STERI-DRAPE™

## (undated) DEVICE — PIN BNE FIX L110MM DIA32MM
Type: IMPLANTABLE DEVICE | Site: KNEE | Status: NON-FUNCTIONAL
Removed: 2021-10-26

## (undated) DEVICE — SURGICAL SET UP - SURE SET: Brand: MEDLINE INDUSTRIES, INC.

## (undated) DEVICE — GARMENT,MEDLINE,DVT,INT,CALF,LG, GEN2: Brand: MEDLINE

## (undated) DEVICE — BANDAGE,SELF ADHRNT,COFLEX,4"X5YD,STRL: Brand: COLABEL

## (undated) DEVICE — BANDAGE COMPR W4INXL15FT BGE E SGL LAYERED CLP CLSR

## (undated) DEVICE — SUTURE VCRL SZ 1 L18IN ABSRB UD L36MM CT-1 1/2 CIR J841D

## (undated) DEVICE — BLADE SURG SAW STD S STL OSC W/ SERR EDGE DISP

## (undated) DEVICE — ZIMMER® STERILE DISPOSABLE TOURNIQUET CUFF WITH PLC, DUAL PORT, SINGLE BLADDER, 34 IN. (86 CM)

## (undated) DEVICE — NEEDLE HYPO 25GA L1.5IN BLU POLYPR HUB S STL REG BVL STR

## (undated) DEVICE — SOLUTION PREP POVIDONE IOD FOR SKIN MUCOUS MEM PRIOR TO

## (undated) DEVICE — BANDAGE COMPR W6INXL10YD ST M E WHITE/BEIGE

## (undated) DEVICE — MARKER,SKIN,WI/RULER AND LABELS: Brand: MEDLINE

## (undated) DEVICE — STAPLER INT BIOABSRB REINF ECHELON ENDOPATH 60

## (undated) DEVICE — GLOVE SURG SZ 75 L12IN THK75MIL DK GRN LTX FREE

## (undated) DEVICE — CADIERE FORCEPS: Brand: ENDOWRIST

## (undated) DEVICE — GLOVE SURG SZ 75 L12IN FNGR THK94MIL STD WHT LTX FREE

## (undated) DEVICE — GOWN SIRUS NONREIN LG W/TWL: Brand: MEDLINE INDUSTRIES, INC.

## (undated) DEVICE — CANNULA SAMP CO2 AD GRN 7FT CO2 AND 7FT O2 TBNG UNIV CONN

## (undated) DEVICE — TROCAR: Brand: KII OPTICAL ACCESS SYSTEM

## (undated) DEVICE — BASIC SINGLE BASIN 1-LF: Brand: MEDLINE INDUSTRIES, INC.

## (undated) DEVICE — NEEDLE HYPO 22GA L1.5IN BLK POLYPR HUB S STL REG BVL STR

## (undated) DEVICE — SPONGE,LAP,4"X18",XR,ST,5/PK,40PK/CS: Brand: MEDLINE INDUSTRIES, INC.

## (undated) DEVICE — SUTURE VCRL SZ 2-0 L27IN ABSRB UD L26MM SH 1/2 CIR J417H

## (undated) DEVICE — NDL CNTR 20CT FM MAG: Brand: MEDLINE INDUSTRIES, INC.

## (undated) DEVICE — TROCAR: Brand: KII FIOS FIRST ENTRY

## (undated) DEVICE — KIT INT FIX FEM TIB CKPT MAKOPLASTY

## (undated) DEVICE — 60 ML SYRINGE,CATHETER TIP: Brand: MONOJECT

## (undated) DEVICE — SUTURE MCRYL SZ 4-0 L18IN ABSRB UD L19MM PS-2 3/8 CIR PRIM Y496G

## (undated) DEVICE — KIT TRK KNEE PROC VIZADISC

## (undated) DEVICE — SOLUTION IRRIG 250ML STRL H2O PLAS POUR BTL USP

## (undated) DEVICE — COOLIEF* COOLED RADIOFREQUENCY PROBE KIT: Brand: AVANOS

## (undated) DEVICE — 1010 S-DRAPE TOWEL DRAPE 10/BX: Brand: STERI-DRAPE™

## (undated) DEVICE — GLOVE SURG SZ 75 CRM LTX FREE POLYISOPRENE POLYMER BEAD ANTI

## (undated) DEVICE — CANNULA SEAL

## (undated) DEVICE — SUTURE STRATAFIX SPRL SZ 2 0 L14IN ABSRB UD MH L36MM 1 2 CIR SXMD2B401

## (undated) DEVICE — LAPAROSCOPIC SCISSORS: Brand: EPIX LAPAROSCOPIC SCISSORS

## (undated) DEVICE — BLANKET WRM W29.9XL79.1IN UP BODY FORC AIR MISTRAL-AIR

## (undated) DEVICE — MASK CAPNOGRAPHY AD W35IN DIA58IN SAMP LN L10FT O2 LN

## (undated) DEVICE — SHEET,DRAPE,53X77,STERILE: Brand: MEDLINE

## (undated) DEVICE — DRAPE,LAP,CHOLE,W/TROUGHS,STERILE: Brand: MEDLINE

## (undated) DEVICE — [HIGH FLOW INSUFFLATOR,  DO NOT USE IF PACKAGE IS DAMAGED,  KEEP DRY,  KEEP AWAY FROM SUNLIGHT,  PROTECT FROM HEAT AND RADIOACTIVE SOURCES.]: Brand: PNEUMOSURE

## (undated) DEVICE — NEEDLE INSUF L150MM DIA2MM DISP FOR PNEUMOPERI ENDOPATH

## (undated) DEVICE — PADDING CAST W6INXL4YD ST COT RAYON MICROPLEATED HIGHLY

## (undated) DEVICE — APPLICATOR MEDICATED 26 CC SOLUTION HI LT ORNG CHLORAPREP

## (undated) DEVICE — RELOAD STPL L60MM H1.5-3.6MM REG TISS BLU GRIPPING SURF B

## (undated) DEVICE — SEAL

## (undated) DEVICE — SUTURE VCRL SZ 4-0 L18IN ABSRB UD L19MM PS-2 3/8 CIR PRIM J496H

## (undated) DEVICE — GLOVE SURG SZ 65 L12IN FNGR THK79MIL GRN LTX FREE

## (undated) DEVICE — GLOVE SURG SZ 65 THK91MIL LTX FREE SYN POLYISOPRENE

## (undated) DEVICE — ANTI-FOG SOLUTION WITH FOAM PAD: Brand: DEVON

## (undated) DEVICE — MATTRESS MAXI AIR TRNSF SGL PT USE DCS 37 45 48 52 75

## (undated) DEVICE — BOOT POS LEG DEMAYO

## (undated) DEVICE — GOWN,SIRUS,POLYRNF,BRTHSLV,LG,30/CS: Brand: MEDLINE

## (undated) DEVICE — COVER,MAYO STAND,STERILE: Brand: MEDLINE

## (undated) DEVICE — TOWEL,OR,DSP,ST,BLUE,STD,4/PK,20PK/CS: Brand: MEDLINE

## (undated) DEVICE — GLOVE SURG SZ 6 CRM LTX FREE POLYISOPRENE POLYMER BEAD ANTI

## (undated) DEVICE — RELOAD STPL H1.8-3.8MM REG THCK TISS G 6 ROW GRIPPING SURF

## (undated) DEVICE — VESSEL SEALER EXTEND: Brand: ENDOWRIST

## (undated) DEVICE — SUTURE VCRL SZ 0 L54IN ABSRB VLT W/O NDL POLYGLACTIN 910 J616H

## (undated) DEVICE — SOLUTION IV 1000ML 0.9% SOD CHL FOR IRRIG PLAS CONT

## (undated) DEVICE — TUBE IRRIG L8IN LNG PT W/ CONN FOR PMP SYS REDEUCE

## (undated) DEVICE — TOTAL KNEE: Brand: MEDLINE INDUSTRIES, INC.

## (undated) DEVICE — COTTON UNDERCAST PADDING,CRIMPED FINISH: Brand: WEBRIL

## (undated) DEVICE — CATHETER IV 20GA L1.25IN PNK FEP SFTY STR HUB RADPQ DISP

## (undated) DEVICE — SYRINGE MED 10ML TRNSLUC BRL PLUNG BLK MRK POLYPR CTRL

## (undated) DEVICE — 40583 XL ADVANCED TRENDELENBURG POSITIONING KIT: Brand: 40583 XL ADVANCED TRENDELENBURG POSITIONING KIT

## (undated) DEVICE — Z DUP USE 2701075 SYSTEM SKIN CLSR 42CM DERMBND PRINEO

## (undated) DEVICE — Z DISCONTINUED USE 2716304 SUTURE STRATAFIX SPRL SZ 3-0 L12IN ABSRB UD FS-1 L24MM 3/8

## (undated) DEVICE — DRAPE ARTHRO 90X124IN KNEE SMS FAB EXT FLD COLL PCH RESIST

## (undated) DEVICE — ARM DRAPE

## (undated) DEVICE — BLADELESS OBTURATOR, LONG: Brand: WECK VISTA

## (undated) DEVICE — GEL US 20GM NONIRRITATING OVERWRAPPED FILE PCH TRNSMIT

## (undated) DEVICE — PLATE ES AD W 9FT CRD 2

## (undated) DEVICE — GLOVE SURG SZ 8 L12IN FNGR THK79MIL GRN LTX FREE

## (undated) DEVICE — PACK PROCEDURE SURG ARTHSCP CUST

## (undated) DEVICE — STAPLER SKIN L440MM 32MM LNG 12 FIRING B FRM PWR + GRIPPING

## (undated) DEVICE — 3M™ STERI-STRIP™ REINFORCED ADHESIVE SKIN CLOSURES, R1547, 1/2 IN X 4 IN (12 MM X 100 MM), 6 STRIPS/ENVELOPE: Brand: 3M™ STERI-STRIP™

## (undated) DEVICE — VISIGI 3D®  CALIBRATION SYSTEM  SIZE 36FR STD W/ BULB: Brand: BOEHRINGER® VISIGI 3D™ SLEEVE GASTRECTOMY CALIBRATION SYSTEM, SIZE 36FR W/BULB

## (undated) DEVICE — GLOVE ORTHO 8   MSG9480

## (undated) DEVICE — JEWISH HOSPITAL TURNOVER KIT: Brand: MEDLINE INDUSTRIES, INC.

## (undated) DEVICE — PAD,NON-ADHERENT,3X8,STERILE,LF,1/PK: Brand: MEDLINE

## (undated) DEVICE — BANDAGE COMPR W6INXL4.5YD LTWT E EC SGL LAYERED CLP CLSR

## (undated) DEVICE — KIT DRP FOR RIO ROBOTIC ARM ASST SYS

## (undated) DEVICE — GOWN,SIRUS,NON REINFRCD,LARGE,SET IN SL: Brand: MEDLINE

## (undated) DEVICE — STOCKINETTE,DOUBLE PLY,4X48,STERILE: Brand: MEDLINE

## (undated) DEVICE — GOWN,SIRUS,POLYRNF,BRTHSLV,XL,30/CS: Brand: MEDLINE

## (undated) DEVICE — 3M™ TEGADERM™ TRANSPARENT FILM DRESSING FRAME STYLE, 1624W, 2-3/8 IN X 2-3/4 IN (6 CM X 7 CM), 100/CT 4CT/CASE: Brand: 3M™ TEGADERM™

## (undated) DEVICE — BANDAGE,GAUZE,4.5"X4.1YD,STERILE,LF: Brand: MEDLINE

## (undated) DEVICE — SOLUTION ANTIFOG VIS SYS CLEARIFY LAPSCP

## (undated) DEVICE — PUMP SUC IRR TBNG L10FT W/ HNDPC ASSEMB STRYKEFLOW 2

## (undated) DEVICE — RELOAD STPL H4.1X2MM DIA60MM THCK TISS GRN 6 ROW PWR GST B

## (undated) DEVICE — DEVICE CLSR 10/12MM XL PRT SYS SUT PASS ST DISP CARTER

## (undated) DEVICE — SET ADMIN PRIMING 7ML L30IN 7.35LB 20 GTT 2ND RLER CLMP

## (undated) DEVICE — COVER US PRB W10XL61CM W/ GEL RUBBERBAND TAPE STRP FLD GEN

## (undated) DEVICE — PADDING CAST W6INXL4YD NONSTERILE COT RAYON MICROPLEATED

## (undated) DEVICE — GAUZE,SPONGE,4"X4",16PLY,STRL,LF,10/TRAY: Brand: MEDLINE

## (undated) DEVICE — SOLUTION IV 1000ML LAC RINGERS PH 6.5 INJ USP VIAFLX PLAS

## (undated) DEVICE — T-DRAPE,EXTREMITY,STERILE: Brand: MEDLINE

## (undated) DEVICE — ADHESIVE SKIN CLSR 0.7ML TOP DERMBND ADV

## (undated) DEVICE — SYRINGE, LUER LOCK, 5ML: Brand: MEDLINE

## (undated) DEVICE — BINDER ABD H12IN FOR 62-74IN WAIST UNIV 4 PNL PREM DSGN E

## (undated) DEVICE — SURE SET-DOUBLE BASIN-LF: Brand: MEDLINE INDUSTRIES, INC.

## (undated) DEVICE — GLOVE SURG SZ 8 L12IN FNGR THK94MIL STD WHT LTX FREE

## (undated) DEVICE — SET GRAV VENT NVENT CK VLV 3 NDL FREE PRT 10 GTT

## (undated) DEVICE — NEEDLE, QUINCKE, 25GX3.5": Brand: MEDLINE

## (undated) DEVICE — GLOVE ORANGE PI 7   MSG9070

## (undated) DEVICE — 4.5 MM FULL RADIUS STRAIGHT                                    BLADES, POWER/EP-1, YELLOW, PACKAGED                                    6 PER BOX, STERILE: Brand: DYONICS

## (undated) DEVICE — SYRINGE MED 10ML SLIP TIP BLNT FILL AND LUERLOCK DISP

## (undated) DEVICE — BOWL MED L 32OZ PLAS W/ MOLD GRAD EZ OPN PEEL PCH

## (undated) DEVICE — PADDING CAST W4INXL4YD NONSTERILE COT RAYON MICROPLEATED